# Patient Record
Sex: MALE | Race: WHITE | Employment: FULL TIME | URBAN - METROPOLITAN AREA
[De-identification: names, ages, dates, MRNs, and addresses within clinical notes are randomized per-mention and may not be internally consistent; named-entity substitution may affect disease eponyms.]

---

## 2019-05-08 ENCOUNTER — OFFICE VISIT (OUTPATIENT)
Dept: INTERNAL MEDICINE CLINIC | Age: 54
End: 2019-05-08

## 2019-05-08 ENCOUNTER — HOSPITAL ENCOUNTER (EMERGENCY)
Age: 54
Discharge: HOME | End: 2019-05-08
Attending: EMERGENCY MEDICINE
Payer: COMMERCIAL

## 2019-05-08 ENCOUNTER — HOSPITAL ENCOUNTER (OUTPATIENT)
Dept: ULTRASOUND IMAGING | Age: 54
Discharge: HOME | End: 2019-05-08
Attending: INTERNAL MEDICINE
Payer: COMMERCIAL

## 2019-05-08 VITALS
BODY MASS INDEX: 30.01 KG/M2 | OXYGEN SATURATION: 95 % | HEIGHT: 68 IN | SYSTOLIC BLOOD PRESSURE: 162 MMHG | DIASTOLIC BLOOD PRESSURE: 101 MMHG | HEART RATE: 91 BPM | WEIGHT: 198 LBS | TEMPERATURE: 98.5 F | RESPIRATION RATE: 16 BRPM

## 2019-05-08 VITALS
HEART RATE: 80 BPM | WEIGHT: 208.4 LBS | HEIGHT: 68 IN | RESPIRATION RATE: 18 BRPM | TEMPERATURE: 98.1 F | BODY MASS INDEX: 31.58 KG/M2 | OXYGEN SATURATION: 96 % | DIASTOLIC BLOOD PRESSURE: 88 MMHG | SYSTOLIC BLOOD PRESSURE: 146 MMHG

## 2019-05-08 DIAGNOSIS — I82.401 ACUTE THROMBOEMBOLISM OF DEEP VEINS OF RIGHT LOWER EXTREMITY (HCC): Primary | ICD-10-CM

## 2019-05-08 DIAGNOSIS — M79.89 RIGHT LEG SWELLING: ICD-10-CM

## 2019-05-08 DIAGNOSIS — M79.89 RIGHT LEG SWELLING: Primary | ICD-10-CM

## 2019-05-08 PROBLEM — E66.9 OBESITY (BMI 30-39.9): Status: ACTIVE | Noted: 2019-05-08

## 2019-05-08 LAB
ALBUMIN SERPL-MCNC: 3.8 G/DL (ref 3.5–5)
ALBUMIN/GLOB SERPL: 1 {RATIO} (ref 1.1–2.2)
ALP SERPL-CCNC: 113 U/L (ref 45–117)
ALT SERPL-CCNC: 43 U/L (ref 12–78)
ANION GAP SERPL CALC-SCNC: 4 MMOL/L (ref 5–15)
AST SERPL-CCNC: 33 U/L (ref 15–37)
BASOPHILS # BLD: 0.1 K/UL (ref 0–0.1)
BASOPHILS NFR BLD: 1 % (ref 0–1)
BILIRUB SERPL-MCNC: 0.4 MG/DL (ref 0.2–1)
BUN SERPL-MCNC: 15 MG/DL (ref 6–20)
BUN/CREAT SERPL: 15 (ref 12–20)
CALCIUM SERPL-MCNC: 9.2 MG/DL (ref 8.5–10.1)
CHLORIDE SERPL-SCNC: 104 MMOL/L (ref 97–108)
CO2 SERPL-SCNC: 30 MMOL/L (ref 21–32)
CREAT SERPL-MCNC: 1.01 MG/DL (ref 0.7–1.3)
DIFFERENTIAL METHOD BLD: ABNORMAL
EOSINOPHIL # BLD: 0.2 K/UL (ref 0–0.4)
EOSINOPHIL NFR BLD: 3 % (ref 0–7)
ERYTHROCYTE [DISTWIDTH] IN BLOOD BY AUTOMATED COUNT: 12.6 % (ref 11.5–14.5)
GLOBULIN SER CALC-MCNC: 3.7 G/DL (ref 2–4)
GLUCOSE SERPL-MCNC: 113 MG/DL (ref 65–100)
HCT VFR BLD AUTO: 42.5 % (ref 36.6–50.3)
HGB BLD-MCNC: 14.3 G/DL (ref 12.1–17)
IMM GRANULOCYTES # BLD AUTO: 0.1 K/UL (ref 0–0.04)
IMM GRANULOCYTES NFR BLD AUTO: 1 % (ref 0–0.5)
INR PPP: 1 (ref 0.9–1.1)
LYMPHOCYTES # BLD: 1.3 K/UL (ref 0.8–3.5)
LYMPHOCYTES NFR BLD: 19 % (ref 12–49)
MCH RBC QN AUTO: 28.7 PG (ref 26–34)
MCHC RBC AUTO-ENTMCNC: 33.6 G/DL (ref 30–36.5)
MCV RBC AUTO: 85.3 FL (ref 80–99)
MONOCYTES # BLD: 0.6 K/UL (ref 0–1)
MONOCYTES NFR BLD: 8 % (ref 5–13)
NEUTS SEG # BLD: 4.8 K/UL (ref 1.8–8)
NEUTS SEG NFR BLD: 68 % (ref 32–75)
NRBC # BLD: 0 K/UL (ref 0–0.01)
NRBC BLD-RTO: 0 PER 100 WBC
PLATELET # BLD AUTO: 242 K/UL (ref 150–400)
PMV BLD AUTO: 10.6 FL (ref 8.9–12.9)
POTASSIUM SERPL-SCNC: 3.9 MMOL/L (ref 3.5–5.1)
PROT SERPL-MCNC: 7.5 G/DL (ref 6.4–8.2)
PROTHROMBIN TIME: 10.3 SEC (ref 9–11.1)
RBC # BLD AUTO: 4.98 M/UL (ref 4.1–5.7)
SODIUM SERPL-SCNC: 138 MMOL/L (ref 136–145)
WBC # BLD AUTO: 7 K/UL (ref 4.1–11.1)

## 2019-05-08 PROCEDURE — 80053 COMPREHEN METABOLIC PANEL: CPT

## 2019-05-08 PROCEDURE — 36415 COLL VENOUS BLD VENIPUNCTURE: CPT

## 2019-05-08 PROCEDURE — 85610 PROTHROMBIN TIME: CPT

## 2019-05-08 PROCEDURE — 96372 THER/PROPH/DIAG INJ SC/IM: CPT

## 2019-05-08 PROCEDURE — 99282 EMERGENCY DEPT VISIT SF MDM: CPT

## 2019-05-08 PROCEDURE — 93971 EXTREMITY STUDY: CPT

## 2019-05-08 PROCEDURE — 74011250636 HC RX REV CODE- 250/636: Performed by: PHYSICIAN ASSISTANT

## 2019-05-08 PROCEDURE — 85025 COMPLETE CBC W/AUTO DIFF WBC: CPT

## 2019-05-08 RX ORDER — ENOXAPARIN SODIUM 100 MG/ML
90 INJECTION SUBCUTANEOUS
Status: COMPLETED | OUTPATIENT
Start: 2019-05-08 | End: 2019-05-08

## 2019-05-08 RX ORDER — RANITIDINE HCL 75 MG
75 TABLET ORAL 2 TIMES DAILY
COMMUNITY
End: 2021-01-18

## 2019-05-08 RX ADMIN — ENOXAPARIN SODIUM 90 MG: 100 INJECTION SUBCUTANEOUS at 18:36

## 2019-05-08 NOTE — PROGRESS NOTES
Chief Complaint Patient presents with  Pain (Chronic) right leg top of calf x 2 weeks 1. Have you been to the ER, urgent care clinic since your last visit? Hospitalized since your last visit? [facility]. 2. Have you seen or consulted any other health care providers outside of the [address] [place] [address] since your last visit? Include any pap smears or colon screening.   no

## 2019-05-08 NOTE — DISCHARGE INSTRUCTIONS
Patient Education        Deep Vein Thrombosis: Care Instructions  Your Care Instructions    A deep vein thrombosis (DVT) is a blood clot in certain veins of the legs, pelvis, or arms. Blood clots in these veins need to be treated because they can get bigger, break loose, and travel through the bloodstream to the lungs. A blood clot in a lung can be life-threatening. The doctor may have given you a blood thinner (anticoagulant). A blood thinner can stop the blood clot from growing larger and prevent new clots from forming. You will need to take a blood thinner for 3 to 6 months or longer. The doctor has checked you carefully, but problems can develop later. If you notice any problems or new symptoms, get medical treatment right away. Follow-up care is a key part of your treatment and safety. Be sure to make and go to all appointments, and call your doctor if you are having problems. It's also a good idea to know your test results and keep a list of the medicines you take. How can you care for yourself at home? · Take your medicines exactly as prescribed. Call your doctor if you think you are having a problem with your medicine. · If you are taking a blood thinner, be sure you get instructions about how to take your medicine safely. Blood thinners can cause serious bleeding problems. · Wear compression stockings if your doctor recommends them. These stockings are tighter at the feet than on the legs. They may reduce pain and swelling in your legs. But there are different types of stockings, and they need to fit right. So your doctor will recommend what you need. · When you sit, use a pillow to raise the arm or leg that has the blood clot. Try to keep it above the level of your heart. When should you call for help? Call 911 anytime you think you may need emergency care.  For example, call if:    · You passed out (lost consciousness).     · You have symptoms of a blood clot in your lung (called a pulmonary embolism). These include:  ? Sudden chest pain. ? Trouble breathing. ? Coughing up blood.    Call your doctor now or seek immediate medical care if:    · You have new or worse trouble breathing.     · You are dizzy or lightheaded, or you feel like you may faint.     · You have symptoms of a blood clot in your arm or leg. These may include:  ? Pain in the arm, calf, back of the knee, thigh, or groin. ? Redness and swelling in the arm, leg, or groin.    Watch closely for changes in your health, and be sure to contact your doctor if:    · You do not get better as expected. Where can you learn more? Go to http://maria luisa-yg.info/. Enter H807 in the search box to learn more about \"Deep Vein Thrombosis: Care Instructions. \"  Current as of: September 26, 2018  Content Version: 11.9  © 7604-5404 [employer]. Care instructions adapted under license by [employer] (which disclaims liability or warranty for this information). If you have questions about a medical condition or this instruction, always ask your healthcare professional. [place] [address] any warranty or liability for your use of this information.

## 2019-05-08 NOTE — PATIENT INSTRUCTIONS

## 2019-05-08 NOTE — PROGRESS NOTES
This note will not be viewable in [address] Subjective:  
 
Mr. Marissa Gonzalez presents to the office today with complaints of right calf swelling tenderness and warmth. He notes that this began approximately 2 weeks ago. Several days before this he played some with [employer] ball. He denies any injury to the leg and has had no pain with weightbearing. He gives no history of prior blood clots or immobility. He has had no chest pains, shortness of breath or palpitations. Past Medical History:  
Diagnosis Date  GERD (gastroesophageal reflux disease)  Obesity (BMI 30-39. 9) 5/8/2019 Past Surgical History:  
Procedure Laterality Date  HX ORTHOPAEDIC No Known Allergies Current Outpatient Medications Medication Sig Dispense Refill  raNITIdine (ZANTAC) 75 mg tab Take  by mouth two (2) times a day. Social History Socioeconomic History  Marital status: SINGLE Spouse name: Not on file  Number of children: Not on file  Years of education: Not on file  Highest education level: Not on file Tobacco Use  Smoking status: Never Smoker  Smokeless tobacco: Current User Substance and Sexual Activity  Alcohol use: Yes Comment: rarely Family History Problem Relation Age of Onset  Cancer Mother  Cancer Father Review of Systems: 
GEN: no weight loss, weight gain, fatigue or night sweats CV: no PND, orthopnea, or palpitations Resp: no dyspnea on exertion, no cough Abd: no nausea, vomiting or diarrhea EXT: Positive for right calf swelling and pain Endocrine: no hair loss, excessive thirst or polyuria Neurological ROS: no TIA or stroke symptoms ROS otherwise negative Objective:  
 
Visit Vitals /88 (BP 1 Location: Right arm, BP Patient Position: Sitting) Pulse 80 Temp 98.1 °F (36.7 °C) (Oral) Resp 18 Ht 5' 8\" (1.727 m) Wt 208 lb 6.4 oz (94.5 kg) SpO2 96% BMI 31.69 kg/m² Body mass index is 31.69 kg/m². General:   alert, cooperative and no distress Heart: S1 and S2 normal,no murmurs noted Lungs: Clear to auscultation bilaterally, no increased work of breathing Abdomen: Soft, nontender. Normal bowel sounds Extremities: There is mild swelling of the right calf and pain with palpation over the proximal posterior gastrocnemius region. No Baker's cyst is felt. The leg is warm as compared to the left Neuro: ..alert, oriented x3,speech normal in context and clarity, cranial nerves II-XII intact,motor strength: full proximally and distally,gait: normal 
reflexes: full and symmetric Physical exam otherwise negative Assessment/Plan:  
 
Diagnoses and all orders for this visit: 
 
Right leg swelling 
-     DUPLEX LOWER EXT VENOUS RIGHT; Future Other instructions: The patient's medication is reviewed and reconciled. He is having  right calf swelling warmth and tenderness and will need to rule out DVT. A stat venous Doppler of the right leg will be ordered for today. Follow-up here pending results of the above Follow-up and Dispositions · Return if symptoms worsen or fail to improve.  
  
 
 
Rosi Medina MD

## 2019-05-08 NOTE — ED PROVIDER NOTES
EMERGENCY DEPARTMENT HISTORY AND PHYSICAL EXAM 
 
 
Date: 5/8/2019 Patient Name: Kemi Zurita History of Presenting Illness Chief Complaint Patient presents with  Leg Swelling  
  pt was brought from [address] [facility] with DVT in right leg, has had swelling in leg x2 weeks off and on History Provided By: Patient HPI: Kemi Zurita, 47 y.o. male with PMHx significant for GERD, presents via wheelchair from outpatient ultrasound to the ED with cc of right leg DVT. 2 weeks ago, the patient began having pain to his right posterior calf and felt like he simply strained a muscle. He has been elevating the leg and taking over-the-counter analgesics but it has not been improving. He went to see his PCP, who noted that his leg was slightly swollen and erythematous, and ordered an outpatient ultrasound. He had that done today and it was positive for DVT from the right proximal femoral vein to the right peroneal vein. He has never had a DVT before. He is a  and spends a lot of time in the car. He denies recent surgery, immobilization, or active cancer. He denies chest pain or shortness of breath. There are no other complaints, changes, or physical findings at this time. PCP: Rosalva Bishop MD 
 
No current facility-administered medications on file prior to encounter. Current Outpatient Medications on File Prior to Encounter Medication Sig Dispense Refill  raNITIdine (ZANTAC) 75 mg tab Take  by mouth two (2) times a day. Past History Past Medical History: 
Past Medical History:  
Diagnosis Date  GERD (gastroesophageal reflux disease)  Obesity (BMI 30-39. 9) 5/8/2019 Past Surgical History: 
Past Surgical History:  
Procedure Laterality Date  HX ORTHOPAEDIC Family History: 
Family History Problem Relation Age of Onset  Cancer Mother  Cancer Father Social History: 
Social History Tobacco Use  
  Smoking status: Never Smoker  Smokeless tobacco: Current User Substance Use Topics  Alcohol use: Yes Comment: rarely  Drug use: Not on file Allergies: 
No Known Allergies Review of Systems Review of Systems Constitutional: Negative for chills and fever. HENT: Negative for ear pain and sore throat. Eyes: Negative for redness and visual disturbance. Respiratory: Negative for cough and shortness of breath. Cardiovascular: Negative for chest pain and palpitations. Gastrointestinal: Negative for abdominal pain, nausea and vomiting. Genitourinary: Negative for dysuria and hematuria. Musculoskeletal: Negative for back pain and gait problem. Positive for right leg pain and swelling. Skin: Negative for rash and wound. Neurological: Negative for dizziness and headaches. Psychiatric/Behavioral: Negative for behavioral problems and confusion. All other systems reviewed and are negative. Physical Exam  
Physical Exam  
Constitutional: He is oriented to person, place, and time. He appears well-developed and well-nourished. No distress. HENT:  
Head: Normocephalic and atraumatic. Eyes: Pupils are equal, round, and reactive to light. Conjunctivae and EOM are normal.  
Neck: Normal range of motion. Neck supple. Cardiovascular: Normal rate, regular rhythm and normal heart sounds. Pulmonary/Chest: Effort normal and breath sounds normal. No respiratory distress. He has no wheezes. He has no rales. Musculoskeletal: Normal range of motion. Right mild swelling to the right lower leg with tenderness to palpation over the proximal calf. Slight erythema along the medial leg. 2+ dorsalis pedis and posterior tibial pulses. Neurological: He is alert and oriented to person, place, and time. He has normal strength. No cranial nerve deficit or sensory deficit. GCS eye subscore is 4. GCS verbal subscore is 5. GCS motor subscore is 6. Skin: Skin is warm and dry. No rash noted. Psychiatric: He has a normal mood and affect. His behavior is normal.  
Nursing note and vitals reviewed. Diagnostic Study Results Labs - Recent Results (from the past 12 hour(s)) CBC WITH AUTOMATED DIFF Collection Time: 05/08/19  4:49 PM  
Result Value Ref Range WBC 7.0 4.1 - 11.1 K/uL  
 RBC 4.98 4.10 - 5.70 M/uL  
 HGB 14.3 12.1 - 17.0 g/dL HCT 42.5 36.6 - 50.3 % MCV 85.3 80.0 - 99.0 FL  
 MCH 28.7 26.0 - 34.0 PG  
 MCHC 33.6 30.0 - 36.5 g/dL  
 RDW 12.6 11.5 - 14.5 % PLATELET 036 298 - 701 K/uL MPV 10.6 8.9 - 12.9 FL  
 NRBC 0.0 0  WBC ABSOLUTE NRBC 0.00 0.00 - 0.01 K/uL NEUTROPHILS 68 32 - 75 % LYMPHOCYTES 19 12 - 49 % MONOCYTES 8 5 - 13 % EOSINOPHILS 3 0 - 7 % BASOPHILS 1 0 - 1 % IMMATURE GRANULOCYTES 1 (H) 0.0 - 0.5 % ABS. NEUTROPHILS 4.8 1.8 - 8.0 K/UL  
 ABS. LYMPHOCYTES 1.3 0.8 - 3.5 K/UL  
 ABS. MONOCYTES 0.6 0.0 - 1.0 K/UL  
 ABS. EOSINOPHILS 0.2 0.0 - 0.4 K/UL  
 ABS. BASOPHILS 0.1 0.0 - 0.1 K/UL  
 ABS. IMM. GRANS. 0.1 (H) 0.00 - 0.04 K/UL  
 DF AUTOMATED METABOLIC PANEL, COMPREHENSIVE Collection Time: 05/08/19  4:49 PM  
Result Value Ref Range Sodium 138 136 - 145 mmol/L Potassium 3.9 3.5 - 5.1 mmol/L Chloride 104 97 - 108 mmol/L  
 CO2 30 21 - 32 mmol/L Anion gap 4 (L) 5 - 15 mmol/L Glucose 113 (H) 65 - 100 mg/dL BUN 15 6 - 20 MG/DL Creatinine 1.01 0.70 - 1.30 MG/DL  
 BUN/Creatinine ratio 15 12 - 20 GFR est AA >60 >60 ml/min/1.73m2 GFR est non-AA >60 >60 ml/min/1.73m2 Calcium 9.2 8.5 - 10.1 MG/DL Bilirubin, total 0.4 0.2 - 1.0 MG/DL  
 ALT (SGPT) 43 12 - 78 U/L  
 AST (SGOT) 33 15 - 37 U/L Alk. phosphatase 113 45 - 117 U/L Protein, total 7.5 6.4 - 8.2 g/dL Albumin 3.8 3.5 - 5.0 g/dL Globulin 3.7 2.0 - 4.0 g/dL A-G Ratio 1.0 (L) 1.1 - 2.2 PROTHROMBIN TIME + INR Collection Time: 05/08/19  4:49 PM  
Result Value Ref Range INR 1.0 0.9 - 1.1 Prothrombin time 10.3 9.0 - 11.1 sec Radiologic Studies - No orders to display CT Results  (Last 48 hours) None CXR Results  (Last 48 hours) None Medical Decision Making I am the first provider for this patient. I reviewed the vital signs, available nursing notes, past medical history, past surgical history, family history and social history. Vital Signs-Reviewed the patient's vital signs. Patient Vitals for the past 12 hrs: 
 Temp Pulse Resp BP SpO2  
05/08/19 1641 98.5 °F (36.9 °C) 91 16 (!) 162/101 95 % Records Reviewed: Nursing Notes, Old Medical Records, Previous Radiology Studies and Previous Laboratory Studies Provider Notes (Medical Decision Making):  
Patient presents with confirmed DVT. He has no signs or symptoms of a pulmonary embolism. Plan to check kidney function, and give bridging dose of Lovenox, and start on Xarelto. I spent time educating the patient on DVT and answering all questions that he had. ED Course:  
Initial assessment performed. The patients presenting problems have been discussed, and they are in agreement with the care plan formulated and outlined with them. I have encouraged them to ask questions as they arise throughout their visit. Disposition: 
6:23 PM 
 
The patient has been re-evaluated and is ready for discharge. Reviewed available results with patient. Counseled patient on diagnosis and care plan. Patient has expressed understanding, and all questions have been answered. Patient agrees with plan and agrees to follow up as recommended, or to return to the ED if their symptoms worsen. Discharge instructions have been provided and explained to the patient, along with reasons to return to the ED. PLAN: 
1. Discharge Medication List as of 5/8/2019  6:20 PM  
  
START taking these medications  Details  
rivaroxaban (XARELTO) 15 mg (42)- 20 mg (9) DsPk Take one 15 mg tablet twice a day with food for the first 21 days. Then, take one 20 mg tablet once a day with food for 9 days. , Print, Disp-1 Dose Pack, R-0  
  
  
CONTINUE these medications which have NOT CHANGED Details  
raNITIdine (ZANTAC) 75 mg tab Take  by mouth two (2) times a day., Historical Med 2. Follow-up Information Follow up With Specialties Details Why Contact Info Claribel Hidalgo MD Internal Medicine Call in 1 day to schedule a follow up appointment [facility]. 
[phone] [facility] EMERGENCY DEPT Emergency Medicine Go to If symptoms worsen, or you develop chest pain, shortness of breath, lightheadedness [address] [place] [address] [address] [facility] 
[phone] Return to ED if worse Diagnosis Clinical Impression: 1. Acute thromboembolism of deep veins of right lower extremity (HCC) Queenie Membreno PA-C

## 2019-05-09 ENCOUNTER — TELEPHONE (OUTPATIENT)
Dept: INTERNAL MEDICINE CLINIC | Age: 54
End: 2019-05-09

## 2019-05-09 NOTE — TELEPHONE ENCOUNTER
Patient is calling, he advises that he went to the ED as Dr. Imtiaz Mendoza advised and was given a shot in his stomach and blood thinner medication to start taking. They advised him in the ED to follow up with us in a week and the patient is requesting to be worked in on Thursday after 3 or Friday as his has mandatory out of town conference in [place] (will return at 3).     Best call back # for patient: [phone]

## 2019-05-17 ENCOUNTER — OFFICE VISIT (OUTPATIENT)
Dept: INTERNAL MEDICINE CLINIC | Age: 54
End: 2019-05-17

## 2019-05-17 VITALS
DIASTOLIC BLOOD PRESSURE: 82 MMHG | HEART RATE: 112 BPM | BODY MASS INDEX: 31.83 KG/M2 | SYSTOLIC BLOOD PRESSURE: 138 MMHG | RESPIRATION RATE: 18 BRPM | HEIGHT: 68 IN | TEMPERATURE: 98.6 F | OXYGEN SATURATION: 96 % | WEIGHT: 210 LBS

## 2019-05-17 DIAGNOSIS — I82.411 ACUTE DEEP VEIN THROMBOSIS (DVT) OF FEMORAL VEIN OF RIGHT LOWER EXTREMITY (HCC): Primary | ICD-10-CM

## 2019-05-17 DIAGNOSIS — R05.9 COUGH: ICD-10-CM

## 2019-05-17 RX ORDER — BENZONATATE 200 MG/1
200 CAPSULE ORAL
Qty: 30 CAP | Refills: 0 | Status: SHIPPED | OUTPATIENT
Start: 2019-05-17 | End: 2019-05-24

## 2019-05-18 ENCOUNTER — HOSPITAL ENCOUNTER (EMERGENCY)
Age: 54
Discharge: HOME | End: 2019-05-18
Attending: EMERGENCY MEDICINE
Payer: COMMERCIAL

## 2019-05-18 VITALS
RESPIRATION RATE: 16 BRPM | SYSTOLIC BLOOD PRESSURE: 175 MMHG | TEMPERATURE: 98.6 F | DIASTOLIC BLOOD PRESSURE: 64 MMHG | WEIGHT: 205.69 LBS | BODY MASS INDEX: 31.17 KG/M2 | HEART RATE: 96 BPM | OXYGEN SATURATION: 99 % | HEIGHT: 68 IN

## 2019-05-18 DIAGNOSIS — R19.5 DARK STOOLS: ICD-10-CM

## 2019-05-18 DIAGNOSIS — I10 ACCELERATED HYPERTENSION: ICD-10-CM

## 2019-05-18 DIAGNOSIS — Z79.01 CHRONIC ANTICOAGULATION: Primary | ICD-10-CM

## 2019-05-18 DIAGNOSIS — Z86.718 HISTORY OF DVT (DEEP VEIN THROMBOSIS): ICD-10-CM

## 2019-05-18 LAB
ABO + RH BLD: NORMAL
ALBUMIN SERPL-MCNC: 3.8 G/DL (ref 3.5–5)
ALBUMIN/GLOB SERPL: 1.1 {RATIO} (ref 1.1–2.2)
ALP SERPL-CCNC: 114 U/L (ref 45–117)
ALT SERPL-CCNC: 52 U/L (ref 12–78)
ANION GAP SERPL CALC-SCNC: 4 MMOL/L (ref 5–15)
APTT PPP: 35.1 SEC (ref 22.1–32)
AST SERPL-CCNC: 66 U/L (ref 15–37)
BASOPHILS # BLD: 0.1 K/UL (ref 0–0.1)
BASOPHILS NFR BLD: 1 % (ref 0–1)
BILIRUB SERPL-MCNC: 0.4 MG/DL (ref 0.2–1)
BLOOD GROUP ANTIBODIES SERPL: NORMAL
BUN SERPL-MCNC: 13 MG/DL (ref 6–20)
BUN/CREAT SERPL: 15 (ref 12–20)
CALCIUM SERPL-MCNC: 9.1 MG/DL (ref 8.5–10.1)
CHLORIDE SERPL-SCNC: 106 MMOL/L (ref 97–108)
CO2 SERPL-SCNC: 30 MMOL/L (ref 21–32)
CREAT SERPL-MCNC: 0.87 MG/DL (ref 0.7–1.3)
DIFFERENTIAL METHOD BLD: ABNORMAL
EOSINOPHIL # BLD: 0.1 K/UL (ref 0–0.4)
EOSINOPHIL NFR BLD: 1 % (ref 0–7)
ERYTHROCYTE [DISTWIDTH] IN BLOOD BY AUTOMATED COUNT: 13 % (ref 11.5–14.5)
GLOBULIN SER CALC-MCNC: 3.6 G/DL (ref 2–4)
GLUCOSE SERPL-MCNC: 115 MG/DL (ref 65–100)
HCT VFR BLD AUTO: 42.8 % (ref 36.6–50.3)
HEMOCCULT STL QL: NEGATIVE
HGB BLD-MCNC: 14.4 G/DL (ref 12.1–17)
IMM GRANULOCYTES # BLD AUTO: 0.1 K/UL (ref 0–0.04)
IMM GRANULOCYTES NFR BLD AUTO: 1 % (ref 0–0.5)
INR PPP: 1.2 (ref 0.9–1.1)
LYMPHOCYTES # BLD: 1.3 K/UL (ref 0.8–3.5)
LYMPHOCYTES NFR BLD: 20 % (ref 12–49)
MCH RBC QN AUTO: 28.3 PG (ref 26–34)
MCHC RBC AUTO-ENTMCNC: 33.6 G/DL (ref 30–36.5)
MCV RBC AUTO: 84.3 FL (ref 80–99)
MONOCYTES # BLD: 0.6 K/UL (ref 0–1)
MONOCYTES NFR BLD: 9 % (ref 5–13)
NEUTS SEG # BLD: 4.2 K/UL (ref 1.8–8)
NEUTS SEG NFR BLD: 68 % (ref 32–75)
NRBC # BLD: 0 K/UL (ref 0–0.01)
NRBC BLD-RTO: 0 PER 100 WBC
PLATELET # BLD AUTO: 200 K/UL (ref 150–400)
PMV BLD AUTO: 11.2 FL (ref 8.9–12.9)
POTASSIUM SERPL-SCNC: 3.8 MMOL/L (ref 3.5–5.1)
PROT SERPL-MCNC: 7.4 G/DL (ref 6.4–8.2)
PROTHROMBIN TIME: 11.9 SEC (ref 9–11.1)
RBC # BLD AUTO: 5.08 M/UL (ref 4.1–5.7)
SODIUM SERPL-SCNC: 140 MMOL/L (ref 136–145)
SPECIMEN EXP DATE BLD: NORMAL
THERAPEUTIC RANGE,PTTT: ABNORMAL SECS (ref 58–77)
WBC # BLD AUTO: 6.2 K/UL (ref 4.1–11.1)

## 2019-05-18 PROCEDURE — 82272 OCCULT BLD FECES 1-3 TESTS: CPT

## 2019-05-18 PROCEDURE — 80053 COMPREHEN METABOLIC PANEL: CPT

## 2019-05-18 PROCEDURE — 85730 THROMBOPLASTIN TIME PARTIAL: CPT

## 2019-05-18 PROCEDURE — 86900 BLOOD TYPING SEROLOGIC ABO: CPT

## 2019-05-18 PROCEDURE — 85610 PROTHROMBIN TIME: CPT

## 2019-05-18 PROCEDURE — 85025 COMPLETE CBC W/AUTO DIFF WBC: CPT

## 2019-05-18 PROCEDURE — 36415 COLL VENOUS BLD VENIPUNCTURE: CPT

## 2019-05-18 PROCEDURE — 99282 EMERGENCY DEPT VISIT SF MDM: CPT

## 2019-05-18 RX ORDER — BISMUTH SUBSALICYLATE 262 MG
1 TABLET,CHEWABLE ORAL DAILY
COMMUNITY

## 2019-05-18 RX ORDER — GLUCOSAMINE SULFATE 1500 MG
1000 POWDER IN PACKET (EA) ORAL DAILY
COMMUNITY

## 2019-05-18 RX ORDER — ASCORBIC ACID 500 MG
500 TABLET ORAL DAILY
COMMUNITY

## 2019-05-18 RX ORDER — FAMOTIDINE 20 MG/1
20 TABLET, FILM COATED ORAL 2 TIMES DAILY
Qty: 20 TAB | Refills: 0 | Status: SHIPPED | OUTPATIENT
Start: 2019-05-18 | End: 2019-05-28

## 2019-05-18 NOTE — PROGRESS NOTES
Pharmacy Clarification of Prior to Admission Medication Regimen The patient was interviewed regarding clarification of the prior to admission medication regimen and was questioned regarding use of any other inhalers, topical products, over the counter medications, herbal medications, vitamin products or ophthalmic/nasal/otic medication use. Information Obtained From: RX Query, Patient Pertinent Pharmacy Findings: 
Patient appeared to be agitated and uncooperative. Patient answered questions, although it appeared he did not want to. 
rivaroxaban (XARELTO) 15 mg (42)- 20 mg (9) DsPk: Patient started a 30 day regimen on 5/9/19. Patient has completed 9 days of therapy as of 5/18/19. rivaroxaban (XARELTO) 20 mg tab tablet: Patient has not begun taking this agent. Identified High Alert Medication Information Current Anticoagulants: 
Name: rivaroxaban Kalyn Yoolist) PTA medication list was corrected to the following:  
 
Prior to Admission Medications Prescriptions Last Dose Informant Patient Reported? Taking?  
ascorbic acid, vitamin C, (VITAMIN C) 500 mg tablet 5/18/2019 at Unknown time Self Yes Yes Sig: Take 500 mg by mouth daily. benzonatate (TESSALON) 200 mg capsule 5/18/2019 at Unknown time Self No Yes Sig: Take 1 Cap by mouth three (3) times daily as needed for Cough for up to 7 days. cholecalciferol (VITAMIN D3) 1,000 unit cap 5/18/2019 at Unknown time Self Yes Yes Sig: Take 1,000 Units by mouth daily. multivitamin (ONE A DAY) tablet 5/18/2019 at Unknown time Self Yes Yes Sig: Take 1 Tab by mouth daily. raNITIdine (ZANTAC) 75 mg tab 4/18/2019 at Unknown time Self Yes Yes Sig: Take 75 mg by mouth two (2) times a day. rivaroxaban (XARELTO) 15 mg (42)- 20 mg (9) DsPk 5/18/2019 at Unknown time Self No Yes Sig: Take one 15 mg tablet twice a day with food for the first 21 days. Then, take one 20 mg tablet once a day with food for 9 days. rivaroxaban (XARELTO) 20 mg tab tablet Not Taking at Unknown time Self No No  
Sig: Take 1 Tab by mouth daily (with breakfast). Facility-Administered Medications: None Thank you, Marcey Rous Deri Shaker) Mortimer Capes, CPhT Medication History Pharmacy Technician

## 2019-05-18 NOTE — ED NOTES
Dr Karie Wheeler reviewed discharge instructions with the patient. The patient verbalized understanding. All questions and concerns were addressed. The patient declined a wheelchair and is discharged ambulatory in the care of family members with instructions and prescriptions in hand. Pt is alert and oriented x 4. Respirations are clear and unlabored.

## 2019-05-18 NOTE — DISCHARGE INSTRUCTIONS
Thank you for allowing us to take care of you today! We hope we addressed all of your concerns and needs. We strive to provide excellent quality care in the Emergency Department. You will receive a survey after your visit to evaluate the care you were provided. Should you receive a survey from us, we invite you to share your experience and tell us what made it excellent. It was a pleasure serving you, we invite you to share your experience with us, in our pursuit for excellence, should you be selected to receive a survey. The exam and treatment you received in the Emergency Department were for an urgent problem and are not intended as complete care. It is important that you follow up with a doctor, nurse practitioner, or physician assistant for ongoing care. If your symptoms become worse or you do not improve as expected and you are unable to reach your usual health care provider, you should return to the Emergency Department. We are available 24 hours a day. Please take your discharge instructions with you when you go to your follow-up appointment. If you have any problem arranging a follow-up appointment, contact the Emergency Department immediately. If a prescription has been provided, please have it filled as soon as possible to prevent a delay in treatment. Read the entire medication instruction sheet provided to you by the pharmacy. If you have any questions or reservations about taking the medication due to side effects or interactions with other medications, please call your primary care physician or contact the ER to speak with the charge nurse. Make an appointment with your family doctor or the physician you were referred to for follow-up of this visit as instructed on your discharge paperwork, as this is mandatory follow-up. Return to the ER if you are unable to be seen or if you are unable to be seen in a timely manner.     If you have any problem arranging the follow-up visit, contact the Emergency Department immediately. I hope you feel better and thank you again for allow us to provide you with excellent care today at [facility]! Warmest regards,    Ashutosh Park MD  Emergency Medicine Physician  [facility]                 Taking Blood Thinners Other Than Warfarin: Care Instructions  Your Care Instructions    Blood thinners are medicines that help prevent blood clots. They also help treat problems caused by blood clots. Your doctor may call them anticoagulants. Blood thinners don't really thin the blood. They slow down the time it takes for a blood clot to form. They also keep existing blood clots from getting bigger. Blood thinners can help prevent a stroke caused by a heart rhythm problem (atrial fibrillation). This heart rhythm problem can form clots in the heart that can then go to the brain. Blood thinners can also help prevent or treat blood clots in the legs or lungs. Examples of blood thinners include:  · Apixaban (Eliquis). · Dabigatran (Pradaxa). · Edoxaban (Savaysa). · Rivaroxaban (Xarelto). · Warfarin (Coumadin). These instructions are about blood thinner medicines except warfarin. If you take warfarin, your doctor will give you other instructions. Blood thinners can help save lives. But they can also cause problems. They can make you more likely to bleed. It's important to take them right and do everything you can to keep yourself safe. Follow-up care is a key part of your treatment and safety. Be sure to make and go to all appointments, and call your doctor if you are having problems. It's also a good idea to know your test results and keep a list of the medicines you take. How can you care for yourself at home? · Take your blood thinner exactly as your doctor prescribed them. · If you miss a dose, don't take an extra dose to make up for it.  Your doctor can tell you exactly what to do so you don't take too much or too little. · Ask your pharmacist if you should take your blood thinner with food or without food. · Take your blood thinner at the same time every day. Be careful not to run out of them. · Ask your pharmacist how to store your blood thinner. · Don't take other medicines before talking to your doctor or pharmacist first. This includes prescription medicines, over-the-counter medicines, vitamins, and herbal products. It also includes aspirin and other pain relievers, such as ibuprofen (Motrin). · Tell your doctors, dentist, and pharmacist that you are taking a blood thinner. · Wear medical alert jewelry. This lets others know that you take a blood thinner. You can buy it at most drugstores. · If you are pregnant, breastfeeding, or trying to get pregnant, tell your doctor. You and your doctor will decide what medicines are safe. If you are not planning on getting pregnant, talk to your doctor about how you can prevent pregnancy. · Try to avoid injuries. For example, be careful when you are exercising or playing sports. Make your home safe to reduce your risk of falling. When should you call for help? Call 911 anytime you think you may need emergency care. For example, call if:    · You have a sudden, severe headache that is different from past headaches.    Call your doctor now or seek immediate medical care if:    · You have any abnormal bleeding, such as:  ? Nosebleeds. ? Vaginal bleeding that is different (heavier, more frequent, at a different time of the month) than what you are used to.  ? Bloody or black stools, or rectal bleeding. ? Bloody or pink urine.    Watch closely for changes in your health, and be sure to contact your doctor if you have any problems. Where can you learn more? Go to http://maria luisa-yg.info/. Enter W492 in the search box to learn more about \"Taking Blood Thinners Other Than Warfarin: Care Instructions. \"  Current as of: July 22, 2018  Content Version: 11.9  © 5808-5961 [employer]. Care instructions adapted under license by [employer] (which disclaims liability or warranty for this information). If you have questions about a medical condition or this instruction, always ask your healthcare professional. [address] any warranty or liability for your use of this information.

## 2019-05-19 NOTE — ED PROVIDER NOTES
EMERGENCY DEPARTMENT HISTORY AND PHYSICAL EXAM 
 
 
Date: 5/18/2019 Patient Name: Katalina Villafana Patient Age and Sex: 47 y.o. male History of Presenting Illness Chief Complaint Patient presents with  Rectal Bleeding Patient states that he was diagnosed with a \"blood clot\" to right upper thigh and began having black stool yesterday and is currently taking \"a bloodthinner\" History Provided By: Patient HPI: Katalina Villafana, 47 y.o. male with PMHx significant for GERD, recent RLE DVT on Xarelto presents ambulatory to the ED with c/o of \"dark stool\" onset this morning. Pt states he was seen here in the ED on 5/8/19 and diagnosed with a RLE DVT and has been compliant with his Xarelto. He followed up with his PCP recently and he had been doing well. He had an uneventful day yesterday and had some salad with olives last night and this morning he noticed a black substance in his stool. He remembered the discharge instructions to call the MD or return to the ED if he noticed any hemoptysis or bleeding in stools and so he came here in the ED for evaluation. He denies any nausea, vomiting or abdominal pain. He denies any history of GI bleed. He states he has seen Dr. Darwin Martinez in the past for gastritis and he has been compliant with his ranitidine. Pt denies any other alleviating or exacerbating factors. Additionally, pt specifically denies any recent fever, chills, headache, nausea, vomiting, diarrhea, abdominal pain, CP, SOB, lightheadedness, dizziness, numbness, weakness, tingling, BLE swelling, heart palpitations, urinary sxs, changes in BM, changes in PO intake, cough, or congestion. PCP: Laney Lauren MD 
 
There are no other complaints, changes or physical findings at this time. Past History Past Medical History: 
Past Medical History:  
Diagnosis Date  GERD (gastroesophageal reflux disease)  Obesity (BMI 30-39. 9) 5/8/2019 Past Surgical History: Past Surgical History:  
Procedure Laterality Date  HX ORTHOPAEDIC Family History: 
Family History Problem Relation Age of Onset  Cancer Mother  Cancer Father Social History: 
Social History Tobacco Use  Smoking status: Never Smoker  Smokeless tobacco: Current User Substance Use Topics  Alcohol use: Yes Comment: once a month  Drug use: Not on file Allergies: 
No Known Allergies Medications: No current facility-administered medications on file prior to encounter. Current Outpatient Medications on File Prior to Encounter Medication Sig Dispense Refill  multivitamin (ONE A DAY) tablet Take 1 Tab by mouth daily.  cholecalciferol (VITAMIN D3) 1,000 unit cap Take 1,000 Units by mouth daily.  ascorbic acid, vitamin C, (VITAMIN C) 500 mg tablet Take 500 mg by mouth daily.  benzonatate (TESSALON) 200 mg capsule Take 1 Cap by mouth three (3) times daily as needed for Cough for up to 7 days. 30 Cap 0  
 raNITIdine (ZANTAC) 75 mg tab Take 75 mg by mouth two (2) times a day.  rivaroxaban (XARELTO) 15 mg (42)- 20 mg (9) DsPk Take one 15 mg tablet twice a day with food for the first 21 days. Then, take one 20 mg tablet once a day with food for 9 days. 1 Dose Pack 0  
 rivaroxaban (XARELTO) 20 mg tab tablet Take 1 Tab by mouth daily (with breakfast). 30 Tab 2 Review of Systems Review of Systems Constitutional: Negative. Negative for chills and fever. HENT: Negative. Negative for congestion, facial swelling, rhinorrhea, sore throat, trouble swallowing and voice change. Eyes: Negative. Respiratory: Negative. Negative for apnea, cough, chest tightness, shortness of breath and wheezing. Cardiovascular: Negative. Negative for chest pain, palpitations and leg swelling. Gastrointestinal: Negative for abdominal distention, abdominal pain, blood in stool, constipation, diarrhea, nausea and vomiting. Dark stools Endocrine: Negative. Negative for cold intolerance, heat intolerance and polyuria. Genitourinary: Negative. Negative for difficulty urinating, dysuria, flank pain, frequency, hematuria and urgency. Musculoskeletal: Negative. Negative for arthralgias, back pain, myalgias, neck pain and neck stiffness. Skin: Negative. Negative for color change and rash. Neurological: Negative. Negative for dizziness, syncope, facial asymmetry, speech difficulty, weakness, light-headedness, numbness and headaches. Hematological: Negative. Does not bruise/bleed easily. Psychiatric/Behavioral: Negative. Negative for confusion and self-injury. The patient is not nervous/anxious. Physical Exam  
Physical Exam  
Constitutional: He is oriented to person, place, and time. Vital signs are normal. He appears well-developed and well-nourished. He is cooperative. Non-toxic appearance. HENT:  
Head: Normocephalic and atraumatic. Mouth/Throat: Mucous membranes are normal. No posterior oropharyngeal erythema. Eyes: Pupils are equal, round, and reactive to light. Conjunctivae and EOM are normal.  
Neck: Normal range of motion. Cardiovascular: Normal rate, regular rhythm, normal heart sounds and intact distal pulses. Exam reveals no gallop and no friction rub. No murmur heard. Pulmonary/Chest: Effort normal and breath sounds normal. No respiratory distress. He has no wheezes. He has no rales. He exhibits no tenderness. Abdominal: Soft. Bowel sounds are normal. He exhibits no distension and no mass. There is no tenderness. There is no rebound and no guarding. Musculoskeletal: Normal range of motion. He exhibits no edema, tenderness or deformity. Neurological: He is alert and oriented to person, place, and time. He displays normal reflexes. No cranial nerve deficit. He exhibits normal muscle tone. Coordination normal.  
Skin: Skin is warm. No rash noted. Psychiatric: He has a normal mood and affect. Nursing note and vitals reviewed. Diagnostic Study Results Labs - Recent Results (from the past 24 hour(s)) CBC WITH AUTOMATED DIFF Collection Time: 05/18/19  1:12 PM  
Result Value Ref Range WBC 6.2 4.1 - 11.1 K/uL  
 RBC 5.08 4.10 - 5.70 M/uL  
 HGB 14.4 12.1 - 17.0 g/dL HCT 42.8 36.6 - 50.3 % MCV 84.3 80.0 - 99.0 FL  
 MCH 28.3 26.0 - 34.0 PG  
 MCHC 33.6 30.0 - 36.5 g/dL  
 RDW 13.0 11.5 - 14.5 % PLATELET 754 995 - 629 K/uL MPV 11.2 8.9 - 12.9 FL  
 NRBC 0.0 0  WBC ABSOLUTE NRBC 0.00 0.00 - 0.01 K/uL NEUTROPHILS 68 32 - 75 % LYMPHOCYTES 20 12 - 49 % MONOCYTES 9 5 - 13 % EOSINOPHILS 1 0 - 7 % BASOPHILS 1 0 - 1 % IMMATURE GRANULOCYTES 1 (H) 0.0 - 0.5 % ABS. NEUTROPHILS 4.2 1.8 - 8.0 K/UL  
 ABS. LYMPHOCYTES 1.3 0.8 - 3.5 K/UL  
 ABS. MONOCYTES 0.6 0.0 - 1.0 K/UL  
 ABS. EOSINOPHILS 0.1 0.0 - 0.4 K/UL  
 ABS. BASOPHILS 0.1 0.0 - 0.1 K/UL  
 ABS. IMM. GRANS. 0.1 (H) 0.00 - 0.04 K/UL  
 DF AUTOMATED METABOLIC PANEL, COMPREHENSIVE Collection Time: 05/18/19  1:12 PM  
Result Value Ref Range Sodium 140 136 - 145 mmol/L Potassium 3.8 3.5 - 5.1 mmol/L Chloride 106 97 - 108 mmol/L  
 CO2 30 21 - 32 mmol/L Anion gap 4 (L) 5 - 15 mmol/L Glucose 115 (H) 65 - 100 mg/dL BUN 13 6 - 20 MG/DL Creatinine 0.87 0.70 - 1.30 MG/DL  
 BUN/Creatinine ratio 15 12 - 20 GFR est AA >60 >60 ml/min/1.73m2 GFR est non-AA >60 >60 ml/min/1.73m2 Calcium 9.1 8.5 - 10.1 MG/DL Bilirubin, total 0.4 0.2 - 1.0 MG/DL  
 ALT (SGPT) 52 12 - 78 U/L  
 AST (SGOT) 66 (H) 15 - 37 U/L Alk. phosphatase 114 45 - 117 U/L Protein, total 7.4 6.4 - 8.2 g/dL Albumin 3.8 3.5 - 5.0 g/dL Globulin 3.6 2.0 - 4.0 g/dL A-G Ratio 1.1 1.1 - 2.2 PTT Collection Time: 05/18/19  1:12 PM  
Result Value Ref Range aPTT 35.1 (H) 22.1 - 32.0 sec  
 aPTT, therapeutic range     58.0 - 77.0 SECS  
PROTHROMBIN TIME + INR  Collection Time: 05/18/19  1:12 PM  
 Result Value Ref Range INR 1.2 (H) 0.9 - 1.1 Prothrombin time 11.9 (H) 9.0 - 11.1 sec TYPE & SCREEN Collection Time: 05/18/19  1:12 PM  
Result Value Ref Range Crossmatch Expiration 05/21/2019 ABO/Rh(D) B POSITIVE Antibody screen NEG   
OCCULT BLOOD, STOOL Collection Time: 05/18/19  1:43 PM  
Result Value Ref Range Occult blood, stool NEGATIVE  NEG Radiologic Studies - No orders to display CT Results  (Last 48 hours) None CXR Results  (Last 48 hours) 05/17/19 1513  XR CHEST PA LAT Final result Impression:  IMPRESSION:   
   
No significant abnormalities. Narrative:  EXAM:  XR CHEST PA LAT INDICATION:  cough COMPARISON:  5/11/2018 FINDINGS:  
   
PA and lateral radiographs of the chest demonstrate clear lungs. The cardiac  
and mediastinal contours and pulmonary vascularity are normal.   The bones and  
soft tissues are unremarkable. Medical Decision Making I am the first provider for this patient. I reviewed the vital signs, available nursing notes, past medical history, past surgical history, family history and social history. Vital Signs-Reviewed the patient's vital signs. Patient Vitals for the past 24 hrs: 
 Temp Pulse Resp BP SpO2  
05/18/19 1258 98.6 °F (37 °C) 96 16 175/64 99 % Pulse Oximetry Analysis - 99% on RA Cardiac Monitor:  
Rate: 96 bpm 
Rhythm: Normal Sinus Rhythm Records Reviewed: Nursing Notes, Old Medical Records, Previous electrocardiograms, Previous Radiology Studies and Previous Laboratory Studies Provider Notes (Medical Decision Making):  
Patient presents with dark stools on Xarelto; Currently stable vitals without tachycardia.  Exam nonperitoneal.  DDx: upper gi bleed 2/2 PUD, Esophageal varices; Iron intake, pepto bismol, foreign body Will get labs, obtain two large bore IV's, provide IVF resuscitation, administer IV PPI, send type and screen and transfuse as needed. Will perform rectal exam and monitor closely. ED Course:  
Initial assessment performed. The patients presenting problems have been discussed, and they are in agreement with the care plan formulated and outlined with them. I have encouraged them to ask questions as they arise throughout their visit. ALCOHOL/SUBSTANCE ABUSE COUNSELING: 
Upon evaluation, pt endorsed recent alcohol/illicit drug use. For approximately 15 minutes, pt has been counseled on the dangers of alcohol and illicit drug use on their health, and they were encouraged to quit as soon as possible in order to decrease further risks to their health. Pt has conveyed their understanding of the risks involved should they continue to use these products. HYPERTENSION COUNSELING Education was provided to the patient today regarding their hypertension. Patient is made aware of their elevated blood pressure and is instructed to follow up this week with their Primary Care for a recheck. Patient is counseled regarding consequences of chronic, uncontrolled hypertension including kidney disease, heart disease, stroke or even death. Patient states their understanding and agrees to follow up this week. Additionally, during their visit, I discussed sodium restriction, maintaining ideal body weight and regular exercise program as physiologic means to achieve blood pressure control. The patient will strive towards this. I reviewed our electronic medical record system for any past medical records that were available that may contribute to the patient's current condition, the nursing notes and vital signs from today's visit. Noe Richard MD 
Procedure Note - Rectal Exam:  
Performed by: Marisel Hackett MD 
Chaperoned by: RN 
Rectal exam performed. Brown stool was collected. Stool was collected and sent to the lab for Hemoccult testing.   
Other findings: Dark fleshy substance in sandwich bag that patient provided as the stool sample from home; appears to be a digested piece of olive but will send down for hemoccult testing. No hemorrhoids noted, no hard stool, no active bleeding. The procedure took 1-15 minutes, and pt tolerated well. Progress Note: 
Patient has been reassessed and reports feeling better and symptoms have improved after ED treatment. Mortimer Ferguson is able to tolerate PO and ambulate per baseline. Sophia Saldivar's final labs and imaging have been reviewed with him. He has been counseled regarding his diagnosis. He verbally conveys understanding and agreement of the signs, symptoms, diagnosis, treatment and prognosis and additionally agrees to follow up as recommended with Dr. Booker Canavan, MD in 24 - 48 hours. He also agrees with the care-plan and conveys that all of his questions have been answered. I have also put together some discharge instructions for him that include: 1) educational information regarding their diagnosis, 2) how to care for their diagnosis at home, as well a 3) list of reasons why they would want to return to the ED prior to their follow-up appointment, should their condition change. I have answered all questions to the patient's satisfaction. Strict return precautions given. He both understood and agreed with plan as discussed above. Vital signs stable for discharge. Disposition: DISCHARGE The pt is ready for discharge. The pt's signs, symptoms, diagnosis, and discharge instructions have been discussed and pt has conveyed their understanding. The pt is to follow up as recommended or return to ER should their symptoms worsen. Plan has been discussed and pt is in agreement. PLAN: 
1. Discharge Medication List as of 5/18/2019  2:25 PM  
  
CONTINUE these medications which have NOT CHANGED Details  
rivaroxaban (XARELTO) 20 mg tab tablet Take 1 Tab by mouth daily (with breakfast). , Normal, Disp-30 Tab, R-2  
  
 benzonatate (TESSALON) 200 mg capsule Take 1 Cap by mouth three (3) times daily as needed for Cough for up to 7 days. , Normal, Disp-30 Cap, R-0  
  
raNITIdine (ZANTAC) 75 mg tab Take 75 mg by mouth two (2) times a day., Historical Med  
  
rivaroxaban (XARELTO) 15 mg (42)- 20 mg (9) DsPk Take one 15 mg tablet twice a day with food for the first 21 days. Then, take one 20 mg tablet once a day with food for 9 days. , Print, Disp-1 Dose Pack, R-0  
  
  
 
2. Follow-up Information Follow up With Specialties Details Why Contact Info Quan Ryan MD Internal Medicine   [address] [facility] 
[phone] [facility] EMERGENCY DEPT Emergency Medicine  As needed, If symptoms worsen [address] [facility] 
[phone] [address] [facility]  Schedule an appointment as soon as possible for a visit in 1 day  [address] [facility] [address]   [id number] Return to ED if worse Diagnosis Clinical Impression: 1. Chronic anticoagulation 2. Dark stools 3. History of DVT (deep vein thrombosis) 4. Accelerated hypertension Attestation: 
 
I personally performed the services described in this documentation on this date 5/18/2019 for patient Osiris Estrella. I am the sole author of this note. Balwinder Dewitt MD 
 
Please note that this dictation was completed with [employer], the computer voice recognition software. Quite often unanticipated grammatical, syntax, homophones, and other interpretive errors are inadvertently transcribed by the computer software. Please disregard these errors. Please excuse any errors that have escaped final proofreading. Thank you. This note will not be viewable in [address]

## 2019-08-12 ENCOUNTER — OFFICE VISIT (OUTPATIENT)
Dept: INTERNAL MEDICINE CLINIC | Age: 54
End: 2019-08-12

## 2019-08-12 VITALS
HEIGHT: 68 IN | OXYGEN SATURATION: 97 % | HEART RATE: 95 BPM | TEMPERATURE: 98.1 F | DIASTOLIC BLOOD PRESSURE: 80 MMHG | SYSTOLIC BLOOD PRESSURE: 130 MMHG | WEIGHT: 208.2 LBS | BODY MASS INDEX: 31.55 KG/M2 | RESPIRATION RATE: 16 BRPM

## 2019-08-12 DIAGNOSIS — Z86.718 HISTORY OF DVT OF LOWER EXTREMITY: Primary | ICD-10-CM

## 2019-08-12 PROBLEM — I82.411 ACUTE DEEP VEIN THROMBOSIS (DVT) OF FEMORAL VEIN OF RIGHT LOWER EXTREMITY (HCC): Status: RESOLVED | Noted: 2019-05-17 | Resolved: 2019-08-12

## 2019-08-12 NOTE — PROGRESS NOTES
This note will not be viewable in [address] Subjective:     Adriel Garcia presents to the office today in follow-up of the DVT that involved his right calf up to the popliteal fossa. He has now been on anticoagulation for 3 months. He has no further leg symptoms or swelling. He notes of no family history of blood clots. He works as a  and spends hours each day sitting in a car. He has had no chest pain, shortness of breath or other related problems. Past Medical History:   Diagnosis Date    GERD (gastroesophageal reflux disease)     Obesity (BMI 30-39. 9) 5/8/2019     Past Surgical History:   Procedure Laterality Date    HX ORTHOPAEDIC       No Known Allergies  Current Outpatient Medications   Medication Sig Dispense Refill    multivitamin (ONE A DAY) tablet Take 1 Tab by mouth daily.  cholecalciferol (VITAMIN D3) 1,000 unit cap Take 1,000 Units by mouth daily.  ascorbic acid, vitamin C, (VITAMIN C) 500 mg tablet Take 500 mg by mouth daily.  rivaroxaban (XARELTO) 20 mg tab tablet Take 1 Tab by mouth daily (with breakfast). 30 Tab 2    raNITIdine (ZANTAC) 75 mg tab Take 75 mg by mouth two (2) times a day.        Social History     Socioeconomic History    Marital status: SINGLE     Spouse name: Not on file    Number of children: Not on file    Years of education: Not on file    Highest education level: Not on file   Tobacco Use    Smoking status: Never Smoker    Smokeless tobacco: Current User   Substance and Sexual Activity    Alcohol use: Yes     Comment: once a month     Family History   Problem Relation Age of Onset   Geronimo.Mayumiko Cancer Mother     Cancer Father        Review of Systems:  GEN: no weight loss, weight gain, fatigue or night sweats  CV: no PND, orthopnea, or palpitations  Resp: no dyspnea on exertion, no cough  Abd: no nausea, vomiting or diarrhea  EXT: denies edema, claudication  Endocrine: no hair loss, excessive thirst or polyuria  Neurological ROS: no TIA or stroke symptoms  ROS otherwise negative      Objective:     Visit Vitals  /80 (BP 1 Location: Right arm, BP Patient Position: Sitting)   Pulse 95   Temp 98.1 °F (36.7 °C) (Oral)   Resp 16   Ht 5' 8\" (1.727 m)   Wt 208 lb 3.2 oz (94.4 kg)   SpO2 97%   BMI 31.66 kg/m²     Body mass index is 31.66 kg/m². General:   alert, cooperative and no distress   Eyes: conjunctivae/sclerae clear. PERRL, EOM's intact   Mouth:  No oral lesions, no pharyngeal erythema, no exudates   Neck: Trachea midline, no thyromegaly, no bruits   Heart: S1 and S2 normal,no murmurs noted    Lungs: Clear to auscultation bilaterally, no increased work of breathing   Abdomen: Soft, nontender. Normal bowel sounds   Extremities: No edema or cyanosis. No right calf swelling or tenderness noted. Distal pulses intact   Neuro: ..alert, oriented x3,speech normal in context and clarity, cranial nerves II-XII intact,motor strength: full proximally and distally,gait: normal  reflexes: full and symmetric     Physical exam otherwise negative         Assessment/Plan:     Diagnoses and all orders for this visit:    History of DVT of lower extremity        Other instructions: The patient's medications were reviewed and reconciled. He has been anticoagulated for 3 months and most likely developed a DVT as a result of prolonged immobilization while sitting in cars during work. The clot was in the calf which puts a doubtful risk of embolizing. We will stop his Xarelto and have them changed to a baby aspirin daily for 3 months. He understands and any further symptoms that develop similar to what he had, that he should return for reevaluation    Follow-up and Dispositions    · Return for As previously scheduled.          Arya Tompkins MD

## 2019-08-12 NOTE — PATIENT INSTRUCTIONS
Body Mass Index: Care Instructions Your Care Instructions Body mass index (BMI) can help you see if your weight is raising your risk for health problems. It uses a formula to compare how much you weigh with how tall you are. · A BMI lower than 18.5 is considered underweight. · A BMI between 18.5 and 24.9 is considered healthy. · A BMI between 25 and 29.9 is considered overweight. A BMI of 30 or higher is considered obese. If your BMI is in the normal range, it means that you have a lower risk for weight-related health problems. If your BMI is in the overweight or obese range, you may be at increased risk for weight-related health problems, such as high blood pressure, heart disease, stroke, arthritis or joint pain, and diabetes. If your BMI is in the underweight range, you may be at increased risk for health problems such as fatigue, lower protection (immunity) against illness, muscle loss, bone loss, hair loss, and hormone problems. BMI is just one measure of your risk for weight-related health problems. You may be at higher risk for health problems if you are not active, you eat an unhealthy diet, or you drink too much alcohol or use tobacco products. Follow-up care is a key part of your treatment and safety. Be sure to make and go to all appointments, and call your doctor if you are having problems. It's also a good idea to know your test results and keep a list of the medicines you take. How can you care for yourself at home? · Practice healthy eating habits. This includes eating plenty of fruits, vegetables, whole grains, lean protein, and low-fat dairy. · If your doctor recommends it, get more exercise. Walking is a good choice. Bit by bit, increase the amount you walk every day. Try for at least 30 minutes on most days of the week. · Do not smoke. Smoking can increase your risk for health problems.  If you need help quitting, talk to your doctor about stop-smoking programs and medicines. These can increase your chances of quitting for good. · Limit alcohol to 2 drinks a day for men and 1 drink a day for women. Too much alcohol can cause health problems. If you have a BMI higher than 25 · Your doctor may do other tests to check your risk for weight-related health problems. This may include measuring the distance around your waist. A waist measurement of more than 40 inches in men or 35 inches in women can increase the risk of weight-related health problems. · Talk with your doctor about steps you can take to stay healthy or improve your health. You may need to make lifestyle changes to lose weight and stay healthy, such as changing your diet and getting regular exercise. If you have a BMI lower than 18.5 · Your doctor may do other tests to check your risk for health problems. · Talk with your doctor about steps you can take to stay healthy or improve your health. You may need to make lifestyle changes to gain or maintain weight and stay healthy, such as getting more healthy foods in your diet and doing exercises to build muscle. Where can you learn more? Go to http://maria luisa-yg.info/. Enter S176 in the search box to learn more about \"Body Mass Index: Care Instructions. \" Current as of: March 28, 2019 Content Version: 12.1 © 8763-2948 Healthwise, Incorporated. Care instructions adapted under license by [employer] (which disclaims liability or warranty for this information). If you have questions about a medical condition or this instruction, always ask your healthcare professional. [address] any warranty or liability for your use of this information.

## 2020-05-18 ENCOUNTER — APPOINTMENT (OUTPATIENT)
Dept: GENERAL RADIOLOGY | Age: 55
End: 2020-05-18
Attending: EMERGENCY MEDICINE
Payer: COMMERCIAL

## 2020-05-18 ENCOUNTER — HOSPITAL ENCOUNTER (EMERGENCY)
Age: 55
Discharge: HOME | End: 2020-05-18
Attending: EMERGENCY MEDICINE
Payer: COMMERCIAL

## 2020-05-18 VITALS
HEART RATE: 86 BPM | BODY MASS INDEX: 31.21 KG/M2 | SYSTOLIC BLOOD PRESSURE: 165 MMHG | RESPIRATION RATE: 18 BRPM | DIASTOLIC BLOOD PRESSURE: 100 MMHG | TEMPERATURE: 98.2 F | OXYGEN SATURATION: 99 % | HEIGHT: 68 IN | WEIGHT: 205.91 LBS

## 2020-05-18 DIAGNOSIS — S66.911A WRIST STRAIN, RIGHT, INITIAL ENCOUNTER: Primary | ICD-10-CM

## 2020-05-18 DIAGNOSIS — G56.11 RIGHT MEDIAN NERVE NEUROPATHY: ICD-10-CM

## 2020-05-18 PROCEDURE — 73130 X-RAY EXAM OF HAND: CPT

## 2020-05-18 PROCEDURE — 99283 EMERGENCY DEPT VISIT LOW MDM: CPT

## 2020-05-18 PROCEDURE — 74011250636 HC RX REV CODE- 250/636: Performed by: EMERGENCY MEDICINE

## 2020-05-18 PROCEDURE — 74011250637 HC RX REV CODE- 250/637: Performed by: EMERGENCY MEDICINE

## 2020-05-18 PROCEDURE — 96372 THER/PROPH/DIAG INJ SC/IM: CPT

## 2020-05-18 RX ORDER — KETOROLAC TROMETHAMINE 30 MG/ML
30 INJECTION, SOLUTION INTRAMUSCULAR; INTRAVENOUS
Status: COMPLETED | OUTPATIENT
Start: 2020-05-18 | End: 2020-05-18

## 2020-05-18 RX ORDER — NAPROXEN 500 MG/1
500 TABLET ORAL 2 TIMES DAILY WITH MEALS
Qty: 20 TAB | Refills: 0 | Status: SHIPPED | OUTPATIENT
Start: 2020-05-18 | End: 2020-05-28

## 2020-05-18 RX ORDER — ACETAMINOPHEN 500 MG
1000 TABLET ORAL ONCE
Status: COMPLETED | OUTPATIENT
Start: 2020-05-18 | End: 2020-05-18

## 2020-05-18 RX ORDER — HYDROCODONE BITARTRATE AND ACETAMINOPHEN 5; 325 MG/1; MG/1
1 TABLET ORAL
Qty: 12 TAB | Refills: 0 | Status: SHIPPED | OUTPATIENT
Start: 2020-05-18 | End: 2020-05-21

## 2020-05-18 RX ADMIN — KETOROLAC TROMETHAMINE 30 MG: 30 INJECTION, SOLUTION INTRAMUSCULAR at 06:42

## 2020-05-18 RX ADMIN — ACETAMINOPHEN 1000 MG: 500 TABLET ORAL at 06:39

## 2020-05-18 NOTE — LETTER
[address] 
[facility] EMERGENCY DEPT 
[address] [facility] [place] [address] 
[phone] Work/School Note Date: 5/18/2020 To Whom It May concern: 
 
Shivam Steen was seen and treated today in the emergency room by the following provider(s): 
Attending Provider: Minh Staples MD. Shivam Steen be excused from work on May 18, 2020. Sincerely, Gerardo Crooks MD

## 2020-05-18 NOTE — DISCHARGE INSTRUCTIONS
Patient Education        Strain or Sprain: Care Instructions  Your Care Instructions    A strain happens when you overstretch, or pull, a muscle. A sprain occurs when you stretch or tear a ligament, the tough tissue that connects one bone to another. These problems can happen when you exercise or lift something or when you are in an accident. Rest and other home care can help strains and sprains heal.  The doctor has checked you carefully, but problems can develop later. If you notice any problems or new symptoms,  get medical treatment right away. Follow-up care is a key part of your treatment and safety. Be sure to make and go to all appointments, and call your doctor if you are having problems. It's also a good idea to know your test results and keep a list of the medicines you take. How can you care for yourself at home? · If your doctor gave you a sling, splint, brace, or immobilizer, use it exactly as directed. · Rest the strained or sprained area, and follow your doctor's advice about when you can be active again. · Put ice or a cold pack on the sore area for 10 to 20 minutes at a time to stop swelling. Try this every 1 to 2 hours for 3 days (when you are awake) or until the swelling goes down. Put a thin cloth between the ice pack and your skin. Keep your splint or brace dry. · Prop up a sore arm or leg on a pillow when you ice it or anytime you sit or lie down. Try to keep it higher than the level of your heart. This will help reduce swelling. · Take pain medicines exactly as directed. ? If the doctor gave you a prescription medicine for pain, take it as prescribed. ? If you are not taking a prescription pain medicine, ask your doctor if you can take an over-the-counter medicine. · Do exercises as directed by your doctor or physical therapist.  · Return to your usual level of activity slowly. · Do not do anything that makes the pain worse. When should you call for help?   Call your doctor now or seek immediate medical care if:    · You have severe or increasing pain.     · You have tingling, weakness, or numbness in the area.     · The area turns cold or changes color.     · Your cast or splint feels too tight.     · You have symptoms of a blood clot, such as:  ? Pain in your calf, back of the knee, thigh, or groin. ? Redness and swelling in your leg or groin.     · You cannot move the strained part of your body.    Watch closely for changes in your health, and be sure to contact your doctor if:    · You do not get better as expected. Where can you learn more? Go to http://maria luisa-yg.info/  Enter H024 in the search box to learn more about \"Strain or Sprain: Care Instructions. \"  Current as of: June 26, 2019Content Version: 12.4  © 3120-3954 Healthwise, Incorporated. Care instructions adapted under license by [employer] (which disclaims liability or warranty for this information). If you have questions about a medical condition or this instruction, always ask your healthcare professional. [address] any warranty or liability for your use of this information.

## 2020-05-18 NOTE — ED PROVIDER NOTES
EMERGENCY DEPARTMENT HISTORY AND PHYSICAL EXAM      Date: 5/18/2020  Patient Name: Cabrera Mclain    History of Presenting Illness     Chief Complaint   Patient presents with    Wrist Pain     Patient reports he thinks he \"overextended\" his hand while carrying groceries. Patient reports taking pain meds last night as well as ice and heat.  Hand Pain       History Provided By: Patient    HPI: Cabrera Mclain, 49 y.o. male with PMHx significant for GERD and previous history of DVT (not currently on anticoagulation) presents to the ED with chief complaint of right wrist and hand pain. Patient states he was carrying heavy groceries and last night in his right hand and felt like his third finger of his right hand overextended. This happened at about 10 PM.  About an hour and a half later he started having severe pain in his third finger, hand, and wrist on the right. He tried taking ibuprofen, and using ice or heat without significant relief. He states that his pain is burning and is intermittently severe. He is left-handed. He thinks he broke his right wrist as a child, but that has been more than 40 years ago. He denies any weakness in his hand. He reports that the injury was not sudden and he did not fall and there was no significant trauma,  But just felt like the bag pulled on his third finger a little hard. PCP: Luis Eduardo Damon MD    No current facility-administered medications on file prior to encounter. Current Outpatient Medications on File Prior to Encounter   Medication Sig Dispense Refill    multivitamin (ONE A DAY) tablet Take 1 Tab by mouth daily.  cholecalciferol (VITAMIN D3) 1,000 unit cap Take 1,000 Units by mouth daily.  ascorbic acid, vitamin C, (VITAMIN C) 500 mg tablet Take 500 mg by mouth daily.  rivaroxaban (XARELTO) 20 mg tab tablet Take 1 Tab by mouth daily (with breakfast).  30 Tab 2    raNITIdine (ZANTAC) 75 mg tab Take 75 mg by mouth two (2) times a day. Past History     Past Medical History:  Past Medical History:   Diagnosis Date    GERD (gastroesophageal reflux disease)     Obesity (BMI 30-39. 9) 5/8/2019       Past Surgical History:  Past Surgical History:   Procedure Laterality Date    HX ORTHOPAEDIC         Family History:  Family History   Problem Relation Age of Onset    Cancer Mother     Cancer Father        Social History:  Social History     Tobacco Use    Smoking status: Never Smoker    Smokeless tobacco: Current User   Substance Use Topics    Alcohol use: Yes     Comment: once a month    Drug use: Not on file       Allergies:  No Known Allergies      Review of Systems   Review of Systems   Constitutional: Negative for chills and fever. HENT: Negative for congestion, ear pain, rhinorrhea and sore throat. Eyes: Negative. Respiratory: Negative for cough, chest tightness, shortness of breath and wheezing. Cardiovascular: Negative for chest pain and palpitations. Gastrointestinal: Negative for abdominal pain, diarrhea, nausea and vomiting. Genitourinary: Negative for dysuria and flank pain. Musculoskeletal: Positive for arthralgias. Negative for back pain, myalgias and neck pain. Skin: Negative for rash and wound. Neurological: Negative for dizziness, syncope, weakness, light-headedness and headaches. Psychiatric/Behavioral: Negative for confusion. The patient is not nervous/anxious. All other systems reviewed and are negative.         Physical Exam    General appearance - well nourished, well appearing, and in no distress  Chest - clear to auscultation  Heart - normal rate and regular rhythm  Musculoskeletal -tender to palpation over right anterior wrist in the area of the carpal tunnel, tender to palpation overlying the flexor tendon of the third finger on the right hand, no deformity, mild swelling of palmar aspect of right hand overlying the carpal tunnel and third meta carpal  normal ROM  Extremities - peripheral pulses normal, no pedal edema  Skin - normal coloration and turgor, no rashes  Neurological - alert, oriented x3, normal speech, no focal findings or movement disorder noted    Diagnostic Study Results     Labs -   No results found for this or any previous visit (from the past 12 hour(s)). Radiologic Studies -   XR HAND RT MIN 3 V   Final Result   IMPRESSION: No acute abnormality. CT Results  (Last 48 hours)    None        CXR Results  (Last 48 hours)    None            Medical Decision Making   I am the first provider for this patient. I reviewed the vital signs, available nursing notes, past medical history, past surgical history, family history and social history. Vital Signs-Reviewed the patient's vital signs. Patient Vitals for the past 12 hrs:   Temp Pulse Resp BP SpO2   05/18/20 0523 98.2 °F (36.8 °C) 86 18 (!) 165/100 99 %           Records Reviewed: Nursing Notes and Old Medical Records    Provider Notes (Medical Decision Making):   Differential diagnosis: Fracture, sprain, median neuropathy    ED Course:   Initial assessment performed. The patients presenting problems have been discussed, and they are in agreement with the care plan formulated and outlined with them. I have encouraged them to ask questions as they arise throughout their visit. Progress Notes:  ED Course as of May 19 0440   Mon May 18, 2020   0725 Patient's x-ray has not yet resulted. He is demanding to leave at this time without the x-ray results. Will place in wrist immobilizer and treat with anti-inflammatory pain medicine and Norco.  Will encourage ice and will have him follow-up with his primary care doctor.    [AO]      ED Course User Index  [AO] Cheryl Baird MD       [facility]      PLAN:  1.    Discharge Medication List as of 5/18/2020  7:25 AM      START taking these medications    Details   HYDROcodone-acetaminophen (Norco) 5-325 mg per tablet Take 1 Tab by mouth every six (6) hours as needed for Pain for up to 3 days. Max Daily Amount: 4 Tabs., Print, Disp-12 Tab, R-0      naproxen (Naprosyn) 500 mg tablet Take 1 Tab by mouth two (2) times daily (with meals) for 10 days. , Print, Disp-20 Tab, R-0         CONTINUE these medications which have NOT CHANGED    Details   multivitamin (ONE A DAY) tablet Take 1 Tab by mouth daily. , Historical Med      cholecalciferol (VITAMIN D3) 1,000 unit cap Take 1,000 Units by mouth daily. , Historical Med      ascorbic acid, vitamin C, (VITAMIN C) 500 mg tablet Take 500 mg by mouth daily. , Historical Med      rivaroxaban (XARELTO) 20 mg tab tablet Take 1 Tab by mouth daily (with breakfast). , Normal, Disp-30 Tab, R-2      raNITIdine (ZANTAC) 75 mg tab Take 75 mg by mouth two (2) times a day., Historical Med           2. Follow-up Information     Follow up With Specialties Details Why Contact Info    [facility] EMERGENCY DEPT Emergency Medicine  If symptoms worsen [address] [place] [address]  [phone]    Rosco Koyanagi, MD Internal Medicine Schedule an appointment as soon as possible for a visit  [address] [place] [id number] [phone]          Return to ED if worse     Diagnosis     Clinical Impression:   1. Wrist strain, right, initial encounter    2.  Right median nerve neuropathy

## 2020-05-18 NOTE — ED NOTES
Skin warm and dry. Respirations even and unlabored. In no apparent distress at this time. Discharge paperwork provided.

## 2020-09-08 ENCOUNTER — HOSPITAL ENCOUNTER (OUTPATIENT)
Dept: CT IMAGING | Age: 55
Discharge: HOME | End: 2020-09-08
Attending: FAMILY MEDICINE

## 2020-09-08 DIAGNOSIS — Z13.6 SCREENING FOR HEART DISEASE: ICD-10-CM

## 2020-09-08 PROCEDURE — 75571 CT HRT W/O DYE W/CA TEST: CPT

## 2020-11-30 ENCOUNTER — TELEPHONE (OUTPATIENT)
Dept: INTERNAL MEDICINE CLINIC | Age: 55
End: 2020-11-30

## 2020-11-30 NOTE — TELEPHONE ENCOUNTER
Patient states he slept wrong on the couch. He has pain from his lower shoulder blade down his right arm. He thinks he may have a pinched nerve. He is taking aleve and putting heat on it.      [phone]

## 2020-11-30 NOTE — TELEPHONE ENCOUNTER
Would continue to treat as he is doing over the next week.   If no improvement then will need to have an office visit

## 2021-01-13 ENCOUNTER — HOSPITAL ENCOUNTER (EMERGENCY)
Age: 56
Discharge: HOME | End: 2021-01-13
Attending: EMERGENCY MEDICINE
Payer: COMMERCIAL

## 2021-01-13 ENCOUNTER — APPOINTMENT (OUTPATIENT)
Dept: GENERAL RADIOLOGY | Age: 56
End: 2021-01-13
Attending: EMERGENCY MEDICINE
Payer: COMMERCIAL

## 2021-01-13 ENCOUNTER — TELEPHONE (OUTPATIENT)
Dept: INTERNAL MEDICINE CLINIC | Age: 56
End: 2021-01-13

## 2021-01-13 VITALS
HEART RATE: 99 BPM | WEIGHT: 204.81 LBS | HEIGHT: 68 IN | RESPIRATION RATE: 30 BRPM | SYSTOLIC BLOOD PRESSURE: 128 MMHG | OXYGEN SATURATION: 93 % | BODY MASS INDEX: 31.04 KG/M2 | TEMPERATURE: 100 F | DIASTOLIC BLOOD PRESSURE: 77 MMHG

## 2021-01-13 DIAGNOSIS — Z20.822 PERSON UNDER INVESTIGATION FOR COVID-19: Primary | ICD-10-CM

## 2021-01-13 DIAGNOSIS — E86.0 DEHYDRATION: ICD-10-CM

## 2021-01-13 DIAGNOSIS — J18.9 COMMUNITY ACQUIRED PNEUMONIA OF LEFT LOWER LOBE OF LUNG: ICD-10-CM

## 2021-01-13 DIAGNOSIS — R50.81 FEVER IN OTHER DISEASES: ICD-10-CM

## 2021-01-13 LAB
ANION GAP SERPL CALC-SCNC: 4 MMOL/L (ref 5–15)
BASOPHILS # BLD: 0 K/UL (ref 0–0.1)
BASOPHILS NFR BLD: 1 % (ref 0–1)
BUN SERPL-MCNC: 13 MG/DL (ref 6–20)
BUN/CREAT SERPL: 13 (ref 12–20)
CALCIUM SERPL-MCNC: 8.2 MG/DL (ref 8.5–10.1)
CHLORIDE SERPL-SCNC: 96 MMOL/L (ref 97–108)
CO2 SERPL-SCNC: 30 MMOL/L (ref 21–32)
CREAT SERPL-MCNC: 1.02 MG/DL (ref 0.7–1.3)
DIFFERENTIAL METHOD BLD: ABNORMAL
EOSINOPHIL # BLD: 0 K/UL (ref 0–0.4)
EOSINOPHIL NFR BLD: 0 % (ref 0–7)
ERYTHROCYTE [DISTWIDTH] IN BLOOD BY AUTOMATED COUNT: 12.9 % (ref 11.5–14.5)
GLUCOSE SERPL-MCNC: 93 MG/DL (ref 65–100)
HCT VFR BLD AUTO: 41.8 % (ref 36.6–50.3)
HGB BLD-MCNC: 14.4 G/DL (ref 12.1–17)
IMM GRANULOCYTES # BLD AUTO: 0 K/UL (ref 0–0.04)
IMM GRANULOCYTES NFR BLD AUTO: 1 % (ref 0–0.5)
LYMPHOCYTES # BLD: 0.9 K/UL (ref 0.8–3.5)
LYMPHOCYTES NFR BLD: 22 % (ref 12–49)
MCH RBC QN AUTO: 28.8 PG (ref 26–34)
MCHC RBC AUTO-ENTMCNC: 34.4 G/DL (ref 30–36.5)
MCV RBC AUTO: 83.6 FL (ref 80–99)
MONOCYTES # BLD: 0.3 K/UL (ref 0–1)
MONOCYTES NFR BLD: 7 % (ref 5–13)
NEUTS SEG # BLD: 2.8 K/UL (ref 1.8–8)
NEUTS SEG NFR BLD: 69 % (ref 32–75)
NRBC # BLD: 0 K/UL (ref 0–0.01)
NRBC BLD-RTO: 0 PER 100 WBC
PLATELET # BLD AUTO: 98 K/UL (ref 150–400)
PMV BLD AUTO: 12.3 FL (ref 8.9–12.9)
POTASSIUM SERPL-SCNC: 4.3 MMOL/L (ref 3.5–5.1)
RBC # BLD AUTO: 5 M/UL (ref 4.1–5.7)
SODIUM SERPL-SCNC: 130 MMOL/L (ref 136–145)
WBC # BLD AUTO: 3.9 K/UL (ref 4.1–11.1)

## 2021-01-13 PROCEDURE — 80048 BASIC METABOLIC PNL TOTAL CA: CPT

## 2021-01-13 PROCEDURE — 36415 COLL VENOUS BLD VENIPUNCTURE: CPT

## 2021-01-13 PROCEDURE — 74011250637 HC RX REV CODE- 250/637: Performed by: EMERGENCY MEDICINE

## 2021-01-13 PROCEDURE — 71045 X-RAY EXAM CHEST 1 VIEW: CPT

## 2021-01-13 PROCEDURE — 87635 SARS-COV-2 COVID-19 AMP PRB: CPT

## 2021-01-13 PROCEDURE — 99283 EMERGENCY DEPT VISIT LOW MDM: CPT

## 2021-01-13 PROCEDURE — 96375 TX/PRO/DX INJ NEW DRUG ADDON: CPT

## 2021-01-13 PROCEDURE — 96361 HYDRATE IV INFUSION ADD-ON: CPT

## 2021-01-13 PROCEDURE — 74011250636 HC RX REV CODE- 250/636: Performed by: EMERGENCY MEDICINE

## 2021-01-13 PROCEDURE — 85025 COMPLETE CBC W/AUTO DIFF WBC: CPT

## 2021-01-13 PROCEDURE — 96374 THER/PROPH/DIAG INJ IV PUSH: CPT

## 2021-01-13 RX ORDER — ACETAMINOPHEN 500 MG
1000 TABLET ORAL ONCE
Status: COMPLETED | OUTPATIENT
Start: 2021-01-13 | End: 2021-01-13

## 2021-01-13 RX ORDER — AZITHROMYCIN 250 MG/1
500 TABLET, FILM COATED ORAL
Status: COMPLETED | OUTPATIENT
Start: 2021-01-13 | End: 2021-01-13

## 2021-01-13 RX ORDER — AZITHROMYCIN 250 MG/1
250 TABLET, FILM COATED ORAL DAILY
Qty: 4 TAB | Refills: 0 | Status: SHIPPED | OUTPATIENT
Start: 2021-01-13 | End: 2021-01-13 | Stop reason: SDUPTHER

## 2021-01-13 RX ORDER — KETOROLAC TROMETHAMINE 30 MG/ML
15 INJECTION, SOLUTION INTRAMUSCULAR; INTRAVENOUS
Status: COMPLETED | OUTPATIENT
Start: 2021-01-13 | End: 2021-01-13

## 2021-01-13 RX ORDER — HYDROCODONE POLISTIREX AND CHLORPHENIRAMINE POLISTIREX 10; 8 MG/5ML; MG/5ML
5 SUSPENSION, EXTENDED RELEASE ORAL
Qty: 50 ML | Refills: 0 | Status: SHIPPED | OUTPATIENT
Start: 2021-01-13 | End: 2021-01-18

## 2021-01-13 RX ORDER — DEXAMETHASONE SODIUM PHOSPHATE 4 MG/ML
10 INJECTION, SOLUTION INTRA-ARTICULAR; INTRALESIONAL; INTRAMUSCULAR; INTRAVENOUS; SOFT TISSUE ONCE
Status: COMPLETED | OUTPATIENT
Start: 2021-01-13 | End: 2021-01-13

## 2021-01-13 RX ORDER — ALBUTEROL SULFATE 90 UG/1
2 AEROSOL, METERED RESPIRATORY (INHALATION)
Qty: 1 INHALER | Refills: 1 | Status: SHIPPED | OUTPATIENT
Start: 2021-01-13

## 2021-01-13 RX ORDER — HYDROCODONE POLISTIREX AND CHLORPHENIRAMINE POLISTIREX 10; 8 MG/5ML; MG/5ML
5 SUSPENSION, EXTENDED RELEASE ORAL
Qty: 50 ML | Refills: 0 | Status: SHIPPED | OUTPATIENT
Start: 2021-01-13 | End: 2021-01-13 | Stop reason: SDUPTHER

## 2021-01-13 RX ORDER — AZITHROMYCIN 250 MG/1
250 TABLET, FILM COATED ORAL DAILY
Qty: 4 TAB | Refills: 0 | Status: SHIPPED | OUTPATIENT
Start: 2021-01-13 | End: 2021-01-18 | Stop reason: CLARIF

## 2021-01-13 RX ORDER — ALBUTEROL SULFATE 90 UG/1
2 AEROSOL, METERED RESPIRATORY (INHALATION)
Qty: 1 INHALER | Refills: 1 | Status: SHIPPED | OUTPATIENT
Start: 2021-01-13 | End: 2021-01-13 | Stop reason: SDUPTHER

## 2021-01-13 RX ADMIN — ACETAMINOPHEN 1000 MG: 500 TABLET ORAL at 23:43

## 2021-01-13 RX ADMIN — SODIUM CHLORIDE 1000 ML: 9 INJECTION, SOLUTION INTRAVENOUS at 22:18

## 2021-01-13 RX ADMIN — DEXAMETHASONE SODIUM PHOSPHATE 10 MG: 4 INJECTION, SOLUTION INTRAMUSCULAR; INTRAVENOUS at 22:18

## 2021-01-13 RX ADMIN — AZITHROMYCIN 500 MG: 250 TABLET, FILM COATED ORAL at 22:18

## 2021-01-13 RX ADMIN — KETOROLAC TROMETHAMINE 15 MG: 30 INJECTION, SOLUTION INTRAMUSCULAR at 22:18

## 2021-01-13 NOTE — TELEPHONE ENCOUNTER
Basically he treats the symptoms as it can take a couple of weeks to get through it. If he gets short of breath he needs to go to the emergency room. They are supplementing patients with vitamin D, vitamin C and zinc to help the immune system fight at.

## 2021-01-13 NOTE — TELEPHONE ENCOUNTER
Patient states his he has covid symptoms since 1/3/21. He has not been tested but has symptoms and his coworkers are positive. He has chills,sweats,no energy and coughing. He would like to know what he can do so it will not go into his chest. He is on day 9 and not feeling better.      [phone]

## 2021-01-13 NOTE — Clinical Note
[address] 
[facility] EMERGENCY DEPT 
[address] [place] [address] [place] [address] 
[phone] Work/School Note Date: 1/13/2021 To Whom It May concern: 
 
Pamela Hamlin was seen and treated today in the emergency room by the following provider(s): 
Attending Provider: Katelyn Leonard MD. Pamela Hamlin is excused from work/school on 1/13/2021 through 1/16/2021. He is medically clear to return to work/school on 1/17/2021. Sincerely, Garth Gilbert MD

## 2021-01-14 ENCOUNTER — PATIENT OUTREACH (OUTPATIENT)
Dept: CASE MANAGEMENT | Age: 56
End: 2021-01-14

## 2021-01-14 LAB
COVID-19, XGCOVT: DETECTED
HEALTH STATUS, XMCV2T: ABNORMAL
SOURCE, COVRS: ABNORMAL
SPECIMEN SOURCE, FCOV2M: ABNORMAL
SPECIMEN TYPE, XMCV1T: ABNORMAL

## 2021-01-14 NOTE — ED PROVIDER NOTES
EMERGENCY DEPARTMENT HISTORY AND PHYSICAL EXAM      Date: 1/13/2021  Patient Name: Sukhwinder Fajardo    History of Presenting Illness     Chief Complaint   Patient presents with    Concern For COVID-19 (Coronavirus)     Pt having chills, sweats, fevers at home, diarrhea, and a cough since Jan 5th. Pt called his PCP and was told to come to ED for evaluation. History Provided By: Patient    HPI: Sukhwinder Fajardo, 48 y.o. male  Without significant past medical history presenting today with cough, shortness of breath, body aches, and fevers. The patient says that he had multiple positive Covid contacts in coworkers about 10 days ago. He started having symptoms 9 days ago. He says that he talked to some of his coworkers and encouraged him to come and be evaluated today. He says that his highest temperature at home was 102 degrees. He says that he is not particularly short of breath right now. Denies any chest pain unless he coughs. When he coughs he has chest pain on the left side. He has been taking Tylenol for symptoms. He also called his primary care provider who encouraged him to use cough medicine if necessary. Otherwise no complaints. There are no other complaints, changes, or physical findings at this time. PCP: Rena Cardenas MD    No current facility-administered medications on file prior to encounter. Current Outpatient Medications on File Prior to Encounter   Medication Sig Dispense Refill    multivitamin (ONE A DAY) tablet Take 1 Tab by mouth daily.  cholecalciferol (VITAMIN D3) 1,000 unit cap Take 1,000 Units by mouth daily.  ascorbic acid, vitamin C, (VITAMIN C) 500 mg tablet Take 500 mg by mouth daily.  rivaroxaban (XARELTO) 20 mg tab tablet Take 1 Tab by mouth daily (with breakfast). 30 Tab 2    raNITIdine (ZANTAC) 75 mg tab Take 75 mg by mouth two (2) times a day.          Past History     Past Medical History:  Past Medical History:   Diagnosis Date    GERD (gastroesophageal reflux disease)     Obesity (BMI 30-39. 9) 5/8/2019       Past Surgical History:  Past Surgical History:   Procedure Laterality Date    HX ORTHOPAEDIC         Family History:  Family History   Problem Relation Age of Onset    Cancer Mother     Cancer Father        Social History:  Social History     Tobacco Use    Smoking status: Never Smoker    Smokeless tobacco: Current User   Substance Use Topics    Alcohol use: Yes     Comment: once a month    Drug use: Not on file       Allergies:  No Known Allergies      Review of Systems   Constitutional: +  fever  Skin: No  rash  HEENT: No  nasal congestion  Resp: + cough  CV: No chest pain  GI: No vomiting  : No dysuria  MSK: No joint pain  Neuro: No numbness  Psych: No anxiety      Physical Exam     Patient Vitals for the past 12 hrs:   Temp Pulse Resp BP SpO2   01/13/21 1847 99.7 °F (37.6 °C) (!) 114 18 (!) 169/89 92 %     General: alert, No acute distress  Eyes: EOMI, normal conjunctiva  ENT: moist mucous membranes. Neck: Active, full ROM of neck. Skin: No rashes. no jaundice              Lungs: Equal chest expansion. no respiratory distress. clear to auscultation bilaterally No accessory muscle usage  Heart: tachycardic with a  regular rhythm     no peripheral edema   2+ radial pulses and DPs bilaterally  Abd:  non distended soft, nontender. No rebound tenderness. No guarding  Back: Full ROM  MSK: Full, active ROM in all 4 extremities. Neuro: Alert and oriented to Person, Place, Time and Situation; normal speech;   Psych: Cooperative with exam; Appropriate mood and affect             Diagnostic Study Results     Labs -     Recent Results (from the past 12 hour(s))   CBC WITH AUTOMATED DIFF    Collection Time: 01/13/21  9:51 PM   Result Value Ref Range    WBC 3.9 (L) 4.1 - 11.1 K/uL    RBC 5.00 4. 10 - 5.70 M/uL    HGB 14.4 12.1 - 17.0 g/dL    HCT 41.8 36.6 - 50.3 %    MCV 83.6 80.0 - 99.0 FL    MCH 28.8 26.0 - 34.0 PG    MCHC 34.4 30.0 - 36.5 g/dL    RDW 12.9 11.5 - 14.5 %    PLATELET 98 (L) 405 - 400 K/uL    MPV 12.3 8.9 - 12.9 FL    NRBC 0.0 0  WBC    ABSOLUTE NRBC 0.00 0.00 - 0.01 K/uL    NEUTROPHILS 69 32 - 75 %    LYMPHOCYTES 22 12 - 49 %    MONOCYTES 7 5 - 13 %    EOSINOPHILS 0 0 - 7 %    BASOPHILS 1 0 - 1 %    IMMATURE GRANULOCYTES 1 (H) 0.0 - 0.5 %    ABS. NEUTROPHILS 2.8 1.8 - 8.0 K/UL    ABS. LYMPHOCYTES 0.9 0.8 - 3.5 K/UL    ABS. MONOCYTES 0.3 0.0 - 1.0 K/UL    ABS. EOSINOPHILS 0.0 0.0 - 0.4 K/UL    ABS. BASOPHILS 0.0 0.0 - 0.1 K/UL    ABS. IMM.  GRANS. 0.0 0.00 - 0.04 K/UL    DF AUTOMATED     METABOLIC PANEL, BASIC    Collection Time: 01/13/21  9:51 PM   Result Value Ref Range    Sodium 130 (L) 136 - 145 mmol/L    Potassium 4.3 3.5 - 5.1 mmol/L    Chloride 96 (L) 97 - 108 mmol/L    CO2 30 21 - 32 mmol/L    Anion gap 4 (L) 5 - 15 mmol/L    Glucose 93 65 - 100 mg/dL    BUN 13 6 - 20 MG/DL    Creatinine 1.02 0.70 - 1.30 MG/DL    BUN/Creatinine ratio 13 12 - 20      GFR est AA >60 >60 ml/min/1.73m2    GFR est non-AA >60 >60 ml/min/1.73m2    Calcium 8.2 (L) 8.5 - 10.1 MG/DL   SARS-COV-2    Collection Time: 01/13/21 10:53 PM   Result Value Ref Range    Specimen source Nasopharyngeal      SARS-CoV-2 PENDING     SARS-CoV-2 PENDING     Specimen source Nasopharyngeal      COVID-19 rapid test PENDING     Specimen type NP Swab      Health status PENDING     COVID-19 PENDING        Radiologic Studies -   XR CHEST PORT   Final Result   IMPRESSION:   Left basilar parenchymal opacity consistent with pneumonia        CT Results  (Last 48 hours)    None        CXR Results  (Last 48 hours)               01/13/21 2015  XR CHEST PORT Final result    Impression:  IMPRESSION:   Left basilar parenchymal opacity consistent with pneumonia       Narrative:  Clinical history: cough/concern for covid   INDICATION:   cough/concern for covid   COMPARISON: 5/17/2019       FINDINGS:   AP portable upright view of the chest demonstrates a mildly prominent cardiopericardial silhouette. There is no pleural effusion. . Peripheral basilar   parenchymal opacity on the left. There is no pneumothorax. . Patient is on a   cardiac monitor. Medical Decision Making   I am the first provider for this patient. I reviewed the vital signs, available nursing notes, past medical history, past surgical history, family history and social history. Vital Signs-Reviewed the patient's vital signs. Patient Vitals for the past 12 hrs:   Temp Pulse Resp BP SpO2   01/13/21 1847 99.7 °F (37.6 °C) (!) 114 18 (!) 169/89 92 %       Pulse Oximetry Analysis - 94% on room air  -  Interpretation: Normal      Provider Notes (Medical Decision Making):     Differential Diagnosis: Pneumonia, pneumothorax, electrolyte derangement, dehydration, COVID-19    Initial Plan: Laboratory studies, IV fluids, Toradol, chest x-ray and reassess. The patient does appear to be uncomfortable, but is in no acute distress, oxygen saturations are about 94% on room air. He is mildly tachycardic and appears dehydrated upon initial exam. Will reassess after work-up and treatment. ED Course:   Initial assessment performed. The patients presenting problems have been discussed, and they are in agreement with the care plan formulated and outlined with them. I have encouraged them to ask questions as they arise throughout their visit. ED Course as of Jan 13 2344 Wed Jan 13, 2021   2310 On my interpretation of the patient's laboratory studies CBC shows no significant abnormality, basic metabolic panel shows mild decrease in sodium. [NW]   2672 On my interpretation of the patient's chest x-ray he does have left lower lobe infiltrate.    [NW]   2310 Patient presenting today with Covid symptoms with multiple Covid positive contacts. I gave him dexamethasone, Toradol, azithromycin, and IV fluids. He does have an infiltrate, but has normal oxygen saturation and no increased respiratory effort.   I will discharge him with a azithromycin, cough medicine, albuterol inhaler as needed. And he is discharged with return precautions. [NW]      ED Course User Index  [NW] Paty Diaz MD       I, Frida Funk MD, am the attending of record for this patient encounter. Dispo: Discharged. The patient has been re-evaluated and is ready for discharge. Reviewed available results with patient. Counseled patient on diagnosis and care plan. Patient has expressed understanding, and all questions have been answered. Patient agrees with plan and agrees to follow up as recommended, or to return to the ED if their symptoms worsen. Discharge instructions have been provided and explained to the patient, along with reasons to return to the ED. PLAN:  Current Discharge Medication List      START taking these medications    Details   albuterol (PROVENTIL HFA, VENTOLIN HFA, PROAIR HFA) 90 mcg/actuation inhaler Take 2 Puffs by inhalation every four (4) hours as needed for Wheezing. Qty: 1 Inhaler, Refills: 1      azithromycin (Zithromax Z-Shade) 250 mg tablet Take 1 Tab by mouth daily for 4 days. As per instructions on dosepak  Qty: 4 Tab, Refills: 0      HYDROcodone-chlorpheniramine (Tussionex Pennkinetic ER) 10-8 mg/5 mL suspension Take 5 mL by mouth every twelve (12) hours as needed for Cough for up to 5 days. Max Daily Amount: 10 mL. Qty: 50 mL, Refills: 0    Associated Diagnoses: Person under investigation for COVID-19           2. Follow-up Information    None       3. Return to ED if worse       Diagnosis     Clinical Impression:   1. Person under investigation for COVID-19    2. Community acquired pneumonia of left lower lobe of lung    3. Fever in other diseases    4. Dehydration        Attestations:    Frida Funk MD    Please note that this dictation was completed with [employer], the [employer] voice recognition software.   Quite often unanticipated grammatical, syntax, homophones, and other interpretive errors are inadvertently transcribed by the computer software. Please disregard these errors. Please excuse any errors that have escaped final proofreading. Thank you.

## 2021-01-14 NOTE — ED NOTES
Pt states that he self diagnosed his self with covid and started quarantining himself for 9 days, pt states he has had SOB, cough, chills, diarrhea, lower abdominal pain, temp (102 highest) and his s/sx have been worsening so his PCP told him to come in and get checked. PT states he has been taking tylenol for his fever. Pt works for the [employer] and other coworkers have been dx with Eliseo. Pt denies cp at this time. Pt resting in bed with call bell within reach.

## 2021-01-14 NOTE — ED NOTES
Rufus Uriarte MD reviewed discharge instructions with the patient. The patient verbalized understanding. All questions and concerns were addressed. The patient declined a wheelchair and is discharged ambulatory in the care of family members with instructions and prescriptions in hand. Pt is alert and oriented x 4. Respirations are clear and unlabored.

## 2021-01-14 NOTE — DISCHARGE INSTRUCTIONS
Tylenol 1000 mg every 6 hours for pain or fever  Ibuprofen 600 mg every 6 hours for pain or fever  Azithromycin as prescribed  Albuterol inhaler 2 puffs every 4-6 hours for shortness of breath as needed.   Tussionex  Prone for at least 3 hours per day (lay on stomach, helps to open lungs up)  Return if you have worsening shortness of breath

## 2021-01-14 NOTE — PROGRESS NOTES
Patient contacted regarding Surinder Meyer. Discussed COVID-19 related testing which was pending at this time. Test results were pending. Patient informed of results, if available? no     Care Transition Nurse/ Ambulatory Care Manager contacted the patient by telephone to perform post discharge assessment. Call within 2 business days of discharge: Yes Verified name and  with patient as identifiers. Provided introduction to self, and explanation of the CTN/ACM role, and reason for call due to risk factors for infection and/or exposure to COVID-19. Symptoms reviewed with patient who verbalized the following symptoms: no new symptoms and no worsening symptoms      Due to no new or worsening symptoms encounter was not routed to provider for escalation. Discussed follow-up appointments. If no appointment was previously scheduled, appointment scheduling offered:  yes   [facility] follow up appointment(s): No future appointments. Non-[facility] follow up appointment(s): none     Advance Care Planning:   Does patient have an Advance Directive:  decision maker updated. Patient has following risk factors of: pneumonia. CTN/ACM reviewed discharge instructions, medical action plan and red flags such as increased shortness of breath, increasing fever and signs of decompensation with patient who verbalized understanding. Discussed exposure protocols and quarantine with CDC Guidelines What to do if you are sick with coronavirus disease .  Patient was given an opportunity for questions and concerns. The patient agrees to contact the [facility] exposure line [phone], [facility] [address]  ([phone] and PCP office for questions related to their healthcare. CTN/ACM provided contact information for future needs.     Reviewed and educated patient on any new and changed medications related to discharge diagnosis     Patient/family/caregiver given information for [facility] and agrees to enroll yes  Patient's preferred e-mail: [email or web address]   Patient's preferred phone number: [phone]  Based on Loop alert triggers, patient will be contacted by nurse care manager for worsening symptoms. Pt will be further monitored by COVID Loop Team based on severity of symptoms and risk factors.

## 2021-01-18 ENCOUNTER — APPOINTMENT (OUTPATIENT)
Dept: GENERAL RADIOLOGY | Age: 56
DRG: 177 | End: 2021-01-18
Attending: EMERGENCY MEDICINE
Payer: COMMERCIAL

## 2021-01-18 ENCOUNTER — HOSPITAL ENCOUNTER (INPATIENT)
Age: 56
LOS: 30 days | Discharge: NURSING, REHAB OR LONG-TERM CARE | DRG: 177 | End: 2021-02-17
Attending: EMERGENCY MEDICINE | Admitting: HOSPITALIST
Payer: COMMERCIAL

## 2021-01-18 ENCOUNTER — APPOINTMENT (OUTPATIENT)
Dept: CT IMAGING | Age: 56
DRG: 177 | End: 2021-01-18
Attending: STUDENT IN AN ORGANIZED HEALTH CARE EDUCATION/TRAINING PROGRAM
Payer: COMMERCIAL

## 2021-01-18 DIAGNOSIS — U07.1 PNEUMONIA DUE TO COVID-19 VIRUS: Primary | ICD-10-CM

## 2021-01-18 DIAGNOSIS — J12.82 PNEUMONIA DUE TO COVID-19 VIRUS: Primary | ICD-10-CM

## 2021-01-18 PROBLEM — J96.01 ACUTE RESPIRATORY FAILURE WITH HYPOXIA (HCC): Status: ACTIVE | Noted: 2021-01-18

## 2021-01-18 LAB
ALBUMIN SERPL-MCNC: 2.5 G/DL (ref 3.5–5)
ALBUMIN/GLOB SERPL: 0.5 {RATIO} (ref 1.1–2.2)
ALP SERPL-CCNC: 84 U/L (ref 45–117)
ALT SERPL-CCNC: 56 U/L (ref 12–78)
ANION GAP SERPL CALC-SCNC: 5 MMOL/L (ref 5–15)
APTT PPP: 23.6 SEC (ref 22.1–31)
ARTERIAL PATENCY WRIST A: YES
AST SERPL-CCNC: 59 U/L (ref 15–37)
BASE EXCESS BLD CALC-SCNC: 5 MMOL/L
BASOPHILS # BLD: 0 K/UL (ref 0–0.1)
BASOPHILS NFR BLD: 0 % (ref 0–1)
BDY SITE: ABNORMAL
BILIRUB SERPL-MCNC: 0.9 MG/DL (ref 0.2–1)
BUN SERPL-MCNC: 19 MG/DL (ref 6–20)
BUN/CREAT SERPL: 19 (ref 12–20)
CA-I BLD-SCNC: 1.16 MMOL/L (ref 1.12–1.32)
CALCIUM SERPL-MCNC: 8.7 MG/DL (ref 8.5–10.1)
CHLORIDE SERPL-SCNC: 100 MMOL/L (ref 97–108)
CK SERPL-CCNC: 287 U/L (ref 39–308)
CO2 SERPL-SCNC: 28 MMOL/L (ref 21–32)
CREAT SERPL-MCNC: 1 MG/DL (ref 0.7–1.3)
CRP SERPL-MCNC: 24.2 MG/DL (ref 0–0.6)
D DIMER PPP FEU-MCNC: 1.54 MG/L FEU (ref 0–0.65)
DIFFERENTIAL METHOD BLD: ABNORMAL
EOSINOPHIL # BLD: 0 K/UL (ref 0–0.4)
EOSINOPHIL NFR BLD: 0 % (ref 0–7)
ERYTHROCYTE [DISTWIDTH] IN BLOOD BY AUTOMATED COUNT: 13.3 % (ref 11.5–14.5)
FERRITIN SERPL-MCNC: 1675 NG/ML (ref 26–388)
GAS FLOW.O2 O2 DELIVERY SYS: ABNORMAL L/MIN
GAS FLOW.O2 SETTING OXYMISER: 15 L/M
GLOBULIN SER CALC-MCNC: 4.7 G/DL (ref 2–4)
GLUCOSE SERPL-MCNC: 105 MG/DL (ref 65–100)
HCO3 BLD-SCNC: 28.9 MMOL/L (ref 22–26)
HCT VFR BLD AUTO: 42 % (ref 36.6–50.3)
HGB BLD-MCNC: 14.1 G/DL (ref 12.1–17)
IMM GRANULOCYTES # BLD AUTO: 0.2 K/UL (ref 0–0.04)
IMM GRANULOCYTES NFR BLD AUTO: 2 % (ref 0–0.5)
INR PPP: 1.1 (ref 0.9–1.1)
LDH SERPL L TO P-CCNC: 682 U/L (ref 85–241)
LYMPHOCYTES # BLD: 0.8 K/UL (ref 0.8–3.5)
LYMPHOCYTES NFR BLD: 7 % (ref 12–49)
MCH RBC QN AUTO: 28.5 PG (ref 26–34)
MCHC RBC AUTO-ENTMCNC: 33.6 G/DL (ref 30–36.5)
MCV RBC AUTO: 85 FL (ref 80–99)
MONOCYTES # BLD: 0.7 K/UL (ref 0–1)
MONOCYTES NFR BLD: 6 % (ref 5–13)
NEUTS SEG # BLD: 9.2 K/UL (ref 1.8–8)
NEUTS SEG NFR BLD: 85 % (ref 32–75)
NRBC # BLD: 0 K/UL (ref 0–0.01)
NRBC BLD-RTO: 0 PER 100 WBC
PCO2 BLD: 41.1 MMHG (ref 35–45)
PH BLD: 7.46 [PH] (ref 7.35–7.45)
PLATELET # BLD AUTO: 255 K/UL (ref 150–400)
PMV BLD AUTO: 11.1 FL (ref 8.9–12.9)
PO2 BLD: 71 MMHG (ref 80–100)
POTASSIUM SERPL-SCNC: 4.3 MMOL/L (ref 3.5–5.1)
PROT SERPL-MCNC: 7.2 G/DL (ref 6.4–8.2)
PROTHROMBIN TIME: 11.4 SEC (ref 9–11.1)
RBC # BLD AUTO: 4.94 M/UL (ref 4.1–5.7)
RBC MORPH BLD: ABNORMAL
SAO2 % BLD: 95 % (ref 92–97)
SODIUM SERPL-SCNC: 133 MMOL/L (ref 136–145)
SPECIMEN TYPE: ABNORMAL
THERAPEUTIC RANGE,PTTT: NORMAL SECS (ref 58–77)
TOTAL RESP. RATE, ITRR: 26
TROPONIN I SERPL-MCNC: <0.05 NG/ML
WBC # BLD AUTO: 10.9 K/UL (ref 4.1–11.1)
WBC MORPH BLD: ABNORMAL

## 2021-01-18 PROCEDURE — 93005 ELECTROCARDIOGRAM TRACING: CPT

## 2021-01-18 PROCEDURE — 71045 X-RAY EXAM CHEST 1 VIEW: CPT

## 2021-01-18 PROCEDURE — 80053 COMPREHEN METABOLIC PANEL: CPT

## 2021-01-18 PROCEDURE — 99285 EMERGENCY DEPT VISIT HI MDM: CPT

## 2021-01-18 PROCEDURE — 82550 ASSAY OF CK (CPK): CPT

## 2021-01-18 PROCEDURE — 74011250636 HC RX REV CODE- 250/636: Performed by: HOSPITALIST

## 2021-01-18 PROCEDURE — 74011250637 HC RX REV CODE- 250/637: Performed by: HOSPITALIST

## 2021-01-18 PROCEDURE — 84484 ASSAY OF TROPONIN QUANT: CPT

## 2021-01-18 PROCEDURE — 85730 THROMBOPLASTIN TIME PARTIAL: CPT

## 2021-01-18 PROCEDURE — 65660000000 HC RM CCU STEPDOWN

## 2021-01-18 PROCEDURE — 74011000636 HC RX REV CODE- 636: Performed by: EMERGENCY MEDICINE

## 2021-01-18 PROCEDURE — 96374 THER/PROPH/DIAG INJ IV PUSH: CPT

## 2021-01-18 PROCEDURE — 36600 WITHDRAWAL OF ARTERIAL BLOOD: CPT

## 2021-01-18 PROCEDURE — 82728 ASSAY OF FERRITIN: CPT

## 2021-01-18 PROCEDURE — 77010033711 HC HIGH FLOW OXYGEN

## 2021-01-18 PROCEDURE — 96375 TX/PRO/DX INJ NEW DRUG ADDON: CPT

## 2021-01-18 PROCEDURE — 82803 BLOOD GASES ANY COMBINATION: CPT

## 2021-01-18 PROCEDURE — 71275 CT ANGIOGRAPHY CHEST: CPT

## 2021-01-18 PROCEDURE — 85379 FIBRIN DEGRADATION QUANT: CPT

## 2021-01-18 PROCEDURE — 85025 COMPLETE CBC W/AUTO DIFF WBC: CPT

## 2021-01-18 PROCEDURE — 86140 C-REACTIVE PROTEIN: CPT

## 2021-01-18 PROCEDURE — 74011000258 HC RX REV CODE- 258: Performed by: HOSPITALIST

## 2021-01-18 PROCEDURE — 83615 LACTATE (LD) (LDH) ENZYME: CPT

## 2021-01-18 PROCEDURE — 36415 COLL VENOUS BLD VENIPUNCTURE: CPT

## 2021-01-18 PROCEDURE — 85610 PROTHROMBIN TIME: CPT

## 2021-01-18 PROCEDURE — 74011000250 HC RX REV CODE- 250: Performed by: HOSPITALIST

## 2021-01-18 RX ORDER — ENOXAPARIN SODIUM 100 MG/ML
0.5 INJECTION SUBCUTANEOUS EVERY 12 HOURS
Status: DISCONTINUED | OUTPATIENT
Start: 2021-01-18 | End: 2021-01-21

## 2021-01-18 RX ORDER — ACETAMINOPHEN 325 MG/1
650 TABLET ORAL
Status: DISCONTINUED | OUTPATIENT
Start: 2021-01-18 | End: 2021-02-17 | Stop reason: HOSPADM

## 2021-01-18 RX ORDER — DEXAMETHASONE 4 MG/1
6 TABLET ORAL DAILY
Status: DISCONTINUED | OUTPATIENT
Start: 2021-01-18 | End: 2021-01-18

## 2021-01-18 RX ORDER — ASPIRIN 81 MG/1
81 TABLET ORAL DAILY
COMMUNITY

## 2021-01-18 RX ORDER — ACETAMINOPHEN 650 MG/1
650 SUPPOSITORY RECTAL
Status: DISCONTINUED | OUTPATIENT
Start: 2021-01-18 | End: 2021-02-17 | Stop reason: HOSPADM

## 2021-01-18 RX ORDER — ASCORBIC ACID 500 MG
500 TABLET ORAL 2 TIMES DAILY
Status: DISPENSED | OUTPATIENT
Start: 2021-01-18 | End: 2021-01-21

## 2021-01-18 RX ORDER — FAMOTIDINE 20 MG/1
20 TABLET, FILM COATED ORAL DAILY
Status: DISCONTINUED | OUTPATIENT
Start: 2021-01-18 | End: 2021-01-20

## 2021-01-18 RX ORDER — SODIUM CHLORIDE 0.9 % (FLUSH) 0.9 %
5-40 SYRINGE (ML) INJECTION AS NEEDED
Status: DISCONTINUED | OUTPATIENT
Start: 2021-01-18 | End: 2021-02-17 | Stop reason: HOSPADM

## 2021-01-18 RX ORDER — POLYETHYLENE GLYCOL 3350 17 G/17G
17 POWDER, FOR SOLUTION ORAL DAILY PRN
Status: DISCONTINUED | OUTPATIENT
Start: 2021-01-18 | End: 2021-02-17 | Stop reason: HOSPADM

## 2021-01-18 RX ORDER — DEXAMETHASONE 4 MG/1
6 TABLET ORAL ONCE
Status: DISCONTINUED | OUTPATIENT
Start: 2021-01-18 | End: 2021-01-18

## 2021-01-18 RX ORDER — ONDANSETRON 2 MG/ML
4 INJECTION INTRAMUSCULAR; INTRAVENOUS
Status: DISCONTINUED | OUTPATIENT
Start: 2021-01-18 | End: 2021-02-17 | Stop reason: HOSPADM

## 2021-01-18 RX ORDER — DEXAMETHASONE 4 MG/1
6 TABLET ORAL DAILY
Status: DISCONTINUED | OUTPATIENT
Start: 2021-01-19 | End: 2021-01-18

## 2021-01-18 RX ORDER — HYDROCODONE POLISTIREX AND CHLORPHENIRAMINE POLISTIREX 10; 8 MG/5ML; MG/5ML
5 SUSPENSION, EXTENDED RELEASE ORAL
Status: DISCONTINUED | OUTPATIENT
Start: 2021-01-18 | End: 2021-02-17 | Stop reason: HOSPADM

## 2021-01-18 RX ORDER — SODIUM CHLORIDE 0.9 % (FLUSH) 0.9 %
5-40 SYRINGE (ML) INJECTION EVERY 8 HOURS
Status: DISCONTINUED | OUTPATIENT
Start: 2021-01-18 | End: 2021-02-17 | Stop reason: HOSPADM

## 2021-01-18 RX ORDER — ASPIRIN 81 MG/1
81 TABLET ORAL DAILY
Status: DISCONTINUED | OUTPATIENT
Start: 2021-01-19 | End: 2021-02-17 | Stop reason: HOSPADM

## 2021-01-18 RX ORDER — MELATONIN
2000 DAILY
Status: DISCONTINUED | OUTPATIENT
Start: 2021-01-19 | End: 2021-02-17 | Stop reason: HOSPADM

## 2021-01-18 RX ORDER — ALBUTEROL SULFATE 90 UG/1
2 AEROSOL, METERED RESPIRATORY (INHALATION)
Status: DISCONTINUED | OUTPATIENT
Start: 2021-01-18 | End: 2021-02-17 | Stop reason: HOSPADM

## 2021-01-18 RX ORDER — DEXAMETHASONE SODIUM PHOSPHATE 4 MG/ML
6 INJECTION, SOLUTION INTRA-ARTICULAR; INTRALESIONAL; INTRAMUSCULAR; INTRAVENOUS; SOFT TISSUE DAILY
Status: DISCONTINUED | OUTPATIENT
Start: 2021-01-18 | End: 2021-01-19

## 2021-01-18 RX ORDER — ZINC SULFATE 50(220)MG
1 CAPSULE ORAL DAILY
Status: DISPENSED | OUTPATIENT
Start: 2021-01-19 | End: 2021-01-26

## 2021-01-18 RX ADMIN — Medication 10 ML: at 16:26

## 2021-01-18 RX ADMIN — Medication 10 ML: at 22:00

## 2021-01-18 RX ADMIN — DEXAMETHASONE SODIUM PHOSPHATE 6 MG: 4 INJECTION, SOLUTION INTRAMUSCULAR; INTRAVENOUS at 16:27

## 2021-01-18 RX ADMIN — IOPAMIDOL 80 ML: 755 INJECTION, SOLUTION INTRAVENOUS at 15:40

## 2021-01-18 RX ADMIN — ENOXAPARIN SODIUM 50 MG: 60 INJECTION SUBCUTANEOUS at 16:32

## 2021-01-18 RX ADMIN — CEFTRIAXONE 1 G: 1 INJECTION, POWDER, FOR SOLUTION INTRAMUSCULAR; INTRAVENOUS at 16:29

## 2021-01-18 RX ADMIN — OXYCODONE HYDROCHLORIDE AND ACETAMINOPHEN 500 MG: 500 TABLET ORAL at 17:15

## 2021-01-18 RX ADMIN — REMDESIVIR 200 MG: 100 INJECTION, POWDER, LYOPHILIZED, FOR SOLUTION INTRAVENOUS at 17:08

## 2021-01-18 NOTE — ED PROVIDER NOTES
EMERGENCY DEPARTMENT HISTORY AND PHYSICAL EXAM          Date: 1/18/2021  Patient Name: Deng Waddell  Attending of Record: Dr. Ila Blake    History of Presenting Illness     Chief Complaint   Patient presents with    Shortness of Breath     Pt seen on 1/13 for same. Symptoms started on 1/4/21. Pt tested for COVID and did not receive a call back. Pt is 79% on room air. Pt placed on 2 L via NC and increased to 88% Incresased to 4L       History Provided By: Patient    HPI: Deng Waddell is a 48 y.o. male, w hx of GERD who presents to the ED with complaints of shortness of breath. Started having symptoms consistent with a viral syndrome on 1/4/21. Had intermittent dry cough, felt weak, had some diarrhea, and shortness of breath. Symptoms of shortness of breath and weakness became acutely worse in the past three days. Has a listed medication for [facility], [place], but reported to me that he has no pmh and is not taking medications. Has tested positive for COVID pneumonia recently. PCP: Viridiana Gray MD    There are no other complaints, changes, or physical findings at this time.      Current Facility-Administered Medications   Medication Dose Route Frequency Provider Last Rate Last Admin    iopamidoL (ISOVUE-370) 76 % injection 100 mL  100 mL IntraVENous RAD Curt Diaz MD        dexamethasone (PF) (DECADRON) 10 mg/mL injection 6 mg  6 mg IntraVENous DAILY Heydi Stephens MD        cefTRIAXone (ROCEPHIN) 1 g in 0.9% sodium chloride (MBP/ADV) 50 mL MBP  1 g IntraVENous Q24H Heydi Stephens MD        sodium chloride (NS) flush 5-40 mL  5-40 mL IntraVENous Q8H Heydi Stephens MD        sodium chloride (NS) flush 5-40 mL  5-40 mL IntraVENous PRN Heydi Stehpens MD        acetaminophen (TYLENOL) tablet 650 mg  650 mg Oral Q6H PRN Heydi Stephens MD        Or    acetaminophen (TYLENOL) suppository 650 mg  650 mg Rectal Q6H PRN Heydi Stephens MD        polyethylene glycol [facility]) packet 17 g  17 g Oral DAILY PRN Mary Ross MD        ondansetron [facility]) injection 4 mg  4 mg IntraVENous Q6H PRN Mary Ross MD        enoxaparin (LOVENOX) injection 50 mg  0.5 mg/kg SubCUTAneous Q12H Mary Ross MD        [START ON 1/19/2021] cholecalciferol (VITAMIN D3) (1000 Units /25 mcg) tablet 2 Tab  2,000 Units Oral DAILY Mary Ross MD        [START ON 1/19/2021] aspirin delayed-release tablet 81 mg  81 mg Oral DAILY Mary Ross MD        HYDROcodone-chlorpheniramine (TUSSIONEX) oral suspension 5 mL  5 mL Oral Q12H PRN Mary Ross MD        ascorbic acid (vitamin C) (VITAMIN C) tablet 500 mg  500 mg Oral BID Mary Ross MD         Current Outpatient Medications   Medication Sig Dispense Refill    aspirin delayed-release 81 mg tablet Take 81 mg by mouth daily.  albuterol (PROVENTIL HFA, VENTOLIN HFA, PROAIR HFA) 90 mcg/actuation inhaler Take 2 Puffs by inhalation every four (4) hours as needed for Wheezing. 1 Inhaler 1    HYDROcodone-chlorpheniramine (Tussionex Pennkinetic ER) 10-8 mg/5 mL suspension Take 5 mL by mouth every twelve (12) hours as needed for Cough for up to 5 days. Max Daily Amount: 10 mL. 50 mL 0    multivitamin (ONE A DAY) tablet Take 1 Tab by mouth daily.  cholecalciferol (VITAMIN D3) 1,000 unit cap Take 1,000 Units by mouth daily.  ascorbic acid, vitamin C, (VITAMIN C) 500 mg tablet Take 500 mg by mouth daily. Past History     Past Medical History:  Past Medical History:   Diagnosis Date    GERD (gastroesophageal reflux disease)     Obesity (BMI 30-39. 9) 5/8/2019    Right leg DVT ([address] 2019       Past Surgical History:  Past Surgical History:   Procedure Laterality Date    HX ORTHOPAEDIC         Family History:  Family History   Problem Relation Age of Onset    Cancer Mother     Cancer Father        Social History:  Social History     Tobacco Use    Smoking status: Never Smoker    Smokeless tobacco: Current User   Substance Use Topics  Alcohol use: Yes     Comment: once a month    Drug use: Not on file       Allergies:  No Known Allergies      Review of Systems   Review of Systems   Constitutional: Positive for activity change and appetite change. HENT: Negative for sneezing. Eyes: Negative for redness. Respiratory: Positive for cough and shortness of breath. Gastrointestinal: Positive for abdominal pain. Genitourinary: Negative for difficulty urinating. Musculoskeletal: Positive for myalgias. Skin: Negative for color change. Neurological: Negative for dizziness. Psychiatric/Behavioral: Negative for agitation. Physical Exam   Physical Exam  Constitutional:       Appearance: He is well-developed. HENT:      Head: Normocephalic and atraumatic. Mouth/Throat:      Mouth: Mucous membranes are moist.   Eyes:      Extraocular Movements: Extraocular movements intact. Pupils: Pupils are equal, round, and reactive to light. Neck:      Musculoskeletal: Normal range of motion. Cardiovascular:      Rate and Rhythm: Regular rhythm. Tachycardia present. Pulmonary:      Effort: Tachypnea present. Comments: Hypoxic to <90% on RA - maintaing O2 sats of 90% on 4LNC  Abdominal:      Palpations: Abdomen is soft. Tenderness: There is no abdominal tenderness. There is no rebound. Musculoskeletal:      Right lower leg: No edema. Left lower leg: No edema. Skin:     General: Skin is warm and dry. Capillary Refill: Capillary refill takes less than 2 seconds. Neurological:      General: No focal deficit present. Mental Status: He is alert.          Diagnostic Study Results     Labs -     Recent Results (from the past 12 hour(s))   CBC WITH AUTOMATED DIFF    Collection Time: 01/18/21 12:44 PM   Result Value Ref Range    WBC 10.9 4.1 - 11.1 K/uL    RBC 4.94 4.10 - 5.70 M/uL    HGB 14.1 12.1 - 17.0 g/dL    HCT 42.0 36.6 - 50.3 %    MCV 85.0 80.0 - 99.0 FL    MCH 28.5 26.0 - 34.0 PG    MCHC 33.6 30.0 - 36.5 g/dL    RDW 13.3 11.5 - 14.5 %    PLATELET 705 030 - 763 K/uL    MPV 11.1 8.9 - 12.9 FL    NRBC 0.0 0  WBC    ABSOLUTE NRBC 0.00 0.00 - 0.01 K/uL    NEUTROPHILS 85 (H) 32 - 75 %    LYMPHOCYTES 7 (L) 12 - 49 %    MONOCYTES 6 5 - 13 %    EOSINOPHILS 0 0 - 7 %    BASOPHILS 0 0 - 1 %    IMMATURE GRANULOCYTES 2 (H) 0.0 - 0.5 %    ABS. NEUTROPHILS 9.2 (H) 1.8 - 8.0 K/UL    ABS. LYMPHOCYTES 0.8 0.8 - 3.5 K/UL    ABS. MONOCYTES 0.7 0.0 - 1.0 K/UL    ABS. EOSINOPHILS 0.0 0.0 - 0.4 K/UL    ABS. BASOPHILS 0.0 0.0 - 0.1 K/UL    ABS. IMM. GRANS. 0.2 (H) 0.00 - 0.04 K/UL    DF SMEAR SCANNED      RBC COMMENTS NORMOCYTIC, NORMOCHROMIC      WBC COMMENTS FEW     METABOLIC PANEL, COMPREHENSIVE    Collection Time: 01/18/21 12:44 PM   Result Value Ref Range    Sodium 133 (L) 136 - 145 mmol/L    Potassium 4.3 3.5 - 5.1 mmol/L    Chloride 100 97 - 108 mmol/L    CO2 28 21 - 32 mmol/L    Anion gap 5 5 - 15 mmol/L    Glucose 105 (H) 65 - 100 mg/dL    BUN 19 6 - 20 MG/DL    Creatinine 1.00 0.70 - 1.30 MG/DL    BUN/Creatinine ratio 19 12 - 20      GFR est AA >60 >60 ml/min/1.73m2    GFR est non-AA >60 >60 ml/min/1.73m2    Calcium 8.7 8.5 - 10.1 MG/DL    Bilirubin, total 0.9 0.2 - 1.0 MG/DL    ALT (SGPT) 56 12 - 78 U/L    AST (SGOT) 59 (H) 15 - 37 U/L    Alk.  phosphatase 84 45 - 117 U/L    Protein, total 7.2 6.4 - 8.2 g/dL    Albumin 2.5 (L) 3.5 - 5.0 g/dL    Globulin 4.7 (H) 2.0 - 4.0 g/dL    A-G Ratio 0.5 (L) 1.1 - 2.2     CK W/ REFLX CKMB    Collection Time: 01/18/21 12:44 PM   Result Value Ref Range     39 - 308 U/L   PROTHROMBIN TIME + INR    Collection Time: 01/18/21  2:15 PM   Result Value Ref Range    INR 1.1 0.9 - 1.1      Prothrombin time 11.4 (H) 9.0 - 11.1 sec   PTT    Collection Time: 01/18/21  2:15 PM   Result Value Ref Range    aPTT 23.6 22.1 - 31.0 sec    aPTT, therapeutic range     58.0 - 77.0 SECS   D DIMER    Collection Time: 01/18/21  2:15 PM   Result Value Ref Range    D-dimer 1.54 (H) 0.00 - 0.65 mg/L FEU   LD    Collection Time: 01/18/21  2:24 PM   Result Value Ref Range     (H) 85 - 241 U/L       Radiologic Studies -   XR CHEST PORT   Final Result   IMPRESSION:   Increased left basilar atelectasis/consolidation. Interval small left effusion. Interval right lung parenchymal opacity as well. CTA CHEST W OR W WO CONT    (Results Pending)     CT Results  (Last 48 hours)    None        CXR Results  (Last 48 hours)               01/18/21 1352  XR CHEST PORT Final result    Impression:  IMPRESSION:   Increased left basilar atelectasis/consolidation. Interval small left effusion. Interval right lung parenchymal opacity as well. Narrative:  Clinical history: Shortness of Breath   INDICATION:   Shortness of Breath   COMPARISON: 1/13/2021       FINDINGS:   AP portable upright view of the chest demonstrates a stable  cardiopericardial   silhouette. There is no pleural effusion. .Increased scattered interstitial and   parenchymal opacities. Increased left-sided pleural effusion. Patient is on a   cardiac monitor. Medical Decision Making   I am the first provider for this patient. I reviewed the vital signs, available nursing notes, past medical history, past surgical history, family history and social history. Vital Signs-Reviewed the patient's vital signs. Patient Vitals for the past 12 hrs:   Temp Pulse Resp BP SpO2   01/18/21 1430 -- 98 25 133/77 90 %   01/18/21 1228 -- -- -- -- 93 %   01/18/21 1225 98.6 °F (37 °C) 100 22 (!) 145/73 (!) 89 %       ECG Interpretation: pending    Records Reviewed: Nursing Notes and Old Medical Records    Provider Notes (Medical Decision Making):   DDx:   COVID Pneumonia  Pulmonary Embolism    Pt with hypoxia requiring 4LNC. Tachycardic. No chest pain. He is hypoxic from COVID pneumonia, but given degree of hypoxia I think he needs a CTPE. Patient not complaining of any pain. He is willing to stay.   Will give dexamethasone. ED Course and Progress Notes:   Initial assessment performed. The patients presenting problems have been discussed, and they are in agreement with the care plan formulated and outlined with them. I have encouraged them to ask questions as they arise throughout their visit. Diagnosis     Clinical Impression:   1. Pneumonia due to COVID-19 virus        Disposition:  Admission    DISCHARGE     Resident Signature:  Tish Kitchen MD, PGY4

## 2021-01-18 NOTE — ED NOTES
Pt arrives ambulatory to the ED with c/o SOB, N/V/D, fever, chills, and CP, however pt denies CP at this time    Pt placed x3 on monitor, bed in low position, call bell in reach    1326: MD Branden at bedside at this time    1448: Stephanie Woo MD at bedside at this time to evaluate pt

## 2021-01-18 NOTE — CONSULTS
[facility]  Pulmonary, Critical Care, and Sleep Medicine    Initial Patient Consult    Name: Rola Lockwood MRN: [id number]   : 1965 Hospital: [facility]   Date: 2021        IMPRESSION:   · Severe Multilobar Covid Pneumonia, first Dx on , Symptoms first started on 21. · Severe Hypoxia: on 15L high flow oxgyen, POx was 79% on RA. Pa02 was 75 on ABG with 15L oxygen. · Covid related coagulopathy  · Covid related inflammation  · Hyponatremia  · Elevated D dimer at 1.54.   · Obese  · GERD  · Hypertension, LAE and LVH on ekg. · Critically ill, high risk of worsening and decompensation. 35 min CC, EOP. RECOMMENDATIONS:   · Mid to high flow oxyen, keep pox over 90%  · Decadron for 10 days. · ON REmdesivir  · Will follow inflammatory and coag markers  · Will get serial CXRs. · Discussed type and screen, Will see if pt needs convalescent plasma   · Will follow along with you. PCP: Veena Waters. Subjective: This patient has been seen and evaluated at the request of Dr. Delroy Evans for above. Patient is a 48 y.o. male   Who was first ill on 21. Has increased shortness of breath. Had dry cough. Weakness. Diarrhea. Weakness has been worse over last 3 days. He has been with increased  Breathing difficulty. Feels his breathing is constricted. Has decreased po intake. Has felt hot. Some loose stools. Has mild GERD. Non smoker, Occ etoh use. Chews tobacco.      Past Medical History:   Diagnosis Date    GERD (gastroesophageal reflux disease)     Obesity (BMI 30-39. 9) 2019    Right leg DVT ([address] 2019      Past Surgical History:   Procedure Laterality Date    HX ORTHOPAEDIC        Prior to Admission medications    Medication Sig Start Date End Date Taking? Authorizing Provider   aspirin delayed-release 81 mg tablet Take 81 mg by mouth daily.    Yes Other, MD Leopoldo   albuterol (PROVENTIL HFA, VENTOLIN HFA, PROAIR HFA) 90 mcg/actuation inhaler Take 2 Puffs by inhalation every four (4) hours as needed for Wheezing. 1/13/21  Yes Ramone Keane MD   HYDROcodone-chlorpheniramine (Tussionex Pennkinetic ER) 10-8 mg/5 mL suspension Take 5 mL by mouth every twelve (12) hours as needed for Cough for up to 5 days. Max Daily Amount: 10 mL. 1/13/21 1/18/21 Yes Ramone Keane MD   multivitamin (ONE A DAY) tablet Take 1 Tab by mouth daily. Yes Provider, Historical   cholecalciferol (VITAMIN D3) 1,000 unit cap Take 1,000 Units by mouth daily. Yes Provider, Historical   ascorbic acid, vitamin C, (VITAMIN C) 500 mg tablet Take 500 mg by mouth daily.    Yes Provider, Historical     No Known Allergies   Social History     Tobacco Use    Smoking status: Never Smoker    Smokeless tobacco: Current User   Substance Use Topics    Alcohol use: Yes     Comment: once a month      Family History   Problem Relation Age of Onset    Cancer Mother     Cancer Father         Current Facility-Administered Medications   Medication Dose Route Frequency    dexamethasone (DECADRON) 4 mg/mL injection 6 mg  6 mg IntraVENous DAILY    cefTRIAXone (ROCEPHIN) 1 g in 0.9% sodium chloride (MBP/ADV) 50 mL MBP  1 g IntraVENous Q24H    sodium chloride (NS) flush 5-40 mL  5-40 mL IntraVENous Q8H    enoxaparin (LOVENOX) injection 50 mg  0.5 mg/kg SubCUTAneous Q12H    [START ON 1/19/2021] cholecalciferol (VITAMIN D3) (1000 Units /25 mcg) tablet 2 Tab  2,000 Units Oral DAILY    [START ON 1/19/2021] aspirin delayed-release tablet 81 mg  81 mg Oral DAILY    ascorbic acid (vitamin C) (VITAMIN C) tablet 500 mg  500 mg Oral BID    remdesivir 200 mg in 0.9% sodium chloride 250 mL IVPB  200 mg IntraVENous ONCE    Followed by   Sonam Bienvenido ON 1/19/2021] remdesivir 100 mg in 0.9% sodium chloride 250 mL IVPB  100 mg IntraVENous Q24H       Review of Systems:  Constitutional: positive for fevers, chills, sweats, fatigue, malaise and anorexia, negative for weight loss  Eyes: negative  Ears, nose, mouth, throat, and face: positive for nasal congestion  Respiratory: positive for cough or dyspnea on exertion  Cardiovascular: positive for fatigue, paroxysmal nocturnal dyspnea, tachypnea, dyspnea on exertion  Gastrointestinal: positive for dyspepsia, reflux symptoms, nausea and change in bowel habits  Genitourinary:negative  Integument/breast: negative  Hematologic/lymphatic: negative  Musculoskeletal:negative  Neurological: negative  Behavioral/Psych: negative  Endocrine: negative  Allergic/Immunologic: negative    Objective:   Vital Signs:    Visit Vitals  /68   Pulse 94   Temp 98 °F (36.7 °C)   Resp (!) 35   Ht 5' 8\" (1.727 m)   Wt 92.9 kg (204 lb 12.9 oz)   SpO2 92%   BMI 31.14 kg/m²       O2 Device: Hi flow nasal cannula   O2 Flow Rate (L/min): 8 l/min   Temp (24hrs), Av.3 °F (36.8 °C), Min:98 °F (36.7 °C), Max:98.6 °F (37 °C)       Intake/Output:   Last shift:      No intake/output data recorded. Last 3 shifts: No intake/output data recorded. No intake or output data in the 24 hours ending 21 1727   Physical Exam:   General:  Alert, cooperative, Mild to mod distress, appears stated age. Head:  Normocephalic, without obvious abnormality, atraumatic. Eyes:  Conjunctivae/corneas clear. PERRL, EOMs intact. Nose: Nares normal. Septum midline. Mucosa normal. No drainage or sinus tenderness. Throat: Lips, mucosa, and tongue normal. Teeth and gums normal.   Neck: Supple, symmetrical, trachea midline, no adenopathy, thyroid: no enlargment/tenderness/nodules, no carotid bruit and no JVD. Back:   Symmetric, no curvature. ROM normal.   Lungs:   Clear to auscultation bilaterally. Does have increased RR in the mid 20s to low 30s. On MId flow oxygen. Chest wall:  No tenderness or deformity. Heart:  Regular rate and rhythm, S1, S2 normal, no murmur, click, rub or gallop. Abdomen:   Soft, non-tender. Bowel sounds normal. No masses,  No organomegaly.    Extremities: Extremities normal, atraumatic, no cyanosis or edema. Pulses: 2+ and symmetric all extremities. Skin: Skin color, texture, turgor normal. No rashes or lesions   Lymph nodes: Cervical, supraclavicular, and axillary nodes normal.   Neurologic: Grossly nonfocal, motor is intact  Psych NO overt anxiety or depression. Data review:     Recent Results (from the past 24 hour(s))   EKG, 12 LEAD, INITIAL    Collection Time: 01/18/21 12:40 PM   Result Value Ref Range    Ventricular Rate 97 BPM    Atrial Rate 97 BPM    P-R Interval 114 ms    QRS Duration 80 ms    Q-T Interval 334 ms    QTC Calculation (Bezet) 424 ms    Calculated P Axis 33 degrees    Calculated R Axis 18 degrees    Calculated T Axis 8 degrees    Diagnosis       Normal sinus rhythm  Possible Left atrial enlargement  Nonspecific T wave abnormality  No previous ECGs available     CBC WITH AUTOMATED DIFF    Collection Time: 01/18/21 12:44 PM   Result Value Ref Range    WBC 10.9 4.1 - 11.1 K/uL    RBC 4.94 4.10 - 5.70 M/uL    HGB 14.1 12.1 - 17.0 g/dL    HCT 42.0 36.6 - 50.3 %    MCV 85.0 80.0 - 99.0 FL    MCH 28.5 26.0 - 34.0 PG    MCHC 33.6 30.0 - 36.5 g/dL    RDW 13.3 11.5 - 14.5 %    PLATELET 621 327 - 843 K/uL    MPV 11.1 8.9 - 12.9 FL    NRBC 0.0 0  WBC    ABSOLUTE NRBC 0.00 0.00 - 0.01 K/uL    NEUTROPHILS 85 (H) 32 - 75 %    LYMPHOCYTES 7 (L) 12 - 49 %    MONOCYTES 6 5 - 13 %    EOSINOPHILS 0 0 - 7 %    BASOPHILS 0 0 - 1 %    IMMATURE GRANULOCYTES 2 (H) 0.0 - 0.5 %    ABS. NEUTROPHILS 9.2 (H) 1.8 - 8.0 K/UL    ABS. LYMPHOCYTES 0.8 0.8 - 3.5 K/UL    ABS. MONOCYTES 0.7 0.0 - 1.0 K/UL    ABS. EOSINOPHILS 0.0 0.0 - 0.4 K/UL    ABS. BASOPHILS 0.0 0.0 - 0.1 K/UL    ABS. IMM.  GRANS. 0.2 (H) 0.00 - 0.04 K/UL    DF SMEAR SCANNED      RBC COMMENTS NORMOCYTIC, NORMOCHROMIC      WBC COMMENTS FEW     METABOLIC PANEL, COMPREHENSIVE    Collection Time: 01/18/21 12:44 PM   Result Value Ref Range    Sodium 133 (L) 136 - 145 mmol/L    Potassium 4.3 3.5 - 5.1 mmol/L    Chloride 100 97 - 108 mmol/L    CO2 28 21 - 32 mmol/L    Anion gap 5 5 - 15 mmol/L    Glucose 105 (H) 65 - 100 mg/dL    BUN 19 6 - 20 MG/DL    Creatinine 1.00 0.70 - 1.30 MG/DL    BUN/Creatinine ratio 19 12 - 20      GFR est AA >60 >60 ml/min/1.73m2    GFR est non-AA >60 >60 ml/min/1.73m2    Calcium 8.7 8.5 - 10.1 MG/DL    Bilirubin, total 0.9 0.2 - 1.0 MG/DL    ALT (SGPT) 56 12 - 78 U/L    AST (SGOT) 59 (H) 15 - 37 U/L    Alk. phosphatase 84 45 - 117 U/L    Protein, total 7.2 6.4 - 8.2 g/dL    Albumin 2.5 (L) 3.5 - 5.0 g/dL    Globulin 4.7 (H) 2.0 - 4.0 g/dL    A-G Ratio 0.5 (L) 1.1 - 2.2     CK W/ REFLX CKMB    Collection Time: 01/18/21 12:44 PM   Result Value Ref Range     39 - 308 U/L   TROPONIN I    Collection Time: 01/18/21 12:44 PM   Result Value Ref Range    Troponin-I, Qt. <0.05 <0.05 ng/mL   PROTHROMBIN TIME + INR    Collection Time: 01/18/21  2:15 PM   Result Value Ref Range    INR 1.1 0.9 - 1.1      Prothrombin time 11.4 (H) 9.0 - 11.1 sec   PTT    Collection Time: 01/18/21  2:15 PM   Result Value Ref Range    aPTT 23.6 22.1 - 31.0 sec    aPTT, therapeutic range     58.0 - 77.0 SECS   D DIMER    Collection Time: 01/18/21  2:15 PM   Result Value Ref Range    D-dimer 1.54 (H) 0.00 - 0.65 mg/L FEU   LD    Collection Time: 01/18/21  2:24 PM   Result Value Ref Range     (H) 85 - 241 U/L   EKG, 12 LEAD, INITIAL    Collection Time: 01/18/21  3:53 PM   Result Value Ref Range    Ventricular Rate 94 BPM    Atrial Rate 94 BPM    P-R Interval 132 ms    QRS Duration 82 ms    Q-T Interval 342 ms    QTC Calculation (Bezet) 427 ms    Calculated P Axis 38 degrees    Calculated R Axis 9 degrees    Calculated T Axis -10 degrees    Diagnosis       Normal sinus rhythm  Possible Left atrial enlargement  Left ventricular hypertrophy  No previous ECGs available         Imaging:  I have personally reviewed the patients radiographs and have reviewed the reports:  1-18-21: IMPRESSION:      1.   No evidence for pulmonary embolism. 2.  Diffuse bilateral peripheral groundglass opacification and consolidation in  the lower lobes compatible with COVID pneumonia.         Sagrario Nance MD

## 2021-01-18 NOTE — ED NOTES
Bedside shift change report received from Amy France [facility] (offgoing nurse) given to VADIM Yin (oncoming nurse). Report included the following information SBAR, Kardex and Recent Results. 1721- Patient noted to be desaturating down to 87% on 8L of Hi Flow. Dr. Geovanni Mcdowell notified and gave a verbal order to increase him to 15 L of Hi Flow at this time. Respiratory notified. 1754- Respiratory to bedside to perform ABG at this time, as well as Dr. Domingo Wilkins to evaluate at this time. 01/19/21    0045- Bedside shift change report given to Aysha Franklin RN (oncoming nurse) by VADIM Yin (offgoing nurse). Report included the following information SBAR, Kardex and Recent Results.

## 2021-01-18 NOTE — H&P
Hospitalist Admission Note    NAME: Irma Lewis   :  1965   MRN:  [id number]     Date/Time:  2021 2:36 PM    Patient PCP: Samantha Mena MD    Please note that this dictation was completed with [email or web address], the computer voice recognition software. Quite often unanticipated grammatical, syntax, homophones, and other interpretive errors are inadvertently transcribed by the computer software. Please disregard these errors. Please excuse any errors that have escaped final proofreading  ______________________________________________________________________   Assessment & Plan:  Worsening bilateral Covid pneumonia, POA with acute hypoxic respiratory failure, POA  --Requiring 6 L oxygen to maintain O2 sat 90%. COVID-19 test positive on  chest x-ray today shows interval worsening pneumonia on the left with new infiltrate on the right compared to . --Finished Z-Shade today. Put on Rocephin. Check procalcitonin  --Start Decadron x10 days. Start on remdesivir  --Put on high flow O2. Monitor in stepdown and consult pulmonology as patient is at high risk for decompensating. --Agree with CTA chest to evaluate for PE since D-dimer is elevated and patient has history of unprovoked extensive right leg DVT . 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 Add IV pepcid prn    Obesity  Body mass index is 31.14 kg/m².     Code: Discussed, full code  DVT prophylaxis: Lovenox  Surrogate decision maker: Brother Irma Bonilla [phone]          Subjective:   CHIEF COMPLAINT: Progressively worsening shortness of breath, fevers chills, nausea vomiting diarrhea, loss of appetite    HISTORY OF PRESENT ILLNESS:     Mary Ellen Agrawal is a 48 y.o. male with past medical history significant for right lower extremity DVT was treated with Xarelto no longer on anticoagulation who presented to the emergency room on January 13 with symptoms of fever up to 102, chills, shortness of breath, nausea vomiting diarrhea. Was diagnosed with pneumonia and discharged home on albuterol, azithromycin and given a dose of Decadron in the ER. COVID-19 testing was done and resulted on January 16 as being positive. Patient also started taking vitamin D and zinc.  He reports not feeling any better since he was discharged from the ER and in fact increasingly more and more short of breath. He denies any pleuritic chest pain, leg pain or swelling. We were asked to admit for work up and evaluation of the above problems. Past Medical History:   Diagnosis Date    GERD (gastroesophageal reflux disease)     Obesity (BMI 30-39. 9) 5/8/2019      Past Surgical History:   Procedure Laterality Date    HX ORTHOPAEDIC       Social History     Tobacco Use    Smoking status: Never Smoker    Smokeless tobacco: Current User   Substance Use Topics    Alcohol use: Yes     Comment: once a month      Drug use: Denies  Lives alone, state   Family History   Problem Relation Age of Onset    Cancer Mother     Cancer Father      No Known Allergies     Prior to Admission medications    Medication Sig Start Date End Date Taking? Authorizing Provider   aspirin delayed-release 81 mg tablet Take 81 mg by mouth daily. Yes Leopoldo Lawrence MD   albuterol (PROVENTIL HFA, VENTOLIN HFA, PROAIR HFA) 90 mcg/actuation inhaler Take 2 Puffs by inhalation every four (4) hours as needed for Wheezing.  1/13/21  Yes Sangita Zayas MD   HYDROcodone-chlorpheniramine (Tussionex Pennkinetic ER) 10-8 mg/5 mL suspension Take 5 mL by mouth every twelve (12) hours as needed for Cough for up to 5 days. Max Daily Amount: 10 mL. 21 Yes Tito Cordova MD   multivitamin (ONE A DAY) tablet Take 1 Tab by mouth daily. Yes Provider, Historical   cholecalciferol (VITAMIN D3) 1,000 unit cap Take 1,000 Units by mouth daily. Yes Provider, Historical   ascorbic acid, vitamin C, (VITAMIN C) 500 mg tablet Take 500 mg by mouth daily. Yes Provider, Historical   azithromycin (Zithromax Z-Shade) 250 mg tablet Take 1 Tab by mouth daily for 4 days. As per instructions on dosepak 21  Tito Cordova MD   rivaroxaban Nattylorl Sicks) 20 mg tab tablet Take 1 Tab by mouth daily (with breakfast). 19   Frida Avina MD   raNITIdine (ZANTAC) 75 mg tab Take 75 mg by mouth two (2) times a day. Provider, Historical     REVIEW OF SYSTEMS:  POSITIVE= Bold.   Negative = normal text  General:  fever, chills, sweats, generalized weakness, weight loss/gain, loss of appetite  Eyes:  blurred vision, eye pain, loss of vision, diplopia  Ear Nose and Throat:  rhinorrhea, pharyngitis  Respiratory:   cough, sputum production, SOB, wheezing, BULL, pleuritic pain  Cardiology:  chest pain, palpitations, orthopnea, PND, edema, syncope   Gastrointestinal:  abdominal pain, N/V, dysphagia, diarrhea, constipation, bleeding  Genitourinary:  frequency, urgency, dysuria, hematuria, incontinence  Muskuloskeletal :  arthralgia, myalgia  Hematology:  easy bruising, bleeding, lymphadenopathy  Dermatological:  rash, ulceration, pruritis  Endocrine:  hot flashes or polydipsia  Neurological:  headache, dizziness, confusion, focal weakness, paresthesia, memory loss, gait disturbance  Psychological: anxiety, depression, agitation      Objective:   VITALS:    Visit Vitals  BP (!) 145/73 (BP 1 Location: Left arm, BP Patient Position: Sitting)   Pulse 100   Temp 98.6 °F (37 °C)   Resp 22   Ht 5' 8\" (1.727 m)   Wt 92.9 kg (204 lb 12.9 oz)   SpO2 93%   BMI 31.14 kg/m²     Temp (24hrs), Av.6 °F (37 °C), Min:98.6 °F (37 °C), Max:98.6 °F (37 °C)    Body mass index is 31.14 kg/m². PHYSICAL EXAM:    General:    Alert, ill appearing, slow to process/answer questions, cooperative, tachypneic with shallow rapid respirations intermittently, appears stated age. HEENT: Atraumatic, anicteric sclerae, pink conjunctivae     Hearing intact. Neck:  Supple, symmetrical,  thyroid: non tender  Lungs:   Bilateral crackles in bases. No Wheezing or Rhonchi. Chest wall:  No tenderness  No Accessory muscle use. Heart:   Regular  rhythm,  No  murmur   No gallop. No edema. Abdomen:   Obese, mildly distended, nontender. Bowel sounds normal. No masses  Extremities: No cyanosis. No clubbing  Skin:     Not pale Not Jaundiced  No rashes   Psych:  Fair insight. Flat affect Not depressed. Not anxious or agitated. Neurologic: EOMs intact. No facial asymmetry. No aphasia or slurred speech. Symmetrical strength, Alert and oriented X 3.    Peripheral pulse: Right, Radial, 2+  Capillary refill:  normal    IMAGING RESULTS:   []       I have personally reviewed the actual   []     CXR  []     CT scan  CXR:  CT :  EKG:   ________________________________________________________________________  Care Plan discussed with:    Comments   Patient y    [facility]:      ________________________________________________________________________  Prophylaxis:  GI none   DVT lovenox   ________________________________________________________________________  Recommended Disposition:   Home with Family y   HH/PT/OT/RN    SNF/LTC    [facility]    ________________________________________________________________________  Code Status:  Full Code y   DNR/DNI    ________________________________________________________________________  TOTAL TIME:  50 minutes      Comments    y Reviewed previous records   >50% of visit spent in counseling and coordination of care  Discussion with patient and/or family and questions answered ______________________________________________________________________  Yaw Lomas MD      Procedures: see electronic medical records for all procedures/Xrays and details which were not copied into this note but were reviewed prior to creation of Plan. LAB DATA REVIEWED:    Recent Results (from the past 24 hour(s))   CBC WITH AUTOMATED DIFF    Collection Time: 01/18/21 12:44 PM   Result Value Ref Range    WBC 10.9 4.1 - 11.1 K/uL    RBC 4.94 4.10 - 5.70 M/uL    HGB 14.1 12.1 - 17.0 g/dL    HCT 42.0 36.6 - 50.3 %    MCV 85.0 80.0 - 99.0 FL    MCH 28.5 26.0 - 34.0 PG    MCHC 33.6 30.0 - 36.5 g/dL    RDW 13.3 11.5 - 14.5 %    PLATELET 266 308 - 994 K/uL    MPV 11.1 8.9 - 12.9 FL    NRBC 0.0 0  WBC    ABSOLUTE NRBC 0.00 0.00 - 0.01 K/uL    NEUTROPHILS 85 (H) 32 - 75 %    LYMPHOCYTES 7 (L) 12 - 49 %    MONOCYTES 6 5 - 13 %    EOSINOPHILS 0 0 - 7 %    BASOPHILS 0 0 - 1 %    IMMATURE GRANULOCYTES 2 (H) 0.0 - 0.5 %    ABS. NEUTROPHILS 9.2 (H) 1.8 - 8.0 K/UL    ABS. LYMPHOCYTES 0.8 0.8 - 3.5 K/UL    ABS. MONOCYTES 0.7 0.0 - 1.0 K/UL    ABS. EOSINOPHILS 0.0 0.0 - 0.4 K/UL    ABS. BASOPHILS 0.0 0.0 - 0.1 K/UL    ABS. IMM. GRANS. 0.2 (H) 0.00 - 0.04 K/UL    DF SMEAR SCANNED      RBC COMMENTS NORMOCYTIC, NORMOCHROMIC      WBC COMMENTS FEW     METABOLIC PANEL, COMPREHENSIVE    Collection Time: 01/18/21 12:44 PM   Result Value Ref Range    Sodium 133 (L) 136 - 145 mmol/L    Potassium 4.3 3.5 - 5.1 mmol/L    Chloride 100 97 - 108 mmol/L    CO2 28 21 - 32 mmol/L    Anion gap 5 5 - 15 mmol/L    Glucose 105 (H) 65 - 100 mg/dL    BUN 19 6 - 20 MG/DL    Creatinine 1.00 0.70 - 1.30 MG/DL    BUN/Creatinine ratio 19 12 - 20      GFR est AA >60 >60 ml/min/1.73m2    GFR est non-AA >60 >60 ml/min/1.73m2    Calcium 8.7 8.5 - 10.1 MG/DL    Bilirubin, total 0.9 0.2 - 1.0 MG/DL    ALT (SGPT) 56 12 - 78 U/L    AST (SGOT) 59 (H) 15 - 37 U/L    Alk.  phosphatase 84 45 - 117 U/L    Protein, total 7.2 6.4 - 8.2 g/dL    Albumin 2.5 (L) 3.5 - 5.0 g/dL    Globulin 4.7 (H) 2.0 - 4.0 g/dL    A-G Ratio 0.5 (L) 1.1 - 2.2     CK W/ REFLX CKMB    Collection Time: 01/18/21 12:44 PM   Result Value Ref Range     39 - 308 U/L

## 2021-01-18 NOTE — PROGRESS NOTES
Pharmacy Clarification of the Prior to Admission Medication Regimen Retrospective to the Admission Medication Reconciliation    The patient was interviewed by phone regarding clarification of the prior to admission medication regimen. He was questioned regarding use of any other inhalers, topical products, over the counter medications, herbal medications, vitamin products or ophthalmic/nasal/otic medication use. Information Obtained From: query, patient    Recommendations/Findings: The following amendments were made to the patient's active medication list on file at [facility]:     1) Additions:         2) Removals:   Z - wale  Zantac  Xarelto      3) Changes:      4) Pertinent Pharmacy Findings:  Updated patients preferred outpatient pharmacy to: [address] [place] [address] medication list was corrected to the following:     Prior to Admission Medications   Prescriptions Last Dose Informant Taking? HYDROcodone-chlorpheniramine (Tussionex Pennkinetic ER) 10-8 mg/5 mL suspension 1/18/2021 at Unknown time Self Yes   Sig: Take 5 mL by mouth every twelve (12) hours as needed for Cough for up to 5 days. Max Daily Amount: 10 mL. albuterol (PROVENTIL HFA, VENTOLIN HFA, PROAIR HFA) 90 mcg/actuation inhaler 1/18/2021 at Unknown time Self Yes   Sig: Take 2 Puffs by inhalation every four (4) hours as needed for Wheezing. ascorbic acid, vitamin C, (VITAMIN C) 500 mg tablet 1/18/2021 at Unknown time Self Yes   Sig: Take 500 mg by mouth daily. aspirin delayed-release 81 mg tablet 1/17/2021 at Unknown time Self Yes   Sig: Take 81 mg by mouth daily. cholecalciferol (VITAMIN D3) 1,000 unit cap 1/18/2021 at Unknown time Self Yes   Sig: Take 1,000 Units by mouth daily. multivitamin (ONE A DAY) tablet 1/17/2021 at Unknown time Self Yes   Sig: Take 1 Tab by mouth daily.       Facility-Administered Medications: None        Thank you,  John Steen CPHT  Medication History Pharmacy Technician

## 2021-01-19 ENCOUNTER — APPOINTMENT (OUTPATIENT)
Dept: GENERAL RADIOLOGY | Age: 56
DRG: 177 | End: 2021-01-19
Attending: INTERNAL MEDICINE
Payer: COMMERCIAL

## 2021-01-19 LAB
ABO + RH BLD: NORMAL
ALBUMIN SERPL-MCNC: 2.4 G/DL (ref 3.5–5)
ALBUMIN/GLOB SERPL: 0.5 {RATIO} (ref 1.1–2.2)
ALP SERPL-CCNC: 86 U/L (ref 45–117)
ALT SERPL-CCNC: 52 U/L (ref 12–78)
ANION GAP SERPL CALC-SCNC: 4 MMOL/L (ref 5–15)
APTT PPP: 23.7 SEC (ref 22.1–31)
ARTERIAL PATENCY WRIST A: YES
AST SERPL-CCNC: 44 U/L (ref 15–37)
ATRIAL RATE: 94 BPM
ATRIAL RATE: 97 BPM
BASE EXCESS BLD CALC-SCNC: 6 MMOL/L
BASOPHILS # BLD: 0 K/UL (ref 0–0.1)
BASOPHILS NFR BLD: 0 % (ref 0–1)
BDY SITE: ABNORMAL
BILIRUB SERPL-MCNC: 0.7 MG/DL (ref 0.2–1)
BLOOD GROUP ANTIBODIES SERPL: NORMAL
BUN SERPL-MCNC: 19 MG/DL (ref 6–20)
BUN/CREAT SERPL: 22 (ref 12–20)
CA-I BLD-SCNC: 1.28 MMOL/L (ref 1.12–1.32)
CALCIUM SERPL-MCNC: 9.1 MG/DL (ref 8.5–10.1)
CALCULATED P AXIS, ECG09: 33 DEGREES
CALCULATED P AXIS, ECG09: 38 DEGREES
CALCULATED R AXIS, ECG10: 18 DEGREES
CALCULATED R AXIS, ECG10: 9 DEGREES
CALCULATED T AXIS, ECG11: -10 DEGREES
CALCULATED T AXIS, ECG11: 8 DEGREES
CHLORIDE SERPL-SCNC: 100 MMOL/L (ref 97–108)
CO2 SERPL-SCNC: 30 MMOL/L (ref 21–32)
COMMENT, HOLDF: NORMAL
CREAT SERPL-MCNC: 0.88 MG/DL (ref 0.7–1.3)
CRP SERPL HS-MCNC: >9.5 MG/L
D DIMER PPP FEU-MCNC: 4.21 MG/L FEU (ref 0–0.65)
DIAGNOSIS, 93000: NORMAL
DIAGNOSIS, 93000: NORMAL
DIFFERENTIAL METHOD BLD: ABNORMAL
EOSINOPHIL # BLD: 0 K/UL (ref 0–0.4)
EOSINOPHIL NFR BLD: 0 % (ref 0–7)
ERYTHROCYTE [DISTWIDTH] IN BLOOD BY AUTOMATED COUNT: 13.4 % (ref 11.5–14.5)
FERRITIN SERPL-MCNC: 1941 NG/ML (ref 26–388)
FIBRINOGEN PPP-MCNC: 752 MG/DL (ref 200–475)
GAS FLOW.O2 O2 DELIVERY SYS: ABNORMAL L/MIN
GAS FLOW.O2 SETTING OXYMISER: 60 L/M
GLOBULIN SER CALC-MCNC: 4.9 G/DL (ref 2–4)
GLUCOSE SERPL-MCNC: 114 MG/DL (ref 65–100)
HCO3 BLD-SCNC: 29.1 MMOL/L (ref 22–26)
HCT VFR BLD AUTO: 41.9 % (ref 36.6–50.3)
HGB BLD-MCNC: 14.1 G/DL (ref 12.1–17)
IMM GRANULOCYTES # BLD AUTO: 0.2 K/UL (ref 0–0.04)
IMM GRANULOCYTES NFR BLD AUTO: 2 % (ref 0–0.5)
INR PPP: 1.1 (ref 0.9–1.1)
LACTATE SERPL-SCNC: 1.6 MMOL/L (ref 0.4–2)
LYMPHOCYTES # BLD: 0.6 K/UL (ref 0.8–3.5)
LYMPHOCYTES NFR BLD: 8 % (ref 12–49)
MAGNESIUM SERPL-MCNC: 3.2 MG/DL (ref 1.6–2.4)
MCH RBC QN AUTO: 28.6 PG (ref 26–34)
MCHC RBC AUTO-ENTMCNC: 33.7 G/DL (ref 30–36.5)
MCV RBC AUTO: 85 FL (ref 80–99)
MONOCYTES # BLD: 0.4 K/UL (ref 0–1)
MONOCYTES NFR BLD: 5 % (ref 5–13)
NEUTS SEG # BLD: 6.5 K/UL (ref 1.8–8)
NEUTS SEG NFR BLD: 85 % (ref 32–75)
NRBC # BLD: 0 K/UL (ref 0–0.01)
NRBC BLD-RTO: 0 PER 100 WBC
O2/TOTAL GAS SETTING VFR VENT: 100 %
P-R INTERVAL, ECG05: 114 MS
P-R INTERVAL, ECG05: 132 MS
PCO2 BLD: 38 MMHG (ref 35–45)
PH BLD: 7.49 [PH] (ref 7.35–7.45)
PHOSPHATE SERPL-MCNC: 3.4 MG/DL (ref 2.6–4.7)
PLATELET # BLD AUTO: 234 K/UL (ref 150–400)
PMV BLD AUTO: 10.9 FL (ref 8.9–12.9)
PO2 BLD: 66 MMHG (ref 80–100)
POTASSIUM SERPL-SCNC: 4.6 MMOL/L (ref 3.5–5.1)
PROCALCITONIN SERPL-MCNC: 0.43 NG/ML
PROT SERPL-MCNC: 7.3 G/DL (ref 6.4–8.2)
PROTHROMBIN TIME: 11.3 SEC (ref 9–11.1)
Q-T INTERVAL, ECG07: 334 MS
Q-T INTERVAL, ECG07: 342 MS
QRS DURATION, ECG06: 80 MS
QRS DURATION, ECG06: 82 MS
QTC CALCULATION (BEZET), ECG08: 424 MS
QTC CALCULATION (BEZET), ECG08: 427 MS
RBC # BLD AUTO: 4.93 M/UL (ref 4.1–5.7)
RBC MORPH BLD: ABNORMAL
SAMPLES BEING HELD,HOLD: NORMAL
SAO2 % BLD: 94 % (ref 92–97)
SODIUM SERPL-SCNC: 134 MMOL/L (ref 136–145)
SPECIMEN EXP DATE BLD: NORMAL
SPECIMEN TYPE: ABNORMAL
THERAPEUTIC RANGE,PTTT: NORMAL SECS (ref 58–77)
TOTAL RESP. RATE, ITRR: 18
VENTRICULAR RATE, ECG03: 94 BPM
VENTRICULAR RATE, ECG03: 97 BPM
WBC # BLD AUTO: 7.7 K/UL (ref 4.1–11.1)

## 2021-01-19 PROCEDURE — 36600 WITHDRAWAL OF ARTERIAL BLOOD: CPT

## 2021-01-19 PROCEDURE — 74011000250 HC RX REV CODE- 250: Performed by: HOSPITALIST

## 2021-01-19 PROCEDURE — 86141 C-REACTIVE PROTEIN HS: CPT

## 2021-01-19 PROCEDURE — XW13325 TRANSFUSION OF CONVALESCENT PLASMA (NONAUTOLOGOUS) INTO PERIPHERAL VEIN, PERCUTANEOUS APPROACH, NEW TECHNOLOGY GROUP 5: ICD-10-PCS | Performed by: INTERNAL MEDICINE

## 2021-01-19 PROCEDURE — 80053 COMPREHEN METABOLIC PANEL: CPT

## 2021-01-19 PROCEDURE — 85379 FIBRIN DEGRADATION QUANT: CPT

## 2021-01-19 PROCEDURE — 65660000001 HC RM ICU INTERMED STEPDOWN

## 2021-01-19 PROCEDURE — 86901 BLOOD TYPING SEROLOGIC RH(D): CPT

## 2021-01-19 PROCEDURE — 85730 THROMBOPLASTIN TIME PARTIAL: CPT

## 2021-01-19 PROCEDURE — 74011000258 HC RX REV CODE- 258: Performed by: HOSPITALIST

## 2021-01-19 PROCEDURE — 83520 IMMUNOASSAY QUANT NOS NONAB: CPT

## 2021-01-19 PROCEDURE — 71045 X-RAY EXAM CHEST 1 VIEW: CPT

## 2021-01-19 PROCEDURE — 83735 ASSAY OF MAGNESIUM: CPT

## 2021-01-19 PROCEDURE — 83605 ASSAY OF LACTIC ACID: CPT

## 2021-01-19 PROCEDURE — XW033E5 INTRODUCTION OF REMDESIVIR ANTI-INFECTIVE INTO PERIPHERAL VEIN, PERCUTANEOUS APPROACH, NEW TECHNOLOGY GROUP 5: ICD-10-PCS | Performed by: INTERNAL MEDICINE

## 2021-01-19 PROCEDURE — 74011250637 HC RX REV CODE- 250/637: Performed by: INTERNAL MEDICINE

## 2021-01-19 PROCEDURE — 85610 PROTHROMBIN TIME: CPT

## 2021-01-19 PROCEDURE — 84145 PROCALCITONIN (PCT): CPT

## 2021-01-19 PROCEDURE — 84100 ASSAY OF PHOSPHORUS: CPT

## 2021-01-19 PROCEDURE — 82728 ASSAY OF FERRITIN: CPT

## 2021-01-19 PROCEDURE — 2709999900 HC NON-CHARGEABLE SUPPLY

## 2021-01-19 PROCEDURE — 77010033711 HC HIGH FLOW OXYGEN

## 2021-01-19 PROCEDURE — 36430 TRANSFUSION BLD/BLD COMPNT: CPT

## 2021-01-19 PROCEDURE — 85025 COMPLETE CBC W/AUTO DIFF WBC: CPT

## 2021-01-19 PROCEDURE — 85384 FIBRINOGEN ACTIVITY: CPT

## 2021-01-19 PROCEDURE — 36415 COLL VENOUS BLD VENIPUNCTURE: CPT

## 2021-01-19 PROCEDURE — 74011250636 HC RX REV CODE- 250/636: Performed by: INTERNAL MEDICINE

## 2021-01-19 PROCEDURE — 82803 BLOOD GASES ANY COMBINATION: CPT

## 2021-01-19 PROCEDURE — 74011250636 HC RX REV CODE- 250/636: Performed by: HOSPITALIST

## 2021-01-19 PROCEDURE — 74011250637 HC RX REV CODE- 250/637: Performed by: HOSPITALIST

## 2021-01-19 RX ORDER — SODIUM CHLORIDE 9 MG/ML
250 INJECTION, SOLUTION INTRAVENOUS AS NEEDED
Status: DISCONTINUED | OUTPATIENT
Start: 2021-01-19 | End: 2021-02-17 | Stop reason: HOSPADM

## 2021-01-19 RX ADMIN — FAMOTIDINE 20 MG: 20 TABLET, FILM COATED ORAL at 09:41

## 2021-01-19 RX ADMIN — ALBUTEROL SULFATE 2 PUFF: 90 AEROSOL, METERED RESPIRATORY (INHALATION) at 20:07

## 2021-01-19 RX ADMIN — REMDESIVIR 100 MG: 100 INJECTION, POWDER, LYOPHILIZED, FOR SOLUTION INTRAVENOUS at 16:52

## 2021-01-19 RX ADMIN — Medication 10 ML: at 22:04

## 2021-01-19 RX ADMIN — ENOXAPARIN SODIUM 50 MG: 60 INJECTION SUBCUTANEOUS at 04:00

## 2021-01-19 RX ADMIN — METHYLPREDNISOLONE SODIUM SUCCINATE 80 MG: 40 INJECTION, POWDER, FOR SOLUTION INTRAMUSCULAR; INTRAVENOUS at 11:19

## 2021-01-19 RX ADMIN — OXYCODONE HYDROCHLORIDE AND ACETAMINOPHEN 500 MG: 500 TABLET ORAL at 09:41

## 2021-01-19 RX ADMIN — AZITHROMYCIN MONOHYDRATE 500 MG: 500 INJECTION, POWDER, LYOPHILIZED, FOR SOLUTION INTRAVENOUS at 11:19

## 2021-01-19 RX ADMIN — METHYLPREDNISOLONE SODIUM SUCCINATE 80 MG: 40 INJECTION, POWDER, FOR SOLUTION INTRAMUSCULAR; INTRAVENOUS at 19:45

## 2021-01-19 RX ADMIN — CHOLECALCIFEROL TAB 25 MCG (1000 UNIT) 2 TABLET: 25 TAB at 09:41

## 2021-01-19 RX ADMIN — ZINC SULFATE 220 MG (50 MG) CAPSULE 1 CAPSULE: CAPSULE at 09:41

## 2021-01-19 RX ADMIN — ENOXAPARIN SODIUM 50 MG: 60 INJECTION SUBCUTANEOUS at 15:52

## 2021-01-19 RX ADMIN — SODIUM CHLORIDE 250 ML: 9 INJECTION, SOLUTION INTRAVENOUS at 17:33

## 2021-01-19 RX ADMIN — Medication 10 ML: at 14:00

## 2021-01-19 RX ADMIN — Medication 10 ML: at 06:00

## 2021-01-19 RX ADMIN — ASPIRIN 81 MG: 81 TABLET, COATED ORAL at 09:41

## 2021-01-19 RX ADMIN — OXYCODONE HYDROCHLORIDE AND ACETAMINOPHEN 500 MG: 500 TABLET ORAL at 19:46

## 2021-01-19 RX ADMIN — CEFTRIAXONE 1 G: 1 INJECTION, POWDER, FOR SOLUTION INTRAMUSCULAR; INTRAVENOUS at 15:53

## 2021-01-19 NOTE — ED NOTES
Bedside shift change report given to Iveth Landin RN (oncoming nurse) by Pedro Jack RN (offgoing nurse). Report included the following information SBAR, Kardex, ED Summary, [facility] and Recent Results. 2:42 AM: Spoke with respiratory regarding the pt 02 saturation. She states to keep the pt on humidified O2 if o2 saturation maintains average of 92%. 3:58 AM: Pt 02 sat 81%. Pt placed on NRB in addition to NC (humidified). Pt now 90%. Charge nurse and RT notified. 4:26 AM: Pt moved to negative pressure room in ED. RT at bedside. 4:26 AM: Bedside shift change report given to Montse Allen (oncoming nurse) by Iveth Landin RN (offgoing nurse). Report included the following information SBAR, Kardex, ED Summary, [facility] and Recent Results.

## 2021-01-19 NOTE — ED NOTES
0: Assumed patient care at time time. RT at bedside. Patient will be placed on Hi Flow due to decreasing oxygen stats.

## 2021-01-19 NOTE — PROGRESS NOTES
[facility]  Pulmonary, Critical Care, and Sleep Medicine    Patient Consult    Name: Rolando Solorio MRN: [id number]   : 1965 Hospital: [facility]   Date: 2021          When is saw him this am. His pox was in 70-80s. He has gotten a little better and is tolerating the high flow now. Will hold off bipap for now. Will at least discuss with ICU team in care he decompensates. No pulmonary reserve at this time. Discussed with RT. IMPRESSION:   · Severe Multilobar Covid Pneumonia, first Dx on , Symptoms first started on 21. · Acute Hypoxic Severe Hypoxia: on 15L high flow oxgyen, POx was 79% on RA. Pa02 was 75 on ABG with 15L oxygen. Pt was placed on high flow oxygen this am. Despite this pt is still hypoxic and has increased Respiratory Rate. I am very concerned pt may further deteriorate and need intubation. Will ask the ICU team to evaluate the pt. When I saw his this am. His RR was in the mid 35s. · Increased Pro Calcitonin: suggest concern for Bacterial infection. · Covid related coagulopathy  · Covid related inflammation, increased CRP, increased LDH,  and Increase ferritin. · Hyponatremia  · Elevated D dimer at 1.54.   · Obese  · GERD  · Hypertension, LAE and LVH on ekg. · Critically ill, high risk of worsening and decompensation. 35 min CC, EOP. RECOMMENDATIONS:   · NPO for now, except clears and meds. , Can try on bipap. But I am very concerned pt is deteriorating. · Will order convalescent plasma. I discussed with pt and he agrees with  Blood transfusion. · Will ask the ICU team to evaluate the pt,I feel he has a high risk of decompensation. · Decadron for 10 days. If not improving would consider solumedrol. · ON REmdesivir  · Will follow inflammatory and coag markers  · Will get serial CXRs. · Will follow along with you. PCP: Robyn Cherry.        Subjective:   21: Pt looks worse this am. Has increased RR into the mid 35s. Dyspneic. On high flow oxygen. Discussed convalescent plasma and he agrees. Feels very fatigued. No appetite. His temp has been normal.           This patient has been seen and evaluated at the request of Dr. Jesus Berger for above. Patient is a 48 y.o. male   Who was first ill on 1/4/21. Has increased shortness of breath. Had dry cough. Weakness. Diarrhea. Weakness has been worse over last 3 days. He has been with increased  Breathing difficulty. Feels his breathing is constricted. Has decreased po intake. Has felt hot. Some loose stools. Has mild GERD. Non smoker, Occ etoh use. Chews tobacco.      Past Medical History:   Diagnosis Date    GERD (gastroesophageal reflux disease)     Obesity (BMI 30-39. 9) 5/8/2019    Right leg DVT ([address] 2019      Past Surgical History:   Procedure Laterality Date    HX ORTHOPAEDIC        Prior to Admission medications    Medication Sig Start Date End Date Taking? Authorizing Provider   aspirin delayed-release 81 mg tablet Take 81 mg by mouth daily. Yes Other, MD Leopoldo   albuterol (PROVENTIL HFA, VENTOLIN HFA, PROAIR HFA) 90 mcg/actuation inhaler Take 2 Puffs by inhalation every four (4) hours as needed for Wheezing. 1/13/21  Yes Anni Encinas MD   HYDROcodone-chlorpheniramine (Tussionex Pennkinetic ER) 10-8 mg/5 mL suspension Take 5 mL by mouth every twelve (12) hours as needed for Cough for up to 5 days. Max Daily Amount: 10 mL. 1/13/21 1/18/21 Yes Anni Encinas MD   multivitamin (ONE A DAY) tablet Take 1 Tab by mouth daily. Yes Provider, Historical   cholecalciferol (VITAMIN D3) 1,000 unit cap Take 1,000 Units by mouth daily. Yes Provider, Historical   ascorbic acid, vitamin C, (VITAMIN C) 500 mg tablet Take 500 mg by mouth daily.    Yes Provider, Historical     No Known Allergies   Social History     Tobacco Use    Smoking status: Never Smoker    Smokeless tobacco: Current User   Substance Use Topics    Alcohol use: Yes     Comment: once a month      Family History   Problem Relation Age of Onset    Cancer Mother     Cancer Father         Current Facility-Administered Medications   Medication Dose Route Frequency    dexamethasone (DECADRON) 4 mg/mL injection 6 mg  6 mg IntraVENous DAILY    cefTRIAXone (ROCEPHIN) 1 g in 0.9% sodium chloride (MBP/ADV) 50 mL MBP  1 g IntraVENous Q24H    sodium chloride (NS) flush 5-40 mL  5-40 mL IntraVENous Q8H    enoxaparin (LOVENOX) injection 50 mg  0.5 mg/kg SubCUTAneous Q12H    cholecalciferol (VITAMIN D3) (1000 Units /25 mcg) tablet 2 Tab  2,000 Units Oral DAILY    aspirin delayed-release tablet 81 mg  81 mg Oral DAILY    ascorbic acid (vitamin C) (VITAMIN C) tablet 500 mg  500 mg Oral BID    remdesivir 100 mg in 0.9% sodium chloride 250 mL IVPB  100 mg IntraVENous Q24H    zinc sulfate (ZINCATE) 220 (50) mg capsule 1 Cap  1 Cap Oral DAILY    famotidine (PEPCID) tablet 20 mg  20 mg Oral DAILY       Review of Systems:  Constitutional: positive for fevers, chills, sweats, fatigue, malaise and anorexia, negative for weight loss  Eyes: negative  Ears, nose, mouth, throat, and face: positive for nasal congestion  Respiratory: positive for cough or dyspnea on exertion  Cardiovascular: positive for fatigue, paroxysmal nocturnal dyspnea, tachypnea, dyspnea on exertion  Gastrointestinal: positive for dyspepsia, reflux symptoms, nausea and change in bowel habits  Genitourinary:negative  Integument/breast: negative  Hematologic/lymphatic: negative  Musculoskeletal:negative  Neurological: negative  Behavioral/Psych: negative  Endocrine: negative  Allergic/Immunologic: negative    Objective:   Vital Signs:    Visit Vitals  /78 (BP 1 Location: Left arm, BP Patient Position: At rest)   Pulse 78   Temp 97.8 °F (36.6 °C)   Resp 22   Ht 5' 8\" (1.727 m)   Wt 92.9 kg (204 lb 12.9 oz)   SpO2 95%   BMI 31.14 kg/m²       O2 Device: Hi flow nasal cannula   O2 Flow Rate (L/min): 60 l/min   Temp (24hrs), Av °F (36.7 °C), Min:97.6 °F (36.4 °C), Max:98.6 °F (37 °C)       Intake/Output:   Last shift:      No intake/output data recorded. Last 3 shifts: No intake/output data recorded. No intake or output data in the 24 hours ending 01/19/21 7407   Physical Exam:   General:  Alert, cooperative, mod respiratory distress, appears stated age. Head:  Normocephalic, without obvious abnormality, atraumatic. Eyes:  Conjunctivae/corneas clear. PERRL, EOMs intact. Nose: Nares normal. Septum midline. Mucosa normal. No drainage or sinus tenderness. Throat: Lips, mucosa, and tongue normal. Teeth and gums normal.   Neck: Supple, symmetrical, trachea midline, no adenopathy, thyroid: no enlargment/tenderness/nodules, no carotid bruit and no JVD. Back:   Symmetric, no curvature. ROM normal.   Lungs:   Clear to auscultation bilaterally. increased RR in the 30s. On high flow oxygen. Chest wall:  No tenderness or deformity. Heart:  Regular rate and rhythm, S1, S2 normal, no murmur, click, rub or gallop. Abdomen:   Soft, non-tender. Bowel sounds normal. No masses,  No organomegaly. Extremities: Extremities normal, atraumatic, no cyanosis or edema. Pulses: 2+ and symmetric all extremities. Skin: Skin color, texture, turgor normal. No rashes or lesions   Lymph nodes: Cervical, supraclavicular, and axillary nodes normal.   Neurologic: Grossly nonfocal, motor is intact  Psych: Mild to mod anxiety.       Data review:     Recent Results (from the past 24 hour(s))   EKG, 12 LEAD, INITIAL    Collection Time: 01/18/21 12:40 PM   Result Value Ref Range    Ventricular Rate 97 BPM    Atrial Rate 97 BPM    P-R Interval 114 ms    QRS Duration 80 ms    Q-T Interval 334 ms    QTC Calculation (Bezet) 424 ms    Calculated P Axis 33 degrees    Calculated R Axis 18 degrees    Calculated T Axis 8 degrees    Diagnosis       Normal sinus rhythm  Possible Left atrial enlargement  Nonspecific T wave abnormality  No previous ECGs available  Confirmed by Delvis Scott P.SAPPHIRE ([id number]) on 1/19/2021 12:43:02 AM     CBC WITH AUTOMATED DIFF    Collection Time: 01/18/21 12:44 PM   Result Value Ref Range    WBC 10.9 4.1 - 11.1 K/uL    RBC 4.94 4.10 - 5.70 M/uL    HGB 14.1 12.1 - 17.0 g/dL    HCT 42.0 36.6 - 50.3 %    MCV 85.0 80.0 - 99.0 FL    MCH 28.5 26.0 - 34.0 PG    MCHC 33.6 30.0 - 36.5 g/dL    RDW 13.3 11.5 - 14.5 %    PLATELET 364 126 - 133 K/uL    MPV 11.1 8.9 - 12.9 FL    NRBC 0.0 0  WBC    ABSOLUTE NRBC 0.00 0.00 - 0.01 K/uL    NEUTROPHILS 85 (H) 32 - 75 %    LYMPHOCYTES 7 (L) 12 - 49 %    MONOCYTES 6 5 - 13 %    EOSINOPHILS 0 0 - 7 %    BASOPHILS 0 0 - 1 %    IMMATURE GRANULOCYTES 2 (H) 0.0 - 0.5 %    ABS. NEUTROPHILS 9.2 (H) 1.8 - 8.0 K/UL    ABS. LYMPHOCYTES 0.8 0.8 - 3.5 K/UL    ABS. MONOCYTES 0.7 0.0 - 1.0 K/UL    ABS. EOSINOPHILS 0.0 0.0 - 0.4 K/UL    ABS. BASOPHILS 0.0 0.0 - 0.1 K/UL    ABS. IMM. GRANS. 0.2 (H) 0.00 - 0.04 K/UL    DF SMEAR SCANNED      RBC COMMENTS NORMOCYTIC, NORMOCHROMIC      WBC COMMENTS FEW     METABOLIC PANEL, COMPREHENSIVE    Collection Time: 01/18/21 12:44 PM   Result Value Ref Range    Sodium 133 (L) 136 - 145 mmol/L    Potassium 4.3 3.5 - 5.1 mmol/L    Chloride 100 97 - 108 mmol/L    CO2 28 21 - 32 mmol/L    Anion gap 5 5 - 15 mmol/L    Glucose 105 (H) 65 - 100 mg/dL    BUN 19 6 - 20 MG/DL    Creatinine 1.00 0.70 - 1.30 MG/DL    BUN/Creatinine ratio 19 12 - 20      GFR est AA >60 >60 ml/min/1.73m2    GFR est non-AA >60 >60 ml/min/1.73m2    Calcium 8.7 8.5 - 10.1 MG/DL    Bilirubin, total 0.9 0.2 - 1.0 MG/DL    ALT (SGPT) 56 12 - 78 U/L    AST (SGOT) 59 (H) 15 - 37 U/L    Alk.  phosphatase 84 45 - 117 U/L    Protein, total 7.2 6.4 - 8.2 g/dL    Albumin 2.5 (L) 3.5 - 5.0 g/dL    Globulin 4.7 (H) 2.0 - 4.0 g/dL    A-G Ratio 0.5 (L) 1.1 - 2.2     CK W/ REFLX CKMB    Collection Time: 01/18/21 12:44 PM   Result Value Ref Range     39 - 308 U/L   TROPONIN I    Collection Time: 01/18/21 12:44 PM   Result Value Ref Range Troponin-I, Qt. <0.05 <0.05 ng/mL   PROTHROMBIN TIME + INR    Collection Time: 01/18/21  2:15 PM   Result Value Ref Range    INR 1.1 0.9 - 1.1      Prothrombin time 11.4 (H) 9.0 - 11.1 sec   PTT    Collection Time: 01/18/21  2:15 PM   Result Value Ref Range    aPTT 23.6 22.1 - 31.0 sec    aPTT, therapeutic range     58.0 - 77.0 SECS   D DIMER    Collection Time: 01/18/21  2:15 PM   Result Value Ref Range    D-dimer 1.54 (H) 0.00 - 0.65 mg/L FEU   LD    Collection Time: 01/18/21  2:24 PM   Result Value Ref Range     (H) 85 - 241 U/L   C REACTIVE PROTEIN, QT    Collection Time: 01/18/21  2:24 PM   Result Value Ref Range    C-Reactive protein 24.20 (H) 0.00 - 0.60 mg/dL   FERRITIN    Collection Time: 01/18/21  2:24 PM   Result Value Ref Range    Ferritin 1,675 (H) 26 - 388 NG/ML   EKG, 12 LEAD, INITIAL    Collection Time: 01/18/21  3:53 PM   Result Value Ref Range    Ventricular Rate 94 BPM    Atrial Rate 94 BPM    P-R Interval 132 ms    QRS Duration 82 ms    Q-T Interval 342 ms    QTC Calculation (Bezet) 427 ms    Calculated P Axis 38 degrees    Calculated R Axis 9 degrees    Calculated T Axis -10 degrees    Diagnosis       Normal sinus rhythm  Possible Left atrial enlargement  Left ventricular hypertrophy  No previous ECGs available  Confirmed by Miriam Shafer, P.VPrasad ([id number]) on 1/19/2021 12:40:57 AM     POC EG7    Collection Time: 01/18/21  5:53 PM   Result Value Ref Range    Calcium, ionized (POC) 1.16 1.12 - 1.32 mmol/L    pH (POC) 7.46 (H) 7.35 - 7.45      pCO2 (POC) 41.1 35.0 - 45.0 MMHG    pO2 (POC) 71 (L) 80 - 100 MMHG    HCO3 (POC) 28.9 (H) 22 - 26 MMOL/L    Base excess (POC) 5 mmol/L    sO2 (POC) 95 92 - 97 %    Site RIGHT RADIAL      Device: High Flow Nasal Cannula      Flow rate (POC) 15 L/M    Allens test (POC) YES      Specimen type (POC) ARTERIAL      Total resp.  rate 26     PROTHROMBIN TIME + INR    Collection Time: 01/19/21  3:10 AM   Result Value Ref Range    INR 1.1 0.9 - 1.1      Prothrombin time 11. 3 (H) 9.0 - 11.1 sec   PTT    Collection Time: 01/19/21  3:10 AM   Result Value Ref Range    aPTT 23.7 22.1 - 31.0 sec    aPTT, therapeutic range     58.0 - 77.0 SECS   D DIMER    Collection Time: 01/19/21  3:10 AM   Result Value Ref Range    D-dimer 4.21 (H) 0.00 - 0.65 mg/L FEU   METABOLIC PANEL, COMPREHENSIVE    Collection Time: 01/19/21  3:10 AM   Result Value Ref Range    Sodium 134 (L) 136 - 145 mmol/L    Potassium 4.6 3.5 - 5.1 mmol/L    Chloride 100 97 - 108 mmol/L    CO2 30 21 - 32 mmol/L    Anion gap 4 (L) 5 - 15 mmol/L    Glucose 114 (H) 65 - 100 mg/dL    BUN 19 6 - 20 MG/DL    Creatinine 0.88 0.70 - 1.30 MG/DL    BUN/Creatinine ratio 22 (H) 12 - 20      GFR est AA >60 >60 ml/min/1.73m2    GFR est non-AA >60 >60 ml/min/1.73m2    Calcium 9.1 8.5 - 10.1 MG/DL    Bilirubin, total 0.7 0.2 - 1.0 MG/DL    ALT (SGPT) 52 12 - 78 U/L    AST (SGOT) 44 (H) 15 - 37 U/L    Alk. phosphatase 86 45 - 117 U/L    Protein, total 7.3 6.4 - 8.2 g/dL    Albumin 2.4 (L) 3.5 - 5.0 g/dL    Globulin 4.9 (H) 2.0 - 4.0 g/dL    A-G Ratio 0.5 (L) 1.1 - 2.2     CBC WITH AUTOMATED DIFF    Collection Time: 01/19/21  3:10 AM   Result Value Ref Range    WBC 7.7 4.1 - 11.1 K/uL    RBC 4.93 4.10 - 5.70 M/uL    HGB 14.1 12.1 - 17.0 g/dL    HCT 41.9 36.6 - 50.3 %    MCV 85.0 80.0 - 99.0 FL    MCH 28.6 26.0 - 34.0 PG    MCHC 33.7 30.0 - 36.5 g/dL    RDW 13.4 11.5 - 14.5 %    PLATELET 161 401 - 885 K/uL    MPV 10.9 8.9 - 12.9 FL    NRBC 0.0 0  WBC    ABSOLUTE NRBC 0.00 0.00 - 0.01 K/uL    NEUTROPHILS 85 (H) 32 - 75 %    LYMPHOCYTES 8 (L) 12 - 49 %    MONOCYTES 5 5 - 13 %    EOSINOPHILS 0 0 - 7 %    BASOPHILS 0 0 - 1 %    IMMATURE GRANULOCYTES 2 (H) 0.0 - 0.5 %    ABS. NEUTROPHILS 6.5 1.8 - 8.0 K/UL    ABS. LYMPHOCYTES 0.6 (L) 0.8 - 3.5 K/UL    ABS. MONOCYTES 0.4 0.0 - 1.0 K/UL    ABS. EOSINOPHILS 0.0 0.0 - 0.4 K/UL    ABS. BASOPHILS 0.0 0.0 - 0.1 K/UL    ABS. IMM.  GRANS. 0.2 (H) 0.00 - 0.04 K/UL    DF SMEAR SCANNED      RBC COMMENTS NORMOCYTIC, NORMOCHROMIC     MAGNESIUM    Collection Time: 01/19/21  3:10 AM   Result Value Ref Range    Magnesium 3.2 (H) 1.6 - 2.4 mg/dL   PHOSPHORUS    Collection Time: 01/19/21  3:10 AM   Result Value Ref Range    Phosphorus 3.4 2.6 - 4.7 MG/DL   SAMPLES BEING HELD    Collection Time: 01/19/21  3:10 AM   Result Value Ref Range    SAMPLES BEING HELD BL     COMMENT        Add-on orders for these samples will be processed based on acceptable specimen integrity and analyte stability, which may vary by analyte. FIBRINOGEN    Collection Time: 01/19/21  3:10 AM   Result Value Ref Range    Fibrinogen 752 (H) 200 - 475 mg/dL   PROCALCITONIN    Collection Time: 01/19/21  3:15 AM   Result Value Ref Range    Procalcitonin 0.43 ng/mL   TYPE & SCREEN    Collection Time: 01/19/21  3:16 AM   Result Value Ref Range    Crossmatch Expiration 01/22/2021,2359     ABO/Rh(D) B POSITIVE     Antibody screen NEG    LACTIC ACID    Collection Time: 01/19/21  3:17 AM   Result Value Ref Range    Lactic acid 1.6 0.4 - 2.0 MMOL/L   POC EG7    Collection Time: 01/19/21  8:25 AM   Result Value Ref Range    Calcium, ionized (POC) 1.28 1.12 - 1.32 mmol/L    FIO2 (POC) 100 %    pH (POC) 7.49 (H) 7.35 - 7.45      pCO2 (POC) 38.0 35.0 - 45.0 MMHG    pO2 (POC) 66 (L) 80 - 100 MMHG    HCO3 (POC) 29.1 (H) 22 - 26 MMOL/L    Base excess (POC) 6 mmol/L    sO2 (POC) 94 92 - 97 %    Site LEFT RADIAL      Device: High Flow Nasal Cannula      Flow rate (POC) 60 L/M    Allens test (POC) YES      Specimen type (POC) ARTERIAL      Total resp. rate 18         Imaging:  I have personally reviewed the patients radiographs and have reviewed the reports:  1-18-21: IMPRESSION:      1. No evidence for pulmonary embolism. 2.  Diffuse bilateral peripheral groundglass opacification and consolidation in  the lower lobes compatible with COVID pneumonia. 1-19-21: Pt has persistent decreased lung volumes. Has bilateral pulmonary infiltrates.         Radha Bernardo MD

## 2021-01-19 NOTE — PROGRESS NOTES
[facility]    ICU TEAM Consult Note    Name: Rola Lockwood   : 1965   MRN: [id number]   Date: 2021      Assessment/Plan:     1. Acute hypoxic respiratory failure d/t COVID pna  a. Continue HiFlo  b. Progress to BiPAP if needed  c. Remdesivir, SoluMed, plasma  d. Keep I/Os negative as able   e. Bronchodilators   f. ProCal minimally up. On Rocephin/Azith   g. D/w Pulm, Dr. Hiral Simmons. h. Maintain current level of care w/ planned admit to PCU. Please call with questions or concerns or should he deteriorate further. 2. Sepsis d/t COVID  a. As above  3. RLE DVT hx   a. Quiescent, not on a/c baseline  b. Lovenox  4. Obesity - complicates care and increases risk of severe COVID illness  5. COVID: Dx'd    a. Remdesivir Day   b. SoluMed Day 2/10+  c. Convalescent plasma ordered, pending   d. Covid specific anticoagulation  e. BG control - normal on labs   f. Zinc/Ascorbic up to 10 days   g. Empiric abx   6. SBP Goal of: > 90 mmHg  7. MAP Goal of: > 65 mmHg  8. IVFs: prn  9. Transfusion Trigger (Hgb): <7 g/dL  10. Respiratory Goals:  a. Chlorhexidine   b. Optimize PEEP/Ventilation/Oxygenation  c. Aggressive bronchopulmonary hygiene  11. Pulmonary toilet: Duo-Nebs   12. SpO2 Goal: > 92%  13. Keep K>4; Mg>2   14. PT/OT: PT consulted and on board and OT consulted and on board   15. Discussed Plan of Care/Code Status: Full Code  16. Discussed Care Plan with Bedside RN  17. Documentation of Current Medications  18. Further as below:    F - Feeding:  Yes po  A - Analgesia: Acetaminophen  S - Sedation: N/A  T - DVT Prophylaxis: Lovenox   H - Head of Bed: > 30 Degrees  U - Ulcer Prophylaxis: Pepcid (famotidine)   G - Glycemic Control: Insulin  S - Spontaneous Breathing Trial: N/A  B - Bowel Regimen: None needed at this time  I - Indwelling Catheter:   Tubes: None  Lines: Peripheral IV  Drains: None  D - De-escalation of Antibiotics: as above    Multidisciplinary Rounds Completed:   Yes    ABCDEF Bundle/Checklist Completed:  Yes    SPECIAL EQUIPMENT  None    DISPOSITION  Awaiting PCU bed. Maintain current level of care for now. Subjective:   Progress Note: 1/19/2021      Reason for ICU Evaluation: acute hypoxic respiratory failure d/t COVID      HPI: 55M w/ h/o prior RLE DVT no longer on a/c admit 1/18, p/w fever, dyspnea progressive for a week plus. COVID + 1/13. Started on usual and appropriate therapies. Seen by Pulm. O2 req's increased from NC to HiFlo. Was to progress to BiPAP, but feeling a little better. Overnight Events: HPI      POD:  * No surgery found *    S/P:       Active Problem List:     Problem List  Date Reviewed: 5/8/2019          Codes Class    History of DVT of lower extremity ICD-10-CM: Z86.718  ICD-9-CM: V12.51         Obesity (BMI 30-39. 9) ICD-10-CM: E66.9  ICD-9-CM: 278.00         Acute respiratory failure with hypoxia (HCC) ICD-10-CM: J96.01  ICD-9-CM: 518.81         COVID-19 virus infection ICD-10-CM: U07.1  ICD-9-CM: 079.89               Past Medical History:      has a past medical history of GERD (gastroesophageal reflux disease), Obesity (BMI 30-39.9) (5/8/2019), and Right leg DVT ([address] (2019). Past Surgical History:      has a past surgical history that includes hx orthopaedic. Home Medications:     Prior to Admission medications    Medication Sig Start Date End Date Taking? Authorizing Provider   aspirin delayed-release 81 mg tablet Take 81 mg by mouth daily. Yes Howard, MD Leopoldo   albuterol (PROVENTIL HFA, VENTOLIN HFA, PROAIR HFA) 90 mcg/actuation inhaler Take 2 Puffs by inhalation every four (4) hours as needed for Wheezing. 1/13/21  Yes Jennifer Bojorquez MD   HYDROcodone-chlorpheniramine (Tussionex Pennkinetic ER) 10-8 mg/5 mL suspension Take 5 mL by mouth every twelve (12) hours as needed for Cough for up to 5 days. Max Daily Amount: 10 mL. 1/13/21 1/18/21 Yes Jennifer Bojorquez MD   multivitamin (ONE A DAY) tablet Take 1 Tab by mouth daily.    Yes Provider, Historical   cholecalciferol (VITAMIN D3) 1,000 unit cap Take 1,000 Units by mouth daily. Yes Provider, Historical   ascorbic acid, vitamin C, (VITAMIN C) 500 mg tablet Take 500 mg by mouth daily. Yes Provider, Historical       Allergies/Social/Family History:     No Known Allergies   Social History     Tobacco Use    Smoking status: Never Smoker    Smokeless tobacco: Current User   Substance Use Topics    Alcohol use: Yes     Comment: once a month      Family History   Problem Relation Age of Onset    Cancer Mother     Cancer Father        Review of Systems:     A comprehensive review of systems was negative except for that written in the HPI. Objective:   Vital Signs:  Visit Vitals  /81   Pulse 68   Temp 97.6 °F (36.4 °C)   Resp 15   Ht 5' 8\" (1.727 m)   Wt 92.9 kg (204 lb 12.9 oz)   SpO2 93%   BMI 31.14 kg/m²    O2 Flow Rate (L/min): 60 l/min O2 Device: Hi flow nasal cannula Temp (24hrs), Av.9 °F (36.6 °C), Min:97.6 °F (36.4 °C), Max:98.6 °F (37 °C)           Intake/Output:   No intake or output data in the 24 hours ending 21 1154    Physical Exam:    General:  Mild distress, cooperative, well noursished, well developed, appears stated age   Eyes:  Sclera anicteric. Pupils equally round and reactive to light. Mouth/Throat: Mucous membranes normal, oral pharynx clear   Neck: Supple   Lungs:   Diminished course   CV:  Regular rate and rhythm,no murmur, click, rub or gallop   Abdomen:   Soft, non-tender.  bowel sounds normal. non-distended   Extremities: No cyanosis or edema   Skin: Skin color, texture, turgor normal. no acute rash or lesions   Lymph nodes: Cervical and supraclavicular normal   Musculoskeletal: No swelling or deformity   Lines/Devices:  Intact, no erythema, drainage or tenderness   Psych: Alert and oriented, normal mood affect given the setting       LABS AND  DATA: Personally reviewed  Recent Labs     21  0310 21  1244   WBC 7.7 10.9   HGB 14.1 14.1   HCT 41.9 42.0    255     Recent Labs     01/19/21  0310 01/18/21  1244   * 133*   K 4.6 4.3    100   CO2 30 28   BUN 19 19   CREA 0.88 1.00   * 105*   CA 9.1 8.7   MG 3.2*  --    PHOS 3.4  --      Recent Labs     01/19/21  0310 01/18/21  1244   AP 86 84   TP 7.3 7.2   ALB 2.4* 2.5*   GLOB 4.9* 4.7*     Recent Labs     01/19/21  0310 01/18/21  1415   INR 1.1 1.1   PTP 11.3* 11.4*   APTT 23.7 23.6      Recent Labs     01/19/21  0825 01/18/21  1753   PHI 7.49* 7.46*   PCO2I 38.0 41.1   PO2I 66* 71*   FIO2I 100  --      Recent Labs     01/18/21  1244   TROIQ <0.05       Hemodynamics:   PAP:   CO:     Wedge:   CI:     CVP:    SVR:       PVR:       Ventilator Settings:  Mode Rate Tidal Volume Pressure FiO2 PEEP            100 %       Peak airway pressure:      Minute ventilation:          MEDS: Reviewed    Chest X-Ray:  CXR Results  (Last 48 hours)               01/19/21 0512  XR CHEST PORT Final result    Impression:  IMPRESSION:        Stable bilateral airspace disease, most marked in the left lower lobe. Persistent narrowing of the trachea at the thoracic inlet. Possible focal   stenosis       Narrative:  EXAM: XR CHEST PORT       INDICATION: Bilateral airspace disease       COMPARISON: Prior day, 1/13/2021       FINDINGS: A portable AP radiograph of the chest was obtained at 0457 hours. The   patient is on a cardiac monitor. The bilateral mixed interstitial parenchymal   opacities have not changed appreciably in distribution. Consolidation is on most   marked in the left lower lobe. . The cardiac and mediastinal contours and   pulmonary vascularity are stable. Incidental note is made of a persistent   narrowing of the trachea at the level of the thoracic inlet. 01/18/21 1352  XR CHEST PORT Final result    Impression:  IMPRESSION:   Increased left basilar atelectasis/consolidation. Interval small left effusion. Interval right lung parenchymal opacity as well. Narrative:  Clinical history: Shortness of Breath   INDICATION:   Shortness of Breath   COMPARISON: 1/13/2021       FINDINGS:   AP portable upright view of the chest demonstrates a stable  cardiopericardial   silhouette. There is no pleural effusion. .Increased scattered interstitial and   parenchymal opacities. Increased left-sided pleural effusion. Patient is on a   cardiac monitor. Images:   Reviewed     ECHO:  Normal stress echo 2016. CRITICAL CARE CONSULTANT NOTE  I had a face to face encounter with the patient, reviewed and interpreted patient data including clinical events, labs, images, vital signs, I/O's, and examined patient. I have discussed the case and the plan and management of the patient's care with the consulting services, the bedside nurses and the respiratory therapist.      NOTE OF PERSONAL INVOLVEMENT IN CARE   This patient has a high probability of imminent, clinically significant deterioration, which requires the highest level of preparedness to intervene urgently. I participated in the decision-making and personally managed or directed the management of the following life and organ supporting interventions that required my frequent assessment to treat or prevent imminent deterioration. I personally spent n/a  minutes of critical care time. This is time spent at this critically ill patient's bedside actively involved in patient care as well as the coordination of care and discussions with the patient's family. This does not include any procedural time which has been billed separately.     [facility]  1/19/2021

## 2021-01-19 NOTE — PROGRESS NOTES
Hospitalist Progress Note    NAME: Armen Cortes   :  1965   MRN:  [id number]       Assessment / Plan:    Worsening bilateral Covid pneumonia, POA with acute hypoxic respiratory failure, POA  -COVID-19 test positive on  chest x-ray today shows interval worsening pneumonia on the left with new infiltrate on the right compared to .  --CTA neg for PE. Shows diffuse bilateral peripheral groundglass opacification and consolidation in the lower lobes   --Finished Z-Shade outpatient. Procalcitonin low. On Rocephin and azithromycin per pulm. --Continue Decadron x10 days and remdesivir   --To get convalescent plasma today  --Continue vitamin C and vitamin D  --On HFNC 60L. Continue to monitor. BiPAP if he desats. --Follow inflammatory markers and D-dimer. -- Appreciate pulm following. ICU team consulted as pt at high risk for further decompensation.     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 Add IV pepcid prn     Obesity  Body mass index is 31.14 kg/m².     Code: Discussed, full code  DVT prophylaxis: Lovenox  Surrogate decision maker: Brother Sofiya Arellano [phone]     Subjective:     Chief Complaint / Reason for Physician Visit  Discussed with RN events overnight. On high flow oxygen, desats when talking, coughing  Denies chest pain    Review of Systems:  Symptom Y/N Comments  Symptom Y/N Comments   Fever/Chills    Chest Pain     Poor Appetite    Edema     Cough    Abdominal Pain     Sputum    Joint Pain     SOB/BULL    Pruritis/Rash     Nausea/vomit    Tolerating PT/OT     Diarrhea    Tolerating Diet     Constipation    Other       Could NOT obtain due to:      Objective:     VITALS:   Last 24hrs VS reviewed since prior progress note.  Most recent are:  Patient Vitals for the past 24 hrs:   Temp Pulse Resp BP SpO2   01/19/21 1826 97.6 °F (36.4 °C) 96 20 (!) 155/75 90 %   01/19/21 1800 -- 74 28 (!) 142/76 97 %   01/19/21 1757 -- 84 (!) 32 (!) 143/79 96 %   01/19/21 1752 97.8 °F (36.6 °C) 70 28 (!) 145/70 94 %   01/19/21 1744 97.9 °F (36.6 °C) 86 21 (!) 142/78 95 %   01/19/21 1741 98.3 °F (36.8 °C) 70 29 134/76 95 %   01/19/21 1724 97.6 °F (36.4 °C) 83 16 (!) 141/85 97 %   01/19/21 1545 97.7 °F (36.5 °C) 77 22 135/87 97 %   01/19/21 1115 -- 85 17 (!) 144/94 96 %   01/19/21 0942 -- -- -- -- 93 %   01/19/21 0900 97.6 °F (36.4 °C) 68 15 123/81 94 %   01/19/21 0815 -- -- -- -- 95 %   01/19/21 0431 -- -- -- -- (!) 75 %   01/19/21 0419 97.8 °F (36.6 °C) 78 22 133/78 (!) 85 %   01/19/21 0401 -- 78 -- -- 91 %   01/19/21 0400 -- 76 -- 130/80 90 %   01/19/21 0357 -- -- -- -- 94 %   01/19/21 0356 -- 79 -- -- (!) 81 %   01/19/21 0330 97.6 °F (36.4 °C) 71 22 124/78 93 %   01/19/21 0300 -- 65 -- 109/69 95 %   01/19/21 0230 -- 71 -- 123/79 91 %   01/19/21 0200 -- 72 -- 122/77 91 %   01/19/21 0145 -- 72 -- -- 95 %   01/19/21 0130 -- 77 -- 130/80 94 %   01/19/21 0100 -- 73 -- 118/71 93 %   01/19/21 0048 -- 76 -- 125/72 91 %   01/18/21 2300 97.7 °F (36.5 °C) 77 24 115/79 (!) 89 %   01/18/21 2000 -- 78 (!) 33 125/74 94 %   01/18/21 1900 -- 96 21 -- 92 %       Intake/Output Summary (Last 24 hours) at 1/19/2021 1830  Last data filed at 1/19/2021 1652  Gross per 24 hour   Intake 50 ml   Output --   Net 50 ml        I had a face to face encounter and independently examined this patient on 1/19/2021, as outlined below:  PHYSICAL EXAM:  General: WD, WN. Alert, cooperative, in moderate respiratory distress    EENT:  EOMI. Anicteric sclerae. MMM  Resp:  Coarse breath sounds bilaterally, no wheezing. CV:  Regular  rhythm,  No edema  GI:  Soft, Non distended, Non tender. +Bowel sounds  Neurologic:  Alert and oriented X 3, normal speech,   Psych:   Good insight.  Not anxious nor agitated  Skin:  No rashes. No jaundice    Reviewed most current lab test results and cultures  YES  Reviewed most current radiology test results   YES  Review and summation of old records today    NO  Reviewed patient's current orders and MAR    YES  PMH/SH reviewed - no change compared to H&P  ________________________________________________________________________  Care Plan discussed with:    Comments   Patient x    Family      RN x    Care Manager     Consultant                        Multidiciplinary team rounds were held today with , nursing, pharmacist and clinical coordinator. Patient's plan of care was discussed; medications were reviewed and discharge planning was addressed. ________________________________________________________________________      Total CRITICAL CARE TIME Spent:   Minutes non procedure based      Comments   >50% of visit spent in counseling and coordination of care x    ________________________________________________________________________  Trace Garibay MD     Procedures: see electronic medical records for all procedures/Xrays and details which were not copied into this note but were reviewed prior to creation of Plan. LABS:  I reviewed today's most current labs and imaging studies.   Pertinent labs include:  Recent Labs     01/19/21  0310 01/18/21  1244   WBC 7.7 10.9   HGB 14.1 14.1   HCT 41.9 42.0    255     Recent Labs     01/19/21  0310 01/18/21  1415 01/18/21  1244   *  --  133*   K 4.6  --  4.3     --  100   CO2 30  --  28   *  --  105*   BUN 19  --  19   CREA 0.88  --  1.00   CA 9.1  --  8.7   MG 3.2*  --   --    PHOS 3.4  --   --    ALB 2.4*  --  2.5*   TBILI 0.7  --  0.9   ALT 52  --  56   INR 1.1 1.1  --        Signed: Trace Garibay MD

## 2021-01-19 NOTE — ED NOTES
TRANSFER - IN REPORT:    Verbal report received from Christelle (name) on Nan En. Report consisted of patients Situation, Background, Assessment and   Recommendations(SBAR). Information from the following report(s) SBAR and ED Summary was reviewed with the receiving nurse. Opportunity for questions and clarification was provided. 0815 RT at bedside     0900 Pulmonologist at bedside evaluating pt     0925 Hospitalist at bedside evaluating pt     0947 pt resting in stretcher in POC, call bll within reach     1100 Pt resting in stretcher in POC. Pt used the urinal with assistance to stand up       1241 Updated pt brother Henna Exon [phone] on pt condition     1345 Pt resting in stretcher provided an ginger ale for pt     1553 Pt resting on stretcher in POC with call bell in reach. Pt remains on monitor x 3. VSS at this time.        1709 RT AT [address] Pt resting in POC, watching tv no complaints at this time     1828 Pt resting in stretcher, watching tv and talking on the phone     200 Updated pt brother on pt condition 1

## 2021-01-19 NOTE — PROGRESS NOTES
Reason for Admission:   COVID Pneumonia                    RUR Score:          12 %           Plan for utilizing home health:  Not at this time         [address]   Name of Practice: [facility]   Are you a current patient: Yes/No: Yes    Approximate date of last visit: 21   Can you participate in a virtual visit with your PCP:   Yes                    Current Advanced Directive/Advance Care Plan:      Ivon 13 (ACP) Conversation    Date of Conversation: 2021  Conducted with: Patient with Decision Making Capacity    Content/Action Overview:   DECLINED ACP conversation - will revisit periodically   Reviewed DNR/DNI and patient elects Full Code (Attempt Resuscitation)     Length of Voluntary ACP Conversation in minutes:  16 minutes    Kelvin Bazan RN }                          Transition of Care Plan:                    CM placed call to patient's room to discuss discharge planning. Patient is COVID positive so assessment completed via phone. Patient name, , and demographics all verified in chart. Patient address is [address] CM asked patient if he would like to provide his physical home address in the event he would need home health services or DME delivery, Patient declined to include physical address. Patient lives alone employed with [employer]. Patient is independent at baseline and does not use any DME at baseline. Patient is active with his PCP. Patient's family to transport at discharge. Patient denies any SNF, HH, and IPR. Patient currently on 60 litters high flow and reported increasing SOB with conversation so assessment was stopped. Unit CM will follow up with any discharge needs. Care Management Interventions  PCP Verified by CM: Yes  Last Visit to PCP: 21  Mode of Transport at Discharge:  Other (see comment)(Family )  Transition of Care Consult (CM Consult): Discharge Planning  Physical Therapy Consult: No  Occupational Therapy Consult: No  Speech Therapy Consult: No  Current Support Network: Lives Alone  Confirm Follow Up Transport: Self  [place] Resource Information Provided?: No  Discharge Location  Discharge Placement: PRATIMA LiaoN, RN, [address] [place] [address]

## 2021-01-20 ENCOUNTER — APPOINTMENT (OUTPATIENT)
Dept: GENERAL RADIOLOGY | Age: 56
DRG: 177 | End: 2021-01-20
Attending: INTERNAL MEDICINE
Payer: COMMERCIAL

## 2021-01-20 LAB
ALBUMIN SERPL-MCNC: 2.5 G/DL (ref 3.5–5)
ALBUMIN/GLOB SERPL: 0.6 {RATIO} (ref 1.1–2.2)
ALP SERPL-CCNC: 80 U/L (ref 45–117)
ALT SERPL-CCNC: 48 U/L (ref 12–78)
ANION GAP SERPL CALC-SCNC: 5 MMOL/L (ref 5–15)
APTT PPP: 25.9 SEC (ref 22.1–31)
AST SERPL-CCNC: 35 U/L (ref 15–37)
BASOPHILS # BLD: 0 K/UL (ref 0–0.1)
BASOPHILS NFR BLD: 0 % (ref 0–1)
BILIRUB SERPL-MCNC: 0.5 MG/DL (ref 0.2–1)
BLD PROD TYP BPU: NORMAL
BPU ID: NORMAL
BUN SERPL-MCNC: 23 MG/DL (ref 6–20)
BUN/CREAT SERPL: 29 (ref 12–20)
CALCIUM SERPL-MCNC: 9.1 MG/DL (ref 8.5–10.1)
CHLORIDE SERPL-SCNC: 104 MMOL/L (ref 97–108)
CO2 SERPL-SCNC: 28 MMOL/L (ref 21–32)
CREAT SERPL-MCNC: 0.79 MG/DL (ref 0.7–1.3)
D DIMER PPP FEU-MCNC: 3.78 MG/L FEU (ref 0–0.65)
DIFFERENTIAL METHOD BLD: ABNORMAL
EOSINOPHIL # BLD: 0 K/UL (ref 0–0.4)
EOSINOPHIL NFR BLD: 0 % (ref 0–7)
ERYTHROCYTE [DISTWIDTH] IN BLOOD BY AUTOMATED COUNT: 13.4 % (ref 11.5–14.5)
GLOBULIN SER CALC-MCNC: 4.4 G/DL (ref 2–4)
GLUCOSE SERPL-MCNC: 127 MG/DL (ref 65–100)
HCT VFR BLD AUTO: 40.9 % (ref 36.6–50.3)
HGB BLD-MCNC: 13.4 G/DL (ref 12.1–17)
IMM GRANULOCYTES # BLD AUTO: 0.2 K/UL (ref 0–0.04)
IMM GRANULOCYTES NFR BLD AUTO: 2 % (ref 0–0.5)
INR PPP: 1.2 (ref 0.9–1.1)
LYMPHOCYTES # BLD: 0.9 K/UL (ref 0.8–3.5)
LYMPHOCYTES NFR BLD: 8 % (ref 12–49)
MAGNESIUM SERPL-MCNC: 2.9 MG/DL (ref 1.6–2.4)
MCH RBC QN AUTO: 28.2 PG (ref 26–34)
MCHC RBC AUTO-ENTMCNC: 32.8 G/DL (ref 30–36.5)
MCV RBC AUTO: 85.9 FL (ref 80–99)
MONOCYTES # BLD: 0.4 K/UL (ref 0–1)
MONOCYTES NFR BLD: 4 % (ref 5–13)
NEUTS SEG # BLD: 9.9 K/UL (ref 1.8–8)
NEUTS SEG NFR BLD: 86 % (ref 32–75)
NRBC # BLD: 0 K/UL (ref 0–0.01)
NRBC BLD-RTO: 0 PER 100 WBC
PHOSPHATE SERPL-MCNC: 3.1 MG/DL (ref 2.6–4.7)
PLATELET # BLD AUTO: 316 K/UL (ref 150–400)
PMV BLD AUTO: 11.3 FL (ref 8.9–12.9)
POTASSIUM SERPL-SCNC: 4.2 MMOL/L (ref 3.5–5.1)
PROT SERPL-MCNC: 6.9 G/DL (ref 6.4–8.2)
PROTHROMBIN TIME: 12.1 SEC (ref 9–11.1)
RBC # BLD AUTO: 4.76 M/UL (ref 4.1–5.7)
SODIUM SERPL-SCNC: 137 MMOL/L (ref 136–145)
STATUS OF UNIT,%ST: NORMAL
THERAPEUTIC RANGE,PTTT: NORMAL SECS (ref 58–77)
UNIT DIVISION, %UDIV: 0
WBC # BLD AUTO: 11.4 K/UL (ref 4.1–11.1)

## 2021-01-20 PROCEDURE — 77030013038 HC MSK CPAP FISP -A

## 2021-01-20 PROCEDURE — 85379 FIBRIN DEGRADATION QUANT: CPT

## 2021-01-20 PROCEDURE — 74011000258 HC RX REV CODE- 258: Performed by: HOSPITALIST

## 2021-01-20 PROCEDURE — 74011250637 HC RX REV CODE- 250/637: Performed by: HOSPITALIST

## 2021-01-20 PROCEDURE — 71045 X-RAY EXAM CHEST 1 VIEW: CPT

## 2021-01-20 PROCEDURE — 74011250636 HC RX REV CODE- 250/636: Performed by: HOSPITALIST

## 2021-01-20 PROCEDURE — 74011000250 HC RX REV CODE- 250: Performed by: HOSPITALIST

## 2021-01-20 PROCEDURE — 85610 PROTHROMBIN TIME: CPT

## 2021-01-20 PROCEDURE — 80053 COMPREHEN METABOLIC PANEL: CPT

## 2021-01-20 PROCEDURE — 74011250636 HC RX REV CODE- 250/636: Performed by: INTERNAL MEDICINE

## 2021-01-20 PROCEDURE — 84100 ASSAY OF PHOSPHORUS: CPT

## 2021-01-20 PROCEDURE — 36415 COLL VENOUS BLD VENIPUNCTURE: CPT

## 2021-01-20 PROCEDURE — 85730 THROMBOPLASTIN TIME PARTIAL: CPT

## 2021-01-20 PROCEDURE — 83735 ASSAY OF MAGNESIUM: CPT

## 2021-01-20 PROCEDURE — 77010033711 HC HIGH FLOW OXYGEN

## 2021-01-20 PROCEDURE — 85025 COMPLETE CBC W/AUTO DIFF WBC: CPT

## 2021-01-20 PROCEDURE — 65660000001 HC RM ICU INTERMED STEPDOWN

## 2021-01-20 PROCEDURE — 2709999900 HC NON-CHARGEABLE SUPPLY

## 2021-01-20 PROCEDURE — 77030012793 HC CIRC VNTLTR FISP -B

## 2021-01-20 PROCEDURE — 94660 CPAP INITIATION&MGMT: CPT

## 2021-01-20 RX ORDER — FAMOTIDINE 10 MG/ML
20 INJECTION INTRAVENOUS EVERY 12 HOURS
Status: DISCONTINUED | OUTPATIENT
Start: 2021-01-20 | End: 2021-01-29

## 2021-01-20 RX ADMIN — FAMOTIDINE 20 MG: 10 INJECTION INTRAVENOUS at 14:26

## 2021-01-20 RX ADMIN — REMDESIVIR 100 MG: 100 INJECTION, POWDER, LYOPHILIZED, FOR SOLUTION INTRAVENOUS at 16:33

## 2021-01-20 RX ADMIN — ENOXAPARIN SODIUM 50 MG: 60 INJECTION SUBCUTANEOUS at 04:31

## 2021-01-20 RX ADMIN — METHYLPREDNISOLONE SODIUM SUCCINATE 80 MG: 40 INJECTION, POWDER, FOR SOLUTION INTRAMUSCULAR; INTRAVENOUS at 14:26

## 2021-01-20 RX ADMIN — OXYCODONE HYDROCHLORIDE AND ACETAMINOPHEN 500 MG: 500 TABLET ORAL at 17:27

## 2021-01-20 RX ADMIN — METHYLPREDNISOLONE SODIUM SUCCINATE 80 MG: 40 INJECTION, POWDER, FOR SOLUTION INTRAMUSCULAR; INTRAVENOUS at 06:33

## 2021-01-20 RX ADMIN — Medication 10 ML: at 06:33

## 2021-01-20 RX ADMIN — FAMOTIDINE 20 MG: 10 INJECTION INTRAVENOUS at 21:33

## 2021-01-20 RX ADMIN — CEFTRIAXONE 1 G: 1 INJECTION, POWDER, FOR SOLUTION INTRAMUSCULAR; INTRAVENOUS at 14:32

## 2021-01-20 RX ADMIN — Medication 10 ML: at 14:26

## 2021-01-20 RX ADMIN — METHYLPREDNISOLONE SODIUM SUCCINATE 80 MG: 40 INJECTION, POWDER, FOR SOLUTION INTRAMUSCULAR; INTRAVENOUS at 17:27

## 2021-01-20 RX ADMIN — AZITHROMYCIN MONOHYDRATE 500 MG: 500 INJECTION, POWDER, LYOPHILIZED, FOR SOLUTION INTRAVENOUS at 09:22

## 2021-01-20 RX ADMIN — METHYLPREDNISOLONE SODIUM SUCCINATE 80 MG: 40 INJECTION, POWDER, FOR SOLUTION INTRAMUSCULAR; INTRAVENOUS at 00:17

## 2021-01-20 RX ADMIN — ENOXAPARIN SODIUM 50 MG: 60 INJECTION SUBCUTANEOUS at 14:32

## 2021-01-20 NOTE — PROGRESS NOTES
Hospitalist Progress Note    NAME: Irma Lewis   :  1965   MRN:  [id number]       Assessment / Plan:  Acute hypoxic respiratory failure POA  Severe multilobar Covid pneumonia POA  -COVID-19 test positive on .   -Initially on nasal cannula/high flow, decompensation  , on BiPAP since 2021, steroids increased, evaluated by ICU intensivist.  --CTA neg for PE. Shows diffuse bilateral peripheral groundglass opacification and consolidation in the lower lobes   --Finished Z-Shade outpatient. Procalcitonin low. On Rocephin and azithromycin per pulm. --Continue  remdesivir   --Solu-Medrol 80 mg every 6 hours  --s/p convalescent plasma 2021  --Follow inflammatory markers and D-dimer. -- Appreciate pulm following.  -N.p.o. while on BiPAP. 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 Pepcid IV twice daily     Obesity  Body mass index is 31.14 kg/m².     Code: Discussed, full code  DVT prophylaxis: Lovenox  Surrogate decision maker: Brother Irma Bonilla [phone]     Subjective:     Chief Complaint / Reason for Physician Visit  Currently on BiPAP. Seems comfortable on BiPAP. He wants to eat. Review of Systems:  Symptom Y/N Comments  Symptom Y/N Comments   Fever/Chills    Chest Pain     Poor Appetite    Edema     Cough    Abdominal Pain     Sputum    Joint Pain     SOB/BULL    Pruritis/Rash     Nausea/vomit    Tolerating PT/OT     Diarrhea    Tolerating Diet     Constipation    Other       Could NOT obtain due to:      Objective:     VITALS:   Last 24hrs VS reviewed since prior progress note.  Most recent are:  Patient Vitals for the past 24 hrs:   Temp Pulse Resp BP SpO2   21 1134 97.7 °F (36.5 °C) 69 20 111/85 95 %   01/20/21 0853 97.7 °F (36.5 °C) 70 22 120/87 93 %   01/20/21 0711 -- -- -- -- 93 %   01/20/21 0429 97.5 °F (36.4 °C) 84 22 (!) 149/92 94 %   01/20/21 0340 -- -- -- -- 90 %   01/20/21 0015 97.4 °F (36.3 °C) 81 24 (!) 149/92 93 %   01/19/21 2307 -- -- -- -- 96 %   01/19/21 2159 97.4 °F (36.3 °C) 87 21 (!) 151/85 92 %   01/19/21 2030 98.1 °F (36.7 °C) 84 18 (!) 150/80 98 %   01/19/21 2015 -- 81 10 -- 95 %   01/19/21 2007 -- 78 -- (!) 144/81 --   01/19/21 2000 -- 86 20 (!) 144/83 91 %   01/19/21 1953 98.1 °F (36.7 °C) 83 (!) 31 (!) 151/82 90 %   01/19/21 1930 -- 90 19 (!) 151/82 (!) 82 %   01/19/21 1826 97.6 °F (36.4 °C) 96 20 (!) 155/75 90 %   01/19/21 1800 -- 74 28 (!) 142/76 97 %   01/19/21 1757 -- 84 (!) 32 (!) 143/79 96 %   01/19/21 1752 97.8 °F (36.6 °C) 70 28 (!) 145/70 94 %   01/19/21 1744 97.9 °F (36.6 °C) 86 21 (!) 142/78 95 %   01/19/21 1741 98.3 °F (36.8 °C) 70 29 134/76 95 %   01/19/21 1724 97.6 °F (36.4 °C) 83 16 (!) 141/85 97 %   01/19/21 1545 97.7 °F (36.5 °C) 77 22 135/87 97 %       Intake/Output Summary (Last 24 hours) at 1/20/2021 1203  Last data filed at 1/20/2021 3281  Gross per 24 hour   Intake 300 ml   Output 250 ml   Net 50 ml        I had a face to face encounter and independently examined this patient on 1/20/2021, as outlined below:  PHYSICAL EXAM:  General: WD, WN. Alert, cooperative, in moderate respiratory distress    EENT:  EOMI. Anicteric sclerae. MMM  Resp:  Coarse breath sounds bilaterally, no wheezing. CV:  Regular  rhythm,  No edema  GI:  Soft, Non distended, Non tender. +Bowel sounds  Neurologic:  Alert and oriented X 3, normal speech,   Psych:   Good insight. Not anxious nor agitated  Skin:  No rashes.   No jaundice    Reviewed most current lab test results and cultures  YES  Reviewed most current radiology test results   YES  Review and summation of old records today    NO  Reviewed patient's current orders and MAR    YES  PMH/SH reviewed - no change compared to H&P  ________________________________________________________________________  Care Plan discussed with:    Comments   Patient x    Family      RN x    Care Manager     Consultant                        Multidiciplinary team rounds were held today with , nursing, pharmacist and clinical coordinator. Patient's plan of care was discussed; medications were reviewed and discharge planning was addressed. ________________________________________________________________________      Total CRITICAL CARE TIME Spent:   Minutes non procedure based      Comments   >50% of visit spent in counseling and coordination of care x    ________________________________________________________________________  Guicho Bell MD     Procedures: see electronic medical records for all procedures/Xrays and details which were not copied into this note but were reviewed prior to creation of Plan. LABS:  I reviewed today's most current labs and imaging studies.   Pertinent labs include:  Recent Labs     01/20/21  0153 01/19/21  0310 01/18/21  1244   WBC 11.4* 7.7 10.9   HGB 13.4 14.1 14.1   HCT 40.9 41.9 42.0    234 255     Recent Labs     01/20/21  0136 01/19/21  0310 01/18/21  1415 01/18/21  1244    134*  --  133*   K 4.2 4.6  --  4.3    100  --  100   CO2 28 30  --  28   * 114*  --  105*   BUN 23* 19  --  19   CREA 0.79 0.88  --  1.00   CA 9.1 9.1  --  8.7   MG 2.9* 3.2*  --   --    PHOS 3.1 3.4  --   --    ALB 2.5* 2.4*  --  2.5*   TBILI 0.5 0.7  --  0.9   ALT 48 52  --  56   INR 1.2* 1.1 1.1  --        Signed: Guicho Bell MD

## 2021-01-20 NOTE — PROGRESS NOTES
[facility]  Pulmonary, Critical Care, and Sleep Medicine    Patient Consult    Name: Arthur Liu MRN: [id number]   : 1965 Hospital: [facility]   Date: 2021            IMPRESSION:   · Severe Multilobar Covid Pneumonia, first Dx on , Symptoms first started on 21. , CXR is stable today. · Acute Hypoxic Severe Hypoxia: ON bipap this am.  Despite this pt is still hypoxic and has increased Respiratory Rate. I am very concerned pt may further deteriorate and need intubation. Will ask the ICU team to evaluate the pt. When I saw his this am. His RR was in the mid 30s on bipap. On fio2 of 100%. · Increased Pro Calcitonin: suggest concern for Bacterial infection. · Covid related coagulopathy  · Covid related inflammation, increased CRP, increased LDH,  and Increase ferritin. · Elevated D dimer at 1.54.   · Obese  · GERD  · Hypertension, LAE and LVH on ekg. · Critically ill, high risk of worsening and decompensation. 35 min CC, EOP. RECOMMENDATIONS:   · High flow as tolerated. Can try to clear liquids as able with high flow. · Will order convalescent plasma. I discussed with pt and he agrees with  Blood transfusion. · Will ask the ICU team to evaluate the pt,I feel he has a high risk of decompensation. · Continue  Solumedrol for now. · ON REmdesivir  · Will follow inflammatory and coag markers  · Will get serial CXRs. · Will follow along with you. PCP: Robin Rodriguez. Subjective:     21: Pt appears and feels more comfortable on bipap this am. Has no headaches, no chest pain, decreased coughing. He feels more hungry today. 21: Pt looks worse this am. Has increased RR into the mid 35s. Dyspneic. On high flow oxygen. Discussed convalescent plasma and he agrees. Feels very fatigued. No appetite. His temp has been normal.     This patient has been seen and evaluated at the request of Dr. Aure Wise for above. Patient is a 48 y.o. male   Who was first ill on 1/4/21. Has increased shortness of breath. Had dry cough. Weakness. Diarrhea. Weakness has been worse over last 3 days. He has been with increased  Breathing difficulty. Feels his breathing is constricted. Has decreased po intake. Has felt hot. Some loose stools. Has mild GERD. Non smoker, Occ etoh use. Chews tobacco.      Past Medical History:   Diagnosis Date    GERD (gastroesophageal reflux disease)     Obesity (BMI 30-39. 9) 5/8/2019    Right leg DVT ([address] 2019      Past Surgical History:   Procedure Laterality Date    HX ORTHOPAEDIC        Prior to Admission medications    Medication Sig Start Date End Date Taking? Authorizing Provider   aspirin delayed-release 81 mg tablet Take 81 mg by mouth daily. Yes Other, MD Leopoldo   albuterol (PROVENTIL HFA, VENTOLIN HFA, PROAIR HFA) 90 mcg/actuation inhaler Take 2 Puffs by inhalation every four (4) hours as needed for Wheezing. 1/13/21  Yes Germán Morin MD   multivitamin (ONE A DAY) tablet Take 1 Tab by mouth daily. Yes Provider, Historical   cholecalciferol (VITAMIN D3) 1,000 unit cap Take 1,000 Units by mouth daily. Yes Provider, Historical   ascorbic acid, vitamin C, (VITAMIN C) 500 mg tablet Take 500 mg by mouth daily.    Yes Provider, Historical     No Known Allergies   Social History     Tobacco Use    Smoking status: Never Smoker    Smokeless tobacco: Current User   Substance Use Topics    Alcohol use: Yes     Comment: once a month      Family History   Problem Relation Age of Onset    Cancer Mother     Cancer Father         Current Facility-Administered Medications   Medication Dose Route Frequency    methylPREDNISolone (PF) (SOLU-MEDROL) injection 80 mg  80 mg IntraVENous Q6H    azithromycin (ZITHROMAX) 500 mg in 0.9% sodium chloride 250 mL (VIAL-MATE)  500 mg IntraVENous Q24H    cefTRIAXone (ROCEPHIN) 1 g in 0.9% sodium chloride (MBP/ADV) 50 mL MBP  1 g IntraVENous Q24H    sodium chloride (NS) flush 5-40 mL  5-40 mL IntraVENous Q8H    enoxaparin (LOVENOX) injection 50 mg  0.5 mg/kg SubCUTAneous Q12H    cholecalciferol (VITAMIN D3) (1000 Units /25 mcg) tablet 2 Tab  2,000 Units Oral DAILY    aspirin delayed-release tablet 81 mg  81 mg Oral DAILY    ascorbic acid (vitamin C) (VITAMIN C) tablet 500 mg  500 mg Oral BID    remdesivir 100 mg in 0.9% sodium chloride 250 mL IVPB  100 mg IntraVENous Q24H    zinc sulfate (ZINCATE) 220 (50) mg capsule 1 Cap  1 Cap Oral DAILY    famotidine (PEPCID) tablet 20 mg  20 mg Oral DAILY       Review of Systems:  Constitutional: positive for fevers, chills, sweats, fatigue, malaise and anorexia, negative for weight loss  Eyes: negative  Ears, nose, mouth, throat, and face: positive for nasal congestion  Respiratory: positive for cough or dyspnea on exertion  Cardiovascular: positive for fatigue, paroxysmal nocturnal dyspnea, tachypnea, dyspnea on exertion  Gastrointestinal: positive for dyspepsia, reflux symptoms, nausea and change in bowel habits  Genitourinary:negative  Integument/breast: negative  Hematologic/lymphatic: negative  Musculoskeletal:negative  Neurological: negative  Behavioral/Psych: negative  Endocrine: negative  Allergic/Immunologic: negative    Objective:   Vital Signs:    Visit Vitals  /87   Pulse 70   Temp 97.7 °F (36.5 °C)   Resp 22   Ht 5' 8\" (1.727 m)   Wt 92.9 kg (204 lb 12.9 oz)   SpO2 93%   BMI 31.14 kg/m²       O2 Device: BIPAP   O2 Flow Rate (L/min): 60 l/min   Temp (24hrs), Av.8 °F (36.6 °C), Min:97.4 °F (36.3 °C), Max:98.3 °F (36.8 °C)       Intake/Output:   Last shift:      No intake/output data recorded.   Last 3 shifts:  1901 -  0700  In: 300 [I.V.:300]  Out: 250 [Urine:250]    Intake/Output Summary (Last 24 hours) at 2021 0912  Last data filed at 2021 1240  Gross per 24 hour   Intake 300 ml   Output 250 ml   Net 50 ml      Physical Exam:   General:  Alert, cooperative, mild resp distress on bipap with 100% fio2. Head:  Normocephalic, without obvious abnormality, atraumatic. Eyes:  Conjunctivae/corneas clear. PERRL, EOMs intact. Nose: Nares normal. Septum midline. Mucosa normal. No drainage or sinus tenderness. Throat: Lips, mucosa, and tongue normal. Teeth and gums normal.   Neck: Supple, symmetrical, trachea midline, no adenopathy, thyroid: no enlargment/tenderness/nodules, no carotid bruit and no JVD. Back:   Symmetric, no curvature. ROM normal.   Lungs:   Clear to auscultation bilaterally. increased RR in the 30s. On high flow oxygen. Chest wall:  No tenderness or deformity. Heart:  Regular rate and rhythm, S1, S2 normal, no murmur, click, rub or gallop. Abdomen:   Soft, non-tender. Bowel sounds normal. No masses,  No organomegaly. Extremities: Extremities normal, atraumatic, no cyanosis or edema. Pulses: 2+ and symmetric all extremities. Skin: Skin color, texture, turgor normal. No rashes or lesions   Lymph nodes: Cervical, supraclavicular, and axillary nodes normal.   Neurologic: Grossly nonfocal, motor is intact  Psych: Mild to mod anxiety.       Data review:     Recent Results (from the past 24 hour(s))   CONVALESCENT PLASMA, ALLOCATE    Collection Time: 01/19/21  9:30 AM   Result Value Ref Range    Unit number [id number]     Blood component type ConvPls,Th     Unit division 00     Status of unit TRANSFUSED    PROTHROMBIN TIME + INR    Collection Time: 01/20/21  1:36 AM   Result Value Ref Range    INR 1.2 (H) 0.9 - 1.1      Prothrombin time 12.1 (H) 9.0 - 11.1 sec   PTT    Collection Time: 01/20/21  1:36 AM   Result Value Ref Range    aPTT 25.9 22.1 - 31.0 sec    aPTT, therapeutic range     58.0 - 77.0 SECS   D DIMER    Collection Time: 01/20/21  1:36 AM   Result Value Ref Range    D-dimer 3.78 (H) 0.00 - 0.65 mg/L FEU   METABOLIC PANEL, COMPREHENSIVE    Collection Time: 01/20/21  1:36 AM   Result Value Ref Range    Sodium 137 136 - 145 mmol/L    Potassium 4.2 3.5 - 5.1 mmol/L    Chloride 104 97 - 108 mmol/L    CO2 28 21 - 32 mmol/L    Anion gap 5 5 - 15 mmol/L    Glucose 127 (H) 65 - 100 mg/dL    BUN 23 (H) 6 - 20 MG/DL    Creatinine 0.79 0.70 - 1.30 MG/DL    BUN/Creatinine ratio 29 (H) 12 - 20      GFR est AA >60 >60 ml/min/1.73m2    GFR est non-AA >60 >60 ml/min/1.73m2    Calcium 9.1 8.5 - 10.1 MG/DL    Bilirubin, total 0.5 0.2 - 1.0 MG/DL    ALT (SGPT) 48 12 - 78 U/L    AST (SGOT) 35 15 - 37 U/L    Alk. phosphatase 80 45 - 117 U/L    Protein, total 6.9 6.4 - 8.2 g/dL    Albumin 2.5 (L) 3.5 - 5.0 g/dL    Globulin 4.4 (H) 2.0 - 4.0 g/dL    A-G Ratio 0.6 (L) 1.1 - 2.2     MAGNESIUM    Collection Time: 01/20/21  1:36 AM   Result Value Ref Range    Magnesium 2.9 (H) 1.6 - 2.4 mg/dL   PHOSPHORUS    Collection Time: 01/20/21  1:36 AM   Result Value Ref Range    Phosphorus 3.1 2.6 - 4.7 MG/DL   CBC WITH AUTOMATED DIFF    Collection Time: 01/20/21  1:53 AM   Result Value Ref Range    WBC 11.4 (H) 4.1 - 11.1 K/uL    RBC 4.76 4.10 - 5.70 M/uL    HGB 13.4 12.1 - 17.0 g/dL    HCT 40.9 36.6 - 50.3 %    MCV 85.9 80.0 - 99.0 FL    MCH 28.2 26.0 - 34.0 PG    MCHC 32.8 30.0 - 36.5 g/dL    RDW 13.4 11.5 - 14.5 %    PLATELET 866 568 - 911 K/uL    MPV 11.3 8.9 - 12.9 FL    NRBC 0.0 0  WBC    ABSOLUTE NRBC 0.00 0.00 - 0.01 K/uL    NEUTROPHILS 86 (H) 32 - 75 %    LYMPHOCYTES 8 (L) 12 - 49 %    MONOCYTES 4 (L) 5 - 13 %    EOSINOPHILS 0 0 - 7 %    BASOPHILS 0 0 - 1 %    IMMATURE GRANULOCYTES 2 (H) 0.0 - 0.5 %    ABS. NEUTROPHILS 9.9 (H) 1.8 - 8.0 K/UL    ABS. LYMPHOCYTES 0.9 0.8 - 3.5 K/UL    ABS. MONOCYTES 0.4 0.0 - 1.0 K/UL    ABS. EOSINOPHILS 0.0 0.0 - 0.4 K/UL    ABS. BASOPHILS 0.0 0.0 - 0.1 K/UL    ABS. IMM. GRANS. 0.2 (H) 0.00 - 0.04 K/UL    DF AUTOMATED         Imaging:  I have personally reviewed the patients radiographs and have reviewed the reports:  1-18-21: IMPRESSION:      1. No evidence for pulmonary embolism.   2.  Diffuse bilateral peripheral groundglass opacification and consolidation in  the lower lobes compatible with COVID pneumonia. 1-20-21: CXR reveals bilateral pulmonary infiltrates. 1-19-21: Pt has persistent decreased lung volumes. Has bilateral pulmonary infiltrates.         Nimco Juárez MD

## 2021-01-20 NOTE — ED NOTES
Bedside shift change report given to Nuha  (oncoming nurse) by  Anibal reza. Report included the following information SBAR, Kardex, ED Summary, [facility] and Recent Results.

## 2021-01-20 NOTE — PROGRESS NOTES
Problem: Falls - Risk of  Goal: *Absence of Falls  Description: Document Joshua Arellano Fall Risk and appropriate interventions in the flowsheet. Outcome: Progressing Towards Goal  Note: Fall Risk Interventions:            Medication Interventions: Patient to call before getting OOB, Teach patient to arise slowly                   Problem: Patient Education: Go to Patient Education Activity  Goal: Patient/Family Education  Outcome: Progressing Towards Goal     Problem: Breathing Pattern - Ineffective  Goal: *Absence of hypoxia  Outcome: Progressing Towards Goal  Goal: *Use of effective breathing techniques  Outcome: Progressing Towards Goal     Problem: Airway Clearance - Ineffective  Goal: Achieve or maintain patent airway  Outcome: Progressing Towards Goal     Problem: Gas Exchange - Impaired  Goal: Absence of hypoxia  Outcome: Progressing Towards Goal  Goal: Promote optimal lung function  Outcome: Progressing Towards Goal     Problem: Breathing Pattern - Ineffective  Goal: Ability to achieve and maintain a regular respiratory rate  Outcome: Progressing Towards Goal     Problem:  Body Temperature -  Risk of, Imbalanced  Goal: Ability to maintain a body temperature within defined limits  Outcome: Progressing Towards Goal  Goal: Will regain or maintain usual level of consciousness  Outcome: Progressing Towards Goal  Goal: Complications related to the disease process, condition or treatment will be avoided or minimized  Outcome: Progressing Towards Goal     Problem: Isolation Precautions - Risk of Spread of Infection  Goal: Prevent transmission of infectious organism to others  Outcome: Progressing Towards Goal     Problem: Nutrition Deficits  Goal: Optimize nutrtional status  Outcome: Progressing Towards Goal     Problem: Risk for Fluid Volume Deficit  Goal: Maintain normal heart rhythm  Outcome: Progressing Towards Goal  Goal: Maintain absence of muscle cramping  Outcome: Progressing Towards Goal  Goal: Maintain normal serum potassium, sodium, calcium, phosphorus, and pH  Outcome: Progressing Towards Goal     Problem: Loneliness or Risk for Loneliness  Goal: Demonstrate positive use of time alone when socialization is not possible  Outcome: Progressing Towards Goal     Problem: Fatigue  Goal: Verbalize increase energy and improved vitality  Outcome: Progressing Towards Goal     Problem: Patient Education: Go to Patient Education Activity  Goal: Patient/Family Education  Outcome: Progressing Towards Goal

## 2021-01-20 NOTE — ED NOTES
1930 - Bedside shift change report given to Fariba Aiken / Mariluz Willett RN  (oncoming nurse) by Kaiser Castle RN  (offgoing nurse). Report included the following information SBAR, Kardex, Intake/Output and MAR.     1955 - on arrival and at bedside report, departing RN stated high flow oxygen was ordered for 60 L / 100% FiO2 - called RT for further eval of machine - staff noticed the flow rate was decreased to 50 L at an unknown time - staff increased to propr setting 60 L 100% and pt's POX improved from low 90s to mid 90s      2050 - TRANSFER - OUT REPORT:    Verbal report given to Kaiden (name) on Shivam Steen  being transferred to PCU (unit) for routine progression of care       Report consisted of patients Situation, Background, Assessment and   Recommendations(SBAR). Information from the following report(s) SBAR, Kardex, ED Summary, Intake/Output and MAR was reviewed with the receiving nurse. Lines:   Peripheral IV 01/19/21 Right Antecubital (Active)   Site Assessment Clean, dry, & intact 01/19/21 0347   Phlebitis Assessment 0 01/19/21 0347   Infiltration Assessment 0 01/19/21 0347   Dressing Status Clean, dry, & intact 01/19/21 0347   Dressing Type Transparent 01/19/21 0347   Hub Color/Line Status Pink 01/19/21 0347   Action Taken Blood drawn 01/19/21 0347       Peripheral IV 01/19/21 Left Hand (Active)   Site Assessment Clean, dry, & intact 01/19/21 1740   Phlebitis Assessment 0 01/19/21 1740   Dressing Status Clean, dry, & intact 01/19/21 1740   Dressing Type Tape;Transparent 01/19/21 1740   Hub Color/Line Status Pink 01/19/21 1740        Opportunity for questions and clarification was provided.       Patient transported with:   Monitor / RN

## 2021-01-20 NOTE — PROGRESS NOTES
TRANSFER - IN REPORT:    Verbal report received from Gisselle RN(name) on Tuan Moore  being received from ED(unit) for routine progression of care      Report consisted of patients Situation, Background, Assessment and   Recommendations(SBAR). Information from the following report(s) SBAR, Kardex and ED Summary was reviewed with the receiving nurse. Opportunity for questions and clarification was provided. Assessment completed upon patients arrival to unit and care assumed.

## 2021-01-20 NOTE — PROGRESS NOTES
End of Shift Note    Bedside shift change report given to [address] (oncoming nurse) by William Kauffman (offgoing nurse). Report included the following information SBAR and Kardex    Shift worked:  [id number]     Shift summary and any significant changes:     Patient arrived to unit from ED. Initially on 60l Heated HFNC. Patient very slow for spO2 to increase. Pulled off HFNC in the AM and had to switch to BIPAP. Concerns for physician to address:       Zone phone for oncoming shift:   [phone]       Activity:  Activity Level: Bed Rest  Number times ambulated in hallways past shift: 0  Number of times OOB to chair past shift: 0    Cardiac:   Cardiac Monitoring: Yes      Cardiac Rhythm: Normal sinus rhythm    Access:   Current line(s): PIV     Genitourinary:   Urinary status: voiding    Respiratory:   O2 Device: BIPAP  Chronic home O2 use?: NO  Incentive spirometer at bedside: NO     GI:     Current diet:  DIET NPO With Ice Chips, With Sips of Clear Fluids, Except Meds  Passing flatus: YES  Tolerating current diet: YES       Pain Management:   Patient states pain is manageable on current regimen: YES    Skin:  Govind Score: 19  Interventions: increase time out of bed and PT/OT consult    Patient Safety:  Fall Score:  Total Score: 1  Interventions: bed/chair alarm and pt to call before getting OOB       Length of Stay:  Expected LOS: 5d 12h  Actual LOS: 2      William Kauffman

## 2021-01-21 ENCOUNTER — APPOINTMENT (OUTPATIENT)
Dept: GENERAL RADIOLOGY | Age: 56
DRG: 177 | End: 2021-01-21
Attending: INTERNAL MEDICINE
Payer: COMMERCIAL

## 2021-01-21 LAB
ALBUMIN SERPL-MCNC: 2.5 G/DL (ref 3.5–5)
ALBUMIN/GLOB SERPL: 0.6 {RATIO} (ref 1.1–2.2)
ALP SERPL-CCNC: 79 U/L (ref 45–117)
ALT SERPL-CCNC: 54 U/L (ref 12–78)
ANION GAP SERPL CALC-SCNC: 8 MMOL/L (ref 5–15)
APTT PPP: 26.6 SEC (ref 22.1–31)
AST SERPL-CCNC: 47 U/L (ref 15–37)
BASOPHILS # BLD: 0 K/UL (ref 0–0.1)
BASOPHILS NFR BLD: 0 % (ref 0–1)
BILIRUB SERPL-MCNC: 0.5 MG/DL (ref 0.2–1)
BUN SERPL-MCNC: 33 MG/DL (ref 6–20)
BUN/CREAT SERPL: 32 (ref 12–20)
CALCIUM SERPL-MCNC: 9.3 MG/DL (ref 8.5–10.1)
CHLORIDE SERPL-SCNC: 108 MMOL/L (ref 97–108)
CO2 SERPL-SCNC: 22 MMOL/L (ref 21–32)
CREAT SERPL-MCNC: 1.02 MG/DL (ref 0.7–1.3)
D DIMER PPP FEU-MCNC: 3.16 MG/L FEU (ref 0–0.65)
DIFFERENTIAL METHOD BLD: ABNORMAL
EOSINOPHIL # BLD: 0 K/UL (ref 0–0.4)
EOSINOPHIL NFR BLD: 0 % (ref 0–7)
ERYTHROCYTE [DISTWIDTH] IN BLOOD BY AUTOMATED COUNT: 13.6 % (ref 11.5–14.5)
GLOBULIN SER CALC-MCNC: 4.4 G/DL (ref 2–4)
GLUCOSE SERPL-MCNC: 159 MG/DL (ref 65–100)
HCT VFR BLD AUTO: 41.4 % (ref 36.6–50.3)
HGB BLD-MCNC: 13.5 G/DL (ref 12.1–17)
IL6 SERPL-MCNC: 13.1 PG/ML (ref 0–13)
IMM GRANULOCYTES # BLD AUTO: 0.1 K/UL (ref 0–0.04)
IMM GRANULOCYTES NFR BLD AUTO: 1 % (ref 0–0.5)
INR PPP: 1.4 (ref 0.9–1.1)
LYMPHOCYTES # BLD: 0.6 K/UL (ref 0.8–3.5)
LYMPHOCYTES NFR BLD: 6 % (ref 12–49)
MAGNESIUM SERPL-MCNC: 3.1 MG/DL (ref 1.6–2.4)
MCH RBC QN AUTO: 28.4 PG (ref 26–34)
MCHC RBC AUTO-ENTMCNC: 32.6 G/DL (ref 30–36.5)
MCV RBC AUTO: 87.2 FL (ref 80–99)
MONOCYTES # BLD: 0.3 K/UL (ref 0–1)
MONOCYTES NFR BLD: 3 % (ref 5–13)
NEUTS SEG # BLD: 10.1 K/UL (ref 1.8–8)
NEUTS SEG NFR BLD: 91 % (ref 32–75)
NRBC # BLD: 0 K/UL (ref 0–0.01)
NRBC BLD-RTO: 0 PER 100 WBC
PHOSPHATE SERPL-MCNC: 3.9 MG/DL (ref 2.6–4.7)
PLATELET # BLD AUTO: 356 K/UL (ref 150–400)
PMV BLD AUTO: 11.1 FL (ref 8.9–12.9)
POTASSIUM SERPL-SCNC: 4.9 MMOL/L (ref 3.5–5.1)
PROT SERPL-MCNC: 6.9 G/DL (ref 6.4–8.2)
PROTHROMBIN TIME: 14.6 SEC (ref 9–11.1)
RBC # BLD AUTO: 4.75 M/UL (ref 4.1–5.7)
SODIUM SERPL-SCNC: 138 MMOL/L (ref 136–145)
THERAPEUTIC RANGE,PTTT: NORMAL SECS (ref 58–77)
WBC # BLD AUTO: 11.2 K/UL (ref 4.1–11.1)

## 2021-01-21 PROCEDURE — 71045 X-RAY EXAM CHEST 1 VIEW: CPT

## 2021-01-21 PROCEDURE — 65660000001 HC RM ICU INTERMED STEPDOWN

## 2021-01-21 PROCEDURE — 74011250637 HC RX REV CODE- 250/637: Performed by: HOSPITALIST

## 2021-01-21 PROCEDURE — 85730 THROMBOPLASTIN TIME PARTIAL: CPT

## 2021-01-21 PROCEDURE — 85025 COMPLETE CBC W/AUTO DIFF WBC: CPT

## 2021-01-21 PROCEDURE — 85379 FIBRIN DEGRADATION QUANT: CPT

## 2021-01-21 PROCEDURE — 83735 ASSAY OF MAGNESIUM: CPT

## 2021-01-21 PROCEDURE — 74011250636 HC RX REV CODE- 250/636: Performed by: INTERNAL MEDICINE

## 2021-01-21 PROCEDURE — 74011000258 HC RX REV CODE- 258: Performed by: HOSPITALIST

## 2021-01-21 PROCEDURE — 36415 COLL VENOUS BLD VENIPUNCTURE: CPT

## 2021-01-21 PROCEDURE — 80053 COMPREHEN METABOLIC PANEL: CPT

## 2021-01-21 PROCEDURE — 94660 CPAP INITIATION&MGMT: CPT

## 2021-01-21 PROCEDURE — 74011250637 HC RX REV CODE- 250/637: Performed by: STUDENT IN AN ORGANIZED HEALTH CARE EDUCATION/TRAINING PROGRAM

## 2021-01-21 PROCEDURE — 74011250636 HC RX REV CODE- 250/636: Performed by: HOSPITALIST

## 2021-01-21 PROCEDURE — 74011000250 HC RX REV CODE- 250: Performed by: HOSPITALIST

## 2021-01-21 PROCEDURE — 84100 ASSAY OF PHOSPHORUS: CPT

## 2021-01-21 PROCEDURE — 85610 PROTHROMBIN TIME: CPT

## 2021-01-21 RX ORDER — HEPARIN SODIUM 5000 [USP'U]/ML
5000 INJECTION, SOLUTION INTRAVENOUS; SUBCUTANEOUS EVERY 8 HOURS
Status: DISCONTINUED | OUTPATIENT
Start: 2021-01-22 | End: 2021-01-27

## 2021-01-21 RX ADMIN — METHYLPREDNISOLONE SODIUM SUCCINATE 80 MG: 40 INJECTION, POWDER, FOR SOLUTION INTRAMUSCULAR; INTRAVENOUS at 05:26

## 2021-01-21 RX ADMIN — Medication 10 ML: at 21:13

## 2021-01-21 RX ADMIN — AZITHROMYCIN MONOHYDRATE 500 MG: 500 INJECTION, POWDER, LYOPHILIZED, FOR SOLUTION INTRAVENOUS at 10:10

## 2021-01-21 RX ADMIN — FAMOTIDINE 20 MG: 10 INJECTION INTRAVENOUS at 21:13

## 2021-01-21 RX ADMIN — METHYLPREDNISOLONE SODIUM SUCCINATE 80 MG: 40 INJECTION, POWDER, FOR SOLUTION INTRAMUSCULAR; INTRAVENOUS at 00:17

## 2021-01-21 RX ADMIN — Medication 10 ML: at 00:09

## 2021-01-21 RX ADMIN — METHYLPREDNISOLONE SODIUM SUCCINATE 80 MG: 40 INJECTION, POWDER, FOR SOLUTION INTRAMUSCULAR; INTRAVENOUS at 12:33

## 2021-01-21 RX ADMIN — Medication 10 ML: at 05:26

## 2021-01-21 RX ADMIN — OXYCODONE HYDROCHLORIDE AND ACETAMINOPHEN 500 MG: 500 TABLET ORAL at 10:09

## 2021-01-21 RX ADMIN — FAMOTIDINE 20 MG: 10 INJECTION INTRAVENOUS at 10:09

## 2021-01-21 RX ADMIN — CEFTRIAXONE 1 G: 1 INJECTION, POWDER, FOR SOLUTION INTRAMUSCULAR; INTRAVENOUS at 16:06

## 2021-01-21 RX ADMIN — METHYLPREDNISOLONE SODIUM SUCCINATE 80 MG: 40 INJECTION, POWDER, FOR SOLUTION INTRAMUSCULAR; INTRAVENOUS at 17:45

## 2021-01-21 RX ADMIN — ZINC SULFATE 220 MG (50 MG) CAPSULE 1 CAPSULE: CAPSULE at 10:09

## 2021-01-21 RX ADMIN — Medication 10 ML: at 16:07

## 2021-01-21 RX ADMIN — CHOLECALCIFEROL TAB 25 MCG (1000 UNIT) 2 TABLET: 25 TAB at 10:09

## 2021-01-21 RX ADMIN — REMDESIVIR 100 MG: 100 INJECTION, POWDER, LYOPHILIZED, FOR SOLUTION INTRAVENOUS at 17:09

## 2021-01-21 RX ADMIN — ASPIRIN 81 MG: 81 TABLET, COATED ORAL at 10:09

## 2021-01-21 RX ADMIN — ENOXAPARIN SODIUM 50 MG: 60 INJECTION SUBCUTANEOUS at 05:24

## 2021-01-21 NOTE — PROGRESS NOTES
Hospitalist Progress Note    NAME: Cheryl Moise   :  1965   MRN:  [id number]       Assessment / Plan:  Acute hypoxic respiratory failure POA  Severe multilobar Covid pneumonia POA  Pneumomediastinum , not POA  -COVID-19 test positive on . -CTA  neg for PE. Shows diffuse bilateral peripheral groundglass opacification and consolidation in the lower lobes   -Initially on nasal cannula/high flow, decompensation  , on BiPAP since 2021, steroids increased, evaluated by ICU intensivist on , declined tx    -Chest x-ray -persistent airspace disease. Showed new pneumomediastinum.  -Currently on BiPAP, agree with pulmonary to change to high flow NC, from BiPAP to reduce barotrauma.  -Continue remdesivir  -Continue IV steroids, getting Solu-Medrol 80 mg every 6 hours  -Continue antibiotics  -s/p convalescent plasma 2021    - Appreciate pulm following.    -Daily chest x-ray, or more frequently if sudden change in respiratory status.  -Currently on moderate intensity Lovenox, will change to heparin for prophylaxis.     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 Pepcid IV twice daily     Obesity  Body mass index is 31.14 kg/m².     Code: Discussed, full code  DVT prophylaxis: Heparin  Surrogate decision maker: Brother Casimiro Cardenas [phone]     Subjective:     Chief Complaint / Reason for Physician Visit  Patient currently on BiPAP. Seems comfortable, denies any chest pain.     Review of Systems:  Symptom Y/N Comments  Symptom Y/N Comments   Fever/Chills    Chest Pain     Poor Appetite    Edema     Cough    Abdominal Pain     Sputum    Joint Pain     SOB/BULL    Pruritis/Rash     Nausea/vomit    Tolerating PT/OT     Diarrhea    Tolerating Diet Constipation    Other       Could NOT obtain due to: On bipap     Objective:     VITALS:   Last 24hrs VS reviewed since prior progress note. Most recent are:  Patient Vitals for the past 24 hrs:   Temp Pulse Resp BP SpO2   01/21/21 1112 -- -- -- -- 90 %   01/21/21 1108 98 °F (36.7 °C) 85 18 125/81 90 %   01/21/21 0919 -- -- -- -- 92 %   01/21/21 0739 -- -- -- -- 94 %   01/21/21 0733 96.9 °F (36.1 °C) 97 16 131/78 90 %   01/21/21 0301 -- -- -- -- 94 %   01/21/21 0258 97.9 °F (36.6 °C) 68 21 126/81 93 %   01/20/21 2341 -- -- -- -- 93 %   01/20/21 2316 98.5 °F (36.9 °C) 80 29 (!) 157/87 --   01/20/21 1952 -- -- -- -- 95 %   01/20/21 1938 98.2 °F (36.8 °C) 73 27 119/82 95 %   01/20/21 1545 98.3 °F (36.8 °C) 75 24 124/86 95 %   01/20/21 1340 -- -- -- -- 93 %       Intake/Output Summary (Last 24 hours) at 1/21/2021 1150  Last data filed at 1/21/2021 0537  Gross per 24 hour   Intake 250 ml   Output 500 ml   Net -250 ml        I had a face to face encounter and independently examined this patient on 1/21/2021, as outlined below:  PHYSICAL EXAM:  General: WD, WN. Alert, cooperative, in moderate respiratory distress    EENT:  EOMI. Anicteric sclerae. MMM  Resp:  Coarse breath sounds bilaterally, no wheezing. CV:  Regular  rhythm,  No edema  GI:  Soft, Non distended, Non tender. +Bowel sounds  Neurologic:  Alert and oriented X 3, normal speech,   Psych:   Good insight. Not anxious nor agitated  Skin:  No rashes.   No jaundice    Reviewed most current lab test results and cultures  YES  Reviewed most current radiology test results   YES  Review and summation of old records today    NO  Reviewed patient's current orders and MAR    YES  PMH/ reviewed - no change compared to H&P  ________________________________________________________________________  Care Plan discussed with:    Comments   Patient x    Family      RN x    Care Manager     Consultant                        Multidiciplinary team rounds were held today with , nursing, pharmacist and clinical coordinator. Patient's plan of care was discussed; medications were reviewed and discharge planning was addressed. ________________________________________________________________________      Total CRITICAL CARE TIME Spent:   Minutes non procedure based      Comments   >50% of visit spent in counseling and coordination of care x    ________________________________________________________________________  Abelardo Roy MD     Procedures: see electronic medical records for all procedures/Xrays and details which were not copied into this note but were reviewed prior to creation of Plan. LABS:  I reviewed today's most current labs and imaging studies.   Pertinent labs include:  Recent Labs     01/21/21  0348 01/20/21  0153 01/19/21  0310   WBC 11.2* 11.4* 7.7   HGB 13.5 13.4 14.1   HCT 41.4 40.9 41.9    316 234     Recent Labs     01/21/21  0348 01/20/21  0136 01/19/21  0310    137 134*   K 4.9 4.2 4.6    104 100   CO2 22 28 30   * 127* 114*   BUN 33* 23* 19   CREA 1.02 0.79 0.88   CA 9.3 9.1 9.1   MG 3.1* 2.9* 3.2*   PHOS 3.9 3.1 3.4   ALB 2.5* 2.5* 2.4*   TBILI 0.5 0.5 0.7   ALT 54 48 52   INR 1.4* 1.2* 1.1       Signed: Abelardo Roy MD

## 2021-01-21 NOTE — PROGRESS NOTES
[facility]  Pulmonary, Critical Care, and Sleep Medicine    Name: Lana Gatica MRN: [id number]   : 1965 Hospital: [facility]   Date: 2021            IMPRESSION:   · Acute hypoxic respiratory failure - severe  · Severe Multilobar Covid Pneumonia, first Dx on , Symptoms first started on 21    · New pneumomediastinum 21  · Covid related coagulopathy  · Covid related inflammation, increased CRP, increased LDH,  and Increase ferritin. · Obesity  · GERD  · Hypertension, LAE and LVH on ekg. RECOMMENDATIONS:   · Currently on NIV. Will try to change to HFNC to minimize the amount of positive pressure ventilation he is getting to minimize risk of converting pneumomediastinum to pneumothorax  · Monitor for signs of sq air, none so far. Daily chest X-ray or more frequently if needed  · S/P convalescent plasma  · Continue steroids  · Remdesivir x 5 days total  · Follow inflammatory and coag markers  · Enoxaparin  · Very high risk for further decompensation. Low threshold to transfer patient to ICU. ICU team declined patient on 21   PCP: Edmundo Caballero. Subjective:     21: remains dependent on BiPAP. Feels about the same. Mild dyspnea unchanged, at least while he is on BiPAP  21: Pt appears and feels more comfortable on bipap this am. Has no headaches, no chest pain, decreased coughing. He feels more hungry today. 21: Pt looks worse this am. Has increased RR into the mid 35s. Dyspneic. On high flow oxygen. Discussed convalescent plasma and he agrees. Feels very fatigued. No appetite. His temp has been normal.     This patient has been seen and evaluated at the request of Dr. Tiara Lucero for above. Patient is a 48 y.o. male   Who was first ill on 21. Has increased shortness of breath. Had dry cough. Weakness. Diarrhea. Weakness has been worse over last 3 days.    He has been with increased  Breathing difficulty. Feels his breathing is constricted. Has decreased po intake. Has felt hot. Some loose stools. Has mild GERD. Non smoker, Occ etoh use. Chews tobacco.      Past Medical History:   Diagnosis Date    GERD (gastroesophageal reflux disease)     Obesity (BMI 30-39. 9) 5/8/2019    Right leg DVT ([address] 2019      Past Surgical History:   Procedure Laterality Date    HX ORTHOPAEDIC        Prior to Admission medications    Medication Sig Start Date End Date Taking? Authorizing Provider   aspirin delayed-release 81 mg tablet Take 81 mg by mouth daily. Yes Other, MD Leopoldo   albuterol (PROVENTIL HFA, VENTOLIN HFA, PROAIR HFA) 90 mcg/actuation inhaler Take 2 Puffs by inhalation every four (4) hours as needed for Wheezing. 1/13/21  Yes Talib Patterson MD   multivitamin (ONE A DAY) tablet Take 1 Tab by mouth daily. Yes Provider, Historical   cholecalciferol (VITAMIN D3) 1,000 unit cap Take 1,000 Units by mouth daily. Yes Provider, Historical   ascorbic acid, vitamin C, (VITAMIN C) 500 mg tablet Take 500 mg by mouth daily.    Yes Provider, Historical     No Known Allergies   Social History     Tobacco Use    Smoking status: Never Smoker    Smokeless tobacco: Current User   Substance Use Topics    Alcohol use: Yes     Comment: once a month      Family History   Problem Relation Age of Onset    Cancer Mother     Cancer Father         Current Facility-Administered Medications   Medication Dose Route Frequency    famotidine (PF) (PEPCID) injection 20 mg  20 mg IntraVENous Q12H    influenza vaccine 2020-21 (6 mos+)(PF) (FLUARIX/FLULAVAL/FLUZONE QUAD) injection 0.5 mL  0.5 mL IntraMUSCular PRIOR TO DISCHARGE    methylPREDNISolone (PF) (SOLU-MEDROL) injection 80 mg  80 mg IntraVENous Q6H    azithromycin (ZITHROMAX) 500 mg in 0.9% sodium chloride 250 mL (VIAL-MATE)  500 mg IntraVENous Q24H    cefTRIAXone (ROCEPHIN) 1 g in 0.9% sodium chloride (MBP/ADV) 50 mL MBP  1 g IntraVENous Q24H    sodium chloride (NS) flush 5-40 mL  5-40 mL IntraVENous Q8H    enoxaparin (LOVENOX) injection 50 mg  0.5 mg/kg SubCUTAneous Q12H    cholecalciferol (VITAMIN D3) (1000 Units /25 mcg) tablet 2 Tab  2,000 Units Oral DAILY    aspirin delayed-release tablet 81 mg  81 mg Oral DAILY    ascorbic acid (vitamin C) (VITAMIN C) tablet 500 mg  500 mg Oral BID    remdesivir 100 mg in 0.9% sodium chloride 250 mL IVPB  100 mg IntraVENous Q24H    zinc sulfate (ZINCATE) 220 (50) mg capsule 1 Cap  1 Cap Oral DAILY       Review of Systems:  Constitutional: positive for fevers, chills, sweats, fatigue, malaise and anorexia, negative for weight loss  Eyes: negative  Ears, nose, mouth, throat, and face: positive for nasal congestion  Respiratory: positive for cough or dyspnea on exertion  Cardiovascular: positive for fatigue, paroxysmal nocturnal dyspnea, tachypnea, dyspnea on exertion  Gastrointestinal: positive for dyspepsia, reflux symptoms, nausea and change in bowel habits  Genitourinary:negative  Integument/breast: negative  Hematologic/lymphatic: negative  Musculoskeletal:negative  Neurological: negative  Behavioral/Psych: negative  Endocrine: negative  Allergic/Immunologic: negative    Objective:   Vital Signs:    Visit Vitals  /78 (BP 1 Location: Right arm, BP Patient Position: At rest)   Pulse 97   Temp 96.9 °F (36.1 °C)   Resp 16   Ht 5' 8\" (1.727 m)   Wt 92.9 kg (204 lb 12.9 oz)   SpO2 92%   BMI 31.14 kg/m²       O2 Device: Heated, Hi flow nasal cannula   O2 Flow Rate (L/min): 60 l/min   Temp (24hrs), Av.9 °F (36.6 °C), Min:96.9 °F (36.1 °C), Max:98.5 °F (36.9 °C)       Intake/Output:   Last shift:      No intake/output data recorded.   Last 3 shifts:  1901 -  0700  In: 500 [I.V.:500]  Out: 750 [Urine:750]    Intake/Output Summary (Last 24 hours) at 2021 5786  Last data filed at 2021 0537  Gross per 24 hour   Intake 250 ml   Output 500 ml   Net -250 ml      Physical Exam:   General:  Alert, cooperative, no resp distress on bipap with 100% fio2. Head:  Normocephalic, without obvious abnormality, atraumatic. Eyes:  Conjunctivae/corneas clear    Nose: Nares normal. Septum midline. Mucosa normal.     Throat: Lips, mucosa, and tongue normal.    Neck: Supple, symmetrical, trachea midline    Lungs:   Distant bilateral breath sounds   Chest wall:  No tenderness or deformity. Heart:  Regular rate and rhythm    Abdomen:   Soft, non-tender. Bowel sounds normal.    Extremities: Extremities normal, atraumatic, no cyanosis    Skin: Skin color, texture, turgor normal. No crepitus   Neurologic: Grossly nonfocal     Data:   Labs:  Recent Labs     01/21/21  0348 01/20/21  0153 01/19/21  0310   WBC 11.2* 11.4* 7.7   HGB 13.5 13.4 14.1   HCT 41.4 40.9 41.9    316 234     Recent Labs     01/21/21  0348 01/20/21  0136 01/19/21  0310 01/18/21  1244 01/18/21  1244   NA  --  137 134*  --  133*   K  --  4.2 4.6  --  4.3   CL  --  104 100  --  100   CO2  --  28 30  --  28   GLU  --  127* 114*  --  105*   BUN  --  23* 19  --  19   CREA  --  0.79 0.88  --  1.00   CA  --  9.1 9.1  --  8.7   MG  --  2.9* 3.2*  --   --    PHOS  --  3.1 3.4  --   --    ALB  --  2.5* 2.4*  --  2.5*   TBILI  --  0.5 0.7  --  0.9   ALT  --  48 52  --  56   INR 1.4* 1.2* 1.1   < >  --     < > = values in this interval not displayed.      Recent Labs     01/19/21  0825 01/18/21  1753   PHI 7.49* 7.46*   PCO2I 38.0 41.1   PO2I 66* 71*   HCO3I 29.1* 28.9*   FIO2I 100  --        Imaging:  I have personally reviewed the patients radiographs:  Bilateral infiltrates with new pneumomediastinum        Vero Inman MD

## 2021-01-21 NOTE — PROGRESS NOTES
End of Shift Note    Bedside shift change report given to [address] [place] [address] Leobardo (oncoming nurse) by Maria Elena Garcia (offgoing nurse). Report included the following information SBAR and Kardex    Shift worked:  8074-0048     Shift summary and any significant changes:    Patient states that BIPAP is causing pain at the bridge of the nose. Concerns for physician to address: none   Zone phone for oncoming shift:  [phone]     Activity:  Activity Level: Bed Rest  Number times ambulated in hallways past shift: 0  Number of times OOB to chair past shift: 0    Cardiac:   Cardiac Monitoring: Yes      Cardiac Rhythm: Normal sinus rhythm    Access:   Current line(s): PIV     Genitourinary:   Urinary status: voiding    Respiratory:   O2 Device: BIPAP  Chronic home O2 use?: NO  Incentive spirometer at bedside: YES     GI:     Current diet:  DIET CLEAR LIQUID  Passing flatus: YES  Tolerating current diet: YES  % Diet Eaten: 0 %(NPO)    Pain Management:   Patient states pain is manageable on current regimen: YES    Skin:  Govind Score: 19  Interventions: PT/OT consult    Patient Safety:  Fall Score:  Total Score: 1  Interventions: bed/chair alarm       Length of Stay:  Expected LOS: 5d 12h  Actual LOS: [facility]

## 2021-01-21 NOTE — PROGRESS NOTES
ORIANA:  -confirm family to transport near d/c  -assess for home oxygen  -await therapy recommendations for the benefit of HHC  -ask pt again if he would be willing to give us his physical address  -ask MD if we can increase his activity level from bedrest  -make PCP omero't prior to d/c

## 2021-01-21 NOTE — PROGRESS NOTES
[facility] INTERDISCIPLINARY ROUNDS    Cardiopulmonary Care Interdisciplinary Rounds were held today to discuss patient's plan of care and outcomes. The following members were present: NP/Physician, Pharmacy, Nursing and Case Management. PLAN OF CARE:   Continue current treatment plan, pt placed on heated HiFlow today and transitioned off BiPAP d/t CXR this AM revealing pneumomediastinum.      Expected Length of Stay:  5d 12h

## 2021-01-22 ENCOUNTER — APPOINTMENT (OUTPATIENT)
Dept: GENERAL RADIOLOGY | Age: 56
DRG: 177 | End: 2021-01-22
Attending: INTERNAL MEDICINE
Payer: COMMERCIAL

## 2021-01-22 LAB
ALBUMIN SERPL-MCNC: 2.4 G/DL (ref 3.5–5)
ALBUMIN/GLOB SERPL: 0.6 {RATIO} (ref 1.1–2.2)
ALP SERPL-CCNC: 77 U/L (ref 45–117)
ALT SERPL-CCNC: 162 U/L (ref 12–78)
ANION GAP SERPL CALC-SCNC: 3 MMOL/L (ref 5–15)
APTT PPP: 23.2 SEC (ref 22.1–31)
AST SERPL-CCNC: 104 U/L (ref 15–37)
BASOPHILS # BLD: 0 K/UL (ref 0–0.1)
BASOPHILS NFR BLD: 0 % (ref 0–1)
BILIRUB SERPL-MCNC: 0.5 MG/DL (ref 0.2–1)
BUN SERPL-MCNC: 27 MG/DL (ref 6–20)
BUN/CREAT SERPL: 33 (ref 12–20)
CALCIUM SERPL-MCNC: 8.8 MG/DL (ref 8.5–10.1)
CHLORIDE SERPL-SCNC: 110 MMOL/L (ref 97–108)
CO2 SERPL-SCNC: 27 MMOL/L (ref 21–32)
CREAT SERPL-MCNC: 0.81 MG/DL (ref 0.7–1.3)
D DIMER PPP FEU-MCNC: 2.5 MG/L FEU (ref 0–0.65)
DIFFERENTIAL METHOD BLD: ABNORMAL
EOSINOPHIL # BLD: 0 K/UL (ref 0–0.4)
EOSINOPHIL NFR BLD: 0 % (ref 0–7)
ERYTHROCYTE [DISTWIDTH] IN BLOOD BY AUTOMATED COUNT: 13.7 % (ref 11.5–14.5)
GLOBULIN SER CALC-MCNC: 4 G/DL (ref 2–4)
GLUCOSE SERPL-MCNC: 122 MG/DL (ref 65–100)
HCT VFR BLD AUTO: 41.7 % (ref 36.6–50.3)
HGB BLD-MCNC: 13.5 G/DL (ref 12.1–17)
IMM GRANULOCYTES # BLD AUTO: 0.2 K/UL (ref 0–0.04)
IMM GRANULOCYTES NFR BLD AUTO: 2 % (ref 0–0.5)
INR PPP: 1.3 (ref 0.9–1.1)
LYMPHOCYTES # BLD: 0.6 K/UL (ref 0.8–3.5)
LYMPHOCYTES NFR BLD: 4 % (ref 12–49)
MCH RBC QN AUTO: 28.1 PG (ref 26–34)
MCHC RBC AUTO-ENTMCNC: 32.4 G/DL (ref 30–36.5)
MCV RBC AUTO: 86.7 FL (ref 80–99)
MONOCYTES # BLD: 0.6 K/UL (ref 0–1)
MONOCYTES NFR BLD: 4 % (ref 5–13)
NEUTS SEG # BLD: 12.3 K/UL (ref 1.8–8)
NEUTS SEG NFR BLD: 90 % (ref 32–75)
NRBC # BLD: 0 K/UL (ref 0–0.01)
NRBC BLD-RTO: 0 PER 100 WBC
PLATELET # BLD AUTO: 363 K/UL (ref 150–400)
PMV BLD AUTO: 11.2 FL (ref 8.9–12.9)
POTASSIUM SERPL-SCNC: 4.8 MMOL/L (ref 3.5–5.1)
PROT SERPL-MCNC: 6.4 G/DL (ref 6.4–8.2)
PROTHROMBIN TIME: 13 SEC (ref 9–11.1)
RBC # BLD AUTO: 4.81 M/UL (ref 4.1–5.7)
SODIUM SERPL-SCNC: 140 MMOL/L (ref 136–145)
THERAPEUTIC RANGE,PTTT: NORMAL SECS (ref 58–77)
WBC # BLD AUTO: 13.8 K/UL (ref 4.1–11.1)

## 2021-01-22 PROCEDURE — 74011000258 HC RX REV CODE- 258: Performed by: HOSPITALIST

## 2021-01-22 PROCEDURE — 85610 PROTHROMBIN TIME: CPT

## 2021-01-22 PROCEDURE — 65660000001 HC RM ICU INTERMED STEPDOWN

## 2021-01-22 PROCEDURE — 71045 X-RAY EXAM CHEST 1 VIEW: CPT

## 2021-01-22 PROCEDURE — 36415 COLL VENOUS BLD VENIPUNCTURE: CPT

## 2021-01-22 PROCEDURE — 80053 COMPREHEN METABOLIC PANEL: CPT

## 2021-01-22 PROCEDURE — 74011250637 HC RX REV CODE- 250/637: Performed by: STUDENT IN AN ORGANIZED HEALTH CARE EDUCATION/TRAINING PROGRAM

## 2021-01-22 PROCEDURE — 74011250637 HC RX REV CODE- 250/637: Performed by: HOSPITALIST

## 2021-01-22 PROCEDURE — 94660 CPAP INITIATION&MGMT: CPT

## 2021-01-22 PROCEDURE — 74011250636 HC RX REV CODE- 250/636: Performed by: INTERNAL MEDICINE

## 2021-01-22 PROCEDURE — 85730 THROMBOPLASTIN TIME PARTIAL: CPT

## 2021-01-22 PROCEDURE — 85379 FIBRIN DEGRADATION QUANT: CPT

## 2021-01-22 PROCEDURE — 77010033711 HC HIGH FLOW OXYGEN

## 2021-01-22 PROCEDURE — 74011250636 HC RX REV CODE- 250/636: Performed by: HOSPITALIST

## 2021-01-22 PROCEDURE — 85025 COMPLETE CBC W/AUTO DIFF WBC: CPT

## 2021-01-22 PROCEDURE — 74011000250 HC RX REV CODE- 250: Performed by: HOSPITALIST

## 2021-01-22 RX ADMIN — ZINC SULFATE 220 MG (50 MG) CAPSULE 1 CAPSULE: CAPSULE at 10:32

## 2021-01-22 RX ADMIN — Medication 10 ML: at 00:41

## 2021-01-22 RX ADMIN — HEPARIN SODIUM 5000 UNITS: 5000 INJECTION INTRAVENOUS; SUBCUTANEOUS at 12:37

## 2021-01-22 RX ADMIN — METHYLPREDNISOLONE SODIUM SUCCINATE 80 MG: 40 INJECTION, POWDER, FOR SOLUTION INTRAMUSCULAR; INTRAVENOUS at 00:40

## 2021-01-22 RX ADMIN — HEPARIN SODIUM 5000 UNITS: 5000 INJECTION INTRAVENOUS; SUBCUTANEOUS at 19:43

## 2021-01-22 RX ADMIN — Medication 10 ML: at 21:07

## 2021-01-22 RX ADMIN — CEFTRIAXONE 1 G: 1 INJECTION, POWDER, FOR SOLUTION INTRAMUSCULAR; INTRAVENOUS at 14:29

## 2021-01-22 RX ADMIN — Medication 5 ML: at 14:29

## 2021-01-22 RX ADMIN — REMDESIVIR 100 MG: 100 INJECTION, POWDER, LYOPHILIZED, FOR SOLUTION INTRAVENOUS at 17:59

## 2021-01-22 RX ADMIN — HEPARIN SODIUM 5000 UNITS: 5000 INJECTION INTRAVENOUS; SUBCUTANEOUS at 05:00

## 2021-01-22 RX ADMIN — METHYLPREDNISOLONE SODIUM SUCCINATE 80 MG: 40 INJECTION, POWDER, FOR SOLUTION INTRAMUSCULAR; INTRAVENOUS at 23:26

## 2021-01-22 RX ADMIN — Medication 10 ML: at 05:01

## 2021-01-22 RX ADMIN — METHYLPREDNISOLONE SODIUM SUCCINATE 80 MG: 40 INJECTION, POWDER, FOR SOLUTION INTRAMUSCULAR; INTRAVENOUS at 17:59

## 2021-01-22 RX ADMIN — METHYLPREDNISOLONE SODIUM SUCCINATE 80 MG: 40 INJECTION, POWDER, FOR SOLUTION INTRAMUSCULAR; INTRAVENOUS at 05:01

## 2021-01-22 RX ADMIN — FAMOTIDINE 20 MG: 10 INJECTION INTRAVENOUS at 10:32

## 2021-01-22 RX ADMIN — ASPIRIN 81 MG: 81 TABLET, COATED ORAL at 10:32

## 2021-01-22 RX ADMIN — FAMOTIDINE 20 MG: 10 INJECTION INTRAVENOUS at 21:07

## 2021-01-22 RX ADMIN — CHOLECALCIFEROL TAB 25 MCG (1000 UNIT) 2 TABLET: 25 TAB at 10:32

## 2021-01-22 RX ADMIN — AZITHROMYCIN MONOHYDRATE 500 MG: 500 INJECTION, POWDER, LYOPHILIZED, FOR SOLUTION INTRAVENOUS at 10:32

## 2021-01-22 RX ADMIN — METHYLPREDNISOLONE SODIUM SUCCINATE 80 MG: 40 INJECTION, POWDER, FOR SOLUTION INTRAMUSCULAR; INTRAVENOUS at 12:37

## 2021-01-22 NOTE — PROGRESS NOTES
End of Shift Note    Bedside shift change report given to Charity Wilkerson (oncoming nurse) by Johnny Ontiveros RN (offgoing nurse). Report included the following information SBAR and Kardex    Shift worked:  1900-0730     Shift summary and any significant changes:     none. Concerns for physician to address:  none. Zone phone for oncoming shift:          Activity:  Activity Level: Up with Assistance  Number times ambulated in hallways past shift: 0  Number of times OOB to chair past shift: 0    Cardiac:   Cardiac Monitoring: Yes      Cardiac Rhythm: Normal sinus rhythm    Access:   Current line(s): PIV     Genitourinary:   Urinary status: voiding    Respiratory:   O2 Device: Heated, Hi flow nasal cannula  Chronic home O2 use?: NO  Incentive spirometer at bedside: YES  Actual Volume (ml): 500 ml  GI:     Current diet:  DIET CLEAR LIQUID  DIET NUTRITIONAL SUPPLEMENTS Breakfast, Dinner, Lunch; Ensure Clear  Passing flatus: YES  Tolerating current diet: YES  % Diet Eaten: 100 %    Pain Management:   Patient states pain is manageable on current regimen: YES    Skin:  Govind Score: 18  Interventions: float heels    Patient Safety:  Fall Score:  Total Score: 1  Interventions: assistive device (walker, cane, etc), gripper socks and pt to call before getting OOB       Length of Stay:  Expected LOS: 5d 12h  Actual LOS: [address], RN

## 2021-01-22 NOTE — PROGRESS NOTES
Circled back on this gentleman who we saw a couple days ago with a pneumomediastinum but with no pneumothorax. Currently there is a bit more sq emphysema in the neck, but he is in no distress, sats 95-97% on HHFNC at 1000% 60 liters, and again there is no target to put a chest tube. Right now, he just needs continuous pulseoximetry to rapidly detect any destaturation that could signal the development of pneumothorax. In that case he would decompensate rapidly and I told Dr. Thony Carrillo attending MD that I would come and place a chest tube in that scenario. At this point as he stands he is well monitored here and does not warrant ICU. We are  Available if there is any change in status they can call me. I updated the charge nurse in this unit as well and RT. Call us as needed.

## 2021-01-22 NOTE — PROGRESS NOTES
End of Shift Note    Bedside shift change report given to DevonWay (oncoming nurse) by Vito Dickey (offgoing nurse). Report included the following information SBAR and Kardex    Shift worked:  DAY     Shift summary and any significant changes:    Low SpO2 in morning. Improved and stable through afternoon. Discussion of transfer for additional care regarding condition. Condition improved, so no further action warranted. Diet advanced from Clear Liquids to Full Liquids. Diet advancement discussion on hold until after CXR tomorrow, per MD.   Concerns for physician to address: Continue to monitor O2 demands closely, and assess for possible complications related to patient condition. Zone phone for oncoming shift:       Activity:  Activity Level: Up with Assistance  Number times ambulated in hallways past shift: 0  Number of times OOB to chair past shift: 0    Cardiac:   Cardiac Monitoring: Yes      Cardiac Rhythm: Sinus bradycardia, Normal sinus rhythm    Access:   Current line(s): PIV     Genitourinary:   Urinary status: voiding    Respiratory:   O2 Device: Heated, Hi flow nasal cannula  Chronic home O2 use?: NO  Incentive spirometer at bedside: YES  Actual Volume (ml): 500 ml  GI:  Last Bowel Movement Date: 01/22/21  Current diet:  DIET NUTRITIONAL SUPPLEMENTS Breakfast, Dinner, Lunch; Ensure Clear  DIET FULL LIQUID  Passing flatus: YES  Tolerating current diet: YES  % Diet Eaten: 100 %    Pain Management:   Patient states pain is manageable on current regimen: YES    Skin:  Govind Score: 18  Interventions: speciality bed, limit briefs and nutritional support     Patient Safety:  Fall Score:  Total Score: 1  Interventions: bed/chair alarm, gripper socks and pt to call before getting OOB       Length of Stay:  Expected LOS: 5d 12h  Actual LOS: [address] [facility]

## 2021-01-22 NOTE — PROGRESS NOTES
0700: Bedside shift change report given to Kirit Bourgeois (oncoming nurse) by Teodora Ortiz RN (offgoing nurse). Report included the following information SBAR and Kardex. 1000: Spoke with Teena Field MD about patient O2 sats in the high 80s on HHiFlow 60L. He said he would continue to monitor and if sats don't improve he may contact intensivist. He said he would let me know later. Current concerns are rapid decompensation r/t possible pneumothorax development. Will continue to monitor patient closely. 1100: Patient O2 sats have improved to 92/96, and I continue frequent encouragement of Incentive spirometry. Will continue to monitor closely and encourage incentive spirometry during patient interactions. Patient denies SOB when O2 saturation decreases to high 80s. Patient appears asymptomatic. 1120: Messaged SAMREEN Bhat MD regarding patient's diet, and he said we can advance it as his respiratory status remains stable. Diet advanced from clear liquids to full liquids. 1400: Follow-up comment to my 1120 note: No further discussion about transfer of patient for additional care -- as expected d/t patient currently maintaining 98% SpO2. Will continue to monitor and encourage incentive spirometry during patient interactions. 1830: Asked SAMREEN Bhat MD if he wanted to advance diet for breakfast from full liquids. He said that diet advancement would be decided after CXR tomorrow.

## 2021-01-22 NOTE — PROGRESS NOTES
Hospitalist Progress Note    NAME: Shun Latif   :  1965   MRN:  [id number]       Assessment / Plan:  Acute hypoxic respiratory failure POA, persistent  Severe multilobar Covid pneumonia POA  Pneumomediastinum , not POA, worsening  -COVID-19 test positive on . -CTA  neg for PE. Shows diffuse bilateral peripheral groundglass opacification and consolidation in the lower lobes   -Initially on nasal cannula/high flow, decompensation  , on BiPAP since 2021, steroids increased, evaluated by ICU intensivist on ,     -has developed pneumomediastinum , changed to high flow from BiPAP to reduce barotrauma, repeat chest x-ray today with slight worsening, oxygen requirement same as yesterday  -Discussed with ICU intensivist, advised to keep the patient in the PCU for now, if he deteriorates will need chest tube and intubation  -Continue HFNC, current sats 92-95% on HFNC,   -Continue remdesivir  -Continue Solu-Medrol, 80 mg q6h  -Continue antibiotics  -s/p convalescent plasma 2021    - Appreciate pulm following.    -Daily chest x-ray, or more frequently if sudden change in respiratory status.  -Currently on moderate intensity Lovenox, will change to heparin for prophylaxis, as high risk for decompensation and need for intubation     History of extensive right lower extremity DVT May 2019, likely occupational related  --Finished course of Xarelto and currently on baby aspirin.       GERD  --currently not on medication. Pepcid IV twice daily     Obesity  Body mass index is 31.14 kg/m².     Code: Discussed, full code  DVT prophylaxis: Heparin  Surrogate decision maker: Brother Favian Way [phone]    Discussed with the brother today, high risk for decompensation       Subjective:     Chief Complaint / Reason for Physician Visit  Currently on high flow nasal cannula, 60 L at 100% FiO2.      Review of Systems:  Symptom Y/N Comments  Symptom Y/N Comments Fever/Chills n   Chest Pain n    Poor Appetite n   Edema n    Cough n   Abdominal Pain n    Sputum    Joint Pain     SOB/BULL    Pruritis/Rash     Nausea/vomit    Tolerating PT/OT     Diarrhea    Tolerating Diet     Constipation    Other       Could NOT obtain due to:      Objective:     VITALS:   Last 24hrs VS reviewed since prior progress note. Most recent are:  Patient Vitals for the past 24 hrs:   Temp Pulse Resp BP SpO2   01/22/21 1132 -- -- -- -- 98 %   01/22/21 1054 97.7 °F (36.5 °C) 73 19 (!) 165/93 98 %   01/22/21 0748 97.8 °F (36.6 °C) 78 20 (!) 153/86 94 %   01/22/21 0736 -- -- -- -- 93 %   01/22/21 0302 98.1 °F (36.7 °C) 85 18 (!) 150/95 91 %   01/21/21 2303 -- -- -- -- 94 %   01/21/21 2243 98 °F (36.7 °C) 71 17 (!) 159/100 99 %   01/21/21 1951 98 °F (36.7 °C) 75 20 (!) 155/95 91 %   01/21/21 1531 97.8 °F (36.6 °C) 73 18 (!) 147/79 90 %   01/21/21 1526 -- -- -- -- 93 %       Intake/Output Summary (Last 24 hours) at 1/22/2021 1328  Last data filed at 1/22/2021 1249  Gross per 24 hour   Intake 1640 ml   Output 1475 ml   Net 165 ml        I had a face to face encounter and independently examined this patient on 1/22/2021, as outlined below:  PHYSICAL EXAM:  General: WD, WN. Alert, cooperative, in moderate respiratory distress    EENT:  EOMI. Anicteric sclerae. MMM  Resp:  B/l lung coarse, rales+  CV:  Regular  rhythm,  No edema  GI:  Soft, Non distended, Non tender. +Bowel sounds  Neurologic:  Alert and oriented X 3, normal speech,   Psych:   Good insight. Not anxious nor agitated  Skin:  No rashes.   No jaundice    Reviewed most current lab test results and cultures  YES  Reviewed most current radiology test results   YES  Review and summation of old records today    NO  Reviewed patient's current orders and MAR    YES  PMH/SH reviewed - no change compared to H&P  ________________________________________________________________________  Care Plan discussed with:    Comments   Patient x    Family      RN x Care Manager     Consultant                        Multidiciplinary team rounds were held today with , nursing, pharmacist and clinical coordinator. Patient's plan of care was discussed; medications were reviewed and discharge planning was addressed. ________________________________________________________________________      Total CRITICAL CARE TIME Spent: 40  Minutes non procedure based      Comments   >50% of visit spent in counseling and coordination of care x    ________________________________________________________________________  Dione Burroughs MD     Procedures: see electronic medical records for all procedures/Xrays and details which were not copied into this note but were reviewed prior to creation of Plan. LABS:  I reviewed today's most current labs and imaging studies.   Pertinent labs include:  Recent Labs     01/22/21  0045 01/21/21  0348 01/20/21  0153   WBC 13.8* 11.2* 11.4*   HGB 13.5 13.5 13.4   HCT 41.7 41.4 40.9    356 316     Recent Labs     01/22/21  0045 01/21/21  0348 01/20/21  0136    138 137   K 4.8 4.9 4.2   * 108 104   CO2 27 22 28   * 159* 127*   BUN 27* 33* 23*   CREA 0.81 1.02 0.79   CA 8.8 9.3 9.1   MG  --  3.1* 2.9*   PHOS  --  3.9 3.1   ALB 2.4* 2.5* 2.5*   TBILI 0.5 0.5 0.5   * 54 48   INR 1.3* 1.4* 1.2*       Signed: Dione Burroughs MD

## 2021-01-22 NOTE — PROGRESS NOTES
[facility]  Pulmonary, Critical Care, and Sleep Medicine    Name: Lovely Booker MRN: [id number]   : 1965 Hospital: [facility]   Date: 2021            IMPRESSION:   · Acute hypoxic respiratory failure - severe  · Severe Multilobar Covid Pneumonia, first Dx on , Symptoms first started on 21    · New pneumomediastinum 21  · Covid related coagulopathy  · Covid related inflammation, increased CRP, increased LDH,  and Increase ferritin. · Obesity  · GERD  · Hypertension, LAE and LVH on ekg. RECOMMENDATIONS:   · Continue HFNC and try to avoid NIV if possible to minimize the amount of positive pressure ventilation he is getting to minimize risk of converting pneumomediastinum to pneumothorax  · Monitor for signs of sq air, none so far. Daily chest X-ray or more frequently if needed  · S/P convalescent plasma  · Continue steroids  · Remdesivir x 5 days total  · Follow inflammatory and coag markers  · Enoxaparin  · Very high risk for further decompensation. Low threshold to transfer patient to ICU. ICU team declined patient on 21   PCP: Yulissa Arana. Subjective:     21: remains dependent on HFNC. Has mild dyspnea. 21: remains dependent on BiPAP. Feels about the same. Mild dyspnea unchanged, at least while he is on BiPAP  21: Pt appears and feels more comfortable on bipap this am. Has no headaches, no chest pain, decreased coughing. He feels more hungry today. 21: Pt looks worse this am. Has increased RR into the mid 35s. Dyspneic. On high flow oxygen. Discussed convalescent plasma and he agrees. Feels very fatigued. No appetite. His temp has been normal.     This patient has been seen and evaluated at the request of Dr. James Martini for above. Patient is a 48 y.o. male   Who was first ill on 21. Has increased shortness of breath. Had dry cough. Weakness. Diarrhea.  Weakness has been worse over last 3 days. He has been with increased  Breathing difficulty. Feels his breathing is constricted. Has decreased po intake. Has felt hot. Some loose stools. Has mild GERD. Non smoker, Occ etoh use. Chews tobacco.      Past Medical History:   Diagnosis Date    GERD (gastroesophageal reflux disease)     Obesity (BMI 30-39. 9) 5/8/2019    Right leg DVT ([address] 2019      Past Surgical History:   Procedure Laterality Date    HX ORTHOPAEDIC        Prior to Admission medications    Medication Sig Start Date End Date Taking? Authorizing Provider   aspirin delayed-release 81 mg tablet Take 81 mg by mouth daily. Yes Other, MD Leopoldo   albuterol (PROVENTIL HFA, VENTOLIN HFA, PROAIR HFA) 90 mcg/actuation inhaler Take 2 Puffs by inhalation every four (4) hours as needed for Wheezing. 1/13/21  Yes Ramone Keane MD   multivitamin (ONE A DAY) tablet Take 1 Tab by mouth daily. Yes Provider, Historical   cholecalciferol (VITAMIN D3) 1,000 unit cap Take 1,000 Units by mouth daily. Yes Provider, Historical   ascorbic acid, vitamin C, (VITAMIN C) 500 mg tablet Take 500 mg by mouth daily.    Yes Provider, Historical     No Known Allergies   Social History     Tobacco Use    Smoking status: Never Smoker    Smokeless tobacco: Current User   Substance Use Topics    Alcohol use: Yes     Comment: once a month      Family History   Problem Relation Age of Onset    Cancer Mother     Cancer Father         Current Facility-Administered Medications   Medication Dose Route Frequency    heparin (porcine) injection 5,000 Units  5,000 Units SubCUTAneous Q8H    famotidine (PF) (PEPCID) injection 20 mg  20 mg IntraVENous Q12H    influenza vaccine 2020-21 (6 mos+)(PF) (FLUARIX/FLULAVAL/FLUZONE QUAD) injection 0.5 mL  0.5 mL IntraMUSCular PRIOR TO DISCHARGE    methylPREDNISolone (PF) (SOLU-MEDROL) injection 80 mg  80 mg IntraVENous Q6H    azithromycin (ZITHROMAX) 500 mg in 0.9% sodium chloride 250 mL (VIAL-MATE)  500 mg IntraVENous Q24H    cefTRIAXone (ROCEPHIN) 1 g in 0.9% sodium chloride (MBP/ADV) 50 mL MBP  1 g IntraVENous Q24H    sodium chloride (NS) flush 5-40 mL  5-40 mL IntraVENous Q8H    cholecalciferol (VITAMIN D3) (1000 Units /25 mcg) tablet 2 Tab  2,000 Units Oral DAILY    aspirin delayed-release tablet 81 mg  81 mg Oral DAILY    remdesivir 100 mg in 0.9% sodium chloride 250 mL IVPB  100 mg IntraVENous Q24H    zinc sulfate (ZINCATE) 220 (50) mg capsule 1 Cap  1 Cap Oral DAILY       Review of Systems:  Constitutional: positive for fevers, chills, sweats, fatigue, malaise and anorexia, negative for weight loss  Eyes: negative  Ears, nose, mouth, throat, and face: positive for nasal congestion  Respiratory: positive for cough or dyspnea on exertion  Cardiovascular: positive for fatigue, paroxysmal nocturnal dyspnea, tachypnea, dyspnea on exertion  Gastrointestinal: positive for dyspepsia, reflux symptoms, nausea and change in bowel habits  Genitourinary:negative  Integument/breast: negative  Hematologic/lymphatic: negative  Musculoskeletal:negative  Neurological: negative  Behavioral/Psych: negative  Endocrine: negative  Allergic/Immunologic: negative    Objective:   Vital Signs:    Visit Vitals  BP (!) 153/86   Pulse 78   Temp 97.8 °F (36.6 °C)   Resp 20   Ht 5' 8\" (1.727 m)   Wt 92.9 kg (204 lb 12.9 oz)   SpO2 94%   BMI 31.14 kg/m²       O2 Device: Heated, Hi flow nasal cannula   O2 Flow Rate (L/min): 60 l/min   Temp (24hrs), Av °F (36.7 °C), Min:97.8 °F (36.6 °C), Max:98.1 °F (36.7 °C)       Intake/Output:   Last shift:       07 -  1900  In: -   Out: 100 [Urine:100]  Last 3 shifts: 1901 -  0700  In: 2130 [P.O.:1880; I.V.:250]  Out: 2225 [Urine:2225]    Intake/Output Summary (Last 24 hours) at 2021 0924  Last data filed at 2021 0755  Gross per 24 hour   Intake 2130 ml   Output 1225 ml   Net 905 ml      Physical Exam:   General:  Alert, cooperative, no resp distress on bipap with 100% fio2. Head:  Normocephalic, without obvious abnormality, atraumatic. Eyes:  Conjunctivae/corneas clear    Nose: Nares normal. Septum midline. Mucosa normal.     Throat: Lips, mucosa, and tongue normal.    Neck: Supple, symmetrical, trachea midline    Lungs:   Distant bilateral breath sounds   Chest wall:  No tenderness or deformity. Heart:  Regular rate and rhythm    Abdomen:   Soft, non-tender. Bowel sounds normal.    Extremities: Extremities normal, atraumatic, no cyanosis    Skin: Skin color, texture, turgor normal. No crepitus   Neurologic: Grossly nonfocal     Data:   Labs:  Recent Labs     01/22/21  0045 01/21/21  0348 01/20/21  0153   WBC 13.8* 11.2* 11.4*   HGB 13.5 13.5 13.4   HCT 41.7 41.4 40.9    356 316     Recent Labs     01/22/21  0045 01/21/21  0348 01/20/21  0136    138 137   K 4.8 4.9 4.2   * 108 104   CO2 27 22 28   * 159* 127*   BUN 27* 33* 23*   CREA 0.81 1.02 0.79   CA 8.8 9.3 9.1   MG  --  3.1* 2.9*   PHOS  --  3.9 3.1   ALB 2.4* 2.5* 2.5*   TBILI 0.5 0.5 0.5   * 54 48   INR 1.3* 1.4* 1.2*     No results for input(s): PHI, PCO2I, PO2I, HCO3I, FIO2I in the last 72 hours.     Imaging:  I have personally reviewed the patients radiographs:  Persistent bilateral infiltrates; slight increase in pneumomediastinum         Rajinder Owens MD

## 2021-01-22 NOTE — PROGRESS NOTES
Problem: Falls - Risk of  Goal: *Absence of Falls  Description: Document Elbert Burns Fall Risk and appropriate interventions in the flowsheet. Outcome: Progressing Towards Goal  Note: Fall Risk Interventions:            Medication Interventions: Patient to call before getting OOB, Teach patient to arise slowly                   Problem: Breathing Pattern - Ineffective  Goal: *Absence of hypoxia  Outcome: Progressing Towards Goal  Goal: *Use of effective breathing techniques  Outcome: Progressing Towards Goal     Problem: Airway Clearance - Ineffective  Goal: Achieve or maintain patent airway  Outcome: Progressing Towards Goal     Problem: Breathing Pattern - Ineffective  Goal: Ability to achieve and maintain a regular respiratory rate  Outcome: Progressing Towards Goal     Problem: Isolation Precautions - Risk of Spread of Infection  Goal: Prevent transmission of infectious organism to others  Outcome: Progressing Towards Goal     Problem: Pressure Injury - Risk of  Goal: *Prevention of pressure injury  Description: Document Govind Scale and appropriate interventions in the flowsheet.   Outcome: Progressing Towards Goal  Note: Pressure Injury Interventions:  Sensory Interventions: Assess changes in LOC         Activity Interventions: Assess need for specialty bed, Chair cushion         Nutrition Interventions: Document food/fluid/supplement intake, Offer support with meals,snacks and hydration

## 2021-01-23 ENCOUNTER — APPOINTMENT (OUTPATIENT)
Dept: GENERAL RADIOLOGY | Age: 56
DRG: 177 | End: 2021-01-23
Attending: INTERNAL MEDICINE
Payer: COMMERCIAL

## 2021-01-23 LAB
ALBUMIN SERPL-MCNC: 2.4 G/DL (ref 3.5–5)
ALBUMIN/GLOB SERPL: 0.6 {RATIO} (ref 1.1–2.2)
ALP SERPL-CCNC: 76 U/L (ref 45–117)
ALT SERPL-CCNC: 129 U/L (ref 12–78)
ANION GAP SERPL CALC-SCNC: 4 MMOL/L (ref 5–15)
APTT PPP: 23.1 SEC (ref 22.1–31)
AST SERPL-CCNC: 46 U/L (ref 15–37)
BASOPHILS # BLD: 0 K/UL (ref 0–0.1)
BASOPHILS NFR BLD: 0 % (ref 0–1)
BILIRUB SERPL-MCNC: 0.5 MG/DL (ref 0.2–1)
BUN SERPL-MCNC: 26 MG/DL (ref 6–20)
BUN/CREAT SERPL: 34 (ref 12–20)
CALCIUM SERPL-MCNC: 8.7 MG/DL (ref 8.5–10.1)
CHLORIDE SERPL-SCNC: 108 MMOL/L (ref 97–108)
CO2 SERPL-SCNC: 27 MMOL/L (ref 21–32)
CREAT SERPL-MCNC: 0.76 MG/DL (ref 0.7–1.3)
D DIMER PPP FEU-MCNC: 4.48 MG/L FEU (ref 0–0.65)
DIFFERENTIAL METHOD BLD: ABNORMAL
EOSINOPHIL # BLD: 0 K/UL (ref 0–0.4)
EOSINOPHIL NFR BLD: 0 % (ref 0–7)
ERYTHROCYTE [DISTWIDTH] IN BLOOD BY AUTOMATED COUNT: 13.4 % (ref 11.5–14.5)
GLOBULIN SER CALC-MCNC: 3.7 G/DL (ref 2–4)
GLUCOSE SERPL-MCNC: 137 MG/DL (ref 65–100)
HCT VFR BLD AUTO: 42.1 % (ref 36.6–50.3)
HGB BLD-MCNC: 13.7 G/DL (ref 12.1–17)
IMM GRANULOCYTES # BLD AUTO: 0.2 K/UL (ref 0–0.04)
IMM GRANULOCYTES NFR BLD AUTO: 1 % (ref 0–0.5)
INR PPP: 1.3 (ref 0.9–1.1)
LYMPHOCYTES # BLD: 0.4 K/UL (ref 0.8–3.5)
LYMPHOCYTES NFR BLD: 3 % (ref 12–49)
MCH RBC QN AUTO: 28.4 PG (ref 26–34)
MCHC RBC AUTO-ENTMCNC: 32.5 G/DL (ref 30–36.5)
MCV RBC AUTO: 87.3 FL (ref 80–99)
MONOCYTES # BLD: 0.5 K/UL (ref 0–1)
MONOCYTES NFR BLD: 4 % (ref 5–13)
NEUTS SEG # BLD: 12.2 K/UL (ref 1.8–8)
NEUTS SEG NFR BLD: 91 % (ref 32–75)
NRBC # BLD: 0 K/UL (ref 0–0.01)
NRBC BLD-RTO: 0 PER 100 WBC
PLATELET # BLD AUTO: 338 K/UL (ref 150–400)
PMV BLD AUTO: 11.1 FL (ref 8.9–12.9)
POTASSIUM SERPL-SCNC: 4.9 MMOL/L (ref 3.5–5.1)
PROT SERPL-MCNC: 6.1 G/DL (ref 6.4–8.2)
PROTHROMBIN TIME: 13 SEC (ref 9–11.1)
RBC # BLD AUTO: 4.82 M/UL (ref 4.1–5.7)
SODIUM SERPL-SCNC: 139 MMOL/L (ref 136–145)
THERAPEUTIC RANGE,PTTT: NORMAL SECS (ref 58–77)
WBC # BLD AUTO: 13.3 K/UL (ref 4.1–11.1)

## 2021-01-23 PROCEDURE — 80053 COMPREHEN METABOLIC PANEL: CPT

## 2021-01-23 PROCEDURE — 85025 COMPLETE CBC W/AUTO DIFF WBC: CPT

## 2021-01-23 PROCEDURE — 74011250636 HC RX REV CODE- 250/636: Performed by: INTERNAL MEDICINE

## 2021-01-23 PROCEDURE — 74011250637 HC RX REV CODE- 250/637: Performed by: STUDENT IN AN ORGANIZED HEALTH CARE EDUCATION/TRAINING PROGRAM

## 2021-01-23 PROCEDURE — 65660000001 HC RM ICU INTERMED STEPDOWN

## 2021-01-23 PROCEDURE — 85379 FIBRIN DEGRADATION QUANT: CPT

## 2021-01-23 PROCEDURE — 36415 COLL VENOUS BLD VENIPUNCTURE: CPT

## 2021-01-23 PROCEDURE — 85610 PROTHROMBIN TIME: CPT

## 2021-01-23 PROCEDURE — 74011250637 HC RX REV CODE- 250/637: Performed by: HOSPITALIST

## 2021-01-23 PROCEDURE — 71045 X-RAY EXAM CHEST 1 VIEW: CPT

## 2021-01-23 PROCEDURE — 85730 THROMBOPLASTIN TIME PARTIAL: CPT

## 2021-01-23 PROCEDURE — 77010033711 HC HIGH FLOW OXYGEN

## 2021-01-23 RX ORDER — ENOXAPARIN SODIUM 100 MG/ML
1 INJECTION SUBCUTANEOUS EVERY 12 HOURS
Status: DISCONTINUED | OUTPATIENT
Start: 2021-01-23 | End: 2021-02-04

## 2021-01-23 RX ADMIN — ASPIRIN 81 MG: 81 TABLET, COATED ORAL at 09:00

## 2021-01-23 RX ADMIN — FAMOTIDINE 20 MG: 10 INJECTION INTRAVENOUS at 09:01

## 2021-01-23 RX ADMIN — CHOLECALCIFEROL TAB 25 MCG (1000 UNIT) 2 TABLET: 25 TAB at 09:00

## 2021-01-23 RX ADMIN — METHYLPREDNISOLONE SODIUM SUCCINATE 80 MG: 40 INJECTION, POWDER, FOR SOLUTION INTRAMUSCULAR; INTRAVENOUS at 13:48

## 2021-01-23 RX ADMIN — METHYLPREDNISOLONE SODIUM SUCCINATE 80 MG: 40 INJECTION, POWDER, FOR SOLUTION INTRAMUSCULAR; INTRAVENOUS at 17:51

## 2021-01-23 RX ADMIN — METHYLPREDNISOLONE SODIUM SUCCINATE 80 MG: 40 INJECTION, POWDER, FOR SOLUTION INTRAMUSCULAR; INTRAVENOUS at 23:41

## 2021-01-23 RX ADMIN — HEPARIN SODIUM 5000 UNITS: 5000 INJECTION INTRAVENOUS; SUBCUTANEOUS at 05:16

## 2021-01-23 RX ADMIN — FAMOTIDINE 20 MG: 10 INJECTION INTRAVENOUS at 22:14

## 2021-01-23 RX ADMIN — Medication 10 ML: at 22:14

## 2021-01-23 RX ADMIN — AZITHROMYCIN MONOHYDRATE 500 MG: 500 INJECTION, POWDER, LYOPHILIZED, FOR SOLUTION INTRAVENOUS at 09:01

## 2021-01-23 RX ADMIN — ZINC SULFATE 220 MG (50 MG) CAPSULE 1 CAPSULE: CAPSULE at 09:00

## 2021-01-23 RX ADMIN — ENOXAPARIN SODIUM 90 MG: 100 INJECTION SUBCUTANEOUS at 23:39

## 2021-01-23 RX ADMIN — ENOXAPARIN SODIUM 90 MG: 100 INJECTION SUBCUTANEOUS at 13:48

## 2021-01-23 RX ADMIN — Medication 10 ML: at 05:16

## 2021-01-23 RX ADMIN — Medication 10 ML: at 13:48

## 2021-01-23 RX ADMIN — METHYLPREDNISOLONE SODIUM SUCCINATE 80 MG: 40 INJECTION, POWDER, FOR SOLUTION INTRAMUSCULAR; INTRAVENOUS at 05:16

## 2021-01-23 NOTE — PROGRESS NOTES
[facility]  Pulmonary, Critical Care, and Sleep Medicine    Name: Sarah Seay MRN: [id number]   : 1965 Hospital: [facility]   Date: 2021            IMPRESSION:   · Acute hypoxic respiratory failure - severe, secondary to COVID PNA-currently on HF (60L, 100%)  · Severe Multilobar Covid Pneumonia, first Dx on , Symptoms first started on 21    · New pneumomediastinum 21, radiographic stable  · Covid related coagulopathy  · Covid related inflammation, increased CRP, increased LDH,  and Increase ferritin. · Obesity  · GERD  · Hypertension, LAE and LVH on ekg. RECOMMENDATIONS:   · Continue HFNC and try to avoid NIV if possible to minimize the amount of positive pressure ventilation he is getting to minimize risk of qorsenng PTX/Pneumomediastiunum. Wean HF as tolerated to keep Spo2 >90%  · Monitor for signs of sq air, none so far. Daily chest X-ray or more frequently if needed  · S/P convalescent plasma  · Empiric antibiotics-CFTX, Azithro  · Start therapeutic Lovenox given suspicion of possible microthrombosis  · Continue steroids  · S/p Remdesivir x 5 days total  · Follow inflammatory and coag markers  · Very high risk for further decompensation. Low threshold to transfer patient to ICU. ICU team declined patient on 21   PCP: Raudel Flores. Subjective:     21: remains on HF (100%, 60L). SOB with minimal exertion. No cough or CP  21: remains dependent on HFNC. Has mild dyspnea. 21: remains dependent on BiPAP. Feels about the same. Mild dyspnea unchanged, at least while he is on BiPAP  21: Pt appears and feels more comfortable on bipap this am. Has no headaches, no chest pain, decreased coughing. He feels more hungry today. 21: Pt looks worse this am. Has increased RR into the mid 35s. Dyspneic. On high flow oxygen. Discussed convalescent plasma and he agrees. Feels very fatigued.  No appetite. His temp has been normal.     This patient has been seen and evaluated at the request of Dr. Edgra Olvera for above. Patient is a 48 y.o. male   Who was first ill on 1/4/21. Has increased shortness of breath. Had dry cough. Weakness. Diarrhea. Weakness has been worse over last 3 days. He has been with increased  Breathing difficulty. Feels his breathing is constricted. Has decreased po intake. Has felt hot. Some loose stools. Has mild GERD. Non smoker, Occ etoh use. Chews tobacco.      Past Medical History:   Diagnosis Date    GERD (gastroesophageal reflux disease)     Obesity (BMI 30-39. 9) 5/8/2019    Right leg DVT ([address] 2019      Past Surgical History:   Procedure Laterality Date    HX ORTHOPAEDIC        Prior to Admission medications    Medication Sig Start Date End Date Taking? Authorizing Provider   aspirin delayed-release 81 mg tablet Take 81 mg by mouth daily. Yes Other, MD Leopoldo   albuterol (PROVENTIL HFA, VENTOLIN HFA, PROAIR HFA) 90 mcg/actuation inhaler Take 2 Puffs by inhalation every four (4) hours as needed for Wheezing. 1/13/21  Yes Marissa Vázquez MD   multivitamin (ONE A DAY) tablet Take 1 Tab by mouth daily. Yes Provider, Historical   cholecalciferol (VITAMIN D3) 1,000 unit cap Take 1,000 Units by mouth daily. Yes Provider, Historical   ascorbic acid, vitamin C, (VITAMIN C) 500 mg tablet Take 500 mg by mouth daily.    Yes Provider, Historical     No Known Allergies   Social History     Tobacco Use    Smoking status: Never Smoker    Smokeless tobacco: Current User   Substance Use Topics    Alcohol use: Yes     Comment: once a month      Family History   Problem Relation Age of Onset    Cancer Mother     Cancer Father         Current Facility-Administered Medications   Medication Dose Route Frequency    enoxaparin (LOVENOX) injection 90 mg  1 mg/kg SubCUTAneous Q12H    heparin (porcine) injection 5,000 Units  5,000 Units SubCUTAneous Q8H    famotidine (PF) (PEPCID) injection 20 mg  20 mg IntraVENous Q12H    influenza vaccine - (6 mos+)(PF) (FLUARIX/FLULAVAL/FLUZONE QUAD) injection 0.5 mL  0.5 mL IntraMUSCular PRIOR TO DISCHARGE    methylPREDNISolone (PF) (SOLU-MEDROL) injection 80 mg  80 mg IntraVENous Q6H    azithromycin (ZITHROMAX) 500 mg in 0.9% sodium chloride 250 mL (VIAL-MATE)  500 mg IntraVENous Q24H    sodium chloride (NS) flush 5-40 mL  5-40 mL IntraVENous Q8H    cholecalciferol (VITAMIN D3) (1000 Units /25 mcg) tablet 2 Tab  2,000 Units Oral DAILY    aspirin delayed-release tablet 81 mg  81 mg Oral DAILY    zinc sulfate (ZINCATE) 220 (50) mg capsule 1 Cap  1 Cap Oral DAILY       Review of Systems:  Constitutional: positive for fevers, chills, sweats, fatigue, malaise and anorexia, negative for weight loss  Eyes: negative  Ears, nose, mouth, throat, and face: positive for nasal congestion  Respiratory: positive for cough or dyspnea on exertion  Cardiovascular: positive for fatigue, paroxysmal nocturnal dyspnea, tachypnea, dyspnea on exertion  Gastrointestinal: positive for dyspepsia, reflux symptoms, nausea and change in bowel habits  Genitourinary:negative  Integument/breast: negative  Hematologic/lymphatic: negative  Musculoskeletal:negative  Neurological: negative  Behavioral/Psych: negative  Endocrine: negative  Allergic/Immunologic: negative    Objective:   Vital Signs:    Visit Vitals  /88   Pulse 62   Temp 98.1 °F (36.7 °C)   Resp 18   Ht 5' 8\" (1.727 m)   Wt 92.9 kg (204 lb 12.9 oz)   SpO2 96%   BMI 31.14 kg/m²       O2 Device: Heated, Hi flow nasal cannula   O2 Flow Rate (L/min): 60 l/min   Temp (24hrs), Av.1 °F (36.7 °C), Min:97.7 °F (36.5 °C), Max:98.3 °F (36.8 °C)       Intake/Output:   Last shift:      No intake/output data recorded.   Last 3 shifts:  1901 -  0700  In:  [P.O.:]  Out:  [Urine:1825]    Intake/Output Summary (Last 24 hours) at 2021 0926  Last data filed at 2021 0521  Gross per 24 hour Intake 1540 ml   Output 1150 ml   Net 390 ml      Physical Exam:   General:  Alert, cooperative, no resp distress on bipap with 100% fio2. Head:  Normocephalic, without obvious abnormality, atraumatic. Eyes:  Conjunctivae/corneas clear    Nose: Nares normal. Septum midline. Mucosa normal.     Throat: Lips, mucosa, and tongue normal.    Neck: Supple, symmetrical, trachea midline    Lungs:   Distant bilateral breath sounds   Chest wall:  No tenderness or deformity. Heart:  Regular rate and rhythm    Abdomen:   Soft, non-tender. Bowel sounds normal.    Extremities: Extremities normal, atraumatic, no cyanosis    Skin: Skin color, texture, turgor normal. No crepitus   Neurologic: Grossly nonfocal     Data:   Labs:  Recent Labs     01/23/21  0223 01/22/21  0045 01/21/21  0348   WBC 13.3* 13.8* 11.2*   HGB 13.7 13.5 13.5   HCT 42.1 41.7 41.4    363 356     Recent Labs     01/23/21  0223 01/22/21  0045 01/21/21  0348    140 138   K 4.9 4.8 4.9    110* 108   CO2 27 27 22   * 122* 159*   BUN 26* 27* 33*   CREA 0.76 0.81 1.02   CA 8.7 8.8 9.3   MG  --   --  3.1*   PHOS  --   --  3.9   ALB 2.4* 2.4* 2.5*   TBILI 0.5 0.5 0.5   * 162* 54   INR 1.3* 1.3* 1.4*     No results for input(s): PHI, PCO2I, PO2I, HCO3I, FIO2I in the last 72 hours.     Imaging:  I have personally reviewed the patients radiographs:  Persistent bilateral infiltrates; slight increase in pneumomediastinum         Ger Jp, DO

## 2021-01-23 NOTE — PROGRESS NOTES
Hospitalist Progress Note    NAME: Lety Menendez   :  1965   MRN:  [id number]       Assessment / Plan:  Acute hypoxic respiratory failure POA, persistent  Severe multilobar Covid pneumonia POA  Pneumomediastinum , not POA, persistent  -COVID-19 test positive on . -CTA  neg for PE. Shows diffuse bilateral peripheral groundglass opacification and consolidation in the lower lobes   -Initially on nasal cannula/high flow, decompensation  , was on BiPAP since 2021, steroids increased, evaluated by ICU intensivist on .     -has developed pneumomediastinum , changed to high flow from BiPAP to reduce barotrauma, currently chest x-ray with tiny pneumothorax.  -Discussed with ICU intensivist .  -Continue HFNC, current sats 92-95% on HFNC,   -Continue remdesivir  -Continue Solu-Medrol, 80 mg q6h  -Continue antibiotics  -s/p convalescent plasma 2021  -Full dose Lovenox added by pulmonary     - Appreciate pulm following.  -Daily chest x-ray, or more frequently if sudden change in respiratory status. History of extensive right lower extremity DVT May 2019, likely occupational related  --Finished course of Xarelto and currently on baby aspirin.       GERD  --currently not on medication. Pepcid IV twice daily     Obesity  Body mass index is 31.14 kg/m².     Code: Discussed, full code  DVT prophylaxis: Heparin  Surrogate decision maker: Brother Noy Etienne [phone]    Patient remains high risk for decompensation. Subjective:     Chief Complaint / Reason for Physician Visit  Patient currently on 60 L high flow. Saturating greater than 90%.   He looks comfortable, not in acute distress,    Review of Systems:  Symptom Y/N Comments  Symptom Y/N Comments   Fever/Chills n   Chest Pain n    Poor Appetite n   Edema n    Cough n   Abdominal Pain n    Sputum    Joint Pain     SOB/BULL    Pruritis/Rash     Nausea/vomit    Tolerating PT/OT     Diarrhea Tolerating Diet     Constipation    Other       Could NOT obtain due to:      Objective:     VITALS:   Last 24hrs VS reviewed since prior progress note. Most recent are:  Patient Vitals for the past 24 hrs:   Temp Pulse Resp BP SpO2   01/23/21 1048 -- -- -- -- 97 %   01/23/21 0222 98.1 °F (36.7 °C) 62 18 127/88 96 %   01/22/21 2325 98.3 °F (36.8 °C) 82 20 (!) 145/84 100 %   01/22/21 1943 98.3 °F (36.8 °C) 68 22 (!) 155/90 96 %   01/22/21 1538 -- -- -- -- 95 %   01/22/21 1501 97.9 °F (36.6 °C) 70 20 (!) 140/88 99 %       Intake/Output Summary (Last 24 hours) at 1/23/2021 1212  Last data filed at 1/23/2021 0586  Gross per 24 hour   Intake 1060 ml   Output 1150 ml   Net -90 ml        I had a face to face encounter and independently examined this patient on 1/23/2021, as outlined below:  PHYSICAL EXAM:  General: WD, WN. Alert, cooperative, in moderate respiratory distress    EENT:  EOMI. Anicteric sclerae. MMM  Resp:  B/l lung coarse, rales+  CV:  Regular  rhythm,  No edema  GI:  Soft, Non distended, Non tender. +Bowel sounds  Neurologic:  Alert and oriented X 3, normal speech,   Psych:   Good insight. Not anxious nor agitated  Skin:  No rashes. No jaundice, subcu around right flank    Reviewed most current lab test results and cultures  YES  Reviewed most current radiology test results   YES  Review and summation of old records today    NO  Reviewed patient's current orders and MAR    YES  PMH/SH reviewed - no change compared to H&P  ________________________________________________________________________  Care Plan discussed with:    Comments   Patient x    Family      RN x    Care Manager     Consultant                        Multidiciplinary team rounds were held today with , nursing, pharmacist and clinical coordinator. Patient's plan of care was discussed; medications were reviewed and discharge planning was addressed. ________________________________________________________________________      Total CRITICAL CARE TIME Spent: 40  Minutes non procedure based      Comments   >50% of visit spent in counseling and coordination of care x    ________________________________________________________________________  Salima Pink MD     Procedures: see electronic medical records for all procedures/Xrays and details which were not copied into this note but were reviewed prior to creation of Plan. LABS:  I reviewed today's most current labs and imaging studies.   Pertinent labs include:  Recent Labs     01/23/21 0223 01/22/21 0045 01/21/21 0348   WBC 13.3* 13.8* 11.2*   HGB 13.7 13.5 13.5   HCT 42.1 41.7 41.4    363 356     Recent Labs     01/23/21 0223 01/22/21 0045 01/21/21 0348    140 138   K 4.9 4.8 4.9    110* 108   CO2 27 27 22   * 122* 159*   BUN 26* 27* 33*   CREA 0.76 0.81 1.02   CA 8.7 8.8 9.3   MG  --   --  3.1*   PHOS  --   --  3.9   ALB 2.4* 2.4* 2.5*   TBILI 0.5 0.5 0.5   * 162* 54   INR 1.3* 1.3* 1.4*       Signed: Salima Pink MD

## 2021-01-23 NOTE — PROGRESS NOTES
Problem: Falls - Risk of  Goal: *Absence of Falls  Description: Document Magda Marte Fall Risk and appropriate interventions in the flowsheet. Outcome: Progressing Towards Goal  Note: Fall Risk Interventions:            Medication Interventions: Patient to call before getting OOB                   Problem: Patient Education: Go to Patient Education Activity  Goal: Patient/Family Education  Outcome: Progressing Towards Goal     Problem: Breathing Pattern - Ineffective  Goal: *Absence of hypoxia  Outcome: Progressing Towards Goal  Goal: *Use of effective breathing techniques  Outcome: Progressing Towards Goal     Problem: Airway Clearance - Ineffective  Goal: Achieve or maintain patent airway  Outcome: Progressing Towards Goal     Problem: Gas Exchange - Impaired  Goal: Absence of hypoxia  Outcome: Progressing Towards Goal  Goal: Promote optimal lung function  Outcome: Progressing Towards Goal     Problem: Breathing Pattern - Ineffective  Goal: Ability to achieve and maintain a regular respiratory rate  Outcome: Progressing Towards Goal     Problem:  Body Temperature -  Risk of, Imbalanced  Goal: Ability to maintain a body temperature within defined limits  Outcome: Progressing Towards Goal  Goal: Will regain or maintain usual level of consciousness  Outcome: Progressing Towards Goal  Goal: Complications related to the disease process, condition or treatment will be avoided or minimized  Outcome: Progressing Towards Goal     Problem: Isolation Precautions - Risk of Spread of Infection  Goal: Prevent transmission of infectious organism to others  Outcome: Progressing Towards Goal     Problem: Nutrition Deficits  Goal: Optimize nutrtional status  Outcome: Progressing Towards Goal     Problem: Risk for Fluid Volume Deficit  Goal: Maintain normal heart rhythm  Outcome: Progressing Towards Goal  Goal: Maintain absence of muscle cramping  Outcome: Progressing Towards Goal  Goal: Maintain normal serum potassium, sodium, calcium, phosphorus, and pH  Outcome: Progressing Towards Goal     Problem: Loneliness or Risk for Loneliness  Goal: Demonstrate positive use of time alone when socialization is not possible  Outcome: Progressing Towards Goal     Problem: Fatigue  Goal: Verbalize increase energy and improved vitality  Outcome: Progressing Towards Goal     Problem: Patient Education: Go to Patient Education Activity  Goal: Patient/Family Education  Outcome: Progressing Towards Goal     Problem: Pressure Injury - Risk of  Goal: *Prevention of pressure injury  Description: Document Govind Scale and appropriate interventions in the flowsheet.   Outcome: Progressing Towards Goal  Note: Pressure Injury Interventions:  Sensory Interventions: Assess changes in LOC         Activity Interventions: Increase time out of bed         Nutrition Interventions: Document food/fluid/supplement intake, Offer support with meals,snacks and hydration    Friction and Shear Interventions: Feet elevated on foot rest                Problem: Patient Education: Go to Patient Education Activity  Goal: Patient/Family Education  Outcome: Progressing Towards Goal

## 2021-01-23 NOTE — PROGRESS NOTES
End of Shift Note    Bedside shift change report given to VADIM Kee (oncoming nurse) by Iris Morgan RN (offgoing nurse). Report included the following information SBAR and Kardex    Shift worked:  1900-0730     Shift summary and any significant changes:     none. Concerns for physician to address:  none. Zone phone for oncoming shift:          Activity:  Activity Level: Up with Assistance  Number times ambulated in hallways past shift: 0  Number of times OOB to chair past shift: 0    Cardiac:   Cardiac Monitoring: Yes      Cardiac Rhythm: Normal sinus rhythm    Access:   Current line(s): PIV     Genitourinary:   Urinary status: voiding    Respiratory:   O2 Device: Heated, Hi flow nasal cannula  Chronic home O2 use?: NO  Incentive spirometer at bedside: YES  Actual Volume (ml): 500 ml  GI:  Last Bowel Movement Date: 01/22/21  Current diet:  DIET NUTRITIONAL SUPPLEMENTS Breakfast, Dinner, Lunch; Ensure Clear  DIET FULL LIQUID  Passing flatus: YES  Tolerating current diet: YES  % Diet Eaten: 100 %    Pain Management:   Patient states pain is manageable on current regimen: YES    Skin:  Govind Score: 18  Interventions: nutritional support     Patient Safety:  Fall Score:  Total Score: 1  Interventions: pt to call before getting OOB       Length of Stay:  Expected LOS: 5d 12h  Actual LOS: Nikki Williamson RN

## 2021-01-23 NOTE — PROGRESS NOTES
Problem: Falls - Risk of  Goal: *Absence of Falls  Description: Document Porter Cea Fall Risk and appropriate interventions in the flowsheet. Outcome: Progressing Towards Goal  Note: Fall Risk Interventions:            Medication Interventions: Patient to call before getting OOB, Teach patient to arise slowly                   Problem: Breathing Pattern - Ineffective  Goal: *Absence of hypoxia  Outcome: Progressing Towards Goal  Goal: *Use of effective breathing techniques  Outcome: Progressing Towards Goal     Problem: Airway Clearance - Ineffective  Goal: Achieve or maintain patent airway  Outcome: Progressing Towards Goal     Problem: Gas Exchange - Impaired  Goal: Absence of hypoxia  Outcome: Progressing Towards Goal  Goal: Promote optimal lung function  Outcome: Progressing Towards Goal     Problem: Breathing Pattern - Ineffective  Goal: Ability to achieve and maintain a regular respiratory rate  Outcome: Progressing Towards Goal     Problem:  Body Temperature -  Risk of, Imbalanced  Goal: Ability to maintain a body temperature within defined limits  Outcome: Progressing Towards Goal  Goal: Will regain or maintain usual level of consciousness  Outcome: Progressing Towards Goal  Goal: Complications related to the disease process, condition or treatment will be avoided or minimized  Outcome: Progressing Towards Goal     Problem: Isolation Precautions - Risk of Spread of Infection  Goal: Prevent transmission of infectious organism to others  Outcome: Progressing Towards Goal

## 2021-01-24 ENCOUNTER — APPOINTMENT (OUTPATIENT)
Dept: GENERAL RADIOLOGY | Age: 56
DRG: 177 | End: 2021-01-24
Attending: INTERNAL MEDICINE
Payer: COMMERCIAL

## 2021-01-24 LAB
ANION GAP SERPL CALC-SCNC: 2 MMOL/L (ref 5–15)
APTT PPP: 29.2 SEC (ref 22.1–31)
BUN SERPL-MCNC: 27 MG/DL (ref 6–20)
BUN/CREAT SERPL: 38 (ref 12–20)
CALCIUM SERPL-MCNC: 8.9 MG/DL (ref 8.5–10.1)
CHLORIDE SERPL-SCNC: 106 MMOL/L (ref 97–108)
CO2 SERPL-SCNC: 28 MMOL/L (ref 21–32)
CREAT SERPL-MCNC: 0.72 MG/DL (ref 0.7–1.3)
D DIMER PPP FEU-MCNC: 4.03 MG/L FEU (ref 0–0.65)
ERYTHROCYTE [DISTWIDTH] IN BLOOD BY AUTOMATED COUNT: 13.3 % (ref 11.5–14.5)
GLUCOSE SERPL-MCNC: 184 MG/DL (ref 65–100)
HCT VFR BLD AUTO: 42 % (ref 36.6–50.3)
HGB BLD-MCNC: 13.9 G/DL (ref 12.1–17)
INR PPP: 1.2 (ref 0.9–1.1)
MCH RBC QN AUTO: 28.5 PG (ref 26–34)
MCHC RBC AUTO-ENTMCNC: 33.1 G/DL (ref 30–36.5)
MCV RBC AUTO: 86.1 FL (ref 80–99)
NRBC # BLD: 0 K/UL (ref 0–0.01)
NRBC BLD-RTO: 0 PER 100 WBC
PLATELET # BLD AUTO: 325 K/UL (ref 150–400)
PMV BLD AUTO: 10.7 FL (ref 8.9–12.9)
POTASSIUM SERPL-SCNC: 4.6 MMOL/L (ref 3.5–5.1)
PROTHROMBIN TIME: 12.4 SEC (ref 9–11.1)
RBC # BLD AUTO: 4.88 M/UL (ref 4.1–5.7)
SODIUM SERPL-SCNC: 136 MMOL/L (ref 136–145)
THERAPEUTIC RANGE,PTTT: NORMAL SECS (ref 58–77)
WBC # BLD AUTO: 12.2 K/UL (ref 4.1–11.1)

## 2021-01-24 PROCEDURE — 80048 BASIC METABOLIC PNL TOTAL CA: CPT

## 2021-01-24 PROCEDURE — 36415 COLL VENOUS BLD VENIPUNCTURE: CPT

## 2021-01-24 PROCEDURE — 74011250637 HC RX REV CODE- 250/637: Performed by: STUDENT IN AN ORGANIZED HEALTH CARE EDUCATION/TRAINING PROGRAM

## 2021-01-24 PROCEDURE — 71045 X-RAY EXAM CHEST 1 VIEW: CPT

## 2021-01-24 PROCEDURE — 85379 FIBRIN DEGRADATION QUANT: CPT

## 2021-01-24 PROCEDURE — 85610 PROTHROMBIN TIME: CPT

## 2021-01-24 PROCEDURE — 85027 COMPLETE CBC AUTOMATED: CPT

## 2021-01-24 PROCEDURE — 77010033711 HC HIGH FLOW OXYGEN

## 2021-01-24 PROCEDURE — 65660000001 HC RM ICU INTERMED STEPDOWN

## 2021-01-24 PROCEDURE — 85730 THROMBOPLASTIN TIME PARTIAL: CPT

## 2021-01-24 PROCEDURE — 94762 N-INVAS EAR/PLS OXIMTRY CONT: CPT

## 2021-01-24 PROCEDURE — 74011250636 HC RX REV CODE- 250/636: Performed by: INTERNAL MEDICINE

## 2021-01-24 PROCEDURE — 74011250637 HC RX REV CODE- 250/637: Performed by: HOSPITALIST

## 2021-01-24 RX ADMIN — FAMOTIDINE 20 MG: 10 INJECTION INTRAVENOUS at 09:31

## 2021-01-24 RX ADMIN — ENOXAPARIN SODIUM 90 MG: 100 INJECTION SUBCUTANEOUS at 11:31

## 2021-01-24 RX ADMIN — METHYLPREDNISOLONE SODIUM SUCCINATE 80 MG: 40 INJECTION, POWDER, FOR SOLUTION INTRAMUSCULAR; INTRAVENOUS at 17:43

## 2021-01-24 RX ADMIN — METHYLPREDNISOLONE SODIUM SUCCINATE 80 MG: 40 INJECTION, POWDER, FOR SOLUTION INTRAMUSCULAR; INTRAVENOUS at 11:31

## 2021-01-24 RX ADMIN — Medication 10 ML: at 22:16

## 2021-01-24 RX ADMIN — FAMOTIDINE 20 MG: 10 INJECTION INTRAVENOUS at 22:16

## 2021-01-24 RX ADMIN — ASPIRIN 81 MG: 81 TABLET, COATED ORAL at 09:31

## 2021-01-24 RX ADMIN — ZINC SULFATE 220 MG (50 MG) CAPSULE 1 CAPSULE: CAPSULE at 09:31

## 2021-01-24 RX ADMIN — CHOLECALCIFEROL TAB 25 MCG (1000 UNIT) 2 TABLET: 25 TAB at 09:34

## 2021-01-24 RX ADMIN — Medication 10 ML: at 15:16

## 2021-01-24 RX ADMIN — Medication 10 ML: at 06:14

## 2021-01-24 RX ADMIN — METHYLPREDNISOLONE SODIUM SUCCINATE 80 MG: 40 INJECTION, POWDER, FOR SOLUTION INTRAMUSCULAR; INTRAVENOUS at 06:14

## 2021-01-24 NOTE — PROGRESS NOTES
Problem: Falls - Risk of  Goal: *Absence of Falls  Description: Document [place] Flow Fall Risk and appropriate interventions in the flowsheet. Outcome: Progressing Towards Goal  Note: Fall Risk Interventions:            Medication Interventions: Patient to call before getting OOB                   Problem: Patient Education: Go to Patient Education Activity  Goal: Patient/Family Education  Outcome: Progressing Towards Goal     Problem: Breathing Pattern - Ineffective  Goal: *Absence of hypoxia  Outcome: Progressing Towards Goal  Goal: *Use of effective breathing techniques  Outcome: Progressing Towards Goal     Problem: Airway Clearance - Ineffective  Goal: Achieve or maintain patent airway  Outcome: Progressing Towards Goal     Problem: Gas Exchange - Impaired  Goal: Absence of hypoxia  Outcome: Progressing Towards Goal  Goal: Promote optimal lung function  Outcome: Progressing Towards Goal     Problem: Breathing Pattern - Ineffective  Goal: Ability to achieve and maintain a regular respiratory rate  Outcome: Progressing Towards Goal     Problem:  Body Temperature -  Risk of, Imbalanced  Goal: Ability to maintain a body temperature within defined limits  Outcome: Progressing Towards Goal  Goal: Will regain or maintain usual level of consciousness  Outcome: Progressing Towards Goal  Goal: Complications related to the disease process, condition or treatment will be avoided or minimized  Outcome: Progressing Towards Goal     Problem: Isolation Precautions - Risk of Spread of Infection  Goal: Prevent transmission of infectious organism to others  Outcome: Progressing Towards Goal     Problem: Nutrition Deficits  Goal: Optimize nutrtional status  Outcome: Progressing Towards Goal     Problem: Risk for Fluid Volume Deficit  Goal: Maintain normal heart rhythm  Outcome: Progressing Towards Goal  Goal: Maintain absence of muscle cramping  Outcome: Progressing Towards Goal  Goal: Maintain normal serum potassium, sodium, calcium, phosphorus, and pH  Outcome: Progressing Towards Goal     Problem: Loneliness or Risk for Loneliness  Goal: Demonstrate positive use of time alone when socialization is not possible  Outcome: Progressing Towards Goal     Problem: Fatigue  Goal: Verbalize increase energy and improved vitality  Outcome: Progressing Towards Goal     Problem: Patient Education: Go to Patient Education Activity  Goal: Patient/Family Education  Outcome: Progressing Towards Goal     Problem: Pressure Injury - Risk of  Goal: *Prevention of pressure injury  Description: Document Govind Scale and appropriate interventions in the flowsheet.   Outcome: Progressing Towards Goal  Note: Pressure Injury Interventions:  Sensory Interventions: Assess changes in LOC         Activity Interventions: Increase time out of bed         Nutrition Interventions: Document food/fluid/supplement intake    Friction and Shear Interventions: HOB 30 degrees or less                Problem: Patient Education: Go to Patient Education Activity  Goal: Patient/Family Education  Outcome: Progressing Towards Goal

## 2021-01-24 NOTE — PROGRESS NOTES
Problem: Falls - Risk of  Goal: *Absence of Falls  Description: Document Magda Marte Fall Risk and appropriate interventions in the flowsheet.   1/24/2021 1026 by Dimitrios Gee RN  Outcome: Progressing Towards Goal  Note: Fall Risk Interventions:            Medication Interventions: Patient to call before getting OOB                1/24/2021 0754 by Dimitrios Gee RN  Outcome: Progressing Towards Goal  Note: Fall Risk Interventions:            Medication Interventions: Patient to call before getting OOB                   Problem: Patient Education: Go to Patient Education Activity  Goal: Patient/Family Education  1/24/2021 1026 by Dimitrios Gee RN  Outcome: Progressing Towards Goal  1/24/2021 0754 by Dimitrios Gee RN  Outcome: Progressing Towards Goal     Problem: Breathing Pattern - Ineffective  Goal: *Absence of hypoxia  1/24/2021 1026 by Dimitrios Gee RN  Outcome: Progressing Towards Goal  1/24/2021 0754 by Dimitrios Gee RN  Outcome: Progressing Towards Goal  Goal: *Use of effective breathing techniques  1/24/2021 1026 by Dimitrios Gee RN  Outcome: Progressing Towards Goal  1/24/2021 0754 by Dimitrios Gee RN  Outcome: Progressing Towards Goal     Problem: Airway Clearance - Ineffective  Goal: Achieve or maintain patent airway  1/24/2021 1026 by Dimitrios Gee RN  Outcome: Progressing Towards Goal  1/24/2021 0754 by Dimitrios Gee RN  Outcome: Progressing Towards Goal     Problem: Gas Exchange - Impaired  Goal: Absence of hypoxia  1/24/2021 1026 by Dimitrios Gee RN  Outcome: Progressing Towards Goal  1/24/2021 0754 by Dimitrios Gee RN  Outcome: Progressing Towards Goal  Goal: Promote optimal lung function  1/24/2021 1026 by Dimitrios Gee RN  Outcome: Progressing Towards Goal  1/24/2021 0754 by Dimitrios Gee RN  Outcome: Progressing Towards Goal     Problem: Breathing Pattern - Ineffective  Goal: Ability to achieve and maintain a regular respiratory rate  1/24/2021 1026 by Gilbert Stanton, RN  Outcome: Progressing Towards Goal  1/24/2021 0754 by Gilbert Jackson RN  Outcome: Progressing Towards Goal     Problem:  Body Temperature -  Risk of, Imbalanced  Goal: Ability to maintain a body temperature within defined limits  1/24/2021 1026 by Gilbert Jackson RN  Outcome: Progressing Towards Goal  1/24/2021 0754 by Gilbert Jackson RN  Outcome: Progressing Towards Goal  Goal: Will regain or maintain usual level of consciousness  1/24/2021 1026 by Gilbert Jackson RN  Outcome: Progressing Towards Goal  1/24/2021 0754 by Gilbert Jackson RN  Outcome: Progressing Towards Goal  Goal: Complications related to the disease process, condition or treatment will be avoided or minimized  1/24/2021 1026 by Gilbert Jackson RN  Outcome: Progressing Towards Goal  1/24/2021 0754 by Gilbert Jackson RN  Outcome: Progressing Towards Goal     Problem: Isolation Precautions - Risk of Spread of Infection  Goal: Prevent transmission of infectious organism to others  1/24/2021 1026 by Gilbert Jackson RN  Outcome: Progressing Towards Goal  1/24/2021 0754 by Gilbert Jackson RN  Outcome: Progressing Towards Goal     Problem: Nutrition Deficits  Goal: Optimize nutrtional status  1/24/2021 1026 by Gilbert Jackson RN  Outcome: Progressing Towards Goal  1/24/2021 0754 by Gilbert Jackson RN  Outcome: Progressing Towards Goal     Problem: Risk for Fluid Volume Deficit  Goal: Maintain normal heart rhythm  1/24/2021 1026 by Gilbert Jackson RN  Outcome: Progressing Towards Goal  1/24/2021 0754 by Gilbert Jackson RN  Outcome: Progressing Towards Goal  Goal: Maintain absence of muscle cramping  1/24/2021 1026 by Gilbert Jackson RN  Outcome: Progressing Towards Goal  1/24/2021 0754 by Gilbert Jackson RN  Outcome: Progressing Towards Goal  Goal: Maintain normal serum potassium, sodium, calcium, phosphorus, and pH  1/24/2021 1026 by Gilbert Jackson RN  Outcome: Progressing Towards Goal  1/24/2021 0754 by Justo Healy RN  Outcome: Progressing Towards Goal     Problem: Loneliness or Risk for Loneliness  Goal: Demonstrate positive use of time alone when socialization is not possible  1/24/2021 1026 by Justo Healy RN  Outcome: Progressing Towards Goal  1/24/2021 0754 by Justo Healy RN  Outcome: Progressing Towards Goal     Problem: Fatigue  Goal: Verbalize increase energy and improved vitality  1/24/2021 1026 by Justo Healy RN  Outcome: Progressing Towards Goal  1/24/2021 0754 by Justo Healy RN  Outcome: Progressing Towards Goal     Problem: Patient Education: Go to Patient Education Activity  Goal: Patient/Family Education  1/24/2021 1026 by Justo Healy RN  Outcome: Progressing Towards Goal  1/24/2021 0754 by Justo Healy RN  Outcome: Progressing Towards Goal     Problem: Pressure Injury - Risk of  Goal: *Prevention of pressure injury  Description: Document Govind Scale and appropriate interventions in the flowsheet.   1/24/2021 1026 by Justo Healy RN  Outcome: Progressing Towards Goal  1/24/2021 0754 by Justo Healy RN  Outcome: Progressing Towards Goal  Note: Pressure Injury Interventions:  Sensory Interventions: Assess changes in LOC         Activity Interventions: Increase time out of bed         Nutrition Interventions: Document food/fluid/supplement intake    Friction and Shear Interventions: HOB 30 degrees or less                Problem: Patient Education: Go to Patient Education Activity  Goal: Patient/Family Education  1/24/2021 1026 by Justo Healy RN  Outcome: Progressing Towards Goal  1/24/2021 0754 by Justo Healy RN  Outcome: Progressing Towards Goal

## 2021-01-24 NOTE — PROGRESS NOTES
End of Shift Note    Bedside shift change report given to [address] (oncoming nurse) by Tuan Del Real (offgoing nurse). Report included the following information SBAR and Kardex    Shift worked:  0210-6029     Shift summary and any significant changes:    Pt O2 saturation was dropping during the night. Respiratory increased HHF  to 55L @ 100%. Concerns for physician to address: See above    Zone phone for oncoming shift:  [phone]     Activity:  Activity Level: Up with Assistance  Number times ambulated in hallways past shift: 0  Number of times OOB to chair past shift: 0    Cardiac:   Cardiac Monitoring: Yes      Cardiac Rhythm: Normal sinus rhythm    Access:   Current line(s): PIV     Genitourinary:   Urinary status: voiding    Respiratory:   O2 Device: Heated, Hi flow nasal cannula, Humidifier  Chronic home O2 use?: NO  Incentive spirometer at bedside: YES  Actual Volume (ml): 750 ml  GI:  Last Bowel Movement Date: 01/22/21  Current diet:  DIET NUTRITIONAL SUPPLEMENTS Breakfast, Dinner, Lunch; Ensure Clear  DIET REGULAR  Passing flatus: YES  Tolerating current diet: YES  % Diet Eaten: 100 %    Pain Management:   Patient states pain is manageable on current regimen: YES    Skin:  Govind Score: 19  Interventions: increase time out of bed    Patient Safety:  Fall Score:  Total Score: 1  Interventions: gripper socks       Length of Stay:  Expected LOS: 5d 12h  Actual LOS: [facility]

## 2021-01-24 NOTE — PROGRESS NOTES
[facility]  Pulmonary, Critical Care, and Sleep Medicine    Name: Iris Perez MRN: [id number]   : 1965 Hospital: [facility]   Date: 2021            IMPRESSION:   · Acute hypoxic respiratory failure - severe, secondary to COVID PNA-currently on HF (55L, 100%)  · Severe Multilobar Covid Pneumonia, first Dx on , Symptoms first started on 21    · New pneumomediastinum 21, radiographic stable  · Covid related coagulopathy  · Covid related inflammation, increased CRP, increased LDH,  and Increase ferritin. · Obesity  · GERD  · Hypertension, LAE and LVH on ekg. RECOMMENDATIONS:   · Continue HFNC and try to avoid NIV if possible to minimize the amount of positive pressure ventilation he is getting to minimize risk of worsening PTX/Pneumomediastiunum. Wean HF as tolerated to keep Spo2 >88%  · Monitor for signs of sq air, none so far. Daily chest X-ray or more frequently if needed  · S/P convalescent plasma  · Empiric antibiotics-CFTX, Azithro  · Cont therapeutic Lovenox given suspicion of possible microthrombosis  · Continue steroids  · S/p Remdesivir x 5 days total  · Follow inflammatory and coag markers  · Mobilize out of bed as tolerated  · Very high risk for further decompensation. Low threshold to transfer patient to ICU. ICU team declined patient on 21   PCP: Corrine Ho. Subjective:     21: uneventful night. On HF-55L, SOB with minimal ambulation in the room  21: remains on HF (100%, 60L). SOB with minimal exertion. No cough or CP  21: remains dependent on HFNC. Has mild dyspnea. 21: remains dependent on BiPAP. Feels about the same. Mild dyspnea unchanged, at least while he is on BiPAP  21: Pt appears and feels more comfortable on bipap this am. Has no headaches, no chest pain, decreased coughing. He feels more hungry today.    21: Pt looks worse this am. Has increased RR into the mid 35s. Dyspneic. On high flow oxygen. Discussed convalescent plasma and he agrees. Feels very fatigued. No appetite. His temp has been normal.     This patient has been seen and evaluated at the request of Dr. Sanders Done for above. Patient is a 48 y.o. male   Who was first ill on 1/4/21. Has increased shortness of breath. Had dry cough. Weakness. Diarrhea. Weakness has been worse over last 3 days. He has been with increased  Breathing difficulty. Feels his breathing is constricted. Has decreased po intake. Has felt hot. Some loose stools. Has mild GERD. Non smoker, Occ etoh use. Chews tobacco.      Past Medical History:   Diagnosis Date    GERD (gastroesophageal reflux disease)     Obesity (BMI 30-39. 9) 5/8/2019    Right leg DVT ([address] 2019      Past Surgical History:   Procedure Laterality Date    HX ORTHOPAEDIC        Prior to Admission medications    Medication Sig Start Date End Date Taking? Authorizing Provider   aspirin delayed-release 81 mg tablet Take 81 mg by mouth daily. Yes Other, MD Leopoldo   albuterol (PROVENTIL HFA, VENTOLIN HFA, PROAIR HFA) 90 mcg/actuation inhaler Take 2 Puffs by inhalation every four (4) hours as needed for Wheezing. 1/13/21  Yes Roma Klinefelter, MD   multivitamin (ONE A DAY) tablet Take 1 Tab by mouth daily. Yes Provider, Historical   cholecalciferol (VITAMIN D3) 1,000 unit cap Take 1,000 Units by mouth daily. Yes Provider, Historical   ascorbic acid, vitamin C, (VITAMIN C) 500 mg tablet Take 500 mg by mouth daily.    Yes Provider, Historical     No Known Allergies   Social History     Tobacco Use    Smoking status: Never Smoker    Smokeless tobacco: Current User   Substance Use Topics    Alcohol use: Yes     Comment: once a month      Family History   Problem Relation Age of Onset    Cancer Mother     Cancer Father         Current Facility-Administered Medications   Medication Dose Route Frequency    enoxaparin (LOVENOX) injection 90 mg  1 mg/kg SubCUTAneous Q12H    [Held by provider] heparin (porcine) injection 5,000 Units  5,000 Units SubCUTAneous Q8H    famotidine (PF) (PEPCID) injection 20 mg  20 mg IntraVENous Q12H    influenza vaccine 2020-21 (6 mos+)(PF) (FLUARIX/FLULAVAL/FLUZONE QUAD) injection 0.5 mL  0.5 mL IntraMUSCular PRIOR TO DISCHARGE    methylPREDNISolone (PF) (SOLU-MEDROL) injection 80 mg  80 mg IntraVENous Q6H    sodium chloride (NS) flush 5-40 mL  5-40 mL IntraVENous Q8H    cholecalciferol (VITAMIN D3) (1000 Units /25 mcg) tablet 2 Tab  2,000 Units Oral DAILY    aspirin delayed-release tablet 81 mg  81 mg Oral DAILY    zinc sulfate (ZINCATE) 220 (50) mg capsule 1 Cap  1 Cap Oral DAILY       Review of Systems:  Constitutional: positive for fevers, chills, sweats, fatigue, malaise and anorexia, negative for weight loss  Eyes: negative  Ears, nose, mouth, throat, and face: positive for nasal congestion  Respiratory: positive for cough or dyspnea on exertion  Cardiovascular: positive for fatigue, paroxysmal nocturnal dyspnea, tachypnea, dyspnea on exertion  Gastrointestinal: positive for dyspepsia, reflux symptoms, nausea and change in bowel habits  Genitourinary:negative  Integument/breast: negative  Hematologic/lymphatic: negative  Musculoskeletal:negative  Neurological: negative  Behavioral/Psych: negative  Endocrine: negative  Allergic/Immunologic: negative    Objective:   Vital Signs:    Visit Vitals  /86   Pulse 74   Temp 98 °F (36.7 °C)   Resp 20   Ht 5' 8\" (1.727 m)   Wt 92.9 kg (204 lb 12.9 oz)   SpO2 95%   BMI 31.14 kg/m²       O2 Device: Heated, Hi flow nasal cannula, Humidifier   O2 Flow Rate (L/min): 55 l/min(increased flow sats dipped 80\"s)   Temp (24hrs), Av.3 °F (36.8 °C), Min:97.8 °F (36.6 °C), Max:98.6 °F (37 °C)       Intake/Output:   Last shift:      No intake/output data recorded.   Last 3 shifts:  1901 -  0700  In: 2280 [P.O.:2030; I.V.:250]  Out: 2600 [Urine:2600]    Intake/Output Summary (Last 24 hours) at 1/24/2021 1008  Last data filed at 1/24/2021 0238  Gross per 24 hour   Intake 1810 ml   Output 1700 ml   Net 110 ml      Physical Exam:   General:  Alert, cooperative, no resp distress on bipap with 100% fio2. Head:  Normocephalic, without obvious abnormality, atraumatic. Eyes:  Conjunctivae/corneas clear    Nose: Nares normal. Septum midline. Mucosa normal.     Throat: Lips, mucosa, and tongue normal.    Neck: Supple, symmetrical, trachea midline    Lungs:   Distant bilateral breath sounds   Chest wall:  No tenderness or deformity. Heart:  Regular rate and rhythm    Abdomen:   Soft, non-tender. Bowel sounds normal.    Extremities: Extremities normal, atraumatic, no cyanosis    Skin: Skin color, texture, turgor normal. No crepitus   Neurologic: Grossly nonfocal     Data:   Labs:  Recent Labs     01/24/21  0240 01/23/21  0223 01/22/21  0045   WBC 12.2* 13.3* 13.8*   HGB 13.9 13.7 13.5   HCT 42.0 42.1 41.7    338 363     Recent Labs     01/24/21  0240 01/23/21  0223 01/22/21  0045    139 140   K 4.6 4.9 4.8    108 110*   CO2 28 27 27   * 137* 122*   BUN 27* 26* 27*   CREA 0.72 0.76 0.81   CA 8.9 8.7 8.8   ALB  --  2.4* 2.4*   TBILI  --  0.5 0.5   ALT  --  129* 162*   INR 1.2* 1.3* 1.3*     No results for input(s): PHI, PCO2I, PO2I, HCO3I, FIO2I in the last 72 hours.     Imaging:  I have personally reviewed the patients radiographs:  Persistent bilateral infiltrates; slight increase in pneumomediastinum         Connie Mendez DO

## 2021-01-24 NOTE — PROGRESS NOTES
Hospitalist Progress Note    NAME: Brea Armstrong   :  1965   MRN:  [id number]       Assessment / Plan:  Acute hypoxic respiratory failure POA, persistent  Severe multilobar Covid pneumonia POA  Pneumomediastinum , not POA, persistent  -COVID-19 test positive on . -CTA  neg for PE. Shows diffuse bilateral peripheral groundglass opacification and consolidation in the lower lobes   -Initially on nasal cannula/high flow, decompensation  , was on BiPAP since 2021, steroids increased, evaluated by ICU intensivist on .     -has developed pneumomediastinum , changed to high flow from BiPAP to reduce barotrauma, chest x-ray this There is an  unchanged tiny right apical pneumothorax. No left pneumothorax  -Dr Jina Sawyer Discussed with ICU intensivist .  -Continue HFNC, current sats 95% on HFNC,   -s/p  remdesivir  -Continue Solu-Medrol, 80 mg q6h  -s/p antibiotics  -s/p convalescent plasma 2021  -Full dose Lovenox added by pulmonary     - Appreciate pulm following.  -Daily chest x-ray, or more frequently if sudden change in respiratory status. History of extensive right lower extremity DVT May 2019, likely occupational related  --Finished course of Xarelto and currently on baby aspirin.       GERD  --currently not on medication. Pepcid IV twice daily     Obesity  Body mass index is 31.14 kg/m².     Code: Discussed, full code  DVT prophylaxis: Heparin  Surrogate decision maker: Brother Balta Sebastian [phone]    Patient remains high risk for decompensation. Subjective:     Chief Complaint / Reason for Physician Visit  Patient currently on 55 L high flow. Saturating greater than 90%. He looks comfortable, not in acute distress,. Patient was seen and examined. No acute events overnight.     \"feeling a little better\"    Review of Systems:  Symptom Y/N Comments  Symptom Y/N Comments   Fever/Chills n   Chest Pain n    Poor Appetite    Edema Cough n   Abdominal Pain n    Sputum    Joint Pain     SOB/BULL y improving  Pruritis/Rash     Nausea/vomit n   Tolerating PT/OT     Diarrhea    Tolerating Diet y    Constipation    Other       Could NOT obtain due to:          Objective:     VITALS:   Last 24hrs VS reviewed since prior progress note. Most recent are:  Patient Vitals for the past 24 hrs:   Temp Pulse Resp BP SpO2   01/24/21 0337 -- -- -- -- 95 %   01/24/21 0248 98 °F (36.7 °C) 74 20 135/86 90 %   01/24/21 0109 -- -- -- -- (!) 88 %   01/23/21 2335 97.8 °F (36.6 °C) 69 20 (!) 147/83 91 %   01/23/21 2135 -- -- -- -- (!) 88 %   01/23/21 1942 98.5 °F (36.9 °C) 82 20 129/86 94 %   01/23/21 1702 -- -- -- -- 93 %   01/23/21 1520 98.6 °F (37 °C) 77 20 130/76 95 %   01/23/21 1500 -- -- -- -- 97 %   01/23/21 1048 -- -- -- -- 97 %   01/23/21 1018 98.6 °F (37 °C) 92 20 120/83 97 %       Intake/Output Summary (Last 24 hours) at 1/24/2021 0919  Last data filed at 1/24/2021 1579  Gross per 24 hour   Intake 1810 ml   Output 1700 ml   Net 110 ml        I had a face to face encounter and independently examined this patient on 1/24/2021, as outlined below:  PHYSICAL EXAM:  General: WD, WN. Alert, cooperative, in moderate respiratory distress    EENT:  EOMI. Anicteric sclerae. MMM  Resp:  B/l lung coarse, rales+  CV:  Regular  rhythm,  No edema  GI:  Soft, Non distended, Non tender. +Bowel sounds  Neurologic:  Alert and oriented X 3, normal speech,   Psych:   Good insight. Not anxious nor agitated  Skin:  No rashes.   No jaundice, subcu around right flank    Reviewed most current lab test results and cultures  YES  Reviewed most current radiology test results   YES  Review and summation of old records today    NO  Reviewed patient's current orders and MAR    YES  PMH/SH reviewed - no change compared to H&P  ________________________________________________________________________  Care Plan discussed with:    Comments   Patient x    Family      RN x    Care Manager Consultant                        Multidiciplinary team rounds were held today with , nursing, pharmacist and clinical coordinator. Patient's plan of care was discussed; medications were reviewed and discharge planning was addressed. ________________________________________________________________________      Total CRITICAL CARE TIME Spent: 40  Minutes non procedure based      Comments   >50% of visit spent in counseling and coordination of care x    ________________________________________________________________________  Beba Dennis MD     Procedures: see electronic medical records for all procedures/Xrays and details which were not copied into this note but were reviewed prior to creation of Plan. LABS:  I reviewed today's most current labs and imaging studies.   Pertinent labs include:  Recent Labs     01/24/21  0240 01/23/21  0223 01/22/21  0045   WBC 12.2* 13.3* 13.8*   HGB 13.9 13.7 13.5   HCT 42.0 42.1 41.7    338 363     Recent Labs     01/24/21  0240 01/23/21  0223 01/22/21  0045    139 140   K 4.6 4.9 4.8    108 110*   CO2 28 27 27   * 137* 122*   BUN 27* 26* 27*   CREA 0.72 0.76 0.81   CA 8.9 8.7 8.8   ALB  --  2.4* 2.4*   TBILI  --  0.5 0.5   ALT  --  129* 162*   INR 1.2* 1.3* 1.3*       Signed: Beba Dennis MD

## 2021-01-25 LAB
APTT PPP: 30 SEC (ref 22.1–31)
D DIMER PPP FEU-MCNC: 3.41 MG/L FEU (ref 0–0.65)
INR PPP: 1.2 (ref 0.9–1.1)
PROTHROMBIN TIME: 12 SEC (ref 9–11.1)
THERAPEUTIC RANGE,PTTT: NORMAL SECS (ref 58–77)

## 2021-01-25 PROCEDURE — 85730 THROMBOPLASTIN TIME PARTIAL: CPT

## 2021-01-25 PROCEDURE — 65660000001 HC RM ICU INTERMED STEPDOWN

## 2021-01-25 PROCEDURE — 77010033711 HC HIGH FLOW OXYGEN

## 2021-01-25 PROCEDURE — 74011250637 HC RX REV CODE- 250/637: Performed by: HOSPITALIST

## 2021-01-25 PROCEDURE — 85379 FIBRIN DEGRADATION QUANT: CPT

## 2021-01-25 PROCEDURE — 74011250636 HC RX REV CODE- 250/636: Performed by: INTERNAL MEDICINE

## 2021-01-25 PROCEDURE — 74011250637 HC RX REV CODE- 250/637: Performed by: STUDENT IN AN ORGANIZED HEALTH CARE EDUCATION/TRAINING PROGRAM

## 2021-01-25 PROCEDURE — 36415 COLL VENOUS BLD VENIPUNCTURE: CPT

## 2021-01-25 PROCEDURE — 85610 PROTHROMBIN TIME: CPT

## 2021-01-25 RX ADMIN — Medication 10 ML: at 06:11

## 2021-01-25 RX ADMIN — ASPIRIN 81 MG: 81 TABLET, COATED ORAL at 10:31

## 2021-01-25 RX ADMIN — METHYLPREDNISOLONE SODIUM SUCCINATE 80 MG: 40 INJECTION, POWDER, FOR SOLUTION INTRAMUSCULAR; INTRAVENOUS at 06:11

## 2021-01-25 RX ADMIN — METHYLPREDNISOLONE SODIUM SUCCINATE 60 MG: 40 INJECTION, POWDER, FOR SOLUTION INTRAMUSCULAR; INTRAVENOUS at 13:32

## 2021-01-25 RX ADMIN — METHYLPREDNISOLONE SODIUM SUCCINATE 80 MG: 40 INJECTION, POWDER, FOR SOLUTION INTRAMUSCULAR; INTRAVENOUS at 01:34

## 2021-01-25 RX ADMIN — ENOXAPARIN SODIUM 90 MG: 100 INJECTION SUBCUTANEOUS at 13:32

## 2021-01-25 RX ADMIN — ENOXAPARIN SODIUM 90 MG: 100 INJECTION SUBCUTANEOUS at 01:35

## 2021-01-25 RX ADMIN — METHYLPREDNISOLONE SODIUM SUCCINATE 60 MG: 40 INJECTION, POWDER, FOR SOLUTION INTRAMUSCULAR; INTRAVENOUS at 18:46

## 2021-01-25 RX ADMIN — FAMOTIDINE 20 MG: 10 INJECTION INTRAVENOUS at 21:30

## 2021-01-25 RX ADMIN — ZINC SULFATE 220 MG (50 MG) CAPSULE 1 CAPSULE: CAPSULE at 10:31

## 2021-01-25 RX ADMIN — CHOLECALCIFEROL TAB 25 MCG (1000 UNIT) 2 TABLET: 25 TAB at 10:31

## 2021-01-25 RX ADMIN — FAMOTIDINE 20 MG: 10 INJECTION INTRAVENOUS at 10:31

## 2021-01-25 RX ADMIN — Medication 10 ML: at 21:31

## 2021-01-25 NOTE — PROGRESS NOTES
Comprehensive Nutrition Assessment    Type and Reason for Visit: Initial, RD nutrition re-screen/LOS    Nutrition Recommendations/Plan:   Continue regular diet  Adjust ONS to Ensure enlive daily  Please document % meals and supplements consumed in flowsheet I/O's under intake     Please obtain new weight for accurate assessment. Current chart weight is estimated. Nutrition Assessment:      Chart reviewed for LOS. Pt noted for acute hypoxic respiratory failure, covid pna, pneumomediastinum, DVT, GERD. RD did not visit bedside d/t covid isolation but spoke with pt over the phone. Pt reports good appetite today and PTA. Ensure clear previously added to diet for CLD. Can d/c now with regular diet. Pt would like Ensure enlive daily. MST negative for malnutrition risk factors. Current weight is estimated, updated wt ordered. Recent BM. No nutrition diagnosis. Patient Vitals for the past 72 hrs:   % Diet Eaten   01/24/21 1821 100 %   01/24/21 1317 100 %   01/24/21 0816 100 %   01/23/21 1520 100 %   01/23/21 1018 100 %   01/22/21 1400 100 %   01/22/21 1100 90 %     Wt Readings from Last 5 Encounters:   01/18/21 92.9 kg (204 lb 12.9 oz)   01/13/21 92.9 kg (204 lb 12.9 oz)   05/18/20 93.4 kg (205 lb 14.6 oz)   08/12/19 94.4 kg (208 lb 3.2 oz)   05/18/19 93.3 kg (205 lb 11 oz)   ]    Estimated Daily Nutrient Needs:  Energy (kcal): 2017 kcal (BMR x 1. 3AF - 250); Weight Used for Energy Requirements: Current  Protein (g): 74-93g (0.8-1g/kg); Weight Used for Protein Requirements: Current  Fluid (ml/day): 2000mL; Method Used for Fluid Requirements: 1 ml/kcal      Nutrition Related Findings:  Labs reviewed. Meds: D3, lovenox, pepcid, solumedrol, zincate. BM 1/23.       Wounds:    None       Current Nutrition Therapies:  DIET REGULAR  DIET NUTRITIONAL SUPPLEMENTS Dinner; Ensure [employer]    Anthropometric Measures:  · Height:  5' 8\" (172.7 cm)  · Current Body Wt:  92.9 kg (204 lb 12.9 oz)   · Ideal Body Wt:  154 lbs:  133 %   · BMI Category:  Obese class 1 (BMI 30.0-34. 9)       Nutrition Diagnosis:   · No nutrition diagnosis at this time related to   as evidenced by        Nutrition Interventions:   Food and/or Nutrient Delivery: Continue current diet, Modify oral nutrition supplement  Nutrition Education and Counseling: No recommendations at this time  Coordination of Nutrition Care: Continue to monitor while inpatient, Interdisciplinary rounds    Goals:  PO intake >70% meals and supplement next 5-7 days       Nutrition Monitoring and Evaluation:   Behavioral-Environmental Outcomes: None identified  Food/Nutrient Intake Outcomes: Food and nutrient intake, Supplement intake  Physical Signs/Symptoms Outcomes: Biochemical data, Weight, GI status    Discharge Planning:    Continue current diet     Electronically signed by Rocky Kendrick RD on 1/25/2021 at 10:42 AM    Contact: [phone]  Pager [phone]

## 2021-01-25 NOTE — PROGRESS NOTES
[facility]  Pulmonary, Critical Care, and Sleep Medicine    Name: Irma Lewis MRN: [id number]   : 1965 Hospital: [facility]   Date: 2021            IMPRESSION:   · Acute hypoxic respiratory failure - severe, secondary to COVID PNA-currently on HF (55L, 100%)  · Severe Multilobar Covid Pneumonia, first Dx on , Symptoms first started on 21    · New pneumomediastinum 21, radiographic stable  · Covid related coagulopathy  · Covid related inflammation, increased CRP, increased LDH,  and Increase ferritin. · Obesity  · GERD  · Hypertension, LAE and LVH on ekg. RECOMMENDATIONS:   · Continue HFNC and try to avoid NIV if possible to minimize the amount of positive pressure ventilation he is getting to minimize risk of worsening PTX/Pneumomediastiunum. Wean HF as tolerated to keep Spo2 >88%  · Monitor for signs of sq air, none so far. Daily chest X-ray or more frequently if needed  · S/P convalescent plasma  · Empiric antibiotics-CFTX, Azithro  · Cont therapeutic Lovenox given suspicion of possible microthrombosis  · Continue steroids  · S/p Remdesivir x 5 days total  · Follow inflammatory and coag markers  · Mobilize out of bed as tolerated  · Very high risk for further decompensation. Low threshold to transfer patient to ICU. ICU team declined patient on 21   PCP: Brittany Mchugh. Subjective:       21: events of weekend reviewed; No chest pain. Prone last night. sats marginal now. On HF-55L, SOB with minimal ambulation in the room. Would like to use BSC if able. Incentive on 400 ml    21: uneventful night. On HF-55L, SOB with minimal ambulation in the room  21: remains on HF (100%, 60L). SOB with minimal exertion. No cough or CP  21: remains dependent on HFNC. Has mild dyspnea. 21: remains dependent on BiPAP. Feels about the same.   Mild dyspnea unchanged, at least while he is on BiPAP  21: Pt appears and feels more comfortable on bipap this am. Has no headaches, no chest pain, decreased coughing. He feels more hungry today. 1-19-21: Pt looks worse this am. Has increased RR into the mid 35s. Dyspneic. On high flow oxygen. Discussed convalescent plasma and he agrees. Feels very fatigued. No appetite. His temp has been normal.     This patient has been seen and evaluated at the request of Dr. Allegra Neal for above. Patient is a 48 y.o. male   Who was first ill on 1/4/21. Has increased shortness of breath. Had dry cough. Weakness. Diarrhea. Weakness has been worse over last 3 days. He has been with increased  Breathing difficulty. Feels his breathing is constricted. Has decreased po intake. Has felt hot. Some loose stools. Has mild GERD. Non smoker, Occ etoh use. Chews tobacco.      Past Medical History:   Diagnosis Date    GERD (gastroesophageal reflux disease)     Obesity (BMI 30-39. 9) 5/8/2019    Right leg DVT ([address] 2019      Past Surgical History:   Procedure Laterality Date    HX ORTHOPAEDIC        Prior to Admission medications    Medication Sig Start Date End Date Taking? Authorizing Provider   aspirin delayed-release 81 mg tablet Take 81 mg by mouth daily. Yes Other, MD Leopoldo   albuterol (PROVENTIL HFA, VENTOLIN HFA, PROAIR HFA) 90 mcg/actuation inhaler Take 2 Puffs by inhalation every four (4) hours as needed for Wheezing. 1/13/21  Yes Tito Cordova MD   multivitamin (ONE A DAY) tablet Take 1 Tab by mouth daily. Yes Provider, Historical   cholecalciferol (VITAMIN D3) 1,000 unit cap Take 1,000 Units by mouth daily. Yes Provider, Historical   ascorbic acid, vitamin C, (VITAMIN C) 500 mg tablet Take 500 mg by mouth daily.    Yes Provider, Historical     No Known Allergies   Social History     Tobacco Use    Smoking status: Never Smoker    Smokeless tobacco: Current User   Substance Use Topics    Alcohol use: Yes     Comment: once a month      Family History   Problem Relation Age of Onset    Cancer Mother     Cancer Father         Current Facility-Administered Medications   Medication Dose Route Frequency    methylPREDNISolone (PF) (SOLU-MEDROL) injection 60 mg  60 mg IntraVENous Q6H    enoxaparin (LOVENOX) injection 90 mg  1 mg/kg SubCUTAneous Q12H    [Held by provider] heparin (porcine) injection 5,000 Units  5,000 Units SubCUTAneous Q8H    famotidine (PF) (PEPCID) injection 20 mg  20 mg IntraVENous Q12H    influenza vaccine - (6 mos+)(PF) (FLUARIX/FLULAVAL/FLUZONE QUAD) injection 0.5 mL  0.5 mL IntraMUSCular PRIOR TO DISCHARGE    sodium chloride (NS) flush 5-40 mL  5-40 mL IntraVENous Q8H    cholecalciferol (VITAMIN D3) (1000 Units /25 mcg) tablet 2 Tab  2,000 Units Oral DAILY    aspirin delayed-release tablet 81 mg  81 mg Oral DAILY    zinc sulfate (ZINCATE) 220 (50) mg capsule 1 Cap  1 Cap Oral DAILY       Review of Systems:  Constitutional: positive for fevers, chills, sweats, fatigue, malaise and anorexia, negative for weight loss  Eyes: negative  Ears, nose, mouth, throat, and face: positive for nasal congestion  Respiratory: positive for cough or dyspnea on exertion  Cardiovascular: positive for fatigue, paroxysmal nocturnal dyspnea, tachypnea, dyspnea on exertion  Gastrointestinal: positive for dyspepsia, reflux symptoms, nausea and change in bowel habits  Genitourinary:negative  Integument/breast: negative  Hematologic/lymphatic: negative  Musculoskeletal:negative  Neurological: negative  Behavioral/Psych: negative  Endocrine: negative  Allergic/Immunologic: negative    Objective:   Vital Signs:    Visit Vitals  /66 (BP 1 Location: Right arm, BP Patient Position: At rest)   Pulse 86   Temp 98.5 °F (36.9 °C)   Resp 20   Ht 5' 8\" (1.727 m)   Wt 92.9 kg (204 lb 12.9 oz)   SpO2 90%   BMI 31.14 kg/m²       O2 Device: Heated, Hi flow nasal cannula, Humidifier   O2 Flow Rate (L/min): 40 l/min   Temp (24hrs), Av.1 °F (36.7 °C), Min:97.9 °F (36.6 °C), Max:98.5 °F (36.9 °C)       Intake/Output:   Last shift:      No intake/output data recorded. Last 3 shifts: 01/23 1901 - 01/25 0700  In: 2130 [P.O.:2130]  Out: 4019 [Urine:1650]    Intake/Output Summary (Last 24 hours) at 1/25/2021 1238  Last data filed at 1/25/2021 0325  Gross per 24 hour   Intake 1410 ml   Output 700 ml   Net 710 ml      Physical Exam:   General:  Alert, cooperative, no resp distress on bipap with 100% fio2. Head:  Normocephalic, without obvious abnormality, atraumatic. Eyes:  Conjunctivae/corneas clear    Nose: Nares normal. Septum midline. Mucosa normal.     Throat: Lips, mucosa, and tongue normal.    Neck: Supple, symmetrical, trachea midline    Lungs:   Distant bilateral breath sounds   Chest wall:  No tenderness or deformity. Heart:  Regular rate and rhythm    Abdomen:   Soft, non-tender. Bowel sounds normal.    Extremities: Extremities normal, atraumatic, no cyanosis    Skin: Skin color, texture, turgor normal. No crepitus   Neurologic: Grossly nonfocal     Data:   Labs:  Recent Labs     01/24/21  0240 01/23/21  0223   WBC 12.2* 13.3*   HGB 13.9 13.7   HCT 42.0 42.1    338     Recent Labs     01/25/21  0238 01/24/21  0240 01/23/21  0223   NA  --  136 139   K  --  4.6 4.9   CL  --  106 108   CO2  --  28 27   GLU  --  184* 137*   BUN  --  27* 26*   CREA  --  0.72 0.76   CA  --  8.9 8.7   ALB  --   --  2.4*   TBILI  --   --  0.5   ALT  --   --  129*   INR 1.2* 1.2* 1.3*     No results for input(s): PHI, PCO2I, PO2I, HCO3I, FIO2I in the last 72 hours.     Imaging:  I have personally reviewed the patients radiographs:  Persistent bilateral infiltrates; slight increase in pneumomediastinum         Vinnie Marks MD

## 2021-01-25 NOTE — PROGRESS NOTES
Spiritual Care Assessment/Progress Note  [facility]      NAME: Harmeet Farfan      MRN: [id number]  AGE: 48 y.o. SEX: male  Roman Catholic Affiliation: No preference   Language: English     1/25/2021     Total Time (in minutes): 7     Spiritual Assessment begun in [facility] through conversation with:         []Patient        [] Family    [] Friend(s)        Reason for Consult: Initial/Spiritual assessment, patient floor     Spiritual beliefs: (Please include comment if needed)     [] Identifies with a andrea tradition:         [] Supported by a andrea community:            [] Claims no spiritual orientation:           [] Seeking spiritual identity:                [] Adheres to an individual form of spirituality:           [x] Not able to assess:                           Identified resources for coping:      [] Prayer                               [] Music                  [] Guided Imagery     [] Family/friends                 [] Pet visits     [] Devotional reading                         [x] Unknown     [] Other:                                           Interventions offered during this visit: (See comments for more details)    Patient Interventions: Initial visit           Plan of Care:     [x] Support spiritual and/or cultural needs    [] Support AMD and/or advance care planning process      [] Support grieving process   [] Coordinate Rites and/or Rituals    [] Coordination with community clergy   [] No spiritual needs identified at this time   [] Detailed Plan of Care below (See Comments)  [] Make referral to Music Therapy  [] Make referral to Pet Therapy     [] Make referral to Addiction services  [] Make referral to [facility]  [] Make referral to Spiritual Care Partner  [] No future visits requested        [x] Follow up upon further referrals     Comments:  Attempted to visit with patient on [facility] unit via telephone for spiritual assessment.   Patient is on COVID contact precautions;  unable to enter room. Telephone rang a busy signal.  Unable to assess spiritual needs/concerns at this time.       SOFI Lamb, [facility], Staff [address]    [address] Paging Service  [phone]

## 2021-01-25 NOTE — PROGRESS NOTES
ORIANA:  -confirm family to transport near d/c  -assess for home oxygen  -await therapy recommendations for the benefit of HHC  -ask pt again if he would be willing to give us his physical address.  This will be needed to deliver the oxygen.  -ask MD if we can increase his activity level from bedrest  -make PCP omero't prior to d/c  -[facility] can accept pending oxygen testing//order

## 2021-01-25 NOTE — PROGRESS NOTES
Hospitalist Progress Note    NAME: Cheryl Moise   :  1965   MRN:  [id number]       Assessment / Plan:  Acute hypoxic respiratory failure POA, persistent  Severe multilobar Covid pneumonia POA  Pneumomediastinum , not POA, persistent  -COVID-19 test positive on . -CTA  neg for PE. Shows diffuse bilateral peripheral groundglass opacification and consolidation in the lower lobes   -Initially on nasal cannula/high flow, decompensation  , was on BiPAP since 2021, steroids increased, evaluated by ICU intensivist on .     -has developed pneumomediastinum , changed to high flow from BiPAP to reduce barotrauma, chest x-ray this There is an unchanged tiny right apical pneumothorax. No left pneumothorax  -Dr Silverio Morse Discussed with ICU intensivist .  -Continue HFNC, currently on 40 L/min high flow nasal cannula saturating 93%  -s/p  remdesivir  -Continue Solu-Medrol, 60 mg every 6 hours  -s/p antibiotics  -s/p convalescent plasma 2021  -Full dose Lovenox added by pulmonary   -I discussed with pulmonology on 2021, input is appreciated  -Very high risk to decompensate at this point. History of extensive right lower extremity DVT May 2019, likely occupational related  --Finished course of Xarelto and currently on baby aspirin.       GERD  --currently not on medication. Pepcid IV twice daily     Obesity  Body mass index is 31.14 kg/m².     Code: Discussed, full code  DVT prophylaxis: Heparin  Surrogate decision maker: Brother Casimiro Cardenas [phone]    Patient remains high risk for decompensation. Subjective:     Chief Complaint / Reason for Physician Visit  Patient currently on 55 L high flow. Saturating greater than 90%. He looks comfortable, not in acute distress,. Patient was seen and examined. No acute events overnight.     \"Doing okay the same from yesterday\"    Review of Systems:  Symptom Y/N Comments  Symptom Y/N Comments Fever/Chills n   Chest Pain n    Poor Appetite    Edema     Cough n   Abdominal Pain n    Sputum    Joint Pain     SOB/BULL y  same  Pruritis/Rash     Nausea/vomit n   Tolerating PT/OT     Diarrhea    Tolerating Diet y    Constipation    Other       Could NOT obtain due to:          Objective:     VITALS:   Last 24hrs VS reviewed since prior progress note. Most recent are:  Patient Vitals for the past 24 hrs:   Temp Pulse Resp BP SpO2   01/25/21 0747 98 °F (36.7 °C) 75 20 118/78 91 %   01/25/21 0325 -- -- -- -- 93 %   01/25/21 0246 97.9 °F (36.6 °C) 72 20 121/67 93 %   01/24/21 2350 -- -- -- -- 98 %   01/24/21 2253 98 °F (36.7 °C) 68 18 123/85 95 %   01/24/21 2001 -- -- -- -- 93 %   01/24/21 1958 98 °F (36.7 °C) 79 18 (!) 169/85 95 %   01/24/21 1821 -- -- -- -- 96 %   01/24/21 1543 -- -- -- -- 92 %   01/24/21 1524 98 °F (36.7 °C) 80 20 (!) 149/91 90 %   01/24/21 1145 98.1 °F (36.7 °C) 78 24 (!) 138/93 90 %   01/24/21 1100 -- -- -- -- 90 %       Intake/Output Summary (Last 24 hours) at 1/25/2021 0926  Last data filed at 1/25/2021 0325  Gross per 24 hour   Intake 1410 ml   Output 950 ml   Net 460 ml        I had a face to face encounter and independently examined this patient on 1/25/2021, as outlined below:  PHYSICAL EXAM:  General: WD, WN. Alert, cooperative, in moderate respiratory distress    EENT:  EOMI. Anicteric sclerae. MMM  Resp:  B/l lung coarse, rales+  CV:  Regular  rhythm,  No edema  GI:  Soft, Non distended, Non tender. +Bowel sounds  Neurologic:  Alert and oriented X 3, normal speech,   Psych:   Good insight. Not anxious nor agitated  Skin:  No rashes.   No jaundice, subcu around right flank    Reviewed most current lab test results and cultures  YES  Reviewed most current radiology test results   YES  Review and summation of old records today    NO  Reviewed patient's current orders and MAR    YES  PMH/SH reviewed - no change compared to H&P  ________________________________________________________________________  Care Plan discussed with:    Comments   Patient x    Family      RN x    Care Manager     Consultant  x  pulmonology on January 24, 2021                     Multidiciplinary team rounds were held today with , nursing, pharmacist and clinical coordinator. Patient's plan of care was discussed; medications were reviewed and discharge planning was addressed. ________________________________________________________________________      Total CRITICAL CARE TIME Spent: 40  Minutes non procedure based      Comments   >50% of visit spent in counseling and coordination of care x    ________________________________________________________________________  Kai Shepard MD     Procedures: see electronic medical records for all procedures/Xrays and details which were not copied into this note but were reviewed prior to creation of Plan. LABS:  I reviewed today's most current labs and imaging studies.   Pertinent labs include:  Recent Labs     01/24/21 0240 01/23/21 0223   WBC 12.2* 13.3*   HGB 13.9 13.7   HCT 42.0 42.1    338     Recent Labs     01/25/21 0238 01/24/21 0240 01/23/21 0223   NA  --  136 139   K  --  4.6 4.9   CL  --  106 108   CO2  --  28 27   GLU  --  184* 137*   BUN  --  27* 26*   CREA  --  0.72 0.76   CA  --  8.9 8.7   ALB  --   --  2.4*   TBILI  --   --  0.5   ALT  --   --  129*   INR 1.2* 1.2* 1.3*       Signed: Kai Shepard MD

## 2021-01-25 NOTE — PROGRESS NOTES
End of Shift Note    Bedside shift change report given to Kelsey (oncoming nurse) by Sandra Dodge (offgoing nurse). Report included the following information SBAR and Kardex    Shift worked:  8225-4243     Shift summary and any significant changes:    none   Concerns for physician to address: none   Zone phone for oncseema shift:  [phone]     Activity:  Activity Level: Up with Assistance  Number times ambulated in hallways past shift: 0  Number of times OOB to chair past shift: 0    Cardiac:   Cardiac Monitoring: Yes      Cardiac Rhythm: Normal sinus rhythm    Access:   Current line(s): PIV     Genitourinary:   Urinary status: voiding    Respiratory:   O2 Device: Heated, Hi flow nasal cannula, Humidifier  Chronic home O2 use?: NO  Incentive spirometer at bedside: YES  Actual Volume (ml): 750 ml  GI:  Last Bowel Movement Date: 01/23/21  Current diet:  DIET NUTRITIONAL SUPPLEMENTS Breakfast, Dinner, Lunch; Ensure Clear  DIET REGULAR  Passing flatus: YES  Tolerating current diet: YES  % Diet Eaten: 100 %    Pain Management:   Patient states pain is manageable on current regimen: YES    Skin:  Govind Score: 18  Interventions: increase time out of bed    Patient Safety:  Fall Score:  Total Score: 1  Interventions: bed/chair alarm       Length of Stay:  Expected LOS: 5d 12h  Actual LOS: [address]

## 2021-01-25 NOTE — PROGRESS NOTES
Problem: Breathing Pattern - Ineffective  Goal: Ability to achieve and maintain a regular respiratory rate  Outcome: Progressing Towards Goal     Problem: Falls - Risk of  Goal: *Absence of Falls  Description: Document Guevara Mckinney Fall Risk and appropriate interventions in the flowsheet.   Outcome: Progressing Towards Goal  Note: Fall Risk Interventions:            Medication Interventions: Patient to call before getting OOB

## 2021-01-26 LAB
ALBUMIN SERPL-MCNC: 2.2 G/DL (ref 3.5–5)
ALBUMIN/GLOB SERPL: 0.7 {RATIO} (ref 1.1–2.2)
ALP SERPL-CCNC: 68 U/L (ref 45–117)
ALT SERPL-CCNC: 142 U/L (ref 12–78)
ANION GAP SERPL CALC-SCNC: 1 MMOL/L (ref 5–15)
AST SERPL-CCNC: 50 U/L (ref 15–37)
BILIRUB SERPL-MCNC: 0.5 MG/DL (ref 0.2–1)
BUN SERPL-MCNC: 24 MG/DL (ref 6–20)
BUN/CREAT SERPL: 30 (ref 12–20)
CALCIUM SERPL-MCNC: 8.7 MG/DL (ref 8.5–10.1)
CHLORIDE SERPL-SCNC: 106 MMOL/L (ref 97–108)
CO2 SERPL-SCNC: 30 MMOL/L (ref 21–32)
CREAT SERPL-MCNC: 0.8 MG/DL (ref 0.7–1.3)
CRP SERPL-MCNC: 0.67 MG/DL (ref 0–0.6)
D DIMER PPP FEU-MCNC: 2.43 MG/L FEU (ref 0–0.65)
ERYTHROCYTE [DISTWIDTH] IN BLOOD BY AUTOMATED COUNT: 13.4 % (ref 11.5–14.5)
FERRITIN SERPL-MCNC: 1157 NG/ML (ref 26–388)
GLOBULIN SER CALC-MCNC: 3.2 G/DL (ref 2–4)
GLUCOSE SERPL-MCNC: 271 MG/DL (ref 65–100)
HCT VFR BLD AUTO: 39.1 % (ref 36.6–50.3)
HGB BLD-MCNC: 12.9 G/DL (ref 12.1–17)
MCH RBC QN AUTO: 28.5 PG (ref 26–34)
MCHC RBC AUTO-ENTMCNC: 33 G/DL (ref 30–36.5)
MCV RBC AUTO: 86.3 FL (ref 80–99)
NRBC # BLD: 0 K/UL (ref 0–0.01)
NRBC BLD-RTO: 0 PER 100 WBC
PLATELET # BLD AUTO: 239 K/UL (ref 150–400)
PMV BLD AUTO: 11.7 FL (ref 8.9–12.9)
POTASSIUM SERPL-SCNC: 4.7 MMOL/L (ref 3.5–5.1)
PROT SERPL-MCNC: 5.4 G/DL (ref 6.4–8.2)
RBC # BLD AUTO: 4.53 M/UL (ref 4.1–5.7)
SODIUM SERPL-SCNC: 137 MMOL/L (ref 136–145)
WBC # BLD AUTO: 12.2 K/UL (ref 4.1–11.1)

## 2021-01-26 PROCEDURE — 80053 COMPREHEN METABOLIC PANEL: CPT

## 2021-01-26 PROCEDURE — 74011250636 HC RX REV CODE- 250/636: Performed by: INTERNAL MEDICINE

## 2021-01-26 PROCEDURE — 65660000001 HC RM ICU INTERMED STEPDOWN

## 2021-01-26 PROCEDURE — 86140 C-REACTIVE PROTEIN: CPT

## 2021-01-26 PROCEDURE — 77010033711 HC HIGH FLOW OXYGEN

## 2021-01-26 PROCEDURE — 74011250637 HC RX REV CODE- 250/637: Performed by: HOSPITALIST

## 2021-01-26 PROCEDURE — 85027 COMPLETE CBC AUTOMATED: CPT

## 2021-01-26 PROCEDURE — 85379 FIBRIN DEGRADATION QUANT: CPT

## 2021-01-26 PROCEDURE — 83520 IMMUNOASSAY QUANT NOS NONAB: CPT

## 2021-01-26 PROCEDURE — 82728 ASSAY OF FERRITIN: CPT

## 2021-01-26 PROCEDURE — 36415 COLL VENOUS BLD VENIPUNCTURE: CPT

## 2021-01-26 RX ADMIN — ENOXAPARIN SODIUM 90 MG: 100 INJECTION SUBCUTANEOUS at 23:57

## 2021-01-26 RX ADMIN — Medication 10 ML: at 05:56

## 2021-01-26 RX ADMIN — METHYLPREDNISOLONE SODIUM SUCCINATE 60 MG: 40 INJECTION, POWDER, FOR SOLUTION INTRAMUSCULAR; INTRAVENOUS at 18:01

## 2021-01-26 RX ADMIN — METHYLPREDNISOLONE SODIUM SUCCINATE 60 MG: 40 INJECTION, POWDER, FOR SOLUTION INTRAMUSCULAR; INTRAVENOUS at 05:56

## 2021-01-26 RX ADMIN — METHYLPREDNISOLONE SODIUM SUCCINATE 60 MG: 40 INJECTION, POWDER, FOR SOLUTION INTRAMUSCULAR; INTRAVENOUS at 12:30

## 2021-01-26 RX ADMIN — Medication 10 ML: at 18:01

## 2021-01-26 RX ADMIN — ASPIRIN 81 MG: 81 TABLET, COATED ORAL at 09:47

## 2021-01-26 RX ADMIN — ENOXAPARIN SODIUM 90 MG: 100 INJECTION SUBCUTANEOUS at 00:26

## 2021-01-26 RX ADMIN — METHYLPREDNISOLONE SODIUM SUCCINATE 60 MG: 40 INJECTION, POWDER, FOR SOLUTION INTRAMUSCULAR; INTRAVENOUS at 23:58

## 2021-01-26 RX ADMIN — METHYLPREDNISOLONE SODIUM SUCCINATE 60 MG: 40 INJECTION, POWDER, FOR SOLUTION INTRAMUSCULAR; INTRAVENOUS at 00:27

## 2021-01-26 RX ADMIN — FAMOTIDINE 20 MG: 10 INJECTION INTRAVENOUS at 20:49

## 2021-01-26 RX ADMIN — CHOLECALCIFEROL TAB 25 MCG (1000 UNIT) 2 TABLET: 25 TAB at 12:29

## 2021-01-26 RX ADMIN — ENOXAPARIN SODIUM 90 MG: 100 INJECTION SUBCUTANEOUS at 12:30

## 2021-01-26 RX ADMIN — Medication 10 ML: at 20:50

## 2021-01-26 RX ADMIN — FAMOTIDINE 20 MG: 10 INJECTION INTRAVENOUS at 09:47

## 2021-01-26 NOTE — INTERDISCIPLINARY ROUNDS
[address] INTERDISCIPLINARY ROUNDS    Cardiopulmonary Care Interdisciplinary Rounds were held today to discuss patient's plan of care and outcomes. The following members were present: NP/Physician, Pharmacy, Nursing and Case Management.     PLAN OF CARE:   Continue current treatment plan    Expected Length of Stay:  5d 12h

## 2021-01-26 NOTE — PROGRESS NOTES
Hospitalist Progress Note    NAME: Rolando Solorio   :  1965   MRN:  [id number]       Assessment / Plan:  Acute hypoxic respiratory failure POA, persistent  Severe multilobar Covid pneumonia POA  Pneumomediastinum , not POA, persistent  -COVID-19 test positive on . -CTA  neg for PE. Shows diffuse bilateral peripheral groundglass opacification and consolidation in the lower lobes   -Initially on nasal cannula/high flow, decompensation  , was on BiPAP since 2021, steroids increased, evaluated by ICU intensivist on .     -has developed pneumomediastinum , changed to high flow from BiPAP to reduce barotrauma, chest x-ray this There is an unchanged tiny right apical pneumothorax. No left pneumothorax  -Dr Jade Self Discussed with ICU intensivist .  -Continue HFNC, currently on 35 L/min high flow nasal cannula saturating 93%  -s/p  remdesivir  -Continue Solu-Medrol, 60 mg every 6 hours  -s/p antibiotics  -s/p convalescent plasma 2021  -Full dose Lovenox added by pulmonary   -I discussed with pulmonology on 2021, input is appreciated  -Continue to be very high risk for decompensation  -Repeat x-ray tomorrow to follow-up on the pneumothorax    History of extensive right lower extremity DVT May 2019, likely occupational related  --Finished course of Xarelto and currently on baby aspirin.       GERD  --currently not on medication. Pepcid IV twice daily     Obesity  Body mass index is 31.14 kg/m².     Code: Discussed, full code  DVT prophylaxis: Heparin  Surrogate decision maker: Irmaer Rosie  [phone]    Patient remains high risk for decompensation. Subjective:     Chief Complaint / Reason for Physician Visit  Patient currently on 55 L high flow. Saturating greater than 90%. He looks comfortable, not in acute distress,. Patient was seen and examined. No acute events overnight.     \"Feeling okay\"    Review of Systems:  Symptom Y/N Comments  Symptom Y/N Comments   Fever/Chills n   Chest Pain n    Poor Appetite    Edema     Cough n   Abdominal Pain n    Sputum    Joint Pain     SOB/BULL y  same  Pruritis/Rash     Nausea/vomit n   Tolerating PT/OT     Diarrhea    Tolerating Diet y    Constipation    Other       Could NOT obtain due to:          Objective:     VITALS:   Last 24hrs VS reviewed since prior progress note. Most recent are:  Patient Vitals for the past 24 hrs:   Temp Pulse Resp BP SpO2   01/26/21 1145 98.1 °F (36.7 °C) 96 18 (!) 150/79 93 %   01/26/21 0902 98.5 °F (36.9 °C) 98 18 135/71 90 %   01/26/21 0412 98.2 °F (36.8 °C) 66 20 136/80 92 %   01/25/21 2357 98 °F (36.7 °C) 60 20 (!) 157/72 97 %   01/25/21 1952 98.3 °F (36.8 °C) 96 20 126/86 92 %   01/25/21 1459 98 °F (36.7 °C) (!) 103 20 (!) 143/69 91 %       Intake/Output Summary (Last 24 hours) at 1/26/2021 1330  Last data filed at 1/25/2021 2357  Gross per 24 hour   Intake 300 ml   Output 950 ml   Net -650 ml        I had a face to face encounter and independently examined this patient on 1/26/2021, as outlined below:  PHYSICAL EXAM:  General: WD, WN. Alert, cooperative, in moderate respiratory distress    EENT:  EOMI. Anicteric sclerae. MMM  Resp:  B/l lung coarse, rales+  CV:  Regular  rhythm,  No edema  GI:  Soft, Non distended, Non tender. +Bowel sounds  Neurologic:  Alert and oriented X 3, normal speech,   Psych:   Good insight. Not anxious nor agitated  Skin:  No rashes.   No jaundice, subcu around right flank    Reviewed most current lab test results and cultures  YES  Reviewed most current radiology test results   YES  Review and summation of old records today    NO  Reviewed patient's current orders and MAR    YES  PMH/SH reviewed - no change compared to H&P  ________________________________________________________________________  Care Plan discussed with:    Comments   Patient x    Family      RN x    Care Manager     Consultant  x  pulmonology on January 24, 2021                     Multidiciplinary team rounds were held today with , nursing, pharmacist and clinical coordinator. Patient's plan of care was discussed; medications were reviewed and discharge planning was addressed. ________________________________________________________________________      Total CRITICAL CARE TIME Spent: 40  Minutes non procedure based      Comments   >50% of visit spent in counseling and coordination of care x    ________________________________________________________________________  Sandy Sandifer, MD     Procedures: see electronic medical records for all procedures/Xrays and details which were not copied into this note but were reviewed prior to creation of Plan. LABS:  I reviewed today's most current labs and imaging studies.   Pertinent labs include:  Recent Labs     01/26/21 0032 01/24/21  0240   WBC 12.2* 12.2*   HGB 12.9 13.9   HCT 39.1 42.0    325     Recent Labs     01/26/21  0032 01/25/21  0238 01/24/21  0240     --  136   K 4.7  --  4.6     --  106   CO2 30  --  28   *  --  184*   BUN 24*  --  27*   CREA 0.80  --  0.72   CA 8.7  --  8.9   ALB 2.2*  --   --    TBILI 0.5  --   --    *  --   --    INR  --  1.2* 1.2*       Signed: Sandy Sandifer, MD

## 2021-01-26 NOTE — PROGRESS NOTES
End of Shift Note    Bedside shift change report given to Al Lynch RN (oncoming nurse) by General Herlinda RN (offgoing nurse). Report included the following information SBAR and Kardex    Shift worked:  1900-0730     Shift summary and any significant changes:     none. Concerns for physician to address:  none. Zone phone for oncoming shift:          Activity:  Activity Level: Up with Assistance  Number times ambulated in hallways past shift: 0  Number of times OOB to chair past shift: 0    Cardiac:   Cardiac Monitoring: Yes      Cardiac Rhythm: Normal sinus rhythm    Access:   Current line(s): PIV     Genitourinary:   Urinary status: voiding    Respiratory:   O2 Device: Heated, Hi flow nasal cannula  Chronic home O2 use?: NO  Incentive spirometer at bedside: YES  Actual Volume (ml): 750 ml  GI:  Last Bowel Movement Date: 01/23/21  Current diet:  DIET REGULAR  DIET NUTRITIONAL SUPPLEMENTS Dinner; Ensure [employer]  Passing flatus: YES  Tolerating current diet: YES  % Diet Eaten: 100 %    Pain Management:   Patient states pain is manageable on current regimen: YES    Skin:  Govind Score: 17  Interventions: increase time out of bed    Patient Safety:  Fall Score:  Total Score: 1  Interventions: pt to call before getting OOB       Length of Stay:  Expected LOS: 5d 12h  Actual LOS: [address] RN

## 2021-01-26 NOTE — PROGRESS NOTES
[facility]  Pulmonary, Critical Care, and Sleep Medicine    Name: Tuan Moore MRN: [id number]   : 1965 Hospital: [facility]   Date: 2021            IMPRESSION:   · Acute hypoxic respiratory failure - severe, secondary to COVID PNA-currently on HF (35L, 100%)  · Severe Multilobar Covid Pneumonia, first Dx on , Symptoms first started on 21    · New pneumomediastinum 21, radiographic stable  · Covid related coagulopathy  · Covid related inflammation, increased CRP, increased LDH,  and Increase ferritin. · Obesity  · GERD  · Hypertension, LAE and LVH on ekg. RECOMMENDATIONS:   · Continue HFNC and try to avoid NIV if possible to minimize the amount of positive pressure ventilation he is getting to minimize risk of worsening PTX/Pneumomediastiunum. Wean HF as tolerated to keep Spo2 >88%  · Monitor for signs of sq air, none so far. Daily chest X-ray or more frequently if needed  · S/P convalescent plasma  · Empiric antibiotics-CFTX, Azithro  · Cont therapeutic Lovenox given suspicion of possible microthrombosis  · Continue steroids  · S/p Remdesivir x 5 days total  · Follow inflammatory and coag markers  · Mobilize out of bed as tolerated  · Very high risk for further decompensation. Low threshold to transfer patient to ICU. ICU team declined patient on 21   PCP: Rodolfo Kent. Subjective:   21: events of last night reviewed; No chest pain. Prone last night. felles better lying down. On HF-35L, ddimer 2.43; ferritin 1157; CRP 0.67 on solumedrol, LMWH    21: events of weekend reviewed; No chest pain. Prone last night. sats marginal now. On HF-55L, SOB with minimal ambulation in the room. Would like to use BSC if able. Incentive on 400 ml    21: uneventful night. On HF-55L, SOB with minimal ambulation in the room  21: remains on HF (100%, 60L). SOB with minimal exertion.  No cough or CP  21: remains dependent on HFNC. Has mild dyspnea. 1-21-21: remains dependent on BiPAP. Feels about the same. Mild dyspnea unchanged, at least while he is on BiPAP  1-20-21: Pt appears and feels more comfortable on bipap this am. Has no headaches, no chest pain, decreased coughing. He feels more hungry today. 1-19-21: Pt looks worse this am. Has increased RR into the mid 35s. Dyspneic. On high flow oxygen. Discussed convalescent plasma and he agrees. Feels very fatigued. No appetite. His temp has been normal.     This patient has been seen and evaluated at the request of Dr. Jesus Berger for above. Patient is a 48 y.o. male   Who was first ill on 1/4/21. Has increased shortness of breath. Had dry cough. Weakness. Diarrhea. Weakness has been worse over last 3 days. He has been with increased  Breathing difficulty. Feels his breathing is constricted. Has decreased po intake. Has felt hot. Some loose stools. Has mild GERD. Non smoker, Occ etoh use. Chews tobacco.      Past Medical History:   Diagnosis Date    GERD (gastroesophageal reflux disease)     Obesity (BMI 30-39. 9) 5/8/2019    Right leg DVT ([address] 2019      Past Surgical History:   Procedure Laterality Date    HX ORTHOPAEDIC        Prior to Admission medications    Medication Sig Start Date End Date Taking? Authorizing Provider   aspirin delayed-release 81 mg tablet Take 81 mg by mouth daily. Yes Other, MD Leopoldo   albuterol (PROVENTIL HFA, VENTOLIN HFA, PROAIR HFA) 90 mcg/actuation inhaler Take 2 Puffs by inhalation every four (4) hours as needed for Wheezing. 1/13/21  Yes Anni Encinas MD   multivitamin (ONE A DAY) tablet Take 1 Tab by mouth daily. Yes Provider, Historical   cholecalciferol (VITAMIN D3) 1,000 unit cap Take 1,000 Units by mouth daily. Yes Provider, Historical   ascorbic acid, vitamin C, (VITAMIN C) 500 mg tablet Take 500 mg by mouth daily.    Yes Provider, Historical     No Known Allergies   Social History     Tobacco Use    Smoking status: Never Smoker    Smokeless tobacco: Current User   Substance Use Topics    Alcohol use: Yes     Comment: once a month      Family History   Problem Relation Age of Onset    Cancer Mother     Cancer Father         Current Facility-Administered Medications   Medication Dose Route Frequency    methylPREDNISolone (PF) (SOLU-MEDROL) injection 60 mg  60 mg IntraVENous Q6H    enoxaparin (LOVENOX) injection 90 mg  1 mg/kg SubCUTAneous Q12H    [Held by provider] heparin (porcine) injection 5,000 Units  5,000 Units SubCUTAneous Q8H    famotidine (PF) (PEPCID) injection 20 mg  20 mg IntraVENous Q12H    influenza vaccine 2020-21 (6 mos+)(PF) (FLUARIX/FLULAVAL/FLUZONE QUAD) injection 0.5 mL  0.5 mL IntraMUSCular PRIOR TO DISCHARGE    sodium chloride (NS) flush 5-40 mL  5-40 mL IntraVENous Q8H    cholecalciferol (VITAMIN D3) (1000 Units /25 mcg) tablet 2 Tab  2,000 Units Oral DAILY    aspirin delayed-release tablet 81 mg  81 mg Oral DAILY       Review of Systems:  Constitutional: positive for fevers, chills, sweats, fatigue, malaise and anorexia, negative for weight loss  Eyes: negative  Ears, nose, mouth, throat, and face: positive for nasal congestion  Respiratory: positive for cough or dyspnea on exertion  Cardiovascular: positive for fatigue, paroxysmal nocturnal dyspnea, tachypnea, dyspnea on exertion  Gastrointestinal: positive for dyspepsia, reflux symptoms, nausea and change in bowel habits  Genitourinary:negative  Integument/breast: negative  Hematologic/lymphatic: negative  Musculoskeletal:negative  Neurological: negative  Behavioral/Psych: negative  Endocrine: negative  Allergic/Immunologic: negative    Objective:   Vital Signs:    Visit Vitals  BP (P) 135/71   Pulse (P) 98   Temp (P) 98.5 °F (36.9 °C)   Resp (P) 18   Ht 5' 8\" (1.727 m)   Wt 92.9 kg (204 lb 12.9 oz)   SpO2 (P) 90%   BMI 31.14 kg/m²       O2 Device: Heated, Hi flow nasal cannula   O2 Flow Rate (L/min): 35 l/min   Temp (24hrs), Av.3 °F (36.8 °C), Min:98 °F (36.7 °C), Max:98.5 °F (36.9 °C)       Intake/Output:   Last shift:      No intake/output data recorded. Last 3 shifts: 1901 -  0700  In: 750 [P.O.:750]  Out: 1500 [Urine:1500]    Intake/Output Summary (Last 24 hours) at 2021 1114  Last data filed at 2021 2357  Gross per 24 hour   Intake 300 ml   Output 950 ml   Net -650 ml      Physical Exam:   General:  Alert, cooperative, no resp distress on bipap with 100% fio2. Head:  Normocephalic, without obvious abnormality, atraumatic. Eyes:  Conjunctivae/corneas clear    Nose: Nares normal. Septum midline. Mucosa normal.     Throat: Lips, mucosa, and tongue normal.    Neck: Supple, symmetrical, trachea midline    Lungs:   Distant bilateral breath sounds   Chest wall:  No tenderness or deformity. Heart:  Regular rate and rhythm    Abdomen:   Soft, non-tender. Bowel sounds normal.    Extremities: Extremities normal, atraumatic, no cyanosis    Skin: Skin color, texture, turgor normal. No crepitus   Neurologic: Grossly nonfocal     Data:   Labs:  Recent Labs     21  0032 21  0240   WBC 12.2* 12.2*   HGB 12.9 13.9   HCT 39.1 42.0    325     Recent Labs     21  0032 21  0238 21  0240     --  136   K 4.7  --  4.6     --  106   CO2 30  --  28   *  --  184*   BUN 24*  --  27*   CREA 0.80  --  0.72   CA 8.7  --  8.9   ALB 2.2*  --   --    TBILI 0.5  --   --    *  --   --    INR  --  1.2* 1.2*     No results for input(s): PHI, PCO2I, PO2I, HCO3I, FIO2I in the last 72 hours.     Imaging:  I have personally reviewed the patients radiographs:  Persistent bilateral infiltrates; slight increase in pneumomediastinum         Remi Chang MD

## 2021-01-26 NOTE — PROGRESS NOTES
0700  Received report from [facility]. Assumed care of patient. 1900  End of Shift Note    Bedside shift change report given to VADIM Diego (oncoming nurse) by Roetta Dakin, RN (offgoing nurse). Report included the following information SBAR, Kardex, MAR and Cardiac Rhythm NSR    Shift worked:  7a-7p     Shift summary and any significant changes:     none     Concerns for physician to address:  none     Zone phone for oncoming shift:   na       Activity:  Activity Level: Up with Assistance  Number times ambulated in hallways past shift: 0  Number of times OOB to chair past shift: 1    Cardiac:   Cardiac Monitoring: Yes      Cardiac Rhythm: Normal sinus rhythm    Access:   Current line(s): PIV     Genitourinary:   Urinary status: voiding    Respiratory:   O2 Device: Heated, Hi flow nasal cannula, Humidifier  Chronic home O2 use?: NO  Incentive spirometer at bedside: YES  Actual Volume (ml): 750 ml  GI:  Last Bowel Movement Date: 01/23/21  Current diet:  DIET REGULAR  DIET NUTRITIONAL SUPPLEMENTS Dinner; Ensure [employer]  Passing flatus: YES  Tolerating current diet: YES  % Diet Eaten: 100 %    Pain Management:   Patient states pain is manageable on current regimen: YES    Skin:  Govind Score: 18  Interventions: increase time out of bed    Patient Safety:  Fall Score:  Total Score: 1  Interventions: bed/chair alarm, assistive device (walker, cane, etc), gripper socks and pt to call before getting OOB       Length of Stay:  Expected LOS: 5d 12h  Actual LOS: [address], RN

## 2021-01-27 ENCOUNTER — APPOINTMENT (OUTPATIENT)
Dept: GENERAL RADIOLOGY | Age: 56
DRG: 177 | End: 2021-01-27
Attending: INTERNAL MEDICINE
Payer: COMMERCIAL

## 2021-01-27 LAB
ALBUMIN SERPL-MCNC: 2.3 G/DL (ref 3.5–5)
ALBUMIN/GLOB SERPL: 0.7 {RATIO} (ref 1.1–2.2)
ALP SERPL-CCNC: 71 U/L (ref 45–117)
ALT SERPL-CCNC: 139 U/L (ref 12–78)
ANION GAP SERPL CALC-SCNC: 1 MMOL/L (ref 5–15)
AST SERPL-CCNC: 36 U/L (ref 15–37)
BILIRUB SERPL-MCNC: 0.9 MG/DL (ref 0.2–1)
BUN SERPL-MCNC: 24 MG/DL (ref 6–20)
BUN/CREAT SERPL: 33 (ref 12–20)
CALCIUM SERPL-MCNC: 8.8 MG/DL (ref 8.5–10.1)
CHLORIDE SERPL-SCNC: 104 MMOL/L (ref 97–108)
CO2 SERPL-SCNC: 31 MMOL/L (ref 21–32)
CREAT SERPL-MCNC: 0.72 MG/DL (ref 0.7–1.3)
D DIMER PPP FEU-MCNC: 2.06 MG/L FEU (ref 0–0.65)
GLOBULIN SER CALC-MCNC: 3.2 G/DL (ref 2–4)
GLUCOSE SERPL-MCNC: 242 MG/DL (ref 65–100)
POTASSIUM SERPL-SCNC: 4.7 MMOL/L (ref 3.5–5.1)
PROT SERPL-MCNC: 5.5 G/DL (ref 6.4–8.2)
SODIUM SERPL-SCNC: 136 MMOL/L (ref 136–145)

## 2021-01-27 PROCEDURE — 77010033711 HC HIGH FLOW OXYGEN

## 2021-01-27 PROCEDURE — 74011250636 HC RX REV CODE- 250/636: Performed by: INTERNAL MEDICINE

## 2021-01-27 PROCEDURE — 80053 COMPREHEN METABOLIC PANEL: CPT

## 2021-01-27 PROCEDURE — 65660000001 HC RM ICU INTERMED STEPDOWN

## 2021-01-27 PROCEDURE — 71045 X-RAY EXAM CHEST 1 VIEW: CPT

## 2021-01-27 PROCEDURE — 85379 FIBRIN DEGRADATION QUANT: CPT

## 2021-01-27 PROCEDURE — 36415 COLL VENOUS BLD VENIPUNCTURE: CPT

## 2021-01-27 PROCEDURE — 74011250637 HC RX REV CODE- 250/637: Performed by: HOSPITALIST

## 2021-01-27 RX ORDER — FUROSEMIDE 10 MG/ML
40 INJECTION INTRAMUSCULAR; INTRAVENOUS DAILY
Status: DISCONTINUED | OUTPATIENT
Start: 2021-01-27 | End: 2021-02-06

## 2021-01-27 RX ADMIN — CHOLECALCIFEROL TAB 25 MCG (1000 UNIT) 2 TABLET: 25 TAB at 09:17

## 2021-01-27 RX ADMIN — METHYLPREDNISOLONE SODIUM SUCCINATE 40 MG: 40 INJECTION, POWDER, FOR SOLUTION INTRAMUSCULAR; INTRAVENOUS at 20:48

## 2021-01-27 RX ADMIN — ENOXAPARIN SODIUM 90 MG: 100 INJECTION SUBCUTANEOUS at 12:51

## 2021-01-27 RX ADMIN — ENOXAPARIN SODIUM 90 MG: 100 INJECTION SUBCUTANEOUS at 23:47

## 2021-01-27 RX ADMIN — Medication 10 ML: at 20:49

## 2021-01-27 RX ADMIN — Medication 10 ML: at 06:22

## 2021-01-27 RX ADMIN — FAMOTIDINE 20 MG: 10 INJECTION INTRAVENOUS at 09:17

## 2021-01-27 RX ADMIN — METHYLPREDNISOLONE SODIUM SUCCINATE 60 MG: 40 INJECTION, POWDER, FOR SOLUTION INTRAMUSCULAR; INTRAVENOUS at 06:21

## 2021-01-27 RX ADMIN — METHYLPREDNISOLONE SODIUM SUCCINATE 60 MG: 40 INJECTION, POWDER, FOR SOLUTION INTRAMUSCULAR; INTRAVENOUS at 12:50

## 2021-01-27 RX ADMIN — ASPIRIN 81 MG: 81 TABLET, COATED ORAL at 09:17

## 2021-01-27 RX ADMIN — Medication 5 ML: at 14:00

## 2021-01-27 RX ADMIN — FAMOTIDINE 20 MG: 10 INJECTION INTRAVENOUS at 20:48

## 2021-01-27 RX ADMIN — FUROSEMIDE 40 MG: 10 INJECTION, SOLUTION INTRAMUSCULAR; INTRAVENOUS at 17:31

## 2021-01-27 NOTE — PROGRESS NOTES
0700: Bedside shift change report given to Giovanni Pino (oncoming nurse) by Joycelyn Brittle RN (offgoing nurse). Report included the following information SBAR and Kardex. 0930: Patient O2 saturation greater than 94% SpO2 on 35L HHiFlow and NRB. NRB utilized since desat this AM on night shift. Notified Respiratory Therapy of saturation and asked if we can increase HHiFlow to transition patient off of NRB. Respiratory Therapy informed me that they would be around to assess patient later and may continue NRB use for now. 1100: Spoke with Sandra TAPIA about patient. He asked to order a portable CXR to follow-up on previous finding of L sided pneumothorax. Ordered. 1140: Spoke with Respiratory Therapist after she saw patient. She said that she increased HHiFlow to 60L @ 100% and removed the NRB. Patient SpO2 98%. She said if a NRB is needed again, it must be connected to an oxygen tank and not the wall because, according to her, it compromises the HHiFlow setup. 1210: Messaged pharmacy for restock of Solumedrol. They replied that it will be restocked shortly, and they will dispense me a dose.

## 2021-01-27 NOTE — PROGRESS NOTES
End of Shift Note    Bedside shift change report given to [employer] (oncoming nurse) by Dennys Alvarez (offgoing nurse). Report included the following information SBAR and Kardex    Shift worked:  DAY     Shift summary and any significant changes:    Patient switched from 100% 35L HHiFlow & NRB to 100% 55L HHiFlow. Follow-up CXR ordered. Lasix added. Solumedrol modified. Concerns for physician to address:  Continue monitoring respiratory status. CXR results when available. Zone phone for oncoming shift:       Activity:  Activity Level: Up with Assistance  Number times ambulated in hallways past shift: 0  Number of times OOB to chair past shift: 3    Cardiac:   Cardiac Monitoring: Yes      Cardiac Rhythm: Normal sinus rhythm    Access:   Current line(s): PIV     Genitourinary:   Urinary status: voiding    Respiratory:   O2 Device: Hi flow nasal cannula, Heated  Chronic home O2 use?: NO  Incentive spirometer at bedside: YES  Actual Volume (ml): 750 ml  GI:  Last Bowel Movement Date: 01/26/21  Current diet:  DIET REGULAR  DIET NUTRITIONAL SUPPLEMENTS Dinner; Ensure [facility]  Passing flatus: YES  Tolerating current diet: YES  % Diet Eaten: 100 %    Pain Management:   Patient states pain is manageable on current regimen: YES    Skin:  Govind Score: 21  Interventions: increase time out of bed and PT/OT consult    Patient Safety:  Fall Score:  Total Score: 1  Interventions: bed/chair alarm, assistive device (walker, cane, etc), gripper socks and pt to call before getting OOB       Length of Stay:  Expected LOS: 5d 12h  Actual LOS: [facility]

## 2021-01-27 NOTE — PROGRESS NOTES
ORIANA:  -confirm family to transport near d/c  -assess for home oxygen  -await therapy recommendations for the benefit of HHC  -ask pt again if he would be willing to give us his physical address. This will be needed to deliver the oxygen.  -ask MD if we can increase his activity level from bedrest  -make PCP omero't prior to d/c  -[facility] can accept pending oxygen testing//order as of 1/27.

## 2021-01-27 NOTE — PROGRESS NOTES
0700  Received report from VADIM Diego. SBAR, Kardex, and MAR were discussed. End of Shift Note    Bedside shift change report given to VADIM Diego (oncoming nurse) by Lily Wang RN (offgoing nurse). Report included the following information SBAR, Kardex, ED Summary, Intake/Output, MAR, Recent Results and Cardiac Rhythm NSR    Shift worked:  3763-3862     Shift summary and any significant changes: On 35L HHF and will desat low 80S with movement. Concerns for physician to address:        Zone phone for oncoming shift:           Activity:  Activity Level: Up with Assistance  Number times ambulated in hallways past shift: 0  Number of times OOB to chair past shift: 0    Cardiac:   Cardiac Monitoring: Yes      Cardiac Rhythm: Normal sinus rhythm    Access:   Current line(s): PIV     Genitourinary:   Urinary status: voiding    Respiratory:   O2 Device: Heated, Hi flow nasal cannula  Chronic home O2 use?: NO  Incentive spirometer at bedside: YES  Actual Volume (ml): 750 ml  GI:  Last Bowel Movement Date: 01/26/21  Current diet:  DIET REGULAR  DIET NUTRITIONAL SUPPLEMENTS Dinner; Ensure [employer]  Passing flatus: YES  Tolerating current diet: YES  % Diet Eaten: 100 %    Pain Management:   Patient states pain is manageable on current regimen: YES    Skin:  Govind Score: 21  Interventions: increase time out of bed    Patient Safety:  Fall Score:  Total Score: 1  Interventions: pt to call before getting OOB and stay with me (per policy)       Length of Stay:  Expected LOS: 5d 12h  Actual LOS: Maris Tejada RN

## 2021-01-27 NOTE — PROGRESS NOTES
[address] INTERDISCIPLINARY ROUNDS    Cardiopulmonary Care Interdisciplinary Rounds were held today to discuss patient's plan of care and outcomes. The following members were present: NP/Physician, Pharmacy, Nursing and Case Management. PLAN OF CARE:   Continue current treatment plan, pt needing to be placed on NRB overnight in addition to heated HiFlow. Plan to obtain f/u CXR today.     Expected Length of Stay:  5d 12h

## 2021-01-27 NOTE — PROGRESS NOTES
Problem: Falls - Risk of  Goal: *Absence of Falls  Description: Document Tello Jacobo Fall Risk and appropriate interventions in the flowsheet. Outcome: Progressing Towards Goal  Note: Fall Risk Interventions:            Medication Interventions: Patient to call before getting OOB, Teach patient to arise slowly                   Problem: Breathing Pattern - Ineffective  Goal: *Absence of hypoxia  Outcome: Progressing Towards Goal  Goal: *Use of effective breathing techniques  Outcome: Progressing Towards Goal     Problem: Airway Clearance - Ineffective  Goal: Achieve or maintain patent airway  Outcome: Progressing Towards Goal     Problem: Gas Exchange - Impaired  Goal: Absence of hypoxia  Outcome: Progressing Towards Goal  Goal: Promote optimal lung function  Outcome: Progressing Towards Goal     Problem: Breathing Pattern - Ineffective  Goal: Ability to achieve and maintain a regular respiratory rate  Outcome: Progressing Towards Goal     Problem:  Body Temperature -  Risk of, Imbalanced  Goal: Ability to maintain a body temperature within defined limits  Outcome: Progressing Towards Goal

## 2021-01-27 NOTE — PROGRESS NOTES
End of Shift Note    Bedside shift change report given to Yvonne Luna RN (oncoming nurse) by Geetha Ray RN (offgoing nurse). Report included the following information SBAR and Kardex    Shift worked:  [id number]     Shift summary and any significant changes:     Patient O2 sats dropped to low 80s on 35L high flow at 100% this morning. Patient placed on NRB in addition to heated high flow for extra support per respiratory. Concerns for physician to address:  none     Zone phone for oncoming shift:          Activity:  Activity Level: Up with Assistance  Number times ambulated in hallways past shift: 0  Number of times OOB to chair past shift: 0    Cardiac:   Cardiac Monitoring: Yes      Cardiac Rhythm: Normal sinus rhythm    Access:   Current line(s): PIV     Genitourinary:   Urinary status: voiding    Respiratory:   O2 Device: Heated, Hi flow nasal cannula  Chronic home O2 use?: NO  Incentive spirometer at bedside: YES  Actual Volume (ml): 750 ml  GI:  Last Bowel Movement Date: 01/26/21  Current diet:  DIET REGULAR  DIET NUTRITIONAL SUPPLEMENTS Dinner; Ensure [employer]  Passing flatus: YES  Tolerating current diet: YES  % Diet Eaten: 100 %    Pain Management:   Patient states pain is manageable on current regimen: YES    Skin:  Govind Score: 21  Interventions: increase time out of bed    Patient Safety:  Fall Score:  Total Score: 1  Interventions: gripper socks       Length of Stay:  Expected LOS: 5d 12h  Actual LOS: 1449 Branden Acosta RN

## 2021-01-27 NOTE — PROGRESS NOTES
Hospitalist Progress Note    NAME: Abdulaziz Olivas   :  1965   MRN:  [id number]       Assessment / Plan:  Acute hypoxic respiratory failure POA, persistent  Severe multilobar Covid pneumonia POA  Pneumomediastinum , not POA, persistent  -COVID-19 test positive on . -CTA  neg for PE. Shows diffuse bilateral peripheral groundglass opacification and consolidation in the lower lobes   -Initially on nasal cannula/high flow, decompensation  , was on BiPAP since 2021, steroids increased, evaluated by ICU intensivist on .     -has developed pneumomediastinum , changed to high flow from BiPAP to reduce barotrauma, chest x-ray this There is an unchanged tiny right apical pneumothorax. No left pneumothorax  -Dr Donna Wynn Discussed with ICU intensivist .  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 GERD  --currently not on medication. Pepcid IV twice daily     Obesity  Body mass index is 31.14 kg/m².     Code: Discussed, full code  DVT prophylaxis: Heparin  Surrogate decision maker: Brother Emily Sheldon [phone]    Patient remains high risk for decompensation. Subjective:     Chief Complaint / Reason for Physician Visit  Patient currently on 55 L high flow. Saturating greater than 90%. He looks comfortable, not in acute distress,. Patient was seen and examined. No acute events overnight.     \"doing ok\"    Review of Systems:  Symptom Y/N Comments  Symptom Y/N Comments   Fever/Chills n   Chest Pain n    Poor Appetite    Edema     Cough n   Abdominal Pain n    Sputum    Joint Pain     SOB/BULL y  same  Pruritis/Rash     Nausea/vomit n   Tolerating PT/OT     Diarrhea    Tolerating Diet y    Constipation    Other       Could NOT obtain due to:          Objective:     VITALS:   Last 24hrs VS reviewed since prior progress note. Most recent are:  Patient Vitals for the past 24 hrs:   Temp Pulse Resp BP SpO2   01/27/21 1141 -- -- -- -- 92 %   01/27/21 1109 97.6 °F (36.4 °C) 77 20 101/72 90 %   01/27/21 1106 -- -- -- -- 98 %   01/27/21 0730 98.9 °F (37.2 °C) 73 20 137/77 91 %   01/27/21 0334 98 °F (36.7 °C) 78 18 (!) 143/91 92 %   01/26/21 2358 98.2 °F (36.8 °C) 75 20 (!) 147/83 93 %   01/26/21 1939 98.4 °F (36.9 °C) (!) 103 18 (!) 156/81 90 %   01/26/21 1448 98.7 °F (37.1 °C) 89 18 (!) 140/76 91 %       Intake/Output Summary (Last 24 hours) at 1/27/2021 1240  Last data filed at 1/26/2021 2358  Gross per 24 hour   Intake 300 ml   Output 450 ml   Net -150 ml        I had a face to face encounter and independently examined this patient on 1/27/2021, as outlined below:  PHYSICAL EXAM:  General: WD, WN. Alert, cooperative, in moderate respiratory distress    EENT:  EOMI. Anicteric sclerae. MMM  Resp:  B/l lung coarse, rales+  CV:  Regular  rhythm,  No edema  GI:  Soft, Non distended, Non tender. +Bowel sounds  Neurologic:  Alert and oriented X 3, normal speech,   Psych:   Good insight. Not anxious nor agitated  Skin:  No rashes.   No jaundice, subcu around right flank    Reviewed most current lab test results and cultures  YES  Reviewed most current radiology test results   YES  Review and summation of old records today    NO  Reviewed patient's current orders and MAR    YES  PMH/SH reviewed - no change compared to H&P  ________________________________________________________________________  Care Plan discussed with:    Comments   Patient x    Family      RN x Care Manager     Consultant  x  pulmonology on January 26, 2021                     Multidiciplinary team rounds were held today with , nursing, pharmacist and clinical coordinator. Patient's plan of care was discussed; medications were reviewed and discharge planning was addressed. ________________________________________________________________________      Total CRITICAL CARE TIME Spent: 35 Minutes non procedure based      Comments   >50% of visit spent in counseling and coordination of care x    ________________________________________________________________________  Mel Jarvis MD     Procedures: see electronic medical records for all procedures/Xrays and details which were not copied into this note but were reviewed prior to creation of Plan. LABS:  I reviewed today's most current labs and imaging studies.   Pertinent labs include:  Recent Labs     01/26/21  0032   WBC 12.2*   HGB 12.9   HCT 39.1        Recent Labs     01/27/21  0310 01/26/21  0032 01/25/21  0238    137  --    K 4.7 4.7  --     106  --    CO2 31 30  --    * 271*  --    BUN 24* 24*  --    CREA 0.72 0.80  --    CA 8.8 8.7  --    ALB 2.3* 2.2*  --    TBILI 0.9 0.5  --    * 142*  --    INR  --   --  1.2*       Signed: Mel Jarvis MD

## 2021-01-28 LAB
ALBUMIN SERPL-MCNC: 2.4 G/DL (ref 3.5–5)
ALBUMIN/GLOB SERPL: 0.8 {RATIO} (ref 1.1–2.2)
ALP SERPL-CCNC: 67 U/L (ref 45–117)
ALT SERPL-CCNC: 109 U/L (ref 12–78)
ANION GAP SERPL CALC-SCNC: 1 MMOL/L (ref 5–15)
AST SERPL-CCNC: 23 U/L (ref 15–37)
BILIRUB SERPL-MCNC: 0.7 MG/DL (ref 0.2–1)
BUN SERPL-MCNC: 24 MG/DL (ref 6–20)
BUN/CREAT SERPL: 29 (ref 12–20)
CALCIUM SERPL-MCNC: 8.7 MG/DL (ref 8.5–10.1)
CHLORIDE SERPL-SCNC: 100 MMOL/L (ref 97–108)
CO2 SERPL-SCNC: 33 MMOL/L (ref 21–32)
CREAT SERPL-MCNC: 0.83 MG/DL (ref 0.7–1.3)
D DIMER PPP FEU-MCNC: 1.66 MG/L FEU (ref 0–0.65)
ERYTHROCYTE [DISTWIDTH] IN BLOOD BY AUTOMATED COUNT: 13.5 % (ref 11.5–14.5)
FERRITIN SERPL-MCNC: 1209 NG/ML (ref 26–388)
GLOBULIN SER CALC-MCNC: 3.2 G/DL (ref 2–4)
GLUCOSE SERPL-MCNC: 258 MG/DL (ref 65–100)
HCT VFR BLD AUTO: 41.1 % (ref 36.6–50.3)
HGB BLD-MCNC: 13.7 G/DL (ref 12.1–17)
IL6 SERPL-MCNC: <2.5 PG/ML (ref 0–13)
MCH RBC QN AUTO: 28.8 PG (ref 26–34)
MCHC RBC AUTO-ENTMCNC: 33.3 G/DL (ref 30–36.5)
MCV RBC AUTO: 86.3 FL (ref 80–99)
NRBC # BLD: 0 K/UL (ref 0–0.01)
NRBC BLD-RTO: 0 PER 100 WBC
PLATELET # BLD AUTO: 188 K/UL (ref 150–400)
PMV BLD AUTO: 11.5 FL (ref 8.9–12.9)
POTASSIUM SERPL-SCNC: 4.8 MMOL/L (ref 3.5–5.1)
PROT SERPL-MCNC: 5.6 G/DL (ref 6.4–8.2)
RBC # BLD AUTO: 4.76 M/UL (ref 4.1–5.7)
SODIUM SERPL-SCNC: 134 MMOL/L (ref 136–145)
WBC # BLD AUTO: 13.7 K/UL (ref 4.1–11.1)

## 2021-01-28 PROCEDURE — 74011250637 HC RX REV CODE- 250/637: Performed by: HOSPITALIST

## 2021-01-28 PROCEDURE — 74011250636 HC RX REV CODE- 250/636: Performed by: INTERNAL MEDICINE

## 2021-01-28 PROCEDURE — 85027 COMPLETE CBC AUTOMATED: CPT

## 2021-01-28 PROCEDURE — 77010033711 HC HIGH FLOW OXYGEN

## 2021-01-28 PROCEDURE — 82728 ASSAY OF FERRITIN: CPT

## 2021-01-28 PROCEDURE — 80053 COMPREHEN METABOLIC PANEL: CPT

## 2021-01-28 PROCEDURE — 85379 FIBRIN DEGRADATION QUANT: CPT

## 2021-01-28 PROCEDURE — 65660000001 HC RM ICU INTERMED STEPDOWN

## 2021-01-28 PROCEDURE — 36415 COLL VENOUS BLD VENIPUNCTURE: CPT

## 2021-01-28 RX ADMIN — ASPIRIN 81 MG: 81 TABLET, COATED ORAL at 09:29

## 2021-01-28 RX ADMIN — CHOLECALCIFEROL TAB 25 MCG (1000 UNIT) 2000 UNITS: 25 TAB at 09:29

## 2021-01-28 RX ADMIN — Medication 10 ML: at 14:00

## 2021-01-28 RX ADMIN — METHYLPREDNISOLONE SODIUM SUCCINATE 40 MG: 40 INJECTION, POWDER, FOR SOLUTION INTRAMUSCULAR; INTRAVENOUS at 09:29

## 2021-01-28 RX ADMIN — METHYLPREDNISOLONE SODIUM SUCCINATE 40 MG: 40 INJECTION, POWDER, FOR SOLUTION INTRAMUSCULAR; INTRAVENOUS at 02:41

## 2021-01-28 RX ADMIN — FUROSEMIDE 40 MG: 10 INJECTION, SOLUTION INTRAMUSCULAR; INTRAVENOUS at 09:29

## 2021-01-28 RX ADMIN — ENOXAPARIN SODIUM 90 MG: 100 INJECTION SUBCUTANEOUS at 11:55

## 2021-01-28 RX ADMIN — FAMOTIDINE 20 MG: 10 INJECTION INTRAVENOUS at 09:29

## 2021-01-28 RX ADMIN — METHYLPREDNISOLONE SODIUM SUCCINATE 40 MG: 40 INJECTION, POWDER, FOR SOLUTION INTRAMUSCULAR; INTRAVENOUS at 13:43

## 2021-01-28 RX ADMIN — FAMOTIDINE 20 MG: 10 INJECTION INTRAVENOUS at 20:38

## 2021-01-28 RX ADMIN — ENOXAPARIN SODIUM 90 MG: 100 INJECTION SUBCUTANEOUS at 23:24

## 2021-01-28 RX ADMIN — Medication 10 ML: at 22:00

## 2021-01-28 RX ADMIN — Medication 10 ML: at 02:41

## 2021-01-28 RX ADMIN — METHYLPREDNISOLONE SODIUM SUCCINATE 40 MG: 40 INJECTION, POWDER, FOR SOLUTION INTRAMUSCULAR; INTRAVENOUS at 20:37

## 2021-01-28 NOTE — PROGRESS NOTES
Hospitalist Progress Note    NAME: Shepard Goldmann   :  1965   MRN:  [id number]       Assessment / Plan:  Acute hypoxic respiratory failure POA, persistent  Severe multilobar Covid pneumonia POA  Pneumomediastinum , not POA, persistent  -COVID-19 test positive on . -CTA  neg for PE. Shows diffuse bilateral peripheral groundglass opacification and consolidation in the lower lobes   -Initially on nasal cannula/high flow, decompensation  , was on BiPAP since 2021, steroids increased, evaluated by ICU intensivist on .     -has developed pneumomediastinum , changed to high flow from BiPAP to reduce barotrauma, chest x-ray this There is an unchanged tiny right apical pneumothorax. No left pneumothorax  -Dr Daya Ward Discussed with ICU intensivist .  -Continue HFNC, currently cont to be on 55 L/min high flow nasal cannula saturating 93%  -s/p  remdesivir  -decrease Solu-Medrol to 40 mg every 6 hours  -s/p antibiotics  -s/p convalescent plasma 2021  -Full dose Lovenox added by pulmonary   -I discussed with pulmonology on 2021, input is appreciated  -Continue to be very high risk for decompensation  -Repeat x-ray to follow-up on the pneumothorax on  \"Small right-sided pneumothorax is increased. 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 GERD  --currently not on medication. Pepcid IV twice daily     Obesity  Body mass index is 31.14 kg/m².     Code: Discussed, full code  DVT prophylaxis: Heparin  Surrogate decision maker: Brother Roland Mnazano [phone]    Patient remains high risk for decompensation. Subjective:     Chief Complaint / Reason for Physician Visit  Patient currently on 55 L high flow. Saturating greater than 90%.   He looks comfortable, not in acute distress,. Patient was seen and examined. No acute events overnight. \"feeling the same\"    Review of Systems:  Symptom Y/N Comments  Symptom Y/N Comments   Fever/Chills n   Chest Pain n    Poor Appetite    Edema     Cough n   Abdominal Pain n    Sputum    Joint Pain     SOB/BULL y  same  Pruritis/Rash     Nausea/vomit n   Tolerating PT/OT     Diarrhea    Tolerating Diet y    Constipation    Other       Could NOT obtain due to:          Objective:     VITALS:   Last 24hrs VS reviewed since prior progress note. Most recent are:  Patient Vitals for the past 24 hrs:   Temp Pulse Resp BP SpO2   01/28/21 1052 98.3 °F (36.8 °C) 85 20 (!) 140/87 90 %   01/28/21 0813 -- -- -- -- 91 %   01/28/21 0802 98.1 °F (36.7 °C) 77 20 128/84 92 %   01/28/21 0243 98.1 °F (36.7 °C) 68 20 122/77 96 %   01/28/21 0145 -- -- -- -- 94 %   01/27/21 2255 97.9 °F (36.6 °C) 73 22 (!) 144/80 93 %   01/27/21 2054 -- -- -- 131/75 --   01/27/21 1926 97.6 °F (36.4 °C) 96 22 (!) 141/103 (!) 88 %   01/27/21 1507 97.9 °F (36.6 °C) (!) 58 21 (!) 141/79 97 %   01/27/21 1415 -- -- -- -- 93 %       Intake/Output Summary (Last 24 hours) at 1/28/2021 1215  Last data filed at 1/28/2021 1159  Gross per 24 hour   Intake 1260 ml   Output 600 ml   Net 660 ml        I had a face to face encounter and independently examined this patient on 1/28/2021, as outlined below:  PHYSICAL EXAM:  General: WD, WN. Alert, cooperative, in moderate respiratory distress    EENT:  EOMI. Anicteric sclerae. MMM  Resp:  B/l lung coarse, rales+  CV:  Regular  rhythm,  No edema  GI:  Soft, Non distended, Non tender. +Bowel sounds  Neurologic:  Alert and oriented X 3, normal speech,   Psych:   Good insight. Not anxious nor agitated  Skin:  No rashes.   No jaundice, subcu around right flank    Reviewed most current lab test results and cultures  YES  Reviewed most current radiology test results   YES  Review and summation of old records today    NO  Reviewed patient's current orders and MAR    YES  PMH/SH reviewed - no change compared to H&P  ________________________________________________________________________  Care Plan discussed with:    Comments   Patient x    Family  x 01/27   RN x    Care Manager     Consultant  x  pulmonology                      Multidiciplinary team rounds were held today with , nursing, pharmacist and clinical coordinator. Patient's plan of care was discussed; medications were reviewed and discharge planning was addressed. ________________________________________________________________________      Total CRITICAL CARE TIME Spent: 35 Minutes non procedure based      Comments   >50% of visit spent in counseling and coordination of care x    ________________________________________________________________________  Patrick Shaw MD     Procedures: see electronic medical records for all procedures/Xrays and details which were not copied into this note but were reviewed prior to creation of Plan. LABS:  I reviewed today's most current labs and imaging studies.   Pertinent labs include:  Recent Labs     01/28/21  0244 01/26/21  0032   WBC 13.7* 12.2*   HGB 13.7 12.9   HCT 41.1 39.1    239     Recent Labs     01/28/21  0244 01/27/21  0310 01/26/21  0032   * 136 137   K 4.8 4.7 4.7    104 106   CO2 33* 31 30   * 242* 271*   BUN 24* 24* 24*   CREA 0.83 0.72 0.80   CA 8.7 8.8 8.7   ALB 2.4* 2.3* 2.2*   TBILI 0.7 0.9 0.5   * 139* 142*       Signed: Patrick Shaw MD

## 2021-01-28 NOTE — PROGRESS NOTES
[facility]  Pulmonary, Critical Care, and Sleep Medicine    Name: Lety Menendez MRN: [id number]   : 1965 Hospital: [facility]   Date: 2021            IMPRESSION:   · Acute hypoxic respiratory failure - severe, secondary to COVID PNA-currently on HF (55L, 100%)  · Severe Multilobar Covid Pneumonia, first Dx on , Symptoms first started on 21    · New pneumomediastinum 21, radiographic stable  · Covid related coagulopathy  · Covid related inflammation, increased ferritin. · Obesity  · GERD  · Hypertension, LAE and LVH on ekg. RECOMMENDATIONS:   · Continue HFNC and try to avoid NIV if possible to minimize the amount of positive pressure ventilation he is getting to minimize risk of worsening PTX/Pneumomediastiunum. Wean HF as tolerated to keep Spo2 >88%  · Monitor for signs of sq air, none so far. Daily chest X-ray or more frequently if needed  · S/P convalescent plasma  · Empiric antibiotics-CFTX, Azithro  · Cont therapeutic Lovenox given suspicion of possible microthrombosis  · Continue steroids- adjusted  · S/p Remdesivir x 5 days total  · Follow inflammatory and coag markers  · Mobilize out of bed as tolerated  · Very high risk for further decompensation. Low threshold to transfer patient to ICU. ICU team declined patient on 21   PCP: Alphonso Marks. Subjective:     21: Needing more O2. Not fatigued. . Stoic. events of last night reviewed; No chest pain. Prone last night. felles better lying down. On HF-55L, Ferritin 1209    21: events of last night reviewed; No chest pain. Prone last night. felles better lying down. On HF-35L,    21: events of last night reviewed; No chest pain. Prone last night. felles better lying down. On HF-35L, ddimer 2.43; ferritin 1157; CRP 0.67 on solumedrol, LMWH    21: events of weekend reviewed; No chest pain. Prone last night. sats marginal now.  On HF-55L, SOB with minimal ambulation in the room. Would like to use BSC if able. Incentive on 400 ml    1-24-21: uneventful night. On HF-55L, SOB with minimal ambulation in the room  1-23-21: remains on HF (100%, 60L). SOB with minimal exertion. No cough or CP  1-22-21: remains dependent on HFNC. Has mild dyspnea. 1-21-21: remains dependent on BiPAP. Feels about the same. Mild dyspnea unchanged, at least while he is on BiPAP  1-20-21: Pt appears and feels more comfortable on bipap this am. Has no headaches, no chest pain, decreased coughing. He feels more hungry today. 1-19-21: Pt looks worse this am. Has increased RR into the mid 35s. Dyspneic. On high flow oxygen. Discussed convalescent plasma and he agrees. Feels very fatigued. No appetite. His temp has been normal.     This patient has been seen and evaluated at the request of Dr. Tiara Lucero for above. Patient is a 48 y.o. male   Who was first ill on 1/4/21. Has increased shortness of breath. Had dry cough. Weakness. Diarrhea. Weakness has been worse over last 3 days. He has been with increased  Breathing difficulty. Feels his breathing is constricted. Has decreased po intake. Has felt hot. Some loose stools. Has mild GERD. Non smoker, Occ etoh use. Chews tobacco.      Past Medical History:   Diagnosis Date    GERD (gastroesophageal reflux disease)     Obesity (BMI 30-39. 9) 5/8/2019    Right leg DVT ([address] 2019      Past Surgical History:   Procedure Laterality Date    HX ORTHOPAEDIC        Prior to Admission medications    Medication Sig Start Date End Date Taking? Authorizing Provider   aspirin delayed-release 81 mg tablet Take 81 mg by mouth daily. Yes Other, MD Leopoldo   albuterol (PROVENTIL HFA, VENTOLIN HFA, PROAIR HFA) 90 mcg/actuation inhaler Take 2 Puffs by inhalation every four (4) hours as needed for Wheezing. 1/13/21  Yes Dannielle Dillon MD   multivitamin (ONE A DAY) tablet Take 1 Tab by mouth daily.    Yes Provider, Historical cholecalciferol (VITAMIN D3) 1,000 unit cap Take 1,000 Units by mouth daily. Yes Provider, Historical   ascorbic acid, vitamin C, (VITAMIN C) 500 mg tablet Take 500 mg by mouth daily.    Yes Provider, Historical     No Known Allergies   Social History     Tobacco Use    Smoking status: Never Smoker    Smokeless tobacco: Current User   Substance Use Topics    Alcohol use: Yes     Comment: once a month      Family History   Problem Relation Age of Onset    Cancer Mother     Cancer Father         Current Facility-Administered Medications   Medication Dose Route Frequency    methylPREDNISolone (PF) (SOLU-MEDROL) injection 40 mg  40 mg IntraVENous Q6H    furosemide (LASIX) injection 40 mg  40 mg IntraVENous DAILY    enoxaparin (LOVENOX) injection 90 mg  1 mg/kg SubCUTAneous Q12H    famotidine (PF) (PEPCID) injection 20 mg  20 mg IntraVENous Q12H    influenza vaccine 2020-21 (6 mos+)(PF) (FLUARIX/FLULAVAL/FLUZONE QUAD) injection 0.5 mL  0.5 mL IntraMUSCular PRIOR TO DISCHARGE    sodium chloride (NS) flush 5-40 mL  5-40 mL IntraVENous Q8H    cholecalciferol (VITAMIN D3) (1000 Units /25 mcg) tablet 2 Tab  2,000 Units Oral DAILY    aspirin delayed-release tablet 81 mg  81 mg Oral DAILY       Review of Systems:  Constitutional: positive for fevers, chills, sweats, fatigue, malaise and anorexia, negative for weight loss  Eyes: negative  Ears, nose, mouth, throat, and face: positive for nasal congestion  Respiratory: positive for cough or dyspnea on exertion  Cardiovascular: positive for fatigue, paroxysmal nocturnal dyspnea, tachypnea, dyspnea on exertion  Gastrointestinal: positive for dyspepsia, reflux symptoms, nausea and change in bowel habits  Genitourinary:negative  Integument/breast: negative  Hematologic/lymphatic: negative  Musculoskeletal:negative  Neurological: negative  Behavioral/Psych: negative  Endocrine: negative  Allergic/Immunologic: negative    Objective:   Vital Signs:    Visit Vitals  BP (!) 140/87 (BP 1 Location: Right arm, BP Patient Position: At rest)   Pulse 85   Temp 98.3 °F (36.8 °C)   Resp 20   Ht 5' 8\" (1.727 m)   Wt 92.9 kg (204 lb 12.9 oz)   SpO2 90%   BMI 31.14 kg/m²       O2 Device: Heated, Hi flow nasal cannula   O2 Flow Rate (L/min): 55 l/min   Temp (24hrs), Av °F (36.7 °C), Min:97.6 °F (36.4 °C), Max:98.3 °F (36.8 °C)       Intake/Output:   Last shift:       07 -  1900  In: 240 [P.O.:240]  Out: 200 [Urine:200]  Last 3 shifts: 1901 -  0700  In: 1700 [P.O.:1700]  Out: 850 [Urine:850]    Intake/Output Summary (Last 24 hours) at 2021 1201  Last data filed at 2021 1159  Gross per 24 hour   Intake 1260 ml   Output 600 ml   Net 660 ml      Physical Exam:   General:  Alert, cooperative, no resp distress on bipap with 100% fio2. Head:  Normocephalic, without obvious abnormality, atraumatic. Eyes:  Conjunctivae/corneas clear    Nose: Nares normal. Septum midline. Mucosa normal.     Throat: Lips, mucosa, and tongue normal.    Neck: Supple, symmetrical, trachea midline    Lungs:   Distant bilateral breath sounds   Chest wall:  No tenderness or deformity. Heart:  Regular rate and rhythm    Abdomen:   Soft, non-tender. Bowel sounds normal.    Extremities: Extremities normal, atraumatic, no cyanosis    Skin: Skin color, texture, turgor normal. No crepitus   Neurologic: Grossly nonfocal     Data:   Labs:  Recent Labs     21  0244 21  0032   WBC 13.7* 12.2*   HGB 13.7 12.9   HCT 41.1 39.1    239     Recent Labs     21  0244 21  0310 21  0032   * 136 137   K 4.8 4.7 4.7    104 106   CO2 33* 31 30   * 242* 271*   BUN 24* 24* 24*   CREA 0.83 0.72 0.80   CA 8.7 8.8 8.7   ALB 2.4* 2.3* 2.2*   TBILI 0.7 0.9 0.5   * 139* 142*     No results for input(s): PHI, PCO2I, PO2I, HCO3I, FIO2I in the last 72 hours.     Imaging:  I have personally reviewed the patients radiographs:  Persistent bilateral infiltrates; slight increase in pneumomediastinum         Ever Tidwell MD

## 2021-01-28 NOTE — PROGRESS NOTES
0700 Bedside shift change report given to Kanchan Jameson (oncoming nurse) by Trever Bell RN (offgoing nurse). Report included the following information SBAR, Kardex, Intake/Output and MAR.     1900 End of Shift Note    Bedside shift change report given to Mary Beth Williamson RN (oncoming nurse) by Emanuel Eric RN (offgoing nurse). Report included the following information SBAR, Kardex, Intake/Output and MAR    Shift worked:  [id number]     Shift summary and any significant changes:     None     Concerns for physician to address:  None     Zone phone for oncoming shift:   [phone]       Activity:  Activity Level: Up with Assistance  Number times ambulated in hallways past shift: 0  Number of times OOB to chair past shift: 1    Cardiac:   Cardiac Monitoring: Yes      Cardiac Rhythm: Normal sinus rhythm    Access:   Current line(s): no access     Genitourinary:   Urinary status: voiding    Respiratory:   O2 Device: Heated, Hi flow nasal cannula  Chronic home O2 use?: NO  Incentive spirometer at bedside: N/A  Actual Volume (ml): 750 ml  GI:  Last Bowel Movement Date: 01/26/21  Current diet:  DIET REGULAR  DIET NUTRITIONAL SUPPLEMENTS Dinner; Ensure [employer]  Passing flatus: YES  Tolerating current diet: YES  % Diet Eaten: 75 %    Pain Management:   Patient states pain is manageable on current regimen: YES    Skin:  Govind Score: 18  Interventions: increase time out of bed    Patient Safety:  Fall Score: Total Score: 1  Interventions: gripper socks       Length of Stay:  Expected LOS: 5d 12h  Actual LOS: [address] [place], RN                              Problem: Falls - Risk of  Goal: *Absence of Falls  Description: Document Vogel Reason Fall Risk and appropriate interventions in the flowsheet.   Outcome: Progressing Towards Goal  Note: Fall Risk Interventions:            Medication Interventions: Bed/chair exit alarm         History of Falls Interventions: Bed/chair exit alarm         Problem: Patient Education: Go to Patient Education Activity  Goal: Patient/Family Education  Outcome: Progressing Towards Goal     Problem: Breathing Pattern - Ineffective  Goal: *Absence of hypoxia  Outcome: Progressing Towards Goal

## 2021-01-28 NOTE — PROGRESS NOTES
[facility]  Pulmonary, Critical Care, and Sleep Medicine    Name: Placido Mcnair MRN: [id number]   : 1965 Hospital: [facility]   Date: 2021            IMPRESSION:   · Acute hypoxic respiratory failure - severe, secondary to COVID PNA-currently on HF (35L, 100%)  · Severe Multilobar Covid Pneumonia, first Dx on , Symptoms first started on 21    · New pneumomediastinum 21, radiographic stable  · Covid related coagulopathy  · Covid related inflammation, increased ferritin. · Obesity  · GERD  · Hypertension, LAE and LVH on ekg. RECOMMENDATIONS:   · Continue HFNC and try to avoid NIV if possible to minimize the amount of positive pressure ventilation he is getting to minimize risk of worsening PTX/Pneumomediastiunum. Wean HF as tolerated to keep Spo2 >88%  · Monitor for signs of sq air, none so far. Daily chest X-ray or more frequently if needed  · S/P convalescent plasma  · Empiric antibiotics-CFTX, Azithro  · Cont therapeutic Lovenox given suspicion of possible microthrombosis  · Continue steroids- adjusted  · S/p Remdesivir x 5 days total  · Follow inflammatory and coag markers  · Mobilize out of bed as tolerated  · Very high risk for further decompensation. Low threshold to transfer patient to ICU. ICU team declined patient on 21   PCP: Kitty Young. Subjective:     21: events of last night reviewed; No chest pain. Prone last night. felles better lying down. On HF-35L,    21: events of last night reviewed; No chest pain. Prone last night. felles better lying down. On HF-35L, ddimer 2.43; ferritin 1157; CRP 0.67 on solumedrol, LMWH    21: events of weekend reviewed; No chest pain. Prone last night. sats marginal now. On HF-55L, SOB with minimal ambulation in the room. Would like to use BSC if able. Incentive on 400 ml    21: uneventful night.  On HF-55L, SOB with minimal ambulation in the room  1-23-21: remains on HF (100%, 60L). SOB with minimal exertion. No cough or CP  1-22-21: remains dependent on HFNC. Has mild dyspnea. 1-21-21: remains dependent on BiPAP. Feels about the same. Mild dyspnea unchanged, at least while he is on BiPAP  1-20-21: Pt appears and feels more comfortable on bipap this am. Has no headaches, no chest pain, decreased coughing. He feels more hungry today. 1-19-21: Pt looks worse this am. Has increased RR into the mid 35s. Dyspneic. On high flow oxygen. Discussed convalescent plasma and he agrees. Feels very fatigued. No appetite. His temp has been normal.     This patient has been seen and evaluated at the request of Dr. Edgar Olvera for above. Patient is a 48 y.o. male   Who was first ill on 1/4/21. Has increased shortness of breath. Had dry cough. Weakness. Diarrhea. Weakness has been worse over last 3 days. He has been with increased  Breathing difficulty. Feels his breathing is constricted. Has decreased po intake. Has felt hot. Some loose stools. Has mild GERD. Non smoker, Occ etoh use. Chews tobacco.      Past Medical History:   Diagnosis Date    GERD (gastroesophageal reflux disease)     Obesity (BMI 30-39. 9) 5/8/2019    Right leg DVT ([address] 2019      Past Surgical History:   Procedure Laterality Date    HX ORTHOPAEDIC        Prior to Admission medications    Medication Sig Start Date End Date Taking? Authorizing Provider   aspirin delayed-release 81 mg tablet Take 81 mg by mouth daily. Yes Other, MD Leopoldo   albuterol (PROVENTIL HFA, VENTOLIN HFA, PROAIR HFA) 90 mcg/actuation inhaler Take 2 Puffs by inhalation every four (4) hours as needed for Wheezing. 1/13/21  Yes Marissa Vázquez MD   multivitamin (ONE A DAY) tablet Take 1 Tab by mouth daily. Yes Provider, Historical   cholecalciferol (VITAMIN D3) 1,000 unit cap Take 1,000 Units by mouth daily.    Yes Provider, Historical   ascorbic acid, vitamin C, (VITAMIN C) 500 mg tablet Take 500 mg by mouth daily.    Yes Provider, Historical     No Known Allergies   Social History     Tobacco Use    Smoking status: Never Smoker    Smokeless tobacco: Current User   Substance Use Topics    Alcohol use: Yes     Comment: once a month      Family History   Problem Relation Age of Onset    Cancer Mother     Cancer Father         Current Facility-Administered Medications   Medication Dose Route Frequency    methylPREDNISolone (PF) (SOLU-MEDROL) injection 40 mg  40 mg IntraVENous Q6H    furosemide (LASIX) injection 40 mg  40 mg IntraVENous DAILY    enoxaparin (LOVENOX) injection 90 mg  1 mg/kg SubCUTAneous Q12H    famotidine (PF) (PEPCID) injection 20 mg  20 mg IntraVENous Q12H    influenza vaccine 2020-21 (6 mos+)(PF) (FLUARIX/FLULAVAL/FLUZONE QUAD) injection 0.5 mL  0.5 mL IntraMUSCular PRIOR TO DISCHARGE    sodium chloride (NS) flush 5-40 mL  5-40 mL IntraVENous Q8H    cholecalciferol (VITAMIN D3) (1000 Units /25 mcg) tablet 2 Tab  2,000 Units Oral DAILY    aspirin delayed-release tablet 81 mg  81 mg Oral DAILY       Review of Systems:  Constitutional: positive for fevers, chills, sweats, fatigue, malaise and anorexia, negative for weight loss  Eyes: negative  Ears, nose, mouth, throat, and face: positive for nasal congestion  Respiratory: positive for cough or dyspnea on exertion  Cardiovascular: positive for fatigue, paroxysmal nocturnal dyspnea, tachypnea, dyspnea on exertion  Gastrointestinal: positive for dyspepsia, reflux symptoms, nausea and change in bowel habits  Genitourinary:negative  Integument/breast: negative  Hematologic/lymphatic: negative  Musculoskeletal:negative  Neurological: negative  Behavioral/Psych: negative  Endocrine: negative  Allergic/Immunologic: negative    Objective:   Vital Signs:    Visit Vitals  BP (!) 140/87 (BP 1 Location: Right arm, BP Patient Position: At rest)   Pulse 85   Temp 98.3 °F (36.8 °C)   Resp 20   Ht 5' 8\" (1.727 m)   Wt 92.9 kg (204 lb 12.9 oz)   SpO2 90%   BMI 31.14 kg/m²       O2 Device: Heated, Hi flow nasal cannula   O2 Flow Rate (L/min): 55 l/min   Temp (24hrs), Av °F (36.7 °C), Min:97.6 °F (36.4 °C), Max:98.3 °F (36.8 °C)       Intake/Output:   Last shift:      No intake/output data recorded. Last 3 shifts:  1901 -  0700  In: 1700 [P.O.:1700]  Out: 850 [Urine:850]    Intake/Output Summary (Last 24 hours) at 2021 1159  Last data filed at 2021 1930  Gross per 24 hour   Intake 1020 ml   Output 400 ml   Net 620 ml      Physical Exam:   General:  Alert, cooperative, no resp distress on bipap with 100% fio2. Head:  Normocephalic, without obvious abnormality, atraumatic. Eyes:  Conjunctivae/corneas clear    Nose: Nares normal. Septum midline. Mucosa normal.     Throat: Lips, mucosa, and tongue normal.    Neck: Supple, symmetrical, trachea midline    Lungs:   Distant bilateral breath sounds   Chest wall:  No tenderness or deformity. Heart:  Regular rate and rhythm    Abdomen:   Soft, non-tender. Bowel sounds normal.    Extremities: Extremities normal, atraumatic, no cyanosis    Skin: Skin color, texture, turgor normal. No crepitus   Neurologic: Grossly nonfocal     Data:   Labs:  Recent Labs     21  0244 21  0032   WBC 13.7* 12.2*   HGB 13.7 12.9   HCT 41.1 39.1    239     Recent Labs     21  0244 21  0310 21  0032   * 136 137   K 4.8 4.7 4.7    104 106   CO2 33* 31 30   * 242* 271*   BUN 24* 24* 24*   CREA 0.83 0.72 0.80   CA 8.7 8.8 8.7   ALB 2.4* 2.3* 2.2*   TBILI 0.7 0.9 0.5   * 139* 142*     No results for input(s): PHI, PCO2I, PO2I, HCO3I, FIO2I in the last 72 hours.     Imaging:  I have personally reviewed the patients radiographs:  Persistent bilateral infiltrates; slight increase in pneumomediastinum         Haydee Abreu MD

## 2021-01-28 NOTE — PROGRESS NOTES
End of Shift Note    Bedside shift change report given to Jannie Saul (oncoming nurse) by Iris Morgan RN (offgoing nurse). Report included the following information SBAR and Kardex    Shift worked:  1900-0730     Shift summary and any significant changes:     none. Concerns for physician to address:  none. Zone phone for oncoming shift:          Activity:  Activity Level: Up with Assistance  Number times ambulated in hallways past shift: 0  Number of times OOB to chair past shift: 0    Cardiac:   Cardiac Monitoring: Yes      Cardiac Rhythm: Normal sinus rhythm    Access:   Current line(s): PIV     Genitourinary:   Urinary status: voiding    Respiratory:   O2 Device: Heated, Hi flow nasal cannula  Chronic home O2 use?: NO  Incentive spirometer at bedside: YES  Actual Volume (ml): 750 ml  GI:  Last Bowel Movement Date: 01/26/21  Current diet:  DIET REGULAR  DIET NUTRITIONAL SUPPLEMENTS Dinner; Ensure [employer]  Passing flatus: YES  Tolerating current diet: YES  % Diet Eaten: 100 %    Pain Management:   Patient states pain is manageable on current regimen: YES    Skin:  Govind Score: 18  Interventions: increase time out of bed    Patient Safety:  Fall Score:  Total Score: 1  Interventions: pt to call before getting OOB       Length of Stay:  Expected LOS: 5d 12h  Actual LOS: Fatuma Sheldon RN

## 2021-01-29 ENCOUNTER — APPOINTMENT (OUTPATIENT)
Dept: GENERAL RADIOLOGY | Age: 56
DRG: 177 | End: 2021-01-29
Attending: INTERNAL MEDICINE
Payer: COMMERCIAL

## 2021-01-29 LAB
ALBUMIN SERPL-MCNC: 2.3 G/DL (ref 3.5–5)
ALBUMIN/GLOB SERPL: 0.7 {RATIO} (ref 1.1–2.2)
ALP SERPL-CCNC: 71 U/L (ref 45–117)
ALT SERPL-CCNC: 107 U/L (ref 12–78)
ANION GAP SERPL CALC-SCNC: 0 MMOL/L (ref 5–15)
AST SERPL-CCNC: 27 U/L (ref 15–37)
BILIRUB SERPL-MCNC: 0.9 MG/DL (ref 0.2–1)
BUN SERPL-MCNC: 26 MG/DL (ref 6–20)
BUN/CREAT SERPL: 36 (ref 12–20)
CALCIUM SERPL-MCNC: 8.7 MG/DL (ref 8.5–10.1)
CHLORIDE SERPL-SCNC: 100 MMOL/L (ref 97–108)
CO2 SERPL-SCNC: 33 MMOL/L (ref 21–32)
CREAT SERPL-MCNC: 0.73 MG/DL (ref 0.7–1.3)
D DIMER PPP FEU-MCNC: 1.19 MG/L FEU (ref 0–0.65)
FERRITIN SERPL-MCNC: 1301 NG/ML (ref 26–388)
GLOBULIN SER CALC-MCNC: 3.2 G/DL (ref 2–4)
GLUCOSE SERPL-MCNC: 253 MG/DL (ref 65–100)
POTASSIUM SERPL-SCNC: 4.8 MMOL/L (ref 3.5–5.1)
PROT SERPL-MCNC: 5.5 G/DL (ref 6.4–8.2)
SODIUM SERPL-SCNC: 133 MMOL/L (ref 136–145)

## 2021-01-29 PROCEDURE — 65660000001 HC RM ICU INTERMED STEPDOWN

## 2021-01-29 PROCEDURE — 74011250637 HC RX REV CODE- 250/637: Performed by: INTERNAL MEDICINE

## 2021-01-29 PROCEDURE — 82728 ASSAY OF FERRITIN: CPT

## 2021-01-29 PROCEDURE — 74011250637 HC RX REV CODE- 250/637: Performed by: HOSPITALIST

## 2021-01-29 PROCEDURE — 36415 COLL VENOUS BLD VENIPUNCTURE: CPT

## 2021-01-29 PROCEDURE — 74011250636 HC RX REV CODE- 250/636: Performed by: INTERNAL MEDICINE

## 2021-01-29 PROCEDURE — 77010033711 HC HIGH FLOW OXYGEN

## 2021-01-29 PROCEDURE — 85379 FIBRIN DEGRADATION QUANT: CPT

## 2021-01-29 PROCEDURE — 80053 COMPREHEN METABOLIC PANEL: CPT

## 2021-01-29 PROCEDURE — 71045 X-RAY EXAM CHEST 1 VIEW: CPT

## 2021-01-29 RX ORDER — FAMOTIDINE 20 MG/1
20 TABLET, FILM COATED ORAL EVERY 12 HOURS
Status: DISCONTINUED | OUTPATIENT
Start: 2021-01-29 | End: 2021-02-17 | Stop reason: HOSPADM

## 2021-01-29 RX ADMIN — ENOXAPARIN SODIUM 90 MG: 100 INJECTION SUBCUTANEOUS at 12:37

## 2021-01-29 RX ADMIN — METHYLPREDNISOLONE SODIUM SUCCINATE 40 MG: 40 INJECTION, POWDER, FOR SOLUTION INTRAMUSCULAR; INTRAVENOUS at 04:10

## 2021-01-29 RX ADMIN — FAMOTIDINE 20 MG: 10 INJECTION INTRAVENOUS at 09:58

## 2021-01-29 RX ADMIN — METHYLPREDNISOLONE SODIUM SUCCINATE 60 MG: 40 INJECTION, POWDER, FOR SOLUTION INTRAMUSCULAR; INTRAVENOUS at 21:13

## 2021-01-29 RX ADMIN — FAMOTIDINE 20 MG: 20 TABLET, FILM COATED ORAL at 21:14

## 2021-01-29 RX ADMIN — METHYLPREDNISOLONE SODIUM SUCCINATE 60 MG: 40 INJECTION, POWDER, FOR SOLUTION INTRAMUSCULAR; INTRAVENOUS at 13:47

## 2021-01-29 RX ADMIN — CHOLECALCIFEROL TAB 25 MCG (1000 UNIT) 2000 UNITS: 25 TAB at 09:58

## 2021-01-29 RX ADMIN — FUROSEMIDE 40 MG: 10 INJECTION, SOLUTION INTRAMUSCULAR; INTRAVENOUS at 09:59

## 2021-01-29 RX ADMIN — ASPIRIN 81 MG: 81 TABLET, COATED ORAL at 09:58

## 2021-01-29 RX ADMIN — Medication 10 ML: at 13:47

## 2021-01-29 RX ADMIN — Medication 10 ML: at 21:14

## 2021-01-29 NOTE — PROGRESS NOTES
Hospitalist Progress Note    NAME: Nanda Best   :  1965   MRN:  [id number]       Assessment / Plan:  Acute hypoxic respiratory failure POA, persistent  Severe multilobar Covid pneumonia POA  Pneumomediastinum , not POA, persistent  -COVID-19 test positive on . -CTA  neg for PE. Shows diffuse bilateral peripheral groundglass opacification and consolidation in the lower lobes   -Initially on nasal cannula/high flow, decompensation  , was on BiPAP since 2021, steroids increased, evaluated by ICU intensivist on .     -has developed pneumomediastinum , changed to high flow from BiPAP to reduce barotrauma, chest x-ray this There is an unchanged tiny right apical pneumothorax. No left pneumothorax  -Dr Jose Sweet Discussed with ICU intensivist .  -Continue HFNC, currently cont to be on 45 L/min high flow nasal cannula saturating 93%  -s/p  remdesivir  -decrease Solu-Medrol to 40 mg every 6 hours  -s/p antibiotics  -s/p convalescent plasma 2021  -Full dose Lovenox added by pulmonary   -I discussed with pulmonology on 2021, input is appreciated  -Continue to be very high risk for decompensation  -Repeat x-ray to follow-up on the pneumothorax on  \"No significant change in the appearance of the chest with a tiny, right-sided pneumothorax. Likely persistent pneumomediastinum. \"    History of extensive right lower extremity DVT May 2019, likely occupational related  --Finished course of Xarelto and currently on baby aspirin. GERD  --currently not on medication. Pepcid IV twice daily     Obesity  Body mass index is 31.14 kg/m².     Code: Discussed, full code  DVT prophylaxis: Heparin  Surrogate decision maker: Brother Stephen Moises [phone]    Patient remains high risk for decompensation. Subjective:     Chief Complaint / Reason for Physician Visit  Patient currently on 45 L high flow. Saturating greater than 90%.   He looks comfortable, not in acute distress,. Patient was seen and examined. No acute events overnight. \"doing the same\"    Review of Systems:  Symptom Y/N Comments  Symptom Y/N Comments   Fever/Chills n   Chest Pain n    Poor Appetite    Edema     Cough n   Abdominal Pain n    Sputum    Joint Pain     SOB/BULL y  same  Pruritis/Rash     Nausea/vomit n   Tolerating PT/OT     Diarrhea    Tolerating Diet y    Constipation    Other       Could NOT obtain due to:          Objective:     VITALS:   Last 24hrs VS reviewed since prior progress note. Most recent are:  Patient Vitals for the past 24 hrs:   Temp Pulse Resp BP SpO2   01/29/21 1203 -- -- -- -- 93 %   01/29/21 1045 97.9 °F (36.6 °C) (!) 109 20 102/74 90 %   01/29/21 0836 -- -- -- -- 95 %   01/29/21 0804 97.5 °F (36.4 °C) 72 20 121/83 98 %   01/29/21 0409 97.5 °F (36.4 °C) (!) 59 20 120/77 98 %   01/28/21 2232 98.3 °F (36.8 °C) 66 20 132/81 95 %   01/28/21 2038 98.3 °F (36.8 °C) 80 20 133/71 94 %   01/28/21 1830 97.8 °F (36.6 °C) 84 20 (!) 140/80 96 %   01/28/21 1518 98.3 °F (36.8 °C) 70 20 130/89 98 %       Intake/Output Summary (Last 24 hours) at 1/29/2021 1455  Last data filed at 1/29/2021 1445  Gross per 24 hour   Intake 480 ml   Output 2000 ml   Net -1520 ml        I had a face to face encounter and independently examined this patient on 1/29/2021, as outlined below:  PHYSICAL EXAM:  General: WD, WN. Alert, cooperative, in moderate respiratory distress    EENT:  EOMI. Anicteric sclerae. MMM  Resp:  B/l lung coarse, rales+  CV:  Regular  rhythm,  No edema  GI:  Soft, Non distended, Non tender. +Bowel sounds  Neurologic:  Alert and oriented X 3, normal speech,   Psych:   Good insight. Not anxious nor agitated  Skin:  No rashes.   No jaundice, subcu around right flank    Reviewed most current lab test results and cultures  YES  Reviewed most current radiology test results   YES  Review and summation of old records today    NO  Reviewed patient's current orders and MAR    YES  PMH/SH reviewed - no change compared to H&P  ________________________________________________________________________  Care Plan discussed with:    Comments   Patient x    Family  x 01/27   RN x    Care Manager     Consultant  x  pulmonology                      Multidiciplinary team rounds were held today with , nursing, pharmacist and clinical coordinator. Patient's plan of care was discussed; medications were reviewed and discharge planning was addressed. ________________________________________________________________________      Total CRITICAL CARE TIME Spent: 35 Minutes non procedure based      Comments   >50% of visit spent in counseling and coordination of care x    ________________________________________________________________________  Shayd Bragg MD     Procedures: see electronic medical records for all procedures/Xrays and details which were not copied into this note but were reviewed prior to creation of Plan. LABS:  I reviewed today's most current labs and imaging studies.   Pertinent labs include:  Recent Labs     01/28/21  0244   WBC 13.7*   HGB 13.7   HCT 41.1        Recent Labs     01/29/21  0413 01/28/21  0244 01/27/21  0310   * 134* 136   K 4.8 4.8 4.7    100 104   CO2 33* 33* 31   * 258* 242*   BUN 26* 24* 24*   CREA 0.73 0.83 0.72   CA 8.7 8.7 8.8   ALB 2.3* 2.4* 2.3*   TBILI 0.9 0.7 0.9   * 109* 139*       Signed: Shady Bragg MD

## 2021-01-29 NOTE — PROGRESS NOTES
0700: Verbal shift change report given to VADIM Kee (oncoming nurse) by Kamini Vargas RN (offgoing nurse). Report included the following information SBAR, Intake/Output, MAR, Recent Results and Cardiac Rhythm NSR.      1140: Called portable chest x-ray regarding STAT CXR. Pt still not received x-ray.

## 2021-01-29 NOTE — PROGRESS NOTES
[facility]  Pulmonary, Critical Care, and Sleep Medicine    Name: Lety Menendez MRN: [id number]   : 1965 Hospital: [facility]   Date: 2021            IMPRESSION:   · Acute hypoxic respiratory failure - severe, secondary to COVID PNA-currently on HF (55L, 100%)  · Severe Multilobar Covid Pneumonia, first Dx on , Symptoms first started on 21  S/P convalescent plasma ; S/p Remdesivir x 5 days total  · New pneumomediastinum 21, radiographic stable  · Covid related coagulopathy  · Covid related inflammation, increased ferritin. · Obesity  · GERD  · Hypertension, LAE and LVH on ekg. RECOMMENDATIONS:   · Continue HFNC and try to avoid NIV if possible to minimize the amount of positive pressure ventilation he is getting to minimize risk of worsening PTX/Pneumomediastiunum. Wean HF as tolerated to keep Spo2 >88%  · Monitor for signs of sq air, none so far. Daily chest X-ray or more frequently if needed  · Daily CXR  · If he declines quickly, will need stat cxr  · Trying to avoid PAP due to pneumomediastinum  · Empiric antibiotics-CFTX, Azithro  · Cont therapeutic Lovenox given suspicion of possible microthrombosis  · Continue steroids- adjusted  · Follow inflammatory and coag markers  · Mobilize out of bed as tolerated  · Very high risk for further decompensation. Low threshold to transfer patient to ICU. ICU team declined patient on 21   PCP: Alphonso Marks. Subjective:   21: at one point down to 40 LPM now %% LPM.d/w nursing. Drops at night. Does prone. SC air inneck CXR done and personally reviewed. PTX minimal pneumomediastiinum same. OOB in recliner. Not fatigued. . Stoic. events of last night reviewed; I spoke with DIL. Pt getting depressed. No chest pain. On HF-55L, Ferritin 1209---> 1301 cc time 35 minutes    21: Needing more O2. Not fatigued. .  Stoic. events of last night reviewed; No chest pain. Prone last night. felles better lying down. On HF-55L, Ferritin 1209    1-27-21: events of last night reviewed; No chest pain. Prone last night. felles better lying down. On HF-35L,    1-26-21: events of last night reviewed; No chest pain. Prone last night. felles better lying down. On HF-35L, ddimer 2.43; ferritin 1157; CRP 0.67 on solumedrol, LMWH    1-25-21: events of weekend reviewed; No chest pain. Prone last night. sats marginal now. On HF-55L, SOB with minimal ambulation in the room. Would like to use BSC if able. Incentive on 400 ml    1-24-21: uneventful night. On HF-55L, SOB with minimal ambulation in the room  1-23-21: remains on HF (100%, 60L). SOB with minimal exertion. No cough or CP  1-22-21: remains dependent on HFNC. Has mild dyspnea. 1-21-21: remains dependent on BiPAP. Feels about the same. Mild dyspnea unchanged, at least while he is on BiPAP  1-20-21: Pt appears and feels more comfortable on bipap this am. Has no headaches, no chest pain, decreased coughing. He feels more hungry today. 1-19-21: Pt looks worse this am. Has increased RR into the mid 35s. Dyspneic. On high flow oxygen. Discussed convalescent plasma and he agrees. Feels very fatigued. No appetite. His temp has been normal.     This patient has been seen and evaluated at the request of Dr. Daisy Morales for above. Patient is a 48 y.o. male   Who was first ill on 1/4/21. Has increased shortness of breath. Had dry cough. Weakness. Diarrhea. Weakness has been worse over last 3 days. He has been with increased  Breathing difficulty. Feels his breathing is constricted. Has decreased po intake. Has felt hot. Some loose stools. Has mild GERD. Non smoker, Occ etoh use. Chews tobacco.      Past Medical History:   Diagnosis Date    GERD (gastroesophageal reflux disease)     Obesity (BMI 30-39. 9) 5/8/2019    Right leg DVT ([address] 2019      Past Surgical History:   Procedure Laterality Date    HX ORTHOPAEDIC Prior to Admission medications    Medication Sig Start Date End Date Taking? Authorizing Provider   aspirin delayed-release 81 mg tablet Take 81 mg by mouth daily. Yes Other, MD Leopoldo   albuterol (PROVENTIL HFA, VENTOLIN HFA, PROAIR HFA) 90 mcg/actuation inhaler Take 2 Puffs by inhalation every four (4) hours as needed for Wheezing. 1/13/21  Yes Faby Maki MD   multivitamin (ONE A DAY) tablet Take 1 Tab by mouth daily. Yes Provider, Historical   cholecalciferol (VITAMIN D3) 1,000 unit cap Take 1,000 Units by mouth daily. Yes Provider, Historical   ascorbic acid, vitamin C, (VITAMIN C) 500 mg tablet Take 500 mg by mouth daily.    Yes Provider, Historical     No Known Allergies   Social History     Tobacco Use    Smoking status: Never Smoker    Smokeless tobacco: Current User   Substance Use Topics    Alcohol use: Yes     Comment: once a month      Family History   Problem Relation Age of Onset    Cancer Mother     Cancer Father         Current Facility-Administered Medications   Medication Dose Route Frequency    methylPREDNISolone (PF) (SOLU-MEDROL) injection 60 mg  60 mg IntraVENous Q6H    famotidine (PEPCID) tablet 20 mg  20 mg Oral Q12H    furosemide (LASIX) injection 40 mg  40 mg IntraVENous DAILY    enoxaparin (LOVENOX) injection 90 mg  1 mg/kg SubCUTAneous Q12H    influenza vaccine 2020-21 (6 mos+)(PF) (FLUARIX/FLULAVAL/FLUZONE QUAD) injection 0.5 mL  0.5 mL IntraMUSCular PRIOR TO DISCHARGE    sodium chloride (NS) flush 5-40 mL  5-40 mL IntraVENous Q8H    cholecalciferol (VITAMIN D3) (1000 Units /25 mcg) tablet 2 Tab  2,000 Units Oral DAILY    aspirin delayed-release tablet 81 mg  81 mg Oral DAILY       Review of Systems:  Constitutional: positive for fevers, chills, sweats, fatigue, malaise and anorexia, negative for weight loss  Eyes: negative  Ears, nose, mouth, throat, and face: positive for nasal congestion  Respiratory: positive for cough or dyspnea on exertion  Cardiovascular: positive for fatigue, paroxysmal nocturnal dyspnea, tachypnea, dyspnea on exertion  Gastrointestinal: positive for dyspepsia, reflux symptoms, nausea and change in bowel habits  Genitourinary:negative  Integument/breast: negative  Hematologic/lymphatic: negative  Musculoskeletal:negative  Neurological: negative  Behavioral/Psych: negative  Endocrine: negative  Allergic/Immunologic: negative    Objective:   Vital Signs:    Visit Vitals  /74 (BP 1 Location: Left upper arm, BP Patient Position: Sitting)   Pulse (!) 109   Temp 97.9 °F (36.6 °C)   Resp 20   Ht 5' 8\" (1.727 m)   Wt 92.9 kg (204 lb 12.9 oz)   SpO2 93%   BMI 31.14 kg/m²       O2 Device: Heated, Hi flow nasal cannula   O2 Flow Rate (L/min): 45 l/min   Temp (24hrs), Av.9 °F (36.6 °C), Min:97.5 °F (36.4 °C), Max:98.3 °F (36.8 °C)       Intake/Output:   Last shift:       07 -  1900  In: 480 [P.O.:480]  Out: 400 [Urine:400]  Last 3 shifts:  190 -  0700  In: 600 [P.O.:600]  Out: 1750 [Urine:1750]    Intake/Output Summary (Last 24 hours) at 2021 1409  Last data filed at 2021 1045  Gross per 24 hour   Intake 480 ml   Output 1550 ml   Net -1070 ml      Physical Exam:   General:  Alert, cooperative, no resp distress on bipap with 100% fio2. Head:  Normocephalic, without obvious abnormality, atraumatic. Eyes:  Conjunctivae/corneas clear    Nose: Nares normal. Septum midline. Mucosa normal.     Throat: Lips, mucosa, and tongue normal.    Neck: Supple, symmetrical, trachea midline    Lungs:   Distant bilateral breath sounds   Chest wall:  No tenderness or deformity. Heart:  Regular rate and rhythm    Abdomen:   Soft, non-tender.  Bowel sounds normal.    Extremities: Extremities normal, atraumatic, no cyanosis    Skin: Skin color, texture, turgor normal. No crepitus   Neurologic: Grossly nonfocal     Data:   Labs:  Recent Labs     21  0244   WBC 13.7*   HGB 13.7   HCT 41.1    Recent Labs     01/29/21  0413 01/28/21  0244 01/27/21  0310   * 134* 136   K 4.8 4.8 4.7    100 104   CO2 33* 33* 31   * 258* 242*   BUN 26* 24* 24*   CREA 0.73 0.83 0.72   CA 8.7 8.7 8.8   ALB 2.3* 2.4* 2.3*   TBILI 0.9 0.7 0.9   * 109* 139*     No results for input(s): PHI, PCO2I, PO2I, HCO3I, FIO2I in the last 72 hours.     Imaging:  I have personally reviewed the patients radiographs:  Persistent bilateral infiltrates; slight increase in pneumomediastinum         Irma Gonzales MD

## 2021-01-29 NOTE — PROGRESS NOTES
End of Shift Note    Bedside shift change report given to VADIM Kee (oncoming nurse) by Katarzyna Singer (offgoing nurse). Report included the following information SBAR and Kardex    Shift worked:  night     Shift summary and any significant changes:     none. Patient tolerating O2, 55L heated high flow. Concerns for physician to address:  O2 needs     Zone phone for oncoming shift:          Activity:  Activity Level: Up with Assistance  Number times ambulated in hallways past shift: 0  Number of times OOB to chair past shift: 0    Cardiac:   Cardiac Monitoring: Yes      Cardiac Rhythm: Normal sinus rhythm    Access:   Current line(s): PIV     Genitourinary:   Urinary status: voiding    Respiratory:   O2 Device: Heated, Hi flow nasal cannula  Chronic home O2 use?: NO  Incentive spirometer at bedside: YES  Actual Volume (ml): 750 ml  GI:  Last Bowel Movement Date: 01/26/21  Current diet:  DIET REGULAR  DIET NUTRITIONAL SUPPLEMENTS Dinner; Ensure [employer]  Passing flatus: YES  Tolerating current diet: YES  % Diet Eaten: 75 %    Pain Management:   Patient states pain is manageable on current regimen: YES    Skin:  Govind Score: 18  Interventions: float heels and increase time out of bed    Patient Safety:  Fall Score:  Total Score: 1  Interventions: bed/chair alarm, assistive device (walker, cane, etc), gripper socks and stay with me (per policy)       Length of Stay:  Expected LOS: 5d 12h  Actual LOS: Ivelisse Alcazar

## 2021-01-29 NOTE — PROGRESS NOTES
ORIANA:  -confirm family to transport near d/c  -assess for home oxygen  -await therapy recommendations for the benefit of HHC  -ask pt again if he would be willing to give us his physical address. This will be needed to deliver the oxygen.  -may benefit from IP pulmonary rehab at d/c  -make PCP omero't prior to d/c  -[facility] can accept pending oxygen testing//order as of 1/27.

## 2021-01-29 NOTE — PROGRESS NOTES
Problem: Falls - Risk of  Goal: *Absence of Falls  Description: Document Ignacio Fall Risk and appropriate interventions in the flowsheet.  Outcome: Progressing Towards Goal  Note: Fall Risk Interventions:  Mobility Interventions: Patient to call before getting OOB, Strengthening exercises (ROM-active/passive), Utilize walker, cane, or other assistive device         Medication Interventions: Patient to call before getting OOB, Teach patient to arise slowly         History of Falls Interventions: Room close to nurse's station         Problem: Patient Education: Go to Patient Education Activity  Goal: Patient/Family Education  Outcome: Progressing Towards Goal     Problem: Breathing Pattern - Ineffective  Goal: *Absence of hypoxia  Outcome: Progressing Towards Goal  Goal: *Use of effective breathing techniques  Outcome: Progressing Towards Goal     Problem: Airway Clearance - Ineffective  Goal: Achieve or maintain patent airway  Outcome: Progressing Towards Goal     Problem: Gas Exchange - Impaired  Goal: Absence of hypoxia  Outcome: Progressing Towards Goal  Goal: Promote optimal lung function  Outcome: Progressing Towards Goal     Problem: Breathing Pattern - Ineffective  Goal: Ability to achieve and maintain a regular respiratory rate  Outcome: Progressing Towards Goal     Problem: Body Temperature -  Risk of, Imbalanced  Goal: Ability to maintain a body temperature within defined limits  Outcome: Progressing Towards Goal  Goal: Will regain or maintain usual level of consciousness  Outcome: Progressing Towards Goal  Goal: Complications related to the disease process, condition or treatment will be avoided or minimized  Outcome: Progressing Towards Goal     Problem: Isolation Precautions - Risk of Spread of Infection  Goal: Prevent transmission of infectious organism to others  Outcome: Progressing Towards Goal     Problem: Nutrition Deficits  Goal: Optimize nutrtional status  Outcome: Progressing Towards Goal    Problem: Risk for Fluid Volume Deficit  Goal: Maintain normal heart rhythm  Outcome: Progressing Towards Goal  Goal: Maintain absence of muscle cramping  Outcome: Progressing Towards Goal  Goal: Maintain normal serum potassium, sodium, calcium, phosphorus, and pH  Outcome: Progressing Towards Goal     Problem: Loneliness or Risk for Loneliness  Goal: Demonstrate positive use of time alone when socialization is not possible  Outcome: Progressing Towards Goal     Problem: Fatigue  Goal: Verbalize increase energy and improved vitality  Outcome: Progressing Towards Goal     Problem: Patient Education: Go to Patient Education Activity  Goal: Patient/Family Education  Outcome: Progressing Towards Goal     Problem: Pressure Injury - Risk of  Goal: *Prevention of pressure injury  Description: Document Govind Scale and appropriate interventions in the flowsheet. Outcome: Progressing Towards Goal  Note: Pressure Injury Interventions:  Sensory Interventions: Assess changes in LOC, Keep linens dry and wrinkle-free, Minimize linen layers, Maintain/enhance activity level, Pressure redistribution bed/mattress (bed type), Sit a 90-degree angle/use footstool if needed, Turn and reposition approx.  every two hours (pillows and wedges if needed), Use 30-degree side-lying position    Moisture Interventions: Absorbent underpads, Check for incontinence Q2 hours and as needed, Minimize layers, Offer toileting Q_hr    Activity Interventions: Increase time out of bed, Pressure redistribution bed/mattress(bed type), PT/OT evaluation    Mobility Interventions: HOB 30 degrees or less, Pressure redistribution bed/mattress (bed type), PT/OT evaluation    Nutrition Interventions: Document food/fluid/supplement intake    Friction and Shear Interventions: Apply protective barrier, creams and emollients, Foam dressings/transparent film/skin sealants, HOB 30 degrees or less, Lift team/patient mobility team, Minimize layers, Sit at 90-degree angle                Problem: Patient Education: Go to Patient Education Activity  Goal: Patient/Family Education  Outcome: Progressing Towards Goal

## 2021-01-30 ENCOUNTER — APPOINTMENT (OUTPATIENT)
Dept: GENERAL RADIOLOGY | Age: 56
DRG: 177 | End: 2021-01-30
Attending: INTERNAL MEDICINE
Payer: COMMERCIAL

## 2021-01-30 LAB
ALBUMIN SERPL-MCNC: 2.3 G/DL (ref 3.5–5)
ALBUMIN/GLOB SERPL: 0.7 {RATIO} (ref 1.1–2.2)
ALP SERPL-CCNC: 80 U/L (ref 45–117)
ALT SERPL-CCNC: 111 U/L (ref 12–78)
ANION GAP SERPL CALC-SCNC: 4 MMOL/L (ref 5–15)
AST SERPL-CCNC: 29 U/L (ref 15–37)
BILIRUB SERPL-MCNC: 0.8 MG/DL (ref 0.2–1)
BUN SERPL-MCNC: 30 MG/DL (ref 6–20)
BUN/CREAT SERPL: 34 (ref 12–20)
CALCIUM SERPL-MCNC: 8.8 MG/DL (ref 8.5–10.1)
CHLORIDE SERPL-SCNC: 98 MMOL/L (ref 97–108)
CO2 SERPL-SCNC: 30 MMOL/L (ref 21–32)
CREAT SERPL-MCNC: 0.89 MG/DL (ref 0.7–1.3)
D DIMER PPP FEU-MCNC: 0.8 MG/L FEU (ref 0–0.65)
ERYTHROCYTE [DISTWIDTH] IN BLOOD BY AUTOMATED COUNT: 13.7 % (ref 11.5–14.5)
FERRITIN SERPL-MCNC: 1339 NG/ML (ref 26–388)
GLOBULIN SER CALC-MCNC: 3.4 G/DL (ref 2–4)
GLUCOSE BLD STRIP.AUTO-MCNC: 394 MG/DL (ref 65–100)
GLUCOSE SERPL-MCNC: 355 MG/DL (ref 65–100)
HCT VFR BLD AUTO: 43 % (ref 36.6–50.3)
HGB BLD-MCNC: 14 G/DL (ref 12.1–17)
MCH RBC QN AUTO: 28.7 PG (ref 26–34)
MCHC RBC AUTO-ENTMCNC: 32.6 G/DL (ref 30–36.5)
MCV RBC AUTO: 88.1 FL (ref 80–99)
NRBC # BLD: 0 K/UL (ref 0–0.01)
NRBC BLD-RTO: 0 PER 100 WBC
PLATELET # BLD AUTO: 158 K/UL (ref 150–400)
PMV BLD AUTO: 12.7 FL (ref 8.9–12.9)
POTASSIUM SERPL-SCNC: 4.9 MMOL/L (ref 3.5–5.1)
PROT SERPL-MCNC: 5.7 G/DL (ref 6.4–8.2)
RBC # BLD AUTO: 4.88 M/UL (ref 4.1–5.7)
SERVICE CMNT-IMP: ABNORMAL
SODIUM SERPL-SCNC: 132 MMOL/L (ref 136–145)
WBC # BLD AUTO: 13.9 K/UL (ref 4.1–11.1)

## 2021-01-30 PROCEDURE — 82728 ASSAY OF FERRITIN: CPT

## 2021-01-30 PROCEDURE — 82962 GLUCOSE BLOOD TEST: CPT

## 2021-01-30 PROCEDURE — 85027 COMPLETE CBC AUTOMATED: CPT

## 2021-01-30 PROCEDURE — 80053 COMPREHEN METABOLIC PANEL: CPT

## 2021-01-30 PROCEDURE — 74011250637 HC RX REV CODE- 250/637: Performed by: HOSPITALIST

## 2021-01-30 PROCEDURE — 85379 FIBRIN DEGRADATION QUANT: CPT

## 2021-01-30 PROCEDURE — 74011250636 HC RX REV CODE- 250/636: Performed by: INTERNAL MEDICINE

## 2021-01-30 PROCEDURE — 71045 X-RAY EXAM CHEST 1 VIEW: CPT

## 2021-01-30 PROCEDURE — 65660000001 HC RM ICU INTERMED STEPDOWN

## 2021-01-30 PROCEDURE — 36415 COLL VENOUS BLD VENIPUNCTURE: CPT

## 2021-01-30 PROCEDURE — 74011250637 HC RX REV CODE- 250/637: Performed by: INTERNAL MEDICINE

## 2021-01-30 PROCEDURE — 77010033711 HC HIGH FLOW OXYGEN

## 2021-01-30 PROCEDURE — 74011636637 HC RX REV CODE- 636/637: Performed by: INTERNAL MEDICINE

## 2021-01-30 RX ADMIN — ASPIRIN 81 MG: 81 TABLET, COATED ORAL at 08:22

## 2021-01-30 RX ADMIN — FUROSEMIDE 40 MG: 10 INJECTION, SOLUTION INTRAMUSCULAR; INTRAVENOUS at 08:22

## 2021-01-30 RX ADMIN — ENOXAPARIN SODIUM 90 MG: 100 INJECTION SUBCUTANEOUS at 23:31

## 2021-01-30 RX ADMIN — FAMOTIDINE 20 MG: 20 TABLET, FILM COATED ORAL at 21:07

## 2021-01-30 RX ADMIN — ENOXAPARIN SODIUM 90 MG: 100 INJECTION SUBCUTANEOUS at 12:56

## 2021-01-30 RX ADMIN — METHYLPREDNISOLONE SODIUM SUCCINATE 60 MG: 40 INJECTION, POWDER, FOR SOLUTION INTRAMUSCULAR; INTRAVENOUS at 08:22

## 2021-01-30 RX ADMIN — HUMAN INSULIN 10 UNITS: 100 INJECTION, SUSPENSION SUBCUTANEOUS at 12:56

## 2021-01-30 RX ADMIN — METHYLPREDNISOLONE SODIUM SUCCINATE 60 MG: 40 INJECTION, POWDER, FOR SOLUTION INTRAMUSCULAR; INTRAVENOUS at 21:06

## 2021-01-30 RX ADMIN — Medication 10 ML: at 06:00

## 2021-01-30 RX ADMIN — ENOXAPARIN SODIUM 90 MG: 100 INJECTION SUBCUTANEOUS at 02:01

## 2021-01-30 RX ADMIN — HUMAN INSULIN 10 UNITS: 100 INJECTION, SUSPENSION SUBCUTANEOUS at 23:32

## 2021-01-30 RX ADMIN — Medication 10 ML: at 13:00

## 2021-01-30 RX ADMIN — METHYLPREDNISOLONE SODIUM SUCCINATE 60 MG: 40 INJECTION, POWDER, FOR SOLUTION INTRAMUSCULAR; INTRAVENOUS at 13:00

## 2021-01-30 RX ADMIN — CHOLECALCIFEROL TAB 25 MCG (1000 UNIT) 2000 UNITS: 25 TAB at 08:22

## 2021-01-30 RX ADMIN — Medication 10 ML: at 21:10

## 2021-01-30 RX ADMIN — FAMOTIDINE 20 MG: 20 TABLET, FILM COATED ORAL at 08:22

## 2021-01-30 RX ADMIN — METHYLPREDNISOLONE SODIUM SUCCINATE 60 MG: 40 INJECTION, POWDER, FOR SOLUTION INTRAMUSCULAR; INTRAVENOUS at 02:00

## 2021-01-30 NOTE — PROGRESS NOTES
Problem: Falls - Risk of  Goal: *Absence of Falls  Description: Document J Luis Led Fall Risk and appropriate interventions in the flowsheet. Outcome: Progressing Towards Goal  Note: Fall Risk Interventions:  Mobility Interventions: OT consult for ADLs, Patient to call before getting OOB, PT Consult for mobility concerns, PT Consult for assist device competence, Strengthening exercises (ROM-active/passive)         Medication Interventions: Patient to call before getting OOB, Teach patient to arise slowly         History of Falls Interventions: Room close to nurse's station         Problem: Patient Education: Go to Patient Education Activity  Goal: Patient/Family Education  Outcome: Progressing Towards Goal     Problem: Breathing Pattern - Ineffective  Goal: *Absence of hypoxia  Outcome: Progressing Towards Goal  Goal: *Use of effective breathing techniques  Outcome: Progressing Towards Goal     Problem: Airway Clearance - Ineffective  Goal: Achieve or maintain patent airway  Outcome: Progressing Towards Goal     Problem: Gas Exchange - Impaired  Goal: Absence of hypoxia  Outcome: Progressing Towards Goal  Goal: Promote optimal lung function  Outcome: Progressing Towards Goal     Problem: Breathing Pattern - Ineffective  Goal: Ability to achieve and maintain a regular respiratory rate  Outcome: Progressing Towards Goal     Problem:  Body Temperature -  Risk of, Imbalanced  Goal: Ability to maintain a body temperature within defined limits  Outcome: Progressing Towards Goal  Goal: Will regain or maintain usual level of consciousness  Outcome: Progressing Towards Goal  Goal: Complications related to the disease process, condition or treatment will be avoided or minimized  Outcome: Progressing Towards Goal     Problem: Isolation Precautions - Risk of Spread of Infection  Goal: Prevent transmission of infectious organism to others  Outcome: Progressing Towards Goal     Problem: Nutrition Deficits  Goal: Optimize nutrtional status  Outcome: Progressing Towards Goal     Problem: Risk for Fluid Volume Deficit  Goal: Maintain normal heart rhythm  Outcome: Progressing Towards Goal  Goal: Maintain absence of muscle cramping  Outcome: Progressing Towards Goal  Goal: Maintain normal serum potassium, sodium, calcium, phosphorus, and pH  Outcome: Progressing Towards Goal     Problem: Loneliness or Risk for Loneliness  Goal: Demonstrate positive use of time alone when socialization is not possible  Outcome: Progressing Towards Goal     Problem: Fatigue  Goal: Verbalize increase energy and improved vitality  Outcome: Progressing Towards Goal     Problem: Patient Education: Go to Patient Education Activity  Goal: Patient/Family Education  Outcome: Progressing Towards Goal     Problem: Pressure Injury - Risk of  Goal: *Prevention of pressure injury  Description: Document Govind Scale and appropriate interventions in the flowsheet.   Outcome: Progressing Towards Goal     Problem: Patient Education: Go to Patient Education Activity  Goal: Patient/Family Education  Outcome: Progressing Towards Goal

## 2021-01-30 NOTE — PROGRESS NOTES
Hospitalist Progress Note    NAME: Rola Story   :  1965   MRN:  [id number]       Assessment / Plan:  Acute hypoxic respiratory failure POA, persistent  Severe multilobar Covid pneumonia POA  Pneumomediastinum , not POA, persistent  -COVID-19 test positive on . -CTA  neg for PE. Shows diffuse bilateral peripheral groundglass opacification and consolidation in the lower lobes   -Initially on nasal cannula/high flow, decompensation  , was on BiPAP since 2021, steroids increased, evaluated by ICU intensivist on .     -has developed pneumomediastinum , changed to high flow from BiPAP to reduce barotrauma, chest x-ray this There is an unchanged tiny right apical pneumothorax. No left pneumothorax  -Dr Reji Jeff Discussed with ICU intensivist .  -Continue HFNC, currently cont to be on 40 L/min high flow nasal cannula saturating 90% when I saw him this a.m.  -s/p  remdesivir  -decreased Solu-Medrol to 40 mg every 6 hours  -s/p antibiotics  -s/p convalescent plasma 2021  -Full dose Lovenox added by pulmonary   -Continue to be very high risk for decompensation  -Repeat x-ray today\"No evidence of pneumothorax. Diffuse bilateral airspace disease, not significantly changed. Pneumomediastinum is unchanged. \"    History of extensive right lower extremity DVT May 2019, likely occupational related  --Finished course of Xarelto and currently on baby aspirin. GERD  --currently not on medication. Pepcid IV twice daily     Obesity  Body mass index is 31.14 kg/m².     Code: Discussed, full code  DVT prophylaxis: Heparin  Surrogate decision maker: Brother Guevara File [phone]    Patient remains high risk for decompensation. Subjective:     Chief Complaint / Reason for Physician Visit  Patient currently on 40 L high flow. Saturating greater than 90%. He looks comfortable, not in acute distress,. Patient was seen and examined.   No acute events overnight. \"Feeling strong\"    Review of Systems:  Symptom Y/N Comments  Symptom Y/N Comments   Fever/Chills n   Chest Pain n    Poor Appetite    Edema     Cough n   Abdominal Pain n    Sputum    Joint Pain     SOB/BULL y  same  Pruritis/Rash     Nausea/vomit n   Tolerating PT/OT     Diarrhea    Tolerating Diet y    Constipation    Other       Could NOT obtain due to:          Objective:     VITALS:   Last 24hrs VS reviewed since prior progress note. Most recent are:  Patient Vitals for the past 24 hrs:   Temp Pulse Resp BP SpO2   01/30/21 1034 -- -- -- -- 94 %   01/30/21 0813 98.1 °F (36.7 °C) 83 20 (!) 140/83 96 %   01/30/21 0502 98.2 °F (36.8 °C) 76 18 (!) 144/72 93 %   01/29/21 2350 97.7 °F (36.5 °C) 74 18 (!) 157/92 96 %   01/29/21 2045 98 °F (36.7 °C) 83 18 (!) 141/76 98 %   01/29/21 1643 -- -- -- -- 95 %   01/29/21 1457 98.1 °F (36.7 °C) 70 20 139/86 97 %   01/29/21 1203 -- -- -- -- 93 %       Intake/Output Summary (Last 24 hours) at 1/30/2021 1053  Last data filed at 1/30/2021 0816  Gross per 24 hour   Intake 480 ml   Output 2700 ml   Net -2220 ml        I had a face to face encounter and independently examined this patient on 1/30/2021, as outlined below:  PHYSICAL EXAM:  General: WD, WN. Alert, cooperative, in moderate respiratory distress    EENT:  EOMI. Anicteric sclerae. MMM  Resp:  B/l lung coarse, rales+  CV:  Regular  rhythm,  No edema  GI:  Soft, Non distended, Non tender. +Bowel sounds  Neurologic:  Alert and oriented X 3, normal speech,   Psych:   Good insight. Not anxious nor agitated  Skin:  No rashes.   No jaundice, subcu around right flank    Reviewed most current lab test results and cultures  YES  Reviewed most current radiology test results   YES  Review and summation of old records today    NO  Reviewed patient's current orders and MAR    YES  PMH/SH reviewed - no change compared to H&P  ________________________________________________________________________  Care Plan discussed with: Comments   Patient x    Family  x 01/29   RN x    Care Manager     Consultant  x  pulmonology                      Multidiciplinary team rounds were held today with , nursing, pharmacist and clinical coordinator. Patient's plan of care was discussed; medications were reviewed and discharge planning was addressed. ________________________________________________________________________      Total CRITICAL CARE TIME Spent: 35 Minutes non procedure based      Comments   >50% of visit spent in counseling and coordination of care x    ________________________________________________________________________  Ken Jovel MD     Procedures: see electronic medical records for all procedures/Xrays and details which were not copied into this note but were reviewed prior to creation of Plan. LABS:  I reviewed today's most current labs and imaging studies.   Pertinent labs include:  Recent Labs     01/30/21  0205 01/28/21  0244   WBC 13.9* 13.7*   HGB 14.0 13.7   HCT 43.0 41.1    188     Recent Labs     01/30/21  0205 01/29/21  0413 01/28/21  0244   * 133* 134*   K 4.9 4.8 4.8   CL 98 100 100   CO2 30 33* 33*   * 253* 258*   BUN 30* 26* 24*   CREA 0.89 0.73 0.83   CA 8.8 8.7 8.7   ALB 2.3* 2.3* 2.4*   TBILI 0.8 0.9 0.7   * 107* 109*       Signed: Ken Jovel MD

## 2021-01-30 NOTE — PROGRESS NOTES
[facility]  Pulmonary, Critical Care, and Sleep Medicine    Name: Armando Hernandez MRN: [id number]   : 1965 Hospital: [facility]   Date: 2021            IMPRESSION:   · Acute hypoxic respiratory failure - severe, secondary to COVID PNA-currently on HF (55L, 100%)  · Severe Multilobar Covid Pneumonia, first Dx on , Symptoms first started on 21  S/P convalescent plasma ; S/p Remdesivir x 5 days total  · New pneumomediastinum 21, radiographic stable  · Covid related coagulopathy  · Covid related inflammation, increased ferritin. · Obesity  · GERD  · Hypertension, LAE and LVH on ekg. RECOMMENDATIONS:   · Continue HFNC and try to avoid NIV if possible to minimize the amount of positive pressure ventilation he is getting to minimize risk of worsening PTX/Pneumomediastiunum. Wean HF as tolerated to keep Spo2 >88%  · Monitor for signs of sq air, none so far. Daily chest X-ray or more frequently if needed  · Daily CXR  · If he declines quickly, will need stat cxr  · Trying to avoid PAP due to pneumomediastinum  · Empiric antibiotics-CFTX, Azithro  · Cont therapeutic Lovenox given suspicion of possible microthrombosis  · Continue steroids- adjusted  · Follow inflammatory and coag markers  · Mobilize out of bed as tolerated  · Very high risk for further decompensation. Low threshold to transfer patient to ICU. ICU team declined patient on 21   PCP: David Mccloud. Subjective:     21: CXR this AM personallyy reviewed. No PTX otherwise no change. Crepitus in neck no change. No chest pain. Down to 36 LPM  D/w nursing. Drops sats with transfers Not fatigued. Ferritin 1209---> 1301 ---> 1339;   cc time 35 minutes      21: at one point down to 40 LPM now %% LPM.d/w nursing. Drops at night. Does prone. SC air inneck CXR done and personally reviewed. PTX minimal pneumomediastiinum same. OOB in recliner. Not fatigued. . Stoic. events of last night reviewed; I spoke with DIL. Pt getting depressed. No chest pain. On HF-55L, Ferritin 1209---> 1301 cc time 35 minutes    1-28-21: Needing more O2. Not fatigued. . Stoic. events of last night reviewed; No chest pain. Prone last night. felles better lying down. On HF-55L, Ferritin 1209    1-27-21: events of last night reviewed; No chest pain. Prone last night. felles better lying down. On HF-35L,    1-26-21: events of last night reviewed; No chest pain. Prone last night. felles better lying down. On HF-35L, ddimer 2.43; ferritin 1157; CRP 0.67 on solumedrol, LMWH    1-25-21: events of weekend reviewed; No chest pain. Prone last night. sats marginal now. On HF-55L, SOB with minimal ambulation in the room. Would like to use BSC if able. Incentive on 400 ml    1-24-21: uneventful night. On HF-55L, SOB with minimal ambulation in the room  1-23-21: remains on HF (100%, 60L). SOB with minimal exertion. No cough or CP  1-22-21: remains dependent on HFNC. Has mild dyspnea. 1-21-21: remains dependent on BiPAP. Feels about the same. Mild dyspnea unchanged, at least while he is on BiPAP  1-20-21: Pt appears and feels more comfortable on bipap this am. Has no headaches, no chest pain, decreased coughing. He feels more hungry today. 1-19-21: Pt looks worse this am. Has increased RR into the mid 35s. Dyspneic. On high flow oxygen. Discussed convalescent plasma and he agrees. Feels very fatigued. No appetite. His temp has been normal.     This patient has been seen and evaluated at the request of Dr. Chana Webster for above. Patient is a 48 y.o. male   Who was first ill on 1/4/21. Has increased shortness of breath. Had dry cough. Weakness. Diarrhea. Weakness has been worse over last 3 days. He has been with increased  Breathing difficulty. Feels his breathing is constricted. Has decreased po intake. Has felt hot. Some loose stools. Has mild GERD.    Non smoker, Occ etoh use. Chews tobacco.      Past Medical History:   Diagnosis Date    GERD (gastroesophageal reflux disease)     Obesity (BMI 30-39. 9) 5/8/2019    Right leg DVT ([address] 2019      Past Surgical History:   Procedure Laterality Date    HX ORTHOPAEDIC        Prior to Admission medications    Medication Sig Start Date End Date Taking? Authorizing Provider   aspirin delayed-release 81 mg tablet Take 81 mg by mouth daily. Yes Other, MD Leopoldo   albuterol (PROVENTIL HFA, VENTOLIN HFA, PROAIR HFA) 90 mcg/actuation inhaler Take 2 Puffs by inhalation every four (4) hours as needed for Wheezing. 1/13/21  Yes Armen Chaudhary MD   multivitamin (ONE A DAY) tablet Take 1 Tab by mouth daily. Yes Provider, Historical   cholecalciferol (VITAMIN D3) 1,000 unit cap Take 1,000 Units by mouth daily. Yes Provider, Historical   ascorbic acid, vitamin C, (VITAMIN C) 500 mg tablet Take 500 mg by mouth daily.    Yes Provider, Historical     No Known Allergies   Social History     Tobacco Use    Smoking status: Never Smoker    Smokeless tobacco: Current User   Substance Use Topics    Alcohol use: Yes     Comment: once a month      Family History   Problem Relation Age of Onset    Cancer Mother     Cancer Father         Current Facility-Administered Medications   Medication Dose Route Frequency    methylPREDNISolone (PF) (SOLU-MEDROL) injection 60 mg  60 mg IntraVENous Q6H    famotidine (PEPCID) tablet 20 mg  20 mg Oral Q12H    furosemide (LASIX) injection 40 mg  40 mg IntraVENous DAILY    enoxaparin (LOVENOX) injection 90 mg  1 mg/kg SubCUTAneous Q12H    influenza vaccine 2020-21 (6 mos+)(PF) (FLUARIX/FLULAVAL/FLUZONE QUAD) injection 0.5 mL  0.5 mL IntraMUSCular PRIOR TO DISCHARGE    sodium chloride (NS) flush 5-40 mL  5-40 mL IntraVENous Q8H    cholecalciferol (VITAMIN D3) (1000 Units /25 mcg) tablet 2 Tab  2,000 Units Oral DAILY    aspirin delayed-release tablet 81 mg  81 mg Oral DAILY       Review of Systems:  Constitutional: positive for fevers, chills, sweats, fatigue, malaise and anorexia, negative for weight loss  Eyes: negative  Ears, nose, mouth, throat, and face: positive for nasal congestion  Respiratory: positive for cough or dyspnea on exertion  Cardiovascular: positive for fatigue, paroxysmal nocturnal dyspnea, tachypnea, dyspnea on exertion  Gastrointestinal: positive for dyspepsia, reflux symptoms, nausea and change in bowel habits  Genitourinary:negative  Integument/breast: negative  Hematologic/lymphatic: negative  Musculoskeletal:negative  Neurological: negative  Behavioral/Psych: negative  Endocrine: negative  Allergic/Immunologic: negative    Objective:   Vital Signs:    Visit Vitals  /76 (BP 1 Location: Left upper arm, BP Patient Position: Sitting)   Pulse 80   Temp 98.1 °F (36.7 °C)   Resp 20   Ht 5' 8\" (1.727 m)   Wt 92.9 kg (204 lb 12.9 oz)   SpO2 93%   BMI 31.14 kg/m²       O2 Device: Heated, Hi flow nasal cannula   O2 Flow Rate (L/min): 40 l/min   Temp (24hrs), Av °F (36.7 °C), Min:97.7 °F (36.5 °C), Max:98.2 °F (36.8 °C)       Intake/Output:   Last shift:       0701 -  1900  In: 480 [P.O.:480]  Out: 1700 [Urine:1700]  Last 3 shifts:  190 -  0700  In: 480 [P.O.:480]  Out: 3000 [Urine:3000]    Intake/Output Summary (Last 24 hours) at 2021 1133  Last data filed at 2021 1108  Gross per 24 hour   Intake 480 ml   Output 3150 ml   Net -2670 ml      Physical Exam:   General:  Alert, cooperative, no resp distress on bipap with 100% fio2. Head:  Normocephalic, without obvious abnormality, atraumatic. Eyes:  Conjunctivae/corneas clear    Nose: Nares normal. Septum midline. Mucosa normal.     Throat: Lips, mucosa, and tongue normal.    Neck: Supple, symmetrical, trachea midline    Lungs:   Distant bilateral breath sounds   Chest wall:  No tenderness or deformity. Heart:  Regular rate and rhythm    Abdomen:   Soft, non-tender.  Bowel sounds normal. Extremities: Extremities normal, atraumatic, no cyanosis    Skin: Skin color, texture, turgor normal. No crepitus   Neurologic: Grossly nonfocal     Data:   Labs:  Recent Labs     01/30/21  0205 01/28/21  0244   WBC 13.9* 13.7*   HGB 14.0 13.7   HCT 43.0 41.1    188     Recent Labs     01/30/21  0205 01/29/21  0413 01/28/21  0244   * 133* 134*   K 4.9 4.8 4.8   CL 98 100 100   CO2 30 33* 33*   * 253* 258*   BUN 30* 26* 24*   CREA 0.89 0.73 0.83   CA 8.8 8.7 8.7   ALB 2.3* 2.3* 2.4*   TBILI 0.8 0.9 0.7   * 107* 109*     No results for input(s): PHI, PCO2I, PO2I, HCO3I, FIO2I in the last 72 hours.     Imaging:  I have personally reviewed the patients radiographs:  Persistent bilateral infiltrates; slight increase in pneumomediastinum         Melody Castro MD

## 2021-01-31 ENCOUNTER — APPOINTMENT (OUTPATIENT)
Dept: GENERAL RADIOLOGY | Age: 56
DRG: 177 | End: 2021-01-31
Attending: INTERNAL MEDICINE
Payer: COMMERCIAL

## 2021-01-31 LAB
D DIMER PPP FEU-MCNC: 0.68 MG/L FEU (ref 0–0.65)
FERRITIN SERPL-MCNC: 1361 NG/ML (ref 26–388)
PROCALCITONIN SERPL-MCNC: <0.05 NG/ML

## 2021-01-31 PROCEDURE — 84145 PROCALCITONIN (PCT): CPT

## 2021-01-31 PROCEDURE — 74011250636 HC RX REV CODE- 250/636: Performed by: INTERNAL MEDICINE

## 2021-01-31 PROCEDURE — 77010033711 HC HIGH FLOW OXYGEN

## 2021-01-31 PROCEDURE — 74011636637 HC RX REV CODE- 636/637: Performed by: INTERNAL MEDICINE

## 2021-01-31 PROCEDURE — 74011250637 HC RX REV CODE- 250/637: Performed by: INTERNAL MEDICINE

## 2021-01-31 PROCEDURE — 82728 ASSAY OF FERRITIN: CPT

## 2021-01-31 PROCEDURE — 36415 COLL VENOUS BLD VENIPUNCTURE: CPT

## 2021-01-31 PROCEDURE — 65660000001 HC RM ICU INTERMED STEPDOWN

## 2021-01-31 PROCEDURE — 85379 FIBRIN DEGRADATION QUANT: CPT

## 2021-01-31 PROCEDURE — 74011250637 HC RX REV CODE- 250/637: Performed by: HOSPITALIST

## 2021-01-31 PROCEDURE — 71045 X-RAY EXAM CHEST 1 VIEW: CPT

## 2021-01-31 RX ADMIN — HUMAN INSULIN 20 UNITS: 100 INJECTION, SUSPENSION SUBCUTANEOUS at 14:49

## 2021-01-31 RX ADMIN — Medication 10 ML: at 06:56

## 2021-01-31 RX ADMIN — Medication 10 ML: at 21:27

## 2021-01-31 RX ADMIN — ENOXAPARIN SODIUM 90 MG: 100 INJECTION SUBCUTANEOUS at 13:29

## 2021-01-31 RX ADMIN — FAMOTIDINE 20 MG: 20 TABLET, FILM COATED ORAL at 09:39

## 2021-01-31 RX ADMIN — CHOLECALCIFEROL TAB 25 MCG (1000 UNIT) 2000 UNITS: 25 TAB at 09:38

## 2021-01-31 RX ADMIN — METHYLPREDNISOLONE SODIUM SUCCINATE 60 MG: 40 INJECTION, POWDER, FOR SOLUTION INTRAMUSCULAR; INTRAVENOUS at 09:39

## 2021-01-31 RX ADMIN — METHYLPREDNISOLONE SODIUM SUCCINATE 40 MG: 40 INJECTION, POWDER, FOR SOLUTION INTRAMUSCULAR; INTRAVENOUS at 21:18

## 2021-01-31 RX ADMIN — ASPIRIN 81 MG: 81 TABLET, COATED ORAL at 09:39

## 2021-01-31 RX ADMIN — FUROSEMIDE 40 MG: 10 INJECTION, SOLUTION INTRAMUSCULAR; INTRAVENOUS at 09:39

## 2021-01-31 RX ADMIN — Medication 10 ML: at 14:48

## 2021-01-31 RX ADMIN — FAMOTIDINE 20 MG: 20 TABLET, FILM COATED ORAL at 21:18

## 2021-01-31 RX ADMIN — METHYLPREDNISOLONE SODIUM SUCCINATE 60 MG: 40 INJECTION, POWDER, FOR SOLUTION INTRAMUSCULAR; INTRAVENOUS at 02:08

## 2021-01-31 RX ADMIN — METHYLPREDNISOLONE SODIUM SUCCINATE 40 MG: 40 INJECTION, POWDER, FOR SOLUTION INTRAMUSCULAR; INTRAVENOUS at 14:48

## 2021-01-31 NOTE — PROGRESS NOTES
[facility]  Pulmonary, Critical Care, and Sleep Medicine    Name: Shivam Steen MRN: [id number]   : 1965 Hospital: [facility]   Date: 2021            IMPRESSION:   · Acute hypoxic respiratory failure - severe, secondary to COVID PNA-currently on HF (55L, 100%)  · Severe Multilobar Covid Pneumonia, first Dx on , Symptoms first started on 21  S/P convalescent plasma ; S/p Remdesivir x 5 days total  · New pneumomediastinum 21, radiographic stable las couple of days; crepitus in neck  · Covid related coagulopathy  · Covid related inflammation, increased ferritin. · Steroid induced hyperglycemia  · Obesity  · GERD  · Hypertension, LAE and LVH on ekg. RECOMMENDATIONS:   · Continue HFNC and try to avoid NIV if possible to minimize the amount of positive pressure ventilation he is getting to minimize risk of worsening PTX/Pneumomediastiunum. Wean HF as tolerated to keep Spo2 >88%  · Monitor for signs of sq air, none so far. Daily chest X-ray or more frequently if needed  · CXR in AM  · If he declines quickly, will need stat cxr  · Trying to avoid PAP due to pneumomediastinum  · Empiric antibiotics-CFTX, Azithro completed  · Cont therapeutic Lovenox given suspicion of possible microthrombosis  · Continue steroids- will start to reduce  · Follow inflammatory and coag markers  · Mobilize out of bed as tolerated  · Very high risk for further decompensation. Low threshold to transfer patient to ICU. ICU team declined patient on 21   PCP: Malvin Tomas. Subjective:     21: CXR this AM personally reviewed. No PTX otherwise no change. Crepitus in neck no change. No chest pain. Still on 40 LPM  D/w nursing. Drops sats with transfers. PCT normal    21: CXR this AM personallyy reviewed. No PTX otherwise no change. Crepitus in neck no change. No chest pain. Down to 36 LPM  D/w nursing.  Drops sats with transfers Not fatigued. Ferritin 1209---> 1301 ---> 1339;   cc time 35 minutes      1-29-21: at one point down to 40 LPM now %% LPM.d/w nursing. Drops at night. Does prone. SC air inneck CXR done and personally reviewed. PTX minimal pneumomediastiinum same. OOB in recliner. Not fatigued. . Stoic. events of last night reviewed; I spoke with DIL. Pt getting depressed. No chest pain. On HF-55L, Ferritin 1209---> 1301 cc time 35 minutes    1-28-21: Needing more O2. Not fatigued. . Stoic. events of last night reviewed; No chest pain. Prone last night. felles better lying down. On HF-55L, Ferritin 1209    1-27-21: events of last night reviewed; No chest pain. Prone last night. felles better lying down. On HF-35L,    1-26-21: events of last night reviewed; No chest pain. Prone last night. felles better lying down. On HF-35L, ddimer 2.43; ferritin 1157; CRP 0.67 on solumedrol, LMWH    1-25-21: events of weekend reviewed; No chest pain. Prone last night. sats marginal now. On HF-55L, SOB with minimal ambulation in the room. Would like to use BSC if able. Incentive on 400 ml    1-24-21: uneventful night. On HF-55L, SOB with minimal ambulation in the room  1-23-21: remains on HF (100%, 60L). SOB with minimal exertion. No cough or CP  1-22-21: remains dependent on HFNC. Has mild dyspnea. 1-21-21: remains dependent on BiPAP. Feels about the same. Mild dyspnea unchanged, at least while he is on BiPAP  1-20-21: Pt appears and feels more comfortable on bipap this am. Has no headaches, no chest pain, decreased coughing. He feels more hungry today. 1-19-21: Pt looks worse this am. Has increased RR into the mid 35s. Dyspneic. On high flow oxygen. Discussed convalescent plasma and he agrees. Feels very fatigued. No appetite. His temp has been normal.     This patient has been seen and evaluated at the request of Dr. Vinh Machado for above. Patient is a 48 y.o. male   Who was first ill on 1/4/21.  Has increased shortness of breath. Had dry cough. Weakness. Diarrhea. Weakness has been worse over last 3 days. He has been with increased  Breathing difficulty. Feels his breathing is constricted. Has decreased po intake. Has felt hot. Some loose stools. Has mild GERD. Non smoker, Occ etoh use. Chews tobacco.      Past Medical History:   Diagnosis Date    GERD (gastroesophageal reflux disease)     Obesity (BMI 30-39. 9) 5/8/2019    Right leg DVT ([address] 2019      Past Surgical History:   Procedure Laterality Date    HX ORTHOPAEDIC        Prior to Admission medications    Medication Sig Start Date End Date Taking? Authorizing Provider   aspirin delayed-release 81 mg tablet Take 81 mg by mouth daily. Yes Other, MD Leopoldo   albuterol (PROVENTIL HFA, VENTOLIN HFA, PROAIR HFA) 90 mcg/actuation inhaler Take 2 Puffs by inhalation every four (4) hours as needed for Wheezing. 1/13/21  Yes Armin Lyons MD   multivitamin (ONE A DAY) tablet Take 1 Tab by mouth daily. Yes Provider, Historical   cholecalciferol (VITAMIN D3) 1,000 unit cap Take 1,000 Units by mouth daily. Yes Provider, Historical   ascorbic acid, vitamin C, (VITAMIN C) 500 mg tablet Take 500 mg by mouth daily.    Yes Provider, Historical     No Known Allergies   Social History     Tobacco Use    Smoking status: Never Smoker    Smokeless tobacco: Current User   Substance Use Topics    Alcohol use: Yes     Comment: once a month      Family History   Problem Relation Age of Onset    Cancer Mother     Cancer Father         Current Facility-Administered Medications   Medication Dose Route Frequency    insulin NPH (NOVOLIN N, HUMULIN N) injection 10 Units  10 Units SubCUTAneous Q12H    methylPREDNISolone (PF) (SOLU-MEDROL) injection 60 mg  60 mg IntraVENous Q6H    famotidine (PEPCID) tablet 20 mg  20 mg Oral Q12H    furosemide (LASIX) injection 40 mg  40 mg IntraVENous DAILY    enoxaparin (LOVENOX) injection 90 mg  1 mg/kg SubCUTAneous Q12H    influenza vaccine 2020- (6 mos+)(PF) (FLUARIX/FLULAVAL/FLUZONE QUAD) injection 0.5 mL  0.5 mL IntraMUSCular PRIOR TO DISCHARGE    sodium chloride (NS) flush 5-40 mL  5-40 mL IntraVENous Q8H    cholecalciferol (VITAMIN D3) (1000 Units /25 mcg) tablet 2 Tab  2,000 Units Oral DAILY    aspirin delayed-release tablet 81 mg  81 mg Oral DAILY       Review of Systems:  Constitutional: positive for fevers, chills, sweats, fatigue, malaise and anorexia, negative for weight loss  Eyes: negative  Ears, nose, mouth, throat, and face: positive for nasal congestion  Respiratory: positive for cough or dyspnea on exertion  Cardiovascular: positive for fatigue, paroxysmal nocturnal dyspnea, tachypnea, dyspnea on exertion  Gastrointestinal: positive for dyspepsia, reflux symptoms, nausea and change in bowel habits  Genitourinary:negative  Integument/breast: negative  Hematologic/lymphatic: negative  Musculoskeletal:negative  Neurological: negative  Behavioral/Psych: negative  Endocrine: negative  Allergic/Immunologic: negative    Objective:   Vital Signs:    Visit Vitals  /85 (BP 1 Location: Left upper arm, BP Patient Position: Sitting)   Pulse 79   Temp 98.1 °F (36.7 °C)   Resp 20   Ht 5' 8\" (1.727 m)   Wt 92.9 kg (204 lb 12.9 oz)   SpO2 96%   BMI 31.14 kg/m²       O2 Device: Heated, Hi flow nasal cannula   O2 Flow Rate (L/min): 40 l/min   Temp (24hrs), Av.1 °F (36.7 °C), Min:98 °F (36.7 °C), Max:98.2 °F (36.8 °C)       Intake/Output:   Last shift:       07 - 1900  In: -   Out: 1600 [Urine:1600]  Last 3 shifts: 1901 -  0700  In: 840 [P.O.:840]  Out: 3100 [Urine:3100]    Intake/Output Summary (Last 24 hours) at 2021 1030  Last data filed at 2021 0956  Gross per 24 hour   Intake 360 ml   Output 2450 ml   Net -2090 ml      Physical Exam:   General:  Alert, cooperative, no resp distress on bipap with 100% fio2. Head:  Normocephalic, without obvious abnormality, atraumatic.    Eyes: Conjunctivae/corneas clear    Nose: Nares normal. Septum midline. Mucosa normal.     Throat: Lips, mucosa, and tongue normal.    Neck: Supple, symmetrical, trachea midline    Lungs:   Distant bilateral breath sounds   Chest wall:  No tenderness or deformity. Heart:  Regular rate and rhythm    Abdomen:   Soft, non-tender. Bowel sounds normal.    Extremities: Extremities normal, atraumatic, no cyanosis    Skin: Skin color, texture, turgor normal. No crepitus   Neurologic: Grossly nonfocal     Data:   Labs:  Recent Labs     01/30/21  0205   WBC 13.9*   HGB 14.0   HCT 43.0        Recent Labs     01/30/21  0205 01/29/21  0413   * 133*   K 4.9 4.8   CL 98 100   CO2 30 33*   * 253*   BUN 30* 26*   CREA 0.89 0.73   CA 8.8 8.7   ALB 2.3* 2.3*   TBILI 0.8 0.9   * 107*     No results for input(s): PHI, PCO2I, PO2I, HCO3I, FIO2I in the last 72 hours.     Imaging:  I have personally reviewed the patients radiographs:  Persistent bilateral infiltrates; slight increase in pneumomediastinum         Kerri Greene MD

## 2021-01-31 NOTE — PROGRESS NOTES
Hospitalist Progress Note    NAME: Ever Woody   :  1965   MRN:  [id number]       Assessment / Plan:  Acute hypoxic respiratory failure POA, persistent  Severe multilobar Covid pneumonia POA  Pneumomediastinum , not POA, persistent  -COVID-19 test positive on . -CTA  neg for PE. Shows diffuse bilateral peripheral groundglass opacification and consolidation in the lower lobes   -Initially on nasal cannula/high flow, decompensation  , was on BiPAP since 2021, steroids increased, evaluated by ICU intensivist on .     -has developed pneumomediastinum , changed to high flow from BiPAP to reduce barotrauma, chest x-ray this There is an unchanged tiny right apical pneumothorax. No left pneumothorax  -Dr Jenna Hernandez Discussed with ICU intensivist .  -Continue HFNC, currently cont to be on 40 L/min high flow nasal cannula saturating 90% when I saw him this a.m.  -s/p  remdesivir  -decreased Solu-Medrol to 40 mg every 6 hours  -s/p antibiotics  -s/p convalescent plasma 2021  -Full dose Lovenox added by pulmonary   -Continue to be very high risk for decompensation  -Repeat x-ray today\"No evidence of pneumothorax. Diffuse bilateral airspace disease, not significantly changed. Pneumomediastinum is unchanged. \"  -No real changes of the last 24 hours. Continue to be on 40 L high flow. History of extensive right lower extremity DVT May 2019, likely occupational related  --Finished course of Xarelto and currently on baby aspirin. GERD  --currently not on medication. Pepcid IV twice daily     Obesity  Body mass index is 31.14 kg/m².     Code: Discussed, full code  DVT prophylaxis: Heparin  Surrogate decision maker: Brother Rajwinder Idol [phone]    Patient remains high risk for decompensation. Subjective:     Chief Complaint / Reason for Physician Visit  Patient currently on 40 L high flow. Saturating greater than 90%.   He looks comfortable, not in acute distress,. Patient was seen and examined. No acute events overnight. \"Doing fine\"    Review of Systems:  Symptom Y/N Comments  Symptom Y/N Comments   Fever/Chills n   Chest Pain n    Poor Appetite    Edema     Cough n   Abdominal Pain n    Sputum    Joint Pain     SOB/BULL y  same  Pruritis/Rash     Nausea/vomit n   Tolerating PT/OT     Diarrhea    Tolerating Diet y    Constipation    Other       Could NOT obtain due to:          Objective:     VITALS:   Last 24hrs VS reviewed since prior progress note. Most recent are:  Patient Vitals for the past 24 hrs:   Temp Pulse Resp BP SpO2   01/31/21 1102 97.9 °F (36.6 °C) 79 20 137/79 94 %   01/31/21 0914 -- -- -- -- 96 %   01/31/21 0746 98.1 °F (36.7 °C) 79 20 125/85 96 %   01/31/21 0348 98.1 °F (36.7 °C) 72 20 129/84 98 %   01/31/21 0054 98 °F (36.7 °C) 88 20 (!) 146/88 95 %   01/30/21 2228 -- -- -- -- 93 %   01/30/21 2015 98.1 °F (36.7 °C) 76 20 (!) 146/87 93 %   01/30/21 1605 -- -- -- -- 91 %   01/30/21 1500 98.2 °F (36.8 °C) 76 20 138/85 94 %       Intake/Output Summary (Last 24 hours) at 1/31/2021 1142  Last data filed at 1/31/2021 1223  Gross per 24 hour   Intake --   Output 2000 ml   Net -2000 ml        I had a face to face encounter and independently examined this patient on 1/31/2021, as outlined below:  PHYSICAL EXAM:  General: WD, WN. Alert, cooperative, in moderate respiratory distress    EENT:  EOMI. Anicteric sclerae. MMM  Resp:  B/l lung coarse, rales+  CV:  Regular  rhythm,  No edema  GI:  Soft, Non distended, Non tender. +Bowel sounds  Neurologic:  Alert and oriented X 3, normal speech,   Psych:   Good insight. Not anxious nor agitated  Skin:  No rashes.   No jaundice, subcu around right flank    Reviewed most current lab test results and cultures  YES  Reviewed most current radiology test results   YES  Review and summation of old records today    NO  Reviewed patient's current orders and MAR    YES  PMH/SH reviewed - no change compared to H&P  ________________________________________________________________________  Care Plan discussed with:    Comments   Patient x    Family  x 01/29   RN x    Care Manager     Consultant  x  pulmonology                      Multidiciplinary team rounds were held today with , nursing, pharmacist and clinical coordinator. Patient's plan of care was discussed; medications were reviewed and discharge planning was addressed. ________________________________________________________________________      Total CRITICAL CARE TIME Spent: 35 Minutes non procedure based      Comments   >50% of visit spent in counseling and coordination of care x    ________________________________________________________________________  Marie Santos MD     Procedures: see electronic medical records for all procedures/Xrays and details which were not copied into this note but were reviewed prior to creation of Plan. LABS:  I reviewed today's most current labs and imaging studies.   Pertinent labs include:  Recent Labs     01/30/21  0205   WBC 13.9*   HGB 14.0   HCT 43.0        Recent Labs     01/30/21  0205 01/29/21  0413   * 133*   K 4.9 4.8   CL 98 100   CO2 30 33*   * 253*   BUN 30* 26*   CREA 0.89 0.73   CA 8.8 8.7   ALB 2.3* 2.3*   TBILI 0.8 0.9   * 107*       Signed: Marie Santos MD

## 2021-02-01 ENCOUNTER — APPOINTMENT (OUTPATIENT)
Dept: GENERAL RADIOLOGY | Age: 56
DRG: 177 | End: 2021-02-01
Attending: INTERNAL MEDICINE
Payer: COMMERCIAL

## 2021-02-01 LAB
ANION GAP SERPL CALC-SCNC: 0 MMOL/L (ref 5–15)
BUN SERPL-MCNC: 34 MG/DL (ref 6–20)
BUN/CREAT SERPL: 45 (ref 12–20)
CALCIUM SERPL-MCNC: 9.2 MG/DL (ref 8.5–10.1)
CHLORIDE SERPL-SCNC: 100 MMOL/L (ref 97–108)
CO2 SERPL-SCNC: 34 MMOL/L (ref 21–32)
CREAT SERPL-MCNC: 0.75 MG/DL (ref 0.7–1.3)
D DIMER PPP FEU-MCNC: 0.89 MG/L FEU (ref 0–0.65)
ERYTHROCYTE [DISTWIDTH] IN BLOOD BY AUTOMATED COUNT: 13.7 % (ref 11.5–14.5)
EST. AVERAGE GLUCOSE BLD GHB EST-MCNC: 137 MG/DL
FERRITIN SERPL-MCNC: 1522 NG/ML (ref 26–388)
GLUCOSE SERPL-MCNC: 265 MG/DL (ref 65–100)
HBA1C MFR BLD: 6.4 % (ref 4–5.6)
HCT VFR BLD AUTO: 44.7 % (ref 36.6–50.3)
HGB BLD-MCNC: 14.5 G/DL (ref 12.1–17)
MCH RBC QN AUTO: 28.3 PG (ref 26–34)
MCHC RBC AUTO-ENTMCNC: 32.4 G/DL (ref 30–36.5)
MCV RBC AUTO: 87.1 FL (ref 80–99)
NRBC # BLD: 0 K/UL (ref 0–0.01)
NRBC BLD-RTO: 0 PER 100 WBC
PLATELET # BLD AUTO: 148 K/UL (ref 150–400)
PMV BLD AUTO: 11.9 FL (ref 8.9–12.9)
POTASSIUM SERPL-SCNC: 4.7 MMOL/L (ref 3.5–5.1)
RBC # BLD AUTO: 5.13 M/UL (ref 4.1–5.7)
SODIUM SERPL-SCNC: 134 MMOL/L (ref 136–145)
WBC # BLD AUTO: 15 K/UL (ref 4.1–11.1)

## 2021-02-01 PROCEDURE — 74011250637 HC RX REV CODE- 250/637: Performed by: HOSPITALIST

## 2021-02-01 PROCEDURE — 85379 FIBRIN DEGRADATION QUANT: CPT

## 2021-02-01 PROCEDURE — 77010033711 HC HIGH FLOW OXYGEN

## 2021-02-01 PROCEDURE — 36415 COLL VENOUS BLD VENIPUNCTURE: CPT

## 2021-02-01 PROCEDURE — 71045 X-RAY EXAM CHEST 1 VIEW: CPT

## 2021-02-01 PROCEDURE — 83036 HEMOGLOBIN GLYCOSYLATED A1C: CPT

## 2021-02-01 PROCEDURE — 74011250636 HC RX REV CODE- 250/636: Performed by: INTERNAL MEDICINE

## 2021-02-01 PROCEDURE — 65660000001 HC RM ICU INTERMED STEPDOWN

## 2021-02-01 PROCEDURE — 74011636637 HC RX REV CODE- 636/637: Performed by: INTERNAL MEDICINE

## 2021-02-01 PROCEDURE — 85027 COMPLETE CBC AUTOMATED: CPT

## 2021-02-01 PROCEDURE — 74011250637 HC RX REV CODE- 250/637: Performed by: INTERNAL MEDICINE

## 2021-02-01 PROCEDURE — 82728 ASSAY OF FERRITIN: CPT

## 2021-02-01 PROCEDURE — 80048 BASIC METABOLIC PNL TOTAL CA: CPT

## 2021-02-01 RX ADMIN — METHYLPREDNISOLONE SODIUM SUCCINATE 40 MG: 40 INJECTION, POWDER, FOR SOLUTION INTRAMUSCULAR; INTRAVENOUS at 02:36

## 2021-02-01 RX ADMIN — Medication 10 ML: at 07:04

## 2021-02-01 RX ADMIN — Medication 10 ML: at 14:00

## 2021-02-01 RX ADMIN — METHYLPREDNISOLONE SODIUM SUCCINATE 40 MG: 40 INJECTION, POWDER, FOR SOLUTION INTRAMUSCULAR; INTRAVENOUS at 08:11

## 2021-02-01 RX ADMIN — ASPIRIN 81 MG: 81 TABLET, COATED ORAL at 11:54

## 2021-02-01 RX ADMIN — METHYLPREDNISOLONE SODIUM SUCCINATE 40 MG: 40 INJECTION, POWDER, FOR SOLUTION INTRAMUSCULAR; INTRAVENOUS at 13:14

## 2021-02-01 RX ADMIN — Medication 10 ML: at 21:52

## 2021-02-01 RX ADMIN — ENOXAPARIN SODIUM 90 MG: 100 INJECTION SUBCUTANEOUS at 02:36

## 2021-02-01 RX ADMIN — HUMAN INSULIN 20 UNITS: 100 INJECTION, SUSPENSION SUBCUTANEOUS at 13:14

## 2021-02-01 RX ADMIN — METHYLPREDNISOLONE SODIUM SUCCINATE 40 MG: 40 INJECTION, POWDER, FOR SOLUTION INTRAMUSCULAR; INTRAVENOUS at 21:52

## 2021-02-01 RX ADMIN — FAMOTIDINE 20 MG: 20 TABLET, FILM COATED ORAL at 11:54

## 2021-02-01 RX ADMIN — HUMAN INSULIN 20 UNITS: 100 INJECTION, SUSPENSION SUBCUTANEOUS at 02:36

## 2021-02-01 RX ADMIN — CHOLECALCIFEROL TAB 25 MCG (1000 UNIT) 2000 UNITS: 25 TAB at 11:54

## 2021-02-01 RX ADMIN — FAMOTIDINE 20 MG: 20 TABLET, FILM COATED ORAL at 21:52

## 2021-02-01 RX ADMIN — ENOXAPARIN SODIUM 90 MG: 100 INJECTION SUBCUTANEOUS at 13:14

## 2021-02-01 RX ADMIN — ENOXAPARIN SODIUM 90 MG: 100 INJECTION SUBCUTANEOUS at 23:07

## 2021-02-01 RX ADMIN — FUROSEMIDE 40 MG: 10 INJECTION, SOLUTION INTRAMUSCULAR; INTRAVENOUS at 11:54

## 2021-02-01 NOTE — PROGRESS NOTES
Comprehensive Nutrition Assessment    Type and Reason for Visit: Reassess    Nutrition Recommendations/Plan:   · RD adjusted diet to a Consistent Carb Diet to assist with BG management. · Continue Enlive for now; will monitor BG and po intake. If BG remains elevated and PO >75% of meals, consider discontinue nutritional supplements. · Please document % meals and supplements consumed in flowsheet I/O's under intake. Nutrition Assessment:     2/1: Chart reviewed; med noted for COVID-19, acute resp failure. As an effort to limit exposure, RD attempted to call pt over the phone but pt did not answer. Continues to receive a regular diet but BG levels remain significantly elevated. Therefore, adjusted to a Consistent Carb Diet to see if this will help. Pt also receiving steroid meds which influence BG levels. Patient Vitals for the past 72 hrs:   % Diet Eaten   01/30/21 1108 100 %   01/30/21 0813 100 %     Last Weight Metric  Weight Loss Metrics 1/18/2021 1/13/2021 5/18/2020 8/12/2019 5/18/2019 5/17/2019 5/8/2019   Today's Wt 204 lb 12.9 oz 204 lb 12.9 oz 205 lb 14.6 oz 208 lb 3.2 oz 205 lb 11 oz 210 lb 198 lb   BMI 31.14 kg/m2 31.14 kg/m2 31.31 kg/m2 31.66 kg/m2 31.27 kg/m2 31.93 kg/m2 30.11 kg/m2     Estimated Daily Nutrient Needs:  Energy (kcal): 2017 kcal (BMR x 1. 3AF - 250); Weight Used for Energy Requirements: Current  Protein (g): 74-93g (0.8-1g/kg); Weight Used for Protein Requirements: Current  Fluid (ml/day): 2000mL; Method Used for Fluid Requirements: 1 ml/kcal    Nutrition Related Findings:  Labs:  mg/dl; Meds: Vitamin D, pepcid, prednisone; BM: 1/31      Wounds:    None       Current Nutrition Therapies:  DIET NUTRITIONAL SUPPLEMENTS Dinner; Ensure [employer]  DIET DIABETIC CONSISTENT CARB Regular    Anthropometric Measures:  · Height:  5' 8\" (172.7 cm)  · Current Body Wt:  92.9 kg (204 lb 12.9 oz)   · Ideal Body Wt:  154 lbs:  133 %   · BMI Category:  Obese class 1 (BMI 30.0-34. 9)       Nutrition Diagnosis:   · Altered nutrition-related lab values related to (current medical condition, acute illness, steroid meds) as evidenced by (elevated BG levels >300 mg/dl)    Nutrition Interventions:   Food and/or Nutrient Delivery: Modify current diet  Nutrition Education and Counseling: No recommendations at this time  Coordination of Nutrition Care: No recommendation at this time    Goals:  Trend BG <200 mg/dl next 5-7 days       Nutrition Monitoring and Evaluation:   Behavioral-Environmental Outcomes: None identified  Food/Nutrient Intake Outcomes: Food and nutrient intake  Physical Signs/Symptoms Outcomes: Biochemical data, Weight, Skin    Discharge Planning:    (Consistent Carb Diet if BG levels remain elevated)     Electronically signed by Brandyn Hinson RD on 2/1/2021 at 1:19 PM

## 2021-02-01 NOTE — PROGRESS NOTES
0730  Report received from [facility]. SBAR, Kardex, ED Summary, Procedure Summary, Intake/Output, MAR, Accordion, Recent Results, Med Rec Status and Cardiac Rhythm NSR were discussed. Juany Cabral     End of Shift Note    Bedside shift change report given to [facility] bg (oncoming nurse) by Ca Jimenez (offgoing nurse). Report included the following information SBAR, Kardex, ED Summary, Procedure Summary, Intake/Output, MAR, Accordion, Recent Results, Med Rec Status and Cardiac Rhythm NSR    Shift worked:  7a-7p     Shift summary and any significant changes:     None - patient sat in recliner for a few hours. Patient slept a lot throughout the shift. Poor appetite. Concerns for physician to address:       [facility]          Activity:  Activity Level: Up with Assistance  Number times ambulated in hallways past shift: 0  Number of times OOB to chair past shift: 0    Cardiac:   Cardiac Monitoring: Yes      Cardiac Rhythm: Normal sinus rhythm    Access:   Current line(s): PIV     Genitourinary:   Urinary status: voiding    Respiratory:   O2 Device: Heated, Hi flow nasal cannula  Chronic home O2 use?: NO  Incentive spirometer at bedside: YES  Actual Volume (ml): 750 ml  GI:  Last Bowel Movement Date: 01/31/21  Current diet:  DIET REGULAR  DIET NUTRITIONAL SUPPLEMENTS Dinner; Ensure [employer]  Passing flatus: YES  Tolerating current diet: YES  % Diet Eaten: 100 %    Pain Management:   Patient states pain is manageable on current regimen: YES    Skin:  Govind Score: 19  Interventions: increase time out of bed    Patient Safety:  Fall Score:  Total Score: 1  Interventions: gripper socks and pt to call before getting OOB       Length of Stay:  Expected LOS: 5d 12h  Actual LOS: [address]

## 2021-02-01 NOTE — PROGRESS NOTES
0700: Bedside shift change report given to Blank Sexton (oncoming nurse) by Lynn Bryant (offgoing nurse). Report included the following information SBAR and Kardex.

## 2021-02-01 NOTE — PROGRESS NOTES
ORIANA:  -confirm family to transport near d/c  -assess for home oxygen 24 hours prior to d/c  -await therapy recommendations for the benefit of Ruben Urias  -ask pt again if he would be willing to give us his physical address.  This will be needed to deliver the oxygen//HHC staff.  -may benefit from IP pulmonary rehab at d/c  -make PCP omero't prior to d/c  -[facility] can accept pending oxygen testing//order as of 1/27.

## 2021-02-01 NOTE — PROGRESS NOTES
[facility]  Pulmonary, Critical Care, and Sleep Medicine    Name: Rola Story MRN: [id number]   : 1965 Hospital: [facility]   Date: 2021            IMPRESSION:   · Acute hypoxic respiratory failure - severe, secondary to COVID PNA-currently on HF (55L, 100%)  · Severe Multilobar Covid Pneumonia, first Dx on , Symptoms first started on 21  S/P convalescent plasma ; S/p Remdesivir x 5 days total  · New pneumomediastinum 21, radiographic stable las couple of days; crepitus in neck  · Covid related coagulopathy  · Covid related inflammation, increased ferritin. · Steroid induced hyperglycemia  · Obesity  · GERD  · Hypertension, LAE and LVH on ekg. RECOMMENDATIONS:   · Continue HFNC   · If he declines quickly, will need stat cxr  · Trying to avoid PAP due to pneumomediastinum  · Empiric antibiotics completed  · Cont therapeutic Lovenox   · Continue steroids  · Follow inflammatory and coag markers  · Mobilize out of bed as tolerated  · Very high risk for further decompensation. Low threshold to transfer patient to ICU. ICU team declined patient on 21   PCP: Lucita Burns.        Subjective:     No acute events   No severe distress  On HF O2    Current Facility-Administered Medications   Medication Dose Route Frequency    insulin NPH (NOVOLIN N, HUMULIN N) injection 20 Units  20 Units SubCUTAneous Q12H    methylPREDNISolone (PF) (SOLU-MEDROL) injection 40 mg  40 mg IntraVENous Q6H    famotidine (PEPCID) tablet 20 mg  20 mg Oral Q12H    furosemide (LASIX) injection 40 mg  40 mg IntraVENous DAILY    enoxaparin (LOVENOX) injection 90 mg  1 mg/kg SubCUTAneous Q12H    influenza vaccine - (6 mos+)(PF) (FLUARIX/FLULAVAL/FLUZONE QUAD) injection 0.5 mL  0.5 mL IntraMUSCular PRIOR TO DISCHARGE    sodium chloride (NS) flush 5-40 mL  5-40 mL IntraVENous Q8H    cholecalciferol (VITAMIN D3) (1000 Units /25 mcg) tablet 2 Tab 2,000 Units Oral DAILY    aspirin delayed-release tablet 81 mg  81 mg Oral DAILY       Review of Systems:  Constitutional: positive for fevers, chills, sweats, fatigue, malaise and anorexia, negative for weight loss  Eyes: negative  Ears, nose, mouth, throat, and face: positive for nasal congestion  Respiratory: positive for cough or dyspnea on exertion  Cardiovascular: positive for fatigue, paroxysmal nocturnal dyspnea, tachypnea, dyspnea on exertion  Gastrointestinal: positive for dyspepsia, reflux symptoms, nausea and change in bowel habits  Genitourinary:negative  Integument/breast: negative  Hematologic/lymphatic: negative  Musculoskeletal:negative  Neurological: negative  Behavioral/Psych: negative  Endocrine: negative  Allergic/Immunologic: negative    Objective:   Vital Signs:    Visit Vitals  /85   Pulse 83   Temp 98 °F (36.7 °C)   Resp 12   Ht 5' 8\" (1.727 m)   Wt 92.9 kg (204 lb 12.9 oz)   SpO2 97%   BMI 31.14 kg/m²       O2 Device: Heated, Hi flow nasal cannula   O2 Flow Rate (L/min): 40 l/min   Temp (24hrs), Av.1 °F (36.7 °C), Min:97.9 °F (36.6 °C), Max:98.2 °F (36.8 °C)       Intake/Output:   Last shift:       07 -  1900  In: 240 [P.O.:240]  Out: 200 [Urine:200]  Last 3 shifts:  190 -  0700  In: -   Out: 3100 [Urine:3100]    Intake/Output Summary (Last 24 hours) at 2021 0930  Last data filed at 2021 0730  Gross per 24 hour   Intake 240 ml   Output 3300 ml   Net -3060 ml      Physical Exam:   General:  Alert, cooperative, no resp distress on bipap with 100% fio2. Head:  Normocephalic, without obvious abnormality, atraumatic. Eyes:  Conjunctivae/corneas clear    Nose: Nares normal. Septum midline. Mucosa normal.     Throat: Lips, mucosa, and tongue normal.    Neck: Supple, symmetrical, trachea midline    Lungs:   Distant bilateral breath sounds   Chest wall:  No tenderness or deformity. Heart:  Regular rate and rhythm    Abdomen:   Soft, non-tender.  Bowel sounds normal.    Extremities: Extremities normal, atraumatic, no cyanosis    Skin: Skin color, texture, turgor normal. No crepitus   Neurologic: Grossly nonfocal     Data:   Labs:  Recent Labs     02/01/21  0816 01/30/21  0205   WBC 15.0* 13.9*   HGB 14.5 14.0   HCT 44.7 43.0   * 158     Recent Labs     02/01/21  0242 01/30/21  0205   * 132*   K 4.7 4.9    98   CO2 34* 30   * 355*   BUN 34* 30*   CREA 0.75 0.89   CA 9.2 8.8   ALB  --  2.3*   TBILI  --  0.8   ALT  --  111*     No results for input(s): PHI, PCO2I, PO2I, HCO3I, FIO2I in the last 72 hours.     Imaging:  I have personally reviewed the patients radiographs:  CXR: manav Ontiveros MD

## 2021-02-01 NOTE — PROGRESS NOTES
Called [facility] for patient.   Per [facility], patient needs new orders for [facility] and  since patient declined orders placed in January.  New orders loaded.   Routing to Dr Hernandez since nursing can not sign orders. Please sign and route back to nursing. Thank you. Will call [facility] once orders signed.      Hospitalist Progress Note    NAME: Nanda Best   :  1965   MRN:  [id number]       Assessment / Plan:  Acute hypoxic respiratory failure POA, persistent  Severe multilobar Covid pneumonia POA  Pneumomediastinum , not POA, persistent  -COVID-19 test positive on . -CTA  neg for PE. Shows diffuse bilateral peripheral groundglass opacification and consolidation in the lower lobes   -Initially on nasal cannula/high flow, decompensation  , was on BiPAP since 2021, steroids increased, evaluated by ICU intensivist on .     -has developed pneumomediastinum , changed to high flow from BiPAP to reduce barotrauma, chest x-ray this There is an unchanged tiny right apical pneumothorax. No left pneumothorax  -Dr Jose Sweet Discussed with ICU intensivist .  -Continue HFNC, currently cont to be on 40 L/min high flow nasal cannula saturating 90% when I saw him this a.m.  -s/p  remdesivir  -Solu-Medrol 40 mg IV every 6 hours, continue to wean as tolerated  -s/p antibiotics  -s/p convalescent plasma 2021  -Full dose Lovenox added by pulmonary   -Continue to be very high risk for decompensation  -Chest x-ray today with slightly improved pulmonary parenchymal opacities  -No changes at the last 24 hours. 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 GERD  --currently not on medication. Pepcid IV twice daily     Obesity  Body mass index is 31.14 kg/m².     Code: Discussed, full code  DVT prophylaxis: Heparin  Surrogate decision maker: Irmaer Stephen Moises [phone]    Patient remains high risk for decompensation. Subjective:     Chief Complaint / Reason for Physician Visit  Discussed with RN overnight events. Patient was seen and examined. No acute events overnight.     \"Doing all right\"    Review of Systems:  Symptom Y/N Comments  Symptom Y/N Comments   Fever/Chills n   Chest Pain n    Poor Appetite    Edema     Cough n   Abdominal Pain n    Sputum    Joint Pain     SOB/BULL y  same  Pruritis/Rash     Nausea/vomit n   Tolerating PT/OT     Diarrhea    Tolerating Diet y    Constipation    Other       Could NOT obtain due to:          Objective:     VITALS:   Last 24hrs VS reviewed since prior progress note. Most recent are:  Patient Vitals for the past 24 hrs:   Temp Pulse Resp BP SpO2   02/01/21 1051 97.7 °F (36.5 °C) 99 16 (!) 147/84 90 %   02/01/21 0844 98 °F (36.7 °C) 83 12 133/85 --   02/01/21 0742 -- -- -- -- 97 %   02/01/21 0300 98 °F (36.7 °C) 75 20 128/79 97 %   01/31/21 2300 98.2 °F (36.8 °C) 89 20 (!) 139/92 93 %   01/31/21 2204 -- -- -- -- 95 %   01/31/21 2015 98 °F (36.7 °C) (!) 107 20 (!) 146/70 97 %   01/31/21 1454 98.2 °F (36.8 °C) 93 20 122/72 94 %       Intake/Output Summary (Last 24 hours) at 2/1/2021 1106  Last data filed at 2/1/2021 0730  Gross per 24 hour   Intake 240 ml   Output 1700 ml   Net -1460 ml        I had a face to face encounter and independently examined this patient on 2/1/2021, as outlined below:  PHYSICAL EXAM:  General: WD, WN. Alert, cooperative, in moderate respiratory distress    EENT:  EOMI. Anicteric sclerae. MMM  Resp:  B/l lung coarse, rales+  CV:  Regular  rhythm,  No edema  GI:  Soft, Non distended, Non tender. +Bowel sounds  Neurologic:  Alert and oriented X 3, normal speech,   Psych:   Good insight. Not anxious nor agitated  Skin:  No rashes.   No jaundice, subcu around right flank    Reviewed most current lab test results and cultures  YES  Reviewed most current radiology test results   YES  Review and summation of old records today    NO  Reviewed patient's current orders and MAR    YES  PMH/SH reviewed - no change compared to H&P  ________________________________________________________________________  Care Plan discussed with:    Comments Patient x    Family      RN x    Care Manager     Consultant  x  pulmonology                      Multidiciplinary team rounds were held today with , nursing, pharmacist and clinical coordinator. Patient's plan of care was discussed; medications were reviewed and discharge planning was addressed. ________________________________________________________________________      Total CRITICAL CARE TIME Spent: 35 Minutes non procedure based      Comments   >50% of visit spent in counseling and coordination of care x    ________________________________________________________________________  Andrea Malave MD     Procedures: see electronic medical records for all procedures/Xrays and details which were not copied into this note but were reviewed prior to creation of Plan. LABS:  I reviewed today's most current labs and imaging studies.   Pertinent labs include:  Recent Labs     02/01/21  0816 01/30/21  0205   WBC 15.0* 13.9*   HGB 14.5 14.0   HCT 44.7 43.0   * 158     Recent Labs     02/01/21  0242 01/30/21  0205   * 132*   K 4.7 4.9    98   CO2 34* 30   * 355*   BUN 34* 30*   CREA 0.75 0.89   CA 9.2 8.8   ALB  --  2.3*   TBILI  --  0.8   ALT  --  111*       Signed: Andrea Malave MD

## 2021-02-02 LAB
D DIMER PPP FEU-MCNC: 0.46 MG/L FEU (ref 0–0.65)
FERRITIN SERPL-MCNC: 1779 NG/ML (ref 26–388)
GLUCOSE BLD STRIP.AUTO-MCNC: 215 MG/DL (ref 65–100)
SERVICE CMNT-IMP: ABNORMAL

## 2021-02-02 PROCEDURE — 74011250636 HC RX REV CODE- 250/636: Performed by: INTERNAL MEDICINE

## 2021-02-02 PROCEDURE — 77010033678 HC OXYGEN DAILY

## 2021-02-02 PROCEDURE — 74011250637 HC RX REV CODE- 250/637: Performed by: INTERNAL MEDICINE

## 2021-02-02 PROCEDURE — 77010033711 HC HIGH FLOW OXYGEN

## 2021-02-02 PROCEDURE — 74011250637 HC RX REV CODE- 250/637: Performed by: HOSPITALIST

## 2021-02-02 PROCEDURE — 82728 ASSAY OF FERRITIN: CPT

## 2021-02-02 PROCEDURE — 74011636637 HC RX REV CODE- 636/637: Performed by: INTERNAL MEDICINE

## 2021-02-02 PROCEDURE — 82962 GLUCOSE BLOOD TEST: CPT

## 2021-02-02 PROCEDURE — 85379 FIBRIN DEGRADATION QUANT: CPT

## 2021-02-02 PROCEDURE — 65660000001 HC RM ICU INTERMED STEPDOWN

## 2021-02-02 RX ADMIN — HUMAN INSULIN 20 UNITS: 100 INJECTION, SUSPENSION SUBCUTANEOUS at 14:00

## 2021-02-02 RX ADMIN — ASPIRIN 81 MG: 81 TABLET, COATED ORAL at 09:10

## 2021-02-02 RX ADMIN — ENOXAPARIN SODIUM 90 MG: 100 INJECTION SUBCUTANEOUS at 12:30

## 2021-02-02 RX ADMIN — Medication 10 ML: at 15:30

## 2021-02-02 RX ADMIN — Medication 10 ML: at 20:47

## 2021-02-02 RX ADMIN — METHYLPREDNISOLONE SODIUM SUCCINATE 40 MG: 40 INJECTION, POWDER, FOR SOLUTION INTRAMUSCULAR; INTRAVENOUS at 02:10

## 2021-02-02 RX ADMIN — ENOXAPARIN SODIUM 90 MG: 100 INJECTION SUBCUTANEOUS at 23:19

## 2021-02-02 RX ADMIN — FUROSEMIDE 40 MG: 10 INJECTION, SOLUTION INTRAMUSCULAR; INTRAVENOUS at 09:10

## 2021-02-02 RX ADMIN — HUMAN INSULIN 20 UNITS: 100 INJECTION, SUSPENSION SUBCUTANEOUS at 02:11

## 2021-02-02 RX ADMIN — METHYLPREDNISOLONE SODIUM SUCCINATE 40 MG: 40 INJECTION, POWDER, FOR SOLUTION INTRAMUSCULAR; INTRAVENOUS at 15:29

## 2021-02-02 RX ADMIN — FAMOTIDINE 20 MG: 20 TABLET, FILM COATED ORAL at 20:46

## 2021-02-02 RX ADMIN — FAMOTIDINE 20 MG: 20 TABLET, FILM COATED ORAL at 09:10

## 2021-02-02 RX ADMIN — METHYLPREDNISOLONE SODIUM SUCCINATE 40 MG: 40 INJECTION, POWDER, FOR SOLUTION INTRAMUSCULAR; INTRAVENOUS at 09:10

## 2021-02-02 RX ADMIN — CHOLECALCIFEROL TAB 25 MCG (1000 UNIT) 2000 UNITS: 25 TAB at 09:10

## 2021-02-02 RX ADMIN — METHYLPREDNISOLONE SODIUM SUCCINATE 40 MG: 40 INJECTION, POWDER, FOR SOLUTION INTRAMUSCULAR; INTRAVENOUS at 20:46

## 2021-02-02 NOTE — PROGRESS NOTES
End of Shift Note    Bedside shift change report given to Gwyn Ford (oncoming nurse) by Cain Trammell (offgoing nurse). Report included the following information SBAR and Kardex    Shift worked:  DAY     Shift summary and any significant changes:    none   Concerns for physician to address: none   Zone phone for oncoming shift:       Activity:  Activity Level: Up with Assistance  Number times ambulated in hallways past shift: 0  Number of times OOB to chair past shift: 1    Cardiac:   Cardiac Monitoring: Yes      Cardiac Rhythm: Normal sinus rhythm    Access:   Current line(s): PIV     Genitourinary:   Urinary status: voiding    Respiratory:   O2 Device: Heated, Hi flow nasal cannula  Chronic home O2 use?: NO  Incentive spirometer at bedside: YES  Actual Volume (ml): 750 ml  GI:  Last Bowel Movement Date: 01/31/21  Current diet:  DIET NUTRITIONAL SUPPLEMENTS Dinner; Ensure [employer]  DIET DIABETIC CONSISTENT CARB Regular  Passing flatus: YES  Tolerating current diet: YES  % Diet Eaten: 100 %    Pain Management:   Patient states pain is manageable on current regimen: YES    Skin:  Govind Score: 19  Interventions: increase time out of bed    Patient Safety:  Fall Score:  Total Score: 2  Interventions: gripper socks and pt to call before getting OOB       Length of Stay:  Expected LOS: 5d 12h  Actual LOS: [address]

## 2021-02-02 NOTE — PROGRESS NOTES
Problem: Falls - Risk of  Goal: *Absence of Falls  Description: Document Daniel Cope Fall Risk and appropriate interventions in the flowsheet.   Outcome: Progressing Towards Goal  Note: Fall Risk Interventions:  Mobility Interventions: Bed/chair exit alarm, Assess mobility with egress test         Medication Interventions: Bed/chair exit alarm, Evaluate medications/consider consulting pharmacy, Patient to call before getting OOB, Teach patient to arise slowly    Elimination Interventions: Bed/chair exit alarm, Call light in reach, Patient to call for help with toileting needs, Toileting schedule/hourly rounds, Urinal in reach    History of Falls Interventions: Bed/chair exit alarm

## 2021-02-02 NOTE — PROGRESS NOTES
Hospitalist Progress Note    NAME: Cheryl Moise   :  1965   MRN:  [id number]       Assessment / Plan:  Acute hypoxic respiratory failure POA, persistent, still on 40L/m HiFlow oxygen  Severe multilobar Covid pneumonia POA  Pneumomediastinum , not POA, persistent but stable  -COVID-19 test positive on . -CTA  neg for PE. Shows diffuse bilateral peripheral groundglass opacification and consolidation in the lower lobes   -Initially on nasal cannula/high flow, decompensation  , was on BiPAP since 2021, steroids increased, evaluated by ICU intensivist on .     -has developed pneumomediastinum , changed to high flow from BiPAP to reduce barotrauma, chest x-ray this There is an unchanged tiny right apical pneumothorax. No left pneumothorax  -Dr Silverio Morse Discussed with ICU intensivist .  -Continue HFNC, currently cont to be on 40 L/min high flow & stable- wean as able  -s/p  remdesivir  -Solu-Medrol 40 mg IV every 6 hours, continue to wean as tolerated  -s/p antibiotics  -s/p convalescent plasma 2021  -Full dose Lovenox added by pulmonary   -Continue to be very high risk for decompensation  -Chest x-ray today with slightly improved pulmonary parenchymal opacities  -No changes at the last 24 hours. 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 GERD  --currently not on medication. Pepcid IV twice daily     Obesity  Body mass index is 31.14 kg/m².     Code: Discussed, full code  DVT prophylaxis: Heparin  Surrogate decision maker: Brother Casimiro Cardenas [phone]    Patient remains high risk for decompensation. Subjective:     Chief Complaint / Reason for Physician Visit  Discussed with RN overnight events. Patient was seen and examined. No acute events overnight.     \"Doing all right\"    Review of Systems:  Symptom Y/N Comments  Symptom Y/N Comments   Fever/Chills n   Chest Pain n    Poor Appetite    Edema     Cough n   Abdominal Pain n    Sputum    Joint Pain     SOB/BULL y  same  Pruritis/Rash     Nausea/vomit n   Tolerating PT/OT     Diarrhea    Tolerating Diet y    Constipation    Other       Could NOT obtain due to:          Objective:     VITALS:   Last 24hrs VS reviewed since prior progress note. Most recent are:  Patient Vitals for the past 24 hrs:   Temp Pulse Resp BP SpO2   02/02/21 1458 -- -- -- -- 96 %   02/02/21 1431 98.1 °F (36.7 °C) 96 20 127/87 94 %   02/02/21 1039 -- -- -- -- 96 %   02/02/21 1008 98.2 °F (36.8 °C) 87 20 136/72 93 %   02/02/21 0754 -- 75 20 134/64 92 %   02/02/21 0752 98.4 °F (36.9 °C) 78 20 134/64 95 %   02/02/21 0509 -- -- -- -- 96 %   02/02/21 0205 98.9 °F (37.2 °C) 71 20 109/62 96 %   02/02/21 0007 -- -- -- -- 99 %   02/01/21 2304 98.2 °F (36.8 °C) 73 20 114/83 99 %   02/01/21 2106 -- -- -- -- 93 %   02/01/21 2004 98 °F (36.7 °C) 83 18 123/71 98 %     No intake or output data in the 24 hours ending 02/02/21 1630     I had a face to face encounter and independently examined this patient on 2/2/2021, as outlined below:  PHYSICAL EXAM:  General: WD, WN. Alert, cooperative, in moderate respiratory distress    EENT:  EOMI. Anicteric sclerae. MMM  Resp:  B/l lung coarse, rales+  CV:  Regular  rhythm,  No edema  GI:  Soft, Non distended, Non tender. +Bowel sounds  Neurologic:  Alert and oriented X 3, normal speech,   Psych:   Good insight. Not anxious nor agitated  Skin:  No rashes.   No jaundice, subcu around right flank    Reviewed most current lab test results and cultures  YES  Reviewed most current radiology test results   YES  Review and summation of old records today    NO  Reviewed patient's current orders and MAR    YES  PMH/ reviewed - no change compared to H&P  ________________________________________________________________________  Care Plan discussed with:    Comments   Patient x    Family      RN x    Care Manager x    Consultant                       x Multidiciplinary team rounds were held today with , nursing, pharmacist and clinical coordinator. Patient's plan of care was discussed; medications were reviewed and discharge planning was addressed. ________________________________________________________________________      Total CRITICAL CARE TIME Spent: 36 Minutes non procedure based      Comments   >50% of visit spent in counseling and coordination of care x    ________________________________________________________________________  Beto Vuong MD     Procedures: see electronic medical records for all procedures/Xrays and details which were not copied into this note but were reviewed prior to creation of Plan. LABS:  I reviewed today's most current labs and imaging studies.   Pertinent labs include:  Recent Labs     02/01/21  0816   WBC 15.0*   HGB 14.5   HCT 44.7   *     Recent Labs     02/01/21  0242   *   K 4.7      CO2 34*   *   BUN 34*   CREA 0.75   CA 9.2       Signed: Beto Vuong MD

## 2021-02-02 NOTE — PROGRESS NOTES
[facility]  Pulmonary, Critical Care, and Sleep Medicine    Name: Armen Cortes MRN: [id number]   : 1965 Hospital[facility]   Date: 2021            IMPRESSION:   · Acute hypoxic respiratory failure - severe, secondary to COVID PNA-currently on HF (55L, 100%)  · Severe Multilobar Covid Pneumonia, first Dx on , Symptoms first started on 21  S/P convalescent plasma ; S/p Remdesivir x 5 days total  · New pneumomediastinum 21, radiographic stable las couple of days; crepitus in neck  · Covid related coagulopathy  · Covid related inflammation, increased ferritin. · Steroid induced hyperglycemia  · Obesity  · GERD  · Hypertension, LAE and LVH on ekg. RECOMMENDATIONS:   · Continue HFNC, wean as tolerated  · Trying to avoid PAP due to pneumomediastinum  · Empiric antibiotics completed  · Cont therapeutic Lovenox   · Continue steroids  · Follow inflammatory and coag markers  · Mobilize out of bed as tolerated   PCP: Doug Kawasaki.        Subjective:     No acute events   No severe distress  On HF O2    Current Facility-Administered Medications   Medication Dose Route Frequency    insulin NPH (NOVOLIN N, HUMULIN N) injection 20 Units  20 Units SubCUTAneous Q12H    methylPREDNISolone (PF) (SOLU-MEDROL) injection 40 mg  40 mg IntraVENous Q6H    famotidine (PEPCID) tablet 20 mg  20 mg Oral Q12H    furosemide (LASIX) injection 40 mg  40 mg IntraVENous DAILY    enoxaparin (LOVENOX) injection 90 mg  1 mg/kg SubCUTAneous Q12H    influenza vaccine  (6 mos+)(PF) (FLUARIX/FLULAVAL/FLUZONE QUAD) injection 0.5 mL  0.5 mL IntraMUSCular PRIOR TO DISCHARGE    sodium chloride (NS) flush 5-40 mL  5-40 mL IntraVENous Q8H    cholecalciferol (VITAMIN D3) (1000 Units /25 mcg) tablet 2 Tab  2,000 Units Oral DAILY    aspirin delayed-release tablet 81 mg  81 mg Oral DAILY       Review of Systems:  Constitutional: positive for fevers, chills, sweats, fatigue, malaise and anorexia, negative for weight loss  Eyes: negative  Ears, nose, mouth, throat, and face: positive for nasal congestion  Respiratory: positive for cough or dyspnea on exertion  Cardiovascular: positive for fatigue, paroxysmal nocturnal dyspnea, tachypnea, dyspnea on exertion  Gastrointestinal: positive for dyspepsia, reflux symptoms, nausea and change in bowel habits  Genitourinary:negative  Integument/breast: negative  Hematologic/lymphatic: negative  Musculoskeletal:negative  Neurological: negative  Behavioral/Psych: negative  Endocrine: negative  Allergic/Immunologic: negative    Objective:   Vital Signs:    Visit Vitals  /64   Pulse 75   Temp 98.4 °F (36.9 °C)   Resp 20   Ht 5' 8\" (1.727 m)   Wt 92.9 kg (204 lb 12.9 oz)   SpO2 92%   BMI 31.14 kg/m²       O2 Device: Heated, Hi flow nasal cannula   O2 Flow Rate (L/min): 40 l/min   Temp (24hrs), Av.2 °F (36.8 °C), Min:97.7 °F (36.5 °C), Max:98.9 °F (37.2 °C)       Intake/Output:   Last shift:      No intake/output data recorded. Last 3 shifts:  190 -  0700  In: 1200 [P.O.:1200]  Out: 900 [Urine:900]    Intake/Output Summary (Last 24 hours) at 2021 0853  Last data filed at 2021 1400  Gross per 24 hour   Intake 960 ml   Output --   Net 960 ml      Physical Exam:   General:  Alert, cooperative, no resp distress on bipap with 100% fio2. Head:  Normocephalic, without obvious abnormality, atraumatic. Eyes:  Conjunctivae/corneas clear    Nose: Nares normal. Septum midline. Mucosa normal.     Throat: Lips, mucosa, and tongue normal.    Neck: Supple, symmetrical, trachea midline    Lungs:   Distant bilateral breath sounds   Chest wall:  No tenderness or deformity. Heart:  Regular rate and rhythm    Abdomen:   Soft, non-tender.  Bowel sounds normal.    Extremities: Extremities normal, atraumatic, no cyanosis    Skin: Skin color, texture, turgor normal. No crepitus   Neurologic: Grossly nonfocal     Data: Labs:  Recent Labs     02/01/21  0816   WBC 15.0*   HGB 14.5   HCT 44.7   *     Recent Labs     02/01/21  0242   *   K 4.7      CO2 34*   *   BUN 34*   CREA 0.75   CA 9.2     No results for input(s): PHI, PCO2I, PO2I, HCO3I, FIO2I in the last 72 hours.     Imaging:  I have personally reviewed the patients radiographs:  CXR: improved        Tyson Miranda MD

## 2021-02-03 LAB
ANION GAP SERPL CALC-SCNC: 2 MMOL/L (ref 5–15)
BUN SERPL-MCNC: 35 MG/DL (ref 6–20)
BUN/CREAT SERPL: 50 (ref 12–20)
CALCIUM SERPL-MCNC: 9 MG/DL (ref 8.5–10.1)
CHLORIDE SERPL-SCNC: 98 MMOL/L (ref 97–108)
CO2 SERPL-SCNC: 34 MMOL/L (ref 21–32)
CREAT SERPL-MCNC: 0.7 MG/DL (ref 0.7–1.3)
D DIMER PPP FEU-MCNC: 0.35 MG/L FEU (ref 0–0.65)
ERYTHROCYTE [DISTWIDTH] IN BLOOD BY AUTOMATED COUNT: 13.5 % (ref 11.5–14.5)
FERRITIN SERPL-MCNC: 2477 NG/ML (ref 26–388)
GLUCOSE BLD STRIP.AUTO-MCNC: 288 MG/DL (ref 65–100)
GLUCOSE SERPL-MCNC: 275 MG/DL (ref 65–100)
HCT VFR BLD AUTO: 44.4 % (ref 36.6–50.3)
HGB BLD-MCNC: 14.7 G/DL (ref 12.1–17)
MCH RBC QN AUTO: 28.7 PG (ref 26–34)
MCHC RBC AUTO-ENTMCNC: 33.1 G/DL (ref 30–36.5)
MCV RBC AUTO: 86.7 FL (ref 80–99)
NRBC # BLD: 0 K/UL (ref 0–0.01)
NRBC BLD-RTO: 0 PER 100 WBC
PLATELET # BLD AUTO: 113 K/UL (ref 150–400)
PMV BLD AUTO: 12.1 FL (ref 8.9–12.9)
POTASSIUM SERPL-SCNC: 4.8 MMOL/L (ref 3.5–5.1)
RBC # BLD AUTO: 5.12 M/UL (ref 4.1–5.7)
SERVICE CMNT-IMP: ABNORMAL
SODIUM SERPL-SCNC: 134 MMOL/L (ref 136–145)
WBC # BLD AUTO: 14 K/UL (ref 4.1–11.1)

## 2021-02-03 PROCEDURE — 36415 COLL VENOUS BLD VENIPUNCTURE: CPT

## 2021-02-03 PROCEDURE — 80048 BASIC METABOLIC PNL TOTAL CA: CPT

## 2021-02-03 PROCEDURE — 74011250637 HC RX REV CODE- 250/637: Performed by: INTERNAL MEDICINE

## 2021-02-03 PROCEDURE — 74011636637 HC RX REV CODE- 636/637: Performed by: INTERNAL MEDICINE

## 2021-02-03 PROCEDURE — 74011250636 HC RX REV CODE- 250/636: Performed by: INTERNAL MEDICINE

## 2021-02-03 PROCEDURE — 85379 FIBRIN DEGRADATION QUANT: CPT

## 2021-02-03 PROCEDURE — 74011250637 HC RX REV CODE- 250/637: Performed by: HOSPITALIST

## 2021-02-03 PROCEDURE — 82962 GLUCOSE BLOOD TEST: CPT

## 2021-02-03 PROCEDURE — 85027 COMPLETE CBC AUTOMATED: CPT

## 2021-02-03 PROCEDURE — 77010033711 HC HIGH FLOW OXYGEN

## 2021-02-03 PROCEDURE — 82728 ASSAY OF FERRITIN: CPT

## 2021-02-03 PROCEDURE — 65660000001 HC RM ICU INTERMED STEPDOWN

## 2021-02-03 RX ORDER — INSULIN LISPRO 100 [IU]/ML
INJECTION, SOLUTION INTRAVENOUS; SUBCUTANEOUS
Status: DISCONTINUED | OUTPATIENT
Start: 2021-02-03 | End: 2021-02-07

## 2021-02-03 RX ORDER — PREDNISONE 20 MG/1
40 TABLET ORAL
Status: DISCONTINUED | OUTPATIENT
Start: 2021-02-03 | End: 2021-02-09

## 2021-02-03 RX ADMIN — ENOXAPARIN SODIUM 90 MG: 100 INJECTION SUBCUTANEOUS at 12:38

## 2021-02-03 RX ADMIN — FAMOTIDINE 20 MG: 20 TABLET, FILM COATED ORAL at 21:13

## 2021-02-03 RX ADMIN — FUROSEMIDE 40 MG: 10 INJECTION, SOLUTION INTRAMUSCULAR; INTRAVENOUS at 09:29

## 2021-02-03 RX ADMIN — Medication 10 ML: at 02:27

## 2021-02-03 RX ADMIN — METHYLPREDNISOLONE SODIUM SUCCINATE 40 MG: 40 INJECTION, POWDER, FOR SOLUTION INTRAMUSCULAR; INTRAVENOUS at 02:27

## 2021-02-03 RX ADMIN — ENOXAPARIN SODIUM 90 MG: 100 INJECTION SUBCUTANEOUS at 23:13

## 2021-02-03 RX ADMIN — Medication 10 ML: at 21:13

## 2021-02-03 RX ADMIN — PREDNISONE 40 MG: 20 TABLET ORAL at 09:59

## 2021-02-03 RX ADMIN — INSULIN LISPRO 5 UNITS: 100 INJECTION, SOLUTION INTRAVENOUS; SUBCUTANEOUS at 17:21

## 2021-02-03 RX ADMIN — FAMOTIDINE 20 MG: 20 TABLET, FILM COATED ORAL at 09:29

## 2021-02-03 RX ADMIN — ASPIRIN 81 MG: 81 TABLET, COATED ORAL at 09:29

## 2021-02-03 RX ADMIN — Medication 10 ML: at 14:00

## 2021-02-03 RX ADMIN — HUMAN INSULIN 20 UNITS: 100 INJECTION, SUSPENSION SUBCUTANEOUS at 02:28

## 2021-02-03 RX ADMIN — CHOLECALCIFEROL TAB 25 MCG (1000 UNIT) 2000 UNITS: 25 TAB at 09:29

## 2021-02-03 NOTE — PROGRESS NOTES
End of Shift Note    Bedside shift change report given to Alexandre Sofia RN (oncoming nurse) by Zhanna Alonso RN (offgoing nurse). Report included the following information SBAR and Kardex    Shift worked:  1900-0730     Shift summary and any significant changes:     none. Concerns for physician to address:  none. Zone phone for oncoming shift:          Activity:  Activity Level: Up with Assistance  Number times ambulated in hallways past shift: 0  Number of times OOB to chair past shift: 0    Cardiac:   Cardiac Monitoring: Yes      Cardiac Rhythm: Normal sinus rhythm    Access:   Current line(s): PIV     Genitourinary:   Urinary status: voiding    Respiratory:   O2 Device: Heated, Hi flow nasal cannula  Chronic home O2 use?: NO  Incentive spirometer at bedside: YES  Actual Volume (ml): 750 ml  GI:  Last Bowel Movement Date: 02/02/21  Current diet:  DIET NUTRITIONAL SUPPLEMENTS Dinner; Ensure [employer]  DIET DIABETIC CONSISTENT CARB Regular  Passing flatus: YES  Tolerating current diet: YES  % Diet Eaten: 100 %    Pain Management:   Patient states pain is manageable on current regimen: YES    Skin:  Govind Score: 17  Interventions: increase time out of bed    Patient Safety:  Fall Score:  Total Score: 2  Interventions: pt to call before getting OOB       Length of Stay:  Expected LOS: 5d 12h  Actual LOS: [address] [place] [address], RN

## 2021-02-03 NOTE — PROGRESS NOTES
End of Shift Note    Bedside shift change report given to [employer] (oncoming nurse) by Cass Falcon (offgoing nurse). Report included the following information SBAR and Kardex    Shift worked:  DAY     Shift summary and any significant changes:    O2 weaned to 15L HiFlow   Concerns for physician to address: none   Zone phone for oncoming shift:       Activity:  Activity Level: Up with Assistance  Number times ambulated in hallways past shift: 0  Number of times OOB to chair past shift: 0    Cardiac:   Cardiac Monitoring: Yes      Cardiac Rhythm: Normal sinus rhythm    Access:   Current line(s): PIV     Genitourinary:   Urinary status: voiding    Respiratory:   O2 Device: Hi flow nasal cannula  Chronic home O2 use?: NO  Incentive spirometer at bedside: YES  Actual Volume (ml): 750 ml  GI:  Last Bowel Movement Date: 02/02/21  Current diet:  DIET NUTRITIONAL SUPPLEMENTS Dinner; Ensure [employer]  DIET DIABETIC CONSISTENT CARB Regular  Passing flatus: YES  Tolerating current diet: YES  % Diet Eaten: 100 %    Pain Management:   Patient states pain is manageable on current regimen: YES    Skin:  Govind Score: 17  Interventions: increase time out of bed and PT/OT consult    Patient Safety:  Fall Score:  Total Score: 2  Interventions: bed/chair alarm, gripper socks and pt to call before getting OOB       Length of Stay:  Expected LOS: 5d 12h  Actual LOS: [address]

## 2021-02-03 NOTE — PROGRESS NOTES
Hospitalist Progress Note    NAME: Armen Mask   :  1965   MRN:  [id number]       Assessment / Plan:  Acute hypoxic respiratory failure POA, improved, down to 15L/m HiFlow oxygen  Severe multilobar Covid pneumonia POA  Pneumomediastinum , not POA, persistent but stable  -COVID-19 test positive on . -CTA  neg for PE. Shows diffuse bilateral peripheral groundglass opacification and consolidation in the lower lobes   -Initially on nasal cannula/high flow, decompensation  , was on BiPAP since 2021, steroids increased, evaluated by ICU intensivist on .     -has developed pneumomediastinum , changed to high flow from BiPAP to reduce barotrauma, chest x-ray this There is an unchanged tiny right apical pneumothorax. No left pneumothorax  -Dr Manish Toro Discussed with ICU intensivist .  -Continue HFNC, currently down to 15 L/min high flow & stable- wean as able  -s/p  remdesivir  S/p IV Solu-Medrol - changed to PO prednisone today per pulmonary  -s/p antibiotics  -s/p convalescent plasma 2021  -Full dose Lovenox added by pulmonary   -Continue to be very high risk for decompensation  -Chest x-ray today with slightly improved pulmonary parenchymal opacities  -No changes at the last 24 hours. 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 GERD  --currently not on medication. Pepcid IV twice daily     Obesity  Body mass index is 31.14 kg/m².     Code: Discussed, full code  DVT prophylaxis: Heparin  Surrogate decision maker: Brother Sfoiya Arellano [phone]    Patient remains high risk for decompensation. Subjective:     Chief Complaint / Reason for Physician Visit  Discussed with RN overnight events. Patient was seen and examined. No acute events overnight.     \"Doing all right\"    Review of Systems:  Symptom Y/N Comments  Symptom Y/N Comments   Fever/Chills n   Chest Pain n    Poor Appetite    Edema     Cough n   Abdominal Pain n    Sputum    Joint Pain     SOB/BULL y  same  Pruritis/Rash     Nausea/vomit n   Tolerating PT/OT y    Diarrhea    Tolerating Diet y    Constipation    Other       Could NOT obtain due to:          Objective:     VITALS:   Last 24hrs VS reviewed since prior progress note. Most recent are:  Patient Vitals for the past 24 hrs:   Temp Pulse Resp BP SpO2   02/03/21 1140 97.7 °F (36.5 °C) 98 16 (!) 138/95 95 %   02/03/21 1044 -- -- -- -- 92 %   02/03/21 0850 -- -- -- -- 97 %   02/03/21 0723 97.9 °F (36.6 °C) 80 16 116/80 98 %   02/03/21 0226 97.3 °F (36.3 °C) 90 18 129/74 91 %   02/02/21 2319 97.4 °F (36.3 °C) 88 20 (!) 148/87 93 %   02/02/21 1952 98.1 °F (36.7 °C) 96 18 (!) 144/90 92 %   02/02/21 1711 -- -- -- -- 98 %   02/02/21 1458 -- -- -- -- 96 %   02/02/21 1431 98.1 °F (36.7 °C) 96 20 127/87 94 %       Intake/Output Summary (Last 24 hours) at 2/3/2021 1346  Last data filed at 2/3/2021 1144  Gross per 24 hour   Intake 900 ml   Output 1725 ml   Net -825 ml        I had a face to face encounter and independently examined this patient on 2/3/2021, as outlined below:  PHYSICAL EXAM:  General: WD, WN. Alert, cooperative, in moderate respiratory distress    EENT:  EOMI. Anicteric sclerae. MMM  Resp:  B/l lung coarse, rales+  CV:  Regular  rhythm,  No edema  GI:  Soft, Non distended, Non tender. +Bowel sounds  Neurologic:  Alert and oriented X 3, normal speech,   Psych:   Good insight. Not anxious nor agitated  Skin:  No rashes.   No jaundice, subcu around right flank    Reviewed most current lab test results and cultures  YES  Reviewed most current radiology test results   YES  Review and summation of old records today    NO  Reviewed patient's current orders and MAR    YES  PMH/SH reviewed - no change compared to H&P  ________________________________________________________________________  Care Plan discussed with:    Comments   Patient x    Family      RN x    Care Manager x    Consultant                       x Multidiciplinary team rounds were held today with , nursing, pharmacist and clinical coordinator. Patient's plan of care was discussed; medications were reviewed and discharge planning was addressed. ________________________________________________________________________      Total TIME Spent: 26 Minutes non procedure based      Comments   >50% of visit spent in counseling and coordination of care x    ________________________________________________________________________  Henna Pedraza MD     Procedures: see electronic medical records for all procedures/Xrays and details which were not copied into this note but were reviewed prior to creation of Plan. LABS:  I reviewed today's most current labs and imaging studies.   Pertinent labs include:  Recent Labs     02/03/21  0232 02/01/21  0816   WBC 14.0* 15.0*   HGB 14.7 14.5   HCT 44.4 44.7   * 148*     Recent Labs     02/03/21  0232 02/01/21  0242   * 134*   K 4.8 4.7   CL 98 100   CO2 34* 34*   * 265*   BUN 35* 34*   CREA 0.70 0.75   CA 9.0 9.2       Signed: Henna Pedraza MD

## 2021-02-03 NOTE — PROGRESS NOTES
0700: Bedside shift change report given to Moises Curry (oncoming nurse) by Sydney Renee RN (offgoing nurse). Report included the following information SBAR and Kardex.

## 2021-02-03 NOTE — PROGRESS NOTES
ORIANA:  -confirm family to transport near d/c  -assess for home oxygen 24 hours prior to d/c  -await therapy recommendations for the benefit of [facility] [address]  -ask pt again if he would be willing to give us his physical address.  This will be needed to deliver the oxygen//HHC staff.  -may benefit from IP pulmonary rehab at d/c  -make PCP omero't prior to d/c  -[facility] can accept pending oxygen testing//order as of 2/3

## 2021-02-03 NOTE — PROGRESS NOTES
[facility]  Pulmonary, Critical Care, and Sleep Medicine    Name: Placido Mcnair MRN: [id number]   : 1965 [facility]   Date: 2/3/2021            IMPRESSION:   · Acute hypoxic respiratory failure - severe, secondary to COVID PNA-currently on HF (55L, 100%)  · Severe Multilobar Covid Pneumonia, first Dx on , Symptoms first started on 21  S/P convalescent plasma ; S/p Remdesivir x 5 days total  · New pneumomediastinum 21, radiographic stable las couple of days; crepitus in neck  · Covid related coagulopathy  · Covid related inflammation, increased ferritin. · Steroid induced hyperglycemia  · Obesity  · GERD  · Hypertension, LAE and LVH on ekg. RECOMMENDATIONS:   · Continue HFNC, wean as tolerated  · Trying to avoid PAP due to pneumomediastinum  · Empiric antibiotics completed  · Cont therapeutic Lovenox   · Continue steroids, change to po prednisone  · Follow inflammatory and coag markers  · Mobilize out of bed as tolerated   PCP: Kitty Young.        Subjective:     No acute events   No severe distress  On HF O2    Current Facility-Administered Medications   Medication Dose Route Frequency    insulin NPH (NOVOLIN N, HUMULIN N) injection 20 Units  20 Units SubCUTAneous Q12H    methylPREDNISolone (PF) (SOLU-MEDROL) injection 40 mg  40 mg IntraVENous Q6H    famotidine (PEPCID) tablet 20 mg  20 mg Oral Q12H    furosemide (LASIX) injection 40 mg  40 mg IntraVENous DAILY    enoxaparin (LOVENOX) injection 90 mg  1 mg/kg SubCUTAneous Q12H    influenza vaccine - (6 mos+)(PF) (FLUARIX/FLULAVAL/FLUZONE QUAD) injection 0.5 mL  0.5 mL IntraMUSCular PRIOR TO DISCHARGE    sodium chloride (NS) flush 5-40 mL  5-40 mL IntraVENous Q8H    cholecalciferol (VITAMIN D3) (1000 Units /25 mcg) tablet 2 Tab  2,000 Units Oral DAILY    aspirin delayed-release tablet 81 mg  81 mg Oral DAILY       Review of Systems:  Constitutional: positive for fevers, chills, sweats, fatigue, malaise and anorexia, negative for weight loss  Eyes: negative  Ears, nose, mouth, throat, and face: positive for nasal congestion  Respiratory: positive for cough or dyspnea on exertion  Cardiovascular: positive for fatigue, paroxysmal nocturnal dyspnea, tachypnea, dyspnea on exertion  Gastrointestinal: positive for dyspepsia, reflux symptoms, nausea and change in bowel habits  Genitourinary:negative  Integument/breast: negative  Hematologic/lymphatic: negative  Musculoskeletal:negative  Neurological: negative  Behavioral/Psych: negative  Endocrine: negative  Allergic/Immunologic: negative    Objective:   Vital Signs:    Visit Vitals  /80 (BP 1 Location: Right upper arm, BP Patient Position: At rest)   Pulse 80   Temp 97.9 °F (36.6 °C)   Resp 16   Ht 5' 8\" (1.727 m)   Wt 92.9 kg (204 lb 12.9 oz)   SpO2 98%   BMI 31.14 kg/m²       O2 Device: Heated, Hi flow nasal cannula   O2 Flow Rate (L/min): 30 l/min   Temp (24hrs), Av.8 °F (36.6 °C), Min:97.3 °F (36.3 °C), Max:98.2 °F (36.8 °C)       Intake/Output:   Last shift:      701 - 1900  In: -   Out: 100 [Urine:100]  Last 3 shifts: 1901 -  0700  In: 420 [P.O.:420]  Out: 1250 [Urine:1250]    Intake/Output Summary (Last 24 hours) at 2/3/2021 0824  Last data filed at 2/3/2021 0729  Gross per 24 hour   Intake 420 ml   Output 1350 ml   Net -930 ml      Physical Exam:   General:  Alert, cooperative, no resp distress on bipap with 100% fio2. Head:  Normocephalic, without obvious abnormality, atraumatic. Eyes:  Conjunctivae/corneas clear    Nose: Nares normal. Septum midline. Mucosa normal.     Throat: Lips, mucosa, and tongue normal.    Neck: Supple, symmetrical, trachea midline    Lungs:   Distant bilateral breath sounds   Chest wall:  No tenderness or deformity. Heart:  Regular rate and rhythm    Abdomen:   Soft, non-tender.  Bowel sounds normal.    Extremities: Extremities normal, atraumatic, no cyanosis    Skin: Skin color, texture, turgor normal. No crepitus   Neurologic: Grossly nonfocal     Data:   Labs:  Recent Labs     02/03/21  0232 02/01/21  0816   WBC 14.0* 15.0*   HGB 14.7 14.5   HCT 44.4 44.7   * 148*     Recent Labs     02/03/21  0232 02/01/21  0242   * 134*   K 4.8 4.7   CL 98 100   CO2 34* 34*   * 265*   BUN 35* 34*   CREA 0.70 0.75   CA 9.0 9.2     No results for input(s): PHI, PCO2I, PO2I, HCO3I, FIO2I in the last 72 hours.     Imaging:  I have personally reviewed the patients radiographs:  CXR 2/1: improved        Diaan Lowe MD

## 2021-02-03 NOTE — PROGRESS NOTES
Problem: Falls - Risk of  Goal: *Absence of Falls  Description: Document Sherif Christopher Fall Risk and appropriate interventions in the flowsheet.   Outcome: Progressing Towards Goal  Note: Fall Risk Interventions:  Mobility Interventions: Assess mobility with egress test, Communicate number of staff needed for ambulation/transfer, Patient to call before getting OOB, PT Consult for mobility concerns         Medication Interventions: Teach patient to arise slowly, Patient to call before getting OOB    Elimination Interventions: Call light in reach, Bed/chair exit alarm    History of Falls Interventions: Bed/chair exit alarm         Problem: Breathing Pattern - Ineffective  Goal: *Absence of hypoxia  Outcome: Progressing Towards Goal  Goal: *Use of effective breathing techniques  Outcome: Progressing Towards Goal     Problem: Airway Clearance - Ineffective  Goal: Achieve or maintain patent airway  Outcome: Progressing Towards Goal     Problem: Gas Exchange - Impaired  Goal: Absence of hypoxia  Outcome: Progressing Towards Goal  Goal: Promote optimal lung function  Outcome: Progressing Towards Goal     Problem: Breathing Pattern - Ineffective  Goal: Ability to achieve and maintain a regular respiratory rate  Outcome: Progressing Towards Goal

## 2021-02-04 LAB
COMMENT, HOLDF: NORMAL
FERRITIN SERPL-MCNC: 1982 NG/ML (ref 26–388)
GLUCOSE BLD STRIP.AUTO-MCNC: 204 MG/DL (ref 65–100)
GLUCOSE BLD STRIP.AUTO-MCNC: 242 MG/DL (ref 65–100)
SAMPLES BEING HELD,HOLD: NORMAL
SERVICE CMNT-IMP: ABNORMAL
SERVICE CMNT-IMP: ABNORMAL

## 2021-02-04 PROCEDURE — 77010033711 HC HIGH FLOW OXYGEN

## 2021-02-04 PROCEDURE — 36415 COLL VENOUS BLD VENIPUNCTURE: CPT

## 2021-02-04 PROCEDURE — 65660000001 HC RM ICU INTERMED STEPDOWN

## 2021-02-04 PROCEDURE — 74011250637 HC RX REV CODE- 250/637: Performed by: INTERNAL MEDICINE

## 2021-02-04 PROCEDURE — 82962 GLUCOSE BLOOD TEST: CPT

## 2021-02-04 PROCEDURE — 74011250636 HC RX REV CODE- 250/636: Performed by: INTERNAL MEDICINE

## 2021-02-04 PROCEDURE — 74011250637 HC RX REV CODE- 250/637: Performed by: HOSPITALIST

## 2021-02-04 PROCEDURE — 82728 ASSAY OF FERRITIN: CPT

## 2021-02-04 PROCEDURE — 74011636637 HC RX REV CODE- 636/637: Performed by: INTERNAL MEDICINE

## 2021-02-04 RX ORDER — ENOXAPARIN SODIUM 100 MG/ML
40 INJECTION SUBCUTANEOUS EVERY 12 HOURS
Status: DISCONTINUED | OUTPATIENT
Start: 2021-02-04 | End: 2021-02-06

## 2021-02-04 RX ADMIN — ENOXAPARIN SODIUM 40 MG: 40 INJECTION SUBCUTANEOUS at 17:47

## 2021-02-04 RX ADMIN — CHOLECALCIFEROL TAB 25 MCG (1000 UNIT) 2000 UNITS: 25 TAB at 08:41

## 2021-02-04 RX ADMIN — FUROSEMIDE 40 MG: 10 INJECTION, SOLUTION INTRAMUSCULAR; INTRAVENOUS at 08:41

## 2021-02-04 RX ADMIN — Medication 10 ML: at 21:05

## 2021-02-04 RX ADMIN — PREDNISONE 40 MG: 20 TABLET ORAL at 08:41

## 2021-02-04 RX ADMIN — FAMOTIDINE 20 MG: 20 TABLET, FILM COATED ORAL at 08:41

## 2021-02-04 RX ADMIN — INSULIN LISPRO 3 UNITS: 100 INJECTION, SOLUTION INTRAVENOUS; SUBCUTANEOUS at 08:41

## 2021-02-04 RX ADMIN — Medication 10 ML: at 14:00

## 2021-02-04 RX ADMIN — Medication 10 ML: at 06:29

## 2021-02-04 RX ADMIN — INSULIN LISPRO 3 UNITS: 100 INJECTION, SOLUTION INTRAVENOUS; SUBCUTANEOUS at 17:47

## 2021-02-04 RX ADMIN — FAMOTIDINE 20 MG: 20 TABLET, FILM COATED ORAL at 21:05

## 2021-02-04 RX ADMIN — ASPIRIN 81 MG: 81 TABLET, COATED ORAL at 08:41

## 2021-02-04 NOTE — PROGRESS NOTES
[facility]  Pulmonary, Critical Care, and Sleep Medicine    Name: Martín Garza MRN: [id number]   : 1965 Hospital: [facility]   Date: 2021            IMPRESSION:   · Acute hypoxic respiratory failure - severe, secondary to COVID PNA-currently on HF (55L, 100%)  · Severe Multilobar Covid Pneumonia, first Dx on , Symptoms first started on 21  S/P convalescent plasma ; S/p Remdesivir x 5 days total  · New pneumomediastinum 21, radiographic stable las couple of days; crepitus in neck  · Covid related coagulopathy  · Covid related inflammation, increased ferritin. · Steroid induced hyperglycemia  · Obesity  · GERD  · Hypertension, LAE and LVH on ekg. RECOMMENDATIONS:   · Continue HFNC, wean as tolerated, daily improvement  · Trying to avoid PAP due to pneumomediastinum  · Empiric antibiotics completed  · Cont therapeutic Lovenox   · Continue po steroids  · Follow inflammatory and coag markers  · Mobilize out of bed as tolerated   PCP: Karely Crabtree.        Subjective:     No acute events   No severe distress  On HF O2, weaning daily  sats 94-97%    Current Facility-Administered Medications   Medication Dose Route Frequency    predniSONE (DELTASONE) tablet 40 mg  40 mg Oral DAILY WITH BREAKFAST    insulin lispro (HUMALOG) injection   SubCUTAneous ACB&D    famotidine (PEPCID) tablet 20 mg  20 mg Oral Q12H    furosemide (LASIX) injection 40 mg  40 mg IntraVENous DAILY    enoxaparin (LOVENOX) injection 90 mg  1 mg/kg SubCUTAneous Q12H    influenza vaccine - (6 mos+)(PF) (FLUARIX/FLULAVAL/FLUZONE QUAD) injection 0.5 mL  0.5 mL IntraMUSCular PRIOR TO DISCHARGE    sodium chloride (NS) flush 5-40 mL  5-40 mL IntraVENous Q8H    cholecalciferol (VITAMIN D3) (1000 Units /25 mcg) tablet 2 Tab  2,000 Units Oral DAILY    aspirin delayed-release tablet 81 mg  81 mg Oral DAILY       Review of Systems:  Constitutional: positive for fevers, chills, sweats, fatigue, malaise and anorexia, negative for weight loss  Eyes: negative  Ears, nose, mouth, throat, and face: positive for nasal congestion  Respiratory: positive for cough or dyspnea on exertion  Cardiovascular: positive for fatigue, paroxysmal nocturnal dyspnea, tachypnea, dyspnea on exertion  Gastrointestinal: positive for dyspepsia, reflux symptoms, nausea and change in bowel habits  Genitourinary:negative  Integument/breast: negative  Hematologic/lymphatic: negative  Musculoskeletal:negative  Neurological: negative  Behavioral/Psych: negative  Endocrine: negative  Allergic/Immunologic: negative    Objective:   Vital Signs:    Visit Vitals  /77 (BP 1 Location: Right upper arm, BP Patient Position: At rest)   Pulse 93   Temp 97.9 °F (36.6 °C)   Resp 16   Ht 5' 8\" (1.727 m)   Wt 92.9 kg (204 lb 12.9 oz)   SpO2 96%   BMI 31.14 kg/m²       O2 Device: Hi flow nasal cannula   O2 Flow Rate (L/min): 10 l/min   Temp (24hrs), Av.6 °F (36.4 °C), Min:97.3 °F (36.3 °C), Max:98 °F (36.7 °C)       Intake/Output:   Last shift:      No intake/output data recorded. Last 3 shifts:  1901 -  0700  In: 1820 [P.O.:1820]  Out: 2475 [Urine:2475]    Intake/Output Summary (Last 24 hours) at 2021 0843  Last data filed at 2021 0536  Gross per 24 hour   Intake 1160 ml   Output 1125 ml   Net 35 ml      Physical Exam:   General:  Alert, cooperative, no resp distress on bipap with 100% fio2. Head:  Normocephalic, without obvious abnormality, atraumatic. Eyes:  Conjunctivae/corneas clear    Nose: Nares normal. Septum midline. Mucosa normal.     Throat: Lips, mucosa, and tongue normal.    Neck: Supple, symmetrical, trachea midline    Lungs:   Distant bilateral breath sounds   Chest wall:  No tenderness or deformity. Heart:  Regular rate and rhythm    Abdomen:   Soft, non-tender.  Bowel sounds normal.    Extremities: Extremities normal, atraumatic, no cyanosis    Skin: Skin color, texture, turgor normal. No crepitus   Neurologic: Grossly nonfocal     Data:   Labs:  Recent Labs     02/03/21  0232   WBC 14.0*   HGB 14.7   HCT 44.4   *     Recent Labs     02/03/21  0232   *   K 4.8   CL 98   CO2 34*   *   BUN 35*   CREA 0.70   CA 9.0     No results for input(s): PHI, PCO2I, PO2I, HCO3I, FIO2I in the last 72 hours.     Imaging:  I have personally reviewed the patients radiographs:  CXR 2/1: improved        Sunita Archuleta MD

## 2021-02-04 NOTE — PROGRESS NOTES
End of Shift Note    Bedside shift change report given to SkyBitz (oncoming nurse) by Isabela Lindsey (offgoing nurse). Report included the following information SBAR and Kardex    Shift worked:  DAY     Shift summary and any significant changes:    Lovenox dosage modified. DDimer draw frequency modified. PT/OT consulted for eval.   Concerns for physician to address: none   Zone phone for oncoming shift:       Activity:  Activity Level: Up with Assistance  Number times ambulated in hallways past shift: 0  Number of times OOB to chair past shift: 1    Cardiac:   Cardiac Monitoring: Yes      Cardiac Rhythm: Normal sinus rhythm, Sinus tachycardia    Access:   Current line(s): PIV     Genitourinary:   Urinary status: voiding    Respiratory:   O2 Device: Hi flow nasal cannula  Chronic home O2 use?: NO  Incentive spirometer at bedside: YES  Actual Volume (ml): 750 ml  GI:  Last Bowel Movement Date: 02/02/21  Current diet:  DIET NUTRITIONAL SUPPLEMENTS Dinner; [employer]  DIET DIABETIC CONSISTENT CARB Regular  Passing flatus: YES  Tolerating current diet: YES  % Diet Eaten: 100 %    Pain Management:   Patient states pain is manageable on current regimen: YES    Skin:  Govind Score: 17  Interventions: increase time out of bed and PT/OT consult    Patient Safety:  Fall Score:  Total Score: 2  Interventions: bed/chair alarm and assistive device (walker, cane, etc)       Length of Stay:  Expected LOS: 5d 12h  Actual LOS: [address] [id number]

## 2021-02-04 NOTE — PROGRESS NOTES
Problem: Falls - Risk of  Goal: *Absence of Falls  Description: Document Magda Marte Fall Risk and appropriate interventions in the flowsheet. Outcome: Progressing Towards Goal  Note: Fall Risk Interventions:  Mobility Interventions: Assess mobility with egress test, Communicate number of staff needed for ambulation/transfer, Patient to call before getting OOB, PT Consult for mobility concerns         Medication Interventions: Patient to call before getting OOB    Elimination Interventions: Call light in reach    History of Falls Interventions: Bed/chair exit alarm         Problem: Breathing Pattern - Ineffective  Goal: *Absence of hypoxia  Outcome: Progressing Towards Goal  Goal: *Use of effective breathing techniques  Outcome: Progressing Towards Goal     Problem: Airway Clearance - Ineffective  Goal: Achieve or maintain patent airway  Outcome: Progressing Towards Goal     Problem: Gas Exchange - Impaired  Goal: Absence of hypoxia  Outcome: Progressing Towards Goal  Goal: Promote optimal lung function  Outcome: Progressing Towards Goal     Problem: Breathing Pattern - Ineffective  Goal: Ability to achieve and maintain a regular respiratory rate  Outcome: Progressing Towards Goal     Problem: Pressure Injury - Risk of  Goal: *Prevention of pressure injury  Description: Document Govind Scale and appropriate interventions in the flowsheet.   Outcome: Progressing Towards Goal  Note: Pressure Injury Interventions:  Sensory Interventions: Assess changes in LOC    Moisture Interventions: Absorbent underpads, Apply protective barrier, creams and emollients    Activity Interventions: Assess need for specialty bed    Mobility Interventions: Assess need for specialty bed, Chair cushion    Nutrition Interventions: Document food/fluid/supplement intake, Offer support with meals,snacks and hydration    Friction and Shear Interventions: Apply protective barrier, creams and emollients, Feet elevated on foot rest

## 2021-02-04 NOTE — PROGRESS NOTES
2pm  I called and spoke with pt's brother Joanna Vazquez as pt is in isolation and did not answer the phone. He states he//wife//step-son(who has cerebral palsy) have not had Covid virus. We discussed d/c options such as [facility]//wants [facility] for nursing, PT, OT, and HHA(FOC completed). Pt works for the [employer] and is \"hesitant\" to give out his physical address. We discussed how the [place] [address] staff and oxygen staff would need to know how to help him at home. We discussed the pt's potential needs for assistance after d/c. He says his wife would help but would not be able to do so unless pt was retested and was covid negative due to state taking away his CP son without the negative test.  He will talk with the pt about hiring assistance, SNF, asking other family members/[employer]/neighbors to help. ORIANA:  -confirm family to transport near d/c  -assess for home oxygen 24 hours prior to d/c  -await therapy recommendations for the benefit of [facility]HC  -ask pt again if he would be willing to give us his physical address.  This will be needed to deliver the oxygen//HHC staff.  -may benefit from IP pulmonary rehab at d/c  -make PCP omero't prior to d/c  -[facility] can accept pending oxygen testing//order as of 2/3

## 2021-02-04 NOTE — PROGRESS NOTES
Hospitalist Progress Note    NAME: Vernon Bay   :  1965   MRN:  [id number]       Assessment / Plan:  Acute hypoxic respiratory failure POA, improved, down to 10 L/m HiFlow oxygen  Severe multilobar Covid pneumonia POA  Pneumomediastinum , not POA, persistent but stable  -COVID-19 test positive on . -CTA  neg for PE. Shows diffuse bilateral peripheral groundglass opacification and consolidation in the lower lobes   -Initially on nasal cannula/high flow, decompensation  , was on BiPAP since 2021, steroids increased, evaluated by ICU intensivist on .     -has developed pneumomediastinum , changed to high flow from BiPAP to reduce barotrauma, chest x-ray this There is an unchanged tiny right apical pneumothorax. No left pneumothorax  -Dr Wally Ojeda Discussed with ICU intensivist .  -Continue HFNC, currently down to 10 L/min high flow & stable- wean as able  -s/p  remdesivir  S/p IV Solu-Medrol - changed to PO prednisone now  -s/p antibiotics  -s/p convalescent plasma 2021  -Full dose Lovenox added by pulmonary   -Continue to be very high risk for decompensation  -Chest x-ray today with slightly improved pulmonary parenchymal opacities  -No changes at the last 24 hours. 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 GERD  --currently not on medication. Pepcid IV twice daily     Obesity  Body mass index is 31.14 kg/m².     Code: Discussed, full code  DVT prophylaxis: Heparin  Surrogate decision maker: Brother Stiven Peers [phone]    Patient remains high risk for decompensation. Subjective:     Chief Complaint / Reason for Physician Visit  Discussed with RN overnight events. Patient was seen and examined. No acute events overnight.     \"Doing all right\"    Review of Systems:  Symptom Y/N Comments  Symptom Y/N Comments   Fever/Chills n   Chest Pain n    Poor Appetite    Edema     Cough n   Abdominal Pain n    Sputum    Joint Pain     SOB/BULL y  same  Pruritis/Rash     Nausea/vomit n   Tolerating PT/OT y    Diarrhea    Tolerating Diet y    Constipation    Other       Could NOT obtain due to:          Objective:     VITALS:   Last 24hrs VS reviewed since prior progress note. Most recent are:  Patient Vitals for the past 24 hrs:   Temp Pulse Resp BP SpO2   02/04/21 1121 98 °F (36.7 °C) 100 20 123/76 95 %   02/04/21 0808 -- -- -- -- 96 %   02/04/21 0751 97.9 °F (36.6 °C) 93 16 124/77 96 %   02/04/21 0536 -- -- -- -- 94 %   02/04/21 0307 97.3 °F (36.3 °C) (!) 101 18 126/85 97 %   02/03/21 2313 97.3 °F (36.3 °C) 90 16 121/60 90 %   02/03/21 1935 97.4 °F (36.3 °C) (!) 108 18 121/78 91 %   02/03/21 1449 98 °F (36.7 °C) 90 16 -- 90 %       Intake/Output Summary (Last 24 hours) at 2/4/2021 1320  Last data filed at 2/4/2021 1012  Gross per 24 hour   Intake 920 ml   Output 1650 ml   Net -730 ml        I had a face to face encounter and independently examined this patient on 2/4/2021, as outlined below:  PHYSICAL EXAM:  General: WD, WN. Alert, cooperative, in moderate respiratory distress    EENT:  EOMI. Anicteric sclerae. MMM  Resp:  B/l lung coarse, rales+  CV:  Regular  rhythm,  No edema  GI:  Soft, Non distended, Non tender. +Bowel sounds  Neurologic:  Alert and oriented X 3, normal speech,   Psych:   Good insight. Not anxious nor agitated  Skin:  No rashes.   No jaundice, subcu around right flank    Reviewed most current lab test results and cultures  YES  Reviewed most current radiology test results   YES  Review and summation of old records today    NO  Reviewed patient's current orders and MAR    YES  PMH/SH reviewed - no change compared to H&P  ________________________________________________________________________  Care Plan discussed with:    Comments   Patient x    Family RN x    Care Manager x    Consultant                       x Multidiciplinary team rounds were held today with , nursing, pharmacist and clinical coordinator. Patient's plan of care was discussed; medications were reviewed and discharge planning was addressed. ________________________________________________________________________      Total TIME Spent: 26 Minutes non procedure based      Comments   >50% of visit spent in counseling and coordination of care x    ________________________________________________________________________  Tyler Patel MD     Procedures: see electronic medical records for all procedures/Xrays and details which were not copied into this note but were reviewed prior to creation of Plan. LABS:  I reviewed today's most current labs and imaging studies.   Pertinent labs include:  Recent Labs     02/03/21 0232   WBC 14.0*   HGB 14.7   HCT 44.4   *     Recent Labs     02/03/21 0232   *   K 4.8   CL 98   CO2 34*   *   BUN 35*   CREA 0.70   CA 9.0       Signed: Tyler Patel MD

## 2021-02-04 NOTE — PROGRESS NOTES
0700: Bedside shift change report given to Sulaiman Katz (oncoming nurse) by Kemar Arthur (offgoing nurse). Report included the following information SBAR and Kardex.

## 2021-02-04 NOTE — PROGRESS NOTES
End of Shift Note    Bedside shift change report given to Fer Spencer RN (oncoming nurse) by Jay Weaver RN (offgoing nurse). Report included the following information SBAR and Kardex    Shift worked:  [id number]     Shift summary and any significant changes:     Unable to obtain patient labs this morning. Will consult PICC team first thing in the morning. NP made aware. Patient on 15L NC high flow. Maintained oxygen >95% overnight. Concerns for physician to address:  none .      Zone phone for oncoming shift:              Jay Weaver RN

## 2021-02-05 ENCOUNTER — APPOINTMENT (OUTPATIENT)
Dept: GENERAL RADIOLOGY | Age: 56
DRG: 177 | End: 2021-02-05
Attending: INTERNAL MEDICINE
Payer: COMMERCIAL

## 2021-02-05 LAB
ERYTHROCYTE [DISTWIDTH] IN BLOOD BY AUTOMATED COUNT: 13.6 % (ref 11.5–14.5)
GLUCOSE BLD STRIP.AUTO-MCNC: 168 MG/DL (ref 65–100)
GLUCOSE BLD STRIP.AUTO-MCNC: 280 MG/DL (ref 65–100)
GLUCOSE BLD STRIP.AUTO-MCNC: 299 MG/DL (ref 65–100)
GLUCOSE BLD STRIP.AUTO-MCNC: 300 MG/DL (ref 65–100)
HCT VFR BLD AUTO: 46.2 % (ref 36.6–50.3)
HGB BLD-MCNC: 15.3 G/DL (ref 12.1–17)
MCH RBC QN AUTO: 28.5 PG (ref 26–34)
MCHC RBC AUTO-ENTMCNC: 33.1 G/DL (ref 30–36.5)
MCV RBC AUTO: 86.2 FL (ref 80–99)
NRBC # BLD: 0 K/UL (ref 0–0.01)
NRBC BLD-RTO: 0 PER 100 WBC
PLATELET # BLD AUTO: 92 K/UL (ref 150–400)
PMV BLD AUTO: 11.5 FL (ref 8.9–12.9)
RBC # BLD AUTO: 5.36 M/UL (ref 4.1–5.7)
SERVICE CMNT-IMP: ABNORMAL
WBC # BLD AUTO: 15.2 K/UL (ref 4.1–11.1)

## 2021-02-05 PROCEDURE — 77010033711 HC HIGH FLOW OXYGEN

## 2021-02-05 PROCEDURE — 82962 GLUCOSE BLOOD TEST: CPT

## 2021-02-05 PROCEDURE — 85027 COMPLETE CBC AUTOMATED: CPT

## 2021-02-05 PROCEDURE — 97162 PT EVAL MOD COMPLEX 30 MIN: CPT

## 2021-02-05 PROCEDURE — 97530 THERAPEUTIC ACTIVITIES: CPT | Performed by: OCCUPATIONAL THERAPIST

## 2021-02-05 PROCEDURE — 74011250637 HC RX REV CODE- 250/637: Performed by: INTERNAL MEDICINE

## 2021-02-05 PROCEDURE — 74011250636 HC RX REV CODE- 250/636: Performed by: INTERNAL MEDICINE

## 2021-02-05 PROCEDURE — 36415 COLL VENOUS BLD VENIPUNCTURE: CPT

## 2021-02-05 PROCEDURE — 65660000001 HC RM ICU INTERMED STEPDOWN

## 2021-02-05 PROCEDURE — 74011636637 HC RX REV CODE- 636/637: Performed by: INTERNAL MEDICINE

## 2021-02-05 PROCEDURE — 97530 THERAPEUTIC ACTIVITIES: CPT

## 2021-02-05 PROCEDURE — 71045 X-RAY EXAM CHEST 1 VIEW: CPT

## 2021-02-05 PROCEDURE — 97165 OT EVAL LOW COMPLEX 30 MIN: CPT | Performed by: OCCUPATIONAL THERAPIST

## 2021-02-05 PROCEDURE — 74011250637 HC RX REV CODE- 250/637: Performed by: HOSPITALIST

## 2021-02-05 RX ADMIN — PREDNISONE 40 MG: 20 TABLET ORAL at 08:55

## 2021-02-05 RX ADMIN — Medication 10 ML: at 17:29

## 2021-02-05 RX ADMIN — ENOXAPARIN SODIUM 40 MG: 40 INJECTION SUBCUTANEOUS at 05:25

## 2021-02-05 RX ADMIN — ENOXAPARIN SODIUM 40 MG: 40 INJECTION SUBCUTANEOUS at 17:28

## 2021-02-05 RX ADMIN — INSULIN LISPRO 5 UNITS: 100 INJECTION, SOLUTION INTRAVENOUS; SUBCUTANEOUS at 17:28

## 2021-02-05 RX ADMIN — INSULIN LISPRO 2 UNITS: 100 INJECTION, SOLUTION INTRAVENOUS; SUBCUTANEOUS at 09:07

## 2021-02-05 RX ADMIN — FAMOTIDINE 20 MG: 20 TABLET, FILM COATED ORAL at 08:55

## 2021-02-05 RX ADMIN — Medication 10 ML: at 21:50

## 2021-02-05 RX ADMIN — ASPIRIN 81 MG: 81 TABLET, COATED ORAL at 08:55

## 2021-02-05 RX ADMIN — FUROSEMIDE 40 MG: 10 INJECTION, SOLUTION INTRAMUSCULAR; INTRAVENOUS at 09:07

## 2021-02-05 RX ADMIN — CHOLECALCIFEROL TAB 25 MCG (1000 UNIT) 2000 UNITS: 25 TAB at 08:55

## 2021-02-05 RX ADMIN — Medication 10 ML: at 05:25

## 2021-02-05 RX ADMIN — FAMOTIDINE 20 MG: 20 TABLET, FILM COATED ORAL at 21:50

## 2021-02-05 NOTE — PROGRESS NOTES
Hospitalist Progress Note    NAME: Martín Garza   :  1965   MRN:  [id number]       Assessment / Plan:  Acute hypoxic respiratory failure POA, improved, down to 9 L/m HiFlow oxygen today  Severe multilobar Covid pneumonia POA  Pneumomediastinum , not POA, persistent but stable  -COVID-19 test positive on . -CTA  neg for PE. Shows diffuse bilateral peripheral groundglass opacification and consolidation in the lower lobes   -Initially on nasal cannula/high flow, decompensation  , was on BiPAP since 2021, steroids increased, evaluated by ICU intensivist on .     -has developed pneumomediastinum , changed to high flow from BiPAP to reduce barotrauma, chest x-ray this There is an unchanged tiny right apical pneumothorax. No left pneumothorax  -Dr Essence Tate Discussed with ICU intensivist .  -Continue HFNC, currently down to 9 L/min high flow & stable- wean as able  -s/p  remdesivir  S/p IV Solu-Medrol - changed to PO prednisone now  -s/p antibiotics  -s/p convalescent plasma 2021  -Full dose Lovenox added by pulmonary   -Continue to be very high risk for decompensation  -Chest x-ray today with slightly improved pulmonary parenchymal opacities  -No changes at the last 24 hours. 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 GERD  --currently not on medication. Pepcid IV twice daily     Obesity  Body mass index is 31.14 kg/m².     Code: Discussed, full code  DVT prophylaxis: Heparin  Surrogate decision maker: Brother Vero Helms [phone]    Disposition:  HH likely, awaiting PT/OT RACHEL griggs working on DC planning     Subjective:     Chief Complaint / Reason for Physician Visit: F/U Resp failure/COVID PNA  Discussed with RN overnight events. Patient was seen and examined.   No acute events overnight. \"Doing all right\"    Review of Systems:  Symptom Y/N Comments  Symptom Y/N Comments   Fever/Chills n   Chest Pain n    Poor Appetite    Edema     Cough n   Abdominal Pain n    Sputum    Joint Pain     SOB/BULL y  improved  Pruritis/Rash     Nausea/vomit n   Tolerating PT/OT y    Diarrhea    Tolerating Diet y    Constipation    Other       Could NOT obtain due to:          Objective:     VITALS:   Last 24hrs VS reviewed since prior progress note. Most recent are:  Patient Vitals for the past 24 hrs:   Temp Pulse Resp BP SpO2   02/05/21 0730 98.1 °F (36.7 °C) 100 16 114/82 95 %   02/05/21 0331 97.1 °F (36.2 °C) 95 18 119/74 94 %   02/04/21 2257 97.4 °F (36.3 °C) 89 16 115/69 93 %   02/04/21 1945 97.2 °F (36.2 °C) (!) 105 18 (!) 141/80 95 %   02/04/21 1540 97.6 °F (36.4 °C) 100 16 115/85 93 %   02/04/21 1121 98 °F (36.7 °C) 100 20 123/76 95 %       Intake/Output Summary (Last 24 hours) at 2/5/2021 0854  Last data filed at 2/5/2021 7617  Gross per 24 hour   Intake 1130 ml   Output 1625 ml   Net -495 ml        I had a face to face encounter and independently examined this patient on 2/5/2021, as outlined below:  PHYSICAL EXAM:  General: WD, WN. Alert, cooperative, in moderate respiratory distress    EENT:  EOMI. Anicteric sclerae. MMM  Resp:  B/l lung coarse, rales+  CV:  Regular  rhythm,  No edema  GI:  Soft, Non distended, Non tender. +Bowel sounds  Neurologic:  Alert and oriented X 3, normal speech,   Psych:   Good insight. Not anxious nor agitated  Skin:  No rashes.   No jaundice, subcu around right flank    Reviewed most current lab test results and cultures  YES  Reviewed most current radiology test results   YES  Review and summation of old records today    NO  Reviewed patient's current orders and MAR    YES  PMH/SH reviewed - no change compared to H&P  ________________________________________________________________________  Care Plan discussed with:    Comments   Patient x    Family      RN x    Care Manager x    Consultant                       x Multidiciplinary team rounds were held today with , nursing, pharmacist and clinical coordinator. Patient's plan of care was discussed; medications were reviewed and discharge planning was addressed. ________________________________________________________________________      Total TIME Spent: 26 Minutes non procedure based      Comments   >50% of visit spent in counseling and coordination of care x    ________________________________________________________________________  Annetta Jensen MD     Procedures: see electronic medical records for all procedures/Xrays and details which were not copied into this note but were reviewed prior to creation of Plan. LABS:  I reviewed today's most current labs and imaging studies.   Pertinent labs include:  Recent Labs     02/05/21 0333 02/03/21  0232   WBC 15.2* 14.0*   HGB 15.3 14.7   HCT 46.2 44.4   PLT 92* 113*     Recent Labs     02/03/21  0232   *   K 4.8   CL 98   CO2 34*   *   BUN 35*   CREA 0.70   CA 9.0       Signed: Annetta Jnesen MD

## 2021-02-05 NOTE — PROGRESS NOTES
Problem: Mobility Impaired (Adult and Pediatric)  Goal: *Acute Goals and Plan of Care (Insert Text)  Description: FUNCTIONAL STATUS PRIOR TO ADMISSION: Patient was independent and active without use of DME.    HOME SUPPORT PRIOR TO ADMISSION: The patient lived with family. Physical Therapy Goals  Initiated 2/5/2021  1. Patient will move from supine to sit and sit to supine  in bed with independence within 7 day(s). 2.  Patient will transfer from bed to chair and chair to bed with independence using the least restrictive device within 7 day(s). 3.  Patient will perform sit to stand with independence within 7 day(s). 4.  Patient will ambulate with independence for 50 feet with the least restrictive device within 7 day(s). Outcome: Not Met    PHYSICAL THERAPY EVALUATION  Patient: Deng Waddell (74 y.o. male)  Date: 2/5/2021  Primary Diagnosis: COVID-19 virus infection [U07.1]  Acute respiratory failure with hypoxia (HCC) [J96.01]        Precautions: fall         ASSESSMENT  Based on the objective data described below, the patient presents with generalized weakness, decreased activity tolerance, tachycardic, hypoxia, impaired balance and overall decreased functional mobility. Pt was received in the chair on 15L of high flow and cleared by nursing to mobilize. Session was extremely limited due to poor activity tolerance. Able to stand, oxygen dropped to 82% and HR increased to 146bpm. He was returned to sitting and chest x-ray was needed to be done. Pt exhausted and SOB with minimal activity. Current Level of Function Impacting Discharge (mobility/balance): CGA    Functional Outcome Measure: The patient scored Total: 50/100 on the Barthel Index which is indicative of 50% impaired ability to care for basic self needs/dependency on others.      Other factors to consider for discharge: significant oxygen needs     Patient will benefit from skilled therapy intervention to address the above noted impairments. PLAN :  Recommendations and Planned Interventions: bed mobility training, transfer training, gait training, therapeutic exercises, patient and family training/education, and therapeutic activities      Frequency/Duration: Patient will be followed by physical therapy:  3 times a week to address goals. Recommendation for discharge: (in order for the patient to meet his/her long term goals)  Physical therapy at least 2 days/week in the home AND ensure assist and/or supervision for safety with mobility if pt is able to progress and wean oxygen  other wise he will need IP rehab    This discharge recommendation:  Has not yet been discussed the attending provider and/or case management    IF patient discharges home will need the following DME: to be determined (TBD)         SUBJECTIVE:   Patient stated i have been in bed since I have been here.     OBJECTIVE DATA SUMMARY:   HISTORY:    Past Medical History:   Diagnosis Date    GERD (gastroesophageal reflux disease)     Obesity (BMI 30-39. 9) 5/8/2019    Right leg DVT ([facility] [address] 2019     Past Surgical History:   Procedure Laterality Date    HX ORTHOPAEDIC         Personal factors and/or comorbidities impacting plan of care:     Home Situation  Home Environment: Private residence  # Steps to Enter: 10  One/Two Story Residence: Two story  # of Interior Steps: 10  Living Alone: Yes  Support Systems: Family member(s), Friends \ neighbors  Patient Expects to be Discharged to[de-identified] Private residence  Current DME Used/Available at Home: None    EXAMINATION/PRESENTATION/DECISION MAKING:   Critical Behavior:      Alert and oriented x 4        Hearing:   Auditory  Auditory Impairment: None  Skin:  intact  Edema: WDL  Range Of Motion:  AROM: Generally decreased, functional           PROM: Generally decreased, functional           Strength:    Strength: Generally decreased, functional                    Tone & Sensation:   Tone: Normal              Sensation: Intact Coordination:  Coordination: Within functional limits  Vision:      Functional Mobility:  Bed Mobility:         Session began and ended in sitting     Transfers:  Sit to Stand: Contact guard assistance  Stand to Sit: Contact guard assistance                       Balance:   Sitting: Intact  Standing: Impaired  Standing - Static: Fair  Ambulation/Gait Training:      Unable to tolerate            Functional Measure:  Barthel Index:    Bathin  Bladder: 10  Bowels: 10  Groomin  Dressin  Feeding: 10  Mobility: 0  Stairs: 0  Toilet Use: 5  Transfer (Bed to Chair and Back): 10  Total: 50/100       The Barthel ADL Index: Guidelines  1. The index should be used as a record of what a patient does, not as a record of what a patient could do. 2. The main aim is to establish degree of independence from any help, physical or verbal, however minor and for whatever reason. 3. The need for supervision renders the patient not independent. 4. A patient's performance should be established using the best available evidence. Asking the patient, friends/relatives and nurses are the usual sources, but direct observation and common sense are also important. However direct testing is not needed. 5. Usually the patient's performance over the preceding 24-48 hours is important, but occasionally longer periods will be relevant. 6. Middle categories imply that the patient supplies over 50 per cent of the effort. 7. Use of aids to be independent is allowed. Herminio Gates., Barthel, D.W. (1541). Functional evaluation: the Barthel Index. [address] [facility] (14)2. ERIK Kwan, Lenny Maradiaga., Arabella Plata., Palma Hernandez, [address] [place] [address] (). Measuring the change indisability after inpatient rehabilitation; comparison of the responsiveness of the Barthel Index and Functional [place] Measure. Journal of Neurology, Neurosurgery, and Psychiatry, 66(4), 306-688. Kishor Padilla NPrasadJ.A, CHRISSY Gómez, Sia Bender M.A. (2004.) Assessment of post-stroke quality of life in cost-effectiveness studies: The usefulness of the Barthel Index and the EuroQoL-5D. Quality of Life Research, 15, 395-95        Physical Therapy Evaluation Charge Determination   History Examination Presentation Decision-Making   MEDIUM  Complexity : 1-2 comorbidities / personal factors will impact the outcome/ POC  MEDIUM Complexity : 3 Standardized tests and measures addressing body structure, function, activity limitation and / or participation in recreation  HIGH Complexity : Unstable and unpredictable characteristics  Other outcome measures barthel  MEDIUM      Based on the above components, the patient evaluation is determined to be of the following complexity level: MEDIUM        Activity Tolerance:   Poor and desaturates with exertion and requires oxygen    After treatment patient left in no apparent distress:   Sitting in chair and Call bell within reach    COMMUNICATION/EDUCATION:   The patients plan of care was discussed with: Occupational therapist and Registered nurse. Fall prevention education was provided and the patient/caregiver indicated understanding. and Patient/family agree to work toward stated goals and plan of care.     Thank you for this referral.  Alric Opitz, PT, DPT   Time Calculation: 18 mins

## 2021-02-05 NOTE — PROGRESS NOTES
Problem: Self Care Deficits Care Plan (Adult)  Goal: *Acute Goals and Plan of Care (Insert Text)  Description: FUNCTIONAL STATUS PRIOR TO ADMISSION: Patient was independent and active without use of DME    HOME SUPPORT PRIOR TO ADMISSION: The patient lived alone with brother and neighbors to provide assistance. Occupational Therapy Goals:  Initiated 2/5/2021  1. Patient will perform grooming seated with supervision within 7 days. 2. Patient will perform toileting with supervision within 7 days. 3. Patient will perform lower body dressing with supervision within 7 days. 4. Patient will transfer from bedside commode with modified independence using the least restrictive device and appropriate durable medical equipment within 7 days. 5. Patient will perform UB dressing with supervision within 7 days. Outcome: Not Met   OCCUPATIONAL THERAPY EVALUATION  Patient: Sukhwinder Fajardo (62 y.o. male)  Date: 2/5/2021  Primary Diagnosis: COVID-19 virus infection [U07.1]  Acute respiratory failure with hypoxia (HCC) [J96.01]        Precautions: droplet+        ASSESSMENT  Based on the objective data described below, the patient presents with very limited activity tolerance and HR was significantly elevated at rest 120's-130's. Pt was on 15L HF NC and O2 sat dropped from 80-91% to 85% with seated crossed leg and knee extension x2 reps. Educated on pacing PLB and over time O2 sat improved to 89% seated with PLB. Pt was able to perform UB seated task with set up and only able to stand briefly with CGA due to medical status. LOB in standing with initial sit to stand with min assist for balance. He is generally weak and limited with ADLS due to medical status and weakness.         02/05/21 1103 02/05/21 1107 02/05/21 1110   Vital Signs   Pulse (Heart Rate) (!) 131 (!) 124  --    /81 124/84  --    MAP (Calculated) 95 97  --    BP 1 Location Right upper arm Right upper arm  --    BP 1 Method Automatic Automatic  --    BP Patient Position Sitting  (LE elevated in chair) Sitting  --    O2 Sat (%) (!) 89 % (!) 89 % (!) 85 %  (LE exercise x2)   O2 Device Hi flow nasal cannula Hi flow nasal cannula Hi flow nasal cannula   O2 Flow Rate (L/min) 15 l/min 15 l/min 15 l/min        02/05/21 1112 02/05/21 1113   Vital Signs   Pulse (Heart Rate) (!) 146  --    BP  --   --    MAP (Calculated)  --   --    BP 1 Location  --   --    BP 1 Method  --   --    BP Patient Position Standing Sitting   O2 Sat (%) 90 % (!) 82 %   O2 Device Hi flow nasal cannula Hi flow nasal cannula   O2 Flow Rate (L/min) 15 l/min 15 l/min     Current Level of Function Impacting Discharge (ADLs/self-care): CGA sit to stand, min assist standing balance    ADL Assessment:  Feeding: Independent(but needs rest breaks)    Oral Facial Hygiene/Grooming: Stand-by assistance    Bathing: Moderate assistance(due to decresaed respiratory status)    Upper Body Dressing: Setup    Lower Body Dressing: Moderate assistance    Toileting: Moderate assistance       Functional Outcome Measure: The patient scored 50/100 on the barthel outcome measure which is indicative of moderate decline in mobility and ADLS. Other factors to consider for discharge: lives alone     Patient will benefit from skilled therapy intervention to address the above noted impairments. PLAN :  Recommendations and Planned Interventions: self care training, functional mobility training, therapeutic exercise, balance training, therapeutic activities, patient education, home safety training, and family training/education    Frequency/Duration: Patient will be followed by occupational therapy 3 times a week to address goals.     Recommendation for discharge: (in order for the patient to meet his/her long term goals)  To be determined: very limited activity tolerance at this time    This discharge recommendation:  Has been made in collaboration with the attending provider and/or case management    IF patient discharges home will need the following DME: TBD       SUBJECTIVE:   Patient stated I have been getting to the chair.     OBJECTIVE DATA SUMMARY:   HISTORY:   Past Medical History:   Diagnosis Date    GERD (gastroesophageal reflux disease)     Obesity (BMI 30-39. 9) 5/8/2019    Right leg DVT ([address] 2019     Past Surgical History:   Procedure Laterality Date    HX ORTHOPAEDIC         Expanded or extensive additional review of patient history:     Home Situation  Home Environment: Private residence  # Steps to Enter: 10  One/Two Story Residence: Two story  # of Interior Steps: 10  Living Alone: Yes  Support Systems: Family member(s), Friends \ neighbors  Patient Expects to be Discharged to[de-identified] Private residence  Current DME Used/Available at Home: None    Hand dominance: Left      EXAMINATION OF PERFORMANCE DEFICITS:  Cognitive/Behavioral Status:  Neurologic State: Alert  Orientation Level: Oriented to person;Oriented to place  Cognition: Decreased attention/concentration; Follows commands  Perception: Appears intact  Perseveration: No perseveration noted  Safety/Judgement: Fall prevention  Hearing: Auditory  Auditory Impairment: None    Vision/Perceptual:                           Acuity: (grossly intact)         Range of Motion:    AROM: Generally decreased, functional  PROM: Generally decreased, functional                      Strength:    Strength: Generally decreased, functional                Coordination:  Coordination: Within functional limits  Fine Motor Skills-Upper: Left Intact; Right Intact    Gross Motor Skills-Upper: Left Intact; Right Intact    Tone & Sensation:    Tone: Normal  Sensation: Intact                      Balance:  Sitting: Intact  Standing: Impaired  Standing - Static: Fair    Functional Mobility and Transfers for ADLs:      Transfers:  Sit to Stand: Contact guard assistance  Stand to Sit: Contact guard assistance  Bathroom Mobility: (unable)    ADL Assessment:  Feeding: Independent(but needs rest breaks)    Oral Facial Hygiene/Grooming: Stand-by assistance    Bathing: Moderate assistance(due to decresaed respiratory status)    Upper Body Dressing: Setup    Lower Body Dressing: Moderate assistance    Toileting: Moderate assistance                ADL Intervention and task modifications:  See assessment    Cognitive Retraining  Safety/Judgement: Fall prevention      Functional Measure:  Barthel Index:    Bathin  Bladder: 10  Bowels: 10  Groomin  Dressin  Feeding: 10  Mobility: 0  Stairs: 0  Toilet Use: 5  Transfer (Bed to Chair and Back): 10  Total: 50/100        The Barthel ADL Index: Guidelines  1. The index should be used as a record of what a patient does, not as a record of what a patient could do. 2. The main aim is to establish degree of independence from any help, physical or verbal, however minor and for whatever reason. 3. The need for supervision renders the patient not independent. 4. A patient's performance should be established using the best available evidence. Asking the patient, friends/relatives and nurses are the usual sources, but direct observation and common sense are also important. However direct testing is not needed. 5. Usually the patient's performance over the preceding 24-48 hours is important, but occasionally longer periods will be relevant. 6. Middle categories imply that the patient supplies over 50 per cent of the effort. 7. Use of aids to be independent is allowed. Jian Alvarado., Barthel, D.W. (4798). Functional evaluation: the Barthel Index. [address] [facility] (14)2. [place] ERIK Rubio, Jamshid Cary., Ani Pitts.[address] (). Measuring the change indisability after inpatient rehabilitation; comparison of the responsiveness of the Barthel Index and Functional [place] Measure. Journal of Neurology, Neurosurgery, and Psychiatry, 66(4), 516-851. KRISTEN Gould.CECE, CHRISSY Gómez, Nigel Penaloza M.A. (2004.) Assessment of post-stroke quality of life in cost-effectiveness studies: The usefulness of the Barthel Index and the EuroQoL-5D. Quality of Life Research, 15, 174-98         Occupational Therapy Evaluation Charge Determination   History Examination Decision-Making   LOW Complexity : Brief history review  LOW Complexity : 1-3 performance deficits relating to physical, cognitive , or psychosocial skils that result in activity limitations and / or participation restrictions  LOW Complexity : No comorbidities that affect functional and no verbal or physical assistance needed to complete eval tasks       Based on the above components, the patient evaluation is determined to be of the following complexity level: LOW   Pain Ratin/10    Activity Tolerance:   Poor, desaturates with exertion and requires oxygen, and observed SOB with activity    After treatment patient left in no apparent distress:    Sitting in chair and Call bell within reach    COMMUNICATION/EDUCATION:   The patients plan of care was discussed with: Physical therapist, Registered nurse, and patient . Patient/family agree to work toward stated goals and plan of care. This patients plan of care is appropriate for delegation to \[facility]\"".     Thank you for this referral.  Delicia Jacobson OTR/L  Time Calculation: 17 mins

## 2021-02-05 NOTE — PROGRESS NOTES
RN states pt is more acute today. We discussed in IDR that MD would order a covid test(see below) on Sunday//Monday. I called into the room and he was willing to have [facility] assess him for admission. I'll send chart to [facility] as brother feels he may need rehab prior to going home. If accepted, he would need insurance auth. At present, there is not enough home support for pt. [facility] can provide oxygen with orders and o2 challenge. [address] [employer] is reviewing the chart-they will need orders and an address    Yesterday, I called and spoke with pt's brother Keaton Garcia as pt is in isolation and did not answer the phone. He states he//wife//step-son(who has cerebral palsy) have not had Covid virus. We discussed d/c options such as [facility]//wants [address] for nursing, PT, OT, and HHA(FOC completed). Pt works for the [employer] and is \"hesitant\" to give out his physical address. We discussed how the [facility] staff and oxygen staff would need to know how to help him at home. We discussed the pt's potential needs for assistance after d/c. He says his wife would help but would not be able to do so unless pt was retested and was covid negative due to state taking away his CP son without the negative test.  He will talk with the pt about hiring assistance, SNF, asking other family members/[employer]/neighbors to help.       ORIANA:  -confirm family to transport near d/c  -assess for home oxygen 24 hours prior to d/c  -await therapy recommendations for the benefit of [facility]  -ask pt again if he would be willing to give us his physical address.  This will be needed to deliver the oxygen//HHC staff.  -may benefit from IP pulmonary rehab at d/c  -make PCP omero't prior to d/c  -[facility] can accept pending oxygen testing//order as of 2/3

## 2021-02-05 NOTE — PROGRESS NOTES
02/05/21 1103 02/05/21 1107 02/05/21 1110   Vital Signs   Pulse (Heart Rate) (!) 131 (!) 124  --    /81 124/84  --    MAP (Calculated) 95 97  --    BP 1 Location Right upper arm Right upper arm  --    BP 1 Method Automatic Automatic  --    BP Patient Position Sitting  (LE elevated in chair) Sitting  --    O2 Sat (%) (!) 89 % (!) 89 % (!) 85 %  (LE exercise x2)   O2 Device Hi flow nasal cannula Hi flow nasal cannula Hi flow nasal cannula   O2 Flow Rate (L/min) 15 l/min 15 l/min 15 l/min      02/05/21 1112 02/05/21 1113   Vital Signs   Pulse (Heart Rate) (!) 146  --    BP  --   --    MAP (Calculated)  --   --    BP 1 Location  --   --    BP 1 Method  --   --    BP Patient Position Standing Sitting   O2 Sat (%) 90 % (!) 82 %   O2 Device Hi flow nasal cannula Hi flow nasal cannula   O2 Flow Rate (L/min) 15 l/min 15 l/min

## 2021-02-05 NOTE — PROGRESS NOTES
02/05/21 1031   Vitals   Temp 98 °F (36.7 °C)   Temp Source Oral   Pulse (Heart Rate) (!) 113   Heart Rate Source Monitor   Resp Rate 18   O2 Sat (%) 90 %   Level of Consciousness Alert   /83   MAP (Calculated) 96   BP 1 Location Right upper arm   BP 1 Method Automatic   BP Patient Position Sitting   MEWS Score 3   Oxygen Therapy   O2 Device Hi flow nasal cannula   O2 Flow Rate (L/min) 15 l/min   Pain 1   Pain Scale 1 Numeric (0 - 10)   Pain Intensity 1 0   Patient Stated Pain Goal 0   Pain Reassessment 1 Yes       MEWS 3 due to HR. Spoke with Dr. Vee Vizcaino and Dr. Troy Soliz regarding oxygen and HR. No new orders at this time.

## 2021-02-05 NOTE — PROGRESS NOTES
Problem: Falls - Risk of  Goal: *Absence of Falls  Description: Document Clydene Bone Fall Risk and appropriate interventions in the flowsheet. Outcome: Progressing Towards Goal  Note: Fall Risk Interventions:  Mobility Interventions: Assess mobility with egress test, Communicate number of staff needed for ambulation/transfer, Patient to call before getting OOB, PT Consult for mobility concerns         Medication Interventions: Patient to call before getting OOB, Teach patient to arise slowly    Elimination Interventions: Call light in reach    History of Falls Interventions: Bed/chair exit alarm         Problem: Patient Education: Go to Patient Education Activity  Goal: Patient/Family Education  Outcome: Progressing Towards Goal     Problem: Breathing Pattern - Ineffective  Goal: *Absence of hypoxia  Outcome: Progressing Towards Goal  Goal: *Use of effective breathing techniques  Outcome: Progressing Towards Goal     Problem: Airway Clearance - Ineffective  Goal: Achieve or maintain patent airway  Outcome: Progressing Towards Goal     Problem: Gas Exchange - Impaired  Goal: Absence of hypoxia  Outcome: Progressing Towards Goal  Goal: Promote optimal lung function  Outcome: Progressing Towards Goal     Problem: Breathing Pattern - Ineffective  Goal: Ability to achieve and maintain a regular respiratory rate  Outcome: Progressing Towards Goal     Problem:  Body Temperature -  Risk of, Imbalanced  Goal: Ability to maintain a body temperature within defined limits  Outcome: Progressing Towards Goal  Goal: Will regain or maintain usual level of consciousness  Outcome: Progressing Towards Goal  Goal: Complications related to the disease process, condition or treatment will be avoided or minimized  Outcome: Progressing Towards Goal     Problem: Isolation Precautions - Risk of Spread of Infection  Goal: Prevent transmission of infectious organism to others  Outcome: Progressing Towards Goal     Problem: Nutrition Deficits  Goal: Optimize nutrtional status  Outcome: Progressing Towards Goal     Problem: Risk for Fluid Volume Deficit  Goal: Maintain normal heart rhythm  Outcome: Progressing Towards Goal  Goal: Maintain absence of muscle cramping  Outcome: Progressing Towards Goal  Goal: Maintain normal serum potassium, sodium, calcium, phosphorus, and pH  Outcome: Progressing Towards Goal     Problem: Loneliness or Risk for Loneliness  Goal: Demonstrate positive use of time alone when socialization is not possible  Outcome: Progressing Towards Goal     Problem: Fatigue  Goal: Verbalize increase energy and improved vitality  Outcome: Progressing Towards Goal     Problem: Patient Education: Go to Patient Education Activity  Goal: Patient/Family Education  Outcome: Progressing Towards Goal     Problem: Pressure Injury - Risk of  Goal: *Prevention of pressure injury  Description: Document Govind Scale and appropriate interventions in the flowsheet.   Outcome: Progressing Towards Goal  Note: Pressure Injury Interventions:  Sensory Interventions: Assess changes in LOC    Moisture Interventions: Absorbent underpads, Apply protective barrier, creams and emollients    Activity Interventions: Assess need for specialty bed    Mobility Interventions: Assess need for specialty bed    Nutrition Interventions: Document food/fluid/supplement intake, Offer support with meals,snacks and hydration    Friction and Shear Interventions: Lift sheet, Minimize layers                Problem: Patient Education: Go to Patient Education Activity  Goal: Patient/Family Education  Outcome: Progressing Towards Goal

## 2021-02-05 NOTE — PROGRESS NOTES
[facility]  Pulmonary, Critical Care, and Sleep Medicine    Name: Abdulaziz Olivas MRN: [id number]   : 1965 Hospital: [facility]   Date: 2021            IMPRESSION:   · Acute hypoxic respiratory failure - severe, secondary to COVID PNA-currently on HF (55L, 100%)  · Severe Multilobar Covid Pneumonia, first Dx on , Symptoms first started on 21  S/P convalescent plasma ; S/p Remdesivir x 5 days total  · New pneumomediastinum 21, radiographic stable las couple of days; crepitus in neck  · Covid related coagulopathy  · Covid related inflammation, increased ferritin. · Steroid induced hyperglycemia  · Obesity  · GERD  · Hypertension, LAE and LVH on ekg. RECOMMENDATIONS:   · Continue HFNC, wean as tolerated, daily improvement  · Trying to avoid PAP due to pneumomediastinum  · Empiric antibiotics completed  · Cont Lovenox, full dose until 24hrs ago  · Continue po steroids  · Follow inflammatory and coag markers  · If BNP, D-Dimeror procalcitonin elevated in the AM then will intervene  · Discussed with Dr Donna Wynn  · Mobilize out of bed as tolerated  · Will follow monday   PCP: Jania Alonzo.        Subjective:     No acute events   No severe distress  On HF O2, requiring more O2 this am  sats 90-93%    Current Facility-Administered Medications   Medication Dose Route Frequency    enoxaparin (LOVENOX) injection 40 mg  40 mg SubCUTAneous Q12H    predniSONE (DELTASONE) tablet 40 mg  40 mg Oral DAILY WITH BREAKFAST    insulin lispro (HUMALOG) injection   SubCUTAneous ACB&D    famotidine (PEPCID) tablet 20 mg  20 mg Oral Q12H    furosemide (LASIX) injection 40 mg  40 mg IntraVENous DAILY    influenza vaccine - (6 mos+)(PF) (FLUARIX/FLULAVAL/FLUZONE QUAD) injection 0.5 mL  0.5 mL IntraMUSCular PRIOR TO DISCHARGE    sodium chloride (NS) flush 5-40 mL  5-40 mL IntraVENous Q8H    cholecalciferol (VITAMIN D3) (1000 Units /25 mcg) tablet 2 Tab  2,000 Units Oral DAILY    aspirin delayed-release tablet 81 mg  81 mg Oral DAILY       Review of Systems:  Constitutional: positive for fevers, chills, sweats, fatigue, malaise and anorexia, negative for weight loss  Eyes: negative  Ears, nose, mouth, throat, and face: positive for nasal congestion  Respiratory: positive for cough or dyspnea on exertion  Cardiovascular: positive for fatigue, paroxysmal nocturnal dyspnea, tachypnea, dyspnea on exertion  Gastrointestinal: positive for dyspepsia, reflux symptoms, nausea and change in bowel habits  Genitourinary:negative  Integument/breast: negative  Hematologic/lymphatic: negative  Musculoskeletal:negative  Neurological: negative  Behavioral/Psych: negative  Endocrine: negative  Allergic/Immunologic: negative    Objective:   Vital Signs:    Visit Vitals  /82 (BP 1 Location: Right upper arm, BP Patient Position: At rest)   Pulse 100   Temp 98.1 °F (36.7 °C)   Resp 16   Ht 5' 8\" (1.727 m)   Wt 92.9 kg (204 lb 12.9 oz)   SpO2 95%   BMI 31.14 kg/m²       O2 Device: Hi flow nasal cannula   O2 Flow Rate (L/min): 9 l/min   Temp (24hrs), Av.6 °F (36.4 °C), Min:97.1 °F (36.2 °C), Max:98.1 °F (36.7 °C)       Intake/Output:   Last shift:      No intake/output data recorded. Last 3 shifts:  1901 -  0700  In: 1570 [P.O.:1570]  Out: 2425 [Urine:2425]    Intake/Output Summary (Last 24 hours) at 2021 0844  Last data filed at 2021 9349  Gross per 24 hour   Intake 1130 ml   Output 1625 ml   Net -495 ml      Physical Exam:   General:  Alert, cooperative, no resp distress on bipap with 100% fio2. Head:  Normocephalic, without obvious abnormality, atraumatic. Eyes:  Conjunctivae/corneas clear    Nose: Nares normal. Septum midline. Mucosa normal.     Throat: Lips, mucosa, and tongue normal.    Neck: Supple, symmetrical, trachea midline    Lungs:   Distant bilateral breath sounds   Chest wall:  No tenderness or deformity.    Heart:  Regular rate and rhythm    Abdomen:   Soft, non-tender. Bowel sounds normal.    Extremities: Extremities normal, atraumatic, no cyanosis    Skin: Skin color, texture, turgor normal. No crepitus   Neurologic: Grossly nonfocal     Data:   Labs:  Recent Labs     02/05/21  0333 02/03/21  0232   WBC 15.2* 14.0*   HGB 15.3 14.7   HCT 46.2 44.4   PLT 92* 113*     Recent Labs     02/03/21  0232   *   K 4.8   CL 98   CO2 34*   *   BUN 35*   CREA 0.70   CA 9.0     No results for input(s): PHI, PCO2I, PO2I, HCO3I, FIO2I in the last 72 hours.     Imaging:  I have personally reviewed the patients radiographs:  CXR: today same        Govind Logan MD

## 2021-02-05 NOTE — PROGRESS NOTES
End of Shift Note    Bedside shift change report given to VADIM Kee (oncoming nurse) by Shailesh Sullivan RN (offgoing nurse). Report included the following information SBAR and Kardex    Shift worked:  [id number]     Shift summary and any significant changes:     none. Concerns for physician to address:  none.       Zone phone for oncoming shift:              Shailesh Sullivan RN

## 2021-02-05 NOTE — PROGRESS NOTES
Problem: Falls - Risk of  Goal: *Absence of Falls  Description: Document Sid Paiz Fall Risk and appropriate interventions in the flowsheet.   Outcome: Progressing Towards Goal  Note: Fall Risk Interventions:  Mobility Interventions: Assess mobility with egress test, Communicate number of staff needed for ambulation/transfer, Patient to call before getting OOB, PT Consult for mobility concerns         Medication Interventions: Patient to call before getting OOB, Teach patient to arise slowly    Elimination Interventions: Call light in reach    History of Falls Interventions: Bed/chair exit alarm         Problem: Breathing Pattern - Ineffective  Goal: *Absence of hypoxia  Outcome: Progressing Towards Goal  Goal: *Use of effective breathing techniques  Outcome: Progressing Towards Goal     Problem: Airway Clearance - Ineffective  Goal: Achieve or maintain patent airway  Outcome: Progressing Towards Goal     Problem: Gas Exchange - Impaired  Goal: Absence of hypoxia  Outcome: Progressing Towards Goal  Goal: Promote optimal lung function  Outcome: Progressing Towards Goal     Problem: Breathing Pattern - Ineffective  Goal: Ability to achieve and maintain a regular respiratory rate  Outcome: Progressing Towards Goal

## 2021-02-05 NOTE — PROGRESS NOTES
Spiritual Care Assessment/Progress Note  [facility]      NAME: Iris Perez      MRN: [id number]  AGE: 48 y.o.  SEX: male  Druze Affiliation: No preference   Language: English     2/5/2021     Total Time (in minutes): 5     Spiritual Assessment begun in [facility]ARD[facility]ULMON[facility] through conversation with:         [x]Patient        [] Family    [] Friend(s)        Reason for Consult: Initial/Spiritual assessment, patient floor     Spiritual beliefs: (Please include comment if needed)     [] Identifies with a andrea tradition:         [] Supported by a andrea community:            [x] Claims no spiritual orientation:           [] Seeking spiritual identity:                [] Adheres to an individual form of spirituality:           [] Not able to assess:                           Identified resources for coping:      [] Prayer                               [] Music                  [] Guided Imagery     [x] Family/friends                 [] Pet visits     [] Devotional reading                         [] Unknown     [] Other:                                               Interventions offered during this visit: (See comments for more details)    Patient Interventions: Affirmation of emotions/emotional suffering, Initial visit, Initial/Spiritual assessment, patient floor, Normalization of emotional/spiritual concerns           Plan of Care:     [] Support spiritual and/or cultural needs    [] Support AMD and/or advance care planning process      [] Support grieving process   [] Coordinate Rites and/or Rituals    [] Coordination with community clergy   [] No spiritual needs identified at this time   [] Detailed Plan of Care below (See Comments)  [] Make referral to Music Therapy  [] Make referral to Pet Therapy     [] Make referral to Addiction services  [] Make referral to [facility]  [] Make referral to Spiritual Care Partner  [] No future visits requested        [x] Follow up upon further referrals     Comments:     Initial Spiritual Care Assessment visit for Mr. Rock Weathers in [address]. No familye was present during the visit. Patient shared that he is ding well and has a good family support and does not need anything as of right now.  affirmed his healing progress and supportive family. Patient declined any further needs. Advised of 24/7  availability.       [id number] Jolly Friedman., M.S., Th.M.  Spiritual Care Provider   Paging Service [phone])

## 2021-02-06 LAB
BNP SERPL-MCNC: 1430 PG/ML
D DIMER PPP FEU-MCNC: 0.4 MG/L FEU (ref 0–0.65)
GLUCOSE BLD STRIP.AUTO-MCNC: 212 MG/DL (ref 65–100)
GLUCOSE BLD STRIP.AUTO-MCNC: 299 MG/DL (ref 65–100)
GLUCOSE BLD STRIP.AUTO-MCNC: 384 MG/DL (ref 65–100)
PROCALCITONIN SERPL-MCNC: 0.23 NG/ML
SERVICE CMNT-IMP: ABNORMAL

## 2021-02-06 PROCEDURE — 74011250636 HC RX REV CODE- 250/636: Performed by: INTERNAL MEDICINE

## 2021-02-06 PROCEDURE — 82962 GLUCOSE BLOOD TEST: CPT

## 2021-02-06 PROCEDURE — 74011250637 HC RX REV CODE- 250/637: Performed by: INTERNAL MEDICINE

## 2021-02-06 PROCEDURE — 74011636637 HC RX REV CODE- 636/637: Performed by: INTERNAL MEDICINE

## 2021-02-06 PROCEDURE — 74011250637 HC RX REV CODE- 250/637: Performed by: HOSPITALIST

## 2021-02-06 PROCEDURE — 84145 PROCALCITONIN (PCT): CPT

## 2021-02-06 PROCEDURE — 36415 COLL VENOUS BLD VENIPUNCTURE: CPT

## 2021-02-06 PROCEDURE — 77010033711 HC HIGH FLOW OXYGEN

## 2021-02-06 PROCEDURE — 85379 FIBRIN DEGRADATION QUANT: CPT

## 2021-02-06 PROCEDURE — 83880 ASSAY OF NATRIURETIC PEPTIDE: CPT

## 2021-02-06 PROCEDURE — 65660000001 HC RM ICU INTERMED STEPDOWN

## 2021-02-06 RX ORDER — ENOXAPARIN SODIUM 100 MG/ML
30 INJECTION SUBCUTANEOUS EVERY 12 HOURS
Status: DISCONTINUED | OUTPATIENT
Start: 2021-02-06 | End: 2021-02-15

## 2021-02-06 RX ORDER — FUROSEMIDE 10 MG/ML
40 INJECTION INTRAMUSCULAR; INTRAVENOUS ONCE
Status: COMPLETED | OUTPATIENT
Start: 2021-02-06 | End: 2021-02-06

## 2021-02-06 RX ORDER — FUROSEMIDE 10 MG/ML
40 INJECTION INTRAMUSCULAR; INTRAVENOUS 2 TIMES DAILY
Status: DISCONTINUED | OUTPATIENT
Start: 2021-02-06 | End: 2021-02-11

## 2021-02-06 RX ADMIN — FAMOTIDINE 20 MG: 20 TABLET, FILM COATED ORAL at 09:17

## 2021-02-06 RX ADMIN — INSULIN LISPRO 5 UNITS: 100 INJECTION, SOLUTION INTRAVENOUS; SUBCUTANEOUS at 17:09

## 2021-02-06 RX ADMIN — Medication 10 ML: at 21:25

## 2021-02-06 RX ADMIN — INSULIN LISPRO 3 UNITS: 100 INJECTION, SOLUTION INTRAVENOUS; SUBCUTANEOUS at 09:18

## 2021-02-06 RX ADMIN — CHOLECALCIFEROL TAB 25 MCG (1000 UNIT) 2000 UNITS: 25 TAB at 09:17

## 2021-02-06 RX ADMIN — ENOXAPARIN SODIUM 40 MG: 40 INJECTION SUBCUTANEOUS at 06:28

## 2021-02-06 RX ADMIN — Medication 10 ML: at 13:40

## 2021-02-06 RX ADMIN — FUROSEMIDE 40 MG: 10 INJECTION, SOLUTION INTRAMUSCULAR; INTRAVENOUS at 17:09

## 2021-02-06 RX ADMIN — PREDNISONE 40 MG: 20 TABLET ORAL at 09:17

## 2021-02-06 RX ADMIN — FUROSEMIDE 40 MG: 10 INJECTION, SOLUTION INTRAMUSCULAR; INTRAVENOUS at 09:17

## 2021-02-06 RX ADMIN — Medication 10 ML: at 06:28

## 2021-02-06 RX ADMIN — ASPIRIN 81 MG: 81 TABLET, COATED ORAL at 09:17

## 2021-02-06 RX ADMIN — FAMOTIDINE 20 MG: 20 TABLET, FILM COATED ORAL at 21:25

## 2021-02-06 RX ADMIN — FUROSEMIDE 40 MG: 10 INJECTION, SOLUTION INTRAMUSCULAR; INTRAVENOUS at 12:44

## 2021-02-06 RX ADMIN — ENOXAPARIN SODIUM 30 MG: 30 INJECTION SUBCUTANEOUS at 17:09

## 2021-02-06 NOTE — PROGRESS NOTES
[facility] [facility]    Cardiopulmonary Care Interdisciplinary Rounds were held today to discuss patient's plan of care and outcomes. The following members were present: NP/Physician, Pharmacy, Nursing and Case Management.     PLAN OF CARE:   Continue current treatment plan, continue to wean O2    Expected Length of Stay:  5d 12h

## 2021-02-06 NOTE — PROGRESS NOTES
Problem: Falls - Risk of  Goal: *Absence of Falls  Description: Document Leafy Ca Fall Risk and appropriate interventions in the flowsheet. Outcome: Progressing Towards Goal  Note: Fall Risk Interventions:  Mobility Interventions: Bed/chair exit alarm, OT consult for ADLs, Patient to call before getting OOB, PT Consult for mobility concerns, PT Consult for assist device competence, Strengthening exercises (ROM-active/passive), Utilize walker, cane, or other assistive device         Medication Interventions: Bed/chair exit alarm, Patient to call before getting OOB, Teach patient to arise slowly    Elimination Interventions: Call light in reach, Bed/chair exit alarm, Patient to call for help with toileting needs, Toileting schedule/hourly rounds, Toilet paper/wipes in reach, Urinal in reach    History of Falls Interventions: Bed/chair exit alarm, Room close to nurse's station         Problem: Patient Education: Go to Patient Education Activity  Goal: Patient/Family Education  Outcome: Progressing Towards Goal     Problem: Breathing Pattern - Ineffective  Goal: *Absence of hypoxia  Outcome: Progressing Towards Goal  Goal: *Use of effective breathing techniques  Outcome: Progressing Towards Goal     Problem: Airway Clearance - Ineffective  Goal: Achieve or maintain patent airway  Outcome: Progressing Towards Goal     Problem: Gas Exchange - Impaired  Goal: Absence of hypoxia  Outcome: Progressing Towards Goal  Goal: Promote optimal lung function  Outcome: Progressing Towards Goal     Problem: Breathing Pattern - Ineffective  Goal: Ability to achieve and maintain a regular respiratory rate  Outcome: Progressing Towards Goal     Problem:  Body Temperature -  Risk of, Imbalanced  Goal: Ability to maintain a body temperature within defined limits  Outcome: Progressing Towards Goal  Goal: Will regain or maintain usual level of consciousness  Outcome: Progressing Towards Goal  Goal: Complications related to the disease process, condition or treatment will be avoided or minimized  Outcome: Progressing Towards Goal     Problem: Isolation Precautions - Risk of Spread of Infection  Goal: Prevent transmission of infectious organism to others  Outcome: Progressing Towards Goal     Problem: Nutrition Deficits  Goal: Optimize nutrtional status  Outcome: Progressing Towards Goal     Problem: Risk for Fluid Volume Deficit  Goal: Maintain normal heart rhythm  Outcome: Progressing Towards Goal  Goal: Maintain absence of muscle cramping  Outcome: Progressing Towards Goal  Goal: Maintain normal serum potassium, sodium, calcium, phosphorus, and pH  Outcome: Progressing Towards Goal     Problem: Loneliness or Risk for Loneliness  Goal: Demonstrate positive use of time alone when socialization is not possible  Outcome: Progressing Towards Goal     Problem: Fatigue  Goal: Verbalize increase energy and improved vitality  Outcome: Progressing Towards Goal     Problem: Patient Education: Go to Patient Education Activity  Goal: Patient/Family Education  Outcome: Progressing Towards Goal     Problem: Pressure Injury - Risk of  Goal: *Prevention of pressure injury  Description: Document Govind Scale and appropriate interventions in the flowsheet.   Outcome: Progressing Towards Goal  Note: Pressure Injury Interventions:  Sensory Interventions: Assess changes in LOC, Keep linens dry and wrinkle-free, Maintain/enhance activity level, Minimize linen layers, Pressure redistribution bed/mattress (bed type), Sit a 90-degree angle/use footstool if needed    Moisture Interventions: Absorbent underpads, Minimize layers, Offer toileting Q_hr    Activity Interventions: Increase time out of bed, Pressure redistribution bed/mattress(bed type), PT/OT evaluation    Mobility Interventions: HOB 30 degrees or less, Pressure redistribution bed/mattress (bed type), PT/OT evaluation    Nutrition Interventions: Document food/fluid/supplement intake    Friction and Shear Interventions: Minimize layers, Sit at 90-degree angle                Problem: Patient Education: Go to Patient Education Activity  Goal: Patient/Family Education  Outcome: Progressing Towards Goal     Problem: Patient Education: Go to Patient Education Activity  Goal: Patient/Family Education  Outcome: Progressing Towards Goal

## 2021-02-06 NOTE — PROGRESS NOTES
Problem: Falls - Risk of  Goal: *Absence of Falls  Description: Document Maciel Gonzalez Fall Risk and appropriate interventions in the flowsheet. Outcome: Progressing Towards Goal  Note: Fall Risk Interventions:  Mobility Interventions: Bed/chair exit alarm, Communicate number of staff needed for ambulation/transfer, OT consult for ADLs, Patient to call before getting OOB, PT Consult for mobility concerns, PT Consult for assist device competence         Medication Interventions: Bed/chair exit alarm, Evaluate medications/consider consulting pharmacy, Patient to call before getting OOB, Teach patient to arise slowly    Elimination Interventions: Bed/chair exit alarm, Call light in reach, Elevated toilet seat, Patient to call for help with toileting needs, Stay With Me (per policy), Toilet paper/wipes in reach, Toileting schedule/hourly rounds, Urinal in reach    History of Falls Interventions: Bed/chair exit alarm, Consult care management for discharge planning, Door open when patient unattended, Room close to nurse's station, Utilize gait belt for transfer/ambulation, Assess for delayed presentation/identification of injury for 48 hrs (comment for end date)         Problem: Patient Education: Go to Patient Education Activity  Goal: Patient/Family Education  Outcome: Progressing Towards Goal     Problem: Breathing Pattern - Ineffective  Goal: *Absence of hypoxia  Outcome: Progressing Towards Goal  Goal: *Use of effective breathing techniques  Outcome: Progressing Towards Goal     Problem: Airway Clearance - Ineffective  Goal: Achieve or maintain patent airway  Outcome: Progressing Towards Goal     Problem: Gas Exchange - Impaired  Goal: Absence of hypoxia  Outcome: Progressing Towards Goal  Goal: Promote optimal lung function  Outcome: Progressing Towards Goal     Problem: Breathing Pattern - Ineffective  Goal: Ability to achieve and maintain a regular respiratory rate  Outcome: Progressing Towards Goal     Problem:  Body Temperature -  Risk of, Imbalanced  Goal: Ability to maintain a body temperature within defined limits  Outcome: Progressing Towards Goal  Goal: Will regain or maintain usual level of consciousness  Outcome: Progressing Towards Goal  Goal: Complications related to the disease process, condition or treatment will be avoided or minimized  Outcome: Progressing Towards Goal     Problem: Isolation Precautions - Risk of Spread of Infection  Goal: Prevent transmission of infectious organism to others  Outcome: Progressing Towards Goal     Problem: Nutrition Deficits  Goal: Optimize nutrtional status  Outcome: Progressing Towards Goal     Problem: Risk for Fluid Volume Deficit  Goal: Maintain normal heart rhythm  Outcome: Progressing Towards Goal  Goal: Maintain absence of muscle cramping  Outcome: Progressing Towards Goal  Goal: Maintain normal serum potassium, sodium, calcium, phosphorus, and pH  Outcome: Progressing Towards Goal     Problem: Loneliness or Risk for Loneliness  Goal: Demonstrate positive use of time alone when socialization is not possible  Outcome: Progressing Towards Goal     Problem: Fatigue  Goal: Verbalize increase energy and improved vitality  Outcome: Progressing Towards Goal     Problem: Patient Education: Go to Patient Education Activity  Goal: Patient/Family Education  Outcome: Progressing Towards Goal     Problem: Pressure Injury - Risk of  Goal: *Prevention of pressure injury  Description: Document Govind Scale and appropriate interventions in the flowsheet.   Outcome: Progressing Towards Goal  Note: Pressure Injury Interventions:  Sensory Interventions: Assess changes in LOC, Assess need for specialty bed, Avoid rigorous massage over bony prominences, Chair cushion, Check visual cues for pain, Discuss PT/OT consult with provider, Float heels, Keep linens dry and wrinkle-free    Moisture Interventions: Absorbent underpads, Apply protective barrier, creams and emollients, Limit adult briefs    Activity Interventions: Assess need for specialty bed, Chair cushion, Increase time out of bed, Pressure redistribution bed/mattress(bed type), PT/OT evaluation    Mobility Interventions: Assess need for specialty bed, Chair cushion, Float heels, HOB 30 degrees or less, Pressure redistribution bed/mattress (bed type), PT/OT evaluation    Nutrition Interventions: Document food/fluid/supplement intake, Offer support with meals,snacks and hydration    Friction and Shear Interventions: Apply protective barrier, creams and emollients, Feet elevated on foot rest, Foam dressings/transparent film/skin sealants, HOB 30 degrees or less, Lift sheet, Lift team/patient mobility team, Minimize layers, Sit at 90-degree angle                Problem: Patient Education: Go to Patient Education Activity  Goal: Patient/Family Education  Outcome: Progressing Towards Goal     Problem: Patient Education: Go to Patient Education Activity  Goal: Patient/Family Education  Outcome: Progressing Towards Goal     Problem: Patient Education: Go to Patient Education Activity  Goal: Patient/Family Education  Outcome: Progressing Towards Goal

## 2021-02-06 NOTE — PROGRESS NOTES
Hospitalist Progress Note    NAME: Lety Menendez   :  1965   MRN:  [id number]       Assessment / Plan:  Acute hypoxic respiratory failure POA, slightly up to 15 L/m HiFlow oxygen today  Severe multilobar Covid pneumonia POA  Pneumomediastinum , not POA, persistent but stable  -COVID-19 test positive on . -CTA  neg for PE. Shows diffuse bilateral peripheral groundglass opacification and consolidation in the lower lobes   -Initially on nasal cannula/high flow, decompensation  , was on BiPAP since 2021, steroids increased, evaluated by ICU intensivist on . D Dimer still low 0.40  Procalcitonin= 0.23 (low)    -has developed pneumomediastinum , changed to high flow from BiPAP to reduce barotrauma, chest x-ray this There is an unchanged tiny right apical pneumothorax. No left pneumothorax  -Dr Dada De Jesus Discussed with ICU intensivist .  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 GERD  --currently not on medication. Pepcid IV twice daily     Obesity  Body mass index is 31.14 kg/m².     Code: Discussed, full code  DVT prophylaxis: Heparin  Surrogate decision maker: Brothtoro Etienne [phone]    Disposition:  IP Pulm rehab versus HH lTBD, PT/OT eval noted CM working on DC planning     Subjective:     Chief Complaint / Reason for Physician Visit: F/U Resp failure/COVID PNA  Discussed with RN overnight events. Patient was seen and examined. No acute events overnight.     \"Doing all right\"    Review of Systems:  Symptom Y/N Comments  Symptom Y/N Comments   Fever/Chills n   Chest Pain n    Poor Appetite    Edema     Cough n   Abdominal Pain n    Sputum    Joint Pain     SOB/BULL y  improved  Pruritis/Rash     Nausea/vomit n   Tolerating PT/OT y    Diarrhea    Tolerating Diet y    Constipation    Other       Could NOT obtain due to:          Objective:     VITALS:   Last 24hrs VS reviewed since prior progress note. Most recent are:  Patient Vitals for the past 24 hrs:   Temp Pulse Resp BP SpO2   02/06/21 1100 97.7 °F (36.5 °C) 95 18 121/77 96 %   02/06/21 0749 97.7 °F (36.5 °C) 88 18 122/74 97 %   02/06/21 0258 98.1 °F (36.7 °C) 91 18 118/83 97 %   02/05/21 2257 98.3 °F (36.8 °C) 95 19 113/78 98 %   02/05/21 1950 97.9 °F (36.6 °C) (!) 102 20 119/79 98 %   02/05/21 1433 97.6 °F (36.4 °C) 91 20 129/82 96 %       Intake/Output Summary (Last 24 hours) at 2/6/2021 1131  Last data filed at 2/6/2021 0801  Gross per 24 hour   Intake 240 ml   Output 1150 ml   Net -910 ml        I had a face to face encounter and independently examined this patient on 2/6/2021, as outlined below:  PHYSICAL EXAM:  General: WD, WN. Alert, cooperative, in moderate respiratory distress    EENT:  EOMI. Anicteric sclerae. MMM  Resp:  B/l lung coarse, rales+  CV:  Regular  rhythm,  No edema  GI:  Soft, Non distended, Non tender. +Bowel sounds  Neurologic:  Alert and oriented X 3, normal speech,   Psych:   Good insight. Not anxious nor agitated  Skin:  No rashes.   No jaundice, subcu around right flank    Reviewed most current lab test results and cultures  YES  Reviewed most current radiology test results   YES  Review and summation of old records today    NO  Reviewed patient's current orders and MAR    YES  PMH/SH reviewed - no change compared to H&P  ________________________________________________________________________  Care Plan discussed with:    Comments   Patient x    Family      RN x    Care Manager     Consultant                        Multidiciplinary team rounds were held today with , nursing, pharmacist and clinical coordinator. Patient's plan of care was discussed; medications were reviewed and discharge planning was addressed. ________________________________________________________________________      Total TIME Spent: 36 Minutes non procedure based      Comments   >50% of visit spent in counseling and coordination of care x    ________________________________________________________________________  Aiyana Soliman MD     Procedures: see electronic medical records for all procedures/Xrays and details which were not copied into this note but were reviewed prior to creation of Plan. LABS:  I reviewed today's most current labs and imaging studies. Pertinent labs include:  Recent Labs     02/05/21  0333   WBC 15.2*   HGB 15.3   HCT 46.2   PLT 92*     No results for input(s): NA, K, CL, CO2, GLU, BUN, CREA, CA, MG, PHOS, ALB, TBIL, TBILI, ALT, INR, INREXT, INREXT in the last 72 hours.     No lab exists for component: SGOT    Signed: Aiyana Soliman MD

## 2021-02-06 NOTE — ROUTINE PROCESS
End of Shift Note    Bedside shift change report given to [address] [place] [address] Leobardo (oncoming nurse) by Mayuri Martin RN (offgoing nurse). Report included the following information SBAR    Shift worked:  7p     Shift summary and any significant changes:          Concerns for physician to address:       Zone phone for oncoming shift:          Activity:  Activity Level: Up with Assistance  Number times ambulated in hallways past shift: 0  Number of times OOB to chair past shift: 0    Cardiac:   Cardiac Monitoring: Yes      Cardiac Rhythm: Normal sinus rhythm    Access:   Current line(s): PIV     Genitourinary:   Urinary status: voiding    Respiratory:   O2 Device: Hi flow nasal cannula  Chronic home O2 use?: NO  Incentive spirometer at bedside: YES  Actual Volume (ml): 750 ml  GI:  Last Bowel Movement Date: 02/03/21  Current diet:  DIET NUTRITIONAL SUPPLEMENTS Dinner; Ensure [employer]  DIET DIABETIC CONSISTENT CARB Regular  Passing flatus: YES  Tolerating current diet: YES  % Diet Eaten: 50 %    Pain Management:   Patient states pain is manageable on current regimen: N/A    Skin:  Govind Score: 18  Interventions: float heels, increase time out of bed and PT/OT consult    Patient Safety:  Fall Score:  Total Score: 3  Interventions: bed/chair alarm, assistive device (walker, cane, etc), gripper socks, pt to call before getting OOB and stay with me (per policy)  High Fall Risk: Yes    Length of Stay:  Expected LOS: 5d 12h  Actual LOS: [address], RN

## 2021-02-07 LAB
ANION GAP SERPL CALC-SCNC: 4 MMOL/L (ref 5–15)
ATRIAL RATE: 105 BPM
BUN SERPL-MCNC: 35 MG/DL (ref 6–20)
BUN/CREAT SERPL: 54 (ref 12–20)
CALCIUM SERPL-MCNC: 9 MG/DL (ref 8.5–10.1)
CALCULATED P AXIS, ECG09: 44 DEGREES
CALCULATED T AXIS, ECG11: -178 DEGREES
CHLORIDE SERPL-SCNC: 94 MMOL/L (ref 97–108)
CO2 SERPL-SCNC: 34 MMOL/L (ref 21–32)
CREAT SERPL-MCNC: 0.65 MG/DL (ref 0.7–1.3)
DIAGNOSIS, 93000: NORMAL
ERYTHROCYTE [DISTWIDTH] IN BLOOD BY AUTOMATED COUNT: 13.3 % (ref 11.5–14.5)
GLUCOSE BLD STRIP.AUTO-MCNC: 256 MG/DL (ref 65–100)
GLUCOSE BLD STRIP.AUTO-MCNC: 280 MG/DL (ref 65–100)
GLUCOSE BLD STRIP.AUTO-MCNC: 285 MG/DL (ref 65–100)
GLUCOSE SERPL-MCNC: 159 MG/DL (ref 65–100)
HCT VFR BLD AUTO: 44.8 % (ref 36.6–50.3)
HGB BLD-MCNC: 14.9 G/DL (ref 12.1–17)
MCH RBC QN AUTO: 28.7 PG (ref 26–34)
MCHC RBC AUTO-ENTMCNC: 33.3 G/DL (ref 30–36.5)
MCV RBC AUTO: 86.3 FL (ref 80–99)
NRBC # BLD: 0 K/UL (ref 0–0.01)
NRBC BLD-RTO: 0 PER 100 WBC
P-R INTERVAL, ECG05: 118 MS
PLATELET # BLD AUTO: 75 K/UL (ref 150–400)
PMV BLD AUTO: 12.7 FL (ref 8.9–12.9)
POTASSIUM SERPL-SCNC: 4.6 MMOL/L (ref 3.5–5.1)
Q-T INTERVAL, ECG07: 366 MS
QRS DURATION, ECG06: 72 MS
QTC CALCULATION (BEZET), ECG08: 483 MS
RBC # BLD AUTO: 5.19 M/UL (ref 4.1–5.7)
SERVICE CMNT-IMP: ABNORMAL
SODIUM SERPL-SCNC: 132 MMOL/L (ref 136–145)
VENTRICULAR RATE, ECG03: 105 BPM
WBC # BLD AUTO: 12.1 K/UL (ref 4.1–11.1)

## 2021-02-07 PROCEDURE — 65660000001 HC RM ICU INTERMED STEPDOWN

## 2021-02-07 PROCEDURE — 82962 GLUCOSE BLOOD TEST: CPT

## 2021-02-07 PROCEDURE — 93005 ELECTROCARDIOGRAM TRACING: CPT

## 2021-02-07 PROCEDURE — 74011250637 HC RX REV CODE- 250/637: Performed by: INTERNAL MEDICINE

## 2021-02-07 PROCEDURE — 36415 COLL VENOUS BLD VENIPUNCTURE: CPT

## 2021-02-07 PROCEDURE — 74011250637 HC RX REV CODE- 250/637: Performed by: HOSPITALIST

## 2021-02-07 PROCEDURE — 80048 BASIC METABOLIC PNL TOTAL CA: CPT

## 2021-02-07 PROCEDURE — 74011000250 HC RX REV CODE- 250: Performed by: INTERNAL MEDICINE

## 2021-02-07 PROCEDURE — 77010033711 HC HIGH FLOW OXYGEN

## 2021-02-07 PROCEDURE — 74011636637 HC RX REV CODE- 636/637: Performed by: INTERNAL MEDICINE

## 2021-02-07 PROCEDURE — 74011250636 HC RX REV CODE- 250/636: Performed by: INTERNAL MEDICINE

## 2021-02-07 PROCEDURE — 85027 COMPLETE CBC AUTOMATED: CPT

## 2021-02-07 RX ORDER — METOPROLOL TARTRATE 25 MG/1
25 TABLET, FILM COATED ORAL 2 TIMES DAILY
Status: DISCONTINUED | OUTPATIENT
Start: 2021-02-08 | End: 2021-02-07

## 2021-02-07 RX ORDER — METOPROLOL TARTRATE 25 MG/1
25 TABLET, FILM COATED ORAL 2 TIMES DAILY
Status: DISCONTINUED | OUTPATIENT
Start: 2021-02-07 | End: 2021-02-16

## 2021-02-07 RX ORDER — DILTIAZEM HYDROCHLORIDE 5 MG/ML
10 INJECTION INTRAVENOUS ONCE
Status: COMPLETED | OUTPATIENT
Start: 2021-02-07 | End: 2021-02-07

## 2021-02-07 RX ORDER — INSULIN LISPRO 100 [IU]/ML
INJECTION, SOLUTION INTRAVENOUS; SUBCUTANEOUS
Status: DISCONTINUED | OUTPATIENT
Start: 2021-02-07 | End: 2021-02-17 | Stop reason: HOSPADM

## 2021-02-07 RX ADMIN — ENOXAPARIN SODIUM 30 MG: 30 INJECTION SUBCUTANEOUS at 17:29

## 2021-02-07 RX ADMIN — INSULIN LISPRO 3 UNITS: 100 INJECTION, SOLUTION INTRAVENOUS; SUBCUTANEOUS at 23:16

## 2021-02-07 RX ADMIN — CHOLECALCIFEROL TAB 25 MCG (1000 UNIT) 2000 UNITS: 25 TAB at 08:31

## 2021-02-07 RX ADMIN — INSULIN LISPRO 5 UNITS: 100 INJECTION, SOLUTION INTRAVENOUS; SUBCUTANEOUS at 17:29

## 2021-02-07 RX ADMIN — METOPROLOL TARTRATE 25 MG: 25 TABLET, FILM COATED ORAL at 21:15

## 2021-02-07 RX ADMIN — Medication 10 ML: at 06:39

## 2021-02-07 RX ADMIN — ENOXAPARIN SODIUM 30 MG: 30 INJECTION SUBCUTANEOUS at 06:38

## 2021-02-07 RX ADMIN — PREDNISONE 40 MG: 20 TABLET ORAL at 08:30

## 2021-02-07 RX ADMIN — FUROSEMIDE 40 MG: 10 INJECTION, SOLUTION INTRAMUSCULAR; INTRAVENOUS at 08:32

## 2021-02-07 RX ADMIN — ASPIRIN 81 MG: 81 TABLET, COATED ORAL at 08:31

## 2021-02-07 RX ADMIN — Medication 10 ML: at 17:28

## 2021-02-07 RX ADMIN — DILTIAZEM HYDROCHLORIDE 10 MG: 5 INJECTION INTRAVENOUS at 19:06

## 2021-02-07 RX ADMIN — Medication 10 ML: at 21:15

## 2021-02-07 RX ADMIN — FUROSEMIDE 40 MG: 10 INJECTION, SOLUTION INTRAMUSCULAR; INTRAVENOUS at 17:29

## 2021-02-07 RX ADMIN — INSULIN LISPRO 5 UNITS: 100 INJECTION, SOLUTION INTRAVENOUS; SUBCUTANEOUS at 08:33

## 2021-02-07 RX ADMIN — FAMOTIDINE 20 MG: 20 TABLET, FILM COATED ORAL at 21:15

## 2021-02-07 RX ADMIN — FAMOTIDINE 20 MG: 20 TABLET, FILM COATED ORAL at 08:32

## 2021-02-07 NOTE — PROGRESS NOTES
Problem: Falls - Risk of  Goal: *Absence of Falls  Description: Document Sherif Christopher Fall Risk and appropriate interventions in the flowsheet. Outcome: Progressing Towards Goal  Note: Fall Risk Interventions:  Mobility Interventions: Bed/chair exit alarm, Communicate number of staff needed for ambulation/transfer, Patient to call before getting OOB, Utilize walker, cane, or other assistive device         Medication Interventions: Bed/chair exit alarm, Patient to call before getting OOB, Teach patient to arise slowly    Elimination Interventions: Bed/chair exit alarm, Call light in reach, Patient to call for help with toileting needs, Toileting schedule/hourly rounds    History of Falls Interventions: Bed/chair exit alarm, Room close to nurse's station         Problem: Pressure Injury - Risk of  Goal: *Prevention of pressure injury  Description: Document Govind Scale and appropriate interventions in the flowsheet.   Outcome: Progressing Towards Goal  Note: Pressure Injury Interventions:  Sensory Interventions: Assess changes in LOC    Moisture Interventions: Absorbent underpads, Minimize layers    Activity Interventions: Assess need for specialty bed, Increase time out of bed, Pressure redistribution bed/mattress(bed type)    Mobility Interventions: Assess need for specialty bed, Pressure redistribution bed/mattress (bed type)    Nutrition Interventions: Document food/fluid/supplement intake, Offer support with meals,snacks and hydration    Friction and Shear Interventions: Apply protective barrier, creams and emollients, Lift team/patient mobility team, Minimize layers

## 2021-02-07 NOTE — PROGRESS NOTES
Problem: Falls - Risk of  Goal: *Absence of Falls  Description: Document Judah Sol Fall Risk and appropriate interventions in the flowsheet.   Outcome: Progressing Towards Goal  Note: Fall Risk Interventions:  Mobility Interventions: Bed/chair exit alarm, OT consult for ADLs, Patient to call before getting OOB, PT Consult for mobility concerns, PT Consult for assist device competence, Strengthening exercises (ROM-active/passive), Utilize walker, cane, or other assistive device         Medication Interventions: Patient to call before getting OOB    Elimination Interventions: Urinal in reach, Toilet paper/wipes in reach, Call light in reach    History of Falls Interventions: Bed/chair exit alarm, Room close to nurse's station

## 2021-02-07 NOTE — PROGRESS NOTES
1900: End of Shift Note    Bedside shift change report given to , RN (oncoming nurse) by Peter Suarez (offgoing nurse). Report included the following information SBAR    Shift worked:  [id number]     Shift summary and any significant changes:     Pt O2 weaned from 15 L to 12 L     Concerns for physician to address:       Zone phone for oncoming shift:   [phone]       Activity:  Activity Level: Up with Assistance  Number times ambulated in hallways past shift: 0  Number of times OOB to chair past shift: 0    Cardiac:   Cardiac Monitoring: Yes      Cardiac Rhythm: Sinus tachycardia    Access:   Current line(s): PIV     Genitourinary:   Urinary status: voiding    Respiratory:   O2 Device: Hi flow nasal cannula  Chronic home O2 use?: NO  Incentive spirometer at bedside: YES  Actual Volume (ml): 750 ml  GI:  Last Bowel Movement Date: 02/03/21  Current diet:  DIET NUTRITIONAL SUPPLEMENTS Dinner; Ensure [employer]  DIET DIABETIC CONSISTENT CARB Regular  Passing flatus: YES  Tolerating current diet: YES  % Diet Eaten: 100 %    Pain Management:   Patient states pain is manageable on current regimen: YES    Skin:  Govind Score: 20  Interventions: internal/external urinary devices and nutritional support     Patient Safety:  Fall Score:  Total Score: 2  Interventions: bed/chair alarm, assistive device (walker, cane, etc), gripper socks and pt to call before getting OOB  High Fall Risk: Yes    Length of Stay:  Expected LOS: 5d 12h  Actual LOS: [facility]

## 2021-02-07 NOTE — PROGRESS NOTES
Hospitalist Progress Note    NAME: Lety Menendez   :  1965   MRN:  [id number]       Assessment / Plan:  Acute hypoxic respiratory failure POA, slightly down to 13 L/m HiFlow oxygen today  Severe multilobar Covid pneumonia POA  Pneumomediastinum , not POA, persistent but stable  -COVID-19 test positive on . -CTA  neg for PE. Shows diffuse bilateral peripheral groundglass opacification and consolidation in the lower lobes   -Initially on nasal cannula/high flow, decompensation  , was on BiPAP since 2021, steroids increased, evaluated by ICU intensivist on . D Dimer still low 0.40  Procalcitonin= 0.23 (low)    -has developed pneumomediastinum , changed to high flow from BiPAP to reduce barotrauma, chest x-ray this There is an unchanged tiny right apical pneumothorax. No left pneumothorax  -Dr Dada De Jesus Discussed with ICU intensivist .  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 GERD  --currently not on medication.   Pepcid IV twice daily     Obesity  Body mass index is 31.14 kg/m².     Code: Discussed, full code  DVT prophylaxis: Heparin  Surrogate decision maker: Brother Noy Etienne [phone]    Disposition:  IP Pulm rehab versus HH lTBD, PT/OT eval noted CM working on DC planning     Subjective:     Chief Complaint / Reason for Physician Visit: F/U Resp failure/COVID PNA  Discussed with RN overnight events. Patient was seen and examined. No acute events overnight. \"Doing all right\"    Review of Systems:  Symptom Y/N Comments  Symptom Y/N Comments   Fever/Chills n   Chest Pain n    Poor Appetite    Edema     Cough n   Abdominal Pain n    Sputum    Joint Pain     SOB/BULL y  improved  Pruritis/Rash     Nausea/vomit n   Tolerating PT/OT y    Diarrhea    Tolerating Diet y    Constipation    Other       Could NOT obtain due to:          Objective:     VITALS:   Last 24hrs VS reviewed since prior progress note. Most recent are:  Patient Vitals for the past 24 hrs:   Temp Pulse Resp BP SpO2   02/07/21 1116 97.8 °F (36.6 °C) (!) 108 20 130/79 --   02/07/21 0831 -- (!) 106 -- 125/76 --   02/07/21 0713 97.9 °F (36.6 °C) (!) 105 20 120/75 97 %   02/07/21 0331 97.9 °F (36.6 °C) (!) 101 20 125/81 99 %   02/06/21 2317 98.1 °F (36.7 °C) 96 20 126/82 99 %   02/06/21 2030 -- -- -- -- 97 %   02/06/21 1954 98 °F (36.7 °C) (!) 106 20 132/77 96 %   02/06/21 1503 98 °F (36.7 °C) (!) 104 18 118/78 96 %       Intake/Output Summary (Last 24 hours) at 2/7/2021 1138  Last data filed at 2/7/2021 1015  Gross per 24 hour   Intake 240 ml   Output 2850 ml   Net -2610 ml        I had a face to face encounter and independently examined this patient on 2/7/2021, as outlined below:  PHYSICAL EXAM:  General: WD, WN. Alert, cooperative, in moderate respiratory distress    EENT:  EOMI. Anicteric sclerae. MMM  Resp:  B/l lung coarse, rales+  CV:  Regular  rhythm,  No edema  GI:  Soft, Non distended, Non tender. +Bowel sounds  Neurologic:  Alert and oriented X 3, normal speech,   Psych:   Good insight. Not anxious nor agitated  Skin:  No rashes.   No jaundice, subcu around right flank    Reviewed most current lab test results and cultures  YES  Reviewed most current radiology test results   YES  Review and summation of old records today    NO  Reviewed patient's current orders and MAR    YES  PMH/SH reviewed - no change compared to H&P  ________________________________________________________________________  Care Plan discussed with:    Comments   Patient x    Family      RN x    Care Manager     Consultant                        Multidiciplinary team rounds were held today with , nursing, pharmacist and clinical coordinator. Patient's plan of care was discussed; medications were reviewed and discharge planning was addressed. ________________________________________________________________________      Total TIME Spent: 36 Minutes non procedure based      Comments   >50% of visit spent in counseling and coordination of care x    ________________________________________________________________________  William France MD     Procedures: see electronic medical records for all procedures/Xrays and details which were not copied into this note but were reviewed prior to creation of Plan. LABS:  I reviewed today's most current labs and imaging studies.   Pertinent labs include:  Recent Labs     02/07/21 0340 02/05/21  0333   WBC 12.1* 15.2*   HGB 14.9 15.3   HCT 44.8 46.2   PLT 75* 92*     Recent Labs     02/07/21  0340   *   K 4.6   CL 94*   CO2 34*   *   BUN 35*   CREA 0.65*   CA 9.0       Signed: William France MD

## 2021-02-08 LAB
ANION GAP SERPL CALC-SCNC: 3 MMOL/L (ref 5–15)
BUN SERPL-MCNC: 33 MG/DL (ref 6–20)
BUN/CREAT SERPL: 57 (ref 12–20)
CALCIUM SERPL-MCNC: 8.7 MG/DL (ref 8.5–10.1)
CHLORIDE SERPL-SCNC: 92 MMOL/L (ref 97–108)
CO2 SERPL-SCNC: 38 MMOL/L (ref 21–32)
CREAT SERPL-MCNC: 0.58 MG/DL (ref 0.7–1.3)
ERYTHROCYTE [DISTWIDTH] IN BLOOD BY AUTOMATED COUNT: 13.1 % (ref 11.5–14.5)
GLUCOSE BLD STRIP.AUTO-MCNC: 132 MG/DL (ref 65–100)
GLUCOSE BLD STRIP.AUTO-MCNC: 239 MG/DL (ref 65–100)
GLUCOSE BLD STRIP.AUTO-MCNC: 306 MG/DL (ref 65–100)
GLUCOSE BLD STRIP.AUTO-MCNC: 321 MG/DL (ref 65–100)
GLUCOSE SERPL-MCNC: 123 MG/DL (ref 65–100)
HCT VFR BLD AUTO: 42.2 % (ref 36.6–50.3)
HGB BLD-MCNC: 14 G/DL (ref 12.1–17)
MAGNESIUM SERPL-MCNC: 2.1 MG/DL (ref 1.6–2.4)
MCH RBC QN AUTO: 28.4 PG (ref 26–34)
MCHC RBC AUTO-ENTMCNC: 33.2 G/DL (ref 30–36.5)
MCV RBC AUTO: 85.6 FL (ref 80–99)
NRBC # BLD: 0 K/UL (ref 0–0.01)
NRBC BLD-RTO: 0 PER 100 WBC
PLATELET # BLD AUTO: 81 K/UL (ref 150–400)
PMV BLD AUTO: 11.7 FL (ref 8.9–12.9)
POTASSIUM SERPL-SCNC: 3.8 MMOL/L (ref 3.5–5.1)
RBC # BLD AUTO: 4.93 M/UL (ref 4.1–5.7)
SERVICE CMNT-IMP: ABNORMAL
SODIUM SERPL-SCNC: 133 MMOL/L (ref 136–145)
WBC # BLD AUTO: 10.8 K/UL (ref 4.1–11.1)

## 2021-02-08 PROCEDURE — 97530 THERAPEUTIC ACTIVITIES: CPT

## 2021-02-08 PROCEDURE — 74011250637 HC RX REV CODE- 250/637: Performed by: HOSPITALIST

## 2021-02-08 PROCEDURE — 85027 COMPLETE CBC AUTOMATED: CPT

## 2021-02-08 PROCEDURE — 74011250636 HC RX REV CODE- 250/636: Performed by: INTERNAL MEDICINE

## 2021-02-08 PROCEDURE — 65660000001 HC RM ICU INTERMED STEPDOWN

## 2021-02-08 PROCEDURE — 74011250637 HC RX REV CODE- 250/637: Performed by: INTERNAL MEDICINE

## 2021-02-08 PROCEDURE — 82962 GLUCOSE BLOOD TEST: CPT

## 2021-02-08 PROCEDURE — 97535 SELF CARE MNGMENT TRAINING: CPT

## 2021-02-08 PROCEDURE — 83735 ASSAY OF MAGNESIUM: CPT

## 2021-02-08 PROCEDURE — 97110 THERAPEUTIC EXERCISES: CPT

## 2021-02-08 PROCEDURE — 80048 BASIC METABOLIC PNL TOTAL CA: CPT

## 2021-02-08 PROCEDURE — 77010033711 HC HIGH FLOW OXYGEN

## 2021-02-08 PROCEDURE — 74011636637 HC RX REV CODE- 636/637: Performed by: INTERNAL MEDICINE

## 2021-02-08 PROCEDURE — 36415 COLL VENOUS BLD VENIPUNCTURE: CPT

## 2021-02-08 RX ADMIN — INSULIN LISPRO 7 UNITS: 100 INJECTION, SOLUTION INTRAVENOUS; SUBCUTANEOUS at 16:54

## 2021-02-08 RX ADMIN — Medication 10 ML: at 16:55

## 2021-02-08 RX ADMIN — Medication 10 ML: at 05:53

## 2021-02-08 RX ADMIN — INSULIN LISPRO 4 UNITS: 100 INJECTION, SOLUTION INTRAVENOUS; SUBCUTANEOUS at 21:57

## 2021-02-08 RX ADMIN — FAMOTIDINE 20 MG: 20 TABLET, FILM COATED ORAL at 08:47

## 2021-02-08 RX ADMIN — PREDNISONE 40 MG: 20 TABLET ORAL at 08:47

## 2021-02-08 RX ADMIN — FAMOTIDINE 20 MG: 20 TABLET, FILM COATED ORAL at 21:57

## 2021-02-08 RX ADMIN — ASPIRIN 81 MG: 81 TABLET, COATED ORAL at 08:47

## 2021-02-08 RX ADMIN — ENOXAPARIN SODIUM 30 MG: 30 INJECTION SUBCUTANEOUS at 17:00

## 2021-02-08 RX ADMIN — ENOXAPARIN SODIUM 30 MG: 30 INJECTION SUBCUTANEOUS at 05:53

## 2021-02-08 RX ADMIN — CHOLECALCIFEROL TAB 25 MCG (1000 UNIT) 2000 UNITS: 25 TAB at 08:47

## 2021-02-08 RX ADMIN — METOPROLOL TARTRATE 25 MG: 25 TABLET, FILM COATED ORAL at 08:47

## 2021-02-08 RX ADMIN — Medication 10 ML: at 21:59

## 2021-02-08 RX ADMIN — INSULIN LISPRO 3 UNITS: 100 INJECTION, SOLUTION INTRAVENOUS; SUBCUTANEOUS at 12:42

## 2021-02-08 RX ADMIN — METOPROLOL TARTRATE 25 MG: 25 TABLET, FILM COATED ORAL at 17:00

## 2021-02-08 RX ADMIN — FUROSEMIDE 40 MG: 10 INJECTION, SOLUTION INTRAMUSCULAR; INTRAVENOUS at 17:00

## 2021-02-08 RX ADMIN — FUROSEMIDE 40 MG: 10 INJECTION, SOLUTION INTRAMUSCULAR; INTRAVENOUS at 08:47

## 2021-02-08 RX ADMIN — HUMAN INSULIN 10 UNITS: 100 INJECTION, SUSPENSION SUBCUTANEOUS at 08:47

## 2021-02-08 NOTE — PROGRESS NOTES
Problem: Falls - Risk of  Goal: *Absence of Falls  Description: Document Desi Vogel Fall Risk and appropriate interventions in the flowsheet.   Outcome: Progressing Towards Goal  Note: Fall Risk Interventions:  Mobility Interventions: Communicate number of staff needed for ambulation/transfer         Medication Interventions: Evaluate medications/consider consulting pharmacy, Patient to call before getting OOB, Teach patient to arise slowly    Elimination Interventions: Bed/chair exit alarm, Call light in reach    History of Falls Interventions: Bed/chair exit alarm, Vital signs minimum Q4HRs X 24 hrs (comment for end date), Assess for delayed presentation/identification of injury for 48 hrs (comment for end date)         Problem: Breathing Pattern - Ineffective  Goal: Ability to achieve and maintain a regular respiratory rate  Outcome: Progressing Towards Goal     Problem: Isolation Precautions - Risk of Spread of Infection  Goal: Prevent transmission of infectious organism to others  Outcome: Progressing Towards Goal     Problem: Loneliness or Risk for Loneliness  Goal: Demonstrate positive use of time alone when socialization is not possible  Outcome: Progressing Towards Goal

## 2021-02-08 NOTE — PROGRESS NOTES
Problem: Self Care Deficits Care Plan (Adult)  Goal: *Acute Goals and Plan of Care (Insert Text)  Description: FUNCTIONAL STATUS PRIOR TO ADMISSION: Patient was independent and active without use of DME    HOME SUPPORT PRIOR TO ADMISSION: The patient lived alone with brother and neighbors to provide assistance. Occupational Therapy Goals:  Initiated 2/5/2021  1. Patient will perform grooming seated with supervision within 7 days. 2. Patient will perform toileting with supervision within 7 days. 3. Patient will perform lower body dressing with supervision within 7 days. 4. Patient will transfer from bedside commode with modified independence using the least restrictive device and appropriate durable medical equipment within 7 days. 5. Patient will perform UB dressing with supervision within 7 days. Outcome: Progressing Towards Goal   OCCUPATIONAL THERAPY TREATMENT  Patient: Cheryl Moise (31 y.o. male)  Date: 2/8/2021  Diagnosis: COVID-19 virus infection [U07.1]  Acute respiratory failure with hypoxia ([facility] [address] [J96.01] <principal problem not specified>       Precautions:    Chart, occupational therapy assessment, plan of care, and goals were reviewed. ASSESSMENT  Patient continues with skilled OT services and is slowly progressing towards goals. Patient continues to present with decreased independence in self-care and functional mobility secondary to general weakness, increased HR, decreased activity tolerance, and impaired balance. Patient received sitting in chair and agreed to therapy. Patient received on 15L O2 with sats at 91%. Patient educated on PLB and demonstrated back with visual cues. Patient completed STS from chair with encouragement to attempt without assistance. While standing, patient's O2 sats dropped in 84% and HR increased to 123. Patient also reported he felt dizzy and returned to sitting. Patient participated in PLB and increased O2 sats to 90% after approx 1 min.  Patient brushed his teeth while sitting and educated on pacing himself during tasks due to his current respiratory status. Patient's O2 sats dropped at low as 83% while brushing his teeth but increased with PLB and verbal cues to take a rest break. Patient participated in LB therapeutic exercises while seated and educated on UB exercises to complete throughout the day. During rest break, patient's O2 sats dropped as low at 79% and recovered to 84% with PLB. After several mins, RN was notified and placed patient on NRB mask. Patient was left with RN at bedside. Current Level of Function Impacting Discharge (ADLs): set-up/SBA for seated grooming    Other factors to consider for discharge: covid +, below baseline         PLAN :  Patient continues to benefit from skilled intervention to address the above impairments. Continue treatment per established plan of care. to address goals. Recommendation for discharge: (in order for the patient to meet his/her long term goals)  To be determined: pulmonary rehab    This discharge recommendation:  Has been made in collaboration with the attending provider and/or case management    IF patient discharges home will need the following DME: TBD depending on progress       SUBJECTIVE:   Patient stated I didn't get up yesterday.     OBJECTIVE DATA SUMMARY:   Cognitive/Behavioral Status:  Neurologic State: Alert; Appropriate for age  Orientation Level: Oriented X4  Cognition: Appropriate decision making; Appropriate for age attention/concentration; Appropriate safety awareness; Follows commands    Functional Mobility and Transfers for ADLs:  Bed Mobility:  Patient received in chair and ended session in chair    Transfers:  Sit to Stand: Contact guard assistance  Stand to Sit: Contact guard assistance    Balance:  Sitting: Intact  Standing: Impaired  Standing - Static: Fair    ADL Intervention:  Grooming  Brushing Teeth: Stand-by assistance;Set-up(cues for energy conservation)    Therapeutic Exercises:   Patient educated and demonstrated seated therapeutic exercises to complete throughout the day. Patient educated on importance of pacing himself during exercise and taking breaks. Pain:  Patient did not c/o pain during session. Activity Tolerance:   Poor, desaturates with exertion and requires oxygen, and observed SOB with activity    After treatment patient left in no apparent distress:   Sitting in chair, Call bell within reach, and RN present    COMMUNICATION/COLLABORATION:   The patients plan of care was discussed with: Physical therapist and Registered nurse.      Lilian Marino OTR/L  Time Calculation: 30 mins

## 2021-02-08 NOTE — PROGRESS NOTES
[facility]  Pulmonary, Critical Care, and Sleep Medicine    Name: Nanda Best MRN: [id number]   : 1965 Hospital: [facility]   Date: 2021            IMPRESSION:   · Acute hypoxic respiratory failure - severe, secondary to COVID PNA-currently on HF (13L, 100%)  · Severe Multilobar Covid Pneumonia, first Dx on , Symptoms first started on 21  S/P convalescent plasma ; S/p Remdesivir x 5 days total  · New pneumomediastinum 21, radiographic stable las couple of days; crepitus in neck  · Covid related coagulopathy  · Covid related inflammation, increased ferritin. · Steroid induced hyperglycemia  · Obesity  · GERD  · Hypertension, LAE and LVH on ekg. RECOMMENDATIONS:   · Continue HFNC, wean as tolerated, daily improvement, wean as tolerated. · Trying to avoid PAP due to pneumomediastinum. · Empiric antibiotics completed  · Cont Lovenox, full dose until 24hrs ago  · Continue po steroids  · Follow inflammatory and coag markers  · If BNP, D-Dimeror procalcitonin elevated in the AM then will intervene  · Discussed with Dr Jose Sweet  · Mobilize out of bed as tolerated  ·    PCP: Grabiel Salinas. Subjective:     No acute events   No severe distress  On HF O2,  Conversant.    sats 90-93%    Current Facility-Administered Medications   Medication Dose Route Frequency    insulin lispro (HUMALOG) injection   SubCUTAneous AC&HS    insulin NPH (NOVOLIN N, HUMULIN N) injection 10 Units  10 Units SubCUTAneous DAILY    metoprolol tartrate (LOPRESSOR) tablet 25 mg  25 mg Oral BID    furosemide (LASIX) injection 40 mg  40 mg IntraVENous BID    enoxaparin (LOVENOX) injection 30 mg  30 mg SubCUTAneous Q12H    predniSONE (DELTASONE) tablet 40 mg  40 mg Oral DAILY WITH BREAKFAST    famotidine (PEPCID) tablet 20 mg  20 mg Oral Q12H    influenza vaccine - (6 mos+)(PF) (FLUARIX/FLULAVAL/FLUZONE QUAD) injection 0.5 mL  0.5 mL IntraMUSCular PRIOR TO DISCHARGE   • sodium chloride (NS) flush 5-40 mL  5-40 mL IntraVENous Q8H   • cholecalciferol (VITAMIN D3) (1000 Units /25 mcg) tablet 2 Tab  2,000 Units Oral DAILY   • aspirin delayed-release tablet 81 mg  81 mg Oral DAILY       Review of Systems:  Constitutional: positive for fevers, chills, sweats, fatigue, malaise and anorexia, negative for weight loss  Eyes: negative  Ears, nose, mouth, throat, and face: positive for nasal congestion  Respiratory: positive for cough or dyspnea on exertion  Cardiovascular: positive for fatigue, paroxysmal nocturnal dyspnea, tachypnea, dyspnea on exertion  Gastrointestinal: positive for dyspepsia, reflux symptoms, nausea and change in bowel habits  Genitourinary:negative  Integument/breast: negative  Hematologic/lymphatic: negative  Musculoskeletal:negative  Neurological: negative  Behavioral/Psych: negative  Endocrine: negative  Allergic/Immunologic: negative    Objective:   Vital Signs:    Visit Vitals  /76   Pulse 97   Temp 98 °F (36.7 °C)   Resp 18   Ht 5' 8\" (1.727 m)   Wt 76.3 kg (168 lb 4.8 oz)   SpO2 100%   BMI 25.59 kg/m²       O2 Device: Hi flow nasal cannula   O2 Flow Rate (L/min): 13 l/min   Temp (24hrs), Av °F (36.7 °C), Min:97.7 °F (36.5 °C), Max:98.3 °F (36.8 °C)       Intake/Output:   Last shift:      No intake/output data recorded.  Last 3 shifts:  1901 -  0700  In: -   Out: 3325 [Urine:3325]    Intake/Output Summary (Last 24 hours) at 2021 0956  Last data filed at 2021 2317  Gross per 24 hour   Intake --   Output 1850 ml   Net -1850 ml      Physical Exam:   General:  Alert, cooperative, no resp distress on bipap with 100% fio2.    Head:  Normocephalic, without obvious abnormality, atraumatic.   Eyes:  Conjunctivae/corneas clear    Nose: Nares normal. Septum midline. Mucosa normal.     Throat: Lips, mucosa, and tongue normal.    Neck: Supple, symmetrical, trachea midline    Lungs:   Distant bilateral breath sounds but clear.   Chest wall:  No tenderness or deformity. Heart:  Regular rate and rhythm    Abdomen:   Soft, non-tender. Bowel sounds normal.    Extremities: Extremities normal, atraumatic, no cyanosis    Skin: Skin color, texture, turgor normal. No crepitus   Neurologic: Grossly nonfocal, motor is intact. Psych: no overt anxiety or depression. Data:   Labs:  Recent Labs     02/08/21 0243 02/07/21  0340   WBC 10.8 12.1*   HGB 14.0 14.9   HCT 42.2 44.8   PLT 81* 75*     Recent Labs     02/08/21 0243 02/07/21  0340   * 132*   K 3.8 4.6   CL 92* 94*   CO2 38* 34*   * 159*   BUN 33* 35*   CREA 0.58* 0.65*   CA 8.7 9.0   MG 2.1  --      No results for input(s): PHI, PCO2I, PO2I, HCO3I, FIO2I in the last 72 hours.     Imaging:  I have personally reviewed the patients radiographs:  CXR: today same        Shawn Muñoz MD

## 2021-02-08 NOTE — PROGRESS NOTES
End of Shift Note    Bedside shift change report given to Roseanne Palomo RN (oncoming nurse) by Arnulfo Jessica (offgoing nurse). Report included the following information SBAR, Kardex and Cardiac Rhythm Sinus Tach    Shift worked:  Day     Shift summary and any significant changes:     Attempted to wean oxygen, pt took 15-20 minutes to recover after therapy. Pt proned for 2-3 hours. Pts oxygen saturation 96-98%. Concerns for physician to address:  Unable to wean oxygen     Zone phone for oncoming shift:   [phone]       Activity:  Activity Level: Up with Assistance  Number times ambulated in hallways past shift: 0  Number of times OOB to chair past shift: 2    Cardiac:   Cardiac Monitoring: Yes      Cardiac Rhythm: Sinus tachycardia    Access:   Current line(s): PIV     Genitourinary:   Urinary status: voiding    Respiratory:   O2 Device: Hi flow nasal cannula  Chronic home O2 use?: NO  Incentive spirometer at bedside: YES  Actual Volume (ml): 750 ml  GI:  Last Bowel Movement Date: 02/03/21  Current diet:  DIET DIABETIC CONSISTENT CARB Regular  DIET NUTRITIONAL SUPPLEMENTS Dinner, Lunch; Glucerna Shake  Passing flatus: YES  Tolerating current diet: YES  % Diet Eaten: 75 %    Pain Management:   Patient states pain is manageable on current regimen: YES    Skin:  Govind Score: 21  Interventions: increase time out of bed and PT/OT consult    Patient Safety:  Fall Score:  Total Score: 2  Interventions: gripper socks and pt to call before getting OOB  High Fall Risk: Yes    Length of Stay:  Expected LOS: 5d 12h  Actual LOS: [address]

## 2021-02-08 NOTE — PROGRESS NOTES
RAPID RESPONSE TEAM 
 
Was asked to evaluate 2223 for tachycardia Heart rate 120's on monitor at rest. Patient denies chest pain or palpitations. Rhythm appears ST depressed with inverted T waves. EKG obtained. ST, possible ischemia? . Discussed with Dr. Miles Rg. Orders received: cardizem 10 mg IVP x1 and metoprolol 25 mg po BID.

## 2021-02-08 NOTE — PROGRESS NOTES
02/08/21 1630   Vitals   Temp 98 °F (36.7 °C)   Temp Source Oral   Pulse (Heart Rate) (!) 115   Heart Rate Source Monitor   Resp Rate 18   O2 Sat (%) 96 %   Level of Consciousness Alert   /78   MAP (Calculated) 96   BP 1 Location Right upper arm   BP 1 Method Automatic   BP Patient Position At rest   MEWS Score 3   MD notified via perfect serve, pt due for scheduled metoprolol.

## 2021-02-08 NOTE — PROGRESS NOTES
Comprehensive Nutrition Assessment    Type and Reason for Visit: Reassess    Nutrition Recommendations/Plan:   Continue CCD  Switch Ensure enlive shakes to Glucerna d/t continuing elevated BG    Please document % meals and supplements consumed in flowsheet I/O's under intake     Nutrition Assessment:      2/8: Chart reviewed for reassessment. RD spoke with pt over the phone. He reports he does not care much for the food but has generally been eating fairly. Recent recorded intakes %. BG continues to spike, likely d/t steroid tx. Will adjust Ensure to Glucerna. Patient Vitals for the past 72 hrs:   % Diet Eaten   02/08/21 1502 75 %   02/06/21 1217 100 %   02/06/21 0749 75 %     2/1: Chart reviewed; med noted for COVID-19, acute resp failure. As an effort to limit exposure, RD attempted to call pt over the phone but pt did not answer. Continues to receive a regular diet but BG levels remain significantly elevated. Therefore, adjusted to a Consistent Carb Diet to see if this will help. Pt also receiving steroid meds which influence BG levels. Estimated Daily Nutrient Needs:  Energy (kcal): 2017 kcal (BMR x 1. 3AF - 250); Weight Used for Energy Requirements: Current  Protein (g): 74-93g (0.8-1g/kg); Weight Used for Protein Requirements: Current  Fluid (ml/day): 2000mL; Method Used for Fluid Requirements: 1 ml/kcal      Nutrition Related Findings:  Labs: -239mg/dL, Na 133. Meds: vit D3, pepcid, lasix, humalog, NPH, prednisone. BM 2/3. Wounds:    None       Current Nutrition Therapies:  DIET NUTRITIONAL SUPPLEMENTS Dinner; Ensure [employer]  DIET DIABETIC CONSISTENT CARB Regular    Anthropometric Measures:  · Height:  5' 8\" (172.7 cm)  · Current Body Wt:  76.3 kg (168 lb 3.4 oz)   · Ideal Body Wt:  154 lbs:  133 %   · BMI Category: Overweight (BMI 25.0-29. 9)       Nutrition Diagnosis:   · Altered nutrition-related lab values related to (current medical condition, acute illness, steroid meds) as evidenced by (elevated BG levels >300 mg/dl)  Diagnosis continues. BG improving, most <300mg/dL.     Nutrition Interventions:   Food and/or Nutrient Delivery: Continue current diet, Modify oral nutrition supplement  Nutrition Education and Counseling: No recommendations at this time  Coordination of Nutrition Care: No recommendation at this time    Goals:  PO intake >60% meals and ONS with BG trending <200mg/dL next 5-7 days       Nutrition Monitoring and Evaluation:   Behavioral-Environmental Outcomes: None identified  Food/Nutrient Intake Outcomes: Food and nutrient intake, Supplement intake  Physical Signs/Symptoms Outcomes: Biochemical data, Weight    Discharge Planning:    Continue current diet     Electronically signed by Ally Vazquez RD on 2/8/2021 at 3:36 PM    Contact: [phone]  Pager [phone]

## 2021-02-08 NOTE — PROGRESS NOTES
Hospitalist Progress Note    NAME: Martín Garza   :  1965   MRN:  [id number]       Assessment / Plan:  Acute hypoxic respiratory failure POA, slightly down to 10-12 L/m HiFlow oxygen today  Severe multilobar Covid pneumonia POA  Pneumomediastinum , not POA, persistent but stable  -COVID-19 test positive on . -CTA  neg for PE. Shows diffuse bilateral peripheral groundglass opacification and consolidation in the lower lobes   -Initially on nasal cannula/high flow, decompensation  , was on BiPAP since 2021, steroids increased, evaluated by ICU intensivist on . D Dimer still low 0.40  Procalcitonin= 0.23 (low)    -has developed pneumomediastinum , changed to high flow from BiPAP to reduce barotrauma, chest x-ray this There is an unchanged tiny right apical pneumothorax. No left pneumothorax  -Dr Essence Tate Discussed with ICU intensivist .  -Continue HFNC, currently improved to 10-12 L/min high flow & stable- wean as able  -s/p  remdesivir  S/p IV Solu-Medrol - changed to PO prednisone now,  added NPH with steroids in AM for steroid induced hyperglycemia  -s/p antibiotics  -s/p convalescent plasma 2021  S/p Full dose Lovenox added by pulmonary - now tapered down to 40 Q 12 2/4  Cont aggressive IV Diuretics -- Lasix 40 mg BID, proBNP= 1430  Neg 2.07L in past 24 hrs, total 12. 07L this admission- keep net negative      History of extensive right lower extremity DVT May 2019, likely occupational related  --Finished course of Xarelto and currently on baby aspirin. GERD  --currently not on medication.   Pepcid IV twice daily     Obesity  Body mass index is 31.14 kg/m².     Code: Discussed, full code  DVT prophylaxis: Heparin  Surrogate decision maker: Brother Vero Helms [phone]    Disposition:  IP Pulm rehab versus HH lTBD, PT/OT eval noted CM working on DC planning     Subjective:     Chief Complaint / Reason for Physician Visit: F/U Resp failure/COVID PNA  Discussed with RN overnight events. Patient was seen and examined. No acute events overnight. \"Doing all right\"    Review of Systems:  Symptom Y/N Comments  Symptom Y/N Comments   Fever/Chills n   Chest Pain n    Poor Appetite    Edema     Cough n   Abdominal Pain n    Sputum    Joint Pain     SOB/BULL y  improved  Pruritis/Rash     Nausea/vomit n   Tolerating PT/OT y    Diarrhea    Tolerating Diet y    Constipation    Other       Could NOT obtain due to:          Objective:     VITALS:   Last 24hrs VS reviewed since prior progress note. Most recent are:  Patient Vitals for the past 24 hrs:   Temp Pulse Resp BP SpO2   02/08/21 1030 97.5 °F (36.4 °C) (!) 103 18 116/71 90 %   02/08/21 0947 -- -- -- -- 99 %   02/08/21 0752 98 °F (36.7 °C) 97 18 123/76 100 %   02/08/21 0244 97.8 °F (36.6 °C) 90 18 114/74 100 %   02/07/21 2300 98.3 °F (36.8 °C) 89 19 107/71 99 %   02/07/21 2113 -- (!) 102 -- 108/74 --   02/07/21 2058 -- -- -- -- 96 %   02/07/21 1953 98.3 °F (36.8 °C) 99 18 99/74 96 %   02/07/21 1911 -- -- -- 106/71 --   02/07/21 1906 -- (!) 113 -- 112/67 --   02/07/21 1728 -- 98 -- 128/77 --   02/07/21 1442 97.7 °F (36.5 °C) 99 20 125/74 97 %       Intake/Output Summary (Last 24 hours) at 2/8/2021 1238  Last data filed at 2/8/2021 7846  Gross per 24 hour   Intake --   Output 1850 ml   Net -1850 ml        I had a face to face encounter and independently examined this patient on 2/8/2021, as outlined below:  PHYSICAL EXAM:  General: WD, WN. Alert, cooperative, in moderate respiratory distress    EENT:  EOMI. Anicteric sclerae. MMM  Resp:  B/l lung coarse, rales+  CV:  Regular  rhythm,  No edema  GI:  Soft, Non distended, Non tender. +Bowel sounds  Neurologic:  Alert and oriented X 3, normal speech,   Psych:   Good insight. Not anxious nor agitated  Skin:  No rashes.   No jaundice, subcu around right flank    Reviewed most current lab test results and cultures  YES  Reviewed most current radiology test results   YES  Review and summation of old records today    NO  Reviewed patient's current orders and MAR    YES  PMH/SH reviewed - no change compared to H&P  ________________________________________________________________________  Care Plan discussed with:    Comments   Patient x    Family      RN x    Care Manager x    Consultant                       x Multidiciplinary team rounds were held today with , nursing, pharmacist and clinical coordinator. Patient's plan of care was discussed; medications were reviewed and discharge planning was addressed. ________________________________________________________________________      Total TIME Spent: 36 Minutes non procedure based      Comments   >50% of visit spent in counseling and coordination of care x    ________________________________________________________________________  Hi Troncoso MD     Procedures: see electronic medical records for all procedures/Xrays and details which were not copied into this note but were reviewed prior to creation of Plan. LABS:  I reviewed today's most current labs and imaging studies.   Pertinent labs include:  Recent Labs     02/08/21  0243 02/07/21  0340   WBC 10.8 12.1*   HGB 14.0 14.9   HCT 42.2 44.8   PLT 81* 75*     Recent Labs     02/08/21  0243 02/07/21  0340   * 132*   K 3.8 4.6   CL 92* 94*   CO2 38* 34*   * 159*   BUN 33* 35*   CREA 0.58* 0.65*   CA 8.7 9.0   MG 2.1  --        Signed: Hi Troncoso MD

## 2021-02-08 NOTE — PROGRESS NOTES
Problem: Falls - Risk of  Goal: *Absence of Falls  Description: Document Maria Luz Olmos Fall Risk and appropriate interventions in the flowsheet.   Outcome: Progressing Towards Goal  Note: Fall Risk Interventions:  Mobility Interventions: Assess mobility with egress test, Bed/chair exit alarm, Communicate number of staff needed for ambulation/transfer, Patient to call before getting OOB         Medication Interventions: Bed/chair exit alarm, Evaluate medications/consider consulting pharmacy, Patient to call before getting OOB, Teach patient to arise slowly    Elimination Interventions: Bed/chair exit alarm, Call light in reach, Patient to call for help with toileting needs    History of Falls Interventions: Bed/chair exit alarm, Evaluate medications/consider consulting pharmacy         Problem: Breathing Pattern - Ineffective  Goal: *Absence of hypoxia  Outcome: Progressing Towards Goal     Problem: Breathing Pattern - Ineffective  Goal: Ability to achieve and maintain a regular respiratory rate  Outcome: Progressing Towards Goal

## 2021-02-08 NOTE — PROGRESS NOTES
0730: Verbal and Written shift change report given to 5 [facility] (oncoming nurse) by Maddi Lai (offgoing nurse). Report included the following information SBAR, Kardex and Cardiac Rhythm NSR.     0900: Pt assisted to chair. 1100: Physical therapy at bedside. 1130: Pts oxygen saturation dropping 83-84%, pt placed on non-rebreather oxygen saturation 92%. 1145: Pt assisted back to bed and placed in prone position. Oxygen saturation 98%. 1250: Pt resting in prone position oxygen saturation 97%. 1440: Pt positioned high fowlers, and set up to eat. Pt proned 2 hours. 1800: Pts heart rate sustaining in the 110-115s, MD notified. Evening dose of metoprolol and lasix given 1700.     1810: MD aware of pts HR.

## 2021-02-08 NOTE — PROGRESS NOTES
Problem: Mobility Impaired (Adult and Pediatric)  Goal: *Acute Goals and Plan of Care (Insert Text)  Description: FUNCTIONAL STATUS PRIOR TO ADMISSION: Patient was independent and active without use of DME.    HOME SUPPORT PRIOR TO ADMISSION: The patient lived with family. Physical Therapy Goals  Initiated 2/5/2021  1. Patient will move from supine to sit and sit to supine  in bed with independence within 7 day(s). 2.  Patient will transfer from bed to chair and chair to bed with independence using the least restrictive device within 7 day(s). 3.  Patient will perform sit to stand with independence within 7 day(s). 4.  Patient will ambulate with independence for 50 feet with the least restrictive device within 7 day(s). Outcome: Progressing Towards Goal    PHYSICAL THERAPY TREATMENT  Patient: Sarah Seay (48 y.o. male)  Date: 2/8/2021  Diagnosis: COVID-19 virus infection [U07.1]  Acute respiratory failure with hypoxia ([address] [J96.01] <principal problem not specified>       Precautions:    Chart, physical therapy assessment, plan of care and goals were reviewed. ASSESSMENT  Patient continues with skilled PT services and is progressing towards goals. Pt was received sitting up in the chair on 12L of HF and cleared by nursing to mobilize. He was only able to tolerate standing up and then returned to sitting. Brushed teeth in sitting with frequent resting breaks. Oxygen dropped to 83% with activity and cued several time to PLB. Performed a few LE exercises. Pt had a difficult time with recovering and oxygen in the low 80s. Nursing aware. Pt was increased to 15L and still unable to recover. Nursing placed pt on NRB. Stable with nursing at the end of the session.       Current Level of Function Impacting Discharge (mobility/balance): CGA    Other factors to consider for discharge: increased oxygen needs         PLAN :  Patient continues to benefit from skilled intervention to address the above impairments. Continue treatment per established plan of care. to address goals. Recommendation for discharge: (in order for the patient to meet his/her long term goals)  To be determined: pulmonary rehab pending progress    This discharge recommendation:  Has not yet been discussed the attending provider and/or case management    IF patient discharges home will need the following DME: to be determined (TBD)       SUBJECTIVE:   Patient stated it feels good to brush my teeth.     OBJECTIVE DATA SUMMARY:   Critical Behavior:  Neurologic State: Alert, Appropriate for age  Orientation Level: Oriented X4  Cognition: Appropriate decision making, Appropriate for age attention/concentration, Appropriate safety awareness, Follows commands  Safety/Judgement: Fall prevention  Functional Mobility Training:  Bed Mobility:         Session began and ended in sitting           Transfers:  Sit to Stand: Contact guard assistance  Stand to Sit: Contact guard assistance                             Balance:  Sitting: Intact  Standing: Impaired  Standing - Static: Fair      Therapeutic Exercises:   Marches and LAQ, only 5 each  Pain Ratin/10    Activity Tolerance:   Poor and desaturates with exertion and requires oxygen    After treatment patient left in no apparent distress:   Sitting in chair and Call bell within reach    COMMUNICATION/COLLABORATION:   The patients plan of care was discussed with: Occupational therapist and Registered nurse.      Belgica Nichols, PT, DPT   Time Calculation: 34 mins

## 2021-02-08 NOTE — PROGRESS NOTES
End of Shift Note    Bedside shift change report given to Kassidy Lieberman (oncoming nurse) by Martin Aquino (offgoing nurse). Report included the following information SBAR and Kardex    Shift worked:  night     Shift summary and any significant changes:          Concerns for physician to address:       Zone phone for oncoming shift:          Activity:  Activity Level: Up with Assistance  Number times ambulated in hallways past shift: 0  Number of times OOB to chair past shift: 0    Cardiac:   Cardiac Monitoring: Yes      Cardiac Rhythm: Normal sinus rhythm    Access:   Current line(s): PIV     Genitourinary:   Urinary status: voiding    Respiratory:   O2 Device: Nasal cannula  Chronic home O2 use?: NO  Incentive spirometer at bedside: YES  Actual Volume (ml): 750 ml  GI:  Last Bowel Movement Date: 02/03/21  Current diet:  DIET NUTRITIONAL SUPPLEMENTS Dinner; [employer]  DIET DIABETIC CONSISTENT CARB Regular  Passing flatus: YES  Tolerating current diet: YES  % Diet Eaten: 100 %    Pain Management:   Patient states pain is manageable on current regimen: YES    Skin:  Govind Score: 20  Interventions: turn team, float heels and increase time out of bed    Patient Safety:  Fall Score:  Total Score: 2  Interventions: bed/chair alarm, gripper socks and pt to call before getting OOB  High Fall Risk: Yes    Length of Stay:  Expected LOS: 5d 12h  Actual LOS: 21      Martin Aquino

## 2021-02-08 NOTE — PROGRESS NOTES
[address] INTERDISCIPLINARY ROUNDS    Cardiopulmonary Care Interdisciplinary Rounds were held today to discuss patient's plan of care and outcomes. The following members were present: NP/Physician, Pharmacy, Nursing and Case Management. PLAN OF CARE:   Continue current treatment plan, pt currently on 15L HiFlow today. Continue to monitor respiratory status closely and wean O2 as tolerated.      Expected Length of Stay:  5d 12h

## 2021-02-09 LAB
ANION GAP SERPL CALC-SCNC: 4 MMOL/L (ref 5–15)
BNP SERPL-MCNC: 3026 PG/ML
BUN SERPL-MCNC: 33 MG/DL (ref 6–20)
BUN/CREAT SERPL: 56 (ref 12–20)
CALCIUM SERPL-MCNC: 9 MG/DL (ref 8.5–10.1)
CHLORIDE SERPL-SCNC: 94 MMOL/L (ref 97–108)
CO2 SERPL-SCNC: 38 MMOL/L (ref 21–32)
CREAT SERPL-MCNC: 0.59 MG/DL (ref 0.7–1.3)
D DIMER PPP FEU-MCNC: 0.33 MG/L FEU (ref 0–0.65)
ERYTHROCYTE [DISTWIDTH] IN BLOOD BY AUTOMATED COUNT: 12.9 % (ref 11.5–14.5)
GLUCOSE BLD STRIP.AUTO-MCNC: 116 MG/DL (ref 65–100)
GLUCOSE BLD STRIP.AUTO-MCNC: 255 MG/DL (ref 65–100)
GLUCOSE BLD STRIP.AUTO-MCNC: 277 MG/DL (ref 65–100)
GLUCOSE BLD STRIP.AUTO-MCNC: 340 MG/DL (ref 65–100)
GLUCOSE SERPL-MCNC: 151 MG/DL (ref 65–100)
HCT VFR BLD AUTO: 40.2 % (ref 36.6–50.3)
HGB BLD-MCNC: 13.2 G/DL (ref 12.1–17)
INR PPP: 1 (ref 0.9–1.1)
MCH RBC QN AUTO: 28.4 PG (ref 26–34)
MCHC RBC AUTO-ENTMCNC: 32.8 G/DL (ref 30–36.5)
MCV RBC AUTO: 86.6 FL (ref 80–99)
NRBC # BLD: 0 K/UL (ref 0–0.01)
NRBC BLD-RTO: 0 PER 100 WBC
PLATELET # BLD AUTO: 93 K/UL (ref 150–400)
PMV BLD AUTO: 12.1 FL (ref 8.9–12.9)
POTASSIUM SERPL-SCNC: 3.8 MMOL/L (ref 3.5–5.1)
PROCALCITONIN SERPL-MCNC: 0.13 NG/ML
PROTHROMBIN TIME: 10.6 SEC (ref 9–11.1)
RBC # BLD AUTO: 4.64 M/UL (ref 4.1–5.7)
SERVICE CMNT-IMP: ABNORMAL
SODIUM SERPL-SCNC: 136 MMOL/L (ref 136–145)
WBC # BLD AUTO: 11 K/UL (ref 4.1–11.1)

## 2021-02-09 PROCEDURE — 84145 PROCALCITONIN (PCT): CPT

## 2021-02-09 PROCEDURE — 74011250637 HC RX REV CODE- 250/637: Performed by: INTERNAL MEDICINE

## 2021-02-09 PROCEDURE — 85610 PROTHROMBIN TIME: CPT

## 2021-02-09 PROCEDURE — 74011250636 HC RX REV CODE- 250/636: Performed by: INTERNAL MEDICINE

## 2021-02-09 PROCEDURE — 83880 ASSAY OF NATRIURETIC PEPTIDE: CPT

## 2021-02-09 PROCEDURE — 74011250637 HC RX REV CODE- 250/637: Performed by: HOSPITALIST

## 2021-02-09 PROCEDURE — 85379 FIBRIN DEGRADATION QUANT: CPT

## 2021-02-09 PROCEDURE — 74011636637 HC RX REV CODE- 636/637: Performed by: INTERNAL MEDICINE

## 2021-02-09 PROCEDURE — 77010033678 HC OXYGEN DAILY

## 2021-02-09 PROCEDURE — 65660000001 HC RM ICU INTERMED STEPDOWN

## 2021-02-09 PROCEDURE — 85027 COMPLETE CBC AUTOMATED: CPT

## 2021-02-09 PROCEDURE — 36415 COLL VENOUS BLD VENIPUNCTURE: CPT

## 2021-02-09 PROCEDURE — 82962 GLUCOSE BLOOD TEST: CPT

## 2021-02-09 PROCEDURE — 80048 BASIC METABOLIC PNL TOTAL CA: CPT

## 2021-02-09 RX ADMIN — Medication 10 ML: at 14:00

## 2021-02-09 RX ADMIN — FUROSEMIDE 40 MG: 10 INJECTION, SOLUTION INTRAMUSCULAR; INTRAVENOUS at 08:42

## 2021-02-09 RX ADMIN — ACETAMINOPHEN 650 MG: 325 TABLET ORAL at 17:17

## 2021-02-09 RX ADMIN — METOPROLOL TARTRATE 25 MG: 25 TABLET, FILM COATED ORAL at 17:17

## 2021-02-09 RX ADMIN — ENOXAPARIN SODIUM 30 MG: 30 INJECTION SUBCUTANEOUS at 17:16

## 2021-02-09 RX ADMIN — PREDNISONE 40 MG: 20 TABLET ORAL at 08:42

## 2021-02-09 RX ADMIN — INSULIN LISPRO 5 UNITS: 100 INJECTION, SOLUTION INTRAVENOUS; SUBCUTANEOUS at 12:44

## 2021-02-09 RX ADMIN — FAMOTIDINE 20 MG: 20 TABLET, FILM COATED ORAL at 21:51

## 2021-02-09 RX ADMIN — ENOXAPARIN SODIUM 30 MG: 30 INJECTION SUBCUTANEOUS at 06:16

## 2021-02-09 RX ADMIN — ASPIRIN 81 MG: 81 TABLET, COATED ORAL at 08:42

## 2021-02-09 RX ADMIN — CHOLECALCIFEROL TAB 25 MCG (1000 UNIT) 2000 UNITS: 25 TAB at 08:42

## 2021-02-09 RX ADMIN — Medication 10 ML: at 06:16

## 2021-02-09 RX ADMIN — FAMOTIDINE 20 MG: 20 TABLET, FILM COATED ORAL at 08:42

## 2021-02-09 RX ADMIN — HUMAN INSULIN 10 UNITS: 100 INJECTION, SUSPENSION SUBCUTANEOUS at 08:43

## 2021-02-09 RX ADMIN — METOPROLOL TARTRATE 25 MG: 25 TABLET, FILM COATED ORAL at 08:42

## 2021-02-09 RX ADMIN — Medication 10 ML: at 21:51

## 2021-02-09 RX ADMIN — INSULIN LISPRO 4 UNITS: 100 INJECTION, SOLUTION INTRAVENOUS; SUBCUTANEOUS at 21:50

## 2021-02-09 RX ADMIN — INSULIN LISPRO 5 UNITS: 100 INJECTION, SOLUTION INTRAVENOUS; SUBCUTANEOUS at 17:14

## 2021-02-09 RX ADMIN — FUROSEMIDE 40 MG: 10 INJECTION, SOLUTION INTRAMUSCULAR; INTRAVENOUS at 17:17

## 2021-02-09 NOTE — PROGRESS NOTES
02/09/21 1446   Vitals   Temp 98.4 °F (36.9 °C)   Temp Source Oral   Pulse (Heart Rate) (!) 118   Heart Rate Source Monitor   Resp Rate 18   O2 Sat (%) 97 %   Level of Consciousness Alert   /64   MAP (Monitor) 76   MAP (Calculated) 79   MEWS Score 3   Will notify provider of MEWS. Pt up in chair oxygen saturation maintaining above 96%.

## 2021-02-09 NOTE — PROGRESS NOTES
Verbal and Written shift change report given to 5 [facility] (oncoming nurse) by Sofi Stein (offgoing nurse). Report included the following information SBAR, Kardex and Cardiac Rhythm NSR, Sinus Tach. 0730: Pt weaned to 10 L HIflow nasal cannula. pts oxygen saturation 96%. 0930: Pts oxygen saturation 88% , pts oxygen increased back to 15L. 1030: Pts  oxygen saturation 94, pt to remain on 15L [place] NC at this time. 1100: Pt's sister in law called, updated on patients plan of care. 1255: Pt assisted to chair, set up for lunch.

## 2021-02-09 NOTE — PROGRESS NOTES
0700: End of Shift Note    Bedside shift change report given to Be Dee RN (oncoming nurse) by Brigette Zuniga (offgoing nurse). Report included the following information SBAR, Kardex, Intake/Output, MAR, Recent Results and Cardiac Rhythm sinus tach, NSR    Shift worked:  [id number]     Shift summary and any significant changes:     uneventful night, pt. slept     Concerns for physician to address:       Zone phone for oncoming shift:          Activity:  Activity Level: Up with Assistance  Number times ambulated in hallways past shift: 0  Number of times OOB to chair past shift: 0    Cardiac:   Cardiac Monitoring: Yes      Cardiac Rhythm: Sinus tachycardia    Access:   Current line(s): PIV     Genitourinary:   Urinary status: voiding    Respiratory:   O2 Device: Hi flow nasal cannula  Chronic home O2 use?: NO  Incentive spirometer at bedside: YES  Actual Volume (ml): 750 ml  GI:  Last Bowel Movement Date: 01/22/21  Current diet:  DIET DIABETIC CONSISTENT CARB Regular  DIET NUTRITIONAL SUPPLEMENTS Dinner, Lunch; Glucerna Shake  Passing flatus: YES  Tolerating current diet: YES  % Diet Eaten: 100 %    Pain Management:   Patient states pain is manageable on current regimen: YES    Skin:  Govind Score: 21  Interventions: increase time out of bed    Patient Safety:  Fall Score:  Total Score: 2  Interventions: gripper socks and pt to call before getting OOB  High Fall Risk: Yes    Length of Stay:  Expected LOS: 5d 12h  Actual LOS: [address]

## 2021-02-09 NOTE — PROGRESS NOTES
Hospitalist Progress Note    NAME: Lizzy Matias   :  1965   MRN:  [id number]       Assessment / Plan:  Acute hypoxic respiratory failure POA  Severe multilobar Covid pneumonia POA  Pneumomediastinum , not POA, persistent but stable  -COVID-19 test positive on . -CTA  neg for PE. Shows diffuse bilateral peripheral groundglass opacification and consolidation in the lower lobes   -Initially on nasal cannula/high flow, decompensation  went on BIPAP    -developed pneumomediastinum , changed to high flow from BiPAP to reduce barotrauma,   -s/p antibiotics  -s/p convalescent plasma 2021  -s/p IV Solu-Medrol - now on prednisone  -continue lovenox  -continue IV lasix. 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 GERD  -Pepcid IV twice daily     Obesity  Body mass index is 31.14 kg/m².     Code: Discussed, full code    Surrogate decision maker: Brother Ciro Toledo [phone]    Disposition:  IP Pulm rehab versus HH lTBD, PT/OT eval noted CM working on DC planning     Subjective:     Chief Complaint / Reason for Physician Visit:   F/U Resp failure/COVID PNA  Discussed with RN overnight events. Review of Systems:  Symptom Y/N Comments  Symptom Y/N Comments   Fever/Chills n   Chest Pain n    Poor Appetite    Edema     Cough n   Abdominal Pain n    Sputum    Joint Pain     SOB/BULL y  improved  Pruritis/Rash     Nausea/vomit n   Tolerating PT/OT y    Diarrhea    Tolerating Diet y    Constipation    Other       Could NOT obtain due to:          Objective:     VITALS:   Last 24hrs VS reviewed since prior progress note.  Most recent are:  Patient Vitals for the past 24 hrs:   Temp Pulse Resp BP SpO2   21 1517 -- (!) 109 -- -- 98 %   21 1513 -- (!) 113 -- -- 96 %   21 1446 98.4 °F (36.9 °C) (!) 118 18 110/64 97 % 02/09/21 1256 -- (!) 128 -- -- 90 %   02/09/21 1032 -- (!) 128 -- -- 94 %   02/09/21 0931 -- (!) 116 -- -- 96 %   02/09/21 0928 -- (!) 130 -- -- (!) 88 %   02/09/21 0846 -- -- -- -- 99 %   02/09/21 0735 97.8 °F (36.6 °C) 96 18 (!) 110/59 100 %   02/09/21 0306 97.8 °F (36.6 °C) 99 16 113/69 98 %   02/08/21 2202 98.1 °F (36.7 °C) (!) 105 18 110/65 97 %   02/08/21 1943 98 °F (36.7 °C) (!) 105 20 113/66 100 %   02/08/21 1630 98 °F (36.7 °C) (!) 115 18 131/78 96 %       Intake/Output Summary (Last 24 hours) at 2/9/2021 1534  Last data filed at 2/9/2021 1254  Gross per 24 hour   Intake 480 ml   Output 2150 ml   Net -1670 ml        I had a face to face encounter and independently examined this patient on 2/9/2021, as outlined below:  PHYSICAL EXAM:  General: WD, WN. Alert, cooperative, in moderate respiratory distress    EENT:  EOMI. Anicteric sclerae. MMM  Resp:  B/l lung coarse, rales+  CV:  Regular  rhythm,  No edema  GI:  Soft, Non distended, Non tender. +Bowel sounds  Neurologic:  Alert and oriented X 3, normal speech,   Psych:   Good insight. Not anxious nor agitated  Skin:  No rashes. No jaundice, subcu around right flank    Reviewed most current lab test results and cultures  YES  Reviewed most current radiology test results   YES  Review and summation of old records today    NO  Reviewed patient's current orders and MAR    YES  PMH/SH reviewed - no change compared to H&P  ________________________________________________________________________  Care Plan discussed with:    Comments   Patient x    Family      RN x    Care Manager     Consultant                        Multidiciplinary team rounds were held today with , nursing, pharmacist and clinical coordinator. Patient's plan of care was discussed; medications were reviewed and discharge planning was addressed.      ________________________________________________________________________    Total TIME Spent: 25 Minutes non procedure based Comments   >50% of visit spent in counseling and coordination of care x    ________________________________________________________________________  Aureliano Geller MD     Procedures: see electronic medical records for all procedures/Xrays and details which were not copied into this note but were reviewed prior to creation of Plan. LABS:  I reviewed today's most current labs and imaging studies.   Pertinent labs include:  Recent Labs     02/09/21  0309 02/08/21  0243 02/07/21  0340   WBC 11.0 10.8 12.1*   HGB 13.2 14.0 14.9   HCT 40.2 42.2 44.8   PLT 93* 81* 75*     Recent Labs     02/09/21  0309 02/08/21  0243 02/07/21  0340    133* 132*   K 3.8 3.8 4.6   CL 94* 92* 94*   CO2 38* 38* 34*   * 123* 159*   BUN 33* 33* 35*   CREA 0.59* 0.58* 0.65*   CA 9.0 8.7 9.0   MG  --  2.1  --    INR 1.0  --   --        Signed: Aureliano Geller MD

## 2021-02-09 NOTE — PROGRESS NOTES
MD-please call family with update.  Please order repeat covid tests per request of family.      ORIANA:  -confirm family to transport near d/c  -assess for home oxygen 24 hours prior to d/c-  -await therapy recommendations for the benefit of [facility] vs IP rehab  -ask pt again if he would be willing to give us his physical address.  This will be needed to deliver the oxygen//HHC staff.  -may benefit from IP pulmonary rehab at d/c  -make PCP omero't prior to d/c  -[facility] can accept pending oxygen testing//order as of 2/9

## 2021-02-09 NOTE — PROGRESS NOTES
[facility]  Pulmonary, Critical Care, and Sleep Medicine    Name: Lana Gatica MRN: [id number]   : 1965 Hospital: [facility]   Date: 2021            IMPRESSION:   · Acute hypoxic respiratory failure - severe, secondary to COVID PNA-currently on HF (13L, 100%)  · Severe Multilobar Covid Pneumonia, first Dx on , Symptoms first started on 21  S/P convalescent plasma ; S/p Remdesivir x 5 days total, He was sleeping in prone position several times yesterday. Seems to have some improvement in oxygenation. · New pneumomediastinum 21, radiographically stable. · Covid related coagulopathy  · Covid related inflammation, increased ferritin. · Steroid induced hyperglycemia  · Obesity  · GERD  · Hypertension, LAE and LVH on ekg. RECOMMENDATIONS:   · Continue HFNC, wean as tolerated, daily improvement, wean as tolerated. · Trying to avoid PAP due to pneumomediastinum. · Will work on weaning the oxygenation. · Empiric antibiotics completed  · Cont Lovenox for DVT prophylaxis. · Continue po steroids, decrease dose to 30mg po every day. · Follow inflammatory and coag markers  · Mobilize out of bed as tolerated   PCP: Edmundo Caballero. Subjective:     No acute events   No severe distress  Had some dryness of his nose. NO headaches. Tolerating po intake pretty well. NO chest pain, no back pain, no leg pain.      Current Facility-Administered Medications   Medication Dose Route Frequency    insulin lispro (HUMALOG) injection   SubCUTAneous AC&HS    insulin NPH (NOVOLIN N, HUMULIN N) injection 10 Units  10 Units SubCUTAneous DAILY    metoprolol tartrate (LOPRESSOR) tablet 25 mg  25 mg Oral BID    furosemide (LASIX) injection 40 mg  40 mg IntraVENous BID    enoxaparin (LOVENOX) injection 30 mg  30 mg SubCUTAneous Q12H    predniSONE (DELTASONE) tablet 40 mg  40 mg Oral DAILY WITH BREAKFAST    famotidine (PEPCID) tablet 20 mg 20 mg Oral Q12H    influenza vaccine - (6 mos+)(PF) (FLUARIX/FLULAVAL/FLUZONE QUAD) injection 0.5 mL  0.5 mL IntraMUSCular PRIOR TO DISCHARGE    sodium chloride (NS) flush 5-40 mL  5-40 mL IntraVENous Q8H    cholecalciferol (VITAMIN D3) (1000 Units /25 mcg) tablet 2 Tab  2,000 Units Oral DAILY    aspirin delayed-release tablet 81 mg  81 mg Oral DAILY       Review of Systems:  Constitutional: positive for fevers, chills, sweats, fatigue, malaise and anorexia, negative for weight loss  Eyes: negative  Ears, nose, mouth, throat, and face: positive for nasal congestion  Respiratory: positive for cough or dyspnea on exertion  Cardiovascular: positive for fatigue, paroxysmal nocturnal dyspnea, tachypnea, dyspnea on exertion  Gastrointestinal: positive for dyspepsia, reflux symptoms, nausea and change in bowel habits  Genitourinary:negative  Integument/breast: negative  Hematologic/lymphatic: negative  Musculoskeletal:negative  Neurological: negative  Behavioral/Psych: negative  Endocrine: negative  Allergic/Immunologic: negative    Objective:   Vital Signs:    Visit Vitals  BP (!) 110/59   Pulse 96   Temp 97.8 °F (36.6 °C)   Resp 18   Ht 5' 8\" (1.727 m)   Wt 76.3 kg (168 lb 4.8 oz)   SpO2 99%   BMI 25.59 kg/m²       O2 Device: Hi flow nasal cannula   O2 Flow Rate (L/min): 10 l/min   Temp (24hrs), Av.9 °F (36.6 °C), Min:97.5 °F (36.4 °C), Max:98.1 °F (36.7 °C)       Intake/Output:   Last shift:      No intake/output data recorded. Last 3 shifts:  1901 -  0700  In: 480 [P.O.:480]  Out: 2225 [Urine:2225]    Intake/Output Summary (Last 24 hours) at 2021 09  Last data filed at 2021 0320  Gross per 24 hour   Intake 480 ml   Output 1500 ml   Net -1020 ml      Physical Exam:   General:  Alert, cooperative, no resp distress. ON heated high flow oxygen. Flow of 10L and Pox of 99%. Head:  Normocephalic, without obvious abnormality, atraumatic.    Eyes:  Conjunctivae/corneas clear    Nose: Nares normal. Septum midline. Mucosa normal.     Throat: Lips, mucosa, and tongue normal.    Neck: Supple, symmetrical, trachea midline    Lungs:   Distant bilateral breath sounds but clear. Chest wall:  No tenderness or deformity. Heart:  Regular rate and rhythm    Abdomen:   Soft, non-tender. Bowel sounds normal.    Extremities: Extremities normal, atraumatic, no cyanosis    Skin: Skin color, texture, turgor normal. No crepitus   Neurologic: Grossly nonfocal, motor is intact. Psych: no overt anxiety or depression. Data:   Labs:  Recent Labs     02/09/21 0309 02/08/21 0243 02/07/21  0340   WBC 11.0 10.8 12.1*   HGB 13.2 14.0 14.9   HCT 40.2 42.2 44.8   PLT 93* 81* 75*     Recent Labs     02/09/21 0309 02/08/21 0243 02/07/21  0340    133* 132*   K 3.8 3.8 4.6   CL 94* 92* 94*   CO2 38* 38* 34*   * 123* 159*   BUN 33* 33* 35*   CREA 0.59* 0.58* 0.65*   CA 9.0 8.7 9.0   MG  --  2.1  --    INR 1.0  --   --      No results for input(s): PHI, PCO2I, PO2I, HCO3I, FIO2I in the last 72 hours. Imaging:  I have personally reviewed the patients radiographs:  CXR: 2-5-21: COMPARISON: February 1     FINDINGS: A portable AP radiograph of the chest was obtained at 1110 hours. The  patient is on a cardiac monitor. There is persistent bilateral lower lobe  opacification without change. The cardiac and mediastinal contours and pulmonary  vascularity are normal.  The bones and soft tissues are grossly within normal  limits.      IMPRESSION  No significant change.         Tanner Dodd MD

## 2021-02-09 NOTE — PROGRESS NOTES
Problem: Breathing Pattern - Ineffective  Goal: *Absence of hypoxia  Outcome: Progressing Towards Goal  Goal: *Use of effective breathing techniques  Outcome: Progressing Towards Goal     Problem: Airway Clearance - Ineffective  Goal: Achieve or maintain patent airway  Outcome: Progressing Towards Goal     Problem: Gas Exchange - Impaired  Goal: Absence of hypoxia  Outcome: Progressing Towards Goal  Goal: Promote optimal lung function  Outcome: Progressing Towards Goal

## 2021-02-10 ENCOUNTER — APPOINTMENT (OUTPATIENT)
Dept: GENERAL RADIOLOGY | Age: 56
DRG: 177 | End: 2021-02-10
Attending: INTERNAL MEDICINE
Payer: COMMERCIAL

## 2021-02-10 LAB
GLUCOSE BLD STRIP.AUTO-MCNC: 167 MG/DL (ref 65–100)
GLUCOSE BLD STRIP.AUTO-MCNC: 208 MG/DL (ref 65–100)
GLUCOSE BLD STRIP.AUTO-MCNC: 280 MG/DL (ref 65–100)
GLUCOSE BLD STRIP.AUTO-MCNC: 92 MG/DL (ref 65–100)
SERVICE CMNT-IMP: ABNORMAL
SERVICE CMNT-IMP: NORMAL

## 2021-02-10 PROCEDURE — 74011250637 HC RX REV CODE- 250/637: Performed by: INTERNAL MEDICINE

## 2021-02-10 PROCEDURE — 97530 THERAPEUTIC ACTIVITIES: CPT

## 2021-02-10 PROCEDURE — 74011250636 HC RX REV CODE- 250/636: Performed by: INTERNAL MEDICINE

## 2021-02-10 PROCEDURE — 77010033678 HC OXYGEN DAILY

## 2021-02-10 PROCEDURE — 74011636637 HC RX REV CODE- 636/637: Performed by: INTERNAL MEDICINE

## 2021-02-10 PROCEDURE — 65660000001 HC RM ICU INTERMED STEPDOWN

## 2021-02-10 PROCEDURE — 82962 GLUCOSE BLOOD TEST: CPT

## 2021-02-10 PROCEDURE — 71045 X-RAY EXAM CHEST 1 VIEW: CPT

## 2021-02-10 PROCEDURE — 97535 SELF CARE MNGMENT TRAINING: CPT | Performed by: OCCUPATIONAL THERAPIST

## 2021-02-10 PROCEDURE — 74011250637 HC RX REV CODE- 250/637: Performed by: HOSPITALIST

## 2021-02-10 RX ADMIN — PREDNISONE 30 MG: 20 TABLET ORAL at 08:57

## 2021-02-10 RX ADMIN — ENOXAPARIN SODIUM 30 MG: 30 INJECTION SUBCUTANEOUS at 17:45

## 2021-02-10 RX ADMIN — Medication 10 ML: at 05:27

## 2021-02-10 RX ADMIN — ENOXAPARIN SODIUM 30 MG: 30 INJECTION SUBCUTANEOUS at 05:26

## 2021-02-10 RX ADMIN — METOPROLOL TARTRATE 25 MG: 25 TABLET, FILM COATED ORAL at 17:45

## 2021-02-10 RX ADMIN — FAMOTIDINE 20 MG: 20 TABLET, FILM COATED ORAL at 23:21

## 2021-02-10 RX ADMIN — ASPIRIN 81 MG: 81 TABLET, COATED ORAL at 09:02

## 2021-02-10 RX ADMIN — HUMAN INSULIN 10 UNITS: 100 INJECTION, SUSPENSION SUBCUTANEOUS at 08:56

## 2021-02-10 RX ADMIN — INSULIN LISPRO 3 UNITS: 100 INJECTION, SOLUTION INTRAVENOUS; SUBCUTANEOUS at 12:23

## 2021-02-10 RX ADMIN — METOPROLOL TARTRATE 25 MG: 25 TABLET, FILM COATED ORAL at 08:57

## 2021-02-10 RX ADMIN — Medication 10 ML: at 23:22

## 2021-02-10 RX ADMIN — INSULIN LISPRO 3 UNITS: 100 INJECTION, SOLUTION INTRAVENOUS; SUBCUTANEOUS at 23:21

## 2021-02-10 RX ADMIN — FUROSEMIDE 40 MG: 10 INJECTION, SOLUTION INTRAMUSCULAR; INTRAVENOUS at 08:56

## 2021-02-10 RX ADMIN — Medication 10 ML: at 14:00

## 2021-02-10 RX ADMIN — INSULIN LISPRO 2 UNITS: 100 INJECTION, SOLUTION INTRAVENOUS; SUBCUTANEOUS at 17:45

## 2021-02-10 RX ADMIN — FAMOTIDINE 20 MG: 20 TABLET, FILM COATED ORAL at 08:57

## 2021-02-10 RX ADMIN — FUROSEMIDE 40 MG: 10 INJECTION, SOLUTION INTRAMUSCULAR; INTRAVENOUS at 17:44

## 2021-02-10 RX ADMIN — CHOLECALCIFEROL TAB 25 MCG (1000 UNIT) 2000 UNITS: 25 TAB at 08:57

## 2021-02-10 NOTE — PROGRESS NOTES
02/10/21 1132 02/10/21 1135 02/10/21 1146   Vital Signs   Pulse (Heart Rate) (!) 102 (!) 123  --    O2 Sat (%) 93 % (!) 88 %  (mobility to chair) 95 %   O2 Device Hi flow nasal cannula Hi flow nasal cannula Hi flow nasal cannula   O2 Flow Rate (L/min) 10 l/min 10 l/min 10 l/min

## 2021-02-10 NOTE — PROGRESS NOTES
0700 Bedside shift change report given to Rocio Quintero (oncoming nurse) by Swathi Chiang RN (offgoing nurse). Report included the following information SBAR, Kardex, Intake/Output and MAR.     [id number] Spoke with pt family member Joel Hirsch. Gave update on pt. Joel Hirsch informed me that she wanted to speak with Dr. Alexus Langley also with [facility] case management. I informed Joel Hirsch that I would reach out to the Dr. Alexus Langley also to [facility] Conroy from case management. 1 Perfect served Dr. Sophie Mallory to inform him that pts family member Joel Hirsch would like for him to reach out to her to answer a few questions awaiting response. Mikopavanrudolph [phone] with Josefina Conroy from case management informed her that pts family member Joel Hirsch would like for her to call her in regards to the pt. Josefina Conroy informed me that she would reach out to the family member. End of Shift Note    Bedside shift change report given to Hudson Duffy RN (oncoming nurse) by Nigel Oconnell RN (offgoing nurse). Report included the following information SBAR, Kardex, Intake/Output and MAR    Shift worked:  3457-0019     Shift summary and any significant changes:     None     Concerns for physician to address:  None     [facility]   [phone]       Activity:  Activity Level:  Up with Assistance  Number times ambulated in hallways past shift: 0  Number of times OOB to chair past shift: 1    Cardiac:   Cardiac Monitoring: Yes      Cardiac Rhythm: Sinus tachycardia    Access:   Current line(s): no access     Genitourinary:   Urinary status: voiding    Respiratory:   O2 Device: Hi flow nasal cannula  Chronic home O2 use?: NO  Incentive spirometer at bedside: N/A  Actual Volume (ml): 250 ml  GI:  Last Bowel Movement Date: (Patient stated he has had a bowel movement since 1/22/2021)  Current diet:  DIET DIABETIC CONSISTENT CARB Regular  DIET NUTRITIONAL SUPPLEMENTS Dinner, Lunch; Glucerna Shake  Passing flatus: YES  Tolerating current diet: YES  % Diet Eaten: 100 %    Pain Management:   Patient states pain is manageable on current regimen: YES    Skin:  Govind Score: 19  Interventions: increase time out of bed    Patient Safety:  Fall Score:  Total Score: 2  Interventions: bed/chair alarm  High Fall Risk: Yes    Length of Stay:  Expected LOS: 5d 12h  Actual LOS: 23      Perez Amaya, RN

## 2021-02-10 NOTE — PROGRESS NOTES
[facility] INTERDISCIPLINARY ROUNDS    Cardiopulmonary Care Interdisciplinary Rounds were held today to discuss patient's plan of care and outcomes. The following members were present: NP/Physician, Pharmacy, Nursing and Case Management. PLAN OF CARE:   Continue current treatment plan, pt remains on 10L HiFlow.      Expected Length of Stay:  5d 12h

## 2021-02-10 NOTE — PROGRESS NOTES
Problem: Falls - Risk of  Goal: *Absence of Falls  Description: Document Elbert Burns Fall Risk and appropriate interventions in the flowsheet. Outcome: Progressing Towards Goal  Note: Fall Risk Interventions:  Mobility Interventions: Bed/chair exit alarm, Patient to call before getting OOB         Medication Interventions: Bed/chair exit alarm, Patient to call before getting OOB, Teach patient to arise slowly    Elimination Interventions: Bed/chair exit alarm, Call light in reach    History of Falls Interventions: Bed/chair exit alarm         Problem: Patient Education: Go to Patient Education Activity  Goal: Patient/Family Education  Outcome: Progressing Towards Goal     Problem: Breathing Pattern - Ineffective  Goal: *Absence of hypoxia  Outcome: Progressing Towards Goal  Goal: *Use of effective breathing techniques  Outcome: Progressing Towards Goal     Problem: Airway Clearance - Ineffective  Goal: Achieve or maintain patent airway  Outcome: Progressing Towards Goal     Problem: Gas Exchange - Impaired  Goal: Absence of hypoxia  Outcome: Progressing Towards Goal  Goal: Promote optimal lung function  Outcome: Progressing Towards Goal     Problem: Breathing Pattern - Ineffective  Goal: Ability to achieve and maintain a regular respiratory rate  Outcome: Progressing Towards Goal     Problem:  Body Temperature -  Risk of, Imbalanced  Goal: Ability to maintain a body temperature within defined limits  Outcome: Progressing Towards Goal  Goal: Will regain or maintain usual level of consciousness  Outcome: Progressing Towards Goal  Goal: Complications related to the disease process, condition or treatment will be avoided or minimized  Outcome: Progressing Towards Goal     Problem: Isolation Precautions - Risk of Spread of Infection  Goal: Prevent transmission of infectious organism to others  Outcome: Progressing Towards Goal     Problem: Nutrition Deficits  Goal: Optimize nutrtional status  Outcome: Progressing Towards Goal     Problem: Risk for Fluid Volume Deficit  Goal: Maintain normal heart rhythm  Outcome: Progressing Towards Goal  Goal: Maintain absence of muscle cramping  Outcome: Progressing Towards Goal  Goal: Maintain normal serum potassium, sodium, calcium, phosphorus, and pH  Outcome: Progressing Towards Goal     Problem: Loneliness or Risk for Loneliness  Goal: Demonstrate positive use of time alone when socialization is not possible  Outcome: Progressing Towards Goal     Problem: Fatigue  Goal: Verbalize increase energy and improved vitality  Outcome: Progressing Towards Goal     Problem: Patient Education: Go to Patient Education Activity  Goal: Patient/Family Education  Outcome: Progressing Towards Goal     Problem: Pressure Injury - Risk of  Goal: *Prevention of pressure injury  Description: Document Govind Scale and appropriate interventions in the flowsheet.   Outcome: Progressing Towards Goal  Note: Pressure Injury Interventions:  Sensory Interventions: Keep linens dry and wrinkle-free, Maintain/enhance activity level, Minimize linen layers    Moisture Interventions: Absorbent underpads, Minimize layers    Activity Interventions: Increase time out of bed    Mobility Interventions: Pressure redistribution bed/mattress (bed type), HOB 30 degrees or less, PT/OT evaluation    Nutrition Interventions: Document food/fluid/supplement intake, Offer support with meals,snacks and hydration    Friction and Shear Interventions: HOB 30 degrees or less, Lift sheet, Minimize layers                Problem: Patient Education: Go to Patient Education Activity  Goal: Patient/Family Education  Outcome: Progressing Towards Goal     Problem: Patient Education: Go to Patient Education Activity  Goal: Patient/Family Education  Outcome: Progressing Towards Goal     Problem: Patient Education: Go to Patient Education Activity  Goal: Patient/Family Education  Outcome: Progressing Towards Goal

## 2021-02-10 NOTE — PROGRESS NOTES
0700: End of Shift Note    Bedside shift change report given to Sigrid Lozoya (oncoming nurse) by Payal Kim (offgoing nurse). Report included the following information SBAR, Kardex, Intake/Output, MAR, Recent Results and Cardiac Rhythm Sinus tach, NSR    Shift worked:  [id number]     Shift summary and any significant changes:     uneventful night     Concerns for physician to address:       Zone phone for oncoming shift:          Activity:  Activity Level: Up with Assistance  Number times ambulated in hallways past shift: 0  Number of times OOB to chair past shift: 0    Cardiac:   Cardiac Monitoring: Yes      Cardiac Rhythm: Sinus tachycardia, Normal sinus rhythm    Access:   Current line(s): PIV     Genitourinary:   Urinary status: voiding    Respiratory:   O2 Device: Hi flow nasal cannula  Chronic home O2 use?: NO  Incentive spirometer at bedside: NO  Actual Volume (ml): 500 ml  GI:  Last Bowel Movement Date: 01/22/21  Current diet:  DIET DIABETIC CONSISTENT CARB Regular  DIET NUTRITIONAL SUPPLEMENTS Dinner, Lunch; Glucerna Shake  Passing flatus: YES  Tolerating current diet: YES  % Diet Eaten: 80 %    Pain Management:   Patient states pain is manageable on current regimen: YES    Skin:  Govind Score: 19  Interventions: increase time out of bed    Patient Safety:  Fall Score:  Total Score: 2  Interventions: bed/chair alarm, gripper socks and pt to call before getting OOB  High Fall Risk: Yes    Length of Stay:  Expected LOS: 5d 12h  Actual LOS: [address]

## 2021-02-10 NOTE — PROGRESS NOTES
I spoke with sister in law, Violet Ness, at [phone] who states that her  is the pt's only family. She works full time caring for their son who has CP. Since they are considered a long term care \"facility\" she is unable to care for covid positive patients such as the patient. She is in agreement with [facility] and if he is not accepted, she would like a list of personal care providers so she can arrange care for the pt at home. ORIANA:  -confirm family to transport near d/c  -assess for home oxygen 24 hours prior to d/c-  -await therapy recommendations for the benefit of [address] vs IP rehab  -ask pt again if he would be willing to give us his physical address.  This will be needed to deliver the oxygen//HHC staff.  -may benefit from IP pulmonary rehab at d/c  -make PCP omero't prior to d/c  -[facility] can accept pending oxygen testing//order as of 2/9  -family would like repeat covid test

## 2021-02-10 NOTE — PROGRESS NOTES
[facility] [place]  Pulmonary, Critical Care, and Sleep Medicine    Name: Maureen Gallego MRN: [id number]   : 1965 Hospital: [facility]   Date: 2/10/2021            IMPRESSION:   · Acute hypoxic respiratory failure - severe, secondary to COVID PNA-currently on HF (13L, 100%), Still needs moderate oxygen support. Continue to wean as tolerated. · Severe Multilobar Covid Pneumonia, first Dx on , Symptoms first started on 21  S/P convalescent plasma ; S/p Remdesivir x 5 days total, He was sleeping in prone position several times yesterday. Seems to have some improvement in oxygenation. · New pneumomediastinum 21, radiographically stable. · Covid related coagulopathy  · Covid related inflammation, increased ferritin. · Steroid induced hyperglycemia  · Obesity  · GERD  · Hypertension, LAE and LVH on ekg. RECOMMENDATIONS:   · Continue HFNC, wean as tolerated, daily improvement, wean as tolerated. · Trying to avoid PAP due to pneumomediastinum. · Will work on weaning the oxygenation. · Empiric antibiotics completed  · Cont Lovenox for DVT prophylaxis. · Continue po steroids, decrease dose to 30mg po every day. · Follow inflammatory and coag markers  · Mobilize out of bed as tolerated   PCP: Iris Richardson. Subjective:   Pt seems comfortable. No acute events   No severe distress  Had some dryness of his nose. NO headaches. Tolerating po intake pretty well. NO chest pain, no back pain, no leg pain.      Current Facility-Administered Medications   Medication Dose Route Frequency    predniSONE (DELTASONE) tablet 30 mg  30 mg Oral DAILY WITH BREAKFAST    insulin lispro (HUMALOG) injection   SubCUTAneous AC&HS    insulin NPH (NOVOLIN N, HUMULIN N) injection 10 Units  10 Units SubCUTAneous DAILY    metoprolol tartrate (LOPRESSOR) tablet 25 mg  25 mg Oral BID    furosemide (LASIX) injection 40 mg  40 mg IntraVENous BID    enoxaparin (LOVENOX) injection 30 mg  30 mg SubCUTAneous Q12H    famotidine (PEPCID) tablet 20 mg  20 mg Oral Q12H    influenza vaccine 2020-21 (6 mos+)(PF) (FLUARIX/FLULAVAL/FLUZONE QUAD) injection 0.5 mL  0.5 mL IntraMUSCular PRIOR TO DISCHARGE    sodium chloride (NS) flush 5-40 mL  5-40 mL IntraVENous Q8H    cholecalciferol (VITAMIN D3) (1000 Units /25 mcg) tablet 2 Tab  2,000 Units Oral DAILY    aspirin delayed-release tablet 81 mg  81 mg Oral DAILY       Review of Systems:  Constitutional: positive for fevers, chills, sweats, fatigue, malaise and anorexia, negative for weight loss  Eyes: negative  Ears, nose, mouth, throat, and face: positive for nasal congestion  Respiratory: positive for cough or dyspnea on exertion  Cardiovascular: positive for fatigue, paroxysmal nocturnal dyspnea, tachypnea, dyspnea on exertion  Gastrointestinal: positive for dyspepsia, reflux symptoms, nausea and change in bowel habits  Genitourinary:negative  Integument/breast: negative  Hematologic/lymphatic: negative  Musculoskeletal:negative  Neurological: negative  Behavioral/Psych: negative  Endocrine: negative  Allergic/Immunologic: negative    Objective:   Vital Signs:    Visit Vitals  /72 (BP 1 Location: Right upper arm, BP Patient Position: At rest)   Pulse 100   Temp 97.7 °F (36.5 °C)   Resp 16   Ht 5' 8\" (1.727 m)   Wt 78.9 kg (174 lb)   SpO2 100%   BMI 26.46 kg/m²       O2 Device: Hi flow nasal cannula   O2 Flow Rate (L/min): 10 l/min   Temp (24hrs), Av.1 °F (36.7 °C), Min:97.7 °F (36.5 °C), Max:98.4 °F (36.9 °C)       Intake/Output:   Last shift:      02/10 0701 - 02/10 1900  In: -   Out: 350 [Urine:350]  Last 3 shifts: 1901 - 02/10 0700  In: 360 [P.O.:360]  Out: 1800 [Urine:1800]    Intake/Output Summary (Last 24 hours) at 2/10/2021 1012  Last data filed at 2/10/2021 0731  Gross per 24 hour   Intake 240 ml   Output 1300 ml   Net -1060 ml      Physical Exam:   General:  Alert, cooperative, no resp distress. ON heated high flow oxygen. Flow of 10L and Pox of 99%. Head:  Normocephalic, without obvious abnormality, atraumatic. Eyes:  Conjunctivae/corneas clear    Nose: Nares normal. Septum midline. Mucosa normal.     Throat: Lips, mucosa, and tongue normal.    Neck: Supple, symmetrical, trachea midline    Lungs:   Distant bilateral breath sounds but clear. Chest wall:  No tenderness or deformity. Heart:  Regular rate and rhythm    Abdomen:   Soft, non-tender. Bowel sounds normal.    Extremities: Extremities normal, atraumatic, no cyanosis    Skin: Skin color, texture, turgor normal. No crepitus   Neurologic: Grossly nonfocal, motor is intact. Psych: no overt anxiety or depression. Data:   Labs:  Recent Labs     02/09/21 0309 02/08/21  0243   WBC 11.0 10.8   HGB 13.2 14.0   HCT 40.2 42.2   PLT 93* 81*     Recent Labs     02/09/21 0309 02/08/21  0243    133*   K 3.8 3.8   CL 94* 92*   CO2 38* 38*   * 123*   BUN 33* 33*   CREA 0.59* 0.58*   CA 9.0 8.7   MG  --  2.1   INR 1.0  --      No results for input(s): PHI, PCO2I, PO2I, HCO3I, FIO2I in the last 72 hours. Imaging:  I have personally reviewed the patients radiographs:  CXR: 2-5-21: COMPARISON: February 1     FINDINGS: A portable AP radiograph of the chest was obtained at 1110 hours. The  patient is on a cardiac monitor. There is persistent bilateral lower lobe  opacification without change. The cardiac and mediastinal contours and pulmonary  vascularity are normal.  The bones and soft tissues are grossly within normal  limits.      IMPRESSION  No significant change.         Sagrario Nance MD

## 2021-02-10 NOTE — PROGRESS NOTES
Problem: Mobility Impaired (Adult and Pediatric)  Goal: *Acute Goals and Plan of Care (Insert Text)  Description: FUNCTIONAL STATUS PRIOR TO ADMISSION: Patient was independent and active without use of DME.    HOME SUPPORT PRIOR TO ADMISSION: The patient lived with family. Physical Therapy Goals  Initiated 2/5/2021  1. Patient will move from supine to sit and sit to supine  in bed with independence within 7 day(s). 2.  Patient will transfer from bed to chair and chair to bed with independence using the least restrictive device within 7 day(s). 3.  Patient will perform sit to stand with independence within 7 day(s). 4.  Patient will ambulate with independence for 50 feet with the least restrictive device within 7 day(s). Physical Therapy Goals  Revised  2/10/2021  1. Patient will move from supine to sit and sit to supine  in bed with independence within 7 day(s). 2.  Patient will transfer from bed to chair and chair to bed with independence using the least restrictive device within 7 day(s). 3.  Patient will perform sit to stand with independence within 7 day(s). 4.  Patient will ambulate with modified independence for 15 feet with the least restrictive device within 7 day(s). While maintaining oxygen saturation >90%  Outcome: Progressing Towards Goal    PHYSICAL THERAPY TREATMENT: WEEKLY REASSESSMENT  Patient: Ani Hyde (70 y.o. male)  Date: 2/10/2021  Primary Diagnosis: COVID-19 virus infection [U07.1]  Acute respiratory failure with hypoxia ([facility] [address] [J96.01]        Precautions: fall         ASSESSMENT  Patient continues with skilled PT services and is progressing towards goals. Pt was received in supine on 10L HF and cleared by nursing to mobilize. HR more stable and increased to 123bpm with activity. He was able to come to the EOB, stood and took a few steps to the chair. Oxygen saturation dropped to 88% and was able to recover much quicker with PLB than previous sessions. Remained seated for ADLs at the sink, attempted to wash hands standing and it was too much activity. He was left sitting up in the chair at the end of the session. Patient's progression toward goals since last assessment: making progress but goals needed to be down graded    Current Level of Function Impacting Discharge (mobility/balance): CGA/min A    Other factors to consider for discharge: increased oxygen needs         PLAN :  Goals have been updated based on progression since last assessment. Patient continues to benefit from skilled intervention to address the above impairments. Recommendations and Planned Interventions: bed mobility training, transfer training, gait training, therapeutic exercises, patient and family training/education, and therapeutic activities      Frequency/Duration: Patient will be followed by physical therapy:  3 times a week to address goals. Recommendation for discharge: (in order for the patient to meet his/her long term goals)  Therapy 3 hours per day 5-7 days per week, pulmonary rehab    This discharge recommendation:  Has not yet been discussed the attending provider and/or case management    IF patient discharges home will need the following DME: to be determined (TBD)         SUBJECTIVE:   Patient stated i have not brushed my teeth since I saw you all last.    OBJECTIVE DATA SUMMARY:   HISTORY:    Past Medical History:   Diagnosis Date    GERD (gastroesophageal reflux disease)     Obesity (BMI 30-39. 9) 5/8/2019    Right leg DVT ([address] 2019     Past Surgical History:   Procedure Laterality Date    HX ORTHOPAEDIC         Personal factors and/or comorbidities impacting plan of care:     Home Situation  Home Environment: Private residence  # Steps to Enter: 10  One/Two Story Residence: Two story  # of Interior Steps: 10  Living Alone: Yes  Support Systems: Family member(s), Friends \ neighbors  Patient Expects to be Discharged to[employer] residence  Current DME Used/Available at Home: None    EXAMINATION/PRESENTATION/DECISION MAKING:   Critical Behavior:  Neurologic State: Alert  Orientation Level: Oriented X4  Cognition: Appropriate decision making, Appropriate for age attention/concentration, Appropriate safety awareness  Safety/Judgement: Awareness of environment, Fall prevention, Home safety  Hearing:  Auditory  Auditory Impairment: None  Skin:  intact  Edema: WDL       Functional Mobility:  Bed Mobility:  Rolling: Independent  Supine to Sit: Supervision     Scooting: Supervision  Transfers:  Sit to Stand: Contact guard assistance  Stand to Sit: Contact guard assistance        Bed to Chair: Minimum assistance(hand held assist due to weakness and balance)              Balance:   Sitting: Intact  Standing: Impaired  Standing - Static: Fair  Standing - Dynamic : Fair  Ambulation/Gait Training:  Distance (ft): 5 Feet (ft)  Assistive Device: (HHA)  Ambulation - Level of Assistance: Contact guard assistance        Gait Abnormalities: Decreased step clearance              Speed/Wen: Shuffled;Pace decreased (<100 feet/min)  Step Length: Left shortened;Right shortened               Pain Rating:  No major complaints     Activity Tolerance:   Fair and desaturates with exertion and requires oxygen    After treatment patient left in no apparent distress:   Sitting in chair and Call bell within reach    COMMUNICATION/EDUCATION:   The patient’s plan of care was discussed with: Occupational therapist and Registered nurse.     Fall prevention education was provided and the patient/caregiver indicated understanding. and Patient/family agree to work toward stated goals and plan of care.    Thank you for this referral.  Lynn Katz, PT, DPT   Time Calculation: 26 mins

## 2021-02-10 NOTE — PROGRESS NOTES
Hospitalist Progress Note    NAME: Sarah Seay   :  1965   MRN:  [id number]       Assessment / Plan:  Acute hypoxic respiratory failure POA  Severe multilobar Covid pneumonia POA  Pneumomediastinum , not POA, persistent but stable  -COVID-19 test positive on . -CTA  neg for PE. Shows diffuse bilateral peripheral groundglass opacification and consolidation in the lower lobes   -Initially on nasal cannula/high flow, decompensation  went on BIPAP    -developed pneumomediastinum , changed to high flow from BiPAP to reduce barotrauma,   -s/p antibiotics  -s/p convalescent plasma 2021  -s/p IV Solu-Medrol - now on prednisone. Follow inflammatory markers  -continue lovenox  -continue IV lasix. 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 GERD  -Pepcid IV twice daily     Obesity  Body mass index is 31.14 kg/m².     Code: Discussed, full code    Surrogate decision maker: Brother Elizabeth Loredo [phone]    Disposition:  IP Pulm rehab versus HH lTBD, PT/OT eval noted CM working on DC planning     Subjective:     Chief Complaint / Reason for Physician Visit:   F/U Resp failure/COVID PNA  Discussed with RN overnight events. Review of Systems:  Symptom Y/N Comments  Symptom Y/N Comments   Fever/Chills n   Chest Pain n    Poor Appetite    Edema     Cough n   Abdominal Pain n    Sputum    Joint Pain     SOB/BULL y  improved  Pruritis/Rash     Nausea/vomit n   Tolerating PT/OT y    Diarrhea    Tolerating Diet y    Constipation    Other       Could NOT obtain due to:          Objective:     VITALS:   Last 24hrs VS reviewed since prior progress note.  Most recent are:  Patient Vitals for the past 24 hrs:   Temp Pulse Resp BP SpO2   02/10/21 1510 97.9 °F (36.6 °C) (!) 111 20 105/77 94 %   02/10/21 1159 97.7 °F (36.5 °C) (!) 104 18 105/65 91 %   02/10/21 1146 -- -- -- -- 95 %   02/10/21 1135 -- (!) 123 -- -- (!) 88 %   02/10/21 1132 -- (!) 102 -- -- 93 %   02/10/21 0729 97.7 °F (36.5 °C) 100 16 104/72 100 %   02/10/21 0322 97.9 °F (36.6 °C) 100 18 112/70 99 %   02/09/21 2341 98 °F (36.7 °C) (!) 105 20 105/67 97 %   02/09/21 1951 98.4 °F (36.9 °C) 96 18 107/66 98 %   02/09/21 1819 -- -- -- -- 98 %       Intake/Output Summary (Last 24 hours) at 2/10/2021 1705  Last data filed at 2/10/2021 1110  Gross per 24 hour   Intake 120 ml   Output 950 ml   Net -830 ml        I had a face to face encounter and independently examined this patient on 2/10/2021, as outlined below:  PHYSICAL EXAM:  General: WD, WN. Alert, cooperative, in moderate respiratory distress    EENT:  EOMI. Anicteric sclerae. MMM  Resp:  B/l lung coarse, rales+  CV:  Regular  rhythm,  No edema  GI:  Soft, Non distended, Non tender. +Bowel sounds  Neurologic:  Alert and oriented X 3, normal speech,   Psych:   Good insight. Not anxious nor agitated  Skin:  No rashes. No jaundice, subcu around right flank    Reviewed most current lab test results and cultures  YES  Reviewed most current radiology test results   YES  Review and summation of old records today    NO  Reviewed patient's current orders and MAR    YES  PMH/SH reviewed - no change compared to H&P  ________________________________________________________________________  Care Plan discussed with:    Comments   Patient x    Family  x MAC   RN x    Care Manager     Consultant                        Multidiciplinary team rounds were held today with , nursing, pharmacist and clinical coordinator. Patient's plan of care was discussed; medications were reviewed and discharge planning was addressed.      ________________________________________________________________________    Total TIME Spent: 25 Minutes non procedure based      Comments   >50% of visit spent in counseling and coordination of care x ________________________________________________________________________  Herminia Maya MD     Procedures: see electronic medical records for all procedures/Xrays and details which were not copied into this note but were reviewed prior to creation of Plan. LABS:  I reviewed today's most current labs and imaging studies.   Pertinent labs include:  Recent Labs     02/09/21 0309 02/08/21 0243   WBC 11.0 10.8   HGB 13.2 14.0   HCT 40.2 42.2   PLT 93* 81*     Recent Labs     02/09/21 0309 02/08/21 0243    133*   K 3.8 3.8   CL 94* 92*   CO2 38* 38*   * 123*   BUN 33* 33*   CREA 0.59* 0.58*   CA 9.0 8.7   MG  --  2.1   INR 1.0  --        Signed: Herminia Maya MD

## 2021-02-10 NOTE — PROGRESS NOTES
Problem: Self Care Deficits Care Plan (Adult)  Goal: *Acute Goals and Plan of Care (Insert Text)  Description: FUNCTIONAL STATUS PRIOR TO ADMISSION: Patient was independent and active without use of DME    HOME SUPPORT PRIOR TO ADMISSION: The patient lived alone with brother and neighbors to provide assistance. Occupational Therapy Goals:  Weekly re-eval 2/10/2021 all below goals remain appropirate  Initiated 2/5/2021  1. Patient will perform grooming seated with supervision within 7 days. Met 2/10/2021 upgrade to standing with rest breaks  2. Patient will perform toileting with supervision within 7 days. 3. Patient will perform lower body dressing with supervision within 7 days. 4. Patient will transfer from bedside commode with modified independence using the least restrictive device and appropriate durable medical equipment within 7 days. 5. Patient will perform UB dressing with supervision within 7 days. Outcome: Progressing Towards Goal   OCCUPATIONAL THERAPY RE-EVALUATION  Patient: Josette Freeman (36 y.o. male)  Date: 2/10/2021  Diagnosis: COVID-19 virus infection [U07.1]  Acute respiratory failure with hypoxia ([facility] [address] [J96.01] <principal problem not specified>       Precautions: (droplet +)  Chart, occupational therapy assessment, plan of care, and goals were reviewed. ASSESSMENT  Based on the objective data described below, pt continues to make gains and is limited by general weakness, decreased endurance, SOB and high O2 needs. With minimal activity pts O2 sat continues to drop on 10L HFNC(the lowest 86%) however O2 sat improves to above 90% with seated PLB in less than 30 seconds. HR has also improved with activity and was max 135 with standing attempt to groom. Pt was unable to complete standing to wash hands due the above and needed rest breaks seated to brush teeth at sink.   Pt has met grooming goal from initial eval.  Continue to recommend inpatient rehab at discharge. Current Level of Function Impacting Discharge (ADLs): CGA/min assist stand pivot transfers     Feeding: Independent    Oral Facial Hygiene/Grooming: Setup(seated)    Bathing: Moderate assistance(due to respiratory status and fatigue)    Upper Body Dressing: Setup    Lower Body Dressing: Moderate assistance    Toileting: Minimum assistance    Other factors to consider for discharge: continued high O2 needs         PLAN :  Recommendations and Planned Interventions: self care training, functional mobility training, therapeutic exercise, balance training, therapeutic activities, patient education, home safety training, and family training/education    Frequency/Duration: Patient will be followed by occupational therapy 3 times a week to address goals. Recommend with staff: OOB to chair and to bedside commode     Recommend next OT session: ADLS, standing tolerance    Recommendation for discharge: (in order for the patient to meet his/her long term goals)  Inpatient Pulmonary Rehab    This discharge recommendation:  Has been made in collaboration with the attending provider and/or case management    Equipment recommendations for successful discharge (if) home: TBD       SUBJECTIVE:   Patient stated Can I brush me teeth? I have not done it since the last time you came and saw me.   Tooth brush left on pts table in his reach at end of session    OBJECTIVE DATA SUMMARY:   Hospital course since last seen and reason for reevaluation: continuing to be weaned from o2    Cognitive/Behavioral Status:  Neurologic State: Alert  Orientation Level: Oriented X4  Cognition: Appropriate decision making; Appropriate for age attention/concentration; Appropriate safety awareness  Perception: Appears intact  Perseveration: No perseveration noted  Safety/Judgement: Awareness of environment; Fall prevention;Home safety      Hearing:   Auditory  Auditory Impairment: None    Vision/Perceptual:                           Acuity: Within Defined Limits         Range of Motion:    AROM: Generally decreased, functional                         Strength:    Strength: Generally decreased, functional                Coordination:  Coordination: Within functional limits  Fine Motor Skills-Upper: Left Intact; Right Intact    Gross Motor Skills-Upper: Left Intact; Right Intact    Tone & Sensation:    Tone: Normal  Sensation: Intact                        Functional Mobility and Transfers for ADLs:  Bed Mobility:  Rolling: Independent  Supine to Sit: Supervision  Scooting: Supervision    Transfers:  Sit to Stand: Contact guard assistance     Bed to Chair: Minimum assistance(hand held assist due to weakness and balance)    Balance:  Sitting: Intact  Standing: Impaired  Standing - Static: Fair  Standing - Dynamic : Fair    ADL Assessment:  Feeding: Independent    Oral Facial Hygiene/Grooming: Setup(seated)    Bathing: Moderate assistance(due to respiratory status and fatigue)    Upper Body Dressing: Setup    Lower Body Dressing: Moderate assistance    Toileting: Minimum assistance                ADL Intervention and task modifications:       Grooming  Brushing Teeth: Set-up(seated at sink in recliner chair)    Standing at sink to wash hands was only able to wash and then needed to sit to dry due to fatigue and low O2 sats. Cognitive Retraining  Safety/Judgement: Awareness of environment; Fall prevention;Home safety      Functional Measure:  Barthel Index:    Bathin  Bladder: 10  Bowels: 10  Groomin  Dressin  Feeding: 10  Mobility: 0  Stairs: 0  Toilet Use: 5  Transfer (Bed to Chair and Back): 10  Total: 55/100        The Barthel ADL Index: Guidelines  1. The index should be used as a record of what a patient does, not as a record of what a patient could do. 2. The main aim is to establish degree of independence from any help, physical or verbal, however minor and for whatever reason.   3. The need for supervision renders the patient not independent. 4. A patient's performance should be established using the best available evidence. Asking the patient, friends/relatives and nurses are the usual sources, but direct observation and common sense are also important. However direct testing is not needed. 5. Usually the patient's performance over the preceding 24-48 hours is important, but occasionally longer periods will be relevant. 6. Middle categories imply that the patient supplies over 50 per cent of the effort. 7. Use of aids to be independent is allowed. Karoline Wells., Barthel, D.W. (4961). Functional evaluation: the Barthel Index. [facility] (14)2. Allison Damian mart ERIK Adkins, Parish Nguyễn., [facility] [place], [address] [place] [address] (1999). Measuring the change indisability after inpatient rehabilitation; comparison of the responsiveness of the Barthel Index and Functional [place] Measure. Journal of Neurology, Neurosurgery, and Psychiatry, 66(4), 278-594. Debora Castellon, N.J.A, CHRISSY Gómez, & Ruben Elmore M.A. (2004.) Assessment of post-stroke quality of life in cost-effectiveness studies: The usefulness of the Barthel Index and the EuroQoL-5D. Quality of Life Research, 13, 427-43         Pain:  0/10    Activity Tolerance:   Fair, desaturates with exertion and requires oxygen, requires frequent rest breaks, and observed SOB with activity    After treatment patient left in no apparent distress:   Sitting in chair and Call bell within reach    COMMUNICATION/COLLABORATION:   The patients plan of care was discussed with: Physical therapist, Registered nurse, and patient .      Ethan Litten, OTR/L  Time Calculation: 30 mins

## 2021-02-11 LAB
ANION GAP SERPL CALC-SCNC: 1 MMOL/L (ref 5–15)
BNP SERPL-MCNC: 2241 PG/ML
BUN SERPL-MCNC: 24 MG/DL (ref 6–20)
BUN/CREAT SERPL: 44 (ref 12–20)
CALCIUM SERPL-MCNC: 8.8 MG/DL (ref 8.5–10.1)
CHLORIDE SERPL-SCNC: 92 MMOL/L (ref 97–108)
CO2 SERPL-SCNC: 39 MMOL/L (ref 21–32)
CREAT SERPL-MCNC: 0.55 MG/DL (ref 0.7–1.3)
CRP SERPL-MCNC: 2.06 MG/DL (ref 0–0.6)
FERRITIN SERPL-MCNC: 1127 NG/ML (ref 26–388)
GLUCOSE BLD STRIP.AUTO-MCNC: 160 MG/DL (ref 65–100)
GLUCOSE BLD STRIP.AUTO-MCNC: 195 MG/DL (ref 65–100)
GLUCOSE BLD STRIP.AUTO-MCNC: 318 MG/DL (ref 65–100)
GLUCOSE BLD STRIP.AUTO-MCNC: 97 MG/DL (ref 65–100)
GLUCOSE SERPL-MCNC: 76 MG/DL (ref 65–100)
LDH SERPL L TO P-CCNC: 610 U/L (ref 85–241)
MAGNESIUM SERPL-MCNC: 2 MG/DL (ref 1.6–2.4)
POTASSIUM SERPL-SCNC: 3.7 MMOL/L (ref 3.5–5.1)
SERVICE CMNT-IMP: ABNORMAL
SERVICE CMNT-IMP: NORMAL
SODIUM SERPL-SCNC: 132 MMOL/L (ref 136–145)

## 2021-02-11 PROCEDURE — 82728 ASSAY OF FERRITIN: CPT

## 2021-02-11 PROCEDURE — 74011250637 HC RX REV CODE- 250/637: Performed by: INTERNAL MEDICINE

## 2021-02-11 PROCEDURE — 77010033711 HC HIGH FLOW OXYGEN

## 2021-02-11 PROCEDURE — 74011636637 HC RX REV CODE- 636/637: Performed by: INTERNAL MEDICINE

## 2021-02-11 PROCEDURE — 83880 ASSAY OF NATRIURETIC PEPTIDE: CPT

## 2021-02-11 PROCEDURE — 83615 LACTATE (LD) (LDH) ENZYME: CPT

## 2021-02-11 PROCEDURE — 80048 BASIC METABOLIC PNL TOTAL CA: CPT

## 2021-02-11 PROCEDURE — 74011250636 HC RX REV CODE- 250/636: Performed by: INTERNAL MEDICINE

## 2021-02-11 PROCEDURE — 74011250637 HC RX REV CODE- 250/637: Performed by: HOSPITALIST

## 2021-02-11 PROCEDURE — 82962 GLUCOSE BLOOD TEST: CPT

## 2021-02-11 PROCEDURE — 65660000001 HC RM ICU INTERMED STEPDOWN

## 2021-02-11 PROCEDURE — 36415 COLL VENOUS BLD VENIPUNCTURE: CPT

## 2021-02-11 PROCEDURE — 83735 ASSAY OF MAGNESIUM: CPT

## 2021-02-11 PROCEDURE — 86140 C-REACTIVE PROTEIN: CPT

## 2021-02-11 RX ORDER — FUROSEMIDE 10 MG/ML
40 INJECTION INTRAMUSCULAR; INTRAVENOUS DAILY
Status: DISCONTINUED | OUTPATIENT
Start: 2021-02-12 | End: 2021-02-17 | Stop reason: HOSPADM

## 2021-02-11 RX ORDER — PREDNISONE 20 MG/1
20 TABLET ORAL
Status: DISCONTINUED | OUTPATIENT
Start: 2021-02-12 | End: 2021-02-17

## 2021-02-11 RX ORDER — LANOLIN ALCOHOL/MO/W.PET/CERES
3 CREAM (GRAM) TOPICAL
Status: DISCONTINUED | OUTPATIENT
Start: 2021-02-11 | End: 2021-02-11

## 2021-02-11 RX ORDER — POLYETHYLENE GLYCOL 3350 17 G/17G
17 POWDER, FOR SOLUTION ORAL DAILY
Status: DISCONTINUED | OUTPATIENT
Start: 2021-02-11 | End: 2021-02-17 | Stop reason: HOSPADM

## 2021-02-11 RX ORDER — DIPHENHYDRAMINE HCL 25 MG
25 CAPSULE ORAL
Status: DISCONTINUED | OUTPATIENT
Start: 2021-02-11 | End: 2021-02-17 | Stop reason: HOSPADM

## 2021-02-11 RX ORDER — LANOLIN ALCOHOL/MO/W.PET/CERES
3 CREAM (GRAM) TOPICAL
Status: DISCONTINUED | OUTPATIENT
Start: 2021-02-11 | End: 2021-02-17 | Stop reason: HOSPADM

## 2021-02-11 RX ORDER — AMOXICILLIN 250 MG
1 CAPSULE ORAL 2 TIMES DAILY
Status: DISCONTINUED | OUTPATIENT
Start: 2021-02-11 | End: 2021-02-17 | Stop reason: HOSPADM

## 2021-02-11 RX ADMIN — PREDNISONE 30 MG: 20 TABLET ORAL at 08:41

## 2021-02-11 RX ADMIN — DOCUSATE SODIUM - SENNOSIDES 1 TABLET: 50; 8.6 TABLET, FILM COATED ORAL at 17:56

## 2021-02-11 RX ADMIN — ASPIRIN 81 MG: 81 TABLET, COATED ORAL at 08:41

## 2021-02-11 RX ADMIN — ENOXAPARIN SODIUM 30 MG: 30 INJECTION SUBCUTANEOUS at 17:56

## 2021-02-11 RX ADMIN — Medication 10 ML: at 06:22

## 2021-02-11 RX ADMIN — Medication 10 ML: at 17:57

## 2021-02-11 RX ADMIN — ENOXAPARIN SODIUM 30 MG: 30 INJECTION SUBCUTANEOUS at 06:22

## 2021-02-11 RX ADMIN — METOPROLOL TARTRATE 25 MG: 25 TABLET, FILM COATED ORAL at 08:41

## 2021-02-11 RX ADMIN — CHOLECALCIFEROL TAB 25 MCG (1000 UNIT) 2000 UNITS: 25 TAB at 08:41

## 2021-02-11 RX ADMIN — HUMAN INSULIN 10 UNITS: 100 INJECTION, SUSPENSION SUBCUTANEOUS at 08:41

## 2021-02-11 RX ADMIN — DOCUSATE SODIUM - SENNOSIDES 1 TABLET: 50; 8.6 TABLET, FILM COATED ORAL at 12:58

## 2021-02-11 RX ADMIN — Medication 10 ML: at 23:04

## 2021-02-11 RX ADMIN — Medication 3 MG: at 23:03

## 2021-02-11 RX ADMIN — POLYETHYLENE GLYCOL 3350 17 G: 17 POWDER, FOR SOLUTION ORAL at 08:41

## 2021-02-11 RX ADMIN — FUROSEMIDE 40 MG: 10 INJECTION, SOLUTION INTRAMUSCULAR; INTRAVENOUS at 08:41

## 2021-02-11 RX ADMIN — METOPROLOL TARTRATE 25 MG: 25 TABLET, FILM COATED ORAL at 17:56

## 2021-02-11 RX ADMIN — INSULIN LISPRO 7 UNITS: 100 INJECTION, SOLUTION INTRAVENOUS; SUBCUTANEOUS at 11:30

## 2021-02-11 RX ADMIN — FAMOTIDINE 20 MG: 20 TABLET, FILM COATED ORAL at 08:41

## 2021-02-11 RX ADMIN — INSULIN LISPRO 2 UNITS: 100 INJECTION, SOLUTION INTRAVENOUS; SUBCUTANEOUS at 16:30

## 2021-02-11 RX ADMIN — FAMOTIDINE 20 MG: 20 TABLET, FILM COATED ORAL at 23:03

## 2021-02-11 NOTE — PROGRESS NOTES
End of Shift Note    Bedside shift change report given to Janet Dillard (oncoming nurse) by Aaron Mcdonough (offgoing nurse). Report included the following information SBAR and Kardex    Shift worked:  [id number]     Shift summary and any significant changes:    none   Concerns for physician to address: none   Zone phone for oncoming shift:  [phone]     Activity:  Activity Level: Up with Assistance  Number times ambulated in hallways past shift: 0  Number of times OOB to chair past shift: 0    Cardiac:   Cardiac Monitoring: Yes      Cardiac Rhythm: Sinus tachycardia    Access:   Current line(s): PIV     Genitourinary:   Urinary status: voiding    Respiratory:   O2 Device: Hi flow nasal cannula, Humidifier  Chronic home O2 use?: NO  Incentive spirometer at bedside: YES  Actual Volume (ml): 250 ml  GI:  Last Bowel Movement Date: (Patient stated he has had a bowel movement since 1/22/2021)  Current diet:  DIET DIABETIC CONSISTENT CARB Regular  DIET NUTRITIONAL SUPPLEMENTS Dinner, Lunch; Glucerna Shake  Passing flatus: YES  Tolerating current diet: YES  % Diet Eaten: 100 %    Pain Management:   Patient states pain is manageable on current regimen: YES    Skin:  Govind Score: 20  Interventions: increase time out of bed and PT/OT consult    Patient Safety:  Fall Score:  Total Score: 2  Interventions: gripper socks and pt to call before getting OOB  High Fall Risk: Yes    Length of Stay:  Expected LOS: 5d 12h  Actual LOS: [address]

## 2021-02-11 NOTE — PROGRESS NOTES
Problem: Falls - Risk of  Goal: *Absence of Falls  Description: Document Crys Kearns Fall Risk and appropriate interventions in the flowsheet.   Outcome: Progressing Towards Goal  Note: Fall Risk Interventions:  Mobility Interventions: Bed/chair exit alarm         Medication Interventions: Bed/chair exit alarm    Elimination Interventions: Bed/chair exit alarm    History of Falls Interventions: Bed/chair exit alarm         Problem: Breathing Pattern - Ineffective  Goal: *Absence of hypoxia  Outcome: Progressing Towards Goal

## 2021-02-11 NOTE — PROGRESS NOTES
Hospitalist Progress Note    NAME: Cheryl Moise   :  1965   MRN:  [id number]       Assessment / Plan:  Acute hypoxic respiratory failure POA  Severe multilobar Covid pneumonia POA  Pneumomediastinum , not POA, persistent but stable  -COVID-19 test positive on . -CTA  neg for PE. Shows diffuse bilateral peripheral groundglass opacification and consolidation in the lower lobes   -Initially on nasal cannula/high flow, decompensation  went on BIPAP    -developed pneumomediastinum , changed to high flow from BiPAP to reduce barotrauma,   -s/p antibiotics  -s/p convalescent plasma 2021  -s/p IV Solu-Medrol - now on prednisone. Follow inflammatory markers  -continue lovenox  -continue IV lasix. 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 Constipation  -pericolace and miralax daily    GERD  -Pepcid      Obesity  Body mass index is 31.14 kg/m².     Code: Discussed, full code    Surrogate decision maker: Brother Casimiro Cardenas [phone]    Disposition:  IP Pulm rehab versus HH lTBD, PT/OT eval noted CM working on DC planning     Subjective:     Chief Complaint / Reason for Physician Visit:   F/U Resp failure/COVID PNA  Discussed with RN overnight events. Review of Systems:  Symptom Y/N Comments  Symptom Y/N Comments   Fever/Chills n   Chest Pain n    Poor Appetite    Edema     Cough n   Abdominal Pain n    Sputum    Joint Pain     SOB/BULL y  improved  Pruritis/Rash     Nausea/vomit n   Tolerating PT/OT y    Diarrhea    Tolerating Diet y    Constipation    Other       Could NOT obtain due to:          Objective:     VITALS:   Last 24hrs VS reviewed since prior progress note.  Most recent are:  Patient Vitals for the past 24 hrs:   Temp Pulse Resp BP SpO2   21 0724 97.8 °F (36.6 °C) (!) 103 16 113/69 95 %   21 0409 -- -- -- -- 98 %   02/11/21 0322 97.4 °F (36.3 °C) (!) 101 20 118/67 91 %   02/10/21 2328 97.8 °F (36.6 °C) (!) 107 20 134/68 90 %   02/10/21 2204 -- -- -- -- 96 %   02/10/21 2003 98 °F (36.7 °C) (!) 103 20 114/62 99 %   02/10/21 2002 -- -- -- -- 99 %   02/10/21 1510 97.9 °F (36.6 °C) (!) 111 20 105/77 94 %   02/10/21 1159 97.7 °F (36.5 °C) (!) 104 18 105/65 91 %   02/10/21 1146 -- -- -- -- 95 %   02/10/21 1135 -- (!) 123 -- -- (!) 88 %   02/10/21 1132 -- (!) 102 -- -- 93 %       Intake/Output Summary (Last 24 hours) at 2/11/2021 0852  Last data filed at 2/11/2021 2022  Gross per 24 hour   Intake 420 ml   Output 1475 ml   Net -1055 ml        I had a face to face encounter and independently examined this patient on 2/11/2021, as outlined below:  PHYSICAL EXAM:  General: WD, WN. Alert, cooperative, in moderate respiratory distress    EENT:  EOMI. Anicteric sclerae. MMM  Resp:  B/l lung coarse, rales+  CV:  Regular  rhythm,  No edema  GI:  Soft, Non distended, Non tender. +Bowel sounds  Neurologic:  Alert and oriented X 3, normal speech,   Psych:   Good insight. Not anxious nor agitated  Skin:  No rashes. No jaundice, subcu around right flank    Reviewed most current lab test results and cultures  YES  Reviewed most current radiology test results   YES  Review and summation of old records today    NO  Reviewed patient's current orders and MAR    YES  PMH/SH reviewed - no change compared to H&P  ________________________________________________________________________  Care Plan discussed with:    Comments   Patient x    Family  x MAC   RN x    Care Manager     Consultant                        Multidiciplinary team rounds were held today with , nursing, pharmacist and clinical coordinator. Patient's plan of care was discussed; medications were reviewed and discharge planning was addressed.      ________________________________________________________________________    Total TIME Spent: 25 Minutes non procedure based      Comments   >50% of visit spent in counseling and coordination of care x    ________________________________________________________________________  Dano Carpio MD     Procedures: see electronic medical records for all procedures/Xrays and details which were not copied into this note but were reviewed prior to creation of Plan. LABS:  I reviewed today's most current labs and imaging studies.   Pertinent labs include:  Recent Labs     02/09/21 0309   WBC 11.0   HGB 13.2   HCT 40.2   PLT 93*     Recent Labs     02/11/21 0305 02/09/21 0309   * 136   K 3.7 3.8   CL 92* 94*   CO2 39* 38*   GLU 76 151*   BUN 24* 33*   CREA 0.55* 0.59*   CA 8.8 9.0   MG 2.0  --    INR  --  1.0       Signed: Dano Carpio MD

## 2021-02-11 NOTE — PROGRESS NOTES
0700: Bedside shift change report given to Jeff Wei (oncoming nurse) by Jennifer Randhawa RN (offgoing nurse). Report included the following information SBAR and Kardex. 1330: Pt MEWS score 4 d/t tachycardia. Joaquina Salas MD notified of tachycardia.

## 2021-02-11 NOTE — PROGRESS NOTES
Today, I emailed health care home resources to Colin Ordonez. Yesterday, I spoke with sister in law, Kenny Murillo, at [phone] who states that her  is the pt's only family. She works full time caring for their son who has CP. Since they are considered a long term care \"facility\" she is unable to care for covid positive patients such as the patient. She is in agreement with [facility] and if he is not accepted, she would like a list of personal care providers so she can arrange care for the pt at home.       ORIANA:  -confirm family to transport near d/c  -assess for home oxygen 24 hours prior to d/c-  -await therapy recommendations for the benefit of HHC vs IP rehab  -ask pt again if he would be willing to give us his physical address.  This will be needed to deliver the oxygen//HHC staff.  -may benefit from IP pulmonary rehab at d/c-updates sent 2/11/21  -make PCP omero't prior to d/c  -[facility] can accept pending oxygen testing//order as of 2/11  -family would like repeat covid test

## 2021-02-11 NOTE — PROGRESS NOTES
[facility]  Pulmonary, Critical Care, and Sleep Medicine    Name: Miles Jang MRN: [id number]   : 1965 Hospital: [facility]   Date: 2021            IMPRESSION:   · Acute hypoxic respiratory failure - severe, secondary to COVID PNA-currently on HF (13L, 100%), Still needs moderate oxygen support. Continue to wean as tolerated. · Severe Multilobar Covid Pneumonia, first Dx on , Symptoms first started on 21  S/P convalescent plasma ; S/p Remdesivir x 5 days total, He was sleeping in prone position several times yesterday. Seems to have some improvement in oxygenation. · New pneumomediastinum 21, radiographically stable. · Covid related coagulopathy  · Covid related inflammation, increased ferritin. · Steroid induced hyperglycemia  · Obesity  · GERD  · Hypertension, LAE and LVH on ekg. RECOMMENDATIONS:   · Continue HFNC, wean as tolerated, daily improvement, wean as tolerated. · Will work on weaning the oxygenation. · Empiric antibiotics completed  · Cont Lovenox for DVT prophylaxis. · Continue po steroids, decrease dose to 30mg po every day. · Follow inflammatory and coag markers  · Pt and OT to assist: Mobilize out of bed as tolerated   PCP: Ronda Yeboah. Subjective:   21: Pt is feeling a little better today. States he is constipated. He has not been sleeping well. He does not like his food. Pt seems comfortable. No acute events   No severe distress  Had some dryness of his nose. NO headaches. Tolerating po intake pretty well. NO chest pain, no back pain, no leg pain.      Current Facility-Administered Medications   Medication Dose Route Frequency    senna-docusate (PERICOLACE) 8.6-50 mg per tablet 1 Tab  1 Tab Oral BID    polyethylene glycol (MIRALAX) packet 17 g  17 g Oral DAILY    [START ON 2021] furosemide (LASIX) injection 40 mg  40 mg IntraVENous DAILY    predniSONE (DELTASONE) tablet 30 mg  30 mg Oral DAILY WITH BREAKFAST    insulin lispro (HUMALOG) injection   SubCUTAneous AC&HS    insulin NPH (NOVOLIN N, HUMULIN N) injection 10 Units  10 Units SubCUTAneous DAILY    metoprolol tartrate (LOPRESSOR) tablet 25 mg  25 mg Oral BID    enoxaparin (LOVENOX) injection 30 mg  30 mg SubCUTAneous Q12H    famotidine (PEPCID) tablet 20 mg  20 mg Oral Q12H    influenza vaccine - (6 mos+)(PF) (FLUARIX/FLULAVAL/FLUZONE QUAD) injection 0.5 mL  0.5 mL IntraMUSCular PRIOR TO DISCHARGE    sodium chloride (NS) flush 5-40 mL  5-40 mL IntraVENous Q8H    cholecalciferol (VITAMIN D3) (1000 Units /25 mcg) tablet 2 Tab  2,000 Units Oral DAILY    aspirin delayed-release tablet 81 mg  81 mg Oral DAILY       Review of Systems:  Constitutional: positive for fevers, chills, sweats, fatigue, malaise and anorexia, negative for weight loss  Eyes: negative  Ears, nose, mouth, throat, and face: positive for nasal congestion  Respiratory: positive for cough or dyspnea on exertion  Cardiovascular: positive for fatigue, paroxysmal nocturnal dyspnea, tachypnea, dyspnea on exertion  Gastrointestinal: positive for dyspepsia, reflux symptoms, nausea and change in bowel habits  Genitourinary:negative  Integument/breast: negative  Hematologic/lymphatic: negative  Musculoskeletal:negative  Neurological: negative  Behavioral/Psych: negative  Endocrine: negative  Allergic/Immunologic: negative    Objective:   Vital Signs:    Visit Vitals  /69 (BP 1 Location: Right upper arm, BP Patient Position: At rest)   Pulse (!) 103   Temp 97.8 °F (36.6 °C)   Resp 16   Ht 5' 8\" (1.727 m)   Wt 75.7 kg (166 lb 14.4 oz)   SpO2 95%   BMI 25.38 kg/m²       O2 Device: Hi flow nasal cannula, Humidifier   O2 Flow Rate (L/min): 10 l/min   Temp (24hrs), Av.8 °F (36.6 °C), Min:97.4 °F (36.3 °C), Max:98 °F (36.7 °C)       Intake/Output:   Last shift:       0701 -  1900  In: -   Out: 300 [Urine:300]  Last 3 shifts: 02/09 1901 - 02/11 0700  In: 420 [P.O.:420]  Out: 1525 [Urine:1525]    Intake/Output Summary (Last 24 hours) at 2/11/2021 1017  Last data filed at 2/11/2021 8307  Gross per 24 hour   Intake 300 ml   Output 1475 ml   Net -1175 ml      Physical Exam:   General:  Alert, cooperative, no resp distress. ON heated high flow oxygen. Flow of 10L and Pox of 96%. Head:  Normocephalic, without obvious abnormality, atraumatic. Eyes:  Conjunctivae/corneas clear    Nose: Nares normal. Septum midline. Mucosa normal.     Throat: Lips, mucosa, and tongue normal.    Neck: Supple, symmetrical, trachea midline    Lungs:   Distant bilateral breath sounds but clear. Chest wall:  No tenderness or deformity. Heart:  Regular rate and rhythm    Abdomen:   Soft, non-tender. Bowel sounds normal.    Extremities: Extremities normal, atraumatic, no cyanosis    Skin: Skin color, texture, turgor normal. No crepitus   Neurologic: Grossly nonfocal, motor is intact. Psych: no overt anxiety or depression. Data:   Labs:  Recent Labs     02/09/21  0309   WBC 11.0   HGB 13.2   HCT 40.2   PLT 93*     Recent Labs     02/11/21  0305 02/09/21  0309   * 136   K 3.7 3.8   CL 92* 94*   CO2 39* 38*   GLU 76 151*   BUN 24* 33*   CREA 0.55* 0.59*   CA 8.8 9.0   MG 2.0  --    INR  --  1.0     No results for input(s): PHI, PCO2I, PO2I, HCO3I, FIO2I in the last 72 hours. Imaging:  I have personally reviewed the patients radiographs:  CXR: 2-5-21: COMPARISON: February 1     FINDINGS: A portable AP radiograph of the chest was obtained at 1110 hours. The  patient is on a cardiac monitor. There is persistent bilateral lower lobe  opacification without change. The cardiac and mediastinal contours and pulmonary  vascularity are normal.  The bones and soft tissues are grossly within normal  limits.      IMPRESSION  No significant change.         Mirela Hinson MD

## 2021-02-12 LAB
GLUCOSE BLD STRIP.AUTO-MCNC: 108 MG/DL (ref 65–100)
GLUCOSE BLD STRIP.AUTO-MCNC: 184 MG/DL (ref 65–100)
GLUCOSE BLD STRIP.AUTO-MCNC: 253 MG/DL (ref 65–100)
GLUCOSE BLD STRIP.AUTO-MCNC: 318 MG/DL (ref 65–100)
SERVICE CMNT-IMP: ABNORMAL

## 2021-02-12 PROCEDURE — 65660000001 HC RM ICU INTERMED STEPDOWN

## 2021-02-12 PROCEDURE — 74011636637 HC RX REV CODE- 636/637: Performed by: INTERNAL MEDICINE

## 2021-02-12 PROCEDURE — 97530 THERAPEUTIC ACTIVITIES: CPT | Performed by: OCCUPATIONAL THERAPIST

## 2021-02-12 PROCEDURE — 74011250637 HC RX REV CODE- 250/637: Performed by: INTERNAL MEDICINE

## 2021-02-12 PROCEDURE — 74011250637 HC RX REV CODE- 250/637: Performed by: HOSPITALIST

## 2021-02-12 PROCEDURE — 77010033678 HC OXYGEN DAILY

## 2021-02-12 PROCEDURE — 74011250636 HC RX REV CODE- 250/636: Performed by: INTERNAL MEDICINE

## 2021-02-12 PROCEDURE — 77010033711 HC HIGH FLOW OXYGEN

## 2021-02-12 PROCEDURE — 97116 GAIT TRAINING THERAPY: CPT

## 2021-02-12 PROCEDURE — 82962 GLUCOSE BLOOD TEST: CPT

## 2021-02-12 RX ADMIN — METOPROLOL TARTRATE 25 MG: 25 TABLET, FILM COATED ORAL at 17:42

## 2021-02-12 RX ADMIN — INSULIN LISPRO 2 UNITS: 100 INJECTION, SOLUTION INTRAVENOUS; SUBCUTANEOUS at 12:47

## 2021-02-12 RX ADMIN — ASPIRIN 81 MG: 81 TABLET, COATED ORAL at 08:45

## 2021-02-12 RX ADMIN — ENOXAPARIN SODIUM 30 MG: 30 INJECTION SUBCUTANEOUS at 17:40

## 2021-02-12 RX ADMIN — METOPROLOL TARTRATE 25 MG: 25 TABLET, FILM COATED ORAL at 08:45

## 2021-02-12 RX ADMIN — HUMAN INSULIN 10 UNITS: 100 INJECTION, SUSPENSION SUBCUTANEOUS at 08:45

## 2021-02-12 RX ADMIN — Medication 5 ML: at 14:00

## 2021-02-12 RX ADMIN — DOCUSATE SODIUM - SENNOSIDES 1 TABLET: 50; 8.6 TABLET, FILM COATED ORAL at 08:45

## 2021-02-12 RX ADMIN — Medication 10 ML: at 21:46

## 2021-02-12 RX ADMIN — INSULIN LISPRO 5 UNITS: 100 INJECTION, SOLUTION INTRAVENOUS; SUBCUTANEOUS at 16:30

## 2021-02-12 RX ADMIN — INSULIN LISPRO 4 UNITS: 100 INJECTION, SOLUTION INTRAVENOUS; SUBCUTANEOUS at 21:47

## 2021-02-12 RX ADMIN — PREDNISONE 20 MG: 20 TABLET ORAL at 08:45

## 2021-02-12 RX ADMIN — Medication 10 ML: at 06:41

## 2021-02-12 RX ADMIN — DOCUSATE SODIUM - SENNOSIDES 1 TABLET: 50; 8.6 TABLET, FILM COATED ORAL at 17:40

## 2021-02-12 RX ADMIN — FAMOTIDINE 20 MG: 20 TABLET, FILM COATED ORAL at 21:47

## 2021-02-12 RX ADMIN — FUROSEMIDE 40 MG: 10 INJECTION, SOLUTION INTRAMUSCULAR; INTRAVENOUS at 08:45

## 2021-02-12 RX ADMIN — ENOXAPARIN SODIUM 30 MG: 30 INJECTION SUBCUTANEOUS at 06:41

## 2021-02-12 RX ADMIN — CHOLECALCIFEROL TAB 25 MCG (1000 UNIT) 2000 UNITS: 25 TAB at 08:45

## 2021-02-12 RX ADMIN — POLYETHYLENE GLYCOL 3350 17 G: 17 POWDER, FOR SOLUTION ORAL at 08:45

## 2021-02-12 RX ADMIN — Medication 3 MG: at 21:47

## 2021-02-12 RX ADMIN — FAMOTIDINE 20 MG: 20 TABLET, FILM COATED ORAL at 08:45

## 2021-02-12 NOTE — PROGRESS NOTES
Problem: Mobility Impaired (Adult and Pediatric)  Goal: *Acute Goals and Plan of Care (Insert Text)  Description: FUNCTIONAL STATUS PRIOR TO ADMISSION: Patient was independent and active without use of DME.    HOME SUPPORT PRIOR TO ADMISSION: The patient lived with family. Physical Therapy Goals  Initiated 2/5/2021  1. Patient will move from supine to sit and sit to supine  in bed with independence within 7 day(s). 2.  Patient will transfer from bed to chair and chair to bed with independence using the least restrictive device within 7 day(s). 3.  Patient will perform sit to stand with independence within 7 day(s). 4.  Patient will ambulate with independence for 50 feet with the least restrictive device within 7 day(s). Physical Therapy Goals  Revised  2/10/2021  1. Patient will move from supine to sit and sit to supine  in bed with independence within 7 day(s). 2.  Patient will transfer from bed to chair and chair to bed with independence using the least restrictive device within 7 day(s). 3.  Patient will perform sit to stand with independence within 7 day(s). 4.  Patient will ambulate with modified independence for 15 feet with the least restrictive device within 7 day(s). While maintaining oxygen saturation >90%    Outcome: Progressing Towards Goal   PHYSICAL THERAPY TREATMENT  Patient: Rolando Solorio (40 y.o. male)  Date: 2/12/2021  Diagnosis: COVID-19 virus infection [U07.1]  Acute respiratory failure with hypoxia ([facility] [address] [J96.01] <principal problem not specified>       Precautions: (droplet +)  Chart, physical therapy assessment, plan of care and goals were reviewed. ASSESSMENT  Patient continues with skilled PT services and is progressing towards goals. Pt received supine in bed with continued motivation to participate in PT. Session focused on pt's coordination of PLB with mobility. Pt able to move to eob, stand and ambulate with HHA X 2 to bedside chair.  He took a seated rest due to decreased O2 sats. O2 increased to 15L for further mobility. RW needed for next session for balance and energy conservation. IPR(pulmonary) remains appropriate at dc.          02/12/21 1239 02/12/21 1242 02/12/21 1249   Vital Signs   O2 Sat (%) (!) 78 %  (mobility to bedside chair) 92 %  (seated at bedside chair) (!) 86 %  (mobility forward and back)   O2 Device Hi flow nasal cannula Hi flow nasal cannula Hi flow nasal cannula   O2 Flow Rate (L/min) 10 l/min 15 l/min 15 l/min        02/12/21 1250   Vital Signs   O2 Sat (%) 92 %   O2 Device Hi flow nasal cannula   O2 Flow Rate (L/min) 10 l/min       Current Level of Function Impacting Discharge (mobility/balance): cga/min a    Other factors to consider for discharge: covid +, functioning below his I baseline       PLAN :  Patient continues to benefit from skilled intervention to address the above impairments. Continue treatment per established plan of care. to address goals. Recommendation for discharge: (in order for the patient to meet his/her long term goals)  Therapy 3 hours per day 5-7 days per week    This discharge recommendation:  Has been made in collaboration with the attending provider and/or case management    IF patient discharges home will need the following DME: to be determined (TBD)       SUBJECTIVE:   Patient stated Oralia Sanchez you for coming to help me.     OBJECTIVE DATA SUMMARY:   Critical Behavior:  Neurologic State: Alert  Orientation Level: Oriented X4  Cognition: Follows commands, Memory loss  Safety/Judgement: Fall prevention  Functional Mobility Training:  Bed Mobility:  Rolling: Independent  Supine to Sit: Supervision     Scooting: Contact guard assistance        Transfers:  Sit to Stand: Contact guard assistance; Additional time  Stand to Sit: Contact guard assistance; Additional time        Bed to Chair: Minimum assistance(hand held assist)                    Balance:  Sitting: Intact  Standing: Impaired  Standing - Static: Fair  Standing - Dynamic : Fair  Ambulation/Gait Training:  Distance (ft): 13 Feet (ft)(3, 5ft foward, 5 ft backward)  Assistive Device: (HHA X 2)  Ambulation - Level of Assistance: Contact guard assistance        Gait Abnormalities: Decreased step clearance              Speed/Wen: Pace decreased (<100 feet/min)  Step Length: Left shortened;Right shortened        Activity Tolerance:   Fair    After treatment patient left in no apparent distress:   Sitting in chair and Call bell within reach    COMMUNICATION/COLLABORATION:   The patients plan of care was discussed with: Occupational therapist and Registered nurse.      Spring Honeycutt, PT

## 2021-02-12 NOTE — ADT AUTH CERT NOTES
Utilization Reviews       Viral Illness, Acute - Care Day 25 (2/11/2021) by Carey Peguero       Review Entered Review Status   2/12/2021 12:18 Completed      Criteria Review      Care Day: 25 Care Date: 2/11/2021 Level of Care: Step Down    Guideline Day 3    Level Of Care    ( ) Complete discharge planning    2/12/2021 12:18:09 EST by Peggy Bucio Team working w family for plan . [facility] or personal care at home. Clinical Status    ( ) * Hemodynamic stability    2/12/2021 12:18:09 EST by Errol Staples      tachycardia hr 100    (X) * Afebrile or temperature acceptable for next level of care    2/12/2021 12:18:09 EST by Errol Mins      97.9    ( ) * Tachypnea absent    2/12/2021 12:18:09 EST by Bernice Hammond rr 20    ( ) * Hypoxemia absent    2/12/2021 12:18:09 EST by Errol Staples      97 % Hi flow nasal cannula 10 l/min    (X) * Mental status at baseline    ( ) * Renal function at baseline, or stable and acceptable for next level of care    2/12/2021 12:18:09 EST by Bernice Hammond bun 24    ( ) * Discharge plans and education understood    Activity    ( ) * Ambulatory or acceptable for next level of care    2/12/2021 12:18:09 EST by Bernice Hammond PT recommended ; Therapy 3 hours per day 5-7 days per week, pulmonary rehab upon DC. took a some steps yesterday w PT. PT OT orders.     Routes    (X) * Oral hydration [L]    (X) * Oral medications or regimen acceptable for next level of care    (X) * Oral diet or acceptable for next level of care    2/12/2021 12:18:09 EST by Errol Staples      diab diet    Interventions    ( ) * Isolation not indicated, or is performable at next level of care [G] [H]    2/12/2021 12:18:09 EST by Errol Staples      droplet plus    (X) Pulse oximetry    2/12/2021 12:18:09 EST by Errol Mins      97 % Hi flow nasal cannula 10 l/min    Medications    (X) * Antimicrobial medication absent or regimen established for next level of care 2/12/2021 12:18:09 EST by Dedra [email or web address]; ASA 81 mg po daily, lovenox 30 mg sc q 12h,   humalog 7u 2u sc, NPH 10 u sc q d, lopressor 25 mg po bid,   lasix 40 mg iv , Prednisone 30 mg po daily. * Milestone   Additional Notes   2/11    97.9 °F (36.6 °C) 100 105/68 - At rest 20    97 % Hi flow nasal cannula 10 l/min . 2/11/2021 03:05   Sodium: 132 (L)   Potassium: 3.7   Chloride: 92 (L)   CO2: 39 (H)   Anion gap: 1 (L)   Glucose: 76   BUN: 24 (H)   Creatinine: 0.55 (L)   BUN/Creatinine ratio: 44 (H)   Calcium: 8.8   Magnesium: 2.0   GFR est non-AA: >60   GFR est AA: >60   LD: 610 (H)   NT pro-BNP: 2,241 (H)   Ferritin: 1,127 (H)   C-Reactive protein: 2.06 (H)      -----------PULM MD TEAM    IMPRESSION:   · Acute hypoxic respiratory failure - severe, secondary to COVID PNA-currently on HF (13L, 100%), Still needs moderate oxygen support. Continue to wean as tolerated. · Severe Multilobar Covid Pneumonia, first Dx on 1/13, Symptoms first started on 1/4/21  S/P convalescent plasma 1/19; S/p Remdesivir x 5 days total, He was sleeping in prone position several times yesterday. Seems to have some improvement in oxygenation. · New pneumomediastinum 1-21-21, radiographically stable. · Covid related coagulopathy   · Covid related inflammation, increased ferritin. · Steroid induced hyperglycemia   · Obesity   · GERD   · Hypertension, LAE and LVH on ekg.        RECOMMENDATIONS:   · Continue HFNC, wean as tolerated, daily improvement, wean as tolerated. · Will work on weaning the oxygenation. · Empiric antibiotics completed   · Cont Lovenox for DVT prophylaxis. · Continue po steroids, decrease dose to 30mg po every day. · Follow inflammatory and coag markers   · Pt and OT to assist: Mobilize out of bed as tolerated   PCP: Robin Rodriguez.         Subjective:   2-11-21: Pt is feeling a little better today. States he is constipated. He has not been sleeping well.     He does not like his food. ----------PER MD TEAM    PHYSICAL EXAM:   General:          WD, WN. Alert, cooperative, in moderate respiratory distress     EENT:              EOMI. Anicteric sclerae. MMM   Resp:               B/l lung coarse, rales+   CV:                  Regular  rhythm,  No edema   GI:                   Soft, Non distended, Non tender.  +Bowel sounds   Neurologic:       Alert and oriented X 3, normal speech,    Psych:   Good insight. Not anxious nor agitated   Skin:                No rashes.  No jaundice, subcu around right flank      Assessment / Plan:   Acute hypoxic respiratory failure POA   Severe multilobar Covid pneumonia POA   Pneumomediastinum 1/21, not POA, persistent but stable   -COVID-19 test positive on January 13. -CTA 1/18 neg for PE.  Shows diffuse bilateral peripheral groundglass opacification and consolidation in the lower lobes    -Initially on nasal cannula/high flow, decompensation 1/19 went on BIPAP     -developed pneumomediastinum 1/21, changed to high flow from BiPAP to reduce barotrauma,    -s/p antibiotics   -s/p convalescent plasma 1/19/2021   -s/p IV Solu-Medrol - now on prednisone.   Follow inflammatory markers   -continue lovenox   -continue IV lasix.  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       Constipation   -pericolace and miralax daily       GERD   -Pepcid        Obesity   Body mass index is 31.14 kg/m².       Code: Discussed, full code                Viral Illness, Acute - Care Day 24 (2/10/2021) by Arcelia Corral       Review Entered Review Status   2/12/2021 12:04 Completed      Criteria Review      Care Day: 24 Care Date: 2/10/2021 Level of Care: Step Down    Guideline Day 3    Clinical Status    ( ) * Hemodynamic stability    2/12/2021 12:03:38 EST by Malvin Sozua      vs below , tachycardia    (X) * Afebrile or temperature acceptable for next level of care    2/12/2021 12:03:38 EST by Tam Gonzales      98    ( ) * Tachypnea absent    2/12/2021 12:03:38 EST by Catie Marie rr 20    ( ) * Hypoxemia absent    (X) * Mental status at baseline    2/12/2021 12:03:38 EST by Catie Marie a&0 x 3    ( ) * Renal function at baseline, or stable and acceptable for next level of care    ( ) * Discharge plans and education understood    Activity    ( ) * Ambulatory or acceptable for next level of care    2/12/2021 11:44:43 EST by Torinkym Janet      AMB 5 feet w PT. They recommend ; Therapy 3 hours per day 5-7 days per week, pulmonary rehab upon DC. Routes    (X) * Oral hydration [L]    (X) * Oral medications or regimen acceptable for next level of care    2/12/2021 12:03:38 EST by Tam Gonzales      SEE meds, also on other po meds    (X) * Oral diet or acceptable for next level of care    2/12/2021 11:44:43 EST by Torinkym Janet      diab diet    Interventions    ( ) * Isolation not indicated, or is performable at next level of care [G] [H]    2/12/2021 12:03:38 EST by Torinkym Janet      droplet plus    (X) Pulse oximetry    2/12/2021 12:03:38 EST by Torinkym Janet      sat 99 HFNC    Medications    (X) * Antimicrobial medication absent or regimen established for next level of care    2/12/2021 12:03:38 EST by [employer] Janet      lasix 40 mg iv bid, prednisone 30 mg po daily,    2/12/2021 11:44:43 EST by Counsyl Janet      meds; asa 81 MG PO Q D, lovenox 30 mg sc q 12h,  humalog 3u 2u 3u sc, NPH 10 u sc q d, lopressor 25 mg po bid,    * Milestone   Additional Notes   2/10       98 °F (36.7 °C) 103 114/62 79 - At rest 20 99 %    HFNC ten liters/Min.       ----------CXR Portable.    FINDINGS: Portable chest shows no significant change since February 5. There is   no apparent pneumothorax.  Lungs show bilateral patchy airspace disease. Heart   size is stable.  There is no overt pulmonary edema.    IMPRESSION   No significant change.      ------No new labs.       ---------PER MD   PHYSICAL EXAM:   General:          WD, WN. Alert, cooperative, in moderate respiratory distress     EENT:              EOMI. Anicteric sclerae. MMM   Resp:               B/l lung coarse, rales+   CV:                  Regular  rhythm,  No edema   GI:                   Soft, Non distended, Non tender.  +Bowel sounds   Neurologic:       Alert and oriented X 3, normal speech,    Psych:   Good insight. Not anxious nor agitated   Skin:                No rashes.  No jaundice, subcu around right flank       Assessment / Plan:   Acute hypoxic respiratory failure POA   Severe multilobar Covid pneumonia POA   Pneumomediastinum 1/21, not POA, persistent but stable   -COVID-19 test positive on January 13. -CTA 1/18 neg for PE. Shows diffuse bilateral peripheral groundglass opacification and consolidation in the lower lobes    -Initially on nasal cannula/high flow, decompensation 1/19 went on BIPAP     -developed pneumomediastinum 1/21, changed to high flow from BiPAP to reduce barotrauma,    -s/p antibiotics   -s/p convalescent plasma 1/19/2021   -s/p IV Solu-Medrol - now on prednisone.   Follow inflammatory markers   -continue lovenox   -continue IV lasix.  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       GERD   -Pepcid IV twice daily       Obesity   Body mass index is 31.14 kg/m².       Code: Discussed, full code       Surrogate decision maker: Brother    Disposition:   IP Pulm rehab versus HH lTBD, PT/OT eval noted CM working on DC planning.       --------------PULM MD TEAM    IMPRESSION:   · Acute hypoxic respiratory failure - severe, secondary to COVID PNA-currently on HF (13L, 100%), Still needs moderate oxygen support. Continue to wean as tolerated.     · Severe Multilobar Covid Pneumonia, first Dx on 1/13, Symptoms first started on 1/4/21  S/P convalescent plasma 1/19; S/p Remdesivir x 5 days total, He was sleeping in prone position several times yesterday. Seems to have some improvement in oxygenation. · New pneumomediastinum 1-21-21, radiographically stable. · Covid related coagulopathy   · Covid related inflammation, increased ferritin. · Steroid induced hyperglycemia   · Obesity   · GERD   · Hypertension, LAE and LVH on ekg.        RECOMMENDATIONS:   · Continue HFNC, wean as tolerated, daily improvement, wean as tolerated. · Trying to avoid PAP due to pneumomediastinum. · Will work on weaning the oxygenation. · Empiric antibiotics completed   · Cont Lovenox for DVT prophylaxis. · Continue po steroids, decrease dose to 30mg po every day.     · Follow inflammatory and coag markers   · Mobilize out of bed as tolerated

## 2021-02-12 NOTE — PROGRESS NOTES
0700: Bedside shift change report given to Kirit Bourgeois (oncoming nurse) by Russ Cook RN (offgoing nurse). Report included the following information SBAR and Kardex. 1240: PT/OT working with patient. Patient amublated within room utilizing 15L HiFlow, SpO2 86%. Patient SpO2 improved after 2-3 minutes at rest. Patient states feeling good. PT/OT discussed with patient utilizing a walker for stability while working on regain strength d/t unstable gait.

## 2021-02-12 NOTE — PROGRESS NOTES
02/12/21 1239 02/12/21 1242 02/12/21 1249   Vital Signs   O2 Sat (%) (!) 78 %  (mobility to bedside chair) 92 %  (seated at bedside chair) (!) 86 %  (mobility forward and back)   O2 Device Hi flow nasal cannula Hi flow nasal cannula Hi flow nasal cannula   O2 Flow Rate (L/min) 10 l/min 15 l/min 15 l/min      02/12/21 1250   Vital Signs   O2 Sat (%) 92 %   O2 Device Hi flow nasal cannula   O2 Flow Rate (L/min) 10 l/min

## 2021-02-12 NOTE — PROGRESS NOTES
Hospitalist Progress Note    NAME: Pamela Hamlin   :  1965   MRN:  [id number]       Assessment / Plan:  Acute hypoxic respiratory failure POA  Severe multilobar Covid pneumonia POA  Pneumomediastinum , not POA, persistent but stable  -COVID-19 test positive on . -CTA  neg for PE. Shows diffuse bilateral peripheral groundglass opacification and consolidation in the lower lobes   -Initially on nasal cannula/high flow, decompensation  went on BIPAP    -developed pneumomediastinum , changed to high flow from BiPAP to reduce barotrauma,   -s/p antibiotics  -s/p convalescent plasma 2021  -s/p IV Solu-Medrol - now on prednisone taper. Follow inflammatory markers  -continue lovenox  -continue IV lasix. 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 Constipation  -pericolace and miralax daily    GERD  -Pepcid      Obesity  Body mass index is 31.14 kg/m².     Code: Discussed, full code    Surrogate decision maker: Brother Ramesh Phillips [phone]    Disposition:  IP Pulm rehab versus HH lTBD, PT/OT eval noted CM working on DC planning     Subjective:     Chief Complaint / Reason for Physician Visit:   F/U Resp failure/COVID PNA  Discussed with RN overnight events. Review of Systems:  Symptom Y/N Comments  Symptom Y/N Comments   Fever/Chills n   Chest Pain n    Poor Appetite    Edema     Cough n   Abdominal Pain n    Sputum    Joint Pain     SOB/BULL y  improved  Pruritis/Rash     Nausea/vomit n   Tolerating PT/OT y    Diarrhea    Tolerating Diet y    Constipation    Other       Could NOT obtain due to:          Objective:     VITALS:   Last 24hrs VS reviewed since prior progress note.  Most recent are:  Patient Vitals for the past 24 hrs:   Temp Pulse Resp BP SpO2   21 1404 -- -- -- -- 99 %   21 1251 -- -- -- -- 94 % 02/12/21 1250 -- -- -- -- 92 %   02/12/21 1249 -- -- -- -- (!) 86 %   02/12/21 1242 -- -- -- -- 92 %   02/12/21 1239 -- -- -- -- (!) 78 %   02/12/21 1104 98.1 °F (36.7 °C) 90 18 100/73 94 %   02/12/21 0932 -- -- -- -- 93 %   02/12/21 0745 98.4 °F (36.9 °C) 95 16 109/69 98 %   02/12/21 0348 98.1 °F (36.7 °C) 89 20 105/61 94 %   02/12/21 0300 -- -- -- -- 94 %   02/11/21 2300 -- -- -- -- 96 %   02/11/21 2254 97.5 °F (36.4 °C) 95 18 117/65 91 %   02/11/21 2018 -- -- -- -- 93 %   02/11/21 2004 97.9 °F (36.6 °C) 100 20 105/68 97 %   02/11/21 1459 97.9 °F (36.6 °C) (!) 113 24 114/71 97 %       Intake/Output Summary (Last 24 hours) at 2/12/2021 1433  Last data filed at 2/12/2021 1400  Gross per 24 hour   Intake 1470 ml   Output 1525 ml   Net -55 ml        I had a face to face encounter and independently examined this patient on 2/12/2021, as outlined below:  PHYSICAL EXAM:  General: WD, WN. Alert, cooperative, in moderate respiratory distress    EENT:  EOMI. Anicteric sclerae. MMM  Resp:  B/l lung coarse, rales+  CV:  Regular  rhythm,  No edema  GI:  Soft, Non distended, Non tender. +Bowel sounds  Neurologic:  Alert and oriented X 3, normal speech,   Psych:   Good insight. Not anxious nor agitated  Skin:  No rashes. No jaundice, subcu around right flank    Reviewed most current lab test results and cultures  YES  Reviewed most current radiology test results   YES  Review and summation of old records today    NO  Reviewed patient's current orders and MAR    YES  PMH/SH reviewed - no change compared to H&P  ________________________________________________________________________  Care Plan discussed with:    Comments   Patient x    Family  x MAC   RN x    Care Manager     Consultant                        Multidiciplinary team rounds were held today with , nursing, pharmacist and clinical coordinator. Patient's plan of care was discussed; medications were reviewed and discharge planning was addressed. ________________________________________________________________________    Total TIME Spent: 25 Minutes non procedure based      Comments   >50% of visit spent in counseling and coordination of care x    ________________________________________________________________________  Alli Echeverria MD     Procedures: see electronic medical records for all procedures/Xrays and details which were not copied into this note but were reviewed prior to creation of Plan. LABS:  I reviewed today's most current labs and imaging studies. Pertinent labs include:  No results for input(s): WBC, HGB, HCT, PLT, HGBEXT, HCTEXT, PLTEXT, HGBEXT, HCTEXT, PLTEXT in the last 72 hours.   Recent Labs     02/11/21  0305   *   K 3.7   CL 92*   CO2 39*   GLU 76   BUN 24*   CREA 0.55*   CA 8.8   MG 2.0       Signed: Alli Echeverria MD

## 2021-02-12 NOTE — PROGRESS NOTES
End of Shift Note    Bedside shift change report given to Love Valentine  (oncoming nurse) by Raine Rodriguez (offgoing nurse). Report included the following information SBAR and Kardex    Shift worked:  [id number]     Shift summary and any significant changes:    none   Concerns for physician to address: none   Zone phone for oncoming shift:  [phone]     Activity:  Activity Level: Up with Assistance  Number times ambulated in hallways past shift: 0  Number of times OOB to chair past shift: 0    Cardiac:   Cardiac Monitoring: Yes      Cardiac Rhythm: Normal sinus rhythm, Sinus tachycardia    Access:   Current line(s): PIV     Genitourinary:   Urinary status: voiding    Respiratory:   O2 Device: Hi flow nasal cannula, Humidifier  Chronic home O2 use?: NO  Incentive spirometer at bedside: YES  Actual Volume (ml): 250 ml  GI:  Last Bowel Movement Date: 02/11/21  Current diet:  DIET DIABETIC CONSISTENT CARB Regular  DIET NUTRITIONAL SUPPLEMENTS Dinner, Lunch; Glucerna Shake  Passing flatus: YES  Tolerating current diet: YES  % Diet Eaten: 100 %    Pain Management:   Patient states pain is manageable on current regimen: YES    Skin:  Govind Score: 20  Interventions: increase time out of bed    Patient Safety:  Fall Score:  Total Score: 2  Interventions: gripper socks and pt to call before getting OOB  High Fall Risk: Yes    Length of Stay:  Expected LOS: 5d 12h  Actual LOS: [address]

## 2021-02-12 NOTE — PROGRESS NOTES
ORIANA:  -confirm family to transport near d/c  -assess for home oxygen 24 hours prior to d/c-  -await therapy recommendations for the benefit of HHC vs IP rehab  -ask pt again if he would be willing to give us his physical address.  This will be needed to deliver the oxygen//HHC staff.  -may benefit from IP pulmonary rehab at d/c-updates sent 2/12/21  -make PCP omero't prior to d/c  -[facility] can accept pending oxygen testing//order as of 2/12  -family would like repeat covid test-2/14

## 2021-02-12 NOTE — PROGRESS NOTES
[facility]  Pulmonary, Critical Care, and Sleep Medicine    Name: Sukhwinder Fajardo MRN: [id number]   : 1965 Hospital: [facility]   Date: 2021            IMPRESSION:   · Acute hypoxic respiratory failure - severe, secondary to COVID PNA-currently on HF (10L, 100%), Still needs moderate oxygen support. Continue to wean as tolerated. · Severe Multilobar Covid Pneumonia, first Dx on , Symptoms first started on 21  S/P convalescent plasma ; S/p Remdesivir x 5 days total, He was sleeping in prone position several times yesterday. Seems to have some improvement in oxygenation. · New pneumomediastinum 21, radiographically stable. · Covid related coagulopathy  · Covid related inflammation, increased ferritin. · Steroid induced hyperglycemia  · Obesity  · GERD  · Hypertension, LAE and LVH on ekg. RECOMMENDATIONS:   · Continue HFNC, wean as tolerated, daily improvement, wean as tolerated. · Will work on weaning the oxygen levels. · Empiric antibiotics completed  · Cont Lovenox for DVT prophylaxis. · Continue po steroids, decrease dose to 30mg po every day. · Follow inflammatory and coag markers  · Pt and OT to assist: Mobilize out of bed as tolerated  · Will see as needed over the weekend. Please call if any issues or acute changes. PCP: Berta Dominguez. Subjective:   21: Pt is feeling a little better today. States he is constipated. He has not been sleeping well. He does not like his food. Tolerating po intake well. Has some leg stiff and back pain from being in the bed. Pt seems comfortable. No acute events   No severe distress  Had some dryness of his nose. NO headaches. Tolerating po intake pretty well. NO chest pain, no back pain, no leg pain.      Current Facility-Administered Medications   Medication Dose Route Frequency    senna-docusate (PERICOLACE) 8.6-50 mg per tablet 1 Tab  1 Tab Oral BID    polyethylene glycol (MIRALAX) packet 17 g  17 g Oral DAILY    furosemide (LASIX) injection 40 mg  40 mg IntraVENous DAILY    predniSONE (DELTASONE) tablet 20 mg  20 mg Oral DAILY WITH BREAKFAST    melatonin tablet 3 mg  3 mg Oral QHS    insulin lispro (HUMALOG) injection   SubCUTAneous AC&HS    insulin NPH (NOVOLIN N, HUMULIN N) injection 10 Units  10 Units SubCUTAneous DAILY    metoprolol tartrate (LOPRESSOR) tablet 25 mg  25 mg Oral BID    enoxaparin (LOVENOX) injection 30 mg  30 mg SubCUTAneous Q12H    famotidine (PEPCID) tablet 20 mg  20 mg Oral Q12H    influenza vaccine - (6 mos+)(PF) (FLUARIX/FLULAVAL/FLUZONE QUAD) injection 0.5 mL  0.5 mL IntraMUSCular PRIOR TO DISCHARGE    sodium chloride (NS) flush 5-40 mL  5-40 mL IntraVENous Q8H    cholecalciferol (VITAMIN D3) (1000 Units /25 mcg) tablet 2 Tab  2,000 Units Oral DAILY    aspirin delayed-release tablet 81 mg  81 mg Oral DAILY       Review of Systems:  Constitutional: positive for fevers, chills, sweats, fatigue, malaise and anorexia, negative for weight loss  Eyes: negative  Ears, nose, mouth, throat, and face: positive for nasal congestion  Respiratory: positive for cough or dyspnea on exertion  Cardiovascular: positive for fatigue, paroxysmal nocturnal dyspnea, tachypnea, dyspnea on exertion  Gastrointestinal: positive for dyspepsia, reflux symptoms, nausea and change in bowel habits  Genitourinary:negative  Integument/breast: negative  Hematologic/lymphatic: negative  Musculoskeletal:negative  Neurological: negative  Behavioral/Psych: negative  Endocrine: negative  Allergic/Immunologic: negative    Objective:   Vital Signs:    Visit Vitals  /69   Pulse 95   Temp 98.4 °F (36.9 °C)   Resp 16   Ht 5' 8\" (1.727 m)   Wt 76.3 kg (168 lb 4.8 oz)   SpO2 98%   BMI 25.59 kg/m²       O2 Device: Hi flow nasal cannula, Humidifier   O2 Flow Rate (L/min): 10 l/min   Temp (24hrs), Av.9 °F (36.6 °C), Min:97.5 °F (36.4 °C), Max:98.4 °F (36.9 °C)       Intake/Output:   Last shift:      No intake/output data recorded. Last 3 shifts: 02/10 1901 - 02/12 0700  In: 1050 [P.O.:1050]  Out: 2400 [Urine:2400]    Intake/Output Summary (Last 24 hours) at 2/12/2021 0999  Last data filed at 2/12/2021 0644  Gross per 24 hour   Intake 750 ml   Output 1225 ml   Net -475 ml      Physical Exam:   General:  Alert, cooperative, no resp distress. ON heated high flow oxygen. Flow of 10L and Pox of 96%. Lying in bed, Conversant, appears comfortable. Head:  Normocephalic, without obvious abnormality, atraumatic. Eyes:  Conjunctivae/corneas clear    Nose: Nares normal. Septum midline. Mucosa normal.     Throat: Lips, mucosa, and tongue normal.    Neck: Supple, symmetrical, trachea midline    Lungs:   Distant bilateral breath sounds but clear. Breathing comfortably. Chest wall:  No tenderness or deformity. Heart:  Regular rate and rhythm    Abdomen:   Soft, non-tender. Bowel sounds normal.    Extremities: Extremities normal, atraumatic, no cyanosis    Skin: Skin color, texture, turgor normal. No crepitus   Neurologic: Grossly nonfocal, motor is intact. Good strength of BUE and BLE. Psych: no overt anxiety or depression. Data:   Labs:  No results for input(s): WBC, HGB, HCT, PLT, HGBEXT, HCTEXT, PLTEXT, HGBEXT, HCTEXT, PLTEXT in the last 72 hours. Recent Labs     02/11/21  0305   *   K 3.7   CL 92*   CO2 39*   GLU 76   BUN 24*   CREA 0.55*   CA 8.8   MG 2.0     No results for input(s): PHI, PCO2I, PO2I, HCO3I, FIO2I in the last 72 hours. Imaging:  I have personally reviewed the patients radiographs:  CXR: 2-5-21: COMPARISON: February 1     FINDINGS: A portable AP radiograph of the chest was obtained at 1110 hours. The  patient is on a cardiac monitor. There is persistent bilateral lower lobe  opacification without change.  The cardiac and mediastinal contours and pulmonary  vascularity are normal.  The bones and soft tissues are grossly within normal  limits.      IMPRESSION  No significant change.         iNchelle Bowers MD

## 2021-02-12 NOTE — PROGRESS NOTES
Problem: Falls - Risk of  Goal: *Absence of Falls  Description: Document Sherif Christopher Fall Risk and appropriate interventions in the flowsheet.   Outcome: Progressing Towards Goal  Note: Fall Risk Interventions:  Mobility Interventions: Bed/chair exit alarm         Medication Interventions: Bed/chair exit alarm    Elimination Interventions: Bed/chair exit alarm, Call light in reach    History of Falls Interventions: Bed/chair exit alarm         Problem: Breathing Pattern - Ineffective  Goal: *Absence of hypoxia  Outcome: Progressing Towards Goal

## 2021-02-12 NOTE — PROGRESS NOTES
Problem: Self Care Deficits Care Plan (Adult)  Goal: *Acute Goals and Plan of Care (Insert Text)  Description: FUNCTIONAL STATUS PRIOR TO ADMISSION: Patient was independent and active without use of DME    HOME SUPPORT PRIOR TO ADMISSION: The patient lived alone with brother and neighbors to provide assistance. Occupational Therapy Goals:  Weekly re-eval 2/10/2021 all below goals remain appropirate  Initiated 2/5/2021  1. Patient will perform grooming seated with supervision within 7 days. Met 2/10/2021 upgrade to standing with rest breaks  2. Patient will perform toileting with supervision within 7 days. 3. Patient will perform lower body dressing with supervision within 7 days. 4. Patient will transfer from bedside commode with modified independence using the least restrictive device and appropriate durable medical equipment within 7 days. 5. Patient will perform UB dressing with supervision within 7 days. Outcome: Progressing Towards Goal   OCCUPATIONAL THERAPY TREATMENT  Patient: Ugo Brantley (74 y.o. male)  Date: 2/12/2021  Diagnosis: COVID-19 virus infection [U07.1]  Acute respiratory failure with hypoxia ([facility] [address] [J96.01] <principal problem not specified>       Precautions: (droplet +)  Chart, occupational therapy assessment, plan of care, and goals were reviewed. ASSESSMENT  Patient continues with skilled OT services and is progressing towards goals. Pt remains motivated to participate and needs verbal cues to coordinate PLB with mobility attempts. He was supine with HOB raised eating lunch but was willing to stop eating to participate in therapy. Focused on tolerance to upright activity tolerance and mobility in prep for functional standing ADLS. Pt needed min hand held assist x2 for mobility to chair with seated rest break due to low O2 sat (see below) and needed 15L for further mobility forward and back.   Pt would benefit from Rw for next session for balance and energy conservation. Continue to recommend inpatient pulmonary rehab at discharge. 02/12/21 1239 02/12/21 1242 02/12/21 1249   Vital Signs   O2 Sat (%) (!) 78 %  (mobility to bedside chair) 92 %  (seated at bedside chair) (!) 86 %  (mobility forward and back)   O2 Device Hi flow nasal cannula Hi flow nasal cannula Hi flow nasal cannula   O2 Flow Rate (L/min) 10 l/min 15 l/min 15 l/min        02/12/21 1250   Vital Signs   O2 Sat (%) 92 %   O2 Device Hi flow nasal cannula   O2 Flow Rate (L/min) 10 l/min         Current Level of Function Impacting Discharge (ADLs): min hand held assist for mobility short distance    Other factors to consider for discharge: was independent and working as a  prior to illness         PLAN :  Patient continues to benefit from skilled intervention to address the above impairments. Continue treatment per established plan of care. to address goals. Recommend with staff: OOB to chair and to [facility] for toiletilng    Recommend next OT session: standing tolerance, PLB ADLs    Recommendation for discharge: (in order for the patient to meet his/her long term goals)  Therapy 3 hours per day 5-7 days per week, inpatient pulmonary rehab    This discharge recommendation:  Has been made in collaboration with the attending provider and/or case management    IF patient discharges home will need the following DME: needs rehab       SUBJECTIVE:   Patient stated I don't mind. You are helping me get better.     OBJECTIVE DATA SUMMARY:   Cognitive/Behavioral Status:  Neurologic State: Alert  Orientation Level: Oriented X4  Cognition: Follows commands;Memory loss  Perception: Appears intact  Perseveration: No perseveration noted  Safety/Judgement: Fall prevention    Functional Mobility and Transfers for ADLs:  Bed Mobility:  Rolling: Independent  Supine to Sit: Supervision  Scooting: Contact guard assistance    Transfers:  Sit to Stand: Contact guard assistance; Additional time     Bed to Chair: Minimum assistance(hand held assist)    Balance:  Sitting: Intact  Standing: Impaired  Standing - Static: Fair  Standing - Dynamic : Fair    ADL Intervention:       Grooming  Washing Hands: Set-up(seated)    Cognitive Retraining  Safety/Judgement: Fall prevention      Pain:  0/10    Activity Tolerance:   Fair, Poor, desaturates with exertion and requires oxygen, requires frequent rest breaks and observed SOB with activity    After treatment patient left in no apparent distress:   Sitting in chair and Call bell within reach    COMMUNICATION/COLLABORATION:   The patients plan of care was discussed with: Physical therapist, Registered nurse and patient.      Qasim Ocampo OTR/L  Time Calculation: 18 mins

## 2021-02-13 LAB
ANION GAP SERPL CALC-SCNC: 4 MMOL/L (ref 5–15)
BNP SERPL-MCNC: 2211 PG/ML
BUN SERPL-MCNC: 19 MG/DL (ref 6–20)
BUN/CREAT SERPL: 42 (ref 12–20)
CALCIUM SERPL-MCNC: 9.1 MG/DL (ref 8.5–10.1)
CHLORIDE SERPL-SCNC: 93 MMOL/L (ref 97–108)
CO2 SERPL-SCNC: 37 MMOL/L (ref 21–32)
CREAT SERPL-MCNC: 0.45 MG/DL (ref 0.7–1.3)
CRP SERPL-MCNC: 1.83 MG/DL (ref 0–0.6)
FERRITIN SERPL-MCNC: 911 NG/ML (ref 26–388)
GLUCOSE BLD STRIP.AUTO-MCNC: 102 MG/DL (ref 65–100)
GLUCOSE BLD STRIP.AUTO-MCNC: 158 MG/DL (ref 65–100)
GLUCOSE BLD STRIP.AUTO-MCNC: 219 MG/DL (ref 65–100)
GLUCOSE BLD STRIP.AUTO-MCNC: 232 MG/DL (ref 65–100)
GLUCOSE SERPL-MCNC: 62 MG/DL (ref 65–100)
MAGNESIUM SERPL-MCNC: 2.2 MG/DL (ref 1.6–2.4)
POTASSIUM SERPL-SCNC: 3.6 MMOL/L (ref 3.5–5.1)
SERVICE CMNT-IMP: ABNORMAL
SODIUM SERPL-SCNC: 134 MMOL/L (ref 136–145)

## 2021-02-13 PROCEDURE — 74011250637 HC RX REV CODE- 250/637: Performed by: INTERNAL MEDICINE

## 2021-02-13 PROCEDURE — 82728 ASSAY OF FERRITIN: CPT

## 2021-02-13 PROCEDURE — 65660000001 HC RM ICU INTERMED STEPDOWN

## 2021-02-13 PROCEDURE — 80048 BASIC METABOLIC PNL TOTAL CA: CPT

## 2021-02-13 PROCEDURE — 74011250636 HC RX REV CODE- 250/636: Performed by: INTERNAL MEDICINE

## 2021-02-13 PROCEDURE — 83735 ASSAY OF MAGNESIUM: CPT

## 2021-02-13 PROCEDURE — 83880 ASSAY OF NATRIURETIC PEPTIDE: CPT

## 2021-02-13 PROCEDURE — 77010033711 HC HIGH FLOW OXYGEN

## 2021-02-13 PROCEDURE — 74011636637 HC RX REV CODE- 636/637: Performed by: INTERNAL MEDICINE

## 2021-02-13 PROCEDURE — 86140 C-REACTIVE PROTEIN: CPT

## 2021-02-13 PROCEDURE — 74011250637 HC RX REV CODE- 250/637: Performed by: HOSPITALIST

## 2021-02-13 PROCEDURE — 82962 GLUCOSE BLOOD TEST: CPT

## 2021-02-13 PROCEDURE — 36415 COLL VENOUS BLD VENIPUNCTURE: CPT

## 2021-02-13 RX ADMIN — ASPIRIN 81 MG: 81 TABLET, COATED ORAL at 09:15

## 2021-02-13 RX ADMIN — FAMOTIDINE 20 MG: 20 TABLET, FILM COATED ORAL at 22:34

## 2021-02-13 RX ADMIN — INSULIN LISPRO 2 UNITS: 100 INJECTION, SOLUTION INTRAVENOUS; SUBCUTANEOUS at 22:34

## 2021-02-13 RX ADMIN — Medication 10 ML: at 22:34

## 2021-02-13 RX ADMIN — FUROSEMIDE 40 MG: 10 INJECTION, SOLUTION INTRAMUSCULAR; INTRAVENOUS at 09:15

## 2021-02-13 RX ADMIN — CHOLECALCIFEROL TAB 25 MCG (1000 UNIT) 2000 UNITS: 25 TAB at 09:15

## 2021-02-13 RX ADMIN — METOPROLOL TARTRATE 25 MG: 25 TABLET, FILM COATED ORAL at 09:15

## 2021-02-13 RX ADMIN — INSULIN LISPRO 3 UNITS: 100 INJECTION, SOLUTION INTRAVENOUS; SUBCUTANEOUS at 13:35

## 2021-02-13 RX ADMIN — INSULIN LISPRO 2 UNITS: 100 INJECTION, SOLUTION INTRAVENOUS; SUBCUTANEOUS at 16:30

## 2021-02-13 RX ADMIN — ENOXAPARIN SODIUM 30 MG: 30 INJECTION SUBCUTANEOUS at 06:43

## 2021-02-13 RX ADMIN — Medication 3 MG: at 22:34

## 2021-02-13 RX ADMIN — PREDNISONE 20 MG: 20 TABLET ORAL at 09:15

## 2021-02-13 RX ADMIN — FAMOTIDINE 20 MG: 20 TABLET, FILM COATED ORAL at 09:15

## 2021-02-13 RX ADMIN — Medication 10 ML: at 06:42

## 2021-02-13 RX ADMIN — ENOXAPARIN SODIUM 30 MG: 30 INJECTION SUBCUTANEOUS at 18:06

## 2021-02-13 RX ADMIN — DOCUSATE SODIUM - SENNOSIDES 1 TABLET: 50; 8.6 TABLET, FILM COATED ORAL at 09:15

## 2021-02-13 RX ADMIN — HUMAN INSULIN 10 UNITS: 100 INJECTION, SUSPENSION SUBCUTANEOUS at 09:41

## 2021-02-13 RX ADMIN — METOPROLOL TARTRATE 25 MG: 25 TABLET, FILM COATED ORAL at 18:06

## 2021-02-13 RX ADMIN — POLYETHYLENE GLYCOL 3350 17 G: 17 POWDER, FOR SOLUTION ORAL at 09:13

## 2021-02-13 NOTE — PROGRESS NOTES
End of Shift Note    Bedside shift change report given to Karuna FIERRO (oncoming nurse) by Lesley Howard (offgoing nurse). Report included the following information SBAR and Kardex    Shift worked:  0121-3113     Shift summary and any significant changes:    none   Concerns for physician to address: none   Zone phone for oncoming shift:  [phone]     Activity:  Activity Level: Up with Assistance  Number times ambulated in hallways past shift: 0  Number of times OOB to chair past shift: 0    Cardiac:   Cardiac Monitoring: Yes      Cardiac Rhythm: Normal sinus rhythm, Sinus tachycardia    Access:   Current line(s): PIV     Genitourinary:   Urinary status: voiding    Respiratory:   O2 Device: Hi flow nasal cannula  Chronic home O2 use?: NO  Incentive spirometer at bedside: YES  Actual Volume (ml): 250 ml  GI:  Last Bowel Movement Date: 02/12/21  Current diet:  DIET DIABETIC CONSISTENT CARB Regular  DIET NUTRITIONAL SUPPLEMENTS Dinner, Lunch; Glucerna Shake  Passing flatus: YES  Tolerating current diet: YES  % Diet Eaten: 100 %    Pain Management:   Patient states pain is manageable on current regimen: YES    Skin:  Govind Score: 20  Interventions: PT/OT consult    Patient Safety:  Fall Score:  Total Score: 3  Interventions: gripper socks  High Fall Risk: Yes    Length of Stay:  Expected LOS: 5d 12h  Actual LOS: [address]

## 2021-02-13 NOTE — PROGRESS NOTES
Problem: Falls - Risk of  Goal: *Absence of Falls  Description: Document Noe Pastrana Fall Risk and appropriate interventions in the flowsheet.   Outcome: Progressing Towards Goal  Note: Fall Risk Interventions:  Mobility Interventions: Bed/chair exit alarm, Communicate number of staff needed for ambulation/transfer, OT consult for ADLs, Patient to call before getting OOB, PT Consult for mobility concerns, PT Consult for assist device competence, Utilize walker, cane, or other assistive device, Utilize gait belt for transfers/ambulation         Medication Interventions: Bed/chair exit alarm, Evaluate medications/consider consulting pharmacy, Teach patient to arise slowly, Patient to call before getting OOB, Utilize gait belt for transfers/ambulation    Elimination Interventions: Bed/chair exit alarm, Elevated toilet seat, Call light in reach, Patient to call for help with toileting needs, Stay With Me (per policy), Toilet paper/wipes in reach, Toileting schedule/hourly rounds    History of Falls Interventions: Bed/chair exit alarm, Consult care management for discharge planning, Door open when patient unattended, Evaluate medications/consider consulting pharmacy, Investigate reason for fall, Utilize gait belt for transfer/ambulation, Room close to nurse's station

## 2021-02-13 NOTE — PROGRESS NOTES
End of Shift Note    Bedside shift change report given to [address] (oncoming nurse) by Mango Johnson (offgoing nurse). Report included the following information SBAR and Kardex    Shift worked:  DAY     Shift summary and any significant changes:    Weaned O2 to 7L; however, hypoxic (86%) on 15L during activity with PT/OT   Concerns for physician to address: none   Zone phone for oncoming shift:       Activity:  Activity Level: Up with Assistance  Number times ambulated in hallways past shift: 0  Number of times OOB to chair past shift: 2    Cardiac:   Cardiac Monitoring: Yes      Cardiac Rhythm: Normal sinus rhythm, Sinus tachycardia    Access:   Current line(s): PIV     Genitourinary:   Urinary status: voiding    Respiratory:   O2 Device: Hi flow nasal cannula  Chronic home O2 use?: NO  Incentive spirometer at bedside: YES  Actual Volume (ml): 250 ml  GI:  Last Bowel Movement Date: 02/11/21  Current diet:  DIET DIABETIC CONSISTENT CARB Regular  DIET NUTRITIONAL SUPPLEMENTS Dinner, Lunch; Glucerna Shake  Passing flatus: YES  Tolerating current diet: YES  % Diet Eaten: 100 %    Pain Management:   Patient states pain is manageable on current regimen: YES    Skin:  Govind Score: 20  Interventions: increase time out of bed and PT/OT consult    Patient Safety:  Fall Score:  Total Score: 3  Interventions: bed/chair alarm, assistive device (walker, cane, etc), gripper socks and pt to call before getting OOB  High Fall Risk: Yes    Length of Stay:  Expected LOS: 5d 12h  Actual LOS: [address] [facility]

## 2021-02-13 NOTE — PROGRESS NOTES
Hospitalist Progress Note    NAME: Armen Mask   :  1965   MRN:  [id number]       Assessment / Plan:  Acute hypoxic respiratory failure POA  Severe multilobar Covid pneumonia POA  Pneumomediastinum , not POA, persistent but stable  -COVID-19 test positive on . -CTA  neg for PE. Shows diffuse bilateral peripheral groundglass opacification and consolidation in the lower lobes   -Initially on nasal cannula/high flow, decompensation  went on BIPAP    -developed pneumomediastinum , changed to high flow from BiPAP to reduce barotrauma,   -s/p antibiotics  -s/p convalescent plasma 2021  -s/p IV Solu-Medrol - now on prednisone taper. Follow inflammatory markers  -continue lovenox  -continue IV lasix. Follow lytes  -down to 6L HF from 15. 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 Constipation  -pericolace and miralax daily    GERD  -Pepcid      Obesity  Body mass index is 31.14 kg/m².     Code: Discussed, full code    Surrogate decision maker: Brother Sofiya Arellano [phone]    Disposition:  IP Pulm rehab versus HH lTBD, PT/OT eval noted CM working on DC planning     Subjective:     Chief Complaint / Reason for Physician Visit:   F/U Resp failure/COVID PNA  Discussed with RN overnight events. Review of Systems:  Symptom Y/N Comments  Symptom Y/N Comments   Fever/Chills n   Chest Pain n    Poor Appetite    Edema     Cough n   Abdominal Pain n    Sputum    Joint Pain     SOB/BULL y  improved  Pruritis/Rash     Nausea/vomit n   Tolerating PT/OT y    Diarrhea    Tolerating Diet y    Constipation    Other       Could NOT obtain due to:          Objective:     VITALS:   Last 24hrs VS reviewed since prior progress note.  Most recent are:  Patient Vitals for the past 24 hrs:   Temp Pulse Resp BP SpO2   21 1124 -- -- -- -- 90 %   21 0740 98 °F (36.7 °C) 91 20 (!) 111/56 95 %   02/13/21 0344 97.9 °F (36.6 °C) 89 20 109/63 91 %   02/12/21 2336 98.1 °F (36.7 °C) 86 20 (!) 106/59 98 %   02/12/21 1926 97.7 °F (36.5 °C) (!) 112 20 131/81 98 %   02/12/21 1742 -- -- -- 124/75 --   02/12/21 1550 97.8 °F (36.6 °C) 93 18 (!) 91/52 94 %   02/12/21 1531 -- -- -- -- 94 %   02/12/21 1404 -- -- -- -- 99 %   02/12/21 1251 -- -- -- -- 94 %   02/12/21 1250 -- -- -- -- 92 %   02/12/21 1249 -- -- -- -- (!) 86 %   02/12/21 1242 -- -- -- -- 92 %   02/12/21 1239 -- -- -- -- (!) 78 %       Intake/Output Summary (Last 24 hours) at 2/13/2021 1230  Last data filed at 2/13/2021 0846  Gross per 24 hour   Intake 840 ml   Output 1400 ml   Net -560 ml        I had a face to face encounter and independently examined this patient on 2/13/2021, as outlined below:  PHYSICAL EXAM:  General: WD, WN. Alert, cooperative, in moderate respiratory distress    EENT:  EOMI. Anicteric sclerae. MMM  Resp:  B/l lung coarse, rales+  CV:  Regular  rhythm,  No edema  GI:  Soft, Non distended, Non tender. +Bowel sounds  Neurologic:  Alert and oriented X 3, normal speech,   Psych:   Good insight. Not anxious nor agitated  Skin:  No rashes. No jaundice, subcu around right flank    Reviewed most current lab test results and cultures  YES  Reviewed most current radiology test results   YES  Review and summation of old records today    NO  Reviewed patient's current orders and MAR    YES  PMH/SH reviewed - no change compared to H&P  ________________________________________________________________________  Care Plan discussed with:    Comments   Patient x    Family  x MAC   RN x    Care Manager     Consultant                        Multidiciplinary team rounds were held today with , nursing, pharmacist and clinical coordinator. Patient's plan of care was discussed; medications were reviewed and discharge planning was addressed. ________________________________________________________________________    Total TIME Spent: 25 Minutes non procedure based      Comments   >50% of visit spent in counseling and coordination of care x    ________________________________________________________________________  Katherine Chaidez MD     Procedures: see electronic medical records for all procedures/Xrays and details which were not copied into this note but were reviewed prior to creation of Plan. LABS:  I reviewed today's most current labs and imaging studies. Pertinent labs include:  No results for input(s): WBC, HGB, HCT, PLT, HGBEXT, HCTEXT, PLTEXT, HGBEXT, HCTEXT, PLTEXT in the last 72 hours.   Recent Labs     02/13/21  0347 02/11/21  0305   * 132*   K 3.6 3.7   CL 93* 92*   CO2 37* 39*   GLU 62* 76   BUN 19 24*   CREA 0.45* 0.55*   CA 9.1 8.8   MG 2.2 2.0       Signed: Katherine Chaidez MD

## 2021-02-14 LAB
GLUCOSE BLD STRIP.AUTO-MCNC: 190 MG/DL (ref 65–100)
GLUCOSE BLD STRIP.AUTO-MCNC: 197 MG/DL (ref 65–100)
GLUCOSE BLD STRIP.AUTO-MCNC: 286 MG/DL (ref 65–100)
SERVICE CMNT-IMP: ABNORMAL

## 2021-02-14 PROCEDURE — 74011250637 HC RX REV CODE- 250/637: Performed by: HOSPITALIST

## 2021-02-14 PROCEDURE — 74011250637 HC RX REV CODE- 250/637: Performed by: INTERNAL MEDICINE

## 2021-02-14 PROCEDURE — 82962 GLUCOSE BLOOD TEST: CPT

## 2021-02-14 PROCEDURE — 74011250636 HC RX REV CODE- 250/636: Performed by: INTERNAL MEDICINE

## 2021-02-14 PROCEDURE — 65660000001 HC RM ICU INTERMED STEPDOWN

## 2021-02-14 PROCEDURE — 77010033678 HC OXYGEN DAILY

## 2021-02-14 PROCEDURE — 74011636637 HC RX REV CODE- 636/637: Performed by: INTERNAL MEDICINE

## 2021-02-14 RX ADMIN — FAMOTIDINE 20 MG: 20 TABLET, FILM COATED ORAL at 10:00

## 2021-02-14 RX ADMIN — Medication 10 ML: at 21:10

## 2021-02-14 RX ADMIN — INSULIN LISPRO 2 UNITS: 100 INJECTION, SOLUTION INTRAVENOUS; SUBCUTANEOUS at 16:30

## 2021-02-14 RX ADMIN — DOCUSATE SODIUM - SENNOSIDES 1 TABLET: 50; 8.6 TABLET, FILM COATED ORAL at 10:00

## 2021-02-14 RX ADMIN — ENOXAPARIN SODIUM 30 MG: 30 INJECTION SUBCUTANEOUS at 05:40

## 2021-02-14 RX ADMIN — FAMOTIDINE 20 MG: 20 TABLET, FILM COATED ORAL at 21:09

## 2021-02-14 RX ADMIN — INSULIN LISPRO 2 UNITS: 100 INJECTION, SOLUTION INTRAVENOUS; SUBCUTANEOUS at 15:12

## 2021-02-14 RX ADMIN — PREDNISONE 20 MG: 20 TABLET ORAL at 10:00

## 2021-02-14 RX ADMIN — FUROSEMIDE 40 MG: 10 INJECTION, SOLUTION INTRAMUSCULAR; INTRAVENOUS at 10:01

## 2021-02-14 RX ADMIN — DOCUSATE SODIUM - SENNOSIDES 1 TABLET: 50; 8.6 TABLET, FILM COATED ORAL at 18:55

## 2021-02-14 RX ADMIN — INSULIN LISPRO 3 UNITS: 100 INJECTION, SOLUTION INTRAVENOUS; SUBCUTANEOUS at 22:25

## 2021-02-14 RX ADMIN — Medication 10 ML: at 05:41

## 2021-02-14 RX ADMIN — Medication 10 ML: at 15:14

## 2021-02-14 RX ADMIN — ASPIRIN 81 MG: 81 TABLET, COATED ORAL at 10:00

## 2021-02-14 RX ADMIN — METOPROLOL TARTRATE 25 MG: 25 TABLET, FILM COATED ORAL at 10:00

## 2021-02-14 RX ADMIN — CHOLECALCIFEROL TAB 25 MCG (1000 UNIT) 2000 UNITS: 25 TAB at 10:00

## 2021-02-14 RX ADMIN — METOPROLOL TARTRATE 25 MG: 25 TABLET, FILM COATED ORAL at 18:55

## 2021-02-14 RX ADMIN — Medication 3 MG: at 21:09

## 2021-02-14 RX ADMIN — ENOXAPARIN SODIUM 30 MG: 30 INJECTION SUBCUTANEOUS at 18:55

## 2021-02-14 RX ADMIN — HUMAN INSULIN 10 UNITS: 100 INJECTION, SUSPENSION SUBCUTANEOUS at 10:01

## 2021-02-14 NOTE — PROGRESS NOTES
End of Shift Note    Bedside shift change report given to SEAN (oncoming nurse) by Olivia Bains (offgoing nurse). Report included the following information SBAR    Shift worked:  7-7P     Shift summary and any significant changes:     Pt OOB to chair today. Multiple stools so holding stool softners. Down to 5L NC     Concerns for physician to address:  see above     Zone phone for oncoming shift:   [phone]       Activity:  Activity Level: Up with Assistance  Number times ambulated in hallways past shift: 0  Number of times OOB to chair past shift: 1    Cardiac:   Cardiac Monitoring: Yes      Cardiac Rhythm: Normal sinus rhythm, Sinus tachycardia    Access:   Current line(s): PIV     Genitourinary:   Urinary status: voiding    Respiratory:   O2 Device: Hi flow nasal cannula  Chronic home O2 use?: NO  Incentive spirometer at bedside: YES  Actual Volume (ml): 250 ml  GI:  Last Bowel Movement Date: 02/13/21  Current diet:  DIET DIABETIC CONSISTENT CARB Regular  DIET NUTRITIONAL SUPPLEMENTS Dinner, Lunch; Glucerna Shake  Passing flatus: YES  Tolerating current diet: YES  % Diet Eaten: 100 %    Pain Management:   Patient states pain is manageable on current regimen: YES    Skin:  Govind Score: 20  Interventions: speciality bed, increase time out of bed, PT/OT consult and internal/external urinary devices    Patient Safety:  Fall Score:  Total Score: 3  Interventions: bed/chair alarm, assistive device (walker, cane, etc), gripper socks, pt to call before getting OOB and stay with me (per policy)  High Fall Risk: Yes    Length of Stay:  Expected LOS: 5d 12h  Actual LOS: [address]

## 2021-02-14 NOTE — PROGRESS NOTES
Hospitalist Progress Note    NAME: Rolando Solorio   :  1965   MRN:  [id number]       Assessment / Plan:  Acute hypoxic respiratory failure POA  Severe multilobar Covid pneumonia POA  Pneumomediastinum , not POA, persistent but stable  -COVID-19 test positive on . -CTA  neg for PE. Shows diffuse bilateral peripheral groundglass opacification and consolidation in the lower lobes   -Initially on nasal cannula/high flow, decompensation  went on BIPAP    -developed pneumomediastinum , changed to high flow from BiPAP to reduce barotrauma,   -s/p antibiotics  -s/p convalescent plasma 2021  -s/p IV Solu-Medrol - now on prednisone taper. Inflammatory markers trending down  -continue lovenox  -continue IV lasix. Follow lytes. -off HF, now on 4LNC. 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 Constipation  -pericolace and miralax daily    GERD  -Pepcid      Obesity  Body mass index is 31.14 kg/m².     Code: Discussed, full code    Surrogate decision maker: Brother Rosie  [phone]    Disposition:  IP Pulm rehab versus HH lTBD, PT/OT eval noted CM working on DC planning     Subjective:     Chief Complaint / Reason for Physician Visit:   F/U Resp failure/COVID PNA  Discussed with RN overnight events. Review of Systems:  Symptom Y/N Comments  Symptom Y/N Comments   Fever/Chills n   Chest Pain n    Poor Appetite    Edema     Cough n   Abdominal Pain n    Sputum    Joint Pain     SOB/BULL y  improved  Pruritis/Rash     Nausea/vomit n   Tolerating PT/OT y    Diarrhea    Tolerating Diet y    Constipation    Other       Could NOT obtain due to:          Objective:     VITALS:   Last 24hrs VS reviewed since prior progress note.  Most recent are:  Patient Vitals for the past 24 hrs:   Temp Pulse Resp BP SpO2   21 1022 97.8 °F (36.6 °C) -- 18 132/83 90 % 02/14/21 0742 98 °F (36.7 °C) 100 18 124/73 93 %   02/14/21 0318 98.1 °F (36.7 °C) 93 16 117/74 99 %   02/13/21 2233 98.4 °F (36.9 °C) 96 16 113/73 95 %   02/13/21 2025 97.6 °F (36.4 °C) (!) 107 16 119/75 93 %   02/13/21 1805 -- -- -- 112/79 --   02/13/21 1335 97.4 °F (36.3 °C) (!) 119 18 121/80 (!) 65 %       Intake/Output Summary (Last 24 hours) at 2/14/2021 1203  Last data filed at 2/13/2021 2028  Gross per 24 hour   Intake 1150 ml   Output 750 ml   Net 400 ml        I had a face to face encounter and independently examined this patient on 2/14/2021, as outlined below:  PHYSICAL EXAM:  General: WD, WN. Alert, cooperative, in moderate respiratory distress    EENT:  EOMI. Anicteric sclerae. MMM  Resp:  B/l lung coarse, rales+  CV:  Regular  rhythm,  No edema  GI:  Soft, Non distended, Non tender. +Bowel sounds  Neurologic:  Alert and oriented X 3, normal speech,   Psych:   Good insight. Not anxious nor agitated  Skin:  No rashes. No jaundice, subcu around right flank    Reviewed most current lab test results and cultures  YES  Reviewed most current radiology test results   YES  Review and summation of old records today    NO  Reviewed patient's current orders and MAR    YES  PMH/ reviewed - no change compared to H&P  ________________________________________________________________________  Care Plan discussed with:    Comments   Patient x    Family  x MAC   RN x    Care Manager     Consultant                        Multidiciplinary team rounds were held today with , nursing, pharmacist and clinical coordinator. Patient's plan of care was discussed; medications were reviewed and discharge planning was addressed.      ________________________________________________________________________    Total TIME Spent: 25 Minutes non procedure based      Comments   >50% of visit spent in counseling and coordination of care x    ________________________________________________________________________  [facility] Rey Lemus MD     Procedures: see electronic medical records for all procedures/Xrays and details which were not copied into this note but were reviewed prior to creation of Plan. LABS:  I reviewed today's most current labs and imaging studies. Pertinent labs include:  No results for input(s): WBC, HGB, HCT, PLT, HGBEXT, HCTEXT, PLTEXT, HGBEXT, HCTEXT, PLTEXT in the last 72 hours.   Recent Labs     02/13/21  0347   *   K 3.6   CL 93*   CO2 37*   GLU 62*   BUN 19   CREA 0.45*   CA 9.1   MG 2.2       Signed: Chloe Gan MD

## 2021-02-14 NOTE — PROGRESS NOTES
0700: End of Shift Note    Bedside shift change report given to VADIM Cole  (oncoming nurse) by Seamus Carroll RN (offgoing nurse). Report included the following information SBAR, Kardex, Procedure Summary, Intake/Output, MAR, Recent Results and Cardiac Rhythm NSR    Shift worked:  1429-5358     Shift summary and any significant changes:     holding stool softeners, 5 liters NC, continue to wean     Concerns for physician to address:       Zone phone for oncoming shift:          Activity:  Activity Level: Up with Assistance  Number times ambulated in hallways past shift: 0  Number of times OOB to chair past shift: 0    Cardiac:   Cardiac Monitoring: Yes      Cardiac Rhythm: Normal sinus rhythm    Access:   Current line(s): PIV     Genitourinary:   Urinary status: voiding    Respiratory:   O2 Device: Hi flow nasal cannula  Chronic home O2 use?: NO  Incentive spirometer at bedside: YES  Actual Volume (ml): 250 ml  GI:  Last Bowel Movement Date: 02/13/21  Current diet:  DIET DIABETIC CONSISTENT CARB Regular  DIET NUTRITIONAL SUPPLEMENTS Dinner, Lunch; Glucerna Shake  Passing flatus: YES  Tolerating current diet: YES  % Diet Eaten: 100 %    Pain Management:   Patient states pain is manageable on current regimen: YES    Skin:  Govind Score: 19  Interventions: float heels, increase time out of bed and PT/OT consult    Patient Safety:  Fall Score:  Total Score: 3  Interventions: bed/chair alarm and assistive device (walker, cane, etc)  High Fall Risk: Yes    Length of Stay:  Expected LOS: 5d 12h  Actual LOS: [address], RN

## 2021-02-15 LAB
ANION GAP SERPL CALC-SCNC: 3 MMOL/L (ref 5–15)
BUN SERPL-MCNC: 17 MG/DL (ref 6–20)
BUN/CREAT SERPL: 35 (ref 12–20)
CALCIUM SERPL-MCNC: 8.6 MG/DL (ref 8.5–10.1)
CHLORIDE SERPL-SCNC: 97 MMOL/L (ref 97–108)
CO2 SERPL-SCNC: 36 MMOL/L (ref 21–32)
CREAT SERPL-MCNC: 0.48 MG/DL (ref 0.7–1.3)
GLUCOSE BLD STRIP.AUTO-MCNC: 119 MG/DL (ref 65–100)
GLUCOSE BLD STRIP.AUTO-MCNC: 155 MG/DL (ref 65–100)
GLUCOSE BLD STRIP.AUTO-MCNC: 259 MG/DL (ref 65–100)
GLUCOSE BLD STRIP.AUTO-MCNC: 263 MG/DL (ref 65–100)
GLUCOSE SERPL-MCNC: 84 MG/DL (ref 65–100)
MAGNESIUM SERPL-MCNC: 2 MG/DL (ref 1.6–2.4)
POTASSIUM SERPL-SCNC: 3.7 MMOL/L (ref 3.5–5.1)
SARS-COV-2, COV2: NORMAL
SERVICE CMNT-IMP: ABNORMAL
SODIUM SERPL-SCNC: 136 MMOL/L (ref 136–145)

## 2021-02-15 PROCEDURE — 74011250637 HC RX REV CODE- 250/637: Performed by: INTERNAL MEDICINE

## 2021-02-15 PROCEDURE — 83735 ASSAY OF MAGNESIUM: CPT

## 2021-02-15 PROCEDURE — 74011250637 HC RX REV CODE- 250/637: Performed by: HOSPITALIST

## 2021-02-15 PROCEDURE — 82962 GLUCOSE BLOOD TEST: CPT

## 2021-02-15 PROCEDURE — 97530 THERAPEUTIC ACTIVITIES: CPT | Performed by: OCCUPATIONAL THERAPIST

## 2021-02-15 PROCEDURE — 80048 BASIC METABOLIC PNL TOTAL CA: CPT

## 2021-02-15 PROCEDURE — 74011250636 HC RX REV CODE- 250/636: Performed by: INTERNAL MEDICINE

## 2021-02-15 PROCEDURE — 36415 COLL VENOUS BLD VENIPUNCTURE: CPT

## 2021-02-15 PROCEDURE — U0005 INFEC AGEN DETEC AMPLI PROBE: HCPCS

## 2021-02-15 PROCEDURE — 74011636637 HC RX REV CODE- 636/637: Performed by: INTERNAL MEDICINE

## 2021-02-15 PROCEDURE — 97530 THERAPEUTIC ACTIVITIES: CPT

## 2021-02-15 PROCEDURE — 77010033678 HC OXYGEN DAILY

## 2021-02-15 PROCEDURE — 2709999900 HC NON-CHARGEABLE SUPPLY

## 2021-02-15 PROCEDURE — 97116 GAIT TRAINING THERAPY: CPT

## 2021-02-15 PROCEDURE — 65660000001 HC RM ICU INTERMED STEPDOWN

## 2021-02-15 RX ORDER — FLUTICASONE PROPIONATE 50 MCG
2 SPRAY, SUSPENSION (ML) NASAL DAILY
Status: DISCONTINUED | OUTPATIENT
Start: 2021-02-16 | End: 2021-02-17 | Stop reason: HOSPADM

## 2021-02-15 RX ORDER — ENOXAPARIN SODIUM 100 MG/ML
40 INJECTION SUBCUTANEOUS EVERY 24 HOURS
Status: DISCONTINUED | OUTPATIENT
Start: 2021-02-16 | End: 2021-02-17 | Stop reason: HOSPADM

## 2021-02-15 RX ADMIN — FUROSEMIDE 40 MG: 10 INJECTION, SOLUTION INTRAMUSCULAR; INTRAVENOUS at 09:11

## 2021-02-15 RX ADMIN — INSULIN LISPRO 5 UNITS: 100 INJECTION, SOLUTION INTRAVENOUS; SUBCUTANEOUS at 12:53

## 2021-02-15 RX ADMIN — Medication 10 ML: at 05:26

## 2021-02-15 RX ADMIN — INSULIN LISPRO 2 UNITS: 100 INJECTION, SOLUTION INTRAVENOUS; SUBCUTANEOUS at 17:19

## 2021-02-15 RX ADMIN — POLYETHYLENE GLYCOL 3350 17 G: 17 POWDER, FOR SOLUTION ORAL at 09:09

## 2021-02-15 RX ADMIN — ENOXAPARIN SODIUM 30 MG: 30 INJECTION SUBCUTANEOUS at 05:26

## 2021-02-15 RX ADMIN — ASPIRIN 81 MG: 81 TABLET, COATED ORAL at 09:10

## 2021-02-15 RX ADMIN — Medication 10 ML: at 22:05

## 2021-02-15 RX ADMIN — METOPROLOL TARTRATE 25 MG: 25 TABLET, FILM COATED ORAL at 09:10

## 2021-02-15 RX ADMIN — Medication 10 ML: at 15:02

## 2021-02-15 RX ADMIN — PREDNISONE 20 MG: 20 TABLET ORAL at 09:10

## 2021-02-15 RX ADMIN — HUMAN INSULIN 10 UNITS: 100 INJECTION, SUSPENSION SUBCUTANEOUS at 09:09

## 2021-02-15 RX ADMIN — DOCUSATE SODIUM - SENNOSIDES 1 TABLET: 50; 8.6 TABLET, FILM COATED ORAL at 09:09

## 2021-02-15 RX ADMIN — DOCUSATE SODIUM - SENNOSIDES 1 TABLET: 50; 8.6 TABLET, FILM COATED ORAL at 17:19

## 2021-02-15 RX ADMIN — FAMOTIDINE 20 MG: 20 TABLET, FILM COATED ORAL at 09:10

## 2021-02-15 RX ADMIN — CHOLECALCIFEROL TAB 25 MCG (1000 UNIT) 2000 UNITS: 25 TAB at 09:10

## 2021-02-15 RX ADMIN — FAMOTIDINE 20 MG: 20 TABLET, FILM COATED ORAL at 22:05

## 2021-02-15 RX ADMIN — Medication 3 MG: at 22:05

## 2021-02-15 RX ADMIN — INSULIN LISPRO 3 UNITS: 100 INJECTION, SOLUTION INTRAVENOUS; SUBCUTANEOUS at 22:05

## 2021-02-15 RX ADMIN — METOPROLOL TARTRATE 25 MG: 25 TABLET, FILM COATED ORAL at 17:19

## 2021-02-15 NOTE — PROGRESS NOTES
0700: Verbal shift change report given to Vidhya RN (oncoming nurse) by Nadine Sherman RN (offgoing nurse). Report included the following information SBAR, Intake/Output, MAR, Recent Results and Cardiac Rhythm NSR/Sinus Tach. 1400: Perfect Serve to Dr. Evans Martins regarding pt complaining of nasal congestion. Also, pt's family would like to have repeat COVID swab. Will place orders for Flonase and COVID swab.    1900:   End of Shift Note    Bedside shift change report given to VADIM Gomez (oncoming nurse) by Denver Sor, RN (offgoing nurse). Report included the following information SBAR, Intake/Output, MAR, Recent Results and Cardiac Rhythm NSR/Sinus Tach    Shift worked:  1529-3732     Shift summary and any significant changes:     None     Concerns for physician to address:  None     Zone phone for oncoming shift:   [phone]       Activity:  Activity Level: Up with Assistance  Number times ambulated in hallways past shift: 0  Number of times OOB to chair past shift: 3    Cardiac:   Cardiac Monitoring: Yes      Cardiac Rhythm: Normal sinus rhythm, Sinus tachycardia    Access:   Current line(s): PIV     Genitourinary:   Urinary status: voiding    Respiratory:   O2 Device: Nasal cannula  Chronic home O2 use?: NO  Incentive spirometer at bedside: YES  Actual Volume (ml): 250 ml  GI:  Last Bowel Movement Date: 02/15/21  Current diet:  DIET DIABETIC CONSISTENT CARB Regular  DIET NUTRITIONAL SUPPLEMENTS Dinner, Lunch; Glucerna Shake  Passing flatus: YES  Tolerating current diet: YES  % Diet Eaten: 100 %    Pain Management:   Patient states pain is manageable on current regimen: N/A    Skin:  Govind Score: 18  Interventions: increase time out of bed and PT/OT consult    Patient Safety:  Fall Score:  Total Score: 3  Interventions: bed/chair alarm, assistive device (walker, cane, etc), gripper socks and pt to call before getting OOB  High Fall Risk: Yes    Length of Stay:  Expected LOS: 5d 12h  Actual LOS: [address] RN

## 2021-02-15 NOTE — PROGRESS NOTES
02/15/21 1150 02/15/21 1152 02/15/21 1153   Vital Signs   O2 Sat (%) (!) 84 %  (upon arrival to room, pt sitting up in chair) (!) 86 % 90 %   O2 Device Nasal cannula Nasal cannula Nasal cannula   O2 Flow Rate (L/min) 3 l/min 4 l/min 6 l/min      02/15/21 1200 02/15/21 1202   Vital Signs   O2 Sat (%) (!) 82 %  (mobility forward and back with Rw) 90 %   O2 Device Nasal cannula Nasal cannula   O2 Flow Rate (L/min) 6 l/min 6 l/min

## 2021-02-15 NOTE — PROGRESS NOTES
Problem: Mobility Impaired (Adult and Pediatric)  Goal: *Acute Goals and Plan of Care (Insert Text)  Description: FUNCTIONAL STATUS PRIOR TO ADMISSION: Patient was independent and active without use of DME.    HOME SUPPORT PRIOR TO ADMISSION: The patient lived with family. Physical Therapy Goals  Initiated 2/5/2021  1. Patient will move from supine to sit and sit to supine  in bed with independence within 7 day(s). 2.  Patient will transfer from bed to chair and chair to bed with independence using the least restrictive device within 7 day(s). 3.  Patient will perform sit to stand with independence within 7 day(s). 4.  Patient will ambulate with independence for 50 feet with the least restrictive device within 7 day(s). Physical Therapy Goals  Revised  2/10/2021  1. Patient will move from supine to sit and sit to supine  in bed with independence within 7 day(s). 2.  Patient will transfer from bed to chair and chair to bed with independence using the least restrictive device within 7 day(s). 3.  Patient will perform sit to stand with independence within 7 day(s). 4.  Patient will ambulate with modified independence for 15 feet with the least restrictive device within 7 day(s). While maintaining oxygen saturation >90%      Outcome: Progressing Towards Goal    PHYSICAL THERAPY TREATMENT  Patient: Rola Story (89 y.o. male)  Date: 2/15/2021  Diagnosis: COVID-19 virus infection [U07.1]  [facility] [J96.01] <principal problem not specified>       Precautions: (droplet +)  Chart, physical therapy assessment, plan of care and goals were reviewed. ASSESSMENT  Patient continues with skilled PT services and is progressing towards goals. Pt was received sitting up in the chair on 4L and cleared by nursing to mobilize. Oxygen saturation 84% and not recovering. Increased to 6L and was able to tolerate minimal activity.  Stood and ambulated with RW forward for 5ft and back for 5ft. Sat and cued to PLB, oxygen had dropped to 82%. Able to recover to upper 80s. Blew  his nose and able to get to 90s. MD entered the room and made aware of progress and vitals. He was left sitting up. Current Level of Function Impacting Discharge (mobility/balance): CGA    Other factors to consider for discharge:          PLAN :  Patient continues to benefit from skilled intervention to address the above impairments. Continue treatment per established plan of care. to address goals. Recommendation for discharge: (in order for the patient to meet his/her long term goals)  Therapy 3 hours per day 5-7 days per week, pulmonary rehab    This discharge recommendation:  Has been made in collaboration with the attending provider and/or case management    IF patient discharges home will need the following DME: to be determined (TBD)       SUBJECTIVE:   Patient stated Chayo Rivas.     OBJECTIVE DATA SUMMARY:   Critical Behavior:  Neurologic State: Alert  Orientation Level: Oriented X4  Cognition: Appropriate decision making, Appropriate for age attention/concentration, Appropriate safety awareness  Safety/Judgement: Awareness of environment, Fall prevention  Functional Mobility Training:  Bed Mobility:         Session began and ended in sitting           Transfers:  Sit to Stand: Contact guard assistance  Stand to Sit: Contact guard assistance                             Balance:  Sitting: Intact  Standing: Impaired  Standing - Static: Good  Standing - Dynamic : Fair  Ambulation/Gait Training:  Distance (ft): 10 Feet (ft)  Assistive Device: Walker, rolling  Ambulation - Level of Assistance: Contact guard assistance        Gait Abnormalities: Decreased step clearance              Speed/Wen: Pace decreased (<100 feet/min)  Step Length: Left shortened;Right shortened               Activity Tolerance:   desaturates with exertion and requires oxygen    After treatment patient left in no apparent distress:   Sitting in chair and Call bell within reach    COMMUNICATION/COLLABORATION:   The patients plan of care was discussed with: Occupational therapist, Registered nurse, and Physician.      Belgica Nichols, PT, DPT   Time Calculation: 29 mins

## 2021-02-15 NOTE — PROGRESS NOTES
0700: End of Shift Note    Bedside shift change report given to VADIM Kee (oncoming nurse) by Jorgito Mahmood RN (offgoing nurse). Report included the following information SBAR, Kardex, Procedure Summary, Intake/Output, MAR, Recent Results and Cardiac Rhythm NSR/ST    Shift worked:  0312-4835     Shift summary and any significant changes:     Still on 4 lters oxygen, continue to wean as tolerated. Desats with exerion     Concerns for physician to address:       Zone phone for oncoming shift:          Activity:  Activity Level: Up with Assistance  Number times ambulated in hallways past shift: 0  Number of times OOB to chair past shift: 0    Cardiac:   Cardiac Monitoring: Yes      Cardiac Rhythm: Normal sinus rhythm, Sinus tachycardia    Access:   Current line(s): PIV     Genitourinary:   Urinary status: voiding    Respiratory:   O2 Device: Nasal cannula  Chronic home O2 use?: NO  Incentive spirometer at bedside: YES  Actual Volume (ml): 250 ml  GI:  Last Bowel Movement Date: 02/14/21  Current diet:  DIET DIABETIC CONSISTENT CARB Regular  DIET NUTRITIONAL SUPPLEMENTS Dinner, Lunch; Glucerna Shake  Passing flatus: YES  Tolerating current diet: YES  % Diet Eaten: 100 %    Pain Management:   Patient states pain is manageable on current regimen: YES    Skin:  Govind Score: 18  Interventions: float heels, increase time out of bed and PT/OT consult    Patient Safety:  Fall Score:  Total Score: 3  Interventions: bed/chair alarm and assistive device (walker, cane, etc)  High Fall Risk: Yes    Length of Stay:  Expected LOS: 5d 12h  Actual LOS: [address] Del[address] Acosta RN

## 2021-02-15 NOTE — PROGRESS NOTES
0700 Received report from Jane Monsivais [facility]. Assume care of patient    1900  End of Shift Note    Bedside shift change report given to Jane Monsivais RN (oncoming nurse) by Vivianne Nissen, RN (offgoing nurse). Report included the following information SBAR, Kardex, MAR and Cardiac Rhythm NSR/Sinus TAch    Shift worked:  7a-7p     Shift summary and any significant changes:     patient had a good day     Concerns for physician to address:  none     Zone phone for oncoming shift:   na       Activity:  Activity Level: Up with Assistance  Number times ambulated in hallways past shift: 0  Number of times OOB to chair past shift: 0    Cardiac:   Cardiac Monitoring: Yes      Cardiac Rhythm: Normal sinus rhythm, Sinus tachycardia    Access:   Current line(s): PIV     Genitourinary:   Urinary status: voiding    Respiratory:   O2 Device: Nasal cannula  Chronic home O2 use?: NO  Incentive spirometer at bedside: YES  Actual Volume (ml): 250 ml  GI:  Last Bowel Movement Date: 02/13/21  Current diet:  DIET DIABETIC CONSISTENT CARB Regular  DIET NUTRITIONAL SUPPLEMENTS Dinner, Lunch; Glucerna Shake  Passing flatus: YES  Tolerating current diet: YES  % Diet Eaten: 100 %    Pain Management:   Patient states pain is manageable on current regimen: YES    Skin:  Govind Score: 18  Interventions: float heels, increase time out of bed and PT/OT consult    Patient Safety:  Fall Score:  Total Score: 3  Interventions: bed/chair alarm, assistive device (walker, cane, etc), gripper socks and pt to call before getting OOB  High Fall Risk: Yes    Length of Stay:  Expected LOS: 5d 12h  Actual LOS: [address], RN

## 2021-02-15 NOTE — PROGRESS NOTES
Hospitalist Progress Note    NAME: Nikos Kamara   :  1965   MRN:  [id number]       Assessment / Plan:  Acute hypoxic respiratory failure POA  Severe multilobar Covid pneumonia POA  Pneumomediastinum , not POA, persistent but stable  -COVID-19 test positive on . -CTA  neg for PE. Shows diffuse bilateral peripheral groundglass opacification and consolidation in the lower lobes   -Initially on nasal cannula/high flow, decompensation  went on BIPAP    -developed pneumomediastinum , changed to high flow from BiPAP to reduce barotrauma,   -s/p antibiotics  -s/p convalescent plasma 2021  -s/p IV Solu-Medrol - now on prednisone taper. Inflammatory markers trending down  -continue lovenox. Pulm recommends eliquis 5bid x 30 at discharge  -continue IV lasix. Follow lytes. -was on 3-4L yesterday but needing 4-6L today after working with PT.  DC planning to IR once bed available. 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 Constipation  -pericolace and miralax daily    GERD  -Pepcid      Obesity  Body mass index is 31.14 kg/m².     Code: Discussed, full code    Surrogate decision maker: Brothtoro Aragon [phone]    Disposition:  IP Pulm rehab versus  lTBD, PT/OT eval noted CM working on DC planning     Subjective:     Chief Complaint / Reason for Physician Visit:   F/U Resp failure/COVID PNA  Discussed with RN overnight events. Review of Systems:  Symptom Y/N Comments  Symptom Y/N Comments   Fever/Chills n   Chest Pain n    Poor Appetite    Edema     Cough n   Abdominal Pain n    Sputum    Joint Pain     SOB/BULL y  improved  Pruritis/Rash     Nausea/vomit n   Tolerating PT/OT y    Diarrhea    Tolerating Diet y    Constipation    Other       Could NOT obtain due to:          Objective:     VITALS:   Last 24hrs VS reviewed since prior progress note.  Most recent are:  Patient Vitals for the past 24 hrs:   Temp Pulse Resp BP SpO2   02/15/21 1202 -- -- -- -- 90 %   02/15/21 1200 -- -- -- -- (!) 82 %   02/15/21 1153 -- -- -- -- 90 %   02/15/21 1152 -- -- -- -- (!) 86 %   02/15/21 1150 -- -- -- -- (!) 84 %   02/15/21 1037 98.3 °F (36.8 °C) (!) 108 16 118/78 92 %   02/15/21 0736 98.1 °F (36.7 °C) 98 16 120/89 94 %   02/15/21 0238 98 °F (36.7 °C) 80 16 117/75 96 %   02/14/21 2222 98.4 °F (36.9 °C) (!) 104 16 106/68 91 %   02/14/21 1944 98.1 °F (36.7 °C) (!) 128 16 131/81 92 %       Intake/Output Summary (Last 24 hours) at 2/15/2021 1452  Last data filed at 2/15/2021 1037  Gross per 24 hour   Intake 720 ml   Output 300 ml   Net 420 ml        I had a face to face encounter and independently examined this patient on 2/15/2021, as outlined below:  PHYSICAL EXAM:  General: WD, WN. Alert, cooperative, in moderate respiratory distress    EENT:  EOMI. Anicteric sclerae. MMM  Resp:  B/l lung coarse, rales+  CV:  Regular  rhythm,  No edema  GI:  Soft, Non distended, Non tender. +Bowel sounds  Neurologic:  Alert and oriented X 3, normal speech,   Psych:   Good insight. Not anxious nor agitated  Skin:  No rashes. No jaundice, subcu around right flank    Reviewed most current lab test results and cultures  YES  Reviewed most current radiology test results   YES  Review and summation of old records today    NO  Reviewed patient's current orders and MAR    YES  PMH/SH reviewed - no change compared to H&P  ________________________________________________________________________  Care Plan discussed with:    Comments   Patient x    Family  x MAC   RN x    Care Manager     Consultant                        Multidiciplinary team rounds were held today with , nursing, pharmacist and clinical coordinator. Patient's plan of care was discussed; medications were reviewed and discharge planning was addressed. ________________________________________________________________________    Total TIME Spent: 25 Minutes non procedure based      Comments   >50% of visit spent in counseling and coordination of care x    ________________________________________________________________________  Rito Manning MD     Procedures: see electronic medical records for all procedures/Xrays and details which were not copied into this note but were reviewed prior to creation of Plan. LABS:  I reviewed today's most current labs and imaging studies. Pertinent labs include:  No results for input(s): WBC, HGB, HCT, PLT, HGBEXT, HCTEXT, PLTEXT, HGBEXT, HCTEXT, PLTEXT in the last 72 hours.   Recent Labs     02/15/21  0241 02/13/21  0347    134*   K 3.7 3.6   CL 97 93*   CO2 36* 37*   GLU 84 62*   BUN 17 19   CREA 0.48* 0.45*   CA 8.6 9.1   MG 2.0 2.2       Signed: Rito Manning MD

## 2021-02-15 NOTE — PROGRESS NOTES
Problem: Self Care Deficits Care Plan (Adult)  Goal: *Acute Goals and Plan of Care (Insert Text)  Description: FUNCTIONAL STATUS PRIOR TO ADMISSION: Patient was independent and active without use of DME    HOME SUPPORT PRIOR TO ADMISSION: The patient lived alone with brother and neighbors to provide assistance. Occupational Therapy Goals:  Weekly re-eval 2/10/2021 all below goals remain appropirate  Initiated 2/5/2021  1. Patient will perform grooming seated with supervision within 7 days. Met 2/10/2021 upgrade to standing with rest breaks  2. Patient will perform toileting with supervision within 7 days. 3. Patient will perform lower body dressing with supervision within 7 days. 4. Patient will transfer from bedside commode with modified independence using the least restrictive device and appropriate durable medical equipment within 7 days. 5. Patient will perform UB dressing with supervision within 7 days. Outcome: Progressing Towards Goal   OCCUPATIONAL THERAPY TREATMENT  Patient: Shivam Steen (53 y.o. male)  Date: 2/15/2021  Diagnosis: COVID-19 virus infection [U07.1]  Acute respiratory failure with hypoxia ([facility] [J96.01] <principal problem not specified>       Precautions: (droplet +)  Chart, occupational therapy assessment, plan of care, and goals were reviewed. ASSESSMENT  Patient continues with skilled OT services and is progressing towards goals but remains limited with activity and ADLS due to respiratory status. Pt was on 4L NC upon arrival and O2 sat was 84%. Pt needed to be titrated back up to 6L for activity and pt was 82% with mobility forward and back. Pt continues to state that his nose is stopped up. Attempted to have pt blow his nose and this did help some.  arrived and was notified of findings. Improved balance with RW for support this session. Continue to recommend inpatient pulmonary rehab at discharge.     02/15/21 1150 02/15/21 1152 02/15/21 1153    Vital Signs   O2 Sat (%) (!) 84 %  (upon arrival to room, pt sitting up in chair) (!) 86 % 90 %   O2 Device Nasal cannula Nasal cannula Nasal cannula   O2 Flow Rate (L/min) 3 l/min 4 l/min 6 l/min        02/15/21 1200 02/15/21 1202   Vital Signs   O2 Sat (%) (!) 82 %  (mobility forward and back with Rw) 90 %   O2 Device Nasal cannula Nasal cannula   O2 Flow Rate (L/min) 6 l/min 6 l/min     Current Level of Function Impacting Discharge (ADLs):  CGA sit to stand and CGA to min assist to mobilize forward ~5 feet and back with RW  Grooming  Washing Hands: Set-up(seated)    Other factors to consider for discharge: O2 sats drop quickly with minimal activity         PLAN :  Patient continues to benefit from skilled intervention to address the above impairments. Continue treatment per established plan of care. to address goals. Recommend with staff: monitor O2 sats, OOB to chair    Recommend next OT session: ADLs and mobility    Recommendation for discharge: (in order for the patient to meet his/her long term goals)  Inpatient pulmonary Rehab    This discharge recommendation:  Has been made in collaboration with the attending provider and/or case management    IF patient discharges home will need the following DME: bedside commode, portable oxygen, shower chair, walker: rolling, and wheelchair       SUBJECTIVE:   Patient stated Star Morales will try.     OBJECTIVE DATA SUMMARY:   Cognitive/Behavioral Status:  Neurologic State: Alert  Orientation Level: Oriented X4  Cognition: Appropriate decision making; Appropriate for age attention/concentration; Appropriate safety awareness  Perception: Appears intact  Perseveration: No perseveration noted  Safety/Judgement: Awareness of environment; Fall prevention    Functional Mobility and Transfers for ADLs:      Transfers:  Sit to Stand: Contact guard assistance          Balance:  Sitting: Intact  Standing: Impaired  Standing - Static: Good  Standing - Dynamic : Fair    ADL Intervention: Grooming  Washing Hands: Set-up(seated)      Cognitive Retraining  Safety/Judgement: Awareness of environment; Fall prevention      Activity Tolerance:   desaturates with exertion and requires oxygen, requires frequent rest breaks, and observed SOB with activity    After treatment patient left in no apparent distress:   Sitting in chair and Call bell within reach    COMMUNICATION/COLLABORATION:   The patients plan of care was discussed with: Physical therapist, Registered nurse, Physician, and patient .      Renae Craven OTR/L  Time Calculation: 24 mins

## 2021-02-15 NOTE — PROGRESS NOTES
Problem: Falls - Risk of  Goal: *Absence of Falls  Description: Document Neno Remy Fall Risk and appropriate interventions in the flowsheet. Outcome: Progressing Towards Goal  Note: Fall Risk Interventions:  Mobility Interventions: Bed/chair exit alarm, OT consult for ADLs, PT Consult for mobility concerns, PT Consult for assist device competence, Strengthening exercises (ROM-active/passive), Utilize walker, cane, or other assistive device, Patient to call before getting OOB         Medication Interventions: Bed/chair exit alarm, Patient to call before getting OOB, Teach patient to arise slowly    Elimination Interventions: Bed/chair exit alarm, Call light in reach, Patient to call for help with toileting needs, Toilet paper/wipes in reach, Toileting schedule/hourly rounds, Urinal in reach    History of Falls Interventions: Bed/chair exit alarm, Door open when patient unattended, Room close to nurse's station         Problem: Patient Education: Go to Patient Education Activity  Goal: Patient/Family Education  Outcome: Progressing Towards Goal     Problem: Breathing Pattern - Ineffective  Goal: *Absence of hypoxia  Outcome: Progressing Towards Goal  Goal: *Use of effective breathing techniques  Outcome: Progressing Towards Goal     Problem: Airway Clearance - Ineffective  Goal: Achieve or maintain patent airway  Outcome: Progressing Towards Goal     Problem: Gas Exchange - Impaired  Goal: Absence of hypoxia  Outcome: Progressing Towards Goal  Goal: Promote optimal lung function  Outcome: Progressing Towards Goal     Problem: Breathing Pattern - Ineffective  Goal: Ability to achieve and maintain a regular respiratory rate  Outcome: Progressing Towards Goal     Problem:  Body Temperature -  Risk of, Imbalanced  Goal: Ability to maintain a body temperature within defined limits  Outcome: Progressing Towards Goal  Goal: Will regain or maintain usual level of consciousness  Outcome: Progressing Towards Goal  Goal: Complications related to the disease process, condition or treatment will be avoided or minimized  Outcome: Progressing Towards Goal     Problem: Isolation Precautions - Risk of Spread of Infection  Goal: Prevent transmission of infectious organism to others  Outcome: Progressing Towards Goal     Problem: Nutrition Deficits  Goal: Optimize nutrtional status  Outcome: Progressing Towards Goal     Problem: Risk for Fluid Volume Deficit  Goal: Maintain normal heart rhythm  Outcome: Progressing Towards Goal  Goal: Maintain absence of muscle cramping  Outcome: Progressing Towards Goal  Goal: Maintain normal serum potassium, sodium, calcium, phosphorus, and pH  Outcome: Progressing Towards Goal     Problem: Loneliness or Risk for Loneliness  Goal: Demonstrate positive use of time alone when socialization is not possible  Outcome: Progressing Towards Goal     Problem: Fatigue  Goal: Verbalize increase energy and improved vitality  Outcome: Progressing Towards Goal     Problem: Patient Education: Go to Patient Education Activity  Goal: Patient/Family Education  Outcome: Progressing Towards Goal     Problem: Pressure Injury - Risk of  Goal: *Prevention of pressure injury  Description: Document Govind Scale and appropriate interventions in the flowsheet.   Outcome: Progressing Towards Goal  Note: Pressure Injury Interventions:  Sensory Interventions: Assess changes in LOC, Keep linens dry and wrinkle-free, Maintain/enhance activity level, Minimize linen layers, Pressure redistribution bed/mattress (bed type), Sit a 90-degree angle/use footstool if needed, Use 30-degree side-lying position    Moisture Interventions: Absorbent underpads, Check for incontinence Q2 hours and as needed, Minimize layers, Offer toileting Q_hr    Activity Interventions: Increase time out of bed, Pressure redistribution bed/mattress(bed type), PT/OT evaluation    Mobility Interventions: HOB 30 degrees or less, Pressure redistribution bed/mattress (bed type), PT/OT evaluation    Nutrition Interventions: Document food/fluid/supplement intake    Friction and Shear Interventions: Apply protective barrier, creams and emollients, HOB 30 degrees or less, Minimize layers, Lift team/patient mobility team, Sit at 90-degree angle                Problem: Patient Education: Go to Patient Education Activity  Goal: Patient/Family Education  Outcome: Progressing Towards Goal

## 2021-02-15 NOTE — PROGRESS NOTES
Pharmacy - Enoxaparin (Lovenox®) Monitoring      Indication: VTE ppx     Current Dose: Enoxaparin 30 mg subcutaneously every 12 hours    Creatinine Clearance (mL/min): > 100 mL/min    Current Weight: 75.3 Kg    Labs:  Recent Labs     02/15/21  0241 02/13/21  0347   CREA 0.48* 0.45*     Wt Readings from Last 1 Encounters:   02/14/21 75.3 kg (166 lb)     Ht Readings from Last 1 Encounters:   01/25/21 172.7 cm (68\")       COVID-19 related labs (anticoagulation):   Recent Labs     02/09/21  0309 02/06/21  0348 02/03/21  0232 02/02/21  0216 02/01/21  0242 01/31/21  0212 01/30/21  0205 01/19/21  0310 01/19/21  0310   DDIMSQ 0.33 0.40 0.35 0.46 0.89* 0.68* 0.80*   < > 4.21*   FIBRN  --   --   --   --   --   --   --   --  752*    < > = values in this interval not displayed. Impression/Plan:   Will change to enoxaparin 40 mg daily per updated dosing protocol. Thanks,  Faiza Ortiz, PHARMD      [email or web address]

## 2021-02-15 NOTE — PROGRESS NOTES
[facility]  Pulmonary, Critical Care, and Sleep Medicine    Name: Alex Saldivar MRN: [id number]   : 1965 Hospital: [facility]   Date: 2/15/2021            IMPRESSION:   · Acute hypoxic respiratory failure - severe, secondary to COVID PNA  · Severe Multilobar Covid Pneumonia, first Dx on , Symptoms first started on 21  S/P convalescent plasma ; S/p Remdesivir x 5 days total  · New pneumomediastinum 21, radiographically stable/resolved  · Covid related coagulopathy  · Covid related inflammation, increased ferritin.   · Steroid induced hyperglycemia  · Obesity  · GERD  · Hypertension, LAE and LVH on ekg.       RECOMMENDATIONS:   · O2 - wean as tolerated  · Empiric antibiotics completed  · Continue po steroids, down to 20 mg daily, would continue for another day, then wean to 10 for 3 days, then stop  · PT/OT  · Hopefully to rehab soon.  Would recommend Eliquis 5 bid x 30 days after discharge   PCP: Joe Simms.       Subjective:     2-15-21: overall feeling significantly better.  No acute complaints today.  Looking forward to discharge soon.  Down to 4 LPM, but he does desaturate with exertion.     Current Facility-Administered Medications   Medication Dose Route Frequency   • senna-docusate (PERICOLACE) 8.6-50 mg per tablet 1 Tab  1 Tab Oral BID   • polyethylene glycol (MIRALAX) packet 17 g  17 g Oral DAILY   • furosemide (LASIX) injection 40 mg  40 mg IntraVENous DAILY   • predniSONE (DELTASONE) tablet 20 mg  20 mg Oral DAILY WITH BREAKFAST   • melatonin tablet 3 mg  3 mg Oral QHS   • insulin lispro (HUMALOG) injection   SubCUTAneous AC&HS   • insulin NPH (NOVOLIN N, HUMULIN N) injection 10 Units  10 Units SubCUTAneous DAILY   • metoprolol tartrate (LOPRESSOR) tablet 25 mg  25 mg Oral BID   • enoxaparin (LOVENOX) injection 30 mg  30 mg SubCUTAneous Q12H   • famotidine (PEPCID) tablet 20 mg  20 mg Oral Q12H   • influenza vaccine - (6  mos+)(PF) (FLUARIX/FLULAVAL/FLUZONE QUAD) injection 0.5 mL  0.5 mL IntraMUSCular PRIOR TO DISCHARGE    sodium chloride (NS) flush 5-40 mL  5-40 mL IntraVENous Q8H    cholecalciferol (VITAMIN D3) (1000 Units /25 mcg) tablet 2 Tab  2,000 Units Oral DAILY    aspirin delayed-release tablet 81 mg  81 mg Oral DAILY       Review of Systems:  Constitutional: positive for fevers, chills, sweats, fatigue, malaise and anorexia, negative for weight loss  Eyes: negative  Ears, nose, mouth, throat, and face: positive for nasal congestion  Respiratory: positive for cough or dyspnea on exertion  Cardiovascular: positive for fatigue, paroxysmal nocturnal dyspnea, tachypnea, dyspnea on exertion  Gastrointestinal: positive for dyspepsia, reflux symptoms, nausea and change in bowel habits  Genitourinary:negative  Integument/breast: negative  Hematologic/lymphatic: negative  Musculoskeletal:negative  Neurological: negative  Behavioral/Psych: negative  Endocrine: negative  Allergic/Immunologic: negative    Objective:   Vital Signs:    Visit Vitals  /89   Pulse 98   Temp 98.1 °F (36.7 °C)   Resp 16   Ht 5' 8\" (1.727 m)   Wt 75.3 kg (166 lb)   SpO2 94%   BMI 25.24 kg/m²       O2 Device: Nasal cannula   O2 Flow Rate (L/min): 4 l/min   Temp (24hrs), Av.1 °F (36.7 °C), Min:97.8 °F (36.6 °C), Max:98.4 °F (36.9 °C)       Intake/Output:   Last shift:      No intake/output data recorded. Last 3 shifts:  190 - 02/15 0700  In: 400 [P.O.:400]  Out: 1300 [Urine:1300]    Intake/Output Summary (Last 24 hours) at 2/15/2021 0840  Last data filed at 2/15/2021 0240  Gross per 24 hour   Intake --   Output 900 ml   Net -900 ml      Physical Exam:   General:  Alert, cooperative, no resp distress. Head:  Normocephalic, without obvious abnormality, atraumatic. Eyes:  Conjunctivae/corneas clear    Nose: Nares normal. Septum midline.  Mucosa normal.     Throat: Lips, mucosa, and tongue normal.    Neck: Supple, symmetrical, trachea midline Lungs:   Distant bilateral breath sounds but clear. Breathing comfortably. Chest wall:  No tenderness or deformity. Heart:  Regular rate and rhythm    Abdomen:   Soft, non-tender. Bowel sounds normal.    Extremities: Extremities normal, atraumatic, no cyanosis    Skin: Skin color, texture, turgor normal. No crepitus   Neurologic: Grossly nonfocal      Data:   Labs:  No results for input(s): WBC, HGB, HCT, PLT, HGBEXT, HCTEXT, PLTEXT, HGBEXT, HCTEXT, PLTEXT in the last 72 hours. Recent Labs     02/15/21  0241 02/13/21  0347    134*   K 3.7 3.6   CL 97 93*   CO2 36* 37*   GLU 84 62*   BUN 17 19   CREA 0.48* 0.45*   CA 8.6 9.1   MG 2.0 2.2     No results for input(s): PHI, PCO2I, PO2I, HCO3I, FIO2I in the last 72 hours.     Imaging:  I have personally reviewed the patients radiographs:  None today        Mark Day MD

## 2021-02-15 NOTE — PROGRESS NOTES
Comprehensive Nutrition Assessment    Type and Reason for Visit: Reassess    Nutrition Recommendations/Plan:   Continue CCD  D/c Glucerna per pt request since he is eating better    Nutrition Assessment:      2/15: Chart reviewed for reassessment. RD spoke with pt over the phone. He reports his appetite is \"fair\" but still has some fluctuations. He had not wanted the Glucerna shakes for the past few days because they were \"too much\" with the food. 100% recent meal intake recorded. Will d/c Glucerna. BG still spiking >200mg/dL, but anticipate this to improve with steroid tx weaning down. Recent BM. Pt reports d/c planning soon. Patient Vitals for the past 72 hrs:   % Diet Eaten   02/15/21 0736 100 %   02/13/21 1954 100 %   02/13/21 1335 100 %   02/13/21 0846 100 %   02/12/21 1400 100 %     2/8: Chart reviewed for reassessment. RD spoke with pt over the phone. He reports he does not care much for the food but has generally been eating fairly. Recent recorded intakes %. BG continues to spike, likely d/t steroid tx. Will adjust Ensure to Glucerna. Estimated Daily Nutrient Needs:  Energy (kcal): 2017 kcal (BMR x 1. 3AF - 250); Weight Used for Energy Requirements: Current  Protein (g): 74-93g (0.8-1g/kg); Weight Used for Protein Requirements: Current  Fluid (ml/day): 2000mL; Method Used for Fluid Requirements: 1 ml/kcal      Nutrition Related Findings:  Labs: -286mg/dL. Meds: D3, pepcid, lasix, NPH, miralax, prednisone, pericolace. BM 2/14. Wounds:    None       Current Nutrition Therapies:  DIET DIABETIC CONSISTENT CARB Regular  DIET NUTRITIONAL SUPPLEMENTS Dinner, Lunch; Glucerna Shake    Anthropometric Measures:  · Height:  5' 8\" (172.7 cm)  · Current Body Wt:  75.3 kg (166 lb 0.1 oz)   · Ideal Body Wt:  154 lbs:  133 %   · BMI Category: Overweight (BMI 25.0-29. 9)       Nutrition Diagnosis:   · Altered nutrition-related lab values related to (current medical condition, acute illness, steroid meds) as evidenced by (elevated BG levels >300 mg/dl)  Diagnosis improving, recent -286mg/dL but steroid tx is weaning down.     Nutrition Interventions:   Food and/or Nutrient Delivery: Continue current diet, Discontinue oral nutrition supplement  Nutrition Education and Counseling: No recommendations at this time  Coordination of Nutrition Care: No recommendation at this time    Goals:  PO intake >75% meals with BG <200mg/dL next 5-7 days       Nutrition Monitoring and Evaluation:   Behavioral-Environmental Outcomes: None identified  Food/Nutrient Intake Outcomes: Food and nutrient intake  Physical Signs/Symptoms Outcomes: Biochemical data, Weight, GI status    Discharge Planning:    (Regular diet if BG normal after steroid stops)     Electronically signed by Penny Hernández RD on 2/15/2021 at 10:17 AM    Contact: [phone]  Pager [phone]

## 2021-02-15 NOTE — PROGRESS NOTES
All in agreement with [facility] to [facility] if bed is available(we have insurance auth)    Hattie Song, says if no [facility] or [facility], then home with the 2 caregivers she has arranged, along with [place] [facility] and [address]. I spoke with MD and [facility] Hannon. [facility] has accepted pt(pending auth//bed availability), has initiated insurance auth, and has no beds today. Hattie Song asked that I send the chart to [facility] to assess for admission(done). If he needs SNF, she requests [facility]. If home with [place] [id number], she wants [address]. No d/c anticipated today. ORIANA:  -confirm transport at d/c-[facility]able [facility] as of 2/15  -assess for home oxygen 24 hours prior to d/c-  -await therapy recommendations for the benefit of HHC vs IP rehab. As of 2/15, they,  recommend IP rehab  -ask pt again if he would be willing to give us his physical address.  This will be needed to deliver the oxygen//HHC staff.  -may benefit from IP pulmonary rehab at d/c-updates sent 2/15/21  -make PCP omero't prior to d/c  -[place] [facility] can accept pending oxygen testing//order as of 2/12  -family would like repeat covid test-2/14

## 2021-02-16 LAB
GLUCOSE BLD STRIP.AUTO-MCNC: 165 MG/DL (ref 65–100)
GLUCOSE BLD STRIP.AUTO-MCNC: 235 MG/DL (ref 65–100)
GLUCOSE BLD STRIP.AUTO-MCNC: 293 MG/DL (ref 65–100)
GLUCOSE BLD STRIP.AUTO-MCNC: 90 MG/DL (ref 65–100)
SARS-COV-2, XPLCVT: DETECTED
SERVICE CMNT-IMP: ABNORMAL
SERVICE CMNT-IMP: NORMAL
SOURCE, COVRS: ABNORMAL

## 2021-02-16 PROCEDURE — 74011250637 HC RX REV CODE- 250/637: Performed by: INTERNAL MEDICINE

## 2021-02-16 PROCEDURE — 65660000001 HC RM ICU INTERMED STEPDOWN

## 2021-02-16 PROCEDURE — 82962 GLUCOSE BLOOD TEST: CPT

## 2021-02-16 PROCEDURE — 74011636637 HC RX REV CODE- 636/637: Performed by: INTERNAL MEDICINE

## 2021-02-16 PROCEDURE — 74011250637 HC RX REV CODE- 250/637: Performed by: HOSPITALIST

## 2021-02-16 PROCEDURE — 74011250636 HC RX REV CODE- 250/636: Performed by: INTERNAL MEDICINE

## 2021-02-16 PROCEDURE — 77010033678 HC OXYGEN DAILY

## 2021-02-16 RX ORDER — METOPROLOL TARTRATE 50 MG/1
50 TABLET ORAL 2 TIMES DAILY
Status: DISCONTINUED | OUTPATIENT
Start: 2021-02-16 | End: 2021-02-17 | Stop reason: HOSPADM

## 2021-02-16 RX ADMIN — FAMOTIDINE 20 MG: 20 TABLET, FILM COATED ORAL at 09:08

## 2021-02-16 RX ADMIN — Medication 10 ML: at 22:07

## 2021-02-16 RX ADMIN — INSULIN LISPRO 3 UNITS: 100 INJECTION, SOLUTION INTRAVENOUS; SUBCUTANEOUS at 22:07

## 2021-02-16 RX ADMIN — Medication 3 MG: at 22:07

## 2021-02-16 RX ADMIN — ENOXAPARIN SODIUM 40 MG: 40 INJECTION SUBCUTANEOUS at 05:31

## 2021-02-16 RX ADMIN — FLUTICASONE PROPIONATE 2 SPRAY: 50 SPRAY, METERED NASAL at 09:15

## 2021-02-16 RX ADMIN — CHOLECALCIFEROL TAB 25 MCG (1000 UNIT) 2000 UNITS: 25 TAB at 09:07

## 2021-02-16 RX ADMIN — Medication 10 ML: at 05:30

## 2021-02-16 RX ADMIN — ASPIRIN 81 MG: 81 TABLET, COATED ORAL at 09:08

## 2021-02-16 RX ADMIN — INSULIN LISPRO 3 UNITS: 100 INJECTION, SOLUTION INTRAVENOUS; SUBCUTANEOUS at 12:28

## 2021-02-16 RX ADMIN — INSULIN LISPRO 2 UNITS: 100 INJECTION, SOLUTION INTRAVENOUS; SUBCUTANEOUS at 17:38

## 2021-02-16 RX ADMIN — HUMAN INSULIN 10 UNITS: 100 INJECTION, SUSPENSION SUBCUTANEOUS at 09:07

## 2021-02-16 RX ADMIN — METOPROLOL TARTRATE 25 MG: 25 TABLET, FILM COATED ORAL at 09:08

## 2021-02-16 RX ADMIN — Medication 10 ML: at 17:41

## 2021-02-16 RX ADMIN — METOPROLOL TARTRATE 50 MG: 50 TABLET, FILM COATED ORAL at 17:38

## 2021-02-16 RX ADMIN — FAMOTIDINE 20 MG: 20 TABLET, FILM COATED ORAL at 22:07

## 2021-02-16 RX ADMIN — FUROSEMIDE 40 MG: 10 INJECTION, SOLUTION INTRAMUSCULAR; INTRAVENOUS at 09:08

## 2021-02-16 RX ADMIN — PREDNISONE 20 MG: 20 TABLET ORAL at 09:08

## 2021-02-16 NOTE — PROGRESS NOTES
D/C plans discussed with RN, [facility], MD, and Ladonna Rodriguez. D/C canceled for today. I updated [facility] and rehab facility. ORIANA:  -confirm transport at d/c-probable [facility] as of 2/16  -assess for home oxygen 24 hours prior to d/c-  -await therapy recommendations for the benefit of HHC vs IP rehab. As of 2/165, they,  recommend IP rehab  -ask pt again if he would be willing to give us his physical address. This will be needed to deliver the oxygen//HHC staff.  -may benefit from IP pulmonary rehab at d/c-updates sent 2/16/21  -make PCP omero't prior to d/c  -[facility] can accept pending oxygen testing//order as of 2/12  -family would like repeat covid test-2/15-done and is pending    [facility] can accept, has auth, and bed availability is pending. [facility] is reviewing chart to assess for admission if needed. If no rehab, [facility] wants d/c to home with caregivers, DME, and HHC.   Will continue to follow

## 2021-02-16 NOTE — PROGRESS NOTES
Problem: Falls - Risk of  Goal: *Absence of Falls  Description: Document Daniel Cope Fall Risk and appropriate interventions in the flowsheet.   Outcome: Progressing Towards Goal  Note: Fall Risk Interventions:  Mobility Interventions: Bed/chair exit alarm, Communicate number of staff needed for ambulation/transfer, PT Consult for mobility concerns, PT Consult for assist device competence, Patient to call before getting OOB         Medication Interventions: Bed/chair exit alarm, Evaluate medications/consider consulting pharmacy, Patient to call before getting OOB, Teach patient to arise slowly    Elimination Interventions: Bed/chair exit alarm, Call light in reach, Patient to call for help with toileting needs    History of Falls Interventions: Bed/chair exit alarm

## 2021-02-16 NOTE — PROGRESS NOTES
[facility]  Pulmonary, Critical Care, and Sleep Medicine    Name: Sarah Seay MRN: [id number]   : 1965 Hospital: [facility]   Date: 2021            IMPRESSION:   · Acute hypoxic respiratory failure - severe, secondary to COVID PNA  · Severe Multilobar Covid Pneumonia, first Dx on , Symptoms first started on 21  S/P convalescent plasma ; S/p Remdesivir x 5 days total  · New pneumomediastinum 21, radiographically stable/resolved  · Covid related coagulopathy  · Covid related inflammation, increased ferritin. · Steroid induced hyperglycemia  · Obesity  · GERD  · Hypertension, LAE and LVH on ekg. RECOMMENDATIONS:   · O2 - wean as tolerated  · Empiric antibiotics completed  · Continue po steroids, down to 20 mg daily, would continue for today's dose, then wean to 10 for 3 days, then stop  · PT/OT  · Hopefully to rehab soon. Would recommend Eliquis 5 bid x 30 days after discharge   PCP: Raudel Flores. Subjective:     21: no events. Hopefully going to rehab soon. Still desaturates with exertion  2-15-21: overall feeling significantly better. No acute complaints today. Looking forward to discharge soon. Down to 4 LPM, but he does desaturate with exertion.      Current Facility-Administered Medications   Medication Dose Route Frequency    enoxaparin (LOVENOX) injection 40 mg  40 mg SubCUTAneous Q24H    fluticasone propionate (FLONASE) 50 mcg/actuation nasal spray 2 Spray  2 Spray Both Nostrils DAILY    senna-docusate (PERICOLACE) 8.6-50 mg per tablet 1 Tab  1 Tab Oral BID    polyethylene glycol (MIRALAX) packet 17 g  17 g Oral DAILY    furosemide (LASIX) injection 40 mg  40 mg IntraVENous DAILY    predniSONE (DELTASONE) tablet 20 mg  20 mg Oral DAILY WITH BREAKFAST    melatonin tablet 3 mg  3 mg Oral QHS    insulin lispro (HUMALOG) injection   SubCUTAneous AC&HS    insulin NPH (NOVOLIN N, HUMULIN N) injection 10 Units  10 Units SubCUTAneous DAILY    metoprolol tartrate (LOPRESSOR) tablet 25 mg  25 mg Oral BID    famotidine (PEPCID) tablet 20 mg  20 mg Oral Q12H    influenza vaccine 2020-21 (6 mos+)(PF) (FLUARIX/FLULAVAL/FLUZONE QUAD) injection 0.5 mL  0.5 mL IntraMUSCular PRIOR TO DISCHARGE    sodium chloride (NS) flush 5-40 mL  5-40 mL IntraVENous Q8H    cholecalciferol (VITAMIN D3) (1000 Units /25 mcg) tablet 2 Tab  2,000 Units Oral DAILY    aspirin delayed-release tablet 81 mg  81 mg Oral DAILY       Review of Systems:  Constitutional: positive for fevers, chills, sweats, fatigue, malaise and anorexia, negative for weight loss  Eyes: negative  Ears, nose, mouth, throat, and face: positive for nasal congestion  Respiratory: positive for cough or dyspnea on exertion  Cardiovascular: positive for fatigue, paroxysmal nocturnal dyspnea, tachypnea, dyspnea on exertion  Gastrointestinal: positive for dyspepsia, reflux symptoms, nausea and change in bowel habits  Genitourinary:negative  Integument/breast: negative  Hematologic/lymphatic: negative  Musculoskeletal:negative  Neurological: negative  Behavioral/Psych: negative  Endocrine: negative  Allergic/Immunologic: negative    Objective:   Vital Signs:    Visit Vitals  BP (!) 135/96   Pulse 95   Temp 98 °F (36.7 °C)   Resp 18   Ht 5' 8\" (1.727 m)   Wt 75.5 kg (166 lb 6.4 oz)   SpO2 96%   BMI 25.30 kg/m²       O2 Device: Nasal cannula   O2 Flow Rate (L/min): 6 l/min   Temp (24hrs), Av.1 °F (36.7 °C), Min:97.9 °F (36.6 °C), Max:98.3 °F (36.8 °C)       Intake/Output:   Last shift:      No intake/output data recorded. Last 3 shifts:  1901 -  0700  In: 960 [P.O.:960]  Out: 700 [Urine:700]    Intake/Output Summary (Last 24 hours) at 2021 0836  Last data filed at 2/15/2021 1916  Gross per 24 hour   Intake 480 ml   Output 400 ml   Net 80 ml      Physical Exam:   General:  Alert, cooperative, no resp distress.     Head:  Normocephalic, without obvious abnormality, atraumatic. Eyes:  Conjunctivae/corneas clear    Nose: Nares normal. Septum midline. Mucosa normal.     Throat: Lips, mucosa, and tongue normal.    Neck: Supple, symmetrical, trachea midline    Lungs:   Distant bilateral breath sounds but clear. Breathing comfortably. Chest wall:  No tenderness or deformity. Heart:  Regular rate and rhythm    Abdomen:   Soft, non-tender. Bowel sounds normal.    Extremities: Extremities normal, atraumatic, no cyanosis    Skin: Skin color, texture, turgor normal. No crepitus   Neurologic: Grossly nonfocal      Data:   Labs:  No results for input(s): WBC, HGB, HCT, PLT, HGBEXT, HCTEXT, PLTEXT, HGBEXT, HCTEXT, PLTEXT in the last 72 hours. Recent Labs     02/15/21  0241      K 3.7   CL 97   CO2 36*   GLU 84   BUN 17   CREA 0.48*   CA 8.6   MG 2.0     No results for input(s): PHI, PCO2I, PO2I, HCO3I, FIO2I in the last 72 hours.     Imaging:  I have personally reviewed the patients radiographs:  None today        Denisse Quintero MD

## 2021-02-16 NOTE — PROGRESS NOTES
Hospitalist Progress Note    NAME: Pamela Hamlin   :  1965   MRN:  [id number]       Assessment / Plan:  Acute hypoxic respiratory failure POA  Severe multilobar Covid pneumonia POA  Pneumomediastinum , not POA, persistent but stable  -COVID-19 test positive on . -CTA  neg for PE. Shows diffuse bilateral peripheral groundglass opacification and consolidation in the lower lobes   -Initially on nasal cannula/high flow, decompensation  went on BIPAP    -developed pneumomediastinum , changed to high flow from BiPAP to reduce barotrauma,   -s/p antibiotics  -s/p convalescent plasma 2021  -s/p IV Solu-Medrol - now on prednisone taper. Inflammatory markers trending down  -continue lovenox. Pulm recommends eliquis 5bid x 30 at discharge  -continue IV lasix. Follow lytes. 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     Constipation  -pericolace and miralax daily    GERD  -Pepcid      Obesity  Body mass index is 31.14 kg/m².     Code: Discussed, full code    Surrogate decision maker: Brother Ramesh Phillips [phone]    Disposition:  IP Pulm rehab      Subjective:     Chief Complaint / Reason for Physician Visit:   F/U Resp failure/COVID PNA  Still very SOB upon exertion but eager to go to rehab       Review of Systems:  Symptom Y/N Comments  Symptom Y/N Comments   Fever/Chills n   Chest Pain n    Poor Appetite    Edema     Cough n   Abdominal Pain n    Sputum    Joint Pain     SOB/BULL y   Pruritis/Rash     Nausea/vomit n   Tolerating PT/OT y    Diarrhea    Tolerating Diet y    Constipation    Other       Could NOT obtain due to: Objective:     VITALS:   Last 24hrs VS reviewed since prior progress note. Most recent are:  Patient Vitals for the past 24 hrs:   Temp Pulse Resp BP SpO2   02/16/21 0804 98 °F (36.7 °C) 95 18 (!) 135/96 96 %   02/16/21 0215 97.9 °F (36.6 °C) 81 16 126/85 --   02/15/21 2212 98.2 °F (36.8 °C) 88 18 105/69 97 %   02/15/21 1952 98.1 °F (36.7 °C) (!) 108 16 115/71 97 %   02/15/21 1548 98 °F (36.7 °C) 100 18 125/69 96 %   02/15/21 1202 -- -- -- -- 90 %   02/15/21 1200 -- -- -- -- (!) 82 %   02/15/21 1153 -- -- -- -- 90 %   02/15/21 1152 -- -- -- -- (!) 86 %   02/15/21 1150 -- -- -- -- (!) 84 %   02/15/21 1037 98.3 °F (36.8 °C) (!) 108 16 118/78 92 %       Intake/Output Summary (Last 24 hours) at 2/16/2021 0859  Last data filed at 2/15/2021 1916  Gross per 24 hour   Intake 480 ml   Output 400 ml   Net 80 ml        I had a face to face encounter and independently examined this patient on 2/16/2021, as outlined below:  PHYSICAL EXAM:  General: WD, WN. Alert, cooperative, in moderate respiratory distress    EENT:  EOMI. Anicteric sclerae. MMM  Resp:  B/l lung coarse, rales+  CV:  Regular  rhythm,  No edema  GI:  Soft, Non distended, Non tender. +Bowel sounds  Neurologic:  Alert and oriented X 3, normal speech,   Psych:   Good insight. Not anxious nor agitated  Skin:  No rashes. No jaundice, subcu around right flank    Reviewed most current lab test results and cultures  YES  Reviewed most current radiology test results   YES  Review and summation of old records today    NO  Reviewed patient's current orders and MAR    YES  PMH/SH reviewed - no change compared to H&P  ________________________________________________________________________  Care Plan discussed with:    Comments   Patient x    Family      RN x    Care Manager     Consultant                       x Multidiciplinary team rounds were held today with , nursing, pharmacist and clinical coordinator.   Patient's plan of care was discussed; medications were reviewed and discharge planning was addressed. ________________________________________________________________________    Total TIME Spent: 35 Minutes non procedure based      Comments   >50% of visit spent in counseling and coordination of care x    ________________________________________________________________________  Mahi Sandoval MD     Procedures: see electronic medical records for all procedures/Xrays and details which were not copied into this note but were reviewed prior to creation of Plan. LABS:  I reviewed today's most current labs and imaging studies. Pertinent labs include:  No results for input(s): WBC, HGB, HCT, PLT, HGBEXT, HCTEXT, PLTEXT, HGBEXT, HCTEXT, PLTEXT in the last 72 hours.   Recent Labs     02/15/21  0241      K 3.7   CL 97   CO2 36*   GLU 84   BUN 17   CREA 0.48*   CA 8.6   MG 2.0       Signed: Mahi Sandoval MD

## 2021-02-16 NOTE — PROGRESS NOTES
Problem: Falls - Risk of  Goal: *Absence of Falls  Description: Document Tello Jacobo Fall Risk and appropriate interventions in the flowsheet. Outcome: Progressing Towards Goal  Note: Fall Risk Interventions:  Mobility Interventions: Bed/chair exit alarm, Communicate number of staff needed for ambulation/transfer, PT Consult for mobility concerns, PT Consult for assist device competence, Patient to call before getting OOB         Medication Interventions: Bed/chair exit alarm, Evaluate medications/consider consulting pharmacy, Patient to call before getting OOB, Teach patient to arise slowly    Elimination Interventions: Bed/chair exit alarm, Call light in reach, Patient to call for help with toileting needs    History of Falls Interventions: Bed/chair exit alarm         Problem: Patient Education: Go to Patient Education Activity  Goal: Patient/Family Education  Outcome: Progressing Towards Goal     Problem: Breathing Pattern - Ineffective  Goal: *Absence of hypoxia  Outcome: Progressing Towards Goal  Goal: *Use of effective breathing techniques  Outcome: Progressing Towards Goal     Problem: Airway Clearance - Ineffective  Goal: Achieve or maintain patent airway  Outcome: Progressing Towards Goal     Problem: Gas Exchange - Impaired  Goal: Absence of hypoxia  Outcome: Progressing Towards Goal  Goal: Promote optimal lung function  Outcome: Progressing Towards Goal     Problem: Breathing Pattern - Ineffective  Goal: Ability to achieve and maintain a regular respiratory rate  Outcome: Progressing Towards Goal     Problem:  Body Temperature -  Risk of, Imbalanced  Goal: Ability to maintain a body temperature within defined limits  Outcome: Progressing Towards Goal  Goal: Will regain or maintain usual level of consciousness  Outcome: Progressing Towards Goal  Goal: Complications related to the disease process, condition or treatment will be avoided or minimized  Outcome: Progressing Towards Goal     Problem: Isolation Precautions - Risk of Spread of Infection  Goal: Prevent transmission of infectious organism to others  Outcome: Progressing Towards Goal     Problem: Nutrition Deficits  Goal: Optimize nutrtional status  Outcome: Progressing Towards Goal     Problem: Risk for Fluid Volume Deficit  Goal: Maintain normal heart rhythm  Outcome: Progressing Towards Goal  Goal: Maintain absence of muscle cramping  Outcome: Progressing Towards Goal  Goal: Maintain normal serum potassium, sodium, calcium, phosphorus, and pH  Outcome: Progressing Towards Goal     Problem: Loneliness or Risk for Loneliness  Goal: Demonstrate positive use of time alone when socialization is not possible  Outcome: Progressing Towards Goal     Problem: Fatigue  Goal: Verbalize increase energy and improved vitality  Outcome: Progressing Towards Goal     Problem: Patient Education: Go to Patient Education Activity  Goal: Patient/Family Education  Outcome: Progressing Towards Goal     Problem: Pressure Injury - Risk of  Goal: *Prevention of pressure injury  Description: Document Govind Scale and appropriate interventions in the flowsheet.   Outcome: Progressing Towards Goal  Note: Pressure Injury Interventions:  Sensory Interventions: Assess changes in LOC    Moisture Interventions: Absorbent underpads, Minimize layers    Activity Interventions: Increase time out of bed    Mobility Interventions: HOB 30 degrees or less, PT/OT evaluation    Nutrition Interventions: Document food/fluid/supplement intake    Friction and Shear Interventions: HOB 30 degrees or less, Minimize layers                Problem: Patient Education: Go to Patient Education Activity  Goal: Patient/Family Education  Outcome: Progressing Towards Goal

## 2021-02-16 NOTE — PROGRESS NOTES
0700: End of Shift Note    Bedside shift change report given to VADIM Kee (oncoming nurse) by Blanca Dempsey (offgoing nurse). Report included the following information SBAR, Kardex, Intake/Output, MAR, Recent Results and Cardiac Rhythm NSR    Shift worked:  Night     Shift summary and any significant changes:     No significant changes     Concerns for physician to address:       Zone phone for oncoming shift:          Activity:  Activity Level: Up with Assistance  Number times ambulated in hallways past shift: 0  Number of times OOB to chair past shift: 0    Cardiac:   Cardiac Monitoring: Yes      Cardiac Rhythm: Normal sinus rhythm    Access:   Current line(s): PIV     Genitourinary:   Urinary status: voiding    Respiratory:   O2 Device: Nasal cannula  Chronic home O2 use?: NO  Incentive spirometer at bedside: YES  Actual Volume (ml): 250 ml  GI:  Last Bowel Movement Date: 02/15/21  Current diet:  DIET DIABETIC CONSISTENT CARB Regular  DIET NUTRITIONAL SUPPLEMENTS Dinner, Lunch; Glucerna Shake  Passing flatus: YES  Tolerating current diet: YES  % Diet Eaten: 100 %    Pain Management:   Patient states pain is manageable on current regimen: YES    Skin:  Govind Score: 18  Interventions: increase time out of bed and PT/OT consult    Patient Safety:  Fall Score:  Total Score: 3  Interventions: bed/chair alarm, gripper socks and pt to call before getting OOB  High Fall Risk: Yes    Length of Stay:  Expected LOS: 5d 12h  Actual LOS: 29      Patricia Decker

## 2021-02-16 NOTE — PROGRESS NOTES
0700: Verbal shift change report given to VADIM Kee (oncoming nurse) by Piyush Echeverria RN (offgoing nurse). Report included the following information SBAR, Intake/Output, MAR, Recent Results and Cardiac Rhythm NSR/Sinus Tach. 1000: Pt desated after going to bedside commode to low 80's on 6 L NC. Pt took ~40 minutes to recover with pursed lip breathing. Pt visibly dyspneic. 1045: Pt sating at 94% on 6 L NC.     1900:   End of Shift Note    Bedside shift change report given to VADIM Gomez (oncoming nurse) by Denver Sor, RN (offgoing nurse). Report included the following information SBAR, Intake/Output, MAR, Recent Results and Cardiac Rhythm NSR/Sinus Tach    Shift worked:  2420-0927     Shift summary and any significant changes:     Increased Metoprolol, cancelled DC today     Concerns for physician to address:  None     Zone phone for oncoming shift:   [phone]       Activity:  Activity Level: Up with Assistance  Number times ambulated in hallways past shift: 0  Number of times OOB to chair past shift: 3    Cardiac:   Cardiac Monitoring: Yes      Cardiac Rhythm: Normal sinus rhythm, Sinus tachycardia    Access:   Current line(s): PIV     Genitourinary:   Urinary status: voiding    Respiratory:   O2 Device: Nasal cannula  Chronic home O2 use?: NO  Incentive spirometer at bedside: YES  Actual Volume (ml): 500 ml  GI:  Last Bowel Movement Date: 02/15/21  Current diet:  DIET DIABETIC CONSISTENT CARB Regular  DIET NUTRITIONAL SUPPLEMENTS Dinner, Lunch; Glucerna Shake  Passing flatus: YES  Tolerating current diet: YES  % Diet Eaten: 100 %    Pain Management:   Patient states pain is manageable on current regimen: YES    Skin:  Govind Score: 18  Interventions: float heels and PT/OT consult    Patient Safety:  Fall Score:  Total Score: 3  Interventions: bed/chair alarm, assistive device (walker, cane, etc), gripper socks and pt to call before getting OOB  High Fall Risk: Yes    Length of Stay:  Expected LOS: 5d 12h  Actual LOS: 95 [facility] Camila Booth RN

## 2021-02-17 ENCOUNTER — APPOINTMENT (OUTPATIENT)
Dept: GENERAL RADIOLOGY | Age: 56
DRG: 177 | End: 2021-02-17
Attending: INTERNAL MEDICINE
Payer: COMMERCIAL

## 2021-02-17 VITALS
RESPIRATION RATE: 19 BRPM | TEMPERATURE: 98.4 F | WEIGHT: 166.4 LBS | HEART RATE: 105 BPM | SYSTOLIC BLOOD PRESSURE: 142 MMHG | OXYGEN SATURATION: 95 % | BODY MASS INDEX: 25.22 KG/M2 | HEIGHT: 68 IN | DIASTOLIC BLOOD PRESSURE: 82 MMHG

## 2021-02-17 LAB
ANION GAP SERPL CALC-SCNC: 4 MMOL/L (ref 5–15)
BUN SERPL-MCNC: 19 MG/DL (ref 6–20)
BUN/CREAT SERPL: 44 (ref 12–20)
CALCIUM SERPL-MCNC: 8.8 MG/DL (ref 8.5–10.1)
CHLORIDE SERPL-SCNC: 100 MMOL/L (ref 97–108)
CO2 SERPL-SCNC: 33 MMOL/L (ref 21–32)
CREAT SERPL-MCNC: 0.43 MG/DL (ref 0.7–1.3)
ERYTHROCYTE [DISTWIDTH] IN BLOOD BY AUTOMATED COUNT: 14.5 % (ref 11.5–14.5)
GLUCOSE BLD STRIP.AUTO-MCNC: 141 MG/DL (ref 65–100)
GLUCOSE BLD STRIP.AUTO-MCNC: 184 MG/DL (ref 65–100)
GLUCOSE BLD STRIP.AUTO-MCNC: 89 MG/DL (ref 65–100)
GLUCOSE SERPL-MCNC: 53 MG/DL (ref 65–100)
HCT VFR BLD AUTO: 35.4 % (ref 36.6–50.3)
HGB BLD-MCNC: 11.5 G/DL (ref 12.1–17)
MCH RBC QN AUTO: 28.8 PG (ref 26–34)
MCHC RBC AUTO-ENTMCNC: 32.5 G/DL (ref 30–36.5)
MCV RBC AUTO: 88.5 FL (ref 80–99)
NRBC # BLD: 0 K/UL (ref 0–0.01)
NRBC BLD-RTO: 0 PER 100 WBC
PLATELET # BLD AUTO: 340 K/UL (ref 150–400)
PMV BLD AUTO: 9.9 FL (ref 8.9–12.9)
POTASSIUM SERPL-SCNC: 3.8 MMOL/L (ref 3.5–5.1)
RBC # BLD AUTO: 4 M/UL (ref 4.1–5.7)
SERVICE CMNT-IMP: ABNORMAL
SERVICE CMNT-IMP: ABNORMAL
SERVICE CMNT-IMP: NORMAL
SODIUM SERPL-SCNC: 137 MMOL/L (ref 136–145)
WBC # BLD AUTO: 6.3 K/UL (ref 4.1–11.1)

## 2021-02-17 PROCEDURE — 74011636637 HC RX REV CODE- 636/637: Performed by: INTERNAL MEDICINE

## 2021-02-17 PROCEDURE — 80048 BASIC METABOLIC PNL TOTAL CA: CPT

## 2021-02-17 PROCEDURE — 97530 THERAPEUTIC ACTIVITIES: CPT | Performed by: OCCUPATIONAL THERAPIST

## 2021-02-17 PROCEDURE — 71045 X-RAY EXAM CHEST 1 VIEW: CPT

## 2021-02-17 PROCEDURE — 85027 COMPLETE CBC AUTOMATED: CPT

## 2021-02-17 PROCEDURE — 97530 THERAPEUTIC ACTIVITIES: CPT

## 2021-02-17 PROCEDURE — 90686 IIV4 VACC NO PRSV 0.5 ML IM: CPT | Performed by: INTERNAL MEDICINE

## 2021-02-17 PROCEDURE — 82962 GLUCOSE BLOOD TEST: CPT

## 2021-02-17 PROCEDURE — 74011250636 HC RX REV CODE- 250/636: Performed by: INTERNAL MEDICINE

## 2021-02-17 PROCEDURE — 74011250637 HC RX REV CODE- 250/637: Performed by: INTERNAL MEDICINE

## 2021-02-17 PROCEDURE — 36415 COLL VENOUS BLD VENIPUNCTURE: CPT

## 2021-02-17 PROCEDURE — 90471 IMMUNIZATION ADMIN: CPT

## 2021-02-17 PROCEDURE — 77010033678 HC OXYGEN DAILY

## 2021-02-17 PROCEDURE — 97116 GAIT TRAINING THERAPY: CPT

## 2021-02-17 PROCEDURE — 74011250637 HC RX REV CODE- 250/637: Performed by: HOSPITALIST

## 2021-02-17 RX ORDER — PREDNISONE 10 MG/1
10 TABLET ORAL
Status: DISCONTINUED | OUTPATIENT
Start: 2021-02-18 | End: 2021-02-17 | Stop reason: HOSPADM

## 2021-02-17 RX ORDER — FUROSEMIDE 40 MG/1
40 TABLET ORAL DAILY
Qty: 30 TAB | Refills: 0 | Status: SHIPPED | OUTPATIENT
Start: 2021-02-17 | End: 2021-04-19 | Stop reason: SDUPTHER

## 2021-02-17 RX ORDER — PREDNISONE 10 MG/1
10 TABLET ORAL
Qty: 2 TAB | Refills: 0 | Status: SHIPPED
Start: 2021-02-18 | End: 2021-02-20

## 2021-02-17 RX ORDER — METOPROLOL TARTRATE 50 MG/1
50 TABLET ORAL 2 TIMES DAILY
Qty: 60 TAB | Refills: 0 | Status: SHIPPED
Start: 2021-02-17 | End: 2021-04-19 | Stop reason: SDUPTHER

## 2021-02-17 RX ORDER — LANOLIN ALCOHOL/MO/W.PET/CERES
3 CREAM (GRAM) TOPICAL
Qty: 30 TAB | Refills: 0 | Status: SHIPPED | OUTPATIENT
Start: 2021-02-17 | End: 2021-03-19

## 2021-02-17 RX ADMIN — PREDNISONE 20 MG: 20 TABLET ORAL at 09:41

## 2021-02-17 RX ADMIN — CHOLECALCIFEROL TAB 25 MCG (1000 UNIT) 2000 UNITS: 25 TAB at 09:41

## 2021-02-17 RX ADMIN — INSULIN LISPRO 2 UNITS: 100 INJECTION, SOLUTION INTRAVENOUS; SUBCUTANEOUS at 13:20

## 2021-02-17 RX ADMIN — Medication 10 ML: at 05:23

## 2021-02-17 RX ADMIN — METOPROLOL TARTRATE 50 MG: 50 TABLET, FILM COATED ORAL at 09:41

## 2021-02-17 RX ADMIN — ENOXAPARIN SODIUM 40 MG: 40 INJECTION SUBCUTANEOUS at 05:24

## 2021-02-17 RX ADMIN — HUMAN INSULIN 10 UNITS: 100 INJECTION, SUSPENSION SUBCUTANEOUS at 09:41

## 2021-02-17 RX ADMIN — ASPIRIN 81 MG: 81 TABLET, COATED ORAL at 09:41

## 2021-02-17 RX ADMIN — INFLUENZA VIRUS VACCINE 0.5 ML: 15; 15; 15; 15 SUSPENSION INTRAMUSCULAR at 16:28

## 2021-02-17 RX ADMIN — FAMOTIDINE 20 MG: 20 TABLET, FILM COATED ORAL at 09:41

## 2021-02-17 RX ADMIN — FUROSEMIDE 40 MG: 10 INJECTION, SOLUTION INTRAMUSCULAR; INTRAVENOUS at 09:41

## 2021-02-17 RX ADMIN — FLUTICASONE PROPIONATE 2 SPRAY: 50 SPRAY, METERED NASAL at 09:42

## 2021-02-17 NOTE — DISCHARGE INSTRUCTIONS
HOSPITALIST DISCHARGE INSTRUCTIONS    NAME: Sarah Seay   :  1965   MRN:  [id number]     Date/Time:  2021 2:38 PM    ADMIT DATE: 2021   DISCHARGE DATE: 2021     Attending Physician: Yaw Cedeno MD    DISCHARGE DIAGNOSIS:  Acute hypoxic respiratory failure POA  Severe multilobar Covid pneumonia POA  Pneumomediastinum    History of extensive right lower extremity DVT    Constipation  GERD   Obesity        Medications: Per above medication reconciliation. Pain Management: per above medications    Recommended diet: Cardiac Diet    Recommended activity: Activity as tolerated    Wound care: None    Indwelling devices:  None    Supplemental Oxygen: 4-6LNC  LNC,  wean as tolerated    Required Lab work: Per SNF routine    Glucose management:  None    Code status: Full        Outside physician follow up: Follow-up Information     Follow up With Specialties Details Why Contact Info    Keiko Dhal MD Internal Medicine   [address] [address] [phone]                 Skilled nursing facility/ SNF MD responsible for above on discharge. Information obtained by :  I understand that if any problems occur once I am at home I am to contact my physician. I understand and acknowledge receipt of the instructions indicated above.                                                                                                                                            Physician's or R.N.'s Signature                                                                  Date/Time                                                                                                                                              Patient or Repres

## 2021-02-17 NOTE — PROGRESS NOTES
96% 4L NC supine  92% 6L NC seated  HR  88% 6L NC seated after transfer to bedside chair   93% 6L NC seated at bedside chair  85% 6L NC With ambulation 14ft with RW   91% 6L NC seated at bedside chair after ~1.5 minutes  85% 6L with holding conversation  88% 6L Seated at end of session at bedside chair ~3 minutes  81% 6L after transfer to supine in bed  91% 6L supine

## 2021-02-17 NOTE — PROGRESS NOTES
Problem: Falls - Risk of  Goal: *Absence of Falls  Description: Document Crys Kearns Fall Risk and appropriate interventions in the flowsheet.   Outcome: Progressing Towards Goal  Note: Fall Risk Interventions:  Mobility Interventions: Bed/chair exit alarm, Communicate number of staff needed for ambulation/transfer, OT consult for ADLs, Patient to call before getting OOB, PT Consult for mobility concerns, PT Consult for assist device competence, Utilize gait belt for transfers/ambulation         Medication Interventions: Bed/chair exit alarm, Evaluate medications/consider consulting pharmacy, Patient to call before getting OOB, Teach patient to arise slowly, Utilize gait belt for transfers/ambulation    Elimination Interventions: Bed/chair exit alarm, Call light in reach, Elevated toilet seat, Patient to call for help with toileting needs, Stay With Me (per policy), Toilet paper/wipes in reach, Toileting schedule/hourly rounds    History of Falls Interventions: Bed/chair exit alarm

## 2021-02-17 NOTE — PROGRESS NOTES
Problem: Falls - Risk of  Goal: *Absence of Falls  Description: Document   Fall Risk and appropriate interventions in the flowsheet.   Outcome: Progressing Towards Goal  Note: Fall Risk Interventions:  Mobility Interventions: Bed/chair exit alarm, Communicate number of staff needed for ambulation/transfer, Patient to call before getting OOB         Medication Interventions: Bed/chair exit alarm, Patient to call before getting OOB, Teach patient to arise slowly    Elimination Interventions: Bed/chair exit alarm, Call light in reach, Patient to call for help with toileting needs    History of Falls Interventions: Bed/chair exit alarm

## 2021-02-17 NOTE — PROGRESS NOTES
Problem: Self Care Deficits Care Plan (Adult)  Goal: *Acute Goals and Plan of Care (Insert Text)  Description: FUNCTIONAL STATUS PRIOR TO ADMISSION: Patient was independent and active without use of DME    HOME SUPPORT PRIOR TO ADMISSION: The patient lived alone with brother and neighbors to provide assistance. Occupational Therapy Goals:  Weekly re-eval 2/17/2021 all goals remain appropriate   Weekly re-eval 2/10/2021 all below goals remain appropirate  Initiated 2/5/2021  1. Patient will perform grooming seated with supervision within 7 days. Met 2/10/2021 upgrade to standing with rest breaks  2. Patient will perform toileting with supervision within 7 days. 3. Patient will perform lower body dressing with supervision within 7 days. 4. Patient will transfer from bedside commode with modified independence using the least restrictive device and appropriate durable medical equipment within 7 days. 5. Patient will perform UB dressing with supervision within 7 days. Outcome: Progressing Towards Goal   OCCUPATIONAL THERAPY RE-EVALUATION  Patient: Iris Perez (48 y.o. male)  Date: 2/17/2021  Diagnosis: COVID-19 virus infection [U07.1]  Acute respiratory failure with hypoxia ([facility] [address] [J96.01] <principal problem not specified>       Precautions: (droplet +)  Chart, occupational therapy assessment, plan of care, and goals were reviewed. ASSESSMENT  Based on the objective data described below, pt continues to make slow steady progress. He remains difficult to wean from O2 and needs at least 6L NC with activity with continuous PLB and cues for pacing. Reinforced not talking and performing tasks, moving a slow rate of movement, using RW for balance/energy conservation, sitting to perform ADLS and not bending towards feet. Pt continues to desaturate over time with activity and needed to remain on 6L NC at end of session to recover. Prior to session pt was on 4L NC.   After general mobility x2 pt needed more extended time to recover O2 sats due to respiratory and body fatigue. Pt reported he felt fine but was educated on how moving uses more O2 and taxes his body more therefore he needs to take frequent rest breaks. No goals met from previous re-eval but pt is making gains and continuing to benefit from OT services. Continue to recommend inpatient pulmonary rehab at discharge. Current Level of Function Impacting Discharge (ADLs):   CGA for short distance mobility with RW    Feeding: Independent    Oral Facial Hygiene/Grooming: Setup(seated, unable to perform standing)    Bathing: Moderate assistance    Upper Body Dressing: Setup    Lower Body Dressing: Minimum assistance    Toileting: Minimum assistan    Other factors to consider for discharge: was independent prior to illness, difficulty with weaning from O2         PLAN :  Recommendations and Planned Interventions: self care training, functional mobility training, therapeutic exercise, balance training, therapeutic activities, patient education, home safety training, and family training/education    Frequency/Duration: Patient will be followed by occupational therapy 3 times a week to address goals. Recommend with staff: OOB to chair and to [address] next OT session: attempt standing tolerance/ADLs, possible seated ADLS    Recommendation for discharge: (in order for the patient to meet his/her long term goals)  Inpatient Pulmonary Rehab    This discharge recommendation:  Has been made in collaboration with the attending provider and/or case management    Equipment recommendations for successful discharge (if) home: needs rehab       SUBJECTIVE:   Patient stated Angela Lee got up earlier and it took a long time to recover. I will try it again, but I am not sure.     OBJECTIVE DATA SUMMARY:   Hospital course since last seen and reason for reevaluation: continuing to be weaned from HF, now on 4-6L NC continuing to desaturate    Cognitive/Behavioral Status:  Neurologic State: Alert  Orientation Level: Oriented X4  Cognition: Follows commands  Perception: Appears intact  Perseveration: No perseveration noted  Safety/Judgement: Awareness of environment; Fall prevention    Hearing: Auditory  Auditory Impairment: None    Vision/Perceptual:                           Acuity: Within Defined Limits         Range of Motion:    AROM: Generally decreased, functional  PROM: Generally decreased, functional                      Strength:    Strength: Generally decreased, functional                Coordination:  Coordination: Within functional limits  Fine Motor Skills-Upper: Left Intact; Right Intact    Gross Motor Skills-Upper: Left Intact; Right Intact    Tone & Sensation:  04 clavicle;    Tone: Normal  Sensation: Intact                        Functional Mobility and Transfers for ADLs:  Bed Mobility:  Rolling: Independent  Supine to Sit: Modified independent  Scooting: Supervision    Transfers:  Sit to Stand: Contact guard assistance  Functional Transfers  Bathroom Mobility: (unable due to decreased O2 sats)  Toilet Transfer : Contact guard assistance(with RW)  Bed to Chair: Contact guard assistance; Additional time(with RW)    Balance:  Sitting: Intact  Standing: Impaired  Standing - Static: Good  Standing - Dynamic : Fair    ADL Assessment:  Feeding: Independent    Oral Facial Hygiene/Grooming: Setup(seated, unable to perform standing)    Bathing: Moderate assistance    Upper Body Dressing: Setup    Lower Body Dressing: Minimum assistance    Toileting: Minimum assistance                ADL Intervention and task modifications:s. Energy conservation teaching provided t/o all ADL and functional mobility performance today. Teaching focused on pacing of activities, by breaking them down and incorporating frequent brief rest breaks.  Instructed patient in the use of pursed lip breathing post activity to hasten his recovery when becoming SOB, as well as to assist in minimizing the occurrence of SOB during functional activities. Recommended performance of ADLs in sitting at this time to minimize exertion. 1) Perform activities at a slow steady pace. Don't rush through ADLs and when ambulating. 2) do not talk and perform tasks     3) Perform ADLs and IADLs in sitting PRN. 4) perform exhalation with PLB on exertion     5) avoid bending towards feet       Cognitive Retraining  Safety/Judgement: Awareness of environment; Fall prevention        Functional Measure:  Barthel Index:    Bathin  Bladder: 10  Bowels: 10  Groomin  Dressin  Feeding: 10  Mobility: 0  Stairs: 0  Toilet Use: 5  Transfer (Bed to Chair and Back): 10  Total: 55/100        The Barthel ADL Index: Guidelines  1. The index should be used as a record of what a patient does, not as a record of what a patient could do. 2. The main aim is to establish degree of independence from any help, physical or verbal, however minor and for whatever reason. 3. The need for supervision renders the patient not independent. 4. A patient's performance should be established using the best available evidence. Asking the patient, friends/relatives and nurses are the usual sources, but direct observation and common sense are also important. However direct testing is not needed. 5. Usually the patient's performance over the preceding 24-48 hours is important, but occasionally longer periods will be relevant. 6. Middle categories imply that the patient supplies over 50 per cent of the effort. 7. Use of aids to be independent is allowed. Sunil Segura., Barthel, D.W. (1203). Functional evaluation: the Barthel Index. [address] [facility] (14)2. Shonda Wolff mart ERIK Adkins, Lianet Leo., [facility], [facility] (). Measuring the change indisability after inpatient rehabilitation; comparison of the responsiveness of the Barthel Index and Functional [place] Measure.  Journal of Neurology, Neurosurgery, and Psychiatry, 66(4), 474-494. CRISTINA Manzanares, CHRISSY Gómez, & Yoav Ball M.A. (2004.) Assessment of post-stroke quality of life in cost-effectiveness studies: The usefulness of the Barthel Index and the EuroQoL-5D. Quality of Life Research, 13, 427-43         Pain:  0/10    Activity Tolerance:   Fair, desaturates with exertion and requires oxygen, requires frequent rest breaks, and observed SOB with activity    After treatment patient left in no apparent distress:   Supine in bed and Call bell within reach    COMMUNICATION/COLLABORATION:   The patients plan of care was discussed with: Physical therapist, Registered nurse, and patient .      LORY Coppola/L  Time Calculation: 35 mins

## 2021-02-17 NOTE — PROGRESS NOTES
[facility]  Pulmonary, Critical Care, and Sleep Medicine    Name: Lizzy Matias MRN: [id number]   : 1965 Hospital: [facility]   Date: 2021            IMPRESSION:   · Acute hypoxic respiratory failure - severe, secondary to COVID PNA  · Severe Multilobar Covid Pneumonia, first Dx on , Symptoms first started on 21  S/P convalescent plasma ; S/p Remdesivir x 5 days total  · New pneumomediastinum 21, radiographically stable/resolved  · Covid related coagulopathy  · Covid related inflammation, increased ferritin. · Steroid induced hyperglycemia  · Obesity  · GERD  · Hypertension, LAE and LVH on ekg. RECOMMENDATIONS:   · O2 - wean as tolerated, I have reduced him to 4 LPM again today. Discussed with him that he may still desaturate with exertion  · Empiric antibiotics completed  · Wean po steroids, 10 mg daily x 3 days, then stop  · PT/OT  · Hopefully to rehab soon. Would recommend Eliquis 5 bid x 30 days after discharge   PCP: Maykel Eli. Subjective:     21: had to go back to 6 LPM after exertion yesterday. I was able to wean to 4 LPM this morning while talking to him. Overall feels about the same, remains fatigued. 21: no events. Hopefully going to rehab soon. Still desaturates with exertion  2-15-21: overall feeling significantly better. No acute complaints today. Looking forward to discharge soon. Down to 4 LPM, but he does desaturate with exertion.      Current Facility-Administered Medications   Medication Dose Route Frequency    metoprolol tartrate (LOPRESSOR) tablet 50 mg  50 mg Oral BID    enoxaparin (LOVENOX) injection 40 mg  40 mg SubCUTAneous Q24H    fluticasone propionate (FLONASE) 50 mcg/actuation nasal spray 2 Spray  2 Spray Both Nostrils DAILY    senna-docusate (PERICOLACE) 8.6-50 mg per tablet 1 Tab  1 Tab Oral BID    polyethylene glycol (MIRALAX) packet 17 g  17 g Oral DAILY    furosemide (LASIX) injection 40 mg  40 mg IntraVENous DAILY    predniSONE (DELTASONE) tablet 20 mg  20 mg Oral DAILY WITH BREAKFAST    melatonin tablet 3 mg  3 mg Oral QHS    insulin lispro (HUMALOG) injection   SubCUTAneous AC&HS    insulin NPH (NOVOLIN N, HUMULIN N) injection 10 Units  10 Units SubCUTAneous DAILY    famotidine (PEPCID) tablet 20 mg  20 mg Oral Q12H    influenza vaccine 2020- (6 mos+)(PF) (FLUARIX/FLULAVAL/FLUZONE QUAD) injection 0.5 mL  0.5 mL IntraMUSCular PRIOR TO DISCHARGE    sodium chloride (NS) flush 5-40 mL  5-40 mL IntraVENous Q8H    cholecalciferol (VITAMIN D3) (1000 Units /25 mcg) tablet 2 Tab  2,000 Units Oral DAILY    aspirin delayed-release tablet 81 mg  81 mg Oral DAILY       Review of Systems:  Constitutional: positive for fevers, chills, sweats, fatigue, malaise and anorexia, negative for weight loss  Eyes: negative  Ears, nose, mouth, throat, and face: positive for nasal congestion  Respiratory: positive for cough or dyspnea on exertion  Cardiovascular: positive for fatigue, paroxysmal nocturnal dyspnea, tachypnea, dyspnea on exertion  Gastrointestinal: positive for dyspepsia, reflux symptoms, nausea and change in bowel habits  Genitourinary:negative  Integument/breast: negative  Hematologic/lymphatic: negative  Musculoskeletal:negative  Neurological: negative  Behavioral/Psych: negative  Endocrine: negative  Allergic/Immunologic: negative    Objective:   Vital Signs:    Visit Vitals  /76   Pulse 78   Temp 98.2 °F (36.8 °C)   Resp 20   Ht 5' 8\" (1.727 m)   Wt 75.5 kg (166 lb 6.4 oz)   SpO2 95%   BMI 25.30 kg/m²       O2 Device: Nasal cannula   O2 Flow Rate (L/min): 6 l/min   Temp (24hrs), Av.2 °F (36.8 °C), Min:97.9 °F (36.6 °C), Max:98.5 °F (36.9 °C)       Intake/Output:   Last shift:      No intake/output data recorded.   Last 3 shifts: 02/15 1901 -  0700  In: 960 [P.O.:960]  Out: 600 [Urine:600]    Intake/Output Summary (Last 24 hours) at 2021 Cate 23 filed at 2/16/2021 1827  Gross per 24 hour   Intake 720 ml   Output 200 ml   Net 520 ml      Physical Exam:   General:  Alert, cooperative, no resp distress. Head:  Normocephalic, without obvious abnormality, atraumatic. Eyes:  Conjunctivae/corneas clear    Nose: Nares normal. Septum midline. Mucosa normal.     Throat: Lips, mucosa, and tongue normal.    Neck: Supple, symmetrical, trachea midline    Lungs:   Distant bilateral breath sounds but clear. Breathing comfortably. Chest wall:  No tenderness or deformity. Heart:  Regular rate and rhythm    Abdomen:   Soft, non-tender. Bowel sounds normal.    Extremities: Extremities normal, atraumatic, no cyanosis    Skin: Skin color, texture, turgor normal. No crepitus   Neurologic: Grossly nonfocal      Data:   Labs:  Recent Labs     02/17/21  0232   WBC 6.3   HGB 11.5*   HCT 35.4*        Recent Labs     02/17/21  0232 02/15/21  0241    136   K 3.8 3.7    97   CO2 33* 36*   GLU 53* 84   BUN 19 17   CREA 0.43* 0.48*   CA 8.8 8.6   MG  --  2.0     No results for input(s): PHI, PCO2I, PO2I, HCO3I, FIO2I in the last 72 hours.     Imaging:  I have personally reviewed the patients radiographs:  None today        Rojelio Tamayo MD

## 2021-02-17 NOTE — PROGRESS NOTES
3p    All in agreement with 4pm [facility] EMTALA transport to [facility]. Under the services of Dr Aarti Sawyer. My contact Sabrina Jessica at [phone] is in agreement. Please call report to [phone], send emar, d/c instructions, facesheet//[facility] form//completed EMTALA. Thanks    I notified dtr Noe Anand    Awaiting word from [facility] if they have a bed, insurance auth, and acceptance. Doubt d/c today. Dtr is aware. ORIANA:  -confirm transport at d/c-probable [facility] as of 2/17  -assess for home oxygen 24 hours prior to d/c-  -await therapy recommendations for the benefit of HHC vs IP rehab. As of 2/17, they,  recommend IP rehab  -ask pt again if he would be willing to give us his physical address. This will be needed to deliver the oxygen//HHC staff.  -may benefit from IP pulmonary rehab at d/c-updates sent 2/16/21  -make PCP omero't prior to d/c  -[facility] can accept pending oxygen testing//order as of 2/12  -family would like repeat covid test-2/15-done and is pending     [facility] can accept, has auth, and bed availability is pending. [facility] is reviewing chart to assess for admission if needed. If no rehab, [facility] wants d/c to home with caregivers, DME, and HHC.   Will continue to follow

## 2021-02-17 NOTE — PROGRESS NOTES
Problem: Mobility Impaired (Adult and Pediatric)  Goal: *Acute Goals and Plan of Care (Insert Text)  Description: FUNCTIONAL STATUS PRIOR TO ADMISSION: Patient was independent and active without use of DME.    HOME SUPPORT PRIOR TO ADMISSION: The patient lived with family. Physical Therapy Goals  Initiated 2/5/2021  1. Patient will move from supine to sit and sit to supine  in bed with independence within 7 day(s). 2.  Patient will transfer from bed to chair and chair to bed with independence using the least restrictive device within 7 day(s). 3.  Patient will perform sit to stand with independence within 7 day(s). 4.  Patient will ambulate with independence for 50 feet with the least restrictive device within 7 day(s). Physical Therapy Goals  Revised  2/10/2021  1. Patient will move from supine to sit and sit to supine  in bed with independence within 7 day(s). 2.  Patient will transfer from bed to chair and chair to bed with independence using the least restrictive device within 7 day(s). 3.  Patient will perform sit to stand with independence within 7 day(s). 4.  Patient will ambulate with modified independence for 15 feet with the least restrictive device within 7 day(s). While maintaining oxygen saturation >90%    Reviewed 2/17- goals remain appropriate  Outcome: Progressing Towards Goal    PHYSICAL THERAPY TREATMENT: WEEKLY REASSESSMENT  Patient: Mary Ellen Agrawal (33 y.o. male)  Date: 2/17/2021  Primary Diagnosis: COVID-19 virus infection [U07.1]  Acute respiratory failure with hypoxia (HCC) [J96.01]        Precautions:   (droplet +)      ASSESSMENT  Patient continues with skilled PT services and is progressing towards goals. Pt was received in supine on 4L and cleared by nursing to mobilize. Increased to 6L for activity. He required extensive education on proper breathing techniques during mobility. He was able to come to the EOB and stand with RW. Able to get to the chair. Recovered. Stood and ambulated forward and back. HR in the 130s-120s with activity today. When pt began to talk about an MD coming in his room while he was on the [facility] he was getting flustered and hypoxic. Cued to stop and PLB. Once in the upper 80s he was able to stand without RW and returned to supine. Informed nursing pt was left on 6L to recover. 96% 4L NC supine  92% 6L NC seated  HR  88% 6L NC seated after transfer to bedside chair   93% 6L NC seated at bedside chair  85% 6L NC With ambulation 14ft with RW   91% 6L NC seated at bedside chair after ~1.5 minutes  85% 6L with holding conversation  88% 6L Seated at end of session at bedside chair ~3 minutes  81% 6L after transfer to supine in bed  91% 6L supine    Patient's progression toward goals since last assessment: making very slow progress, continue to limited due to respiratory status    Current Level of Function Impacting Discharge (mobility/balance): CGA    Other factors to consider for discharge: continues to require increased oxygen with any activity         PLAN :  Goals have been updated based on progression since last assessment. Patient continues to benefit from skilled intervention to address the above impairments. Recommendations and Planned Interventions: bed mobility training, transfer training, gait training, therapeutic exercises, patient and family training/education, and therapeutic activities      Frequency/Duration: Patient will be followed by physical therapy:  3 times a week to address goals.     Recommendation for discharge: (in order for the patient to meet his/her long term goals)  Pulmonary rehab when ready    This discharge recommendation:  Has not yet been discussed the attending provider and/or case management    IF patient discharges home will need the following DME: to be determined (TBD)         SUBJECTIVE:   Patient stated the doctor was trying to talk to me when I was trying to catch my breath.     OBJECTIVE DATA SUMMARY:   HISTORY:    Past Medical History:   Diagnosis Date    GERD (gastroesophageal reflux disease)     Obesity (BMI 30-39. 9) 5/8/2019    Right leg DVT ([address] 2019     Past Surgical History:   Procedure Laterality Date    HX ORTHOPAEDIC         Personal factors and/or comorbidities impacting plan of care:     Home Situation  Home Environment: Private residence  # Steps to Enter: 10  One/Two Story Residence: Two story  # of Interior Steps: 10  Living Alone: Yes  Support Systems: Family member(s), Friends \ neighbors  Patient Expects to be Discharged to[de-identified] Private residence  Current DME Used/Available at Home: None    EXAMINATION/PRESENTATION/DECISION MAKING:   Critical Behavior:  Neurologic State: Alert  Orientation Level: Oriented X4  Cognition: Follows commands  Safety/Judgement: Awareness of environment, Fall prevention  Hearing: Auditory  Auditory Impairment: None  Skin:  intact  Edema: none  Range Of Motion:  AROM: Generally decreased, functional           PROM: Generally decreased, functional           Strength:    Strength: Generally decreased, functional                    Tone & Sensation:   Tone: Normal              Sensation: Intact               Coordination:  Coordination: Within functional limits  Vision:   Acuity: Within Defined Limits  Functional Mobility:  Bed Mobility:  Rolling: Independent  Supine to Sit: Modified independent     Scooting: Supervision  Transfers:  Sit to Stand: Contact guard assistance  Stand to Sit: Contact guard assistance        Bed to Chair: Contact guard assistance; Additional time(with RW)              Balance:   Sitting: Intact  Standing: Impaired  Standing - Static: Good  Standing - Dynamic : Fair  Ambulation/Gait Training:  Distance (ft): 14 Feet (ft)  Assistive Device: Walker, rolling  Ambulation - Level of Assistance: Stand-by assistance                       Speed/Wen: Slow  Step Length: Left shortened;Right shortened Activity Tolerance:   desaturates with exertion and requires oxygen and observed SOB with activity    After treatment patient left in no apparent distress:   Supine in bed and Call bell within reach    COMMUNICATION/EDUCATION:   The patients plan of care was discussed with: Occupational therapist and Registered nurse. Fall prevention education was provided and the patient/caregiver indicated understanding. and Patient/family agree to work toward stated goals and plan of care.     Thank you for this referral.  Oracio Blanchard, PT, DPT   Time Calculation: 35 mins

## 2021-02-17 NOTE — PROGRESS NOTES
[address] INTERDISCIPLINARY ROUNDS    Cardiopulmonary Care Interdisciplinary Rounds were held today to discuss patient's plan of care and outcomes. The following members were present: NP/Physician, Pharmacy, Nursing and Case Management. PLAN OF CARE:   Continue current treatment plan, pt needs new insurance authorization and bed placement in order to be discharged to [facility]. CM working on discharge needs.      Expected Length of Stay:  5d 12h

## 2021-02-17 NOTE — DISCHARGE SUMMARY
Hospitalist Discharge Summary     Patient ID:  Arlyn London  [id number]  70 y.o.  1965 1/18/2021    PCP on record: Layla Bradley MD    Admit date: 1/18/2021  Discharge date and time: 2/17/2021    DISCHARGE DIAGNOSIS:  Acute hypoxic respiratory failure POA  Severe multilobar Covid pneumonia POA  Pneumomediastinum    History of extensive right lower extremity DVT    Constipation  GERD   Obesity      CONSULTATIONS:  IP CONSULT TO PULMONOLOGY    Excerpted HPI from H&P of Meek Stout MD:  CHIEF COMPLAINT: Progressively worsening shortness of breath, fevers chills, nausea vomiting diarrhea, loss of appetite     HISTORY OF PRESENT ILLNESS:     Arlyn London is a 48 y.o. male with past medical history significant for right lower extremity DVT was treated with Xarelto no longer on anticoagulation who presented to the emergency room on January 13 with symptoms of fever up to 102, chills, shortness of breath, nausea vomiting diarrhea. Was diagnosed with pneumonia and discharged home on albuterol, azithromycin and given a dose of Decadron in the ER. COVID-19 testing was done and resulted on January 16 as being positive. Patient also started taking vitamin D and zinc.  He reports not feeling any better since he was discharged from the ER and in fact increasingly more and more short of breath. He denies any pleuritic chest pain, leg pain or swelling.        We were asked to admit for work up and evaluation of the above problems. ______________________________________________________________________  DISCHARGE SUMMARY/HOSPITAL COURSE:  for full details see H&P, daily progress notes, labs, consult notes. Acute hypoxic respiratory failure POA  Severe multilobar Covid pneumonia POA  Pneumomediastinum 1/21, not POA, persistent but stable  -COVID-19 test positive on January 13. -CTA 1/18 neg for PE.  Shows diffuse bilateral peripheral groundglass opacification and consolidation in the lower lobes   -Initially on nasal cannula/high flow, decompensation 1/19 went on BIPAP    -developed pneumomediastinum 1/21, changed to high flow from BiPAP to reduce barotrauma,   -s/p antibiotics  -s/p convalescent plasma 1/19/2021  -s/p IV Solu-Medrol - now on prednisone taper.  Inflammatory markers trending down  -continue lovenox.  Pulm recommends eliquis 5bid x 30 at discharge  -s/p  IV lasix. Now on PO lasix   Follow lytes.    -patient is currently on 4liter at rest and HR < 90. 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  Is being discharged on eliquis 5mg bid      Constipation  -pericolace and miralax daily     GERD  -Pepcid      Obesity  Body mass index is 31.14 kg/m².          _______________________________________________________________________  Patient seen and examined by me on discharge day.  Pertinent Findings:  Gen:    Not in distress  Chest: Clear lungs  CVS:   Regular rhythm.  No edema  Abd:  Soft, not distended, not tender  Neuro:  Alert, orientedx3  _______________________________________________________________________  DISCHARGE MEDICATIONS:   Current Discharge Medication List      START taking these medications    Details   apixaban (Eliquis) 5 mg tablet Take 1 Tab by mouth two (2) times a day for 30 days.  Qty: 60 Tab, Refills: 0      furosemide (Lasix) 40 mg tablet Take 1 Tab by mouth daily for 30 days.  Qty: 30 Tab, Refills: 0      melatonin 3 mg tablet Take 1 Tab by mouth nightly for 30 days.  Qty: 30 Tab, Refills: 0      metoprolol tartrate (LOPRESSOR) 50 mg tablet Take 1 Tab by mouth two (2) times a day for 30 days.  Qty: 60 Tab, Refills: 0      predniSONE (DELTASONE)  10 mg tablet Take 10 mg by mouth daily (with breakfast) for 2 days. Qty: 2 Tab, Refills: 0         CONTINUE these medications which have NOT CHANGED    Details   aspirin delayed-release 81 mg tablet Take 81 mg by mouth daily. albuterol (PROVENTIL HFA, VENTOLIN HFA, PROAIR HFA) 90 mcg/actuation inhaler Take 2 Puffs by inhalation every four (4) hours as needed for Wheezing. Qty: 1 Inhaler, Refills: 1      multivitamin (ONE A DAY) tablet Take 1 Tab by mouth daily. cholecalciferol (VITAMIN D3) 1,000 unit cap Take 1,000 Units by mouth daily. ascorbic acid, vitamin C, (VITAMIN C) 500 mg tablet Take 500 mg by mouth daily. Patient Follow Up Instructions:    Activity: Activity as tolerated  Diet: Cardiac Diet  Wound Care: None needed        Follow-up Information     Follow up With Specialties Details Why [address] 79Prasad, MD Internal Medicine   [address]  [phone]          ________________________________________________________________    Risk of deterioration: High    Condition at Discharge:  Stable  __________________________________________________________________    Disposition  IP Rehab    ____________________________________________________________________    Code Status: Full Code  ___________________________________________________________________      Total time in minutes spent coordinating this discharge (includes going over instructions, follow-up, prescriptions, and preparing report for sign off to her PCP) :  >30 minutes    Signed:  Diane Poe MD

## 2021-02-17 NOTE — PROGRESS NOTES
0700: End of Shift Note    Bedside shift change report given to VADIM Beckman (oncoming nurse) by Nikhil Lozano (offgoing nurse). Report included the following information SBAR, Kardex, Intake/Output, MAR, Recent Results and Cardiac Rhythm NSR    Shift worked:  Night     Shift summary and any significant changes:     No significant changes     Concerns for physician to address:       Zone phone for oncoming shift:          Activity:  Activity Level: Up with Assistance  Number times ambulated in hallways past shift: 0  Number of times OOB to chair past shift: 0    Cardiac:   Cardiac Monitoring: Yes      Cardiac Rhythm: Normal sinus rhythm    Access:   Current line(s): PIV     Genitourinary:   Urinary status: voiding    Respiratory:   O2 Device: Nasal cannula  Chronic home O2 use?: NO  Incentive spirometer at bedside: YES  Actual Volume (ml): 500 ml  GI:  Last Bowel Movement Date: 02/15/21  Current diet:  DIET DIABETIC CONSISTENT CARB Regular  DIET NUTRITIONAL SUPPLEMENTS Dinner, Lunch; Glucerna Shake  Passing flatus: YES  Tolerating current diet: YES  % Diet Eaten: 100 %    Pain Management:   Patient states pain is manageable on current regimen: YES    Skin:  Govind Score: 18  Interventions: increase time out of bed and PT/OT consult    Patient Safety:  Fall Score:  Total Score: 3  Interventions: bed/chair alarm, gripper socks and pt to call before getting OOB  High Fall Risk: Yes    Length of Stay:  Expected LOS: 5d 12h  Actual LOS: 30      Nikhil Lozano

## 2021-02-17 NOTE — ADT AUTH CERT NOTES
Utilization Reviews       Viral Illness, Acute - Care Day 29 (2/15/2021) by Deshawn Pizano RN       Review Entered Review Status   2/17/2021 14:26 Completed      Criteria Review      Care Day: 29 Care Date: 2/15/2021 Level of Care: Step Down    Guideline Day 3    Clinical Status    (X) * Hemodynamic stability    2/17/2021 14:26:26 EST by Deshawn Pizano      VS:    98. 1- P- 100- R- 18- 125/69. (X) * Afebrile or temperature acceptable for next level of care    (X) * Tachypnea absent    2/17/2021 14:26:26 EST by Michael Arce RR=18. ( ) * Hypoxemia absent    (X) * Mental status at baseline    2/17/2021 14:26:26 EST by Melodye Cooks and oriented X 3    (X) * Renal function at baseline, or stable and acceptable for next level of care    2/17/2021 14:26:26 EST by Deshawn Pizano      Cr= 0.48.  BUN/Cr ratio= 35. ( ) * Discharge plans and education understood    Activity    (X) * Ambulatory or acceptable for next level of care    2/17/2021 14:26:26 EST by Michael Arce Up ad ozzy. Routes    (X) * Oral hydration [L]    2/17/2021 14:26:26 EST by Deshawn Pizano      Diabetic diet.    (X) * Oral medications or regimen acceptable for next level of care    2/17/2021 14:26:26 EST by Deshawn Pizano      Prednisone 20 mg po daily.  ASA 81 mg po daily.  Vitamin D 3 po daily. Pepcid 20 mg po bid . Lasix 40 mg iv daily.  Humalog SSI.  NPH 10 units SQ daily.  Miralax 17 G po daily.  Lovenox 30 mg SQ bid. Lopressor 25 mg po bid. (X) * Oral diet or acceptable for next level of care    2/17/2021 14:26:26 EST by Deshawn Pizano      Diabetic diet.     Interventions    ( ) * Isolation not indicated, or is performable at next level of care [G] [H]    (X) Pulse oximetry    2/17/2021 14:26:26 EST by Deshawn Pizano      O2 sat= 97% on 6 lpm.    Medications    (X) * Antimicrobial medication absent or regimen established for next level of care    2/17/2021 14:26:26 EST by Radha Kendrick      n/a. * Milestone   Additional Notes   2/15/21-      Abnormal Labs:   Co2= 36.  Anion gap= 3.  Glucose= 119, 263, 155, 259.     COVID 19= positive. Attending Note:   PHYSICAL EXAM:   General:          WD, WN. Alert, cooperative, in moderate respiratory distress     EENT:              EOMI. Anicteric sclerae. MMM   Resp:               B/l lung coarse, rales+   CV:                  Regular  rhythm,  No edema   GI:                   Soft, Non distended, Non tender.  +Bowel sounds   Neurologic:       normal speech,    Psych:   Good insight. Not anxious nor agitated   Skin:                No rashes.  No jaundice, subcu around right flank      Assessment / Plan:   Acute hypoxic respiratory failure POA   Severe multilobar Covid pneumonia POA   Pneumomediastinum 1/21, not POA, persistent but stable   -COVID-19 test positive on January 13. -CTA 1/18 neg for PE. Shows diffuse bilateral peripheral groundglass opacification and consolidation in the lower lobes    -Initially on nasal cannula/high flow, decompensation 1/19 went on BIPAP     -developed pneumomediastinum 1/21, changed to high flow from BiPAP to reduce barotrauma,    -s/p antibiotics   -s/p convalescent plasma 1/19/2021   -s/p IV Solu-Medrol - now on prednisone taper.  Inflammatory markers trending down   -continue lovenox.  Pulm recommends eliquis 5bid x 30 at discharge   -continue IV lasix.  Follow lytes.     -was on 3-4L yesterday but needing 4-6L today after working with PT. [facility] planning to IR once bed available.        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       Constipation   -pericolace and miralax daily       GERD   -Pepcid        Obesity   Body mass index is 31.14 kg/m².             Pulmonary Note:   IMPRESSION:   · Acute hypoxic respiratory failure - severe, secondary to COVID PNA   · Severe Multilobar Covid Pneumonia, first Dx on 1/13, Symptoms first started on 1/4/21  S/P convalescent plasma 1/19; S/p Remdesivir x 5 days total   · New pneumomediastinum 1-21-21, radiographically stable/resolved   · Covid related coagulopathy   · Covid related inflammation, increased ferritin. · Steroid induced hyperglycemia   · Obesity   · GERD   · Hypertension, LAE and LVH on ekg.        RECOMMENDATIONS:   · O2 - wean as tolerated   · Empiric antibiotics completed   · Continue po steroids, down to 20 mg daily, would continue for another day, then wean to 10 for 3 days, then stop   · PT/OT   Hopefully to rehab soon.  Would recommend Eliquis 5 bid x 30 days after discharge         PT Note:      Patient continues with skilled PT services and is progressing towards goals. Pt was received sitting up in the chair on 4L and cleared by nursing to mobilize. Oxygen saturation 84% and not recovering. Increased to 6L and was able to tolerate minimal activity. Stood and ambulated with RW forward for 5ft and back for 5ft. Sat and cued to PLB, oxygen had dropped to 82%. Able to recover to upper 80s. Blew  his nose and able to get to 90s. MD entered the room and made aware of progress and vitals.  He was left sitting up         CM Note:   All in agreement with 3pm [facility] ambulance to [facility] if bed is available(we have insurance auth)       Shea Maria E, says if no [facility] or [facility], then home with the 2 caregivers she has arranged, along with [facility] and [address].         I spoke with MD and MAC, [facility] has accepted pt(pending auth//bed availability), has initiated insurance auth, and has no beds today. Gretchen Chris asked that I send the chart to [facility] to assess for admission(done).  If he needs SNF, she requests [facility].  If home with [facility], she wants [address].  No d/c anticipated today.         ORIANA:   -confirm transport at d/c-probable [facility] as of 2/15   -assess for home oxygen 24 hours prior to d/c-   -await therapy recommendations for the benefit of [facility] vs IP rehab. As of 2/15, they,  recommend IP rehab   -ask pt again if he would be willing to give us his physical address. This will be needed to deliver the oxygen//HHC staff.   -may benefit from IP pulmonary rehab at d/c-updates sent 2/15/21   -make PCP omero't prior to d/c   -[facility] can accept pending oxygen testing//order as of 2/12   -family would like repeat covid test-2/14          Additional Orders:   Stepdown Unit.  Glucose ac/hs.  I and O.  Continuous VS.  Spot check oximetry.  I and O.  Droplet Plus isolation.  Daily weights.  PT/OT.  O2. SCD's.  Full code.                 Positive COVID 19 by Leo Michel RN       Review Entered Review Status   2/17/2021 14:22 In Primary      Criteria Review   Is the illness suspected to be related to the Coronavirus (COVID-19)? Yes  Has the member been tested for the COVID-19? Yes o  If Yes, what are the results of the COVID-19? Positive  What is the severity of the members condition (i.e. Isolation, Ventilator use)? O2 at 6 lpm.  Droplet Plus isolation.      Component Value Flag Ref Range Units Status   Specimen source         Final   Please find results under separate order    SARS-CoV-2 Detected  Panic   NOTD   Final          Viral Illness, Acute - Care Day 28 (2/14/2021) by Leo Michel RN       Review Entered Review Status   2/17/2021 14:18 Completed      Criteria Review      Care Day: 28 Care Date: 2/14/2021 Level of Care: Step Down    Guideline Day 3    Clinical Status    (X) * Hemodynamic stability    2/17/2021 14:18:35 EST by Leo Michel      VS:   98.2- P- 100- R- 18- 108/60. (X) * Afebrile or temperature acceptable for next level of care    2/17/2021 14:18:35 EST by Chelsea Healy temp= 98.2.    (X) * Tachypnea absent    2/17/2021 14:18:35 EST by Chelsea Healy RR=18.     ( ) * Hypoxemia absent    2/17/2021 14:18:35 EST by Leo Michel      O2 sat= 97% on 5 lpm.    (X) * Mental status at baseline    2/17/2021 14:18:35 EST by Michelle Castro and oriented X 3    ( ) * Renal function at baseline, or stable and acceptable for next level of care    ( ) * Discharge plans and education understood    Activity    (X) * Ambulatory or acceptable for next level of care    2/17/2021 14:18:35 EST by Dina So Up ad ozzy. Routes    (X) * Oral hydration [L]    2/17/2021 14:18:35 EST by Wolf Clay      Diabetic diet.    (X) * Oral medications or regimen acceptable for next level of care    2/17/2021 14:18:35 EST by Wolf Clay      Prednisone 20 mg po daily.  ASA 81 mg po daily.  Vitamin D 3 po daily. Pepcid 20 mg po bid . Lasix 40 mg iv daily.  Humalog SSI.  NPH 10 units SQ daily.  Miralax 17 G po daily.  Lovenox 30 mg SQ bid. Lopressor 25 mg po bid. (X) * Oral diet or acceptable for next level of care    2/17/2021 14:18:35 EST by Wolf Clay      Diabetic diet. Interventions    ( ) * Isolation not indicated, or is performable at next level of care [G] [H]    (X) Pulse oximetry    2/17/2021 14:18:35 EST by Wolf Clay      O2 sat= 97% on 5 lpm.    Medications    (X) * Antimicrobial medication absent or regimen established for next level of care    2/17/2021 14:18:35 EST by Nicol Hilliard. * Milestone   Additional Notes   2/14/21-   Abnormal Labs:   Glucose= 197, 1990, 286. Attending Note:    PHYSICAL EXAM:   General:          WD, WN. Alert, cooperative, in moderate respiratory distress     EENT:              EOMI. Anicteric sclerae. MMM   Resp:               B/l lung coarse, rales+   CV:                  Regular  rhythm,  No edema   GI:                   Soft, Non distended, Non tender.  +Bowel sounds   Neurologic:       normal speech,    Psych:   Good insight.  Not anxious nor agitated   Skin:                No rashes.  No jaundice, subcu around right flank      Assessment / Plan:   Acute hypoxic respiratory failure POA Severe multilobar Covid pneumonia POA   Pneumomediastinum 1/21, not POA, persistent but stable   -COVID-19 test positive on January 13. -CTA 1/18 neg for PE. Shows diffuse bilateral peripheral groundglass opacification and consolidation in the lower lobes    -Initially on nasal cannula/high flow, decompensation 1/19 went on BIPAP     -developed pneumomediastinum 1/21, changed to high flow from BiPAP to reduce barotrauma,    -s/p antibiotics   -s/p convalescent plasma 1/19/2021   -s/p IV Solu-Medrol - now on prednisone taper.  Inflammatory markers trending down   -continue lovenox   -continue IV lasix.  Follow lytes.     -off HF, now on 4LNC.  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       Constipation   -pericolace and miralax daily       GERD   -Pepcid        Obesity   Body mass index is 31.14 kg/m².             Additional Orders:   Stepdown Unit.  Glucose ac/hs.  I and O.  Continuous VS.  Spot check oximetry.  I and O.  Droplet Plus isolation.  Daily weights.  PT/OT.  O2. SCD's.  Full code.           Viral Illness, Acute - Care Day 27 (2/13/2021) by Malaika Banerjee RN       Review Entered Review Status   2/17/2021 14:12 Completed      Criteria Review      Care Day: 27 Care Date: 2/13/2021 Level of Care: Step Down    Guideline Day 3    Clinical Status    ( ) * Hemodynamic stability    (X) * Afebrile or temperature acceptable for next level of care    2/17/2021 14:12:39 EST by Malaika Banerjee      temp= 97.4.    (X) * Tachypnea absent    2/17/2021 14:12:39 EST by Malaika Banerjee      R=18.     ( ) * Hypoxemia absent    2/17/2021 14:12:39 EST by Malaika Banerjee      O2 sat= 95% on 7 lpm.    (X) * Mental status at baseline    2/17/2021 14:12:39 EST by Elio Griffith and oriented X 3.    (X) * Renal function at baseline, or stable and acceptable for next level of care 2/17/2021 14:12:39 EST by Carlos A Brennan      Cr= 0.45. BUN/Cr ratio= 42. ( ) * Discharge plans and education understood    Activity    (X) * Ambulatory or acceptable for next level of care    2/17/2021 14:12:39 EST by Jenny Karimi Up ad ozzy. Routes    (X) * Oral hydration [L]    2/17/2021 14:12:39 EST by Carlos A Brennan      Diabetic diet.    (X) * Oral medications or regimen acceptable for next level of care    2/17/2021 14:12:39 EST by Carlos A Brennan      ASA 81 mg po daily.  Vitamin D 3 po daily. Pepcid 20 mg po bid . Lasix 40 mg iv daily.  Humalog SSI.  NPH 10 units SQ daily.  Miralax 17 G po daily.  Lovenox 30 mg SQ bid. Lopressor 25 mg po bid.  Prednisone 20 mg po daily. (X) * Oral diet or acceptable for next level of care    2/17/2021 14:12:39 EST by Carlos A Brennan      Diabetic diet. Interventions    ( ) * Isolation not indicated, or is performable at next level of care [G] [H]    (X) Pulse oximetry    2/17/2021 14:12:39 EST by Carlos A Brennan      O2 sat= 95% on 7 lpm.    Medications    (X) * Antimicrobial medication absent or regimen established for next level of care    2/17/2021 14:12:39 EST by Shine Urbina. * Milestone   Additional Notes   2/13/21      VS:   97. 4- P- 119- R- 18- 113/74.        Abnormal Labs:   Na= 134.  Chl= 93.  Co2= 37.  Anion gap= 4.  Glucose= 62, 102, 232, 158, 219.  NT pro-BNP= 2,211.  Ferritin= 911. C. reactive protein= 1.83. Attending Note:   PHYSICAL EXAM:   General:          WD, WN. Alert, cooperative, in moderate respiratory distress     EENT:              EOMI. Anicteric sclerae. MMM   Resp:               B/l lung coarse, rales+   CV:                  Regular  rhythm,  No edema   GI:                   Soft, Non distended, Non tender.  +Bowel sounds   Neurologic:       normal speech,    Psych:   Good insight.  Not anxious nor agitated   Skin:                No rashes.  No jaundice, subcu around right flank Assessment / Plan:   Acute hypoxic respiratory failure POA   Severe multilobar Covid pneumonia POA   Pneumomediastinum 1/21, not POA, persistent but stable   -COVID-19 test positive on January 13. -CTA 1/18 neg for PE. Shows diffuse bilateral peripheral groundglass opacification and consolidation in the lower lobes    -Initially on nasal cannula/high flow, decompensation 1/19 went on BIPAP     -developed pneumomediastinum 1/21, changed to high flow from BiPAP to reduce barotrauma,    -s/p antibiotics   -s/p convalescent plasma 1/19/2021   -s/p IV Solu-Medrol - now on prednisone taper.   Follow inflammatory markers   -continue lovenox   -continue IV lasix.  Follow lytes   -down to 6L HF from 15.  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       Constipation   -pericolace and miralax daily       GERD   -Pepcid        Obesity   Body mass index is 31.14 kg/m².          Additional Orders:   Stepdown Unit.  Glucose ac/hs.  I and O.  Continuous VS.  Spot check oximetry.  I and O.  Droplet Plus isolation.  Daily weights.  PT/OT.  O2. SCD's.  Full code.           Viral Illness, Acute - Care Day 26 (2/12/2021) by Cheryl Epps RN       Review Entered Review Status   2/17/2021 13:58 Completed      Criteria Review      Care Day: 26 Care Date: 2/12/2021 Level of Care: Step Down    Guideline Day 3    Clinical Status    (X) * Hemodynamic stability    2/17/2021 13:58:20 EST by Cheryl Epps      VS:  98.1- P- 93- R- 18- 91/52.     (X) * Afebrile or temperature acceptable for next level of care    (X) * Tachypnea absent    2/17/2021 13:58:20 EST by Maria Guadalupe Cornejo rr=18,    ( ) * Hypoxemia absent    2/17/2021 13:58:20 EST by Cheryl Epps      O2 sat= 98% on 10 lpm.    (X) * Mental status at baseline    2/17/2021 13:58:20 EST by Shweta Puri and oriented X 3    ( ) * Renal function at baseline, or stable and acceptable for next level of care    ( ) * Discharge plans and education understood    Activity    (X) * Ambulatory or acceptable for next level of care    2/17/2021 13:58:20 EST by Earma Shear Up ad ozzy. Routes    (X) * Oral hydration [L]    2/17/2021 13:58:20 EST by Dewayne nAdrade      Diabetic diet.    (X) * Oral medications or regimen acceptable for next level of care    2/17/2021 13:58:20 EST by Dewayne Andrade      ASA 81 mg po daily.  Vitamin D 3 po daily. Pepcid 20 mg po bid . Lasix 40 mg iv daily.  Humalog SSI.  NPH 10 units SQ daily.  Miralax 17 G po daily.  Lovenox 30 mg SQ bid. Lopressor 25 mg po bid.  Prednisone 20 mg po daily. (X) * Oral diet or acceptable for next level of care    2/17/2021 13:58:20 EST by Dewayne Andrade      Diabetic diet. Interventions    ( ) * Isolation not indicated, or is performable at next level of care [G] [H]    (X) Pulse oximetry    2/17/2021 13:58:20 EST by Dewayne Andrade      O2 sat= 98% on 10 lpm.    Medications    (X) * Antimicrobial medication absent or regimen established for next level of care    2/17/2021 13:58:20 EST by Tara Dominique. * Milestone   Additional Notes   2/12/21-      Abnormal Labs:   Glucose= 18, 184, 253, 318. Attending Note:   PHYSICAL EXAM:   General:          WD, WN. Alert, cooperative, in moderate respiratory distress     EENT:              EOMI. Anicteric sclerae. MMM   Resp:               B/l lung coarse, rales+   CV:                  Regular  rhythm,  No edema   GI:                   Soft, Non distended, Non tender.  +Bowel sounds   Neurologic:       normal speech,    Psych:   Good insight.  Not anxious nor agitated   Skin:                No rashes.  No jaundice, subcu around right flank      Assessment / Plan:   Acute hypoxic respiratory failure POA   Severe multilobar Covid pneumonia POA   Pneumomediastinum 1/21, not POA, persistent but stable -COVID-19 test positive on January 13. -CTA 1/18 neg for PE. Shows diffuse bilateral peripheral groundglass opacification and consolidation in the lower lobes    -Initially on nasal cannula/high flow, decompensation 1/19 went on BIPAP     -developed pneumomediastinum 1/21, changed to high flow from BiPAP to reduce barotrauma,    -s/p antibiotics   -s/p convalescent plasma 1/19/2021   -s/p IV Solu-Medrol - now on prednisone taper.   Follow inflammatory markers   -continue lovenox   -continue IV lasix.  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       Constipation   -pericolace and miralax daily       GERD   -Pepcid        Obesity   Body mass index is 31.14 kg/m².             Pulmonary Note:   IMPRESSION:   · Acute hypoxic respiratory failure - severe, secondary to COVID PNA-currently on HF (10L, 100%), Still needs moderate oxygen support. Continue to wean as tolerated. · Severe Multilobar Covid Pneumonia, first Dx on 1/13, Symptoms first started on 1/4/21  S/P convalescent plasma 1/19; S/p Remdesivir x 5 days total, He was sleeping in prone position several times yesterday. Seems to have some improvement in oxygenation. · New pneumomediastinum 1-21-21, radiographically stable. · Covid related coagulopathy   · Covid related inflammation, increased ferritin. · Steroid induced hyperglycemia   · Obesity   · GERD   · Hypertension, LAE and LVH on ekg.        RECOMMENDATIONS:   · Continue HFNC, wean as tolerated, daily improvement, wean as tolerated. · Will work on weaning the oxygen levels.     · Empiric antibiotics completed   · Cont Lovenox for DVT prophylaxis. · Continue po steroids, decrease dose to 30mg po every day. · Follow inflammatory and coag markers   · Pt and OT to assist: Mobilize out of bed as tolerated   · Will see as needed over the weekend. Please call if any issues or acute changes. PT Note:   Patient continues with skilled PT services and is progressing towards goals.  Pt received supine in bed with continued motivation to participate in PT. Session focused on pt's coordination of PLB with mobility. Pt able to move to eob, stand and ambulate with HHA X 2 to bedside chair. He took a seated rest due to decreased O2 sats.  O2 increased to 15L for further mobility.  RW needed for next session for balance and energy conservation. IPR(pulmonary) remains appropriate at dc. Ambulation/Gait Training:   Distance (ft): 13 Feet (ft)(3, 5ft foward, 5 ft backward)   Assistive Device: (HHA X 2)   Ambulation - Level of Assistance: Contact guard assistance   Gait Abnormalities: Decreased step clearance   Speed/Wen: Pace decreased (<100 feet/min)   Step Length: Left shortened;Right shortened         Additional Orders:   Stepdown Unit.  Glucose ac/hs.  I and O.  Continuous VS.  Spot check oximetry.  I and O.  Droplet Plus isolation.  Daily weights.  PT/OT.  O2.  SCD's.  Full code.

## 2021-02-18 ENCOUNTER — PATIENT OUTREACH (OUTPATIENT)
Dept: CASE MANAGEMENT | Age: 56
End: 2021-02-18

## 2021-02-18 NOTE — PROGRESS NOTES
1920  Pt d/c home. I reviewed all d/c instructions, follow up appts, and medications. Pt stated understanding. Removed all IV/tele. Pt left with all personal belongings (2 cellphones, wallet, boot, and clothing) and documents for admission at [facility] via [facility].

## 2021-02-26 ENCOUNTER — PATIENT OUTREACH (OUTPATIENT)
Dept: CASE MANAGEMENT | Age: 56
End: 2021-02-26

## 2021-02-26 NOTE — PROGRESS NOTES
Transition of care outreach postponed for 7 days due to patient's discharge to inpatient rehab facility.  D/C to [facility] 2/17/21 f/u 7d

## 2021-03-02 ENCOUNTER — APPOINTMENT (OUTPATIENT)
Dept: GENERAL RADIOLOGY | Age: 56
DRG: 871 | End: 2021-03-02
Attending: EMERGENCY MEDICINE
Payer: COMMERCIAL

## 2021-03-02 ENCOUNTER — APPOINTMENT (OUTPATIENT)
Dept: ULTRASOUND IMAGING | Age: 56
DRG: 871 | End: 2021-03-02
Attending: FAMILY MEDICINE
Payer: COMMERCIAL

## 2021-03-02 ENCOUNTER — APPOINTMENT (OUTPATIENT)
Dept: CT IMAGING | Age: 56
DRG: 871 | End: 2021-03-02
Attending: EMERGENCY MEDICINE
Payer: COMMERCIAL

## 2021-03-02 ENCOUNTER — HOSPITAL ENCOUNTER (INPATIENT)
Age: 56
LOS: 16 days | Discharge: NURSING, REHAB OR LONG-TERM CARE | DRG: 871 | End: 2021-03-18
Attending: EMERGENCY MEDICINE | Admitting: FAMILY MEDICINE
Payer: COMMERCIAL

## 2021-03-02 DIAGNOSIS — J12.9 VIRAL PNEUMONIA: ICD-10-CM

## 2021-03-02 DIAGNOSIS — J96.01 ACUTE RESPIRATORY FAILURE WITH HYPOXEMIA (HCC): Primary | ICD-10-CM

## 2021-03-02 PROBLEM — J96.90 RESPIRATORY FAILURE (HCC): Status: ACTIVE | Noted: 2021-03-02

## 2021-03-02 LAB
25(OH)D3 SERPL-MCNC: 24.6 NG/ML (ref 30–100)
ALBUMIN SERPL-MCNC: 2.3 G/DL (ref 3.5–5)
ALBUMIN/GLOB SERPL: 0.5 {RATIO} (ref 1.1–2.2)
ALP SERPL-CCNC: 145 U/L (ref 45–117)
ALT SERPL-CCNC: 88 U/L (ref 12–78)
ANION GAP SERPL CALC-SCNC: 6 MMOL/L (ref 5–15)
APPEARANCE UR: CLEAR
APPEARANCE UR: CLEAR
APTT PPP: 23.8 SEC (ref 22.1–31)
ARTERIAL PATENCY WRIST A: YES
AST SERPL-CCNC: 43 U/L (ref 15–37)
ATRIAL RATE: 109 BPM
ATRIAL RATE: 112 BPM
B PERT DNA SPEC QL NAA+PROBE: NOT DETECTED
BACTERIA URNS QL MICRO: NEGATIVE /HPF
BASE EXCESS BLD CALC-SCNC: 9 MMOL/L
BASOPHILS # BLD: 0.2 K/UL (ref 0–0.1)
BASOPHILS NFR BLD: 1 % (ref 0–1)
BDY SITE: ABNORMAL
BILIRUB SERPL-MCNC: 0.4 MG/DL (ref 0.2–1)
BILIRUB UR QL: NEGATIVE
BILIRUB UR QL: NEGATIVE
BNP SERPL-MCNC: 264 PG/ML
BORDETELLA PARAPERTUSSIS PCR, BORPAR: NOT DETECTED
BUN SERPL-MCNC: 15 MG/DL (ref 6–20)
BUN/CREAT SERPL: 19 (ref 12–20)
C PNEUM DNA SPEC QL NAA+PROBE: NOT DETECTED
CA-I BLD-SCNC: 1.29 MMOL/L (ref 1.12–1.32)
CALCIUM SERPL-MCNC: 9.7 MG/DL (ref 8.5–10.1)
CALCULATED P AXIS, ECG09: 24 DEGREES
CALCULATED P AXIS, ECG09: 26 DEGREES
CALCULATED R AXIS, ECG10: -3 DEGREES
CALCULATED R AXIS, ECG10: 3 DEGREES
CALCULATED T AXIS, ECG11: -8 DEGREES
CALCULATED T AXIS, ECG11: 177 DEGREES
CHLORIDE SERPL-SCNC: 101 MMOL/L (ref 97–108)
CO2 SERPL-SCNC: 29 MMOL/L (ref 21–32)
COLOR UR: NORMAL
COLOR UR: NORMAL
CREAT SERPL-MCNC: 0.78 MG/DL (ref 0.7–1.3)
CRP SERPL-MCNC: 13.4 MG/DL (ref 0–0.6)
D DIMER PPP FEU-MCNC: 2.12 MG/L FEU (ref 0–0.65)
DIAGNOSIS, 93000: NORMAL
DIAGNOSIS, 93000: NORMAL
DIFFERENTIAL METHOD BLD: ABNORMAL
EOSINOPHIL # BLD: 0 K/UL (ref 0–0.4)
EOSINOPHIL NFR BLD: 0 % (ref 0–7)
EPITH CASTS URNS QL MICRO: NORMAL /LPF
ERYTHROCYTE [DISTWIDTH] IN BLOOD BY AUTOMATED COUNT: 15.4 % (ref 11.5–14.5)
FERRITIN SERPL-MCNC: 1491 NG/ML (ref 26–388)
FIBRINOGEN PPP-MCNC: 789 MG/DL (ref 200–475)
FLUAV H1 2009 PAND RNA SPEC QL NAA+PROBE: NOT DETECTED
FLUAV H1 RNA SPEC QL NAA+PROBE: NOT DETECTED
FLUAV H3 RNA SPEC QL NAA+PROBE: NOT DETECTED
FLUAV SUBTYP SPEC NAA+PROBE: NOT DETECTED
FLUBV RNA SPEC QL NAA+PROBE: NOT DETECTED
GAS FLOW.O2 O2 DELIVERY SYS: ABNORMAL L/MIN
GAS FLOW.O2 SETTING OXYMISER: 40 L/M
GLOBULIN SER CALC-MCNC: 5.1 G/DL (ref 2–4)
GLUCOSE SERPL-MCNC: 162 MG/DL (ref 65–100)
GLUCOSE UR STRIP.AUTO-MCNC: NEGATIVE MG/DL
GLUCOSE UR STRIP.AUTO-MCNC: NEGATIVE MG/DL
HADV DNA SPEC QL NAA+PROBE: NOT DETECTED
HCO3 BLD-SCNC: 32.8 MMOL/L (ref 22–26)
HCOV 229E RNA SPEC QL NAA+PROBE: NOT DETECTED
HCOV HKU1 RNA SPEC QL NAA+PROBE: NOT DETECTED
HCOV NL63 RNA SPEC QL NAA+PROBE: NOT DETECTED
HCOV OC43 RNA SPEC QL NAA+PROBE: NOT DETECTED
HCT VFR BLD AUTO: 36.4 % (ref 36.6–50.3)
HGB BLD-MCNC: 12.2 G/DL (ref 12.1–17)
HGB UR QL STRIP: NEGATIVE
HGB UR QL STRIP: NEGATIVE
HMPV RNA SPEC QL NAA+PROBE: NOT DETECTED
HPIV1 RNA SPEC QL NAA+PROBE: NOT DETECTED
HPIV2 RNA SPEC QL NAA+PROBE: NOT DETECTED
HPIV3 RNA SPEC QL NAA+PROBE: NOT DETECTED
HPIV4 RNA SPEC QL NAA+PROBE: NOT DETECTED
HYALINE CASTS URNS QL MICRO: NORMAL /LPF (ref 0–5)
IMM GRANULOCYTES # BLD AUTO: 0.3 K/UL (ref 0–0.04)
IMM GRANULOCYTES NFR BLD AUTO: 2 % (ref 0–0.5)
INR PPP: 1.1 (ref 0.9–1.1)
KETONES UR QL STRIP.AUTO: NEGATIVE MG/DL
KETONES UR QL STRIP.AUTO: NEGATIVE MG/DL
LACTATE SERPL-SCNC: 1.2 MMOL/L (ref 0.4–2)
LACTATE SERPL-SCNC: 2 MMOL/L (ref 0.4–2)
LACTATE SERPL-SCNC: 2.1 MMOL/L (ref 0.4–2)
LACTATE SERPL-SCNC: 3 MMOL/L (ref 0.4–2)
LDH SERPL L TO P-CCNC: 386 U/L (ref 85–241)
LEUKOCYTE ESTERASE UR QL STRIP.AUTO: NEGATIVE
LEUKOCYTE ESTERASE UR QL STRIP.AUTO: NEGATIVE
LYMPHOCYTES # BLD: 0.6 K/UL (ref 0.8–3.5)
LYMPHOCYTES NFR BLD: 4 % (ref 12–49)
M PNEUMO DNA SPEC QL NAA+PROBE: NOT DETECTED
MAGNESIUM SERPL-MCNC: 2.1 MG/DL (ref 1.6–2.4)
MCH RBC QN AUTO: 28.6 PG (ref 26–34)
MCHC RBC AUTO-ENTMCNC: 33.5 G/DL (ref 30–36.5)
MCV RBC AUTO: 85.4 FL (ref 80–99)
MONOCYTES # BLD: 0.5 K/UL (ref 0–1)
MONOCYTES NFR BLD: 3 % (ref 5–13)
NEUTS SEG # BLD: 14.3 K/UL (ref 1.8–8)
NEUTS SEG NFR BLD: 90 % (ref 32–75)
NITRITE UR QL STRIP.AUTO: NEGATIVE
NITRITE UR QL STRIP.AUTO: NEGATIVE
NRBC # BLD: 0.02 K/UL (ref 0–0.01)
NRBC BLD-RTO: 0.1 PER 100 WBC
O2/TOTAL GAS SETTING VFR VENT: 100 %
P-R INTERVAL, ECG05: 112 MS
P-R INTERVAL, ECG05: 114 MS
PCO2 BLD: 43.8 MMHG (ref 35–45)
PH BLD: 7.48 [PH] (ref 7.35–7.45)
PH UR STRIP: 7.5 [PH] (ref 5–8)
PH UR STRIP: 7.5 [PH] (ref 5–8)
PLATELET # BLD AUTO: 268 K/UL (ref 150–400)
PMV BLD AUTO: 10.7 FL (ref 8.9–12.9)
PO2 BLD: 108 MMHG (ref 80–100)
POTASSIUM SERPL-SCNC: 4.3 MMOL/L (ref 3.5–5.1)
PROCALCITONIN SERPL-MCNC: 0.08 NG/ML
PROCALCITONIN SERPL-MCNC: 0.11 NG/ML
PROT SERPL-MCNC: 7.4 G/DL (ref 6.4–8.2)
PROT UR STRIP-MCNC: NEGATIVE MG/DL
PROT UR STRIP-MCNC: NEGATIVE MG/DL
PROTHROMBIN TIME: 11.7 SEC (ref 9–11.1)
Q-T INTERVAL, ECG07: 316 MS
Q-T INTERVAL, ECG07: 318 MS
QRS DURATION, ECG06: 76 MS
QRS DURATION, ECG06: 78 MS
QTC CALCULATION (BEZET), ECG08: 428 MS
QTC CALCULATION (BEZET), ECG08: 431 MS
RBC # BLD AUTO: 4.26 M/UL (ref 4.1–5.7)
RBC #/AREA URNS HPF: NORMAL /HPF (ref 0–5)
RBC MORPH BLD: ABNORMAL
RSV RNA SPEC QL NAA+PROBE: NOT DETECTED
RV+EV RNA SPEC QL NAA+PROBE: NOT DETECTED
SAO2 % BLD: 99 % (ref 92–97)
SARS-COV-2 PCR, COVPCR: NOT DETECTED
SODIUM SERPL-SCNC: 136 MMOL/L (ref 136–145)
SP GR UR REFRACTOMETRY: 1.01 (ref 1–1.03)
SP GR UR REFRACTOMETRY: 1.01 (ref 1–1.03)
SPECIMEN TYPE: ABNORMAL
THERAPEUTIC RANGE,PTTT: NORMAL SECS (ref 58–77)
TOTAL RESP. RATE, ITRR: 30
TROPONIN I SERPL-MCNC: <0.05 NG/ML
UR CULT HOLD, URHOLD: NORMAL
UROBILINOGEN UR QL STRIP.AUTO: 0.2 EU/DL (ref 0.2–1)
UROBILINOGEN UR QL STRIP.AUTO: 0.2 EU/DL (ref 0.2–1)
VENTRICULAR RATE, ECG03: 109 BPM
VENTRICULAR RATE, ECG03: 112 BPM
WBC # BLD AUTO: 15.9 K/UL (ref 4.1–11.1)
WBC URNS QL MICRO: NORMAL /HPF (ref 0–4)

## 2021-03-02 PROCEDURE — 83735 ASSAY OF MAGNESIUM: CPT

## 2021-03-02 PROCEDURE — 0202U NFCT DS 22 TRGT SARS-COV-2: CPT

## 2021-03-02 PROCEDURE — 70450 CT HEAD/BRAIN W/O DYE: CPT

## 2021-03-02 PROCEDURE — 74011000636 HC RX REV CODE- 636: Performed by: RADIOLOGY

## 2021-03-02 PROCEDURE — 85610 PROTHROMBIN TIME: CPT

## 2021-03-02 PROCEDURE — 93005 ELECTROCARDIOGRAM TRACING: CPT

## 2021-03-02 PROCEDURE — 99285 EMERGENCY DEPT VISIT HI MDM: CPT

## 2021-03-02 PROCEDURE — 74011000258 HC RX REV CODE- 258: Performed by: EMERGENCY MEDICINE

## 2021-03-02 PROCEDURE — 84484 ASSAY OF TROPONIN QUANT: CPT

## 2021-03-02 PROCEDURE — 81001 URINALYSIS AUTO W/SCOPE: CPT

## 2021-03-02 PROCEDURE — 86140 C-REACTIVE PROTEIN: CPT

## 2021-03-02 PROCEDURE — 83605 ASSAY OF LACTIC ACID: CPT

## 2021-03-02 PROCEDURE — 87040 BLOOD CULTURE FOR BACTERIA: CPT

## 2021-03-02 PROCEDURE — 71045 X-RAY EXAM CHEST 1 VIEW: CPT

## 2021-03-02 PROCEDURE — 74011250636 HC RX REV CODE- 250/636: Performed by: EMERGENCY MEDICINE

## 2021-03-02 PROCEDURE — 84145 PROCALCITONIN (PCT): CPT

## 2021-03-02 PROCEDURE — 71275 CT ANGIOGRAPHY CHEST: CPT

## 2021-03-02 PROCEDURE — 82728 ASSAY OF FERRITIN: CPT

## 2021-03-02 PROCEDURE — 96365 THER/PROPH/DIAG IV INF INIT: CPT

## 2021-03-02 PROCEDURE — 80053 COMPREHEN METABOLIC PANEL: CPT

## 2021-03-02 PROCEDURE — 36415 COLL VENOUS BLD VENIPUNCTURE: CPT

## 2021-03-02 PROCEDURE — 74011000258 HC RX REV CODE- 258: Performed by: FAMILY MEDICINE

## 2021-03-02 PROCEDURE — 87086 URINE CULTURE/COLONY COUNT: CPT

## 2021-03-02 PROCEDURE — 85379 FIBRIN DEGRADATION QUANT: CPT

## 2021-03-02 PROCEDURE — 36600 WITHDRAWAL OF ARTERIAL BLOOD: CPT

## 2021-03-02 PROCEDURE — 74011250637 HC RX REV CODE- 250/637: Performed by: FAMILY MEDICINE

## 2021-03-02 PROCEDURE — 83880 ASSAY OF NATRIURETIC PEPTIDE: CPT

## 2021-03-02 PROCEDURE — 65660000001 HC RM ICU INTERMED STEPDOWN

## 2021-03-02 PROCEDURE — 83615 LACTATE (LD) (LDH) ENZYME: CPT

## 2021-03-02 PROCEDURE — 85025 COMPLETE CBC W/AUTO DIFF WBC: CPT

## 2021-03-02 PROCEDURE — 82306 VITAMIN D 25 HYDROXY: CPT

## 2021-03-02 PROCEDURE — 81003 URINALYSIS AUTO W/O SCOPE: CPT

## 2021-03-02 PROCEDURE — 77010033711 HC HIGH FLOW OXYGEN

## 2021-03-02 PROCEDURE — 85384 FIBRINOGEN ACTIVITY: CPT

## 2021-03-02 PROCEDURE — 96375 TX/PRO/DX INJ NEW DRUG ADDON: CPT

## 2021-03-02 PROCEDURE — 82803 BLOOD GASES ANY COMBINATION: CPT

## 2021-03-02 PROCEDURE — 74011250636 HC RX REV CODE- 250/636: Performed by: FAMILY MEDICINE

## 2021-03-02 PROCEDURE — 85730 THROMBOPLASTIN TIME PARTIAL: CPT

## 2021-03-02 RX ORDER — METOPROLOL TARTRATE 50 MG/1
50 TABLET ORAL 2 TIMES DAILY
Status: DISCONTINUED | OUTPATIENT
Start: 2021-03-02 | End: 2021-03-18 | Stop reason: HOSPADM

## 2021-03-02 RX ORDER — VANCOMYCIN 1.75 GRAM/500 ML IN 0.9 % SODIUM CHLORIDE INTRAVENOUS
1750 ONCE
Status: COMPLETED | OUTPATIENT
Start: 2021-03-02 | End: 2021-03-02

## 2021-03-02 RX ORDER — ACETAMINOPHEN 325 MG/1
650 TABLET ORAL
Status: DISCONTINUED | OUTPATIENT
Start: 2021-03-02 | End: 2021-03-18 | Stop reason: HOSPADM

## 2021-03-02 RX ORDER — ASPIRIN 81 MG/1
81 TABLET ORAL DAILY
Status: DISCONTINUED | OUTPATIENT
Start: 2021-03-03 | End: 2021-03-18 | Stop reason: HOSPADM

## 2021-03-02 RX ORDER — LANOLIN ALCOHOL/MO/W.PET/CERES
3 CREAM (GRAM) TOPICAL
Status: DISCONTINUED | OUTPATIENT
Start: 2021-03-02 | End: 2021-03-18 | Stop reason: HOSPADM

## 2021-03-02 RX ORDER — SODIUM CHLORIDE 0.9 % (FLUSH) 0.9 %
5-40 SYRINGE (ML) INJECTION AS NEEDED
Status: DISCONTINUED | OUTPATIENT
Start: 2021-03-02 | End: 2021-03-18 | Stop reason: HOSPADM

## 2021-03-02 RX ORDER — THERA TABS 400 MCG
1 TAB ORAL DAILY
Status: DISCONTINUED | OUTPATIENT
Start: 2021-03-03 | End: 2021-03-18 | Stop reason: HOSPADM

## 2021-03-02 RX ORDER — SODIUM CHLORIDE 0.9 % (FLUSH) 0.9 %
5-40 SYRINGE (ML) INJECTION EVERY 8 HOURS
Status: DISCONTINUED | OUTPATIENT
Start: 2021-03-02 | End: 2021-03-18 | Stop reason: HOSPADM

## 2021-03-02 RX ORDER — SODIUM CHLORIDE 0.9 % (FLUSH) 0.9 %
5-10 SYRINGE (ML) INJECTION AS NEEDED
Status: DISCONTINUED | OUTPATIENT
Start: 2021-03-02 | End: 2021-03-18 | Stop reason: HOSPADM

## 2021-03-02 RX ORDER — ASCORBIC ACID 500 MG
500 TABLET ORAL DAILY
Status: DISCONTINUED | OUTPATIENT
Start: 2021-03-03 | End: 2021-03-18 | Stop reason: HOSPADM

## 2021-03-02 RX ORDER — SODIUM CHLORIDE 9 MG/ML
25 INJECTION, SOLUTION INTRAVENOUS CONTINUOUS
Status: DISCONTINUED | OUTPATIENT
Start: 2021-03-02 | End: 2021-03-04

## 2021-03-02 RX ADMIN — PIPERACILLIN AND TAZOBACTAM 3.38 G: 3; .375 INJECTION, POWDER, LYOPHILIZED, FOR SOLUTION INTRAVENOUS at 14:43

## 2021-03-02 RX ADMIN — APIXABAN 5 MG: 5 TABLET, FILM COATED ORAL at 21:30

## 2021-03-02 RX ADMIN — Medication 3 MG: at 21:31

## 2021-03-02 RX ADMIN — Medication 10 ML: at 21:35

## 2021-03-02 RX ADMIN — METOPROLOL TARTRATE 50 MG: 50 TABLET, FILM COATED ORAL at 21:30

## 2021-03-02 RX ADMIN — CEFEPIME HYDROCHLORIDE 2 G: 2 INJECTION, POWDER, FOR SOLUTION INTRAVENOUS at 21:30

## 2021-03-02 RX ADMIN — SODIUM CHLORIDE 75 ML/HR: 9 INJECTION, SOLUTION INTRAVENOUS at 21:30

## 2021-03-02 RX ADMIN — VANCOMYCIN HYDROCHLORIDE 1750 MG: 10 INJECTION, POWDER, LYOPHILIZED, FOR SOLUTION INTRAVENOUS at 14:05

## 2021-03-02 RX ADMIN — IOPAMIDOL 80 ML: 755 INJECTION, SOLUTION INTRAVENOUS at 13:10

## 2021-03-02 NOTE — H&P
History & Physical    Primary Care Provider: Samantha Mena MD  Source of Information: Patient    History of Presenting Illness:   Irma Lewis is a 48 y.o. male who presents with shortness of breath and dyspnea    Patient is not able to provide much history as he has significant shortness of breath and increased work of breathing. Patient was admitted on 1/18/2021 at [facility] and discharged on 2/17/2021. Patient was admitted with acute hypoxic respiratory failure, severe multilobar Covid pneumonia, pneumomediastinum, patient also has history of right lower extremity DVT. Patient was discharged to [facility], patient has been having increased shortness of breath for the last few days and today became quite short of breath, patient was put on nonrebreather 15 L and was brought to [facility].  Patient continued to be quite short of breath and is currently on 45 L nasal cannula, patient appears to be distressed. Patient was evaluated by the ICU team and deemed appropriate to be admitted to the hospitalist service on intermediate care unit. Patient denies any chest pain associated with his symptoms reports that he feels he is struggling to breathe. Patient reports mild cough but denies fever      The patient denies any Headache, blurry vision, sore throat, trouble swallowing, trouble with speech, chest pain, fever, chills, N/V/D, abd pain, urinary symptoms, constipation, recent travels, sick contacts, focal or generalized neurological symptoms,  falls, injuries, rashes, contact with COVID-19 diagnosed patients, hematemesis, melena, hemoptysis, hematuria, rashes, denies starting any new medications and denies any other concerns or problems besides as mentioned above. Review of Systems:  A comprehensive review of systems was negative except for that written in the History of Present Illness.      Past Medical History:   Diagnosis Date    GERD (gastroesophageal reflux disease)     Obesity (BMI 30-39. 9) 5/8/2019    Right leg DVT ([address] 2019      Past Surgical History:   Procedure Laterality Date    HX ORTHOPAEDIC       Prior to Admission medications    Medication Sig Start Date End Date Taking? Authorizing Provider   apixaban (Eliquis) 5 mg tablet Take 1 Tab by mouth two (2) times a day for 30 days. 2/17/21 3/19/21  Gold Ferrari MD   furosemide (Lasix) 40 mg tablet Take 1 Tab by mouth daily for 30 days. 2/17/21 3/19/21  Gold Ferrari MD   melatonin 3 mg tablet Take 1 Tab by mouth nightly for 30 days. 2/17/21 3/19/21  Gold Ferrari MD   metoprolol tartrate (LOPRESSOR) 50 mg tablet Take 1 Tab by mouth two (2) times a day for 30 days. 2/17/21 3/19/21  Gold Ferrari MD   aspirin delayed-release 81 mg tablet Take 81 mg by mouth daily. Other, MD Leopoldo   albuterol (PROVENTIL HFA, VENTOLIN HFA, PROAIR HFA) 90 mcg/actuation inhaler Take 2 Puffs by inhalation every four (4) hours as needed for Wheezing. 1/13/21   Roma Klinefelter, MD   multivitamin (ONE A DAY) tablet Take 1 Tab by mouth daily. Provider, Historical   cholecalciferol (VITAMIN D3) 1,000 unit cap Take 1,000 Units by mouth daily. Provider, Historical   ascorbic acid, vitamin C, (VITAMIN C) 500 mg tablet Take 500 mg by mouth daily.     Provider, Historical     No Known Allergies   Family History   Problem Relation Age of Onset    Cancer Mother     Cancer Father         SOCIAL HISTORY:  Patient resides:  Independently    Assisted Living    SNF x   With family care       Smoking history:   None x   Former    Chronic      Alcohol history:   None    Social x   Chronic      Ambulates:   Independently x   w/cane    w/walker    w/wc    CODE STATUS:  DNR    Full x   Other      Objective:     Physical Exam:     Visit Vitals  BP (!) 134/97   Pulse 93   Temp 98.2 °F (36.8 °C)   Resp (!) 33   Wt 72.1 kg (159 lb)   SpO2 97%   BMI 24.18 kg/m²    O2 Flow Rate (L/min): 40 l/min O2 Device: Hi flow nasal cannula    General : alert x 2, distressed, ill-appearing male  HEENT: PEERL moist mucus membrane, TM clear  Neck: supple,   Chest: Decreased basal breath sounds, coarse breath sounds bilateral lung fields  CVS: S1 S2 heard,   Abd: soft/ Non tender, non distended, BS physiological,   Ext: no clubbing, no cyanosis, no edema,  Neuro/Psych: Exam due to patient's shortness of breath, DTR 1+ to 4  Skin: warm     EKG: Sinus tachycardia with nonspecific ST changes      Data Review:     Recent Days:  Recent Labs     03/02/21  1227   WBC 15.9*   HGB 12.2   HCT 36.4*        Recent Labs     03/02/21  1227      K 4.3      CO2 29   *   BUN 15   CREA 0.78   CA 9.7   MG 2.1   ALB 2.3*   ALT 88*   INR 1.1     No results for input(s): PH, PCO2, PO2, HCO3, FIO2 in the last 72 hours.     24 Hour Results:  Recent Results (from the past 24 hour(s))   EKG, 12 LEAD, INITIAL    Collection Time: 03/02/21 12:13 PM   Result Value Ref Range    Ventricular Rate 112 BPM    Atrial Rate 112 BPM    P-R Interval 114 ms    QRS Duration 78 ms    Q-T Interval 316 ms    QTC Calculation (Bezet) 431 ms    Calculated P Axis 24 degrees    Calculated R Axis 3 degrees    Calculated T Axis -8 degrees    Diagnosis       Sinus tachycardia  Left ventricular hypertrophy with repolarization abnormality  When compared with ECG of 07-FEB-2021 18:49,  Non-specific change in ST segment in Inferior leads  Confirmed by Willy Wang MD, Belvie Goldberg ([id number]) on 3/2/2021 1:50:30 PM     PROTHROMBIN TIME + INR    Collection Time: 03/02/21 12:27 PM   Result Value Ref Range    INR 1.1 0.9 - 1.1      Prothrombin time 11.7 (H) 9.0 - 11.1 sec   FIBRINOGEN    Collection Time: 03/02/21 12:27 PM   Result Value Ref Range    Fibrinogen 789 (H) 200 - 475 mg/dL   PTT    Collection Time: 03/02/21 12:27 PM   Result Value Ref Range    aPTT 23.8 22.1 - 31.0 sec    aPTT, therapeutic range     58.0 - 77.0 SECS   PROCALCITONIN    Collection Time: 03/02/21 12:27 PM   Result Value Ref Range    Procalcitonin 0.11 ng/mL   C REACTIVE PROTEIN, QT    Collection Time: 03/02/21 12:27 PM   Result Value Ref Range    C-Reactive protein 13.40 (H) 0.00 - 0.60 mg/dL   LD    Collection Time: 03/02/21 12:27 PM   Result Value Ref Range     (H) 85 - 241 U/L   FERRITIN    Collection Time: 03/02/21 12:27 PM   Result Value Ref Range    Ferritin 1,491 (H) 26 - 388 NG/ML   D DIMER    Collection Time: 03/02/21 12:27 PM   Result Value Ref Range    D-dimer 2.12 (H) 0.00 - 0.65 mg/L FEU   CBC WITH AUTOMATED DIFF    Collection Time: 03/02/21 12:27 PM   Result Value Ref Range    WBC 15.9 (H) 4.1 - 11.1 K/uL    RBC 4.26 4.10 - 5.70 M/uL    HGB 12.2 12.1 - 17.0 g/dL    HCT 36.4 (L) 36.6 - 50.3 %    MCV 85.4 80.0 - 99.0 FL    MCH 28.6 26.0 - 34.0 PG    MCHC 33.5 30.0 - 36.5 g/dL    RDW 15.4 (H) 11.5 - 14.5 %    PLATELET 039 153 - 134 K/uL    MPV 10.7 8.9 - 12.9 FL    NRBC 0.1 (H) 0  WBC    ABSOLUTE NRBC 0.02 (H) 0.00 - 0.01 K/uL    NEUTROPHILS 90 (H) 32 - 75 %    LYMPHOCYTES 4 (L) 12 - 49 %    MONOCYTES 3 (L) 5 - 13 %    EOSINOPHILS 0 0 - 7 %    BASOPHILS 1 0 - 1 %    IMMATURE GRANULOCYTES 2 (H) 0.0 - 0.5 %    ABS. NEUTROPHILS 14.3 (H) 1.8 - 8.0 K/UL    ABS. LYMPHOCYTES 0.6 (L) 0.8 - 3.5 K/UL    ABS. MONOCYTES 0.5 0.0 - 1.0 K/UL    ABS. EOSINOPHILS 0.0 0.0 - 0.4 K/UL    ABS. BASOPHILS 0.2 (H) 0.0 - 0.1 K/UL    ABS. IMM.  GRANS. 0.3 (H) 0.00 - 0.04 K/UL    DF SMEAR SCANNED      RBC COMMENTS NORMOCYTIC, NORMOCHROMIC     METABOLIC PANEL, COMPREHENSIVE    Collection Time: 03/02/21 12:27 PM   Result Value Ref Range    Sodium 136 136 - 145 mmol/L    Potassium 4.3 3.5 - 5.1 mmol/L    Chloride 101 97 - 108 mmol/L    CO2 29 21 - 32 mmol/L    Anion gap 6 5 - 15 mmol/L    Glucose 162 (H) 65 - 100 mg/dL    BUN 15 6 - 20 MG/DL    Creatinine 0.78 0.70 - 1.30 MG/DL    BUN/Creatinine ratio 19 12 - 20      GFR est AA >60 >60 ml/min/1.73m2    GFR est non-AA >60 >60 ml/min/1.73m2    Calcium 9.7 8.5 - 10.1 MG/DL    Bilirubin, total 0.4 0.2 - 1.0 MG/DL    ALT (SGPT) 88 (H) 12 - 78 U/L    AST (SGOT) 43 (H) 15 - 37 U/L    Alk.  phosphatase 145 (H) 45 - 117 U/L    Protein, total 7.4 6.4 - 8.2 g/dL    Albumin 2.3 (L) 3.5 - 5.0 g/dL    Globulin 5.1 (H) 2.0 - 4.0 g/dL    A-G Ratio 0.5 (L) 1.1 - 2.2     MAGNESIUM    Collection Time: 03/02/21 12:27 PM   Result Value Ref Range    Magnesium 2.1 1.6 - 2.4 mg/dL   TROPONIN I    Collection Time: 03/02/21 12:27 PM   Result Value Ref Range    Troponin-I, Qt. <0.05 <0.05 ng/mL   LACTIC ACID    Collection Time: 03/02/21 12:27 PM   Result Value Ref Range    Lactic acid 3.0 (HH) 0.4 - 2.0 MMOL/L   VITAMIN D, 25 HYDROXY    Collection Time: 03/02/21 12:27 PM   Result Value Ref Range    Vitamin D 25-Hydroxy 24.6 (L) 30 - 100 ng/mL   NT-PRO BNP    Collection Time: 03/02/21 12:27 PM   Result Value Ref Range    NT pro- (H) <125 PG/ML   EKG, 12 LEAD, INITIAL    Collection Time: 03/02/21 12:38 PM   Result Value Ref Range    Ventricular Rate 109 BPM    Atrial Rate 109 BPM    P-R Interval 112 ms    QRS Duration 76 ms    Q-T Interval 318 ms    QTC Calculation (Bezet) 428 ms    Calculated P Axis 26 degrees    Calculated R Axis -3 degrees    Calculated T Axis 177 degrees    Diagnosis       Sinus tachycardia  Left ventricular hypertrophy with repolarization abnormality  When compared with ECG of 07-FEB-2021 18:49,  T wave inversion less evident in Anterolateral leads  QT has shortened  Confirmed by Yolie Presley MD, Sean Ashby ([id number]) on 3/2/2021 1:50:35 PM     POC EG7    Collection Time: 03/02/21 12:56 PM   Result Value Ref Range    Calcium, ionized (POC) 1.29 1.12 - 1.32 mmol/L    FIO2 (POC) 100 %    pH (POC) 7.48 (H) 7.35 - 7.45      pCO2 (POC) 43.8 35.0 - 45.0 MMHG    pO2 (POC) 108 (H) 80 - 100 MMHG    HCO3 (POC) 32.8 (H) 22 - 26 MMOL/L    Base excess (POC) 9 mmol/L    sO2 (POC) 99 (H) 92 - 97 %    Site RIGHT RADIAL      Device: High Flow Nasal Cannula      Flow rate (POC) 40 L/M    Allens test (POC) YES      Specimen type (POC) ARTERIAL      Total resp. rate 30     URINALYSIS W/MICROSCOPIC    Collection Time: 03/02/21  1:53 PM   Result Value Ref Range    Color YELLOW/STRAW      Appearance CLEAR CLEAR      Specific gravity 1.012 1.003 - 1.030      pH (UA) 7.5 5.0 - 8.0      Protein Negative NEG mg/dL    Glucose Negative NEG mg/dL    Ketone Negative NEG mg/dL    Bilirubin Negative NEG      Blood Negative NEG      Urobilinogen 0.2 0.2 - 1.0 EU/dL    Nitrites Negative NEG      Leukocyte Esterase Negative NEG      WBC 0-4 0 - 4 /hpf    RBC 0-5 0 - 5 /hpf    Epithelial cells FEW FEW /lpf    Bacteria Negative NEG /hpf    Hyaline cast 0-2 0 - 5 /lpf   URINE CULTURE HOLD SAMPLE    Collection Time: 03/02/21  1:53 PM    Specimen: Serum; Urine   Result Value Ref Range    Urine culture hold        Urine on hold in Microbiology dept for 2 days. If unpreserved urine is submitted, it cannot be used for addtional testing after 24 hours, recollection will be required.    LACTIC ACID    Collection Time: 03/02/21  2:08 PM   Result Value Ref Range    Lactic acid 2.1 (HH) 0.4 - 2.0 MMOL/L   RESPIRATORY VIRUS PANEL W/COVID-19, PCR    Collection Time: 03/02/21  2:48 PM    Specimen: Nasopharyngeal   Result Value Ref Range    Adenovirus Not detected NOTD      Coronavirus 229E Not detected NOTD      Coronavirus HKU1 Not detected NOTD      Coronavirus CVNL63 Not detected NOTD      Coronavirus OC43 Not detected NOTD      Metapneumovirus Not detected NOTD      Rhinovirus and Enterovirus Not detected NOTD      Influenza A Not detected NOTD      Influenza A, subtype H1 Not detected NOTD      Influenza A, subtype H3 Not detected NOTD      INFLUENZA A H1N1 PCR Not detected NOTD      Influenza B Not detected NOTD      Parainfluenza 1 Not detected NOTD      Parainfluenza 2 Not detected NOTD      Parainfluenza 3 Not detected NOTD      Parainfluenza virus 4 Not detected NOTD      RSV by PCR Not detected NOTD      B. parapertussis, PCR Not detected NOTD      Bordetella pertussis - PCR Not detected NOTD      Chlamydophila pneumoniae DNA, QL, PCR Not detected NOTD      Mycoplasma pneumoniae DNA, QL, PCR Not detected NOTD      SARS-CoV-2, PCR Not detected NOTD           Imaging:   Xr Chest Port    Result Date: 3/2/2021  Low lung volumes. Diffuse interstitial and airspace opacities with some improved aeration in the right lung base. Assessment/Plan     Acute hypoxic respiratory failure  Recent COVID-19 pneumonia  Severe sepsis as defined by leukocytosis, elevated lactic acid, tachypnea, tachycardia and likely pulmonary source  Transaminitis  History of DVT    Admit patient to the intermediate care unit  Start patient on broad-spectrum IV antibiotics, limit fluid bolus due to history of recent COVID-19 infection, continue ascorbic acid and vitamin C  CTA of the chest is pending  Blood cultures, urine culture, IV hydration, pulmonary consult, pulse ox monitoring, telemetry, cycle troponins, monitor  Transaminitis likely secondary to sepsis, right upper quadrant ultrasound pending, trend LFTs, check acetaminophen level, close monitoring  Continue Eliquis    GI DVT prophylaxis: Patient is on Eliquis      Patient appears to be critically ill with high risk for decompensation, total critical care time 48 minutes      Critical Care Attestation: This patient is unstable and critically ill. Due to a high probability of clinically significant, life threatening deterioration, the patient required my highest level of preparedness to intervene emergently and I personally spent this critical care time directly and personally managing the patient. This critical care time included obtaining a history; examining the patient; pulse oximetry; ordering and review of studies; arranging urgent treatment with development of a management plan; evaluation of patient's response to treatment; frequent reassessment; and, discussions with other providers and/or family.  This critical care time was performed to assess and manage the high probability of imminent, life-threatening deterioration that could result in multi-organ failure and death. It was exclusive of separately billable procedures, treating other patients, and teaching time. Please note that this dictation was completed with Dualog, the computer voice recognition software. Quite often unanticipated grammatical, syntax, homophones, and other interpretive errors are inadvertently transcribed by the computer software. Please disregard these errors. Please excuse any errors that have escaped final proofreading.          Signed By: Jack Aguilar MD     March 2, 2021

## 2021-03-02 NOTE — ED PROVIDER NOTES
The history is provided by the patient, the EMS personnel and the nursing home. Respiratory Distress  This is a recurrent problem. The average episode lasts 1 day. The problem occurs continuously. The current episode started more than 1 week ago. The problem has been gradually worsening. Pertinent negatives include no fever, no cough, no sputum production, no hemoptysis and no chest pain. Associated symptoms comments: Occasional nosebleeds. Precipitated by: recent admission for COVID pneumonia. Treatments tried: beta blockers, calium channel blockers. The treatment provided no relief. He has had prior hospitalizations. He has had prior ED visits. Associated medical issues include pneumonia. Past Medical History:   Diagnosis Date    GERD (gastroesophageal reflux disease)     Obesity (BMI 30-39. 9) 5/8/2019    Right leg DVT ([address] 2019       Past Surgical History:   Procedure Laterality Date    HX ORTHOPAEDIC           Family History:   Problem Relation Age of Onset    Cancer Mother     Cancer Father        Social History     Socioeconomic History    Marital status: SINGLE     Spouse name: Not on file    Number of children: Not on file    Years of education: Not on file    Highest education level: Not on file   Occupational History    Not on file   Social Needs    Financial resource strain: Not on file    Food insecurity     Worry: Not on file     Inability: Not on file    Transportation needs     Medical: Not on file     Non-medical: Not on file   Tobacco Use    Smoking status: Never Smoker    Smokeless tobacco: Current User   Substance and Sexual Activity    Alcohol use: Yes     Comment: once a month    Drug use: Not on file    Sexual activity: Not on file   Lifestyle    Physical activity     Days per week: Not on file     Minutes per session: Not on file    Stress: Not on file   Relationships    Social connections     Talks on phone: Not on file     Gets together: Not on file     Attends Jew service: Not on file     Active member of club or organization: Not on file     Attends meetings of clubs or organizations: Not on file     Relationship status: Not on file    Intimate partner violence     Fear of current or ex partner: Not on file     Emotionally abused: Not on file     Physically abused: Not on file     Forced sexual activity: Not on file   Other Topics Concern    Not on file   Social History Narrative    Not on file         ALLERGIES: Patient has no known allergies. Review of Systems   Constitutional: Negative for fever. Respiratory: Negative for cough, hemoptysis and sputum production. Cardiovascular: Negative for chest pain. All other systems reviewed and are negative. Vitals:    03/02/21 1212 03/02/21 1230 03/02/21 1232 03/02/21 1245   BP: (!) 167/88 136/83  139/83   Pulse: (!) 113 (!) 117  (!) 118   Resp: (!) 43 (!) 43  (!) 35   Temp: 98.6 °F (37 °C)      SpO2: (!) 89% 93% 96% 92%            Physical Exam  Vitals signs and nursing note reviewed. Constitutional:       General: He is awake. Appearance: He is ill-appearing. HENT:      Head: Normocephalic and atraumatic. Eyes:      Conjunctiva/sclera: Conjunctivae normal.   Neck:      Musculoskeletal: Neck supple. Trachea: No tracheal deviation. Cardiovascular:      Rate and Rhythm: Normal rate and regular rhythm. Pulmonary:      Effort: Tachypnea present. Breath sounds: Decreased air movement (bilaterally) present. Rales present. Abdominal:      General: There is no distension. Musculoskeletal: Normal range of motion. General: No deformity. Skin:     General: Skin is warm and dry. Neurological:      Mental Status: He is alert. Cranial Nerves: No cranial nerve deficit. Psychiatric:         Behavior: Behavior normal.          MDM     48 y.o. male presents with progressively worsening respiratory failure from Covid pneumonia.   There is concern with elevated white blood cell count, worsening vital signs and increasing oxygen requirements now on 40 L/min on high flow nasal cannula to maintain oxygenation that he could be developing a secondary pneumonia that is either viral or post viral in nature. Covered empirically with vancomycin and Zosyn. ICU consulted for evaluation and inpatient admission. Procedures    Critical Care Time: 34 minutes  I personally performed critical care time separate of billable procedures which may include ordering and interpretation of testing, ordering of medications, direct patient assessment and reassessment, discussion with consultants or family, and documentation. EKG 1238: Rate 109, sinus tachycardia. LVH with secondary repolarization abnormality in anterolateral leads. No significant change from last tracing.

## 2021-03-02 NOTE — CONSULTS
[facility]    ICU Consult    Name: Arlyn London   : 1965   MRN: [id number]   Date: 3/2/2021      Subjective:   Progress Note: 3/2/2021      Reason for ICU Consult: Hypoxic Respiratory Failure     HPI: 49 y/o M w hx of covid-19 pneumonia diagnosed in January s/p prolonged hospitalization at [facility] presenting from Rehab facility with increasing oxygen requirement. Patient with some subjective increase in WOB related to activity over the pas week. Saturating well on HFNC currently. Able to communicate easily in full sentences. Denies chest pain or SOB at rest. He does have a hx of DVT which was treated with xarelto. Summary from [facility] stay:  -COVID-19 test positive on .   -Initially on nasal cannula/high flow, decompensation  went on BIPAP    -developed pneumomediastinum , changed back to high flow   -s/p convalescent plasma 2021  -s/p IV Solu-Medrol - now on prednisone taper. -PO lasix  - 4 lpm at rest   - 6 lpm w exertion        Assessment and Recommendations:     Acute on chronic hypoxic respiratory failure   - Admit to IMCU   - Continue HFNC as tolerated. Pay require bipap at night/while sleeping   - F/u respiratory viral panel   - F/u CTA chest   - Continue PT/OT as tolerated   - Good pulmonary hygiene and oobtc    Contact ICU if he should deteriorate    Active Problem List:     Problem List  Date Reviewed: 2019          Codes Class    History of DVT of lower extremity ICD-10-CM: Z86.718  ICD-9-CM: V12.51         Obesity (BMI 30-39. 9) ICD-10-CM: E66.9  ICD-9-CM: 278.00         Acute respiratory failure with hypoxia (HCC) ICD-10-CM: J96.01  ICD-9-CM: 518.81         COVID-19 virus infection ICD-10-CM: U07.1  ICD-9-CM: 079.89               Past Medical History:      has a past medical history of GERD (gastroesophageal reflux disease), Obesity (BMI 30-39.9) (2019), and Right leg DVT ([facility] [address] (2019).     Past Surgical History:      has a past surgical history that includes hx orthopaedic. Home Medications:     Prior to Admission medications    Medication Sig Start Date End Date Taking? Authorizing Provider   apixaban (Eliquis) 5 mg tablet Take 1 Tab by mouth two (2) times a day for 30 days. 2/17/21 3/19/21  Kierra Shetty MD   furosemide (Lasix) 40 mg tablet Take 1 Tab by mouth daily for 30 days. 2/17/21 3/19/21  Kierra Shetty MD   melatonin 3 mg tablet Take 1 Tab by mouth nightly for 30 days. 2/17/21 3/19/21  Kierra Shetty MD   metoprolol tartrate (LOPRESSOR) 50 mg tablet Take 1 Tab by mouth two (2) times a day for 30 days. 2/17/21 3/19/21  Kierra Shetty MD   aspirin delayed-release 81 mg tablet Take 81 mg by mouth daily. Other, Phys, MD   albuterol (PROVENTIL HFA, VENTOLIN HFA, PROAIR HFA) 90 mcg/actuation inhaler Take 2 Puffs by inhalation every four (4) hours as needed for Wheezing. 21   Ramone Keane MD   multivitamin (ONE A DAY) tablet Take 1 Tab by mouth daily. Provider, Historical   cholecalciferol (VITAMIN D3) 1,000 unit cap Take 1,000 Units by mouth daily. Provider, Historical   ascorbic acid, vitamin C, (VITAMIN C) 500 mg tablet Take 500 mg by mouth daily.     Provider, Historical       Allergies/Social/Family History:     No Known Allergies   Social History     Tobacco Use    Smoking status: Never Smoker    Smokeless tobacco: Current User   Substance Use Topics    Alcohol use: Yes     Comment: once a month      Family History   Problem Relation Age of Onset    Cancer Mother     Cancer Father        Review of Systems:     A comprehensive review of systems was negative except for: Respiratory: positive for dyspnea on exertion    Objective:   Visit Vitals  BP (!) 119/93   Pulse (!) 111   Temp 98.2 °F (36.8 °C)   Resp 26   Wt 72.1 kg (159 lb)   SpO2 93%   BMI 24.18 kg/m²    O2 Flow Rate (L/min): 40 l/min O2 Device: Hi flow nasal cannula Temp (24hrs), Av.4 °F (36.9 °C), Min:98.2 °F (36.8 °C), Max:98.6 °F (37 °C)    Physical Exam:    General:  Alert, cooperative, well noursished, well developed, appears stated age   Eyes:  Sclera anicteric. Pupils equally round and reactive to light. Mouth/Throat: Mucous membranes normal, oral pharynx clear   Neck: Supple   Lungs:   Clear to auscultation bilaterally, good effort. Short inspiratory phase   CV:  Regular rhythm, tachycardic   Abdomen:   Soft, non-tender. bowel sounds normal. non-distended   Extremities: No cyanosis or edema   Skin: Skin color, texture, turgor normal. no acute rash or lesions   Lymph nodes: Cervical and supraclavicular normal   Musculoskeletal: No swelling or deformity   Lines/Devices:  Intact, no erythema, drainage or tenderness   Psych: Alert and oriented, normal mood affect given the setting       Labs and Data: Reviewed  Recent Labs     03/02/21  1227   WBC 15.9*   HGB 12.2   HCT 36.4*        Recent Labs     03/02/21  1227      K 4.3      CO2 29   BUN 15   CREA 0.78   *   CA 9.7   MG 2.1     Recent Labs     03/02/21  1227   *   TP 7.4   ALB 2.3*   GLOB 5.1*     Recent Labs     03/02/21  1227   INR 1.1   PTP 11.7*   APTT 23.8      Recent Labs     03/02/21  1256   PHI 7.48*   PCO2I 43.8   PO2I 108*   FIO2I 100     Recent Labs     03/02/21  1227   TROIQ <0.05       MEDS: Reviewed    Chest X-Ray:  CXR Results  (Last 48 hours)               03/02/21 1248  XR CHEST PORT Final result    Impression:  Low lung volumes. Diffuse interstitial and airspace opacities with   some improved aeration in the right lung base. Narrative:  EXAM: XR CHEST PORT       INDICATION: worsening hypoxemia       COMPARISON: 2/17/2021       FINDINGS: A portable AP radiograph of the chest was obtained at 1248 hours. The   patient is on a cardiac monitor. Lung volumes are low. Diffuse interstitial and   airspace opacities are again noted with some improvement in the right lung   base. Toy Thompson Heart size is possibly enlarged. .  The bones and soft tissues are grossly within normal limits. Special Equipment: None    DISPOSITION: Transfer to non-ICU bed    CRITICAL CARE CONSULTANT NOTE  I had a face to face encounter with the patient, reviewed and interpreted patient data including clinical events, labs, images, vital signs, I/O's, and examined patient. I have discussed the case and the plan and management of the patient's care with the consulting services, the bedside nurses and the respiratory therapist.      I personally spent 45 minutes of critical care time. This is time spent at this critically ill patient's bedside actively involved in patient care as well as the coordination of care and discussions with the patient's family. This does not include any procedural time which has been billed separately.     Ellen Maldonado MD  Anesthesiology and [address] [place] [address]

## 2021-03-03 ENCOUNTER — APPOINTMENT (OUTPATIENT)
Dept: GENERAL RADIOLOGY | Age: 56
DRG: 871 | End: 2021-03-03
Attending: INTERNAL MEDICINE
Payer: COMMERCIAL

## 2021-03-03 ENCOUNTER — APPOINTMENT (OUTPATIENT)
Dept: ULTRASOUND IMAGING | Age: 56
DRG: 871 | End: 2021-03-03
Attending: FAMILY MEDICINE
Payer: COMMERCIAL

## 2021-03-03 LAB
ALBUMIN SERPL-MCNC: 2.2 G/DL (ref 3.5–5)
ALBUMIN/GLOB SERPL: 0.6 {RATIO} (ref 1.1–2.2)
ALP SERPL-CCNC: 126 U/L (ref 45–117)
ALT SERPL-CCNC: 82 U/L (ref 12–78)
ANION GAP SERPL CALC-SCNC: 4 MMOL/L (ref 5–15)
ARTERIAL PATENCY WRIST A: YES
AST SERPL-CCNC: 32 U/L (ref 15–37)
BACTERIA SPEC CULT: NORMAL
BACTERIA SPEC CULT: NORMAL
BASE EXCESS BLD CALC-SCNC: 5 MMOL/L
BASOPHILS # BLD: 0.1 K/UL (ref 0–0.1)
BASOPHILS NFR BLD: 1 % (ref 0–1)
BDY SITE: ABNORMAL
BILIRUB SERPL-MCNC: 0.3 MG/DL (ref 0.2–1)
BNP SERPL-MCNC: 156 PG/ML
BUN SERPL-MCNC: 18 MG/DL (ref 6–20)
BUN/CREAT SERPL: 33 (ref 12–20)
CA-I BLD-SCNC: 1.35 MMOL/L (ref 1.12–1.32)
CALCIUM SERPL-MCNC: 9.2 MG/DL (ref 8.5–10.1)
CHLORIDE SERPL-SCNC: 106 MMOL/L (ref 97–108)
CO2 SERPL-SCNC: 29 MMOL/L (ref 21–32)
CREAT SERPL-MCNC: 0.55 MG/DL (ref 0.7–1.3)
DIFFERENTIAL METHOD BLD: ABNORMAL
EOSINOPHIL # BLD: 0 K/UL (ref 0–0.4)
EOSINOPHIL NFR BLD: 0 % (ref 0–7)
ERYTHROCYTE [DISTWIDTH] IN BLOOD BY AUTOMATED COUNT: 15.4 % (ref 11.5–14.5)
GAS FLOW.O2 O2 DELIVERY SYS: ABNORMAL L/MIN
GAS FLOW.O2 SETTING OXYMISER: 60 L/M
GLOBULIN SER CALC-MCNC: 3.9 G/DL (ref 2–4)
GLUCOSE SERPL-MCNC: 75 MG/DL (ref 65–100)
HCO3 BLD-SCNC: 29.8 MMOL/L (ref 22–26)
HCT VFR BLD AUTO: 36 % (ref 36.6–50.3)
HGB BLD-MCNC: 11.5 G/DL (ref 12.1–17)
IMM GRANULOCYTES # BLD AUTO: 0.4 K/UL (ref 0–0.04)
IMM GRANULOCYTES NFR BLD AUTO: 4 % (ref 0–0.5)
LACTATE SERPL-SCNC: 1 MMOL/L (ref 0.4–2)
LYMPHOCYTES # BLD: 1.3 K/UL (ref 0.8–3.5)
LYMPHOCYTES NFR BLD: 13 % (ref 12–49)
MCH RBC QN AUTO: 28.4 PG (ref 26–34)
MCHC RBC AUTO-ENTMCNC: 31.9 G/DL (ref 30–36.5)
MCV RBC AUTO: 88.9 FL (ref 80–99)
MONOCYTES # BLD: 0.7 K/UL (ref 0–1)
MONOCYTES NFR BLD: 7 % (ref 5–13)
NEUTS SEG # BLD: 7.7 K/UL (ref 1.8–8)
NEUTS SEG NFR BLD: 75 % (ref 32–75)
NRBC # BLD: 0 K/UL (ref 0–0.01)
NRBC BLD-RTO: 0 PER 100 WBC
PCO2 BLD: 48.4 MMHG (ref 35–45)
PH BLD: 7.4 [PH] (ref 7.35–7.45)
PLATELET # BLD AUTO: 214 K/UL (ref 150–400)
PMV BLD AUTO: 11.1 FL (ref 8.9–12.9)
PO2 BLD: 125 MMHG (ref 80–100)
POTASSIUM SERPL-SCNC: 4 MMOL/L (ref 3.5–5.1)
PROCALCITONIN SERPL-MCNC: <0.05 NG/ML
PROT SERPL-MCNC: 6.1 G/DL (ref 6.4–8.2)
RBC # BLD AUTO: 4.05 M/UL (ref 4.1–5.7)
RBC MORPH BLD: ABNORMAL
SAO2 % BLD: 99 % (ref 92–97)
SERVICE CMNT-IMP: NORMAL
SODIUM SERPL-SCNC: 139 MMOL/L (ref 136–145)
SPECIMEN TYPE: ABNORMAL
TROPONIN I SERPL-MCNC: <0.05 NG/ML
WBC # BLD AUTO: 10.2 K/UL (ref 4.1–11.1)

## 2021-03-03 PROCEDURE — 74011250636 HC RX REV CODE- 250/636: Performed by: FAMILY MEDICINE

## 2021-03-03 PROCEDURE — 74011000258 HC RX REV CODE- 258: Performed by: FAMILY MEDICINE

## 2021-03-03 PROCEDURE — 83880 ASSAY OF NATRIURETIC PEPTIDE: CPT

## 2021-03-03 PROCEDURE — 94760 N-INVAS EAR/PLS OXIMETRY 1: CPT

## 2021-03-03 PROCEDURE — 36415 COLL VENOUS BLD VENIPUNCTURE: CPT

## 2021-03-03 PROCEDURE — 82803 BLOOD GASES ANY COMBINATION: CPT

## 2021-03-03 PROCEDURE — 36600 WITHDRAWAL OF ARTERIAL BLOOD: CPT

## 2021-03-03 PROCEDURE — 94762 N-INVAS EAR/PLS OXIMTRY CONT: CPT

## 2021-03-03 PROCEDURE — 80053 COMPREHEN METABOLIC PANEL: CPT

## 2021-03-03 PROCEDURE — 74011250637 HC RX REV CODE- 250/637: Performed by: FAMILY MEDICINE

## 2021-03-03 PROCEDURE — 74011250636 HC RX REV CODE- 250/636: Performed by: INTERNAL MEDICINE

## 2021-03-03 PROCEDURE — 85025 COMPLETE CBC W/AUTO DIFF WBC: CPT

## 2021-03-03 PROCEDURE — 65660000001 HC RM ICU INTERMED STEPDOWN

## 2021-03-03 PROCEDURE — 77010033711 HC HIGH FLOW OXYGEN

## 2021-03-03 PROCEDURE — 84484 ASSAY OF TROPONIN QUANT: CPT

## 2021-03-03 PROCEDURE — 77010033678 HC OXYGEN DAILY

## 2021-03-03 PROCEDURE — 83605 ASSAY OF LACTIC ACID: CPT

## 2021-03-03 PROCEDURE — 80048 BASIC METABOLIC PNL TOTAL CA: CPT

## 2021-03-03 PROCEDURE — 84145 PROCALCITONIN (PCT): CPT

## 2021-03-03 PROCEDURE — 71045 X-RAY EXAM CHEST 1 VIEW: CPT

## 2021-03-03 RX ORDER — FUROSEMIDE 10 MG/ML
20 INJECTION INTRAMUSCULAR; INTRAVENOUS ONCE
Status: COMPLETED | OUTPATIENT
Start: 2021-03-03 | End: 2021-03-03

## 2021-03-03 RX ADMIN — OXYCODONE HYDROCHLORIDE AND ACETAMINOPHEN 500 MG: 500 TABLET ORAL at 10:14

## 2021-03-03 RX ADMIN — METOPROLOL TARTRATE 50 MG: 50 TABLET, FILM COATED ORAL at 10:15

## 2021-03-03 RX ADMIN — FUROSEMIDE 20 MG: 10 INJECTION, SOLUTION INTRAMUSCULAR; INTRAVENOUS at 13:03

## 2021-03-03 RX ADMIN — CEFEPIME HYDROCHLORIDE 2 G: 2 INJECTION, POWDER, FOR SOLUTION INTRAVENOUS at 13:05

## 2021-03-03 RX ADMIN — CEFEPIME HYDROCHLORIDE 2 G: 2 INJECTION, POWDER, FOR SOLUTION INTRAVENOUS at 04:04

## 2021-03-03 RX ADMIN — APIXABAN 5 MG: 5 TABLET, FILM COATED ORAL at 10:14

## 2021-03-03 RX ADMIN — Medication 10 ML: at 13:05

## 2021-03-03 RX ADMIN — Medication 3 MG: at 21:55

## 2021-03-03 RX ADMIN — THERA TABS 1 TABLET: TAB at 10:14

## 2021-03-03 RX ADMIN — Medication 10 ML: at 06:00

## 2021-03-03 RX ADMIN — ASPIRIN 81 MG: 81 TABLET, COATED ORAL at 10:14

## 2021-03-03 RX ADMIN — Medication 10 ML: at 21:55

## 2021-03-03 RX ADMIN — CEFEPIME HYDROCHLORIDE 2 G: 2 INJECTION, POWDER, FOR SOLUTION INTRAVENOUS at 21:55

## 2021-03-03 RX ADMIN — APIXABAN 5 MG: 5 TABLET, FILM COATED ORAL at 17:30

## 2021-03-03 RX ADMIN — METOPROLOL TARTRATE 50 MG: 50 TABLET, FILM COATED ORAL at 17:30

## 2021-03-03 NOTE — PROGRESS NOTES
Transition of Care Plan   RUR- 13% Low Risk   DISPOSITION: The disposition plan is pending recommendation and medical progression.  Transport: Family - Sister Idalia Hernández - [phone]    Reason for Admission:   Hypoxic Respiratory Failure                    RUR Score:  13% Low Risk                   Plan for utilizing home health:  Pending recommendation. PCP: First and Last name:  Sheila Tolentino MD   Name of Practice: [facility]   Are you a current patient: Yes/No: Yes   Approximate date of last visit: Jan 2021 - virtually   Can you participate in a virtual visit with your PCP: Yes                    Current Advanced Directive/Advance Care Plan: Full Code, No ACP documentation on file at this time. Healthcare Decision Maker:   Click here to complete [address] including selection of the Healthcare Decision Maker Relationship (ie \"Primary\") - Patient identified his health care decision maker as his sister Idalia Hernández. Transition of Care Plan:  Pending recommendation. Reviewed chart for transitions of care and discussed in rounds. CM met with patient at bedside to explain role and offer support. Patient is alert and oriented x4 and confirmed demographics. CM contacted patient via patient room this morning (due to contact). Patient confirmed his mailing address, declined giving his home address. [facility]. Patient also reports that he has utilized [facility] and reported, \"I plan to go back once I get better. \" Patient reports that either his sister or brother will be his mode of transportation. Patient confirms that he was using the [facility] for his prescriptions.  Patient gave  permission to contact his sister Hattie Song to get his updated pharmacy ( will use the [facility] on [facility] for his prescriptions.)    CM will continue to follow and provide support. Care Management Interventions  PCP Verified by CM: Yes  Palliative Care Criteria Met (RRAT>21 & CHF Dx)?: No  Mode of Transport at Discharge:  Other (see comment)  Transition of Care Consult (CM Consult): Discharge Planning  MyChart Signup: No  Discharge Durable Medical Equipment: No  Physical Therapy Consult: No  Occupational Therapy Consult: No  Speech Therapy Consult: No  Current Support Network: Own Home  Confirm Follow Up Transport: Family  The Patient and/or Patient Representative was Provided with a Choice of Provider and Agrees with the Discharge Plan?: Yes  Freedom of Choice List was Provided with Basic Dialogue that Supports the Patient's Individualized Plan of Care/Goals, Treatment Preferences and Shares the Quality Data Associated with the Providers?: Yes  The [employer] Information Provided?: No    Edilson Negro, MSW,CRM

## 2021-03-03 NOTE — PROGRESS NOTES
Bedside and Verbal shift change report given to RN  (oncoming nurse) by Danielle Maldonado RN (offgoing nurse). Report included the following information SBAR, Kardex, ED Summary, Intake/Output, MAR, Recent Results and Cardiac Rhythm NSR.

## 2021-03-03 NOTE — PROGRESS NOTES
[address] [facility] Adult  Hospitalist Group                                                                                          Hospitalist Progress Note  Sergio Maradiaga MD  Answering service: [phone] from in house phone        Date of Service:  3/3/2021  NAME:  [place]  :  1965  MRN:  [id number]      Admission Summary:     Patient is not able to provide much history as he has significant shortness of breath and increased work of breathing. Patient was admitted on 2021 at [facility] and discharged on 2021. Patient was admitted with acute hypoxic respiratory failure, severe multilobar Covid pneumonia, pneumomediastinum, patient also has history of right lower extremity DVT. Patient was discharged to [facility], patient has been having increased shortness of breath for the last few days and today became quite short of breath, patient was put on nonrebreather 15 L and was brought to [facility].  Patient continued to be quite short of breath and is currently on 45 L nasal cannula, patient appears to be distressed. Patient was evaluated by the ICU team and deemed appropriate to be admitted to the hospitalist service on intermediate care unit. Patient denies any chest pain associated with his symptoms reports that he feels he is struggling to breathe. Patient reports mild cough but denies fever      Interval history / Subjective:     He said he chocked while eating and gets shortness of breath, and cough     Assessment & Plan:     Acute on chronic hypoxic respiratory failure with recent COVID 19 Pneumonia  -on HFNC on FiO2 100% 60 l/m, monitor pulse ox to wean down oxygen  -on iv cefepime, vancomycin, vitamin c, eliquis  -chest x ray low lung volumes.  Diffuse interstitial and airspace opacities with some improved aeration in the right lung base  -acute respiratory pannel not detected  -SARS CoV 2 PCR not detected on 3/2  -leukocytosis, lactic acidosis improving,   -D Dimer 2.12  -CTA chest limitations secondary to motion. No evidence of pulmonary embolus. Diffuse interstitial airspace opacities consistent with Covid 19 pneumonia or sequela.  -troponin <0.05  -repeated chest x ray 3/3 Stable low lung volumes with bilateral lung opacities. -ABG pH 7.40 pCO2 48.4 pO2 99 on HF  -pulmonologist on board    Severe sepsis likely due to pneumonia POA  -leukocytosis, elevated lactic acid, tachycardia, elevated liver enzyme   -continue above abx, oxygen support  -follow up on blood cx    Right lower extremity DVT  -continue Eliquis    Code status: Full Code  DVT prophylaxis: Eliquis    Care Plan discussed with: Patient/Family, Nurse and   Anticipated Disposition: TBD  Anticipated Discharge: Greater than 48 hours     Hospital Problems  Date Reviewed: 5/8/2019          Codes Class Noted POA    Respiratory failure ([facility] [address] ICD-10-CM: J96.90  ICD-9-CM: 518.81  3/2/2021 Unknown                Vital Signs:    Last 24hrs VS reviewed since prior progress note. Most recent are:  Visit Vitals  /80   Pulse 82   Temp 98.1 °F (36.7 °C)   Resp 16   Wt 72.1 kg (159 lb)   SpO2 100%   BMI 24.18 kg/m²         Intake/Output Summary (Last 24 hours) at 3/3/2021 9992  Last data filed at 3/2/2021 1603  Gross per 24 hour   Intake 100 ml   Output --   Net 100 ml        Physical Examination:     I had a face to face encounter with this patient and independently examined them on 3/3/2021 as outlined below:          Constitutional:  No acute distress, cooperative, pleasant    ENT:  Oral mucosa moist, oropharynx benign. Resp:  Decrease bilaterally. No wheezing/rhonchi/rales. No accessory muscle use   CV:  Regular rhythm, normal rate, no murmurs, gallops, rubs    GI:  Soft, non distended, non tender. normoactive bowel sounds, no hepatosplenomegaly     Musculoskeletal:  No edema     Neurologic:  Moves all extremities.   AAOx3, CN II-XII reviewed Skin:  Good turgor, no rashes or ulcers       Data Review:    Review and/or order of clinical lab test  Review and/or order of tests in the radiology section of CPT  Review and/or order of tests in the medicine section of CPT      Labs:     Recent Labs     03/03/21 0422 03/02/21  1227   WBC 10.2 15.9*   HGB 11.5* 12.2   HCT 36.0* 36.4*    268     Recent Labs     03/03/21  0422 03/02/21  1227    136   K 4.0 4.3    101   CO2 29 29   BUN 18 15   CREA 0.55* 0.78   GLU 75 162*   CA 9.2 9.7   MG  --  2.1     Recent Labs     03/03/21  0422 03/02/21  1227   ALT 82* 88*   * 145*   TBILI 0.3 0.4   TP 6.1* 7.4   ALB 2.2* 2.3*   GLOB 3.9 5.1*     Recent Labs     03/02/21  1227   INR 1.1   PTP 11.7*   APTT 23.8      Recent Labs     03/02/21  1227   FERR 1,491*      No results found for: FOL, RBCF   No results for input(s): PH, PCO2, PO2 in the last 72 hours.   Recent Labs     03/03/21 0422 03/02/21 2127 03/02/21  1708   TROIQ <0.05 <0.05 <0.05     No results found for: CHOL, CHOLX, CHLST, CHOLV, HDL, HDLP, LDL, LDLC, DLDLP, TGLX, TRIGL, TRIGP, CHHD, CHHDX  Lab Results   Component Value Date/Time    Glucose (POC) 184 (H) 02/17/2021 03:46 PM    Glucose (POC) 141 (H) 02/17/2021 11:31 AM    Glucose (POC) 89 02/17/2021 08:00 AM    Glucose (POC) 293 (H) 02/16/2021 08:47 PM    Glucose (POC) 165 (H) 02/16/2021 03:58 PM     Lab Results   Component Value Date/Time    Color YELLOW/STRAW 03/02/2021 05:08 PM    Appearance CLEAR 03/02/2021 05:08 PM    Specific gravity 1.012 03/02/2021 05:08 PM    pH (UA) 7.5 03/02/2021 05:08 PM    Protein Negative 03/02/2021 05:08 PM    Glucose Negative 03/02/2021 05:08 PM    Ketone Negative 03/02/2021 05:08 PM    Bilirubin Negative 03/02/2021 05:08 PM    Urobilinogen 0.2 03/02/2021 05:08 PM    Nitrites Negative 03/02/2021 05:08 PM    Leukocyte Esterase Negative 03/02/2021 05:08 PM    Epithelial cells FEW 03/02/2021 01:53 PM    Bacteria Negative 03/02/2021 01:53 PM    WBC 0-4 03/02/2021 01:53 PM    RBC 0-5 03/02/2021 01:53 PM         Medications Reviewed:     Current Facility-Administered Medications   Medication Dose Route Frequency    apixaban (ELIQUIS) tablet 5 mg  5 mg Oral BID    ascorbic acid (vitamin C) (VITAMIN C) tablet 500 mg  500 mg Oral DAILY    aspirin delayed-release tablet 81 mg  81 mg Oral DAILY    melatonin tablet 3 mg  3 mg Oral QHS    metoprolol tartrate (LOPRESSOR) tablet 50 mg  50 mg Oral BID    therapeutic multivitamin (THERAGRAN) tablet 1 Tab  1 Tab Oral DAILY    sodium chloride (NS) flush 5-40 mL  5-40 mL IntraVENous Q8H    sodium chloride (NS) flush 5-40 mL  5-40 mL IntraVENous PRN    0.9% sodium chloride infusion  75 mL/hr IntraVENous CONTINUOUS    acetaminophen (TYLENOL) tablet 650 mg  650 mg Oral Q4H PRN    cefepime (MAXIPIME) 2 g in 0.9% sodium chloride (MBP/ADV) 100 mL MBP  2 g IntraVENous Q8H    sodium chloride (NS) flush 5-10 mL  5-10 mL IntraVENous PRN     ______________________________________________________________________  EXPECTED LENGTH OF STAY: - - -  ACTUAL LENGTH OF STAY:          1                 Christina Pal MD

## 2021-03-03 NOTE — ED NOTES
TRANSFER - OUT REPORT:    Verbal report given to Shanelle(name) on Irma Lewis  being transferred to [address] [place] [address](unit) for routine progression of care       Report consisted of patients Situation, Background, Assessment and   Recommendations(SBAR). Information from the following report(s) SBAR, Kardex and ED Summary was reviewed with the receiving nurse. Lines:   Peripheral IV 03/02/21 Left Antecubital (Active)   Site Assessment Clean, dry, & intact 03/02/21 1231   Phlebitis Assessment 0 03/02/21 1231   Infiltration Assessment 0 03/02/21 1231   Dressing Status Clean, dry, & intact 03/02/21 1231   Hub Color/Line Status Pink 03/02/21 1231       Peripheral IV 03/02/21 Left Wrist (Active)   Site Assessment Clean, dry, & intact 03/02/21 1242   Phlebitis Assessment 0 03/02/21 1242   Infiltration Assessment 0 03/02/21 1242        Opportunity for questions and clarification was provided.       Patient transported with:   Monitor  O2 @ 40 liters  Registered Nurse

## 2021-03-03 NOTE — CONSULTS
PULMONARY MEDICINE    Initial Physician Consultation Note    Name: Nikos Kamara   : 1965   MRN: [id number]   Date: 3/3/2021      Subjective:   Consult Note: 3/3/2021   Requesting Physician:Dr. Jil Stoddard  Reason for consult: Hypoxic resp failure    Medical records and data reviewed. Patient is a 48 y.o. male who presented to the hospital with worsening dyspnea. Patient was recently discharged from [facility] after an extended stay for COVID-19 pneumonia and was treated with usual acute treatments including remdesivir. He was in rehabilitation and has been experiencing increasing shortness of breath. He was found to be hypoxic, placed on supplemental oxygen and transferred to the ER for further evaluation. He has been requiring high flow oxygen. He report chest discomfort. When seen earlier today he had a spell of aspiration on breakfast on history and has been requiring increased flow at 60 L and 100% FiO2. Breath sounds appear diminished on the left side and a stat chest x-ray is pending. CT scan of the chest that was done on admission was limited by motion artifact and did not show pulmonary emboli. Lung fields however appeared to be severely affected with interstitial and alveolar opacities with developing fibrosis. Viral panel on admission was negative. He is receiving antibiotics for bacterial coverage and sputum culture has been ordered. He does not have prior history of cardiac disease but echocardiogram will be obtained to screen for Covid related cardiac dysfunction. A dose of diuretic has been given. Patient is tachypneic and tachycardic with increased work of breathing on high FiO2 and may benefit from closer monitoring in the ICU as he is at risk for progressive decline. He indicated his sister-in-law be updated, I have called her and discussed his unstable clinical condition and answered all her questions.     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 Discussed with RN at bedside. Discussed with patient and his family member as requested. Critical care time spent 45'      Active Problem List:     Problem List  Date Reviewed: 5/8/2019          Codes Class    History of DVT of lower extremity ICD-10-CM: S40.372  ICD-9-CM: V12.51         Obesity (BMI 30-39. 9) ICD-10-CM: E66.9  ICD-9-CM: 278.00         Respiratory failure ([address] ICD-10-CM: J96.90  ICD-9-CM: 518.81         Acute respiratory failure with hypoxia (HCC) ICD-10-CM: J96.01  ICD-9-CM: 518.81         COVID-19 virus infection ICD-10-CM: U07.1  ICD-9-CM: 079.89               Past Medical History:      has a past medical history of GERD (gastroesophageal reflux disease), Obesity (BMI 30-39.9) (5/8/2019), and Right leg DVT ([address] (2019). Past Surgical History:      has a past surgical history that includes hx orthopaedic. Home Medications:     Prior to Admission medications    Medication Sig Start Date End Date Taking? Authorizing Provider   apixaban (Eliquis) 5 mg tablet Take 1 Tab by mouth two (2) times a day for 30 days. 2/17/21 3/19/21  Frank Lopez MD   furosemide (Lasix) 40 mg tablet Take 1 Tab by mouth daily for 30 days. 2/17/21 3/19/21  Frank Lopez MD   melatonin 3 mg tablet Take 1 Tab by mouth nightly for 30 days. 2/17/21 3/19/21  Frank Lopez MD   metoprolol tartrate (LOPRESSOR) 50 mg tablet Take 1 Tab by mouth two (2) times a day for 30 days. 2/17/21 3/19/21  Frank Lopez MD   aspirin delayed-release 81 mg tablet Take 81 mg by mouth daily. Other, MD Leopoldo   albuterol (PROVENTIL HFA, VENTOLIN HFA, PROAIR HFA) 90 mcg/actuation inhaler Take 2 Puffs by inhalation every four (4) hours as needed for Wheezing. 21   Екатерина Lerner MD   multivitamin (ONE A DAY) tablet Take 1 Tab by mouth daily. Provider, Historical   cholecalciferol (VITAMIN D3) 1,000 unit cap Take 1,000 Units by mouth daily. Provider, Historical   ascorbic acid, vitamin C, (VITAMIN C) 500 mg tablet Take 500 mg by mouth daily. Provider, Historical       Allergies/Social/Family History:     No Known Allergies   Social History     Tobacco Use    Smoking status: Never Smoker    Smokeless tobacco: Current User   Substance Use Topics    Alcohol use: Yes     Comment: once a month      Family History   Problem Relation Age of Onset    Cancer Mother     Cancer Father             Objective:   Vital Signs:  Visit Vitals  BP (!) 123/91   Pulse (!) 115   Temp 98.1 °F (36.7 °C)   Resp (!) 42   Wt 72.1 kg (159 lb)   SpO2 (!) 67%   BMI 24.18 kg/m²    O2 Flow Rate (L/min): 40 l/min O2 Device: Hi flow nasal cannula Temp (24hrs), Av.3 °F (36.8 °C), Min:98 °F (36.7 °C), Max:98.6 °F (37 °C)           Intake/Output:     Intake/Output Summary (Last 24 hours) at 3/3/2021 1100  Last data filed at 3/2/2021 1603  Gross per 24 hour   Intake 100 ml   Output --   Net 100 ml       Physical Exam:   General:  Alert, cooperative, no distress, appears stated age. Tachypneic   Head:  Normocephalic, without obvious abnormality, atraumatic. Eyes:  Conjunctivae/corneas clear. PERRL   Neck: Supple, symmetrical, no adenopathy, no carotid bruit and no JVD.    Lungs: Clear to auscultation bilaterally. Increased work of breathing   Chest wall:  No tenderness or deformity. Heart:  Regular rate and rhythm, S1-S2 normal, no murmur, no click, rub or gallop. Abdomen:   Soft, non-tender. Bowel sounds present. No masses,  No organomegaly. Extremities: Atraumatic, no cyanosis or edema. Pulses: Palpable   Skin: No rashes or lesions   Neurologic: Grossly nonfocal         LABS AND  DATA: Personally reviewed  Recent Labs     03/03/21  0422 03/02/21  1227   WBC 10.2 15.9*   HGB 11.5* 12.2   HCT 36.0* 36.4*    268     Recent Labs     03/03/21  0422 03/02/21  1227    136   K 4.0 4.3    101   CO2 29 29   BUN 18 15   CREA 0.55* 0.78   GLU 75 162*   CA 9.2 9.7   MG  --  2.1     Recent Labs     03/03/21  0422 03/02/21  1227   * 145*   TP 6.1* 7.4   ALB 2.2* 2.3*   GLOB 3.9 5.1*     Recent Labs     03/02/21  1227   INR 1.1   PTP 11.7*   APTT 23.8      Recent Labs     03/02/21  1256   PHI 7.48*   PCO2I 43.8   PO2I 108*   FIO2I 100     Recent Labs     03/03/21  0422 03/02/21  2127   TROIQ <0.05 <0.05       MEDS: Reviewed    Chest Imaging: personally reviewed and report checked    Tele- reviewed    Medical decision making:   I have reviewed the flowsheet and previous day's notes  Patient has acute or chronic illness that poses a threat to life or bodily function  Review and order of Clinical lab tests  Review and Order of Radiology tests  Independent visualization of Image  Reviewed Oxygen/ NiPPV  High Risk Drug therapy requiring intensive monitoring for toxicity: eg steroids, pressors, antibiotics        Thank you for allowing me to participate in this patient's care.     Marques Martinez MD      [facility]

## 2021-03-03 NOTE — PROGRESS NOTES
1015: patient aspirated on food while RN was at bedside. Patient oxygen dropped to 60%. HIgh corbin maxed now. 1045: RN still at bedside. Patient unable to get oxygen sat above 90%. MD paged. 1100: erena at bedside. abg's ordered. RT notified.

## 2021-03-03 NOTE — PROGRESS NOTES
1930: TRANSFER - IN REPORT:    Verbal report received from [facility] RN(name) on Miles Jang  being received from ED (unit) for routine progression of care      Report consisted of patients Situation, Background, Assessment and   Recommendations(SBAR). Information from the following report(s) SBAR, Kardex, ED Summary, Intake/Output, MAR, Recent Results and Cardiac Rhythm NSR was reviewed with the receiving nurse. Opportunity for questions and clarification was provided. Assessment completed upon patients arrival to unit and care assumed. 1930: Bedside shift change report given to Celi RN (oncoming nurse) by Nicki RN (offgoing nurse). Report included the following information SBAR, Kardex, Intake/Output, MAR, Recent Results and Cardiac Rhythm NSR.

## 2021-03-04 ENCOUNTER — APPOINTMENT (OUTPATIENT)
Dept: NON INVASIVE DIAGNOSTICS | Age: 56
DRG: 871 | End: 2021-03-04
Attending: INTERNAL MEDICINE
Payer: COMMERCIAL

## 2021-03-04 LAB
ANION GAP SERPL CALC-SCNC: 6 MMOL/L (ref 5–15)
BACTERIA SPEC CULT: NORMAL
BASOPHILS # BLD: 0.1 K/UL (ref 0–0.1)
BASOPHILS NFR BLD: 1 % (ref 0–1)
BUN SERPL-MCNC: 19 MG/DL (ref 6–20)
BUN/CREAT SERPL: 36 (ref 12–20)
CALCIUM SERPL-MCNC: 8.9 MG/DL (ref 8.5–10.1)
CHLORIDE SERPL-SCNC: 103 MMOL/L (ref 97–108)
CO2 SERPL-SCNC: 28 MMOL/L (ref 21–32)
CREAT SERPL-MCNC: 0.53 MG/DL (ref 0.7–1.3)
DIFFERENTIAL METHOD BLD: ABNORMAL
ECHO AO ROOT DIAM: 3.42 CM
ECHO AV AREA PEAK VELOCITY: 3.42 CM2
ECHO AV AREA/BSA PEAK VELOCITY: 1.8 CM2/M2
ECHO AV PEAK GRADIENT: 5.49 MMHG
ECHO AV PEAK VELOCITY: 117.15 CM/S
ECHO LA MAJOR AXIS: 3.42 CM
ECHO LA MINOR AXIS: 1.84 CM
ECHO LV INTERNAL DIMENSION DIASTOLIC: 4.38 CM (ref 4.2–5.9)
ECHO LV INTERNAL DIMENSION SYSTOLIC: 2.56 CM
ECHO LV IVSD: 1 CM (ref 0.6–1)
ECHO LV MASS 2D: 136.8 G (ref 88–224)
ECHO LV MASS INDEX 2D: 73.8 G/M2 (ref 49–115)
ECHO LV POSTERIOR WALL DIASTOLIC: 0.9 CM (ref 0.6–1)
ECHO LVOT DIAM: 2.26 CM
ECHO LVOT PEAK GRADIENT: 3.98 MMHG
ECHO LVOT PEAK VELOCITY: 99.74 CM/S
ECHO PV PEAK INSTANTANEOUS GRADIENT SYSTOLIC: 3.22 MMHG
EOSINOPHIL # BLD: 0.3 K/UL (ref 0–0.4)
EOSINOPHIL NFR BLD: 3 % (ref 0–7)
ERYTHROCYTE [DISTWIDTH] IN BLOOD BY AUTOMATED COUNT: 15 % (ref 11.5–14.5)
GLUCOSE SERPL-MCNC: 80 MG/DL (ref 65–100)
HCT VFR BLD AUTO: 31.4 % (ref 36.6–50.3)
HGB BLD-MCNC: 10.1 G/DL (ref 12.1–17)
IMM GRANULOCYTES # BLD AUTO: 0.4 K/UL (ref 0–0.04)
IMM GRANULOCYTES NFR BLD AUTO: 5 % (ref 0–0.5)
LACTATE SERPL-SCNC: 1 MMOL/L (ref 0.4–2)
LYMPHOCYTES # BLD: 1.2 K/UL (ref 0.8–3.5)
LYMPHOCYTES NFR BLD: 13 % (ref 12–49)
MCH RBC QN AUTO: 28.5 PG (ref 26–34)
MCHC RBC AUTO-ENTMCNC: 32.2 G/DL (ref 30–36.5)
MCV RBC AUTO: 88.7 FL (ref 80–99)
MONOCYTES # BLD: 0.6 K/UL (ref 0–1)
MONOCYTES NFR BLD: 6 % (ref 5–13)
NEUTS SEG # BLD: 6.9 K/UL (ref 1.8–8)
NEUTS SEG NFR BLD: 72 % (ref 32–75)
NRBC # BLD: 0 K/UL (ref 0–0.01)
NRBC BLD-RTO: 0 PER 100 WBC
PLATELET # BLD AUTO: 212 K/UL (ref 150–400)
PMV BLD AUTO: 11 FL (ref 8.9–12.9)
POTASSIUM SERPL-SCNC: 4.1 MMOL/L (ref 3.5–5.1)
RBC # BLD AUTO: 3.54 M/UL (ref 4.1–5.7)
SERVICE CMNT-IMP: NORMAL
SODIUM SERPL-SCNC: 137 MMOL/L (ref 136–145)
WBC # BLD AUTO: 9.5 K/UL (ref 4.1–11.1)

## 2021-03-04 PROCEDURE — 83605 ASSAY OF LACTIC ACID: CPT

## 2021-03-04 PROCEDURE — 74011250637 HC RX REV CODE- 250/637: Performed by: FAMILY MEDICINE

## 2021-03-04 PROCEDURE — 93306 TTE W/DOPPLER COMPLETE: CPT | Performed by: SPECIALIST

## 2021-03-04 PROCEDURE — 74011250636 HC RX REV CODE- 250/636: Performed by: FAMILY MEDICINE

## 2021-03-04 PROCEDURE — 74011000258 HC RX REV CODE- 258: Performed by: FAMILY MEDICINE

## 2021-03-04 PROCEDURE — 74011250636 HC RX REV CODE- 250/636: Performed by: PHYSICIAN ASSISTANT

## 2021-03-04 PROCEDURE — C8929 TTE W OR WO FOL WCON,DOPPLER: HCPCS

## 2021-03-04 PROCEDURE — 36415 COLL VENOUS BLD VENIPUNCTURE: CPT

## 2021-03-04 PROCEDURE — 65660000001 HC RM ICU INTERMED STEPDOWN

## 2021-03-04 PROCEDURE — 74011250636 HC RX REV CODE- 250/636: Performed by: INTERNAL MEDICINE

## 2021-03-04 PROCEDURE — 74011000258 HC RX REV CODE- 258: Performed by: INTERNAL MEDICINE

## 2021-03-04 RX ORDER — FUROSEMIDE 10 MG/ML
20 INJECTION INTRAMUSCULAR; INTRAVENOUS ONCE
Status: COMPLETED | OUTPATIENT
Start: 2021-03-04 | End: 2021-03-04

## 2021-03-04 RX ADMIN — APIXABAN 5 MG: 5 TABLET, FILM COATED ORAL at 09:00

## 2021-03-04 RX ADMIN — METOPROLOL TARTRATE 50 MG: 50 TABLET, FILM COATED ORAL at 17:54

## 2021-03-04 RX ADMIN — METOPROLOL TARTRATE 50 MG: 50 TABLET, FILM COATED ORAL at 08:59

## 2021-03-04 RX ADMIN — FUROSEMIDE 20 MG: 10 INJECTION, SOLUTION INTRAMUSCULAR; INTRAVENOUS at 11:20

## 2021-03-04 RX ADMIN — PERFLUTREN 2 ML: 6.52 INJECTION, SUSPENSION INTRAVENOUS at 14:13

## 2021-03-04 RX ADMIN — OXYCODONE HYDROCHLORIDE AND ACETAMINOPHEN 500 MG: 500 TABLET ORAL at 08:59

## 2021-03-04 RX ADMIN — CEFEPIME 2 G: 2 INJECTION, POWDER, FOR SOLUTION INTRAVENOUS at 19:00

## 2021-03-04 RX ADMIN — Medication 10 ML: at 21:38

## 2021-03-04 RX ADMIN — Medication 3 MG: at 21:37

## 2021-03-04 RX ADMIN — APIXABAN 5 MG: 5 TABLET, FILM COATED ORAL at 17:54

## 2021-03-04 RX ADMIN — Medication 10 ML: at 09:01

## 2021-03-04 RX ADMIN — CEFEPIME HYDROCHLORIDE 2 G: 2 INJECTION, POWDER, FOR SOLUTION INTRAVENOUS at 11:19

## 2021-03-04 RX ADMIN — Medication 10 ML: at 13:26

## 2021-03-04 RX ADMIN — THERA TABS 1 TABLET: TAB at 09:00

## 2021-03-04 RX ADMIN — CEFEPIME HYDROCHLORIDE 2 G: 2 INJECTION, POWDER, FOR SOLUTION INTRAVENOUS at 04:13

## 2021-03-04 RX ADMIN — ASPIRIN 81 MG: 81 TABLET, COATED ORAL at 08:59

## 2021-03-04 NOTE — PROGRESS NOTES
[facility] Adult  Hospitalist Group                                                                                          Hospitalist Progress Note  Lety Horne MD  Answering service: [phone] from in house phone        Date of Service:  3/4/2021  NAME:  Pamela Hamlin  :  1965  MRN:  [id number]      Admission Summary:     Patient is not able to provide much history as he has significant shortness of breath and increased work of breathing. Patient was admitted on 2021 at [facility] and discharged on 2021. Patient was admitted with acute hypoxic respiratory failure, severe multilobar Covid pneumonia, pneumomediastinum, patient also has history of right lower extremity DVT. Patient was discharged to [facility], patient has been having increased shortness of breath for the last few days and today became quite short of breath, patient was put on nonrebreather 15 L and was brought to [facility].  Patient continued to be quite short of breath and is currently on 45 L nasal cannula, patient appears to be distressed. Patient was evaluated by the ICU team and deemed appropriate to be admitted to the hospitalist service on intermediate care unit. Patient denies any chest pain associated with his symptoms reports that he feels he is struggling to breathe. Patient reports mild cough but denies fever      Interval history / Subjective:     Still on hfnc. No major complaimnts. He told me that he was somewhat sOB in AM but now feeling better      Assessment & Plan:     Acute on chronic hypoxic respiratory failure with recent COVID 19 Pneumonia  -on HFNC on FiO2 100% 30 l/m, monitor pulse ox to wean down oxygen  -on iv cefepime,  vitamin c, eliquis  -chest x ray low lung volumes.  Diffuse interstitial and airspace opacities with some improved aeration in the right lung base  -acute respiratory pannel not detected  -SARS CoV 2 PCR not detected on 3/2  -leukocytosis, lactic acidosis improving,   -D Dimer 2.12  -CTA chest limitations secondary to motion. No evidence of pulmonary embolus. Diffuse interstitial airspace opacities consistent with Covid 19 pneumonia or sequela.  -troponin <0.05  -repeated chest x ray 3/3 Stable low lung volumes with bilateral lung opacities. -ABG pH 7.40 pCO2 48.4 pO2 99 on HF  -pulmonologist on board    Severe sepsis likely due to pneumonia POA, improving   -leukocytosis, elevated lactic acid, tachycardia, elevated liver enzyme   -continue above abx, oxygen support  -follow up on blood cx    Right lower extremity DVT  -continue Eliquis    Code status: Full Code  DVT prophylaxis: Eliquis    Care Plan discussed with: Patient/Family, Nurse and   Anticipated Disposition: TBD  Anticipated Discharge: Greater than 48 hours     Hospital Problems  Date Reviewed: 5/8/2019          Codes Class Noted POA    Respiratory failure ([address] ICD-10-CM: J96.90  ICD-9-CM: 518.81  3/2/2021 Unknown                Vital Signs:    Last 24hrs VS reviewed since prior progress note. Most recent are:  Visit Vitals  /80   Pulse 100   Temp 97.6 °F (36.4 °C)   Resp (!) 42   Ht 5' 8\" (1.727 m)   Wt 72.1 kg (159 lb)   SpO2 100%   BMI 24.18 kg/m²       No intake or output data in the 24 hours ending 03/04/21 1617     Physical Examination:     I had a face to face encounter with this patient and independently examined them on 3/4/2021 as outlined below:          Constitutional:  No acute distress, cooperative, pleasant    ENT:  Oral mucosa moist, oropharynx benign. Resp:  Decrease bilaterally. No wheezing/rhonchi/rales. No accessory muscle use   CV:  Regular rhythm, normal rate, no murmurs, gallops, rubs    GI:  Soft, non distended, non tender. normoactive bowel sounds, no hepatosplenomegaly     Musculoskeletal:  No edema     Neurologic:  Moves all extremities.   AAOx3, CN II-XII reviewed     Skin:  Good turgor, no rashes or ulcers       Data Review:    Review and/or order of clinical lab test  Review and/or order of tests in the radiology section of CPT  Review and/or order of tests in the medicine section of CPT      Labs:     Recent Labs     03/03/21 0428 03/03/21 0422   WBC 9.5 10.2   HGB 10.1* 11.5*   HCT 31.4* 36.0*    214     Recent Labs     03/03/21  0422 03/03/21  0420 03/02/21  1227    137 136   K 4.0 4.1 4.3    103 101   CO2 29 28 29   BUN 18 19 15   CREA 0.55* 0.53* 0.78   GLU 75 80 162*   CA 9.2 8.9 9.7   MG  --   --  2.1     Recent Labs     03/03/21 0422 03/02/21  1227   ALT 82* 88*   * 145*   TBILI 0.3 0.4   TP 6.1* 7.4   ALB 2.2* 2.3*   GLOB 3.9 5.1*     Recent Labs     03/02/21  1227   INR 1.1   PTP 11.7*   APTT 23.8      Recent Labs     03/02/21  1227   FERR 1,491*      No results found for: FOL, RBCF   No results for input(s): PH, PCO2, PO2 in the last 72 hours.   Recent Labs     03/03/21 0422 03/02/21 2127 03/02/21  1708   TROIQ <0.05 <0.05 <0.05     No results found for: CHOL, CHOLX, CHLST, CHOLV, HDL, HDLP, LDL, LDLC, DLDLP, TGLX, TRIGL, TRIGP, CHHD, CHHDX  Lab Results   Component Value Date/Time    Glucose (POC) 184 (H) 02/17/2021 03:46 PM    Glucose (POC) 141 (H) 02/17/2021 11:31 AM    Glucose (POC) 89 02/17/2021 08:00 AM    Glucose (POC) 293 (H) 02/16/2021 08:47 PM    Glucose (POC) 165 (H) 02/16/2021 03:58 PM     Lab Results   Component Value Date/Time    Color YELLOW/STRAW 03/02/2021 05:08 PM    Appearance CLEAR 03/02/2021 05:08 PM    Specific gravity 1.012 03/02/2021 05:08 PM    pH (UA) 7.5 03/02/2021 05:08 PM    Protein Negative 03/02/2021 05:08 PM    Glucose Negative 03/02/2021 05:08 PM    Ketone Negative 03/02/2021 05:08 PM    Bilirubin Negative 03/02/2021 05:08 PM    Urobilinogen 0.2 03/02/2021 05:08 PM    Nitrites Negative 03/02/2021 05:08 PM    Leukocyte Esterase Negative 03/02/2021 05:08 PM    Epithelial cells FEW 03/02/2021 01:53 PM    Bacteria Negative 03/02/2021 01:53 PM    WBC 0-4 03/02/2021 01:53 PM    RBC 0-5 03/02/2021 01:53 PM         Medications Reviewed:     Current Facility-Administered Medications   Medication Dose Route Frequency    apixaban (ELIQUIS) tablet 5 mg  5 mg Oral BID    ascorbic acid (vitamin C) (VITAMIN C) tablet 500 mg  500 mg Oral DAILY    aspirin delayed-release tablet 81 mg  81 mg Oral DAILY    melatonin tablet 3 mg  3 mg Oral QHS    metoprolol tartrate (LOPRESSOR) tablet 50 mg  50 mg Oral BID    therapeutic multivitamin (THERAGRAN) tablet 1 Tab  1 Tab Oral DAILY    sodium chloride (NS) flush 5-40 mL  5-40 mL IntraVENous Q8H    sodium chloride (NS) flush 5-40 mL  5-40 mL IntraVENous PRN    acetaminophen (TYLENOL) tablet 650 mg  650 mg Oral Q4H PRN    sodium chloride (NS) flush 5-10 mL  5-10 mL IntraVENous PRN     ______________________________________________________________________  EXPECTED LENGTH OF STAY: 4d 19h  ACTUAL LENGTH OF STAY:          2                 Dariana Mccullough MD

## 2021-03-04 NOTE — PROGRESS NOTES
visit for routine follow up. Previous visit cut short due to emergent page elsewhere. Returned to complete this visit as patient requested prayer. Spoke about his health and his needs. Requested prayer for his health, but, more importantly, for his brother and sister-in-law and their son out of the love and gratitude he has for them especially for the care and support they have provided him and for the fact that they are such special people. A prayer was offered and he appeared comforted as a result of this prayer and visit expressing gratitude for this visit and prayer. Rev.  Danuta Couch MDiv, [facility], [facility]   paging service: [phone])

## 2021-03-04 NOTE — ACP (ADVANCE CARE PLANNING)
Request by patient to assist with advance medical directive. Consulted with patients nurse. Explained document to patient. Assisted patient in completing the document. Made copy for patients chart and placed the copy in the patient's chart. Returned original document and two copies to the patient. Rev.  Mary Ann Conklin MDiv, [facility], [facility]   paging service: [phone])

## 2021-03-04 NOTE — PROGRESS NOTES
Spiritual Care Assessment/Progress Note  [facility]      NAME: Ugo Brantley      MRN: [id number]  AGE: 48 y.o.  SEX: male  Latter day Affiliation: No preference   Language: English     3/4/2021     Total Time (in minutes): 35     Spiritual Assessment begun in [facility] through conversation with:         [x]Patient        [] Family    [] Friend(s)        Reason for Consult: Advance medical directive consult, Initial/Spiritual assessment, patient floor     Spiritual beliefs: (Please include comment if needed)     [x] Identifies with a andrea tradition: Hoahaoism        [] Supported by a andrea community:            [] Claims no spiritual orientation:           [] Seeking spiritual identity:                [] Adheres to an individual form of spirituality:           [] Not able to assess:                           Identified resources for coping:      [x] Prayer                               [] Music                  [] Guided Imagery     [x] Family/friends                 [] Pet visits     [] Devotional reading                         [] Unknown     [] Other:                                               Interventions offered during this visit: (See comments for more details)    Patient Interventions: Advance medical directive consult, Advance medical directive completed, Affirmation of emotions/emotional suffering, Catharsis/review of pertinent events in supportive environment, Coping skills reviewed/reinforced, Iconic (affirming the presence of God/Higher Power), End of life issues discussed           Plan of Care:     [] Support spiritual and/or cultural needs    [x] Support AMD and/or advance care planning process      [] Support grieving process   [] Coordinate Rites and/or Rituals    [] Coordination with community clergy   [] No spiritual needs identified at this time   [] Detailed Plan of Care below (See Comments)  [] Make referral to Music Therapy  [] Make referral to Pet Therapy     [] Make referral to Addiction services  [] Make referral to [facility]  [] Make referral to Spiritual Care Partner  [] No future visits requested        [x] Follow up upon further referrals     Comments:  visit for initial spiritual assessment and Advance Directive (AMD) consult. Provided spiritual presence and listening as he spoke of his present thoughts, feelings, and concerns. Spoke of his health and events leading to this hospitalization. Says he has basically been in the hospital since mid January when he was admitted to [id number] [place], [place] [address] Critical access hospital with Covid. Was in rehab after that since mid February. Hopefull to recover his health and be able to return home as soon as able. He described good family support from his brother, Addy Cantu, and sister-in-law, Magdiel Harrison. Request by patient to assist with advance medical directive. Consulted with patients nurse. Explained document to patient. Assisted patient in completing the document. Made copy for patients chart and placed the copy in the patient's chart. Returned original document and two copies to the patient. He appeared comforted as a result of this visit and expressed gratitude for [facility] visit. Visit cut short as this  was paged for other care. Rev.  Vandana El MDiv, [facility], [facility]   paging service: [phone])

## 2021-03-04 NOTE — PROGRESS NOTES
PULMONARY MEDICINE    Progress Note    Name: Sukhwinder Fajardo   : 1965   MRN: [id number]   Date: 3/4/2021      Subjective:     3/4/21  Down to 30lpm and 90% FiO2  Reducing fluids and diuretics seem to have helped him     Consult Note:  3/3/21  Requesting Physician:Dr. Troy Paz  Reason for consult: Hypoxic resp failure    Medical records and data reviewed. Patient is a 48 y.o. male who presented to the hospital with worsening dyspnea. Patient was recently discharged from [facility] after an extended stay for COVID-19 pneumonia and was treated with usual acute treatments including remdesivir. He was in rehabilitation and has been experiencing increasing shortness of breath. He was found to be hypoxic, placed on supplemental oxygen and transferred to the ER for further evaluation. He has been requiring high flow oxygen. He report chest discomfort. When seen earlier today he had a spell of aspiration on breakfast on history and has been requiring increased flow at 60 L and 100% FiO2. Breath sounds appear diminished on the left side and a stat chest x-ray is pending. CT scan of the chest that was done on admission was limited by motion artifact and did not show pulmonary emboli. Lung fields however appeared to be severely affected with interstitial and alveolar opacities with developing fibrosis. Viral panel on admission was negative. He is receiving antibiotics for bacterial coverage and sputum culture has been ordered. He does not have prior history of cardiac disease but echocardiogram will be obtained to screen for Covid related cardiac dysfunction. A dose of diuretic has been given. Patient is tachypneic and tachycardic with increased work of breathing on high FiO2 and may benefit from closer monitoring in the ICU as he is at risk for progressive decline.   He indicated his sister-in-law be updated, I have called her and discussed his unstable clinical condition and answered all her questions. 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  Unknown cardiac function  Witnessed aspiration  On Eliquis for DVT in 2019  Other medical problems per chart      Recommendations: On High flow, wean; O2 titration above 90%   Chest x-ray with bilateral infiltrates   A dose of Lasix again today  Stop fluids   ECHO pending   procal is low, on cefepime   Sputum culture pending   Anticoagulated  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 9) ICD-10-CM: E66.9  ICD-9-CM: 278.00         Respiratory failure ([address] ICD-10-CM: J96.90  ICD-9-CM: 518.81         Acute respiratory failure with hypoxia (HCC) ICD-10-CM: J96.01  ICD-9-CM: 518.81         COVID-19 virus infection ICD-10-CM: U07.1  ICD-9-CM: 079.89               Past Medical History:      has a past medical history of GERD (gastroesophageal reflux disease), Obesity (BMI 30-39.9) (5/8/2019), and Right leg DVT ([address] (2019). Past Surgical History:      has a past surgical history that includes hx orthopaedic. Home Medications:     Prior to Admission medications    Medication Sig Start Date End Date Taking? Authorizing Provider   apixaban (Eliquis) 5 mg tablet Take 1 Tab by mouth two (2) times a day for 30 days. 2/17/21 3/19/21  Ector Jules MD   furosemide (Lasix) 40 mg tablet Take 1 Tab by mouth daily for 30 days. 2/17/21 3/19/21  Ector Jules MD   melatonin 3 mg tablet Take 1 Tab by mouth nightly for 30 days.  2/17/21 3/19/21  Ector Jules MD metoprolol tartrate (LOPRESSOR) 50 mg tablet Take 1 Tab by mouth two (2) times a day for 30 days. 2/17/21 3/19/21  Harmony Cid MD   aspirin delayed-release 81 mg tablet Take 81 mg by mouth daily. Other, MD Leopoldo   albuterol (PROVENTIL HFA, VENTOLIN HFA, PROAIR HFA) 90 mcg/actuation inhaler Take 2 Puffs by inhalation every four (4) hours as needed for Wheezing. 21   Paty Diaz MD   multivitamin (ONE A DAY) tablet Take 1 Tab by mouth daily. Provider, Historical   cholecalciferol (VITAMIN D3) 1,000 unit cap Take 1,000 Units by mouth daily. Provider, Historical   ascorbic acid, vitamin C, (VITAMIN C) 500 mg tablet Take 500 mg by mouth daily. Provider, Historical       Allergies/Social/Family History:     No Known Allergies   Social History     Tobacco Use    Smoking status: Never Smoker    Smokeless tobacco: Current User   Substance Use Topics    Alcohol use: Yes     Comment: once a month      Family History   Problem Relation Age of Onset   [facility] Cancer Mother     Cancer Father             Objective:   Vital Signs:  Visit Vitals  /75   Pulse 79   Temp 97.8 °F (36.6 °C)   Resp 21   Ht 5' 8\" (1.727 m)   Wt 72.1 kg (159 lb)   SpO2 91%   BMI 24.18 kg/m²    O2 Flow Rate (L/min): 40 l/min O2 Device: Hi flow nasal cannula Temp (24hrs), Av.2 °F (36.8 °C), Min:97.5 °F (36.4 °C), Max:98.7 °F (37.1 °C)           Intake/Output:     Intake/Output Summary (Last 24 hours) at 3/4/2021 1035  Last data filed at 3/3/2021 1500  Gross per 24 hour   Intake 480 ml   Output --   Net 480 ml       Physical Exam:   General:  Alert, cooperative, no distress, appears stated age. Tachypneic   Head:  Normocephalic, without obvious abnormality, atraumatic. Eyes:  Conjunctivae/corneas clear. PERRL   Neck: Supple, symmetrical, no adenopathy, no carotid bruit and no JVD. Lungs:   Clear to auscultation bilaterally. Increased work of breathing   Chest wall:  No tenderness or deformity.    Heart:  Regular rate and rhythm, S1-S2 normal, no murmur, no click, rub or gallop. Abdomen:   Soft, non-tender. Bowel sounds present. No masses,  No organomegaly. Extremities: Atraumatic, no cyanosis or edema. Pulses: Palpable   Skin: No rashes or lesions   Neurologic: Grossly nonfocal         LABS AND  DATA: Personally reviewed  Recent Labs     03/03/21  0428 03/03/21  0422   WBC 9.5 10.2   HGB 10.1* 11.5*   HCT 31.4* 36.0*    214     Recent Labs     03/03/21  0422 03/03/21  0420 03/02/21  1227    137 136   K 4.0 4.1 4.3    103 101   CO2 29 28 29   BUN 18 19 15   CREA 0.55* 0.53* 0.78   GLU 75 80 162*   CA 9.2 8.9 9.7   MG  --   --  2.1     Recent Labs     03/03/21  0422 03/02/21  1227   * 145*   TP 6.1* 7.4   ALB 2.2* 2.3*   GLOB 3.9 5.1*     Recent Labs     03/02/21  1227   INR 1.1   PTP 11.7*   APTT 23.8      Recent Labs     03/03/21  1131 03/02/21  1256   PHI 7.40 7.48*   PCO2I 48.4* 43.8   PO2I 125* 108*   FIO2I  --  100     Recent Labs     03/03/21  0422 03/02/21  2127   TROIQ <0.05 <0.05       MEDS: Reviewed    Chest Imaging: personally reviewed and report checked    Tele- reviewed    Medical decision making:   I have reviewed the flowsheet and previous day's notes  Patient has acute or chronic illness that poses a threat to life or bodily function  Review and order of Clinical lab tests  Review and Order of Radiology tests  Independent visualization of Image  Reviewed Oxygen/ NiPPV  High Risk Drug therapy requiring intensive monitoring for toxicity: eg steroids, pressors, antibiotics        Thank you for allowing me to participate in this patient's care.     Natasha Cárdenas PA-C      [facility]

## 2021-03-05 ENCOUNTER — PATIENT OUTREACH (OUTPATIENT)
Dept: CASE MANAGEMENT | Age: 56
End: 2021-03-05

## 2021-03-05 PROCEDURE — 74011250636 HC RX REV CODE- 250/636: Performed by: PHYSICIAN ASSISTANT

## 2021-03-05 PROCEDURE — 74011250636 HC RX REV CODE- 250/636: Performed by: INTERNAL MEDICINE

## 2021-03-05 PROCEDURE — 74011000258 HC RX REV CODE- 258: Performed by: INTERNAL MEDICINE

## 2021-03-05 PROCEDURE — 65660000001 HC RM ICU INTERMED STEPDOWN

## 2021-03-05 PROCEDURE — 74011250637 HC RX REV CODE- 250/637: Performed by: FAMILY MEDICINE

## 2021-03-05 RX ORDER — FUROSEMIDE 10 MG/ML
20 INJECTION INTRAMUSCULAR; INTRAVENOUS ONCE
Status: COMPLETED | OUTPATIENT
Start: 2021-03-05 | End: 2021-03-05

## 2021-03-05 RX ADMIN — FUROSEMIDE 20 MG: 10 INJECTION, SOLUTION INTRAMUSCULAR; INTRAVENOUS at 10:41

## 2021-03-05 RX ADMIN — CEFEPIME 2 G: 2 INJECTION, POWDER, FOR SOLUTION INTRAVENOUS at 03:55

## 2021-03-05 RX ADMIN — CEFEPIME 2 G: 2 INJECTION, POWDER, FOR SOLUTION INTRAVENOUS at 10:42

## 2021-03-05 RX ADMIN — APIXABAN 5 MG: 5 TABLET, FILM COATED ORAL at 18:41

## 2021-03-05 RX ADMIN — Medication 10 ML: at 15:33

## 2021-03-05 RX ADMIN — OXYCODONE HYDROCHLORIDE AND ACETAMINOPHEN 500 MG: 500 TABLET ORAL at 10:41

## 2021-03-05 RX ADMIN — METOPROLOL TARTRATE 50 MG: 50 TABLET, FILM COATED ORAL at 10:41

## 2021-03-05 RX ADMIN — Medication 10 ML: at 21:31

## 2021-03-05 RX ADMIN — CEFEPIME 2 G: 2 INJECTION, POWDER, FOR SOLUTION INTRAVENOUS at 18:43

## 2021-03-05 RX ADMIN — Medication 10 ML: at 06:54

## 2021-03-05 RX ADMIN — THERA TABS 1 TABLET: TAB at 10:41

## 2021-03-05 RX ADMIN — METOPROLOL TARTRATE 50 MG: 50 TABLET, FILM COATED ORAL at 18:41

## 2021-03-05 RX ADMIN — ASPIRIN 81 MG: 81 TABLET, COATED ORAL at 10:41

## 2021-03-05 RX ADMIN — Medication 3 MG: at 22:26

## 2021-03-05 RX ADMIN — APIXABAN 5 MG: 5 TABLET, FILM COATED ORAL at 10:41

## 2021-03-05 NOTE — PROGRESS NOTES
[facility]  Hospitalist Group                                                                                          Hospitalist Progress Note  Delonte Patterson MD  Answering service: [phone] from in house phone        Date of Service:  3/5/2021  NAME:  Shun Latif  :  1965  MRN:  [id number]      Admission Summary:     Patient is not able to provide much history as he has significant shortness of breath and increased work of breathing. Patient was admitted on 2021 at [facility] and discharged on 2021. Patient was admitted with acute hypoxic respiratory failure, severe multilobar Covid pneumonia, pneumomediastinum, patient also has history of right lower extremity DVT. Patient was discharged to [facility], patient has been having increased shortness of breath for the last few days and today became quite short of breath, patient was put on nonrebreather 15 L and was brought to [facility].  Patient continued to be quite short of breath and is currently on 45 L nasal cannula, patient appears to be distressed. Patient was evaluated by the ICU team and deemed appropriate to be admitted to the hospitalist service on intermediate care unit. Patient denies any chest pain associated with his symptoms reports that he feels he is struggling to breathe. Patient reports mild cough but denies fever      Interval history / Subjective:     Still on hfnc now on 15 liters . No major complaimnts. He told me that he was somewhat sOB in AM but now feeling better      Assessment & Plan:     Acute on chronic hypoxic respiratory failure with recent COVID 19 Pneumonia  -on HFNC on FiO2 100% 30 l/m, monitor pulse ox to wean down oxygen  -on iv cefepime,  vitamin c, eliquis  -chest x ray low lung volumes.  Diffuse interstitial and airspace opacities with some improved aeration in the right lung base  -acute respiratory pannel not detected  -SARS CoV 2 PCR not detected on 3/2  -leukocytosis, lactic acidosis improving,   -D Dimer 2.12  -CTA chest limitations secondary to motion. No evidence of pulmonary embolus. Diffuse interstitial airspace opacities consistent with Covid 19 pneumonia or sequela.  -troponin <0.05  -repeated chest x ray 3/3 Stable low lung volumes with bilateral lung opacities. -ABG pH 7.40 pCO2 48.4 pO2 99 on HF  -pulmonologist on board    Severe sepsis likely due to pneumonia POA, improving   -leukocytosis, elevated lactic acid, tachycardia, elevated liver enzyme   -continue above abx, oxygen support  -follow up on blood cx    Right lower extremity DVT  -continue Eliquis    Code status: Full Code  DVT prophylaxis: Eliquis    Care Plan discussed with: Patient/Family, Nurse and   Anticipated Disposition: TBD  Anticipated Discharge: Greater than 48 hours     Hospital Problems  Date Reviewed: 5/8/2019          Codes Class Noted POA    Respiratory failure [address] [address] ICD-10-CM: J96.90  ICD-9-CM: 518.81  3/2/2021 Unknown                Vital Signs:    Last 24hrs VS reviewed since prior progress note. Most recent are:  Visit Vitals  /61 (BP 1 Location: Right arm, BP Patient Position: At rest)   Pulse 81   Temp 97.7 °F (36.5 °C)   Resp 26   Ht 5' 8\" (1.727 m)   Wt 72.1 kg (159 lb)   SpO2 100%   BMI 24.18 kg/m²         Intake/Output Summary (Last 24 hours) at 3/5/2021 1542  Last data filed at 3/5/2021 1303  Gross per 24 hour   Intake --   Output 1700 ml   Net -1700 ml        Physical Examination:     I had a face to face encounter with this patient and independently examined them on 3/5/2021 as outlined below:          Constitutional:  No acute distress, cooperative, pleasant    ENT:  Oral mucosa moist, oropharynx benign. Resp:  Decrease bilaterally. No wheezing/rhonchi/rales. No accessory muscle use   CV:  Regular rhythm, normal rate, no murmurs, gallops, rubs    GI:  Soft, non distended, non tender.  normoactive bowel sounds, no hepatosplenomegaly     Musculoskeletal:  No edema     Neurologic:  Moves all extremities. AAOx3, CN II-XII reviewed     Skin:  Good turgor, no rashes or ulcers       Data Review:    Review and/or order of clinical lab test  Review and/or order of tests in the radiology section of CPT  Review and/or order of tests in the medicine section of [facility]      Labs:     Recent Labs     03/03/21 0428 03/03/21 0422   WBC 9.5 10.2   HGB 10.1* 11.5*   HCT 31.4* 36.0*    214     Recent Labs     03/03/21 0422 03/03/21 0420    137   K 4.0 4.1    103   CO2 29 28   BUN 18 19   CREA 0.55* 0.53*   GLU 75 80   CA 9.2 8.9     Recent Labs     03/03/21 0422   ALT 82*   *   TBILI 0.3   TP 6.1*   ALB 2.2*   GLOB 3.9     No results for input(s): INR, PTP, APTT, INREXT, INREXT in the last 72 hours. No results for input(s): FE, TIBC, PSAT, FERR in the last 72 hours. No results found for: FOL, RBCF   No results for input(s): PH, PCO2, PO2 in the last 72 hours.   Recent Labs     03/03/21 0422 03/02/21 2127 03/02/21  1708   TROIQ <0.05 <0.05 <0.05     No results found for: CHOL, CHOLX, CHLST, CHOLV, HDL, HDLP, LDL, LDLC, DLDLP, TGLX, TRIGL, TRIGP, CHHD, CHHDX  Lab Results   Component Value Date/Time    Glucose (POC) 184 (H) 02/17/2021 03:46 PM    Glucose (POC) 141 (H) 02/17/2021 11:31 AM    Glucose (POC) 89 02/17/2021 08:00 AM    Glucose (POC) 293 (H) 02/16/2021 08:47 PM    Glucose (POC) 165 (H) 02/16/2021 03:58 PM     Lab Results   Component Value Date/Time    Color YELLOW/STRAW 03/02/2021 05:08 PM    Appearance CLEAR 03/02/2021 05:08 PM    Specific gravity 1.012 03/02/2021 05:08 PM    pH (UA) 7.5 03/02/2021 05:08 PM    Protein Negative 03/02/2021 05:08 PM    Glucose Negative 03/02/2021 05:08 PM    Ketone Negative 03/02/2021 05:08 PM    Bilirubin Negative 03/02/2021 05:08 PM    Urobilinogen 0.2 03/02/2021 05:08 PM    Nitrites Negative 03/02/2021 05:08 PM    Leukocyte Esterase Negative 03/02/2021 05:08 PM Epithelial cells FEW 03/02/2021 01:53 PM    Bacteria Negative 03/02/2021 01:53 PM    WBC 0-4 03/02/2021 01:53 PM    RBC 0-5 03/02/2021 01:53 PM         Medications Reviewed:     Current Facility-Administered Medications   Medication Dose Route Frequency    cefepime (MAXIPIME) 2 g in 0.9% sodium chloride (MBP/ADV) 100 mL MBP  2 g IntraVENous Q8H    apixaban (ELIQUIS) tablet 5 mg  5 mg Oral BID    ascorbic acid (vitamin C) (VITAMIN C) tablet 500 mg  500 mg Oral DAILY    aspirin delayed-release tablet 81 mg  81 mg Oral DAILY    melatonin tablet 3 mg  3 mg Oral QHS    metoprolol tartrate (LOPRESSOR) tablet 50 mg  50 mg Oral BID    therapeutic multivitamin (THERAGRAN) tablet 1 Tab  1 Tab Oral DAILY    sodium chloride (NS) flush 5-40 mL  5-40 mL IntraVENous Q8H    sodium chloride (NS) flush 5-40 mL  5-40 mL IntraVENous PRN    acetaminophen (TYLENOL) tablet 650 mg  650 mg Oral Q4H PRN    sodium chloride (NS) flush 5-10 mL  5-10 mL IntraVENous PRN     ______________________________________________________________________  EXPECTED LENGTH OF STAY: 4d 19h  ACTUAL LENGTH OF STAY:          3                 Abdullahi Wade MD

## 2021-03-05 NOTE — PROGRESS NOTES
Bedside shift change report given to Yulia (oncoming nurse) by Cammie Mcintosh (offgoing nurse).  Report included the following information SBAR, Intake/Output, MAR, Recent Results and Cardiac Rhythm Sr.

## 2021-03-05 NOTE — PROGRESS NOTES
Transition of Care Plan   RUR- 15% Low Risk    DISPOSITION: The disposition plan is pending recommendation and medical progression.  Transport: Family - Sister Nikolas Joel - [phone]     will continue to follow, provide support and assist with ORIANA needs as they arise.     MARY KAY Tavarez,CRM

## 2021-03-05 NOTE — PROGRESS NOTES
Transition of care outreach postponed for 7 days due to patient's discharge to inpatient rehab facility.   D/C to [facility] 2/17/21 readmit 3/2/21 [facility] spoke to patient currently admitted for respiratory failure will f/u 7d

## 2021-03-05 NOTE — PROGRESS NOTES
PULMONARY MEDICINE    Progress Note    Name: Miles Jang   : 1965   MRN: [id number]   Date: 3/5/2021      Subjective:     3/5  Down to 15lpm   Feeling better     3/4/21  Down to 30lpm and 90% FiO2  Reducing fluids and diuretics seem to have helped him     Consult Note:  3/3/21  Requesting Physician:Dr. Bobbi Handley  Reason for consult: Hypoxic resp failure    Medical records and data reviewed. Patient is a 48 y.o. male who presented to the hospital with worsening dyspnea. Patient was recently discharged from [facility] after an extended stay for COVID-19 pneumonia and was treated with usual acute treatments including remdesivir. He was in rehabilitation and has been experiencing increasing shortness of breath. He was found to be hypoxic, placed on supplemental oxygen and transferred to the ER for further evaluation. He has been requiring high flow oxygen. He report chest discomfort. When seen earlier today he had a spell of aspiration on breakfast on history and has been requiring increased flow at 60 L and 100% FiO2. Breath sounds appear diminished on the left side and a stat chest x-ray is pending. CT scan of the chest that was done on admission was limited by motion artifact and did not show pulmonary emboli. Lung fields however appeared to be severely affected with interstitial and alveolar opacities with developing fibrosis. Viral panel on admission was negative. He is receiving antibiotics for bacterial coverage and sputum culture has been ordered. He does not have prior history of cardiac disease but echocardiogram will be obtained to screen for Covid related cardiac dysfunction. A dose of diuretic has been given. Patient is tachypneic and tachycardic with increased work of breathing on high FiO2 and may benefit from closer monitoring in the ICU as he is at risk for progressive decline.   He indicated his sister-in-law be updated, I have called her and discussed his unstable clinical condition and answered all her questions. 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 9) ICD-10-CM: E66.9  ICD-9-CM: 278.00         Respiratory failure ([facility] [address] ICD-10-CM: J96.90  ICD-9-CM: 518.81         Acute respiratory failure with hypoxia (HCC) ICD-10-CM: J96.01  ICD-9-CM: 518.81         COVID-19 virus infection ICD-10-CM: U07.1  ICD-9-CM: 079.89               Past Medical History:      has a past medical history of GERD (gastroesophageal reflux disease), Obesity (BMI 30-39.9) (5/8/2019), and Right leg DVT ([address] (2019). Past Surgical History:      has a past surgical history that includes hx orthopaedic. Home Medications:     Prior to Admission medications    Medication Sig Start Date End Date Taking? Authorizing Provider   apixaban (Eliquis) 5 mg tablet Take 1 Tab by mouth two (2) times a day for 30 days. 2/17/21 3/19/21  Rebeca Ibrahim MD   furosemide (Lasix) 40 mg tablet Take 1 Tab by mouth daily for 30 days. 2/17/21 3/19/21  Rebeca Ibrahim MD   melatonin 3 mg tablet Take 1 Tab by mouth nightly for 30 days.  2/17/21 3/19/21  Ector Jules MD   metoprolol tartrate (LOPRESSOR) 50 mg tablet Take 1 Tab by mouth two (2) times a day for 30 days. 2/17/21 3/19/21  Ector Jules MD   aspirin delayed-release 81 mg tablet Take 81 mg by mouth daily. Other, MD Leopoldo   albuterol (PROVENTIL HFA, VENTOLIN HFA, PROAIR HFA) 90 mcg/actuation inhaler Take 2 Puffs by inhalation every four (4) hours as needed for Wheezing. 21   Benny Downey MD   multivitamin (ONE A DAY) tablet Take 1 Tab by mouth daily. Provider, Historical   cholecalciferol (VITAMIN D3) 1,000 unit cap Take 1,000 Units by mouth daily. Provider, Historical   ascorbic acid, vitamin C, (VITAMIN C) 500 mg tablet Take 500 mg by mouth daily. Provider, Historical       Allergies/Social/Family History:     No Known Allergies   Social History     Tobacco Use    Smoking status: Never Smoker    Smokeless tobacco: Current User   Substance Use Topics    Alcohol use: Yes     Comment: once a month      Family History   Problem Relation Age of Onset   [address] [place] [address] Cancer Mother     Cancer Father             Objective:   Vital Signs:  Visit Vitals  /80 (BP 1 Location: Right upper arm, BP Patient Position: At rest)   Pulse 91   Temp 98.5 °F (36.9 °C)   Resp 25   Ht 5' 8\" (1.727 m)   Wt 72.1 kg (159 lb)   SpO2 100%   BMI 24.18 kg/m²    O2 Flow Rate (L/min): 15 l/min O2 Device: Hi flow nasal cannula(midflow) Temp (24hrs), Av.1 °F (36.7 °C), Min:97.6 °F (36.4 °C), Max:98.5 °F (36.9 °C)           Intake/Output:     Intake/Output Summary (Last 24 hours) at 3/5/2021 0911  Last data filed at 3/5/2021 0849  Gross per 24 hour   Intake --   Output 1350 ml   Net -1350 ml       Physical Exam:   General:  Alert, cooperative, no distress, appears stated age. Tachypneic   Head:  Normocephalic, without obvious abnormality, atraumatic. Eyes:  Conjunctivae/corneas clear. PERRL   Neck: Supple, symmetrical, no adenopathy, no carotid bruit and no JVD.    Lungs:   Clear to auscultation bilaterally. Increased work of breathing   Chest wall:  No tenderness or deformity. Heart:  Regular rate and rhythm, S1-S2 normal, no murmur, no click, rub or gallop. Abdomen:   Soft, non-tender. Bowel sounds present. No masses,  No organomegaly. Extremities: Atraumatic, no cyanosis or edema. Pulses: Palpable   Skin: No rashes or lesions   Neurologic: Grossly nonfocal         LABS AND  DATA: Personally reviewed  Recent Labs     03/03/21  0428 03/03/21 0422   WBC 9.5 10.2   HGB 10.1* 11.5*   HCT 31.4* 36.0*    214     Recent Labs     03/03/21  0422 03/03/21  0420 03/02/21  1227    137 136   K 4.0 4.1 4.3    103 101   CO2 29 28 29   BUN 18 19 15   CREA 0.55* 0.53* 0.78   GLU 75 80 162*   CA 9.2 8.9 9.7   MG  --   --  2.1     Recent Labs     03/03/21  0422 03/02/21  1227   * 145*   TP 6.1* 7.4   ALB 2.2* 2.3*   GLOB 3.9 5.1*     Recent Labs     03/02/21  1227   INR 1.1   PTP 11.7*   APTT 23.8      Recent Labs     03/03/21  1131 03/02/21  1256   PHI 7.40 7.48*   PCO2I 48.4* 43.8   PO2I 125* 108*   FIO2I  --  100     Recent Labs     03/03/21 0422 03/02/21 2127   TROIQ <0.05 <0.05       MEDS: Reviewed    Chest Imaging: personally reviewed and report checked    Tele- reviewed    Medical decision making:   I have reviewed the flowsheet and previous day's notes  Patient has acute or chronic illness that poses a threat to life or bodily function  Review and order of Clinical lab tests  Review and Order of Radiology tests  Independent visualization of Image  Reviewed Oxygen/ NiPPV  High Risk Drug therapy requiring intensive monitoring for toxicity: eg steroids, pressors, antibiotics        Thank you for allowing me to participate in this patient's care.     Doreen Oliveira PA-C      [facility]

## 2021-03-06 LAB
ANION GAP SERPL CALC-SCNC: 3 MMOL/L (ref 5–15)
BASOPHILS # BLD: 0.1 K/UL (ref 0–0.1)
BASOPHILS NFR BLD: 1 % (ref 0–1)
BUN SERPL-MCNC: 16 MG/DL (ref 6–20)
BUN/CREAT SERPL: 34 (ref 12–20)
CALCIUM SERPL-MCNC: 8.8 MG/DL (ref 8.5–10.1)
CHLORIDE SERPL-SCNC: 100 MMOL/L (ref 97–108)
CO2 SERPL-SCNC: 31 MMOL/L (ref 21–32)
CREAT SERPL-MCNC: 0.47 MG/DL (ref 0.7–1.3)
DIFFERENTIAL METHOD BLD: ABNORMAL
EOSINOPHIL # BLD: 0.5 K/UL (ref 0–0.4)
EOSINOPHIL NFR BLD: 5 % (ref 0–7)
ERYTHROCYTE [DISTWIDTH] IN BLOOD BY AUTOMATED COUNT: 14.5 % (ref 11.5–14.5)
GLUCOSE SERPL-MCNC: 100 MG/DL (ref 65–100)
HCT VFR BLD AUTO: 34 % (ref 36.6–50.3)
HGB BLD-MCNC: 10.8 G/DL (ref 12.1–17)
IMM GRANULOCYTES # BLD AUTO: 0 K/UL
IMM GRANULOCYTES NFR BLD AUTO: 0 %
LYMPHOCYTES # BLD: 1.4 K/UL (ref 0.8–3.5)
LYMPHOCYTES NFR BLD: 13 % (ref 12–49)
MCH RBC QN AUTO: 27.8 PG (ref 26–34)
MCHC RBC AUTO-ENTMCNC: 31.8 G/DL (ref 30–36.5)
MCV RBC AUTO: 87.6 FL (ref 80–99)
MONOCYTES # BLD: 0.7 K/UL (ref 0–1)
MONOCYTES NFR BLD: 7 % (ref 5–13)
MYELOCYTES NFR BLD MANUAL: 2 %
NEUTS BAND NFR BLD MANUAL: 7 % (ref 0–6)
NEUTS SEG # BLD: 7.6 K/UL (ref 1.8–8)
NEUTS SEG NFR BLD: 65 % (ref 32–75)
NRBC # BLD: 0 K/UL (ref 0–0.01)
NRBC BLD-RTO: 0 PER 100 WBC
PLATELET # BLD AUTO: 252 K/UL (ref 150–400)
PMV BLD AUTO: 10.7 FL (ref 8.9–12.9)
POTASSIUM SERPL-SCNC: 4.3 MMOL/L (ref 3.5–5.1)
RBC # BLD AUTO: 3.88 M/UL (ref 4.1–5.7)
RBC MORPH BLD: ABNORMAL
RBC MORPH BLD: ABNORMAL
SODIUM SERPL-SCNC: 134 MMOL/L (ref 136–145)
WBC # BLD AUTO: 10.6 K/UL (ref 4.1–11.1)

## 2021-03-06 PROCEDURE — 85025 COMPLETE CBC W/AUTO DIFF WBC: CPT

## 2021-03-06 PROCEDURE — 74011000258 HC RX REV CODE- 258: Performed by: INTERNAL MEDICINE

## 2021-03-06 PROCEDURE — 65660000001 HC RM ICU INTERMED STEPDOWN

## 2021-03-06 PROCEDURE — 74011250636 HC RX REV CODE- 250/636: Performed by: INTERNAL MEDICINE

## 2021-03-06 PROCEDURE — 36415 COLL VENOUS BLD VENIPUNCTURE: CPT

## 2021-03-06 PROCEDURE — 80048 BASIC METABOLIC PNL TOTAL CA: CPT

## 2021-03-06 PROCEDURE — 74011250637 HC RX REV CODE- 250/637: Performed by: FAMILY MEDICINE

## 2021-03-06 RX ADMIN — CEFEPIME 2 G: 2 INJECTION, POWDER, FOR SOLUTION INTRAVENOUS at 02:38

## 2021-03-06 RX ADMIN — ASPIRIN 81 MG: 81 TABLET, COATED ORAL at 08:31

## 2021-03-06 RX ADMIN — APIXABAN 5 MG: 5 TABLET, FILM COATED ORAL at 19:27

## 2021-03-06 RX ADMIN — Medication 10 ML: at 16:03

## 2021-03-06 RX ADMIN — CEFEPIME 2 G: 2 INJECTION, POWDER, FOR SOLUTION INTRAVENOUS at 13:05

## 2021-03-06 RX ADMIN — OXYCODONE HYDROCHLORIDE AND ACETAMINOPHEN 500 MG: 500 TABLET ORAL at 08:32

## 2021-03-06 RX ADMIN — METOPROLOL TARTRATE 50 MG: 50 TABLET, FILM COATED ORAL at 08:31

## 2021-03-06 RX ADMIN — Medication 3 MG: at 21:01

## 2021-03-06 RX ADMIN — METOPROLOL TARTRATE 50 MG: 50 TABLET, FILM COATED ORAL at 19:27

## 2021-03-06 RX ADMIN — CEFEPIME 2 G: 2 INJECTION, POWDER, FOR SOLUTION INTRAVENOUS at 19:27

## 2021-03-06 RX ADMIN — Medication 10 ML: at 06:01

## 2021-03-06 RX ADMIN — THERA TABS 1 TABLET: TAB at 08:32

## 2021-03-06 RX ADMIN — APIXABAN 5 MG: 5 TABLET, FILM COATED ORAL at 08:32

## 2021-03-06 NOTE — PROGRESS NOTES
Bedside and Verbal shift change report given to [address] (oncoming nurse) by Víctor Pino and Maritza RN (offgoing nurse). Report included the following information SBAR, ED Summary, Intake/Output, Recent Results and Cardiac Rhythm NS-ST.

## 2021-03-06 NOTE — PROGRESS NOTES
[address] [facility] Adult  Hospitalist Group                                                                                          Hospitalist Progress Note  Carlos Ray MD  Answering service: [phone] from in house phone        Date of Service:  3/6/2021  NAME:  Irma Lewis  :  1965  MRN:  [id number]      Admission Summary:     Patient is not able to provide much history as he has significant shortness of breath and increased work of breathing. Patient was admitted on 2021 at [facility] and discharged on 2021. Patient was admitted with acute hypoxic respiratory failure, severe multilobar Covid pneumonia, pneumomediastinum, patient also has history of right lower extremity DVT. Patient was discharged to [facility], patient has been having increased shortness of breath for the last few days and today became quite short of breath, patient was put on nonrebreather 15 L and was brought to [facility].  Patient continued to be quite short of breath and is currently on 45 L nasal cannula, patient appears to be distressed. Patient was evaluated by the ICU team and deemed appropriate to be admitted to the hospitalist service on intermediate care unit. Patient denies any chest pain associated with his symptoms reports that he feels he is struggling to breathe. Patient reports mild cough but denies fever      Interval history / Subjective:     Still on hfnc. No major complaimnts. Still on 13 liters of hfnc. No acute issues overnight      Assessment & Plan:     Acute on chronic hypoxic respiratory failure with recent COVID 19 Pneumonia  -on HFNC on FiO2 100% 13 l/m, monitor pulse ox to wean down oxygen  -on iv cefepime,  vitamin c, eliquis  -chest x ray low lung volumes.  Diffuse interstitial and airspace opacities with some improved aeration in the right lung base  -acute respiratory pannel not detected  -SARS CoV 2 PCR not detected on 3/2  -leukocytosis, lactic acidosis improving,   -D Dimer 2.12  -CTA chest limitations secondary to motion. No evidence of pulmonary embolus. Diffuse interstitial airspace opacities consistent with Covid 19 pneumonia or sequela.  -troponin <0.05  -repeated chest x ray 3/3 Stable low lung volumes with bilateral lung opacities. -ABG pH 7.40 pCO2 48.4 pO2 99 on HF  -pulmonologist on board    Severe sepsis likely due to pneumonia POA, improving   -leukocytosis, elevated lactic acid, tachycardia, elevated liver enzyme   -continue above abx, oxygen support  -follow up on blood cx, NGTD     Right lower extremity DVT  -continue Eliquis    Code status: Full Code  DVT prophylaxis: Eliquis    Care Plan discussed with: Patient/Family, Nurse and   Anticipated Disposition: TBD  Anticipated Discharge: Greater than 48 hours     Hospital Problems  Date Reviewed: 5/8/2019          Codes Class Noted POA    Respiratory failure ([facility] [address] ICD-10-CM: J96.90  ICD-9-CM: 518.81  3/2/2021 Unknown                Vital Signs:    Last 24hrs VS reviewed since prior progress note. Most recent are:  Visit Vitals  /72 (BP 1 Location: Right upper arm, BP Patient Position: At rest)   Pulse 72   Temp 98.2 °F (36.8 °C)   Resp 20   Ht 5' 8\" (1.727 m)   Wt 74.8 kg (165 lb)   SpO2 100%   BMI 25.09 kg/m²         Intake/Output Summary (Last 24 hours) at 3/6/2021 1401  Last data filed at 3/6/2021 1351  Gross per 24 hour   Intake 120 ml   Output 1525 ml   Net -1405 ml        Physical Examination:     I had a face to face encounter with this patient and independently examined them on 3/6/2021 as outlined below:          Constitutional:  No acute distress, cooperative, pleasant    ENT:  Oral mucosa moist, oropharynx benign. Resp:  Decrease bilaterally. No wheezing/rhonchi/rales. No accessory muscle use   CV:  Regular rhythm, normal rate, no murmurs, gallops, rubs    GI:  Soft, non distended, non tender.  normoactive bowel sounds, no hepatosplenomegaly     Musculoskeletal:  No edema     Neurologic:  Moves all extremities. AAOx3, CN II-XII reviewed     Skin:  Good turgor, no rashes or ulcers       Data Review:    Review and/or order of clinical lab test  Review and/or order of tests in the radiology section of CPT  Review and/or order of tests in the medicine section of CPT      Labs:     Recent Labs     03/06/21  0007   WBC 10.6   HGB 10.8*   HCT 34.0*        Recent Labs     03/06/21  0007   *   K 4.3      CO2 31   BUN 16   CREA 0.47*      CA 8.8     No results for input(s): ALT, AP, TBIL, TBILI, TP, ALB, GLOB, GGT, AML, LPSE in the last 72 hours. No lab exists for component: SGOT, GPT, AMYP, HLPSE  No results for input(s): INR, PTP, APTT, INREXT, INREXT in the last 72 hours. No results for input(s): FE, TIBC, PSAT, FERR in the last 72 hours. No results found for: FOL, RBCF   No results for input(s): PH, PCO2, PO2 in the last 72 hours. No results for input(s): CPK, CKNDX, TROIQ in the last 72 hours.     No lab exists for component: CPKMB  No results found for: CHOL, CHOLX, CHLST, CHOLV, HDL, HDLP, LDL, LDLC, DLDLP, TGLX, TRIGL, TRIGP, CHHD, CHHDX  Lab Results   Component Value Date/Time    Glucose (POC) 184 (H) 02/17/2021 03:46 PM    Glucose (POC) 141 (H) 02/17/2021 11:31 AM    Glucose (POC) 89 02/17/2021 08:00 AM    Glucose (POC) 293 (H) 02/16/2021 08:47 PM    Glucose (POC) 165 (H) 02/16/2021 03:58 PM     Lab Results   Component Value Date/Time    Color YELLOW/STRAW 03/02/2021 05:08 PM    Appearance CLEAR 03/02/2021 05:08 PM    Specific gravity 1.012 03/02/2021 05:08 PM    pH (UA) 7.5 03/02/2021 05:08 PM    Protein Negative 03/02/2021 05:08 PM    Glucose Negative 03/02/2021 05:08 PM    Ketone Negative 03/02/2021 05:08 PM    Bilirubin Negative 03/02/2021 05:08 PM    Urobilinogen 0.2 03/02/2021 05:08 PM    Nitrites Negative 03/02/2021 05:08 PM    Leukocyte Esterase Negative 03/02/2021 05:08 PM    Epithelial cells FEW 03/02/2021 01:53 PM    Bacteria Negative 03/02/2021 01:53 PM    WBC 0-4 03/02/2021 01:53 PM    RBC 0-5 03/02/2021 01:53 PM         Medications Reviewed:     Current Facility-Administered Medications   Medication Dose Route Frequency    cefepime (MAXIPIME) 2 g in 0.9% sodium chloride (MBP/ADV) 100 mL MBP  2 g IntraVENous Q8H    apixaban (ELIQUIS) tablet 5 mg  5 mg Oral BID    ascorbic acid (vitamin C) (VITAMIN C) tablet 500 mg  500 mg Oral DAILY    aspirin delayed-release tablet 81 mg  81 mg Oral DAILY    melatonin tablet 3 mg  3 mg Oral QHS    metoprolol tartrate (LOPRESSOR) tablet 50 mg  50 mg Oral BID    therapeutic multivitamin (THERAGRAN) tablet 1 Tab  1 Tab Oral DAILY    sodium chloride (NS) flush 5-40 mL  5-40 mL IntraVENous Q8H    sodium chloride (NS) flush 5-40 mL  5-40 mL IntraVENous PRN    acetaminophen (TYLENOL) tablet 650 mg  650 mg Oral Q4H PRN    sodium chloride (NS) flush 5-10 mL  5-10 mL IntraVENous PRN     ______________________________________________________________________  EXPECTED LENGTH OF STAY: 4d 19h  ACTUAL LENGTH OF STAY:          4                 Maisha Sams MD

## 2021-03-06 NOTE — PROGRESS NOTES
2000: Bedside and Verbal shift change report given to [address] (oncoming nurse) by Ghanshyam Schwartz and Maritza RN (offgoing nurse). Report included the following information SBAR, Kardex, ED Summary, MAR, Recent Results, and Cardiac Rhythm NSR.

## 2021-03-07 LAB
ANION GAP SERPL CALC-SCNC: 4 MMOL/L (ref 5–15)
BACTERIA SPEC CULT: NORMAL
BACTERIA SPEC CULT: NORMAL
BASOPHILS # BLD: 0.2 K/UL (ref 0–0.1)
BASOPHILS NFR BLD: 2 % (ref 0–1)
BUN SERPL-MCNC: 12 MG/DL (ref 6–20)
BUN/CREAT SERPL: 23 (ref 12–20)
CALCIUM SERPL-MCNC: 9.2 MG/DL (ref 8.5–10.1)
CHLORIDE SERPL-SCNC: 100 MMOL/L (ref 97–108)
CO2 SERPL-SCNC: 33 MMOL/L (ref 21–32)
CREAT SERPL-MCNC: 0.52 MG/DL (ref 0.7–1.3)
DIFFERENTIAL METHOD BLD: ABNORMAL
EOSINOPHIL # BLD: 0.4 K/UL (ref 0–0.4)
EOSINOPHIL NFR BLD: 4 % (ref 0–7)
ERYTHROCYTE [DISTWIDTH] IN BLOOD BY AUTOMATED COUNT: 14.7 % (ref 11.5–14.5)
GLUCOSE SERPL-MCNC: 90 MG/DL (ref 65–100)
HCT VFR BLD AUTO: 33.8 % (ref 36.6–50.3)
HGB BLD-MCNC: 10.9 G/DL (ref 12.1–17)
IMM GRANULOCYTES # BLD AUTO: 0 K/UL
IMM GRANULOCYTES NFR BLD AUTO: 0 %
LYMPHOCYTES # BLD: 1 K/UL (ref 0.8–3.5)
LYMPHOCYTES NFR BLD: 10 % (ref 12–49)
MCH RBC QN AUTO: 28.2 PG (ref 26–34)
MCHC RBC AUTO-ENTMCNC: 32.2 G/DL (ref 30–36.5)
MCV RBC AUTO: 87.6 FL (ref 80–99)
MONOCYTES # BLD: 0.2 K/UL (ref 0–1)
MONOCYTES NFR BLD: 2 % (ref 5–13)
MYELOCYTES NFR BLD MANUAL: 2 %
NEUTS SEG # BLD: 8.3 K/UL (ref 1.8–8)
NEUTS SEG NFR BLD: 80 % (ref 32–75)
NRBC # BLD: 0 K/UL (ref 0–0.01)
NRBC BLD-RTO: 0 PER 100 WBC
PLATELET # BLD AUTO: 245 K/UL (ref 150–400)
PLATELET COMMENTS,PCOM: ABNORMAL
PMV BLD AUTO: 10.7 FL (ref 8.9–12.9)
POTASSIUM SERPL-SCNC: 4.2 MMOL/L (ref 3.5–5.1)
RBC # BLD AUTO: 3.86 M/UL (ref 4.1–5.7)
RBC MORPH BLD: ABNORMAL
RBC MORPH BLD: ABNORMAL
SERVICE CMNT-IMP: NORMAL
SERVICE CMNT-IMP: NORMAL
SODIUM SERPL-SCNC: 137 MMOL/L (ref 136–145)
WBC # BLD AUTO: 10.4 K/UL (ref 4.1–11.1)

## 2021-03-07 PROCEDURE — 65660000001 HC RM ICU INTERMED STEPDOWN

## 2021-03-07 PROCEDURE — 77010033711 HC HIGH FLOW OXYGEN

## 2021-03-07 PROCEDURE — 74011250636 HC RX REV CODE- 250/636: Performed by: INTERNAL MEDICINE

## 2021-03-07 PROCEDURE — 80048 BASIC METABOLIC PNL TOTAL CA: CPT

## 2021-03-07 PROCEDURE — 74011250637 HC RX REV CODE- 250/637: Performed by: FAMILY MEDICINE

## 2021-03-07 PROCEDURE — 36415 COLL VENOUS BLD VENIPUNCTURE: CPT

## 2021-03-07 PROCEDURE — 85025 COMPLETE CBC W/AUTO DIFF WBC: CPT

## 2021-03-07 PROCEDURE — 74011250637 HC RX REV CODE- 250/637: Performed by: INTERNAL MEDICINE

## 2021-03-07 PROCEDURE — 74011000258 HC RX REV CODE- 258: Performed by: INTERNAL MEDICINE

## 2021-03-07 RX ADMIN — CEFEPIME 2 G: 2 INJECTION, POWDER, FOR SOLUTION INTRAVENOUS at 04:42

## 2021-03-07 RX ADMIN — APIXABAN 5 MG: 5 TABLET, FILM COATED ORAL at 08:39

## 2021-03-07 RX ADMIN — THERA TABS 1 TABLET: TAB at 08:39

## 2021-03-07 RX ADMIN — Medication 10 ML: at 19:34

## 2021-03-07 RX ADMIN — Medication 10 ML: at 21:07

## 2021-03-07 RX ADMIN — SALINE NASAL SPRAY 2 SPRAY: 1.5 SOLUTION NASAL at 16:27

## 2021-03-07 RX ADMIN — METOPROLOL TARTRATE 50 MG: 50 TABLET, FILM COATED ORAL at 08:39

## 2021-03-07 RX ADMIN — Medication 3 MG: at 21:06

## 2021-03-07 RX ADMIN — APIXABAN 5 MG: 5 TABLET, FILM COATED ORAL at 19:33

## 2021-03-07 RX ADMIN — OXYCODONE HYDROCHLORIDE AND ACETAMINOPHEN 500 MG: 500 TABLET ORAL at 08:39

## 2021-03-07 RX ADMIN — Medication 10 ML: at 06:00

## 2021-03-07 RX ADMIN — Medication 10 ML: at 04:43

## 2021-03-07 RX ADMIN — CEFEPIME 2 G: 2 INJECTION, POWDER, FOR SOLUTION INTRAVENOUS at 19:33

## 2021-03-07 RX ADMIN — CEFEPIME 2 G: 2 INJECTION, POWDER, FOR SOLUTION INTRAVENOUS at 11:51

## 2021-03-07 RX ADMIN — METOPROLOL TARTRATE 50 MG: 50 TABLET, FILM COATED ORAL at 19:33

## 2021-03-07 RX ADMIN — ASPIRIN 81 MG: 81 TABLET, COATED ORAL at 08:39

## 2021-03-07 NOTE — ROUTINE PROCESS
2015: Bedside and Verbal shift change report given to Marielle Landaverde (oncoming nurse) by Glen Serrano (offgoing nurse). Report included the following information SBAR, Intake/Output, MAR, Recent Results and Cardiac Rhythm NSR.

## 2021-03-07 NOTE — PROGRESS NOTES
[address] [facility] Adult  Hospitalist Group                                                                                          Hospitalist Progress Note  Elvin Nissen, MD  Answering service: [phone] from in house phone        Date of Service:  3/7/2021  NAME:  Arthur Liu  :  1965  MRN:  [id number]      Admission Summary:     Patient is not able to provide much history as he has significant shortness of breath and increased work of breathing. Patient was admitted on 2021 at [facility] and discharged on 2021. Patient was admitted with acute hypoxic respiratory failure, severe multilobar Covid pneumonia, pneumomediastinum, patient also has history of right lower extremity DVT. Patient was discharged to [facility], patient has been having increased shortness of breath for the last few days and today became quite short of breath, patient was put on nonrebreather 15 L and was brought to [facility].  Patient continued to be quite short of breath and is currently on 45 L nasal cannula, patient appears to be distressed. Patient was evaluated by the ICU team and deemed appropriate to be admitted to the hospitalist service on intermediate care unit. Patient denies any chest pain associated with his symptoms reports that he feels he is struggling to breathe. Patient reports mild cough but denies fever      Interval history / Subjective:     Still on 10 liters of hfnc. No acute issues overnight   Patient with no major complaints  Afebrile      Assessment & Plan:     Acute on chronic hypoxic respiratory failure with recent COVID 19 Pneumonia  -on HFNC on FiO2 100% 13 l/m, monitor pulse ox to wean down oxygen  -on iv cefepime,  vitamin c, eliquis  -chest x ray low lung volumes.  Diffuse interstitial and airspace opacities with some improved aeration in the right lung base  -acute respiratory pannel not detected  -SARS CoV 2 PCR not detected on 3/2  -leukocytosis, lactic acidosis improving,   -D Dimer 2.12  -CTA chest limitations secondary to motion. No evidence of pulmonary embolus. Diffuse interstitial airspace opacities consistent with Covid 19 pneumonia or sequela.  -troponin <0.05  -repeated chest x ray 3/3 Stable low lung volumes with bilateral lung opacities. -ABG pH 7.40 pCO2 48.4 pO2 99 on HF  -pulmonologist on board    Severe sepsis likely due to pneumonia POA, improving   -leukocytosis, elevated lactic acid, tachycardia, elevated liver enzyme   -continue above abx, oxygen support  -follow up on blood cx, NGTD     Right lower extremity DVT  -continue Eliquis    Code status: Full Code  DVT prophylaxis: Eliquis    Care Plan discussed with: Patient/Family, Nurse and   Anticipated Disposition: TBD  Anticipated Discharge: Greater than 48 hours     Hospital Problems  Date Reviewed: 5/8/2019          Codes Class Noted POA    Respiratory failure ([facility] [address] ICD-10-CM: J96.90  ICD-9-CM: 518.81  3/2/2021 Unknown                Vital Signs:    Last 24hrs VS reviewed since prior progress note. Most recent are:  Visit Vitals  /66 (BP 1 Location: Right upper arm, BP Patient Position: At rest)   Pulse 90   Temp 97.7 °F (36.5 °C)   Resp (!) 32   Ht 5' 8\" (1.727 m)   Wt 75.4 kg (166 lb 3.6 oz)   SpO2 100%   BMI 25.27 kg/m²         Intake/Output Summary (Last 24 hours) at 3/7/2021 1445  Last data filed at 3/7/2021 1336  Gross per 24 hour   Intake --   Output 900 ml   Net -900 ml        Physical Examination:     I had a face to face encounter with this patient and independently examined them on 3/7/2021 as outlined below:          Constitutional:  No acute distress, cooperative, pleasant    ENT:  Oral mucosa moist, oropharynx benign. Resp:  Decrease bilaterally. No wheezing/rhonchi/rales. No accessory muscle use   CV:  Regular rhythm, normal rate, no murmurs, gallops, rubs    GI:  Soft, non distended, non tender.  normoactive bowel sounds, no hepatosplenomegaly     Musculoskeletal:  No edema     Neurologic:  Moves all extremities. AAOx3, CN II-XII reviewed     Skin:  Good turgor, no rashes or ulcers       Data Review:    Review and/or order of clinical lab test  Review and/or order of tests in the radiology section of CPT  Review and/or order of tests in the medicine section of CPT      Labs:     Recent Labs     03/07/21  0440 03/06/21  0007   WBC 10.4 10.6   HGB 10.9* 10.8*   HCT 33.8* 34.0*    252     Recent Labs     03/07/21  0440 03/06/21  0007    134*   K 4.2 4.3    100   CO2 33* 31   BUN 12 16   CREA 0.52* 0.47*   GLU 90 100   CA 9.2 8.8     No results for input(s): ALT, AP, TBIL, TBILI, TP, ALB, GLOB, GGT, AML, LPSE in the last 72 hours. No lab exists for component: SGOT, GPT, AMYP, HLPSE  No results for input(s): INR, PTP, APTT, INREXT, INREXT in the last 72 hours. No results for input(s): FE, TIBC, PSAT, FERR in the last 72 hours. No results found for: FOL, RBCF   No results for input(s): PH, PCO2, PO2 in the last 72 hours. No results for input(s): CPK, CKNDX, TROIQ in the last 72 hours.     No lab exists for component: CPKMB  No results found for: CHOL, CHOLX, CHLST, CHOLV, HDL, HDLP, LDL, LDLC, DLDLP, TGLX, TRIGL, TRIGP, CHHD, CHHDX  Lab Results   Component Value Date/Time    Glucose (POC) 184 (H) 02/17/2021 03:46 PM    Glucose (POC) 141 (H) 02/17/2021 11:31 AM    Glucose (POC) 89 02/17/2021 08:00 AM    Glucose (POC) 293 (H) 02/16/2021 08:47 PM    Glucose (POC) 165 (H) 02/16/2021 03:58 PM     Lab Results   Component Value Date/Time    Color YELLOW/STRAW 03/02/2021 05:08 PM    Appearance CLEAR 03/02/2021 05:08 PM    Specific gravity 1.012 03/02/2021 05:08 PM    pH (UA) 7.5 03/02/2021 05:08 PM    Protein Negative 03/02/2021 05:08 PM    Glucose Negative 03/02/2021 05:08 PM    Ketone Negative 03/02/2021 05:08 PM    Bilirubin Negative 03/02/2021 05:08 PM    Urobilinogen 0.2 03/02/2021 05:08 PM    Nitrites Negative 03/02/2021 05:08 PM    Leukocyte Esterase Negative 03/02/2021 05:08 PM    Epithelial cells FEW 03/02/2021 01:53 PM    Bacteria Negative 03/02/2021 01:53 PM    WBC 0-4 03/02/2021 01:53 PM    RBC 0-5 03/02/2021 01:53 PM         Medications Reviewed:     Current Facility-Administered Medications   Medication Dose Route Frequency    sodium chloride (OCEAN) 0.65 % nasal squeeze bottle 2 Spray  2 Spray Both Nostrils Q2H PRN    cefepime (MAXIPIME) 2 g in 0.9% sodium chloride (MBP/ADV) 100 mL MBP  2 g IntraVENous Q8H    apixaban (ELIQUIS) tablet 5 mg  5 mg Oral BID    ascorbic acid (vitamin C) (VITAMIN C) tablet 500 mg  500 mg Oral DAILY    aspirin delayed-release tablet 81 mg  81 mg Oral DAILY    melatonin tablet 3 mg  3 mg Oral QHS    metoprolol tartrate (LOPRESSOR) tablet 50 mg  50 mg Oral BID    therapeutic multivitamin (THERAGRAN) tablet 1 Tab  1 Tab Oral DAILY    sodium chloride (NS) flush 5-40 mL  5-40 mL IntraVENous Q8H    sodium chloride (NS) flush 5-40 mL  5-40 mL IntraVENous PRN    acetaminophen (TYLENOL) tablet 650 mg  650 mg Oral Q4H PRN    sodium chloride (NS) flush 5-10 mL  5-10 mL IntraVENous PRN     ______________________________________________________________________  EXPECTED LENGTH OF STAY: 4d 19h  ACTUAL LENGTH OF STAY:          Bahman Tomas MD

## 2021-03-08 ENCOUNTER — APPOINTMENT (OUTPATIENT)
Dept: VASCULAR SURGERY | Age: 56
DRG: 871 | End: 2021-03-08
Attending: INTERNAL MEDICINE
Payer: COMMERCIAL

## 2021-03-08 LAB
ANION GAP SERPL CALC-SCNC: 3 MMOL/L (ref 5–15)
BASOPHILS # BLD: 0.1 K/UL (ref 0–0.1)
BASOPHILS NFR BLD: 1 % (ref 0–1)
BUN SERPL-MCNC: 12 MG/DL (ref 6–20)
BUN/CREAT SERPL: 20 (ref 12–20)
CALCIUM SERPL-MCNC: 9.2 MG/DL (ref 8.5–10.1)
CHLORIDE SERPL-SCNC: 103 MMOL/L (ref 97–108)
CO2 SERPL-SCNC: 29 MMOL/L (ref 21–32)
CREAT SERPL-MCNC: 0.59 MG/DL (ref 0.7–1.3)
DIFFERENTIAL METHOD BLD: ABNORMAL
EOSINOPHIL # BLD: 0.8 K/UL (ref 0–0.4)
EOSINOPHIL NFR BLD: 8 % (ref 0–7)
ERYTHROCYTE [DISTWIDTH] IN BLOOD BY AUTOMATED COUNT: 14.6 % (ref 11.5–14.5)
GLUCOSE SERPL-MCNC: 81 MG/DL (ref 65–100)
HCT VFR BLD AUTO: 34.8 % (ref 36.6–50.3)
HGB BLD-MCNC: 10.9 G/DL (ref 12.1–17)
IMM GRANULOCYTES # BLD AUTO: 0 K/UL
IMM GRANULOCYTES NFR BLD AUTO: 0 %
LYMPHOCYTES # BLD: 1.1 K/UL (ref 0.8–3.5)
LYMPHOCYTES NFR BLD: 12 % (ref 12–49)
MCH RBC QN AUTO: 27.9 PG (ref 26–34)
MCHC RBC AUTO-ENTMCNC: 31.3 G/DL (ref 30–36.5)
MCV RBC AUTO: 89.2 FL (ref 80–99)
MONOCYTES # BLD: 0.4 K/UL (ref 0–1)
MONOCYTES NFR BLD: 4 % (ref 5–13)
MYELOCYTES NFR BLD MANUAL: 2 %
NEUTS BAND NFR BLD MANUAL: 1 % (ref 0–6)
NEUTS SEG # BLD: 6.9 K/UL (ref 1.8–8)
NEUTS SEG NFR BLD: 72 % (ref 32–75)
NRBC # BLD: 0 K/UL (ref 0–0.01)
NRBC BLD-RTO: 0 PER 100 WBC
PLATELET # BLD AUTO: 249 K/UL (ref 150–400)
PLATELET COMMENTS,PCOM: ABNORMAL
PMV BLD AUTO: 10.9 FL (ref 8.9–12.9)
POTASSIUM SERPL-SCNC: 4.4 MMOL/L (ref 3.5–5.1)
RBC # BLD AUTO: 3.9 M/UL (ref 4.1–5.7)
RBC MORPH BLD: ABNORMAL
SODIUM SERPL-SCNC: 135 MMOL/L (ref 136–145)
WBC # BLD AUTO: 9.5 K/UL (ref 4.1–11.1)

## 2021-03-08 PROCEDURE — 97530 THERAPEUTIC ACTIVITIES: CPT

## 2021-03-08 PROCEDURE — 65660000001 HC RM ICU INTERMED STEPDOWN

## 2021-03-08 PROCEDURE — 77010033711 HC HIGH FLOW OXYGEN

## 2021-03-08 PROCEDURE — 74011250636 HC RX REV CODE- 250/636: Performed by: INTERNAL MEDICINE

## 2021-03-08 PROCEDURE — 80048 BASIC METABOLIC PNL TOTAL CA: CPT

## 2021-03-08 PROCEDURE — 36415 COLL VENOUS BLD VENIPUNCTURE: CPT

## 2021-03-08 PROCEDURE — 94762 N-INVAS EAR/PLS OXIMTRY CONT: CPT

## 2021-03-08 PROCEDURE — 97161 PT EVAL LOW COMPLEX 20 MIN: CPT

## 2021-03-08 PROCEDURE — 74011000258 HC RX REV CODE- 258: Performed by: INTERNAL MEDICINE

## 2021-03-08 PROCEDURE — 85025 COMPLETE CBC W/AUTO DIFF WBC: CPT

## 2021-03-08 PROCEDURE — 74011250637 HC RX REV CODE- 250/637: Performed by: FAMILY MEDICINE

## 2021-03-08 PROCEDURE — 93971 EXTREMITY STUDY: CPT

## 2021-03-08 RX ADMIN — CEFEPIME 2 G: 2 INJECTION, POWDER, FOR SOLUTION INTRAVENOUS at 03:00

## 2021-03-08 RX ADMIN — ASPIRIN 81 MG: 81 TABLET, COATED ORAL at 09:15

## 2021-03-08 RX ADMIN — APIXABAN 5 MG: 5 TABLET, FILM COATED ORAL at 09:15

## 2021-03-08 RX ADMIN — THERA TABS 1 TABLET: TAB at 09:15

## 2021-03-08 RX ADMIN — OXYCODONE HYDROCHLORIDE AND ACETAMINOPHEN 500 MG: 500 TABLET ORAL at 09:15

## 2021-03-08 RX ADMIN — CEFEPIME 2 G: 2 INJECTION, POWDER, FOR SOLUTION INTRAVENOUS at 10:54

## 2021-03-08 RX ADMIN — METOPROLOL TARTRATE 50 MG: 50 TABLET, FILM COATED ORAL at 17:56

## 2021-03-08 RX ADMIN — Medication 10 ML: at 22:26

## 2021-03-08 RX ADMIN — CEFEPIME 2 G: 2 INJECTION, POWDER, FOR SOLUTION INTRAVENOUS at 18:17

## 2021-03-08 RX ADMIN — Medication 10 ML: at 06:49

## 2021-03-08 RX ADMIN — METOPROLOL TARTRATE 50 MG: 50 TABLET, FILM COATED ORAL at 09:15

## 2021-03-08 RX ADMIN — APIXABAN 5 MG: 5 TABLET, FILM COATED ORAL at 17:56

## 2021-03-08 RX ADMIN — Medication 10 ML: at 15:55

## 2021-03-08 NOTE — PROGRESS NOTES
Bedside and Verbal shift change report given to Danyelle Staley (oncoming nurse) by Kehinde Matias (offgoing nurse). Report included the following information SBAR, Kardex, Cardiac Rhythm NSR and Alarm Parameters .

## 2021-03-08 NOTE — PROGRESS NOTES
Transition Plan of Care  RUR 14%-Low  Admitted from [facility] with complaints of shortness of breath. Was discharged from [facility] on 2/17 to [facility]. At baseline he is independent with ADLs. Will need PT/OT to evaluate for level of care at discharge. Orders for PT/OT placed.   Guanakito Augustine RN CRM  Ext [phone]

## 2021-03-08 NOTE — PROGRESS NOTES
[address] [facility] Adult  Hospitalist Group                                                                                          Hospitalist Progress Note  Halina Muniz MD  Answering service: [phone] OR [phone] from in house phone        Date of Service:  3/8/2021  NAME:  Sheree Rob  :  1965  MRN:  [id number]      Admission Summary:     Patient is not able to provide much history as he has significant shortness of breath and increased work of breathing. Patient was admitted on 2021 at [facility] and discharged on 2021. Patient was admitted with acute hypoxic respiratory failure, severe multilobar Covid pneumonia, pneumomediastinum, patient also has history of right lower extremity DVT. Patient was discharged to [facility], patient has been having increased shortness of breath for the last few days and today became quite short of breath, patient was put on nonrebreather 15 L and was brought to [facility].  Patient continued to be quite short of breath and is currently on 45 L nasal cannula, patient appears to be distressed. Patient was evaluated by the ICU team and deemed appropriate to be admitted to the hospitalist service on intermediate care unit. Patient denies any chest pain associated with his symptoms reports that he feels he is struggling to breathe. Patient reports mild cough but denies fever      Interval history / Subjective:     No acute issues overnight   Patient with no major complaints. Talked ot sister in law in detail and answered questions. Told patient and family that our goal is to wean off oxygen butmay need to be discharged  On 2-3 liters of oxygen if unable to wean off. Afebrile      Assessment & Plan:     Acute on chronic hypoxic respiratory failure with recent COVID 19 Pneumonia  -on HFNC on FiO2 100% 13 l/m, monitor pulse ox to wean down oxygen  -on iv cefepime,  vitamin c, eliquis  -chest x ray low lung volumes. Diffuse interstitial and airspace opacities with some improved aeration in the right lung base  -acute respiratory pannel not detected  -SARS CoV 2 PCR not detected on 3/2  -leukocytosis, lactic acidosis improving,   -D Dimer 2.12  -CTA chest limitations secondary to motion. No evidence of pulmonary embolus. Diffuse interstitial airspace opacities consistent with Covid 19 pneumonia or sequela.  -troponin <0.05  -repeated chest x ray 3/3 Stable low lung volumes with bilateral lung opacities. -ABG pH 7.40 pCO2 48.4 pO2 99 on HF  -pulmonologist on board    Severe sepsis likely due to pneumonia POA, improving   -leukocytosis, elevated lactic acid, tachycardia, elevated liver enzyme   -continue above abx, oxygen support  -follow up on blood cx, NGTD     Right lower extremity DVT  -continue Eliquis    Code status: Full Code  DVT prophylaxis: Eliquis    Care Plan discussed with: Patient/Family, Nurse and   Anticipated Disposition: TBD  Anticipated Discharge: Greater than 48 hours     Hospital Problems  Date Reviewed: 5/8/2019          Codes Class Noted POA    Respiratory failure ([facility] [address] ICD-10-CM: J96.90  ICD-9-CM: 518.81  3/2/2021 Unknown                Vital Signs:    Last 24hrs VS reviewed since prior progress note. Most recent are:  Visit Vitals  BP (!) 145/84 (BP 1 Location: Left upper arm, BP Patient Position: At rest)   Pulse 84   Temp 97.3 °F (36.3 °C)   Resp 26   Ht 5' 8\" (1.727 m)   Wt 75.8 kg (167 lb 3.2 oz)   SpO2 95%   BMI 25.42 kg/m²         Intake/Output Summary (Last 24 hours) at 3/8/2021 1553  Last data filed at 3/8/2021 1234  Gross per 24 hour   Intake 200 ml   Output 1575 ml   Net -1375 ml        Physical Examination:     I had a face to face encounter with this patient and independently examined them on 3/8/2021 as outlined below:          Constitutional:  No acute distress, cooperative, pleasant    ENT:  Oral mucosa moist, oropharynx benign. Resp:  Decrease bilaterally.  No wheezing/rhonchi/rales. No accessory muscle use   CV:  Regular rhythm, normal rate, no murmurs, gallops, rubs    GI:  Soft, non distended, non tender. normoactive bowel sounds, no hepatosplenomegaly     Musculoskeletal:  No edema     Neurologic:  Moves all extremities.  AAOx3, CN II-XII reviewed     Skin:  Good turgor, no rashes or ulcers       Data Review:    Review and/or order of clinical lab test  Review and/or order of tests in the radiology section of CPT  Review and/or order of tests in the medicine section of CPT      Labs:     Recent Labs     03/08/21 0327 03/07/21  0440   WBC 9.5 10.4   HGB 10.9* 10.9*   HCT 34.8* 33.8*    245     Recent Labs     03/08/21 0327 03/07/21 0440 03/06/21  0007   * 137 134*   K 4.4 4.2 4.3    100 100   CO2 29 33* 31   BUN 12 12 16   CREA 0.59* 0.52* 0.47*   GLU 81 90 100   CA 9.2 9.2 8.8     No results for input(s): ALT, AP, TBIL, TBILI, TP, ALB, GLOB, GGT, AML, LPSE in the last 72 hours.    No lab exists for component: SGOT, GPT, AMYP, HLPSE  No results for input(s): INR, PTP, APTT, INREXT, INREXT in the last 72 hours.   No results for input(s): FE, TIBC, PSAT, FERR in the last 72 hours.   No results found for: FOL, RBCF   No results for input(s): PH, PCO2, PO2 in the last 72 hours.  No results for input(s): CPK, CKNDX, TROIQ in the last 72 hours.    No lab exists for component: CPKMB  No results found for: CHOL, CHOLX, CHLST, CHOLV, HDL, HDLP, LDL, LDLC, DLDLP, TGLX, TRIGL, TRIGP, CHHD, CHHDX  Lab Results   Component Value Date/Time    Glucose (POC) 184 (H) 02/17/2021 03:46 PM    Glucose (POC) 141 (H) 02/17/2021 11:31 AM    Glucose (POC) 89 02/17/2021 08:00 AM    Glucose (POC) 293 (H) 02/16/2021 08:47 PM    Glucose (POC) 165 (H) 02/16/2021 03:58 PM     Lab Results   Component Value Date/Time    Color YELLOW/STRAW 03/02/2021 05:08 PM    Appearance CLEAR 03/02/2021 05:08 PM    Specific gravity 1.012 03/02/2021 05:08 PM    pH (UA) 7.5 03/02/2021 05:08 PM     Protein Negative 03/02/2021 05:08 PM    Glucose Negative 03/02/2021 05:08 PM    Ketone Negative 03/02/2021 05:08 PM    Bilirubin Negative 03/02/2021 05:08 PM    Urobilinogen 0.2 03/02/2021 05:08 PM    Nitrites Negative 03/02/2021 05:08 PM    Leukocyte Esterase Negative 03/02/2021 05:08 PM    Epithelial cells FEW 03/02/2021 01:53 PM    Bacteria Negative 03/02/2021 01:53 PM    WBC 0-4 03/02/2021 01:53 PM    RBC 0-5 03/02/2021 01:53 PM         Medications Reviewed:     Current Facility-Administered Medications   Medication Dose Route Frequency    sodium chloride (OCEAN) 0.65 % nasal squeeze bottle 2 Spray  2 Spray Both Nostrils Q2H PRN    cefepime (MAXIPIME) 2 g in 0.9% sodium chloride (MBP/ADV) 100 mL MBP  2 g IntraVENous Q8H    apixaban (ELIQUIS) tablet 5 mg  5 mg Oral BID    ascorbic acid (vitamin C) (VITAMIN C) tablet 500 mg  500 mg Oral DAILY    aspirin delayed-release tablet 81 mg  81 mg Oral DAILY    melatonin tablet 3 mg  3 mg Oral QHS    metoprolol tartrate (LOPRESSOR) tablet 50 mg  50 mg Oral BID    therapeutic multivitamin (THERAGRAN) tablet 1 Tab  1 Tab Oral DAILY    sodium chloride (NS) flush 5-40 mL  5-40 mL IntraVENous Q8H    sodium chloride (NS) flush 5-40 mL  5-40 mL IntraVENous PRN    acetaminophen (TYLENOL) tablet 650 mg  650 mg Oral Q4H PRN    sodium chloride (NS) flush 5-10 mL  5-10 mL IntraVENous PRN     ______________________________________________________________________  EXPECTED LENGTH OF STAY: 4d 19h  ACTUAL LENGTH OF STAY:          6                 Elvin Nissen, MD

## 2021-03-08 NOTE — ROUTINE PROCESS
2000: Bedside shift change report given to Marielle Landaverde (oncoming nurse) by Landry Ross (offgoing nurse). Report included the following information SBAR, ED Summary, Intake/Output and Cardiac Rhythm NS.

## 2021-03-08 NOTE — PROGRESS NOTES
Problem: Mobility Impaired (Adult and Pediatric)  Goal: *Acute Goals and Plan of Care (Insert Text)  Description: FUNCTIONAL STATUS PRIOR TO ADMISSION: Patient was independent and active without use of DME prior to recent admissions. Pt was at [facility] then [facility]. Pt was planned to d/c home then re-hospitalized. Pt was ambulating with min A x 40 ft x RW with PT.     HOME SUPPORT PRIOR TO ADMISSION: Patient lived alone prior to recent hospital stays and plans to have assistance for 2-3 weeks once d/c home. Physical Therapy Goals  Initiated 3/8/2021  1. Patient will move from supine to sit and sit to supine , scoot up and down, and roll side to side in bed with modified independence within 7 day(s). 2.  Patient will transfer from bed to chair and chair to bed with modified independence using the least restrictive device within 7 day(s). 3.  Patient will perform sit to stand with modified independence within 7 day(s). 4.  Patient will ambulate with modified independence for 300 feet with the least restrictive device within 7 day(s). 5.  Patient will ascend/descend 5 stairs with one handrail(s) with modified independence within 7 day(s). Outcome: Not Met   PHYSICAL THERAPY EVALUATION  Patient: Ani Hyde (59 y.o. male)  Date: 3/8/2021  Primary Diagnosis: Respiratory failure ([address] [J96.90]        Precautions:          ASSESSMENT  Based on the objective data described below, the patient presents with decreased activity tolerance and balance deficits. Pt has been in hospital for 6 days and reports he has not been OOB (also reports he was in bed for several days at [facility] until admitted to [facility]). Pt received in bed on 6 L O2. Pt demonstrated ability to perform stand/pivot to chair with good technique, yet labored breathing. SpO2 decreased to 82% and RR increased to 55 bpm. Pt required approx 3 minutes of seated rest break for SpO2 to recover to >90%.  Recommend continued OOB mobility x 1 assist x RW, will trial increasing standing tolerance next session. Current Level of Function Impacting Discharge (mobility/balance): min A for transfers    Functional Outcome Measure: The patient scored Total: 30/100 on the Barthel Index which is indicative of 70% impaired ability to care for basic self needs/dependency on others. Other factors to consider for discharge: fall risk, admitted from [facility], O2 need     Patient will benefit from skilled therapy intervention to address the above noted impairments. PLAN :  Recommendations and Planned Interventions: bed mobility training, transfer training, gait training, therapeutic exercises, neuromuscular re-education, patient and family training/education, and therapeutic activities      Frequency/Duration: Patient will be followed by physical therapy:  5 times a week to address goals. Recommendation for discharge: (in order for the patient to meet his/her long term goals)  Therapy 3 hours per day 5-7 days per week pending progress  If pt were to d/c home, would benefit from HHPT and require assistance/supervision for transfers, ambulation and stairs as pt is a fall risk      This discharge recommendation:  Has not yet been discussed the attending provider and/or case management    IF patient discharges home will need the following DME: wheelchair         SUBJECTIVE:   Patient stated I have been in bed.     OBJECTIVE DATA SUMMARY:   HISTORY:    Past Medical History:   Diagnosis Date    GERD (gastroesophageal reflux disease)     Obesity (BMI 30-39. 9) 5/8/2019    Right leg DVT ([address] 2019     Past Surgical History:   Procedure Laterality Date    HX ORTHOPAEDIC         Personal factors and/or comorbidities impacting plan of care: [facility], recent [address] [facility]: Private residence  # Steps to Enter: 10  Rails to Enter: Yes  Hand Rails : Bilateral  One/Two Story Residence: Two story  # of Interior Steps: 12  Interior Rails: Left  Living Alone: No  Support Systems: (pt plans to have family at home for 1st 2-3 weeks)  Current DME Used/Available at Home: Si Ripa, rolling, Irene Fuss, straight(built in bench for shower)  Tub or Shower Type: Shower    EXAMINATION/PRESENTATION/DECISION MAKING:   Critical Behavior:  Neurologic State: Alert  Orientation Level: Oriented X4  Cognition: Follows commands, Appropriate decision making     Hearing:     Skin:  intact  Edema: none noted  Range Of Motion:  AROM: Within functional limits           PROM: Within functional limits           Strength:    Strength: Generally decreased, functional                    Tone & Sensation:   Tone: Normal              Sensation: Intact               Coordination:  Coordination: Generally decreased, functional  Vision:      Functional Mobility:  Bed Mobility:     Supine to Sit: Supervision        Transfers:  Sit to Stand: Minimum assistance; Adaptive equipment; Additional time  Stand to Sit: Contact guard assistance  Stand Pivot Transfers: Minimum assistance; Adaptive equipment; Additional time                    Balance:   Sitting: Intact  Standing: Impaired; With support  Standing - Static: Fair;Constant support  Standing - Dynamic : Fair;Constant support    Functional Measure:  Barthel Index:    Bathin  Bladder: 5  Bowels: 5  Groomin  Dressin  Feeding: 10  Mobility: 0  Stairs: 0  Toilet Use: 0  Transfer (Bed to Chair and Back): 5  Total: 30/100       The Barthel ADL Index: Guidelines  1. The index should be used as a record of what a patient does, not as a record of what a patient could do. 2. The main aim is to establish degree of independence from any help, physical or verbal, however minor and for whatever reason. 3. The need for supervision renders the patient not independent. 4. A patient's performance should be established using the best available evidence. Asking the patient, friends/relatives and nurses are the usual sources, but direct observation and common sense are also important. However direct testing is not needed. 5. Usually the patient's performance over the preceding 24-48 hours is important, but occasionally longer periods will be relevant. 6. Middle categories imply that the patient supplies over 50 per cent of the effort. 7. Use of aids to be independent is allowed. Ninoska Byrd., Barthel, D.W. (7709). Functional evaluation: the Barthel Index. [address] [facility] (14)2. [address] ERIK Adkins, Joshua Saul., Denita Harrell., Enigma, [facility] (1999). Measuring the change indisability after inpatient rehabilitation; comparison of the responsiveness of the Barthel Index and Functional [place] Measure. Journal of Neurology, Neurosurgery, and Psychiatry, 66(4), 999-689. CRISTINA Chong, CHRISSY Gómez, & Jeimy Jason M.A. (2004.) Assessment of post-stroke quality of life in cost-effectiveness studies: The usefulness of the Barthel Index and the EuroQoL-5D. Quality of Life Research, 15, 330-27        Physical Therapy Evaluation Charge Determination   History Examination Presentation Decision-Making   MEDIUM  Complexity : 1-2 comorbidities / personal factors will impact the outcome/ POC  LOW Complexity : 1-2 Standardized tests and measures addressing body structure, function, activity limitation and / or participation in recreation  MEDIUM Complexity : Evolving with changing characteristics  Other outcome measures barthel index  MEDIUM      Based on the above components, the patient evaluation is determined to be of the following complexity level: LOW       Activity Tolerance:   Fair, desaturates with exertion and requires oxygen, and requires rest breaks    After treatment patient left in no apparent distress:   Sitting in chair and Call bell within reach    COMMUNICATION/EDUCATION:   The patients plan of care was discussed with: Registered nurse.      Fall prevention education was provided and the patient/caregiver indicated understanding., Patient/family have participated as able in goal setting and plan of care. , and Patient/family agree to work toward stated goals and plan of care.     Thank you for this referral.  Juany Adrian, PT, DPT   Time Calculation: 17 mins

## 2021-03-08 NOTE — PROGRESS NOTES
PULMONARY MEDICINE    Progress Note    Name: Martín Garza   : 1965   MRN: [id number]   Date: 3/8/2021      Subjective:     3/8  Down to 5lpm   He feels much better  Diuresising well     3/5  Down to 15lpm   Feeling better     3/4/21  Down to 30lpm and 90% FiO2  Reducing fluids and diuretics seem to have helped him     Consult Note:  3/3/21  Requesting Physician:Dr. Leonel Ojeda  Reason for consult: Hypoxic resp failure    Medical records and data reviewed. Patient is a 48 y.o. male who presented to the hospital with worsening dyspnea. Patient was recently discharged from [facility] after an extended stay for COVID-19 pneumonia and was treated with usual acute treatments including remdesivir. He was in rehabilitation and has been experiencing increasing shortness of breath. He was found to be hypoxic, placed on supplemental oxygen and transferred to the ER for further evaluation. He has been requiring high flow oxygen. He report chest discomfort. When seen earlier today he had a spell of aspiration on breakfast on history and has been requiring increased flow at 60 L and 100% FiO2. Breath sounds appear diminished on the left side and a stat chest x-ray is pending. CT scan of the chest that was done on admission was limited by motion artifact and did not show pulmonary emboli. Lung fields however appeared to be severely affected with interstitial and alveolar opacities with developing fibrosis. Viral panel on admission was negative. He is receiving antibiotics for bacterial coverage and sputum culture has been ordered. He does not have prior history of cardiac disease but echocardiogram will be obtained to screen for Covid related cardiac dysfunction. A dose of diuretic has been given. Patient is tachypneic and tachycardic with increased work of breathing on high FiO2 and may benefit from closer monitoring in the ICU as he is at risk for progressive decline.   He indicated his sister-in-law be updated, I have called her and discussed his unstable clinical condition and answered all her questions. 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 9) ICD-10-CM: E66.9  ICD-9-CM: 278.00         Respiratory failure ([address] ICD-10-CM: J96.90  ICD-9-CM: 518.81         Acute respiratory failure with hypoxia (HCC) ICD-10-CM: J96.01  ICD-9-CM: 518.81         COVID-19 virus infection ICD-10-CM: U07.1  ICD-9-CM: 079.89               Past Medical History:      has a past medical history of GERD (gastroesophageal reflux disease), Obesity (BMI 30-39.9) (5/8/2019), and Right leg DVT ([address] (2019). Past Surgical History:      has a past surgical history that includes hx orthopaedic. Home Medications:     Prior to Admission medications    Medication Sig Start Date End Date Taking? Authorizing Provider   apixaban (Eliquis) 5 mg tablet Take 1 Tab by mouth two (2) times a day for 30 days. 2/17/21 3/19/21  Frank Lopez MD   furosemide (Lasix) 40 mg tablet Take 1 Tab by mouth daily for 30 days.  2/17/21 3/19/21  Frank Lopez MD   melatonin 3 mg tablet Take 1 Tab by mouth nightly for 30 days. 2/17/21 3/19/21  Annie Dwyer MD   metoprolol tartrate (LOPRESSOR) 50 mg tablet Take 1 Tab by mouth two (2) times a day for 30 days. 2/17/21 3/19/21  Annie Dwyer MD   aspirin delayed-release 81 mg tablet Take 81 mg by mouth daily. Other, MD Leopoldo   albuterol (PROVENTIL HFA, VENTOLIN HFA, PROAIR HFA) 90 mcg/actuation inhaler Take 2 Puffs by inhalation every four (4) hours as needed for Wheezing. 21   Martín Amaral MD   multivitamin (ONE A DAY) tablet Take 1 Tab by mouth daily. Provider, Historical   cholecalciferol (VITAMIN D3) 1,000 unit cap Take 1,000 Units by mouth daily. Provider, Historical   ascorbic acid, vitamin C, (VITAMIN C) 500 mg tablet Take 500 mg by mouth daily. Provider, Historical       Allergies/Social/Family History:     No Known Allergies   Social History     Tobacco Use    Smoking status: Never Smoker    Smokeless tobacco: Current User   Substance Use Topics    Alcohol use: Yes     Comment: once a month      Family History   Problem Relation Age of Onset   [facility] Cancer Mother     Cancer Father             Objective:   Vital Signs:  Visit Vitals  /70 (BP 1 Location: Right upper arm, BP Patient Position: At rest)   Pulse 71   Temp 98.1 °F (36.7 °C)   Resp 20   Ht 5' 8\" (1.727 m)   Wt 75.8 kg (167 lb 3.2 oz)   SpO2 98%   BMI 25.42 kg/m²    O2 Flow Rate (L/min): 5 l/min O2 Device: Nasal cannula Temp (24hrs), Av.1 °F (36.7 °C), Min:98 °F (36.7 °C), Max:98.3 °F (36.8 °C)           Intake/Output:     Intake/Output Summary (Last 24 hours) at 3/8/2021 1134  Last data filed at 3/8/2021 0322  Gross per 24 hour   Intake 200 ml   Output 1475 ml   Net -1275 ml       Physical Exam:   General:  Alert, cooperative, no distress, appears stated age. Tachypneic   Head:  Normocephalic, without obvious abnormality, atraumatic. Eyes:  Conjunctivae/corneas clear. PERRL   Neck: Supple, symmetrical, no adenopathy, no carotid bruit and no JVD.    Lungs:   Clear to auscultation bilaterally. Increased work of breathing   Chest wall:  No tenderness or deformity. Heart:  Regular rate and rhythm, S1-S2 normal, no murmur, no click, rub or gallop. Abdomen:   Soft, non-tender. Bowel sounds present. No masses,  No organomegaly. Extremities: Atraumatic, no cyanosis or edema. Pulses: Palpable   Skin: No rashes or lesions   Neurologic: Grossly nonfocal         LABS AND  DATA: Personally reviewed  Recent Labs     03/08/21 0327 03/07/21  0440   WBC 9.5 10.4   HGB 10.9* 10.9*   HCT 34.8* 33.8*    245     Recent Labs     03/08/21 0327 03/07/21  0440   * 137   K 4.4 4.2    100   CO2 29 33*   BUN 12 12   CREA 0.59* 0.52*   GLU 81 90   CA 9.2 9.2     No results for input(s): AP, TBIL, TP, ALB, GLOB, AML, LPSE in the last 72 hours. No lab exists for component: SGOT, GPT, AMYP  No results for input(s): INR, PTP, APTT, INREXT, INREXT in the last 72 hours. No results for input(s): PHI, PCO2I, PO2I, FIO2I in the last 72 hours. No results for input(s): CPK, CKMB, TROIQ, BNPP in the last 72 hours. MEDS: Reviewed    Chest Imaging: personally reviewed and report checked    Tele- reviewed    Medical decision making:   I have reviewed the flowsheet and previous day's notes  Patient has acute or chronic illness that poses a threat to life or bodily function  Review and order of Clinical lab tests  Review and Order of Radiology tests  Independent visualization of Image  Reviewed Oxygen/ NiPPV  High Risk Drug therapy requiring intensive monitoring for toxicity: eg steroids, pressors, antibiotics        Thank you for allowing me to participate in this patient's care.     Austin Erickson PA-C      [facility]

## 2021-03-09 ENCOUNTER — APPOINTMENT (OUTPATIENT)
Dept: CT IMAGING | Age: 56
DRG: 871 | End: 2021-03-09
Attending: INTERNAL MEDICINE
Payer: COMMERCIAL

## 2021-03-09 LAB
ANION GAP SERPL CALC-SCNC: 1 MMOL/L (ref 5–15)
BASOPHILS # BLD: 0.2 K/UL (ref 0–0.1)
BASOPHILS NFR BLD: 2 % (ref 0–1)
BUN SERPL-MCNC: 16 MG/DL (ref 6–20)
BUN/CREAT SERPL: 30 (ref 12–20)
CALCIUM SERPL-MCNC: 9.1 MG/DL (ref 8.5–10.1)
CHLORIDE SERPL-SCNC: 103 MMOL/L (ref 97–108)
CO2 SERPL-SCNC: 33 MMOL/L (ref 21–32)
CREAT SERPL-MCNC: 0.53 MG/DL (ref 0.7–1.3)
DIFFERENTIAL METHOD BLD: ABNORMAL
EOSINOPHIL # BLD: 0.5 K/UL (ref 0–0.4)
EOSINOPHIL NFR BLD: 5 % (ref 0–7)
ERYTHROCYTE [DISTWIDTH] IN BLOOD BY AUTOMATED COUNT: 14.8 % (ref 11.5–14.5)
GLUCOSE SERPL-MCNC: 92 MG/DL (ref 65–100)
HCT VFR BLD AUTO: 33.2 % (ref 36.6–50.3)
HGB BLD-MCNC: 10.8 G/DL (ref 12.1–17)
IMM GRANULOCYTES # BLD AUTO: 0.6 K/UL (ref 0–0.04)
IMM GRANULOCYTES NFR BLD AUTO: 6 % (ref 0–0.5)
LYMPHOCYTES # BLD: 1.1 K/UL (ref 0.8–3.5)
LYMPHOCYTES NFR BLD: 11 % (ref 12–49)
MCH RBC QN AUTO: 28.1 PG (ref 26–34)
MCHC RBC AUTO-ENTMCNC: 32.5 G/DL (ref 30–36.5)
MCV RBC AUTO: 86.2 FL (ref 80–99)
MONOCYTES # BLD: 0.8 K/UL (ref 0–1)
MONOCYTES NFR BLD: 8 % (ref 5–13)
NEUTS SEG # BLD: 6.5 K/UL (ref 1.8–8)
NEUTS SEG NFR BLD: 68 % (ref 32–75)
NRBC # BLD: 0 K/UL (ref 0–0.01)
NRBC BLD-RTO: 0 PER 100 WBC
PLATELET # BLD AUTO: 265 K/UL (ref 150–400)
PMV BLD AUTO: 10.8 FL (ref 8.9–12.9)
POTASSIUM SERPL-SCNC: 4.3 MMOL/L (ref 3.5–5.1)
RBC # BLD AUTO: 3.85 M/UL (ref 4.1–5.7)
RBC MORPH BLD: ABNORMAL
SODIUM SERPL-SCNC: 137 MMOL/L (ref 136–145)
WBC # BLD AUTO: 9.7 K/UL (ref 4.1–11.1)

## 2021-03-09 PROCEDURE — 71275 CT ANGIOGRAPHY CHEST: CPT

## 2021-03-09 PROCEDURE — 85025 COMPLETE CBC W/AUTO DIFF WBC: CPT

## 2021-03-09 PROCEDURE — 65660000001 HC RM ICU INTERMED STEPDOWN

## 2021-03-09 PROCEDURE — 74011250636 HC RX REV CODE- 250/636: Performed by: PHYSICIAN ASSISTANT

## 2021-03-09 PROCEDURE — 74011250637 HC RX REV CODE- 250/637: Performed by: INTERNAL MEDICINE

## 2021-03-09 PROCEDURE — 74011000636 HC RX REV CODE- 636: Performed by: RADIOLOGY

## 2021-03-09 PROCEDURE — 74011250637 HC RX REV CODE- 250/637: Performed by: FAMILY MEDICINE

## 2021-03-09 PROCEDURE — 74011000258 HC RX REV CODE- 258: Performed by: INTERNAL MEDICINE

## 2021-03-09 PROCEDURE — 80048 BASIC METABOLIC PNL TOTAL CA: CPT

## 2021-03-09 PROCEDURE — 97165 OT EVAL LOW COMPLEX 30 MIN: CPT | Performed by: OCCUPATIONAL THERAPIST

## 2021-03-09 PROCEDURE — 97535 SELF CARE MNGMENT TRAINING: CPT | Performed by: OCCUPATIONAL THERAPIST

## 2021-03-09 PROCEDURE — 36415 COLL VENOUS BLD VENIPUNCTURE: CPT

## 2021-03-09 PROCEDURE — 74011250636 HC RX REV CODE- 250/636: Performed by: INTERNAL MEDICINE

## 2021-03-09 RX ORDER — FUROSEMIDE 10 MG/ML
20 INJECTION INTRAMUSCULAR; INTRAVENOUS ONCE
Status: COMPLETED | OUTPATIENT
Start: 2021-03-09 | End: 2021-03-09

## 2021-03-09 RX ORDER — GUAIFENESIN 600 MG/1
600 TABLET, EXTENDED RELEASE ORAL EVERY 12 HOURS
Status: DISCONTINUED | OUTPATIENT
Start: 2021-03-09 | End: 2021-03-16

## 2021-03-09 RX ADMIN — GUAIFENESIN 600 MG: 600 TABLET, EXTENDED RELEASE ORAL at 21:15

## 2021-03-09 RX ADMIN — OXYCODONE HYDROCHLORIDE AND ACETAMINOPHEN 500 MG: 500 TABLET ORAL at 08:55

## 2021-03-09 RX ADMIN — FUROSEMIDE 20 MG: 10 INJECTION, SOLUTION INTRAMUSCULAR; INTRAVENOUS at 12:26

## 2021-03-09 RX ADMIN — METOPROLOL TARTRATE 50 MG: 50 TABLET, FILM COATED ORAL at 17:40

## 2021-03-09 RX ADMIN — THERA TABS 1 TABLET: TAB at 08:54

## 2021-03-09 RX ADMIN — SALINE NASAL SPRAY 2 SPRAY: 1.5 SOLUTION NASAL at 09:25

## 2021-03-09 RX ADMIN — ASPIRIN 81 MG: 81 TABLET, COATED ORAL at 08:54

## 2021-03-09 RX ADMIN — GUAIFENESIN 600 MG: 600 TABLET, EXTENDED RELEASE ORAL at 12:26

## 2021-03-09 RX ADMIN — Medication 10 ML: at 14:59

## 2021-03-09 RX ADMIN — CEFEPIME 2 G: 2 INJECTION, POWDER, FOR SOLUTION INTRAVENOUS at 02:43

## 2021-03-09 RX ADMIN — Medication 10 ML: at 06:38

## 2021-03-09 RX ADMIN — Medication 10 ML: at 21:15

## 2021-03-09 RX ADMIN — IOPAMIDOL 80 ML: 755 INJECTION, SOLUTION INTRAVENOUS at 13:00

## 2021-03-09 RX ADMIN — FUROSEMIDE 20 MG: 10 INJECTION, SOLUTION INTRAMUSCULAR; INTRAVENOUS at 16:03

## 2021-03-09 RX ADMIN — METOPROLOL TARTRATE 50 MG: 50 TABLET, FILM COATED ORAL at 08:54

## 2021-03-09 RX ADMIN — Medication 3 MG: at 21:15

## 2021-03-09 RX ADMIN — APIXABAN 5 MG: 5 TABLET, FILM COATED ORAL at 17:40

## 2021-03-09 RX ADMIN — APIXABAN 5 MG: 5 TABLET, FILM COATED ORAL at 08:55

## 2021-03-09 RX ADMIN — CEFEPIME 2 G: 2 INJECTION, POWDER, FOR SOLUTION INTRAVENOUS at 10:33

## 2021-03-09 NOTE — PROGRESS NOTES
0800  Pt removed NC to clear nares, desated into 60s. Put on non-re breather paired with NC and sats fab to 95.    1000  Pt removed non-re breather to clear nares and sats dropped into 80s. Titrated O2 up to 15L to get sats in the 90s.   Pt was not able to get up to chair with OT.      1200  Titrated O2 to 13L, pts sats 100%    1300  Titrated O2 to 11L    1400  Titrated O2 to 9L, pt sats 99%    1430  Titrated O2 to 7L pt sats remain at 99%    1600  Titrated O2 to 5L pt sats remain at 99%

## 2021-03-09 NOTE — PROGRESS NOTES
Chart checked, case discussed with his nurse who reports that pt is to undergo a CTA of the chest to rule out a PE (has been desating today). PT will defer, follow, and see as able and appropriate.  Thank you, Sangita Sharma, PT

## 2021-03-09 NOTE — PROGRESS NOTES
Problem: Self Care Deficits Care Plan (Adult)  Goal: *Acute Goals and Plan of Care (Insert Text)  Description:   FUNCTIONAL STATUS PRIOR TO ADMISSION: Patient was independent and active without use of DME prior to initial hospitalization at [id number], Dx with COVID 1/13/21, discharge to rehab where he was able to ambulate ~40' until last week he was there    HOME SUPPORT: The patient lived with family but did not require assist.    Occupational Therapy Goals  Initiated 3/9/2021  1. Patient will tolerate > or = 5 minutes functional activity and maintain oxygen sats > or = 90% within 7 day(s). 2.  Patient will stand pivot to bedside commode 1x/day with CGA within 7 day(s)  3. Patient will perform bilateral UE AROM coordinated breathing exercises x;s 5 reps and maintain sats > or = 90% within 7 day(s)  4. Patient will don gown and socks with increased time and set up and maintain sats > or = 90 % within 7 day(s). Outcome: Not Met    OCCUPATIONAL THERAPY EVALUATION  Patient: Ever Woody (77 y.o. male)  Date: 3/9/2021  Primary Diagnosis: Respiratory failure ([facility] [address] [J96.90]        Precautions: fall       ASSESSMENT  Based on the objective data described below, the patient presents with increased work of breathing, decreased oxygen sats with all movement, received on 5 liters midflow after eating and in low 90's. Patient decreased to 80's initially in sitting and with increased time to recover, however continued to decrease to 73%, returned to sidelying and oxygen increase to 15 liters. Patient reports increased congestion making breathing harder and RN aware. Current Level of Function Impacting Discharge (ADLs/self-care): increased assist due to respiratory status, total assist toileting at bed level this date. Eating with increased time     Functional Outcome Measure: The patient scored 20/100 on the Barthel Index outcome measure .       Other factors to consider for discharge: was at rehab and progressing Patient will benefit from skilled therapy intervention to address the above noted impairments. PLAN :  Recommendations and Planned Interventions: self care training, functional mobility training, therapeutic exercise, balance training, therapeutic activities, endurance activities, patient education, home safety training, and family training/education    Frequency/Duration: Patient will be followed by occupational therapy 5 times a week to address goals. Recommendation for discharge: (in order for the patient to meet his/her long term goals)  To be determined: back to rehab versus home health pending progress    This discharge recommendation:  Has been made in collaboration with the attending provider and/or case management    IF patient discharges home will need the following DME:        SUBJECTIVE:   Patient stated I think I need to lay back down, I don't think it's a good idea to get in the chair, I just can't breathe.     OBJECTIVE DATA SUMMARY:   HISTORY:   Past Medical History:   Diagnosis Date    GERD (gastroesophageal reflux disease)     Obesity (BMI 30-39. 9) 5/8/2019    Right leg DVT ([facility] [address] 2019     Past Surgical History:   Procedure Laterality Date    HX ORTHOPAEDIC         Expanded or extensive additional review of patient history:     Home Situation  Home Environment: Private residence  # Steps to Enter: 10  Rails to Enter: Yes  Hand Rails : Bilateral  One/Two Story Residence: Two story  # of Interior Steps: 12  Interior Rails: Left  Living Alone: No  Support Systems: (pt plans to have family at home for 1st 2-3 weeks)  Current DME Used/Available at Home: Elvia Elias, jazmyne, Yolanda De Souza, straight(built in bench for shower)  Tub or Shower Type: Shower    Hand dominance: Right    EXAMINATION OF PERFORMANCE DEFICITS:  Cognitive/Behavioral Status:  Neurologic State: Alert  Orientation Level: Oriented X4  Cognition: Follows commands; Appropriate decision making  Perception: Appears intact  Perseveration: No perseveration noted  Safety/Judgement: Awareness of environment; Fall prevention;Home safety; Insight into deficits    Skin: RN applied ointment marcy-area, otherwise intact    Edema: none noted    Hearing: Auditory  Auditory Impairment: Hard of hearing, bilateral(certain tones worse than others)    Vision/Perceptual:                                     Range of Motion:  AROM: Within functional limits  PROM: Within functional limits                      Strength:  Strength: Generally decreased, functional                Coordination:  Coordination: Generally decreased, functional  Fine Motor Skills-Upper: Left Intact; Right Intact    Gross Motor Skills-Upper: Left Intact; Right Intact    Tone & Sensation:  Tone: Normal  Sensation: Intact                      Balance:  Sitting: Intact  Standing: (unable to tolerate)    Functional Mobility and Transfers for ADLs:  Bed Mobility:  Rolling: Supervision  Supine to Sit: Supervision;Bed Modified(request head of bed elevated)  Sit to Supine: Supervision  Scooting: Supervision    Transfers:  Sit to Stand: Total assistance(unable to tolerate due to respiratory status)  Bed to Chair: Total assistance  Bathroom Mobility: Dependent/total assistance  Toilet Transfer : Total assistance(issued bedside commode, unable to tolerate transfer)    ADL Assessment:  Feeding: Supervision; Additional time(removed non-rebreather to eat)    Oral Facial Hygiene/Grooming: Setup    Bathing: Total assistance;Maximum assistance    Upper Body Dressing: Minimum assistance    Lower Body Dressing: Maximum assistance    Toileting: Total assistance(bed level)                ADL Intervention and task modifications:     Educated on positioning in modified prone in sidelying and rolling as he can onto pillow to increase oxygen sats after decrease to 70's seated edge of bed.      Educated on benefit of prone, verbalized needing assist due to lines and leads and instructed to call for assist    Demonstrated short shallow breathing with decreased oxygen and cues for pursed lip/ exhale through mouth    Issued bedside commode for stand pivot transfer as tolerated          Cognitive Retraining  Safety/Judgement: Awareness of environment; Fall prevention;Home safety; Insight into deficits    Therapeutic Exercise: Instructed on performance of coordinated breathing exercises: ie: inhale during shoulder flexion and exhale on extension, encourage performance as tolerated   Functional Measure:  Barthel Index:    Bathin  Bladder: 5  Bowels: 5  Groomin  Dressin  Feedin  Mobility: 0  Stairs: 0  Toilet Use: 0  Transfer (Bed to Chair and Back): 0  Total: 20/100        The Barthel ADL Index: Guidelines  1. The index should be used as a record of what a patient does, not as a record of what a patient could do. 2. The main aim is to establish degree of independence from any help, physical or verbal, however minor and for whatever reason. 3. The need for supervision renders the patient not independent. 4. A patient's performance should be established using the best available evidence. Asking the patient, friends/relatives and nurses are the usual sources, but direct observation and common sense are also important. However direct testing is not needed. 5. Usually the patient's performance over the preceding 24-48 hours is important, but occasionally longer periods will be relevant. 6. Middle categories imply that the patient supplies over 50 per cent of the effort. 7. Use of aids to be independent is allowed. Eriberto Lu., Barthel, DPrasadW. (4104). Functional evaluation: the Barthel Index. [address] [place] [employer] (14)2. Carolyn Haines, ERIK, Rosemary Hood., Tawanna Ormond., Joey, [address] [place] [address] (). Measuring the change indisability after inpatient rehabilitation; comparison of the responsiveness of the Barthel Index and Functional [place] Measure. Journal of Neurology, Neurosurgery, and Psychiatry, 66(4), 322-065.   Dannielle Carranza, CRISTINA, CHRISSY Gómez, & Susan Medina M.A. (2004.) Assessment of post-stroke quality of life in cost-effectiveness studies: The usefulness of the Barthel Index and the EuroQoL-5D. Quality of Life Research, 15, 307-26         Occupational Therapy Evaluation Charge Determination   History Examination Decision-Making   LOW Complexity : Brief history review  MEDIUM Complexity : 3-5 performance deficits relating to physical, cognitive , or psychosocial skils that result in activity limitations and / or participation restrictions MEDIUM Complexity : Patient may present with comorbidities that affect occupational performnce. Miniml to moderate modification of tasks or assistance (eg, physical or verbal ) with assesment(s) is necessary to enable patient to complete evaluation       Based on the above components, the patient evaluation is determined to be of the following complexity level: LOW   Pain Rating:  No complaint    Activity Tolerance:   Poor, requires frequent rest breaks, and observed SOB with activity    After treatment patient left in no apparent distress:    Patient positioned in left sidelying for pressure relief, Call bell within reach, and Side rails x 3    COMMUNICATION/EDUCATION:   The patients plan of care was discussed with: Registered nurse. Home safety education was provided and the patient/caregiver indicated understanding. and Patient/family have participated as able in goal setting and plan of care. This patients plan of care is appropriate for delegation to [facility] [place].     Thank you for this referral.  LORY Sullivan/L  Time Calculation: 36 mins

## 2021-03-09 NOTE — PROGRESS NOTES
Transition of Care    RUR-  Medium / High Risks    DISPOSITION: The disposition plan is home with family assistance/home with home healthF/U with PCP/Specialist     Transport:car.  CRM continues to monitor for transition of care needs. Patient continues to de-sat and remains on oxygen. He is severely de-conditioned and is now being followed by PT and OT. Dietician is following also as patient has lost weight and needs more protein in diet. Care mgt will continue to monitor for transitions of care needs.

## 2021-03-09 NOTE — PROGRESS NOTES
Bedside and Verbal shift change report given to Elvis Reagan RN (oncoming nurse) by Nicole Mora RN (offgoing nurse). Report included the following information SBAR, Kardex, Intake/Output, MAR, Accordion and Recent Results.

## 2021-03-09 NOTE — PROGRESS NOTES
Problem: Pressure Injury - Risk of  Goal: *Prevention of pressure injury  Description: Document Govind Scale and appropriate interventions in the flowsheet.   Outcome: Progressing Towards Goal  Note: Pressure Injury Interventions:  Sensory Interventions: Discuss PT/OT consult with provider, Keep linens dry and wrinkle-free, Minimize linen layers    Moisture Interventions: Absorbent underpads, Minimize layers    Activity Interventions: Assess need for specialty bed, Chair cushion    Mobility Interventions: Pressure redistribution bed/mattress (bed type), PT/OT evaluation, Chair cushion    Nutrition Interventions: Document food/fluid/supplement intake    Friction and Shear Interventions: Minimize layers     Pt expresses understanding of risks and works with nursing staff to actively prevent pressure injury

## 2021-03-09 NOTE — PROGRESS NOTES
[facility] Adult  Hospitalist Group                                                                                          Hospitalist Progress Note  Lianne Fitzgerald MD  Answering service: [phone] from in house phone        Date of Service:  3/9/2021  NAME:  Rola Story  :  1965  MRN:  [id number]      Admission Summary:     Patient is not able to provide much history as he has significant shortness of breath and increased work of breathing. Patient was admitted on 2021 at [facility] and discharged on 2021. Patient was admitted with acute hypoxic respiratory failure, severe multilobar Covid pneumonia, pneumomediastinum, patient also has history of right lower extremity DVT. Patient was discharged to [facility], patient has been having increased shortness of breath for the last few days and today became quite short of breath, patient was put on nonrebreather 15 L and was brought to [facility].  Patient continued to be quite short of breath and is currently on 45 L nasal cannula, patient appears to be distressed. Patient was evaluated by the ICU team and deemed appropriate to be admitted to the hospitalist service on intermediate care unit. Patient denies any chest pain associated with his symptoms reports that he feels he is struggling to breathe. Patient reports mild cough but denies fever      Interval history / Subjective:     No acute issues overnight   Patient with no major complaints. Talked to sister in law in detail and answered questions. Told patient and family that our goal is to wean off oxygen butmay need to be discharged  On 2-3 liters of oxygen if unable to wean off. Afebrile   Today patient oxygen requirement increased to 15 liters.  Ordered CTA chest after discussing with pulmonology        Assessment & Plan:     Acute on chronic hypoxic respiratory failure with recent COVID 19 Pneumonia  -on HFNC on FiO2 100% 13 l/m, monitor pulse ox to wean down oxygen  -was on iv cefepime,    -vitamin c, eliquis  -chest x ray low lung volumes. -Diffuse interstitial and airspace opacities with some improved aeration in the right lung base  -acute respiratory pannel not detected  -SARS CoV 2 PCR not detected on 3/2  -leukocytosis, lactic acidosis improving,   -D Dimer 2.12  -CTA chest limitations secondary to motion. No evidence of pulmonary embolus. Diffuse interstitial airspace opacities consistent with Covid 19 pneumonia or sequela.  -troponin <0.05  -repeated chest x ray 3/3 Stable low lung volumes with bilateral lung opacities. -pulmonologist on board  CTA chest ordered today     Severe sepsis likely due to pneumonia POA, improving   -leukocytosis, elevated lactic acid, tachycardia, elevated liver enzyme   -continue above abx, oxygen support  -follow up on blood cx, NGTD     Right lower extremity DVT  -continue Eliquis    Code status: Full Code  DVT prophylaxis: Eliquis    Care Plan discussed with: Patient/Family, Nurse and   Anticipated Disposition: TBD  Anticipated Discharge: Greater than 48 hours     Hospital Problems  Date Reviewed: 5/8/2019          Codes Class Noted POA    Respiratory failure ([facility] [address] ICD-10-CM: J96.90  ICD-9-CM: 518.81  3/2/2021 Unknown                Vital Signs:    Last 24hrs VS reviewed since prior progress note.  Most recent are:  Visit Vitals  /71 (BP 1 Location: Left upper arm, BP Patient Position: At rest)   Pulse 70   Temp 98 °F (36.7 °C)   Resp 27   Ht 5' 8\" (1.727 m)   Wt 74 kg (163 lb 2.3 oz)   SpO2 100%   BMI 24.81 kg/m²         Intake/Output Summary (Last 24 hours) at 3/9/2021 1502  Last data filed at 3/9/2021 1429  Gross per 24 hour   Intake 0 ml   Output 1276 ml   Net -1276 ml        Physical Examination:     I had a face to face encounter with this patient and independently examined them on 3/9/2021 as outlined below:          Constitutional:  No acute distress, cooperative, pleasant    ENT:  Oral mucosa moist, oropharynx benign. Resp:  Decrease bilaterally. No wheezing/rhonchi/rales. No accessory muscle use   CV:  Regular rhythm, normal rate, no murmurs, gallops, rubs    GI:  Soft, non distended, non tender. normoactive bowel sounds, no hepatosplenomegaly     Musculoskeletal:  No edema     Neurologic:  Moves all extremities. AAOx3, CN II-XII reviewed     Skin:  Good turgor, no rashes or ulcers       Data Review:    Review and/or order of clinical lab test  Review and/or order of tests in the radiology section of CPT  Review and/or order of tests in the medicine section of CPT      Labs:     Recent Labs     03/09/21 0242 03/08/21 0327   WBC 9.7 9.5   HGB 10.8* 10.9*   HCT 33.2* 34.8*    249     Recent Labs     03/09/21 0242 03/08/21 0327 03/07/21  0440    135* 137   K 4.3 4.4 4.2    103 100   CO2 33* 29 33*   BUN 16 12 12   CREA 0.53* 0.59* 0.52*   GLU 92 81 90   CA 9.1 9.2 9.2     No results for input(s): ALT, AP, TBIL, TBILI, TP, ALB, GLOB, GGT, AML, LPSE in the last 72 hours. No lab exists for component: SGOT, GPT, AMYP, HLPSE  No results for input(s): INR, PTP, APTT, INREXT, INREXT in the last 72 hours. No results for input(s): FE, TIBC, PSAT, FERR in the last 72 hours. No results found for: FOL, RBCF   No results for input(s): PH, PCO2, PO2 in the last 72 hours. No results for input(s): CPK, CKNDX, TROIQ in the last 72 hours.     No lab exists for component: CPKMB  No results found for: CHOL, CHOLX, CHLST, CHOLV, HDL, HDLP, LDL, LDLC, DLDLP, TGLX, TRIGL, TRIGP, CHHD, CHHDX  Lab Results   Component Value Date/Time    Glucose (POC) 184 (H) 02/17/2021 03:46 PM    Glucose (POC) 141 (H) 02/17/2021 11:31 AM    Glucose (POC) 89 02/17/2021 08:00 AM    Glucose (POC) 293 (H) 02/16/2021 08:47 PM    Glucose (POC) 165 (H) 02/16/2021 03:58 PM     Lab Results   Component Value Date/Time    Color YELLOW/STRAW 03/02/2021 05:08 PM    Appearance CLEAR 03/02/2021 05:08 PM    Specific gravity 1.012 03/02/2021 05:08 PM    pH (UA) 7.5 03/02/2021 05:08 PM    Protein Negative 03/02/2021 05:08 PM    Glucose Negative 03/02/2021 05:08 PM    Ketone Negative 03/02/2021 05:08 PM    Bilirubin Negative 03/02/2021 05:08 PM    Urobilinogen 0.2 03/02/2021 05:08 PM    Nitrites Negative 03/02/2021 05:08 PM    Leukocyte Esterase Negative 03/02/2021 05:08 PM    Epithelial cells FEW 03/02/2021 01:53 PM    Bacteria Negative 03/02/2021 01:53 PM    WBC 0-4 03/02/2021 01:53 PM    RBC 0-5 03/02/2021 01:53 PM         Medications Reviewed:     Current Facility-Administered Medications   Medication Dose Route Frequency    guaiFENesin ER (MUCINEX) tablet 600 mg  600 mg Oral Q12H    sodium chloride (OCEAN) 0.65 % nasal squeeze bottle 2 Spray  2 Spray Both Nostrils Q2H PRN    apixaban (ELIQUIS) tablet 5 mg  5 mg Oral BID    ascorbic acid (vitamin C) (VITAMIN C) tablet 500 mg  500 mg Oral DAILY    aspirin delayed-release tablet 81 mg  81 mg Oral DAILY    melatonin tablet 3 mg  3 mg Oral QHS    metoprolol tartrate (LOPRESSOR) tablet 50 mg  50 mg Oral BID    therapeutic multivitamin (THERAGRAN) tablet 1 Tab  1 Tab Oral DAILY    sodium chloride (NS) flush 5-40 mL  5-40 mL IntraVENous Q8H    sodium chloride (NS) flush 5-40 mL  5-40 mL IntraVENous PRN    acetaminophen (TYLENOL) tablet 650 mg  650 mg Oral Q4H PRN    sodium chloride (NS) flush 5-10 mL  5-10 mL IntraVENous PRN     ______________________________________________________________________  EXPECTED LENGTH OF STAY: 4d 19h  ACTUAL LENGTH OF STAY:          7                 Lety Horne MD

## 2021-03-09 NOTE — PROGRESS NOTES
Occupational Therapy  Order received and chart reviewed, RN clear to see if vitals stable on non-re-breather, on arrival patient removed non-rebreather and nasal canula to blow nose briefly, sats 88% with nasal cannula back in place, patient keeping non-rebreather off so he can eat at this time, declined to transfer to chair or reposition higher in bed to eat,  will follow up as able.    Fidel Corona, OTR/L

## 2021-03-09 NOTE — PROGRESS NOTES
PCCM:    On 5lpm    BMP stable    Plan:    [id number] with post inflammatory fibrosis    Lasix today   OOB as tolerated    Juan Carlos Mills PA-C

## 2021-03-10 LAB
ANION GAP SERPL CALC-SCNC: 4 MMOL/L (ref 5–15)
BASOPHILS # BLD: 0.1 K/UL (ref 0–0.1)
BASOPHILS NFR BLD: 1 % (ref 0–1)
BUN SERPL-MCNC: 16 MG/DL (ref 6–20)
BUN/CREAT SERPL: 25 (ref 12–20)
CALCIUM SERPL-MCNC: 9.5 MG/DL (ref 8.5–10.1)
CHLORIDE SERPL-SCNC: 99 MMOL/L (ref 97–108)
CO2 SERPL-SCNC: 34 MMOL/L (ref 21–32)
CREAT SERPL-MCNC: 0.63 MG/DL (ref 0.7–1.3)
DIFFERENTIAL METHOD BLD: ABNORMAL
EOSINOPHIL # BLD: 0.9 K/UL (ref 0–0.4)
EOSINOPHIL NFR BLD: 10 % (ref 0–7)
ERYTHROCYTE [DISTWIDTH] IN BLOOD BY AUTOMATED COUNT: 14.8 % (ref 11.5–14.5)
GLUCOSE SERPL-MCNC: 82 MG/DL (ref 65–100)
HCT VFR BLD AUTO: 36.4 % (ref 36.6–50.3)
HGB BLD-MCNC: 11.5 G/DL (ref 12.1–17)
IMM GRANULOCYTES # BLD AUTO: 0.6 K/UL (ref 0–0.04)
IMM GRANULOCYTES NFR BLD AUTO: 6 % (ref 0–0.5)
LYMPHOCYTES # BLD: 1.4 K/UL (ref 0.8–3.5)
LYMPHOCYTES NFR BLD: 15 % (ref 12–49)
MCH RBC QN AUTO: 27.6 PG (ref 26–34)
MCHC RBC AUTO-ENTMCNC: 31.6 G/DL (ref 30–36.5)
MCV RBC AUTO: 87.3 FL (ref 80–99)
MONOCYTES # BLD: 0.8 K/UL (ref 0–1)
MONOCYTES NFR BLD: 9 % (ref 5–13)
NEUTS SEG # BLD: 5.4 K/UL (ref 1.8–8)
NEUTS SEG NFR BLD: 59 % (ref 32–75)
NRBC # BLD: 0.02 K/UL (ref 0–0.01)
NRBC BLD-RTO: 0.2 PER 100 WBC
PLATELET # BLD AUTO: 268 K/UL (ref 150–400)
PLATELET COMMENTS,PCOM: ABNORMAL
PMV BLD AUTO: 11.2 FL (ref 8.9–12.9)
POTASSIUM SERPL-SCNC: 4.2 MMOL/L (ref 3.5–5.1)
RBC # BLD AUTO: 4.17 M/UL (ref 4.1–5.7)
RBC MORPH BLD: ABNORMAL
RBC MORPH BLD: ABNORMAL
SODIUM SERPL-SCNC: 137 MMOL/L (ref 136–145)
WBC # BLD AUTO: 9.2 K/UL (ref 4.1–11.1)

## 2021-03-10 PROCEDURE — 36415 COLL VENOUS BLD VENIPUNCTURE: CPT

## 2021-03-10 PROCEDURE — 77010033678 HC OXYGEN DAILY

## 2021-03-10 PROCEDURE — 65660000001 HC RM ICU INTERMED STEPDOWN

## 2021-03-10 PROCEDURE — 74011250636 HC RX REV CODE- 250/636: Performed by: PHYSICIAN ASSISTANT

## 2021-03-10 PROCEDURE — 80048 BASIC METABOLIC PNL TOTAL CA: CPT

## 2021-03-10 PROCEDURE — 74011250637 HC RX REV CODE- 250/637: Performed by: INTERNAL MEDICINE

## 2021-03-10 PROCEDURE — 74011250637 HC RX REV CODE- 250/637: Performed by: FAMILY MEDICINE

## 2021-03-10 PROCEDURE — 97116 GAIT TRAINING THERAPY: CPT

## 2021-03-10 PROCEDURE — 97530 THERAPEUTIC ACTIVITIES: CPT

## 2021-03-10 PROCEDURE — 85025 COMPLETE CBC W/AUTO DIFF WBC: CPT

## 2021-03-10 RX ORDER — FUROSEMIDE 10 MG/ML
20 INJECTION INTRAMUSCULAR; INTRAVENOUS ONCE
Status: COMPLETED | OUTPATIENT
Start: 2021-03-10 | End: 2021-03-10

## 2021-03-10 RX ORDER — BENZONATATE 100 MG/1
100 CAPSULE ORAL
Status: DISCONTINUED | OUTPATIENT
Start: 2021-03-10 | End: 2021-03-18 | Stop reason: HOSPADM

## 2021-03-10 RX ADMIN — APIXABAN 5 MG: 5 TABLET, FILM COATED ORAL at 10:07

## 2021-03-10 RX ADMIN — METOPROLOL TARTRATE 50 MG: 50 TABLET, FILM COATED ORAL at 10:07

## 2021-03-10 RX ADMIN — Medication 10 ML: at 19:27

## 2021-03-10 RX ADMIN — Medication 10 ML: at 20:07

## 2021-03-10 RX ADMIN — Medication 3 MG: at 20:07

## 2021-03-10 RX ADMIN — THERA TABS 1 TABLET: TAB at 10:08

## 2021-03-10 RX ADMIN — APIXABAN 5 MG: 5 TABLET, FILM COATED ORAL at 19:27

## 2021-03-10 RX ADMIN — GUAIFENESIN 600 MG: 600 TABLET, EXTENDED RELEASE ORAL at 20:06

## 2021-03-10 RX ADMIN — Medication 10 ML: at 05:00

## 2021-03-10 RX ADMIN — FUROSEMIDE 20 MG: 10 INJECTION, SOLUTION INTRAMUSCULAR; INTRAVENOUS at 13:21

## 2021-03-10 RX ADMIN — OXYCODONE HYDROCHLORIDE AND ACETAMINOPHEN 500 MG: 500 TABLET ORAL at 10:08

## 2021-03-10 RX ADMIN — ASPIRIN 81 MG: 81 TABLET, COATED ORAL at 10:07

## 2021-03-10 RX ADMIN — METOPROLOL TARTRATE 50 MG: 50 TABLET, FILM COATED ORAL at 19:27

## 2021-03-10 RX ADMIN — GUAIFENESIN 600 MG: 600 TABLET, EXTENDED RELEASE ORAL at 10:07

## 2021-03-10 RX ADMIN — BENZONATATE 100 MG: 100 CAPSULE ORAL at 21:51

## 2021-03-10 NOTE — PROGRESS NOTES
Transition of Care Plan   RUR- Low 15%   DISPOSITION: Return to [facility] vs. Home with home health   F/U with PCP/Specialist     Transport: [facility] vs   Sister Juma Fish - [phone]    CM reviewed patient chart. Patient discharge likely 48+ hours, per MD note on 3/9. PT/OT recommendation [facility] vs home health pending progress.  CM to continue to monitor patient progress and assist with ORIANA needs as recommended by PT/OT/MD.    Kiki De La Garza MSW Supervisee

## 2021-03-10 NOTE — PROGRESS NOTES
PULMONARY MEDICINE    Progress Note    Name: Rola Story   : 1965   MRN: [id number]   Date: 3/10/2021      Subjective:     3/10  Apparently had a coughing fit yesterday that dropped his saturations  CTA without PE, notable fibrotic disease and evidence of pulmonary edema as waell  Responded to diuretics, down to 5lpm    3/8  Down to 5lpm   He feels much better  Diuresising well     3/5  Down to 15lpm   Feeling better     3/4/21  Down to 30lpm and 90% FiO2  Reducing fluids and diuretics seem to have helped him     Consult Note:  3/3/21  Requesting Physician:Dr. Yasmin Gallego  Reason for consult: Hypoxic resp failure    Medical records and data reviewed. Patient is a 48 y.o. male who presented to the hospital with worsening dyspnea. Patient was recently discharged from [facility] after an extended stay for COVID-19 pneumonia and was treated with usual acute treatments including remdesivir. He was in rehabilitation and has been experiencing increasing shortness of breath. He was found to be hypoxic, placed on supplemental oxygen and transferred to the ER for further evaluation. He has been requiring high flow oxygen. He report chest discomfort. When seen earlier today he had a spell of aspiration on breakfast on history and has been requiring increased flow at 60 L and 100% FiO2. Breath sounds appear diminished on the left side and a stat chest x-ray is pending. CT scan of the chest that was done on admission was limited by motion artifact and did not show pulmonary emboli. Lung fields however appeared to be severely affected with interstitial and alveolar opacities with developing fibrosis. Viral panel on admission was negative. He is receiving antibiotics for bacterial coverage and sputum culture has been ordered. He does not have prior history of cardiac disease but echocardiogram will be obtained to screen for Covid related cardiac dysfunction.   A dose of diuretic has been given. Patient is tachypneic and tachycardic with increased work of breathing on high FiO2 and may benefit from closer monitoring in the ICU as he is at risk for progressive decline. He indicated his sister-in-law be updated, I have called her and discussed his unstable clinical condition and answered all her questions. 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 9) ICD-10-CM: E66.9  ICD-9-CM: 278.00         Respiratory failure ([address] ICD-10-CM: J96.90  ICD-9-CM: 518.81         Acute respiratory failure with hypoxia (HCC) ICD-10-CM: J96.01  ICD-9-CM: 518.81         COVID-19 virus infection ICD-10-CM: U07.1  ICD-9-CM: 079.89               Past Medical History:      has a past medical history of GERD (gastroesophageal reflux disease), Obesity (BMI 30-39.9) (5/8/2019), and Right leg DVT ([address] (2019). Past Surgical History:      has a past surgical history that includes hx orthopaedic. Home Medications:     Prior to Admission medications    Medication Sig Start Date End Date Taking? Authorizing Provider   apixaban (Eliquis) 5 mg tablet Take 1 Tab by mouth two (2) times a day for 30 days.  2/17/21 3/19/21 Karen Chester MD   furosemide (Lasix) 40 mg tablet Take 1 Tab by mouth daily for 30 days. 2/17/21 3/19/21  Karen Chester MD   melatonin 3 mg tablet Take 1 Tab by mouth nightly for 30 days. 2/17/21 3/19/21  Karen Chester MD   metoprolol tartrate (LOPRESSOR) 50 mg tablet Take 1 Tab by mouth two (2) times a day for 30 days. 2/17/21 3/19/21  Karen Chester MD   aspirin delayed-release 81 mg tablet Take 81 mg by mouth daily. Other, MD Leopoldo   albuterol (PROVENTIL HFA, VENTOLIN HFA, PROAIR HFA) 90 mcg/actuation inhaler Take 2 Puffs by inhalation every four (4) hours as needed for Wheezing. 21   Germán Morin MD   multivitamin (ONE A DAY) tablet Take 1 Tab by mouth daily. Provider, Historical   cholecalciferol (VITAMIN D3) 1,000 unit cap Take 1,000 Units by mouth daily. Provider, Historical   ascorbic acid, vitamin C, (VITAMIN C) 500 mg tablet Take 500 mg by mouth daily. Provider, Historical       Allergies/Social/Family History:     No Known Allergies   Social History     Tobacco Use    Smoking status: Never Smoker    Smokeless tobacco: Current User   Substance Use Topics    Alcohol use: Yes     Comment: once a month      Family History   Problem Relation Age of Onset   [facility] Cancer Mother     Cancer Father             Objective:   Vital Signs:  Visit Vitals  /70 (BP 1 Location: Left upper arm, BP Patient Position: At rest)   Pulse 76   Temp 98.2 °F (36.8 °C)   Resp 17   Ht 5' 8\" (1.727 m)   Wt 73.2 kg (161 lb 6 oz)   SpO2 95%   BMI 24.54 kg/m²    O2 Flow Rate (L/min): 5 l/min O2 Device: Nasal cannula Temp (24hrs), Av.1 °F (36.7 °C), Min:98 °F (36.7 °C), Max:98.5 °F (36.9 °C)           Intake/Output:     Intake/Output Summary (Last 24 hours) at 3/10/2021 1113  Last data filed at 3/9/2021 1741  Gross per 24 hour   Intake 100 ml   Output 1750 ml   Net -1650 ml       Physical Exam:   General:  Alert, cooperative, no distress, appears stated age.   Tachypneic   Head:  Normocephalic, without obvious abnormality, atraumatic. Eyes:  Conjunctivae/corneas clear. PERRL   Neck: Supple, symmetrical, no adenopathy, no carotid bruit and no JVD. Lungs:   Clear to auscultation bilaterally. Increased work of breathing   Chest wall:  No tenderness or deformity. Heart:  Regular rate and rhythm, S1-S2 normal, no murmur, no click, rub or gallop. Abdomen:   Soft, non-tender. Bowel sounds present. No masses,  No organomegaly. Extremities: Atraumatic, no cyanosis or edema. Pulses: Palpable   Skin: No rashes or lesions   Neurologic: Grossly nonfocal         LABS AND  DATA: Personally reviewed  Recent Labs     03/10/21  0327 03/09/21  0242   WBC 9.2 9.7   HGB 11.5* 10.8*   HCT 36.4* 33.2*    265     Recent Labs     03/10/21  0327 03/09/21  0242    137   K 4.2 4.3   CL 99 103   CO2 34* 33*   BUN 16 16   CREA 0.63* 0.53*   GLU 82 92   CA 9.5 9.1     No results for input(s): AP, TBIL, TP, ALB, GLOB, AML, LPSE in the last 72 hours. No lab exists for component: SGOT, GPT, AMYP  No results for input(s): INR, PTP, APTT, INREXT, INREXT in the last 72 hours. No results for input(s): PHI, PCO2I, PO2I, FIO2I in the last 72 hours. No results for input(s): CPK, CKMB, TROIQ, BNPP in the last 72 hours. MEDS: Reviewed    Chest Imaging: personally reviewed and report checked    Tele- reviewed    Medical decision making:   I have reviewed the flowsheet and previous day's notes  Patient has acute or chronic illness that poses a threat to life or bodily function  Review and order of Clinical lab tests  Review and Order of Radiology tests  Independent visualization of Image  Reviewed Oxygen/ NiPPV  High Risk Drug therapy requiring intensive monitoring for toxicity: eg steroids, pressors, antibiotics        Thank you for allowing me to participate in this patient's care.     Alice Meade PA-C      [facility]

## 2021-03-10 NOTE — PROGRESS NOTES
Bedside and Verbal shift change report given to Josh Lemus RN (oncoming nurse) by Carmencita Mora RN (offgoing nurse). Report included the following information SBAR and Kardex.

## 2021-03-10 NOTE — PROGRESS NOTES
Problem: Mobility Impaired (Adult and Pediatric)  Goal: *Acute Goals and Plan of Care (Insert Text)  Description: FUNCTIONAL STATUS PRIOR TO ADMISSION: Patient was independent and active without use of DME prior to recent admissions. Pt was at [facility] then [facility]. Pt was planned to d/c home then re-hospitalized. Pt was ambulating with min A x 40 ft x RW with PT.     HOME SUPPORT PRIOR TO ADMISSION: Patient lived alone prior to recent hospital stays and plans to have assistance for 2-3 weeks once d/c home. Physical Therapy Goals  Initiated 3/8/2021  1. Patient will move from supine to sit and sit to supine , scoot up and down, and roll side to side in bed with modified independence within 7 day(s). 2.  Patient will transfer from bed to chair and chair to bed with modified independence using the least restrictive device within 7 day(s). 3.  Patient will perform sit to stand with modified independence within 7 day(s). 4.  Patient will ambulate with modified independence for 300 feet with the least restrictive device within 7 day(s). 5.  Patient will ascend/descend 5 stairs with one handrail(s) with modified independence within 7 day(s). Outcome: Progressing Towards Goal   PHYSICAL THERAPY TREATMENT  Patient: Irma Lewis (55 y.o. male)  Date: 3/10/2021  Diagnosis: Respiratory failure ([facility] [J96.90] <principal problem not specified>       Precautions:    Chart, physical therapy assessment, plan of care and goals were reviewed. ASSESSMENT  Patient continues with skilled PT services and is progressing towards goals. Patient continues to be limited by poor pulmonary function that is worsened with even position changes and talking. On 5L today at rest with sats near 100% but with transition to sitting EOB sats dropped to mid  80s and required 2-3 minutes to recover and still hard to maintain over 89%. Increased O2 to 6L during gait around bed to chair with HHA.   Once in chair, O2 sats down to 78% at one point but recovered to 91% after 3-4 minutes. RR in 40s at times but with encouragement to try to increase depth and decreased rate of breathing, RR decreased. Patient needs this concept reinforced during activity and with recovery. Left patient up in chair with all needs in reach and sats in 90s. Cont to recommend returning to [facility] at d/c as patient well below his baseline level and demonstrates high motivation and steady progress with therapy. Current Level of Function Impacting Discharge (mobility/balance): SBA bed mob, CGA to MIN A for transfers and gait    Other factors to consider for discharge:          PLAN :  Patient continues to benefit from skilled intervention to address the above impairments. Continue treatment per established plan of care. to address goals. Recommendation for discharge: (in order for the patient to meet his/her long term goals)  Therapy 3 hours per day 5-7 days per week    This discharge recommendation:  Has been made in collaboration with the attending provider and/or case management    IF patient discharges home will need the following DME: to be determined (TBD)       SUBJECTIVE:   Patient stated I just can't breathe slower and deeper bc of my lost capacity.     OBJECTIVE DATA SUMMARY:   Critical Behavior:  Neurologic State: Alert  Orientation Level: Oriented X4  Cognition: Appropriate decision making, Appropriate for age attention/concentration, Appropriate safety awareness, Follows commands  Safety/Judgement: Awareness of environment, Fall prevention, Home safety, Insight into deficits  Functional Mobility Training:  Bed Mobility:  Rolling: Supervision  Supine to Sit: Supervision     Scooting: Supervision        Transfers:  Sit to Stand: Minimum assistance  Stand to Sit: Contact guard assistance        Bed to Chair: Minimum assistance                    Balance:  Sitting: Intact  Standing: Impaired  Standing - Static: Good;Constant support  Standing - Dynamic : Fair;Constant support  Ambulation/Gait Training:  Distance (ft): 8 Feet (ft)(around bed to chair)  Assistive Device: Gait belt  Ambulation - Level of Assistance: Minimal assistance(light HHA)        Gait Abnormalities: Decreased step clearance              Speed/Wen: Pace decreased (<100 feet/min)  Step Length: Right shortened;Left shortened          Pain Rating:  None reported    Activity Tolerance:   Poor, desaturates with exertion and requires oxygen, requires frequent rest breaks, and observed SOB with activity    After treatment patient left in no apparent distress:   Sitting in chair and Call bell within reach    COMMUNICATION/COLLABORATION:   The patients plan of care was discussed with: Registered nurse.      Bharath Wade, PT   Time Calculation: 25 mins

## 2021-03-10 NOTE — PROGRESS NOTES
Occupational Therapy  3/10/2021    Chart reviewed. Spoke with PT who just worked with patient. Pt in chair, needing increased time to recover due to poor respiratory status. Will defer at this time and follow back as able.  Celso Gonzalez, OT

## 2021-03-10 NOTE — PROGRESS NOTES
[facility] Adult  Hospitalist Group                                                                                          Hospitalist Progress Note  Abimael Kemp MD  Answering service: [phone] from in house phone        Date of Service:  3/10/2021  NAME:  Josette Freeman  :  1965  MRN:  [id number]      Admission Summary:     Patient is not able to provide much history as he has significant shortness of breath and increased work of breathing. Patient was admitted on 2021 at [facility] and discharged on 2021. Patient was admitted with acute hypoxic respiratory failure, severe multilobar Covid pneumonia, pneumomediastinum, patient also has history of right lower extremity DVT. Patient was discharged to [facility], patient has been having increased shortness of breath for the last few days and today became quite short of breath, patient was put on nonrebreather 15 L and was brought to [facility].  Patient continued to be quite short of breath and is currently on 45 L nasal cannula, patient appears to be distressed. Patient was evaluated by the ICU team and deemed appropriate to be admitted to the hospitalist service on intermediate care unit. Patient denies any chest pain associated with his symptoms reports that he feels he is struggling to breathe. Patient reports mild cough but denies fever      Interval history / Subjective:     No acute issues overnight   Patient with no major complaints. Talked to sister in law in detail and answered questions. Told patient and family that our goal is to wean off oxygen butmay need to be discharged  On 2-3 liters of oxygen if unable to wean off. Afebrile   Today patient feeling better. He had a cough spell in morning but no drop in oxygen saturation. Patient is on 5 liters today.         Assessment & Plan:     Acute on chronic hypoxic respiratory failure with recent COVID 19 Pneumonia  -on HFNC on FiO2 100% 13 l/m, monitor pulse ox to wean down oxygen  -was on iv cefepime,    -vitamin c, eliquis  -chest x ray low lung volumes. -Diffuse interstitial and airspace opacities with some improved aeration in the right lung base  -acute respiratory pannel not detected  -SARS CoV 2 PCR not detected on 3/2  -leukocytosis, lactic acidosis improving,   -D Dimer 2.12  -CTA chest limitations secondary to motion. No evidence of pulmonary embolus. Diffuse interstitial airspace opacities consistent with Covid 19 pneumonia or sequela.  -troponin <0.05  -repeated chest x ray 3/3 Stable low lung volumes with bilateral lung opacities. -pulmonologist on board  CTA chest ordered , no acute changes, no     Severe sepsis likely due to pneumonia POA, improving   -leukocytosis, elevated lactic acid, tachycardia, elevated liver enzyme   -continue above abx, oxygen support  -follow up on blood cx, NGTD     Right lower extremity DVT  -continue Eliquis    Code status: Full Code  DVT prophylaxis: Eliquis    Care Plan discussed with: Patient/Family, Nurse and   Anticipated Disposition: TBD  Anticipated Discharge: Greater than 48 hours     Hospital Problems  Date Reviewed: 5/8/2019          Codes Class Noted POA    Respiratory failure ([facility] [address] ICD-10-CM: J96.90  ICD-9-CM: 518.81  3/2/2021 Unknown                Vital Signs:    Last 24hrs VS reviewed since prior progress note.  Most recent are:  Visit Vitals  /73 (BP 1 Location: Left upper arm, BP Patient Position: At rest)   Pulse 89   Temp 98.5 °F (36.9 °C)   Resp 21   Ht 5' 8\" (1.727 m)   Wt 73.2 kg (161 lb 6 oz)   SpO2 97%   BMI 24.54 kg/m²         Intake/Output Summary (Last 24 hours) at 3/10/2021 1651  Last data filed at 3/9/2021 1741  Gross per 24 hour   Intake --   Output 900 ml   Net -900 ml        Physical Examination:     I had a face to face encounter with this patient and independently examined them on 3/10/2021 as outlined below:          Constitutional: No acute distress, cooperative, pleasant    ENT:  Oral mucosa moist, oropharynx benign. Resp:  Decrease bilaterally. No wheezing/rhonchi/rales. No accessory muscle use   CV:  Regular rhythm, normal rate, no murmurs, gallops, rubs    GI:  Soft, non distended, non tender. normoactive bowel sounds, no hepatosplenomegaly     Musculoskeletal:  No edema     Neurologic:  Moves all extremities. AAOx3, CN II-XII reviewed     Skin:  Good turgor, no rashes or ulcers       Data Review:    Review and/or order of clinical lab test  Review and/or order of tests in the radiology section of CPT  Review and/or order of tests in the medicine section of CPT      Labs:     Recent Labs     03/10/21  0327 03/09/21  0242   WBC 9.2 9.7   HGB 11.5* 10.8*   HCT 36.4* 33.2*    265     Recent Labs     03/10/21  0327 03/09/21  0242 03/08/21  0327    137 135*   K 4.2 4.3 4.4   CL 99 103 103   CO2 34* 33* 29   BUN 16 16 12   CREA 0.63* 0.53* 0.59*   GLU 82 92 81   CA 9.5 9.1 9.2     No results for input(s): ALT, AP, TBIL, TBILI, TP, ALB, GLOB, GGT, AML, LPSE in the last 72 hours. No lab exists for component: SGOT, GPT, AMYP, HLPSE  No results for input(s): INR, PTP, APTT, INREXT, INREXT in the last 72 hours. No results for input(s): FE, TIBC, PSAT, FERR in the last 72 hours. No results found for: FOL, RBCF   No results for input(s): PH, PCO2, PO2 in the last 72 hours. No results for input(s): CPK, CKNDX, TROIQ in the last 72 hours.     No lab exists for component: CPKMB  No results found for: CHOL, CHOLX, CHLST, CHOLV, HDL, HDLP, LDL, LDLC, DLDLP, TGLX, TRIGL, TRIGP, CHHD, CHHDX  Lab Results   Component Value Date/Time    Glucose (POC) 184 (H) 02/17/2021 03:46 PM    Glucose (POC) 141 (H) 02/17/2021 11:31 AM    Glucose (POC) 89 02/17/2021 08:00 AM    Glucose (POC) 293 (H) 02/16/2021 08:47 PM    Glucose (POC) 165 (H) 02/16/2021 03:58 PM     Lab Results   Component Value Date/Time    Color YELLOW/STRAW 03/02/2021 05:08 PM Appearance CLEAR 03/02/2021 05:08 PM    Specific gravity 1.012 03/02/2021 05:08 PM    pH (UA) 7.5 03/02/2021 05:08 PM    Protein Negative 03/02/2021 05:08 PM    Glucose Negative 03/02/2021 05:08 PM    Ketone Negative 03/02/2021 05:08 PM    Bilirubin Negative 03/02/2021 05:08 PM    Urobilinogen 0.2 03/02/2021 05:08 PM    Nitrites Negative 03/02/2021 05:08 PM    Leukocyte Esterase Negative 03/02/2021 05:08 PM    Epithelial cells FEW 03/02/2021 01:53 PM    Bacteria Negative 03/02/2021 01:53 PM    WBC 0-4 03/02/2021 01:53 PM    RBC 0-5 03/02/2021 01:53 PM         Medications Reviewed:     Current Facility-Administered Medications   Medication Dose Route Frequency    benzonatate (TESSALON) capsule 100 mg  100 mg Oral TID PRN    guaiFENesin ER (MUCINEX) tablet 600 mg  600 mg Oral Q12H    sodium chloride (OCEAN) 0.65 % nasal squeeze bottle 2 Spray  2 Spray Both Nostrils Q2H PRN    apixaban (ELIQUIS) tablet 5 mg  5 mg Oral BID    ascorbic acid (vitamin C) (VITAMIN C) tablet 500 mg  500 mg Oral DAILY    aspirin delayed-release tablet 81 mg  81 mg Oral DAILY    melatonin tablet 3 mg  3 mg Oral QHS    metoprolol tartrate (LOPRESSOR) tablet 50 mg  50 mg Oral BID    therapeutic multivitamin (THERAGRAN) tablet 1 Tab  1 Tab Oral DAILY    sodium chloride (NS) flush 5-40 mL  5-40 mL IntraVENous Q8H    sodium chloride (NS) flush 5-40 mL  5-40 mL IntraVENous PRN    acetaminophen (TYLENOL) tablet 650 mg  650 mg Oral Q4H PRN    sodium chloride (NS) flush 5-10 mL  5-10 mL IntraVENous PRN     ______________________________________________________________________  EXPECTED LENGTH OF STAY: 4d 19h  ACTUAL LENGTH OF STAY:          Cata Brothers MD

## 2021-03-11 ENCOUNTER — APPOINTMENT (OUTPATIENT)
Dept: GENERAL RADIOLOGY | Age: 56
DRG: 871 | End: 2021-03-11
Attending: HOSPITALIST
Payer: COMMERCIAL

## 2021-03-11 LAB
ANION GAP SERPL CALC-SCNC: 6 MMOL/L (ref 5–15)
BASOPHILS # BLD: 0 K/UL (ref 0–0.1)
BASOPHILS NFR BLD: 0 % (ref 0–1)
BNP SERPL-MCNC: 210 PG/ML
BUN SERPL-MCNC: 18 MG/DL (ref 6–20)
BUN/CREAT SERPL: 34 (ref 12–20)
CALCIUM SERPL-MCNC: 9.3 MG/DL (ref 8.5–10.1)
CHLORIDE SERPL-SCNC: 102 MMOL/L (ref 97–108)
CO2 SERPL-SCNC: 32 MMOL/L (ref 21–32)
CREAT SERPL-MCNC: 0.53 MG/DL (ref 0.7–1.3)
DIFFERENTIAL METHOD BLD: ABNORMAL
EOSINOPHIL # BLD: 0.5 K/UL (ref 0–0.4)
EOSINOPHIL NFR BLD: 5 % (ref 0–7)
ERYTHROCYTE [DISTWIDTH] IN BLOOD BY AUTOMATED COUNT: 14.9 % (ref 11.5–14.5)
GLUCOSE SERPL-MCNC: 89 MG/DL (ref 65–100)
HCT VFR BLD AUTO: 35.4 % (ref 36.6–50.3)
HGB BLD-MCNC: 11.3 G/DL (ref 12.1–17)
IMM GRANULOCYTES # BLD AUTO: 0 K/UL
IMM GRANULOCYTES NFR BLD AUTO: 0 %
LYMPHOCYTES # BLD: 1.7 K/UL (ref 0.8–3.5)
LYMPHOCYTES NFR BLD: 18 % (ref 12–49)
MCH RBC QN AUTO: 28.3 PG (ref 26–34)
MCHC RBC AUTO-ENTMCNC: 31.9 G/DL (ref 30–36.5)
MCV RBC AUTO: 88.5 FL (ref 80–99)
METAMYELOCYTES NFR BLD MANUAL: 1 %
MONOCYTES # BLD: 0.5 K/UL (ref 0–1)
MONOCYTES NFR BLD: 5 % (ref 5–13)
MYELOCYTES NFR BLD MANUAL: 1 %
NEUTS SEG # BLD: 6.5 K/UL (ref 1.8–8)
NEUTS SEG NFR BLD: 70 % (ref 32–75)
NRBC # BLD: 0 K/UL (ref 0–0.01)
NRBC BLD-RTO: 0 PER 100 WBC
PLATELET # BLD AUTO: 255 K/UL (ref 150–400)
PMV BLD AUTO: 11 FL (ref 8.9–12.9)
POTASSIUM SERPL-SCNC: 3.9 MMOL/L (ref 3.5–5.1)
RBC # BLD AUTO: 4 M/UL (ref 4.1–5.7)
RBC MORPH BLD: ABNORMAL
SODIUM SERPL-SCNC: 140 MMOL/L (ref 136–145)
WBC # BLD AUTO: 9.3 K/UL (ref 4.1–11.1)

## 2021-03-11 PROCEDURE — 71045 X-RAY EXAM CHEST 1 VIEW: CPT

## 2021-03-11 PROCEDURE — 74011250636 HC RX REV CODE- 250/636: Performed by: PHYSICIAN ASSISTANT

## 2021-03-11 PROCEDURE — 74011250637 HC RX REV CODE- 250/637: Performed by: FAMILY MEDICINE

## 2021-03-11 PROCEDURE — 36415 COLL VENOUS BLD VENIPUNCTURE: CPT

## 2021-03-11 PROCEDURE — 83880 ASSAY OF NATRIURETIC PEPTIDE: CPT

## 2021-03-11 PROCEDURE — 97116 GAIT TRAINING THERAPY: CPT

## 2021-03-11 PROCEDURE — 97530 THERAPEUTIC ACTIVITIES: CPT

## 2021-03-11 PROCEDURE — 80048 BASIC METABOLIC PNL TOTAL CA: CPT

## 2021-03-11 PROCEDURE — 85025 COMPLETE CBC W/AUTO DIFF WBC: CPT

## 2021-03-11 PROCEDURE — 74011250637 HC RX REV CODE- 250/637: Performed by: INTERNAL MEDICINE

## 2021-03-11 PROCEDURE — 65660000001 HC RM ICU INTERMED STEPDOWN

## 2021-03-11 PROCEDURE — 97535 SELF CARE MNGMENT TRAINING: CPT

## 2021-03-11 RX ORDER — BENZONATATE 100 MG/1
100 CAPSULE ORAL 3 TIMES DAILY
Status: DISCONTINUED | OUTPATIENT
Start: 2021-03-12 | End: 2021-03-11

## 2021-03-11 RX ORDER — BENZONATATE 100 MG/1
100 CAPSULE ORAL
Status: DISCONTINUED | OUTPATIENT
Start: 2021-03-11 | End: 2021-03-11 | Stop reason: SDUPTHER

## 2021-03-11 RX ORDER — FUROSEMIDE 10 MG/ML
20 INJECTION INTRAMUSCULAR; INTRAVENOUS ONCE
Status: COMPLETED | OUTPATIENT
Start: 2021-03-11 | End: 2021-03-11

## 2021-03-11 RX ADMIN — BENZONATATE 100 MG: 100 CAPSULE ORAL at 22:12

## 2021-03-11 RX ADMIN — ASPIRIN 81 MG: 81 TABLET, COATED ORAL at 09:21

## 2021-03-11 RX ADMIN — METOPROLOL TARTRATE 50 MG: 50 TABLET, FILM COATED ORAL at 09:21

## 2021-03-11 RX ADMIN — OXYCODONE HYDROCHLORIDE AND ACETAMINOPHEN 500 MG: 500 TABLET ORAL at 09:21

## 2021-03-11 RX ADMIN — BENZONATATE 100 MG: 100 CAPSULE ORAL at 17:38

## 2021-03-11 RX ADMIN — Medication 10 ML: at 13:00

## 2021-03-11 RX ADMIN — GUAIFENESIN 600 MG: 600 TABLET, EXTENDED RELEASE ORAL at 21:59

## 2021-03-11 RX ADMIN — APIXABAN 5 MG: 5 TABLET, FILM COATED ORAL at 17:38

## 2021-03-11 RX ADMIN — THERA TABS 1 TABLET: TAB at 09:21

## 2021-03-11 RX ADMIN — GUAIFENESIN 600 MG: 600 TABLET, EXTENDED RELEASE ORAL at 09:21

## 2021-03-11 RX ADMIN — FUROSEMIDE 20 MG: 10 INJECTION, SOLUTION INTRAMUSCULAR; INTRAVENOUS at 12:59

## 2021-03-11 RX ADMIN — BENZONATATE 100 MG: 100 CAPSULE ORAL at 09:27

## 2021-03-11 RX ADMIN — METOPROLOL TARTRATE 50 MG: 50 TABLET, FILM COATED ORAL at 17:38

## 2021-03-11 RX ADMIN — APIXABAN 5 MG: 5 TABLET, FILM COATED ORAL at 09:21

## 2021-03-11 RX ADMIN — Medication 10 ML: at 21:59

## 2021-03-11 RX ADMIN — Medication 10 ML: at 04:22

## 2021-03-11 NOTE — PROGRESS NOTES
1930: Bedside, Verbal and Written shift change report given to Ben Dempsey and Drew Nye (oncoming nurse) by Bianca Garcia RN (offgoing nurse). Report included the following information SBAR, Kardex, ED Summary, Procedure Summary, Intake/Output, MAR, Accordion, Recent Results, Med Rec Status, Cardiac Rhythm NSR, Alarm Parameters , Pre Procedure Checklist, Procedure Verification, Quality Measures and Dual Neuro Assessment. 2000: Assessment completed, Meds given by preceptor Mehrdad Mcgill. Pt resting call bell within reach      Hourly rounding completed pt is resting in bed with call bell within reach   0000: Reassessment completed. Changed noted. Vital signs assessed. Pt resting in bed with call bell in reach. No complaints of pain. 0400: Reassessment completed. No changed noted. Vital signs assessed. Blood drawn for labs . Pt resting in bed with call bell in reach. No complaints of pain. 0700: Pt up and preforming morning ADL's. Pt denies pain. Pt had call bell within reach     0730 Bedside, Verbal and Written shift change report given to [address] [place] [address] Drive (oncoming nurse) by Ben Dempsey and Mehrdad Mcgill RN (offgoing nurse). Report included the following information SBAR, Kardex, ED Summary, Procedure Summary, Intake/Output, MAR, Accordion, Recent Results, Med Rec Status, Cardiac Rhythm ST , Alarm Parameters , Pre Procedure Checklist, Procedure Verification, Quality Measures and Dual Neuro Assessment.

## 2021-03-11 NOTE — PROGRESS NOTES
PCCM:    On 5lpm, can be weaned. BMP stable    Plan:    [id number] with post inflammatory fibrosis    Lasix today   OOB as tolerated   Possible discharge soon?    He wants to go back to rehab    Kg Bedolla PA-C

## 2021-03-11 NOTE — PROGRESS NOTES
Problem: Self Care Deficits Care Plan (Adult)  Goal: *Acute Goals and Plan of Care (Insert Text)  Description:   FUNCTIONAL STATUS PRIOR TO ADMISSION: Patient was independent and active without use of DME prior to initial hospitalization at [facility], Dx with COVID 1/13/21, discharge to rehab where he was able to ambulate ~40' until last week he was there    HOME SUPPORT: The patient lived with family but did not require assist.    Occupational Therapy Goals  Initiated 3/9/2021  1. Patient will tolerate > or = 5 minutes functional activity and maintain oxygen sats > or = 90% within 7 day(s). 2.  Patient will stand pivot to bedside commode 1x/day with CGA within 7 day(s)  3. Patient will perform bilateral UE AROM coordinated breathing exercises x;s 5 reps and maintain sats > or = 90% within 7 day(s)  4. Patient will don gown and socks with increased time and set up and maintain sats > or = 90 % within 7 day(s). Outcome: Progressing Towards Goal       OCCUPATIONAL THERAPY TREATMENT  Patient: Ani Hyde (3 y.o. male)  Date: 3/11/2021  Diagnosis: Respiratory failure ([facility] [address] [J96.90] <principal problem not specified>       Precautions:    Chart, occupational therapy assessment, plan of care, and goals were reviewed. ASSESSMENT  Patient continues with skilled OT services and is slowly progressing towards goals. Pt remains limited in ADLs, functional transfers and mobility by poor pulmonary endurance with desaturations on 6L NC. Pt is motivated to regain functional independence and tolerated bed to [facility] with RW and MIN A x 1. Pt stood x 1 minute leaning on RW for total A bowel hygiene before requesting return to bed 2* BULL/SOB. Pt extremely tachypneic with RR 50s-60s and SpO2 59-60% in bed. Placed NRB mask over 6L NC with pt recovering SpO2 >90% within 2 minutes. Pt able to roll in bed after several minutes of recovery for assistance with BM hygiene. Pt bridged in bed x 10 seconds for brief donning.  At this time, pt is well below his ADL and functional mobility baseline and would benefit from IPR at discharge. Current Level of Function Impacting Discharge (ADLs): min A for transfers, BULL/hypoxia, decreased cardiopulmonary endurance, A for hygiene in standing    Other factors to consider for discharge: from rehab, hx of COVID         PLAN :  Patient continues to benefit from skilled intervention to address the above impairments. Continue treatment per established plan of care. to address goals. Recommend with staff: BSC with RW Ax 1 as able    Recommend next OT session: energy conservation, bed level exercises, ADLs as tolerated    Recommendation for discharge: (in order for the patient to meet his/her long term goals)  Therapy 3 hours per day 5-7 days per week    This discharge recommendation:  Has been made in collaboration with the attending provider and/or case management    IF patient discharges home will need the following DME: bedside commode, portable oxygen, shower chair, and wheelchair       SUBJECTIVE:   Patient stated I want to go to rehab to get stronger so that I can actually walk into the bathroom. I need to be able to do that at home.     OBJECTIVE DATA SUMMARY:   Cognitive/Behavioral Status:                      Functional Mobility and Transfers for ADLs:  Bed Mobility:  Rolling: Stand-by assistance  Supine to Sit: Stand-by assistance  Sit to Supine: Stand-by assistance    Transfers:  Sit to Stand: Contact guard assistance  Functional Transfers  Toilet Transfer : Minimum assistance;Assist x1;Additional time; Adaptive equipment  Bed to Chair: Minimum assistance; Additional time;Assist x1    Balance:  Sitting: Intact  Standing: Impaired; Without support  Standing - Static: Good  Standing - Dynamic : Fair    ADL Intervention:          Pt demonstrated short, rapid breaths after coughing and exertion.  Pt reported unable to take a deep breath due to COVID, fibrotic lung tissue                 Lower Body Dressing Assistance  Protective Undergarmet: Total assistance (dependent)    Toileting  Bowel Hygiene: Total assistance (dependent)  Clothing Management: Maximum assistance         Pain:  none    Activity Tolerance:   Poor    After treatment patient left in no apparent distress:   Supine in bed and Call bell within reach    COMMUNICATION/COLLABORATION:   The patients plan of care was discussed with: Registered nurse.      Melissa Licona OT  Time Calculation: 52 mins

## 2021-03-11 NOTE — PROGRESS NOTES
Problem: Mobility Impaired (Adult and Pediatric)  Goal: *Acute Goals and Plan of Care (Insert Text)  Description: FUNCTIONAL STATUS PRIOR TO ADMISSION: Patient was independent and active without use of DME prior to recent admissions. Pt was at [address] then IPR. Pt was planned to d/c home then re-hospitalized. Pt was ambulating with min A x 40 ft x RW with PT.     HOME SUPPORT PRIOR TO ADMISSION: Patient lived alone prior to recent hospital stays and plans to have assistance for 2-3 weeks once d/c home. Physical Therapy Goals  Initiated 3/8/2021  1. Patient will move from supine to sit and sit to supine , scoot up and down, and roll side to side in bed with modified independence within 7 day(s). 2.  Patient will transfer from bed to chair and chair to bed with modified independence using the least restrictive device within 7 day(s). 3.  Patient will perform sit to stand with modified independence within 7 day(s). 4.  Patient will ambulate with modified independence for 300 feet with the least restrictive device within 7 day(s). 5.  Patient will ascend/descend 5 stairs with one handrail(s) with modified independence within 7 day(s). Outcome: Progressing Towards Goal   PHYSICAL THERAPY TREATMENT  Patient: Placido Mcnair (89 y.o. male)  Date: 3/11/2021  Diagnosis: [facility] failure ([facility] [J96.90] <principal problem not specified>       Precautions:    Chart, physical therapy assessment, plan of care and goals were reviewed. ASSESSMENT  Patient continues with skilled PT services and is progressing towards goals. Pt seen in the afternoon after had had a session with OT and some time to recover, see OT note. Our session today was limited by impaired activity tolerance, see chart below. At rest his RR was elevated and we took mobility very slowly. He desat to 84% on 5 liters of 02 with just 2 feet of amb but did quickly recover, sats up to 94% in two minutes.  Note some coughing during session but it was minimal. He is moving well and anticipate steady gains once he can tolerate activity respiratory wise. Continue to recommend rehab at inpatient level. Current Level of Function Impacting Discharge (mobility/balance): provided contact guard at most    Other factors to consider for discharge: h/o COVID-19    Vitals:     03/11/21 1423 03/11/21 1428 03/11/21 1436 03/11/21 1438   BP: 110/73 127/71 133/82     BP 1 Location: Left upper arm Left upper arm Left upper arm     BP Patient Position: Supine Sitting Sitting     Pulse: 90 (!) 103 (!) 116     Resp: (!) 37 (!) 48 (!) 44     Temp:           SpO2: on 5 liters of 02 96% 93% (!) 84% 94%                                       PLAN :  Patient continues to benefit from skilled intervention to address the above impairments. Continue treatment per established plan of care. to address goals. Recommendation for discharge: (in order for the patient to meet his/her long term goals)  Therapy 3 hours per day 5-7 days per week    This discharge recommendation:  A follow-up discussion with the attending provider and/or case management is planned    IF patient discharges home will need the following DME: to be determined (TBD)       SUBJECTIVE:   Patient stated that he feels that if the coughing could be brought under control, then he would be able to tolerate more activity    OBJECTIVE DATA SUMMARY:   Chart checked, pt cleared by nursing  Critical Behavior:  Neurologic State: Alert, Eyes open spontaneously  Orientation Level: Oriented X4  Cognition: Follows commands  Safety/Judgement: Awareness of environment, Fall prevention, Home safety, Insight into deficits  Functional Mobility Training:  Bed Mobility:  Rolling: Stand-by assistance  Supine to Sit: Supervision  Sit to Supine: Stand-by assistance           Transfers:  Sit to Stand: Contact guard assistance  Stand to Sit: Contact guard assistance                    Balance:  Sitting: Intact; Without support  Standing: Impaired; Without support  Standing - Static: Good  Standing - Dynamic : Fair  Ambulation/Gait Training:  Distance (ft): 2 Feet (ft)  Assistive Device: Gait belt  Ambulation - Level of Assistance: Contact guard assistance        Gait Abnormalities: Decreased step clearance              Speed/Wen: Slow;Pace decreased (<100 feet/min)  Step Length: Left shortened;Right shortened                    Stairs: Therapeutic Exercises:     Pain Rating:  None rated    Activity Tolerance:   Fair and see assessment above    After treatment patient left in no apparent distress:   Sitting in chair and Call bell within reach    COMMUNICATION/COLLABORATION:   The patients plan of care was discussed with: Occupational therapist and Registered nurse.      Brian Gutierrez   Time Calculation: 38 mins

## 2021-03-11 NOTE — PROGRESS NOTES
Full note to follow.  Carlos Andrade, PT    Vitals:    03/11/21 1423 03/11/21 1428 03/11/21 1436 03/11/21 1438   BP: 110/73 127/71 133/82    BP 1 Location: Left upper arm Left upper arm Left upper arm    BP Patient Position: Supine Sitting Sitting    Pulse: 90 (!) 103 (!) 116    Resp: (!) 37 (!) 48 (!) 44    Temp:       SpO2: on 5 liters of 02 96% 93% (!) 84% 94%

## 2021-03-11 NOTE — PROGRESS NOTES
Transition of Care Plan   RUR- Low 16%   DISPOSITION: Return to [facility] IPR   F/U with PCP/Specialist     Transport: [facility] vs   Sister [place] - [phone]    CM met with patient at bedside to discuss return to [facility]; patient prefers to return to [facility] to get his strength back. CM will wait for evaluations from PT/OT. Patient requested CM call his sister, Prosper Valdivia, for an update this afternoon.      Debora [facility] Mina De La Rosa BA, MSW Supervisee

## 2021-03-12 LAB
ANION GAP SERPL CALC-SCNC: 2 MMOL/L (ref 5–15)
BASOPHILS # BLD: 0.2 K/UL (ref 0–0.1)
BASOPHILS NFR BLD: 2 % (ref 0–1)
BUN SERPL-MCNC: 14 MG/DL (ref 6–20)
BUN/CREAT SERPL: 25 (ref 12–20)
CALCIUM SERPL-MCNC: 9.4 MG/DL (ref 8.5–10.1)
CHLORIDE SERPL-SCNC: 101 MMOL/L (ref 97–108)
CO2 SERPL-SCNC: 31 MMOL/L (ref 21–32)
CREAT SERPL-MCNC: 0.57 MG/DL (ref 0.7–1.3)
DIFFERENTIAL METHOD BLD: ABNORMAL
EOSINOPHIL # BLD: 1 K/UL (ref 0–0.4)
EOSINOPHIL NFR BLD: 11 % (ref 0–7)
ERYTHROCYTE [DISTWIDTH] IN BLOOD BY AUTOMATED COUNT: 15.1 % (ref 11.5–14.5)
GLUCOSE SERPL-MCNC: 88 MG/DL (ref 65–100)
HCT VFR BLD AUTO: 33.5 % (ref 36.6–50.3)
HGB BLD-MCNC: 10.6 G/DL (ref 12.1–17)
IMM GRANULOCYTES # BLD AUTO: 0 K/UL
IMM GRANULOCYTES NFR BLD AUTO: 0 %
LYMPHOCYTES # BLD: 1.1 K/UL (ref 0.8–3.5)
LYMPHOCYTES NFR BLD: 13 % (ref 12–49)
MCH RBC QN AUTO: 27.7 PG (ref 26–34)
MCHC RBC AUTO-ENTMCNC: 31.6 G/DL (ref 30–36.5)
MCV RBC AUTO: 87.5 FL (ref 80–99)
MONOCYTES # BLD: 0.5 K/UL (ref 0–1)
MONOCYTES NFR BLD: 6 % (ref 5–13)
MYELOCYTES NFR BLD MANUAL: 2 %
NEUTS BAND NFR BLD MANUAL: 10 % (ref 0–6)
NEUTS SEG # BLD: 5.8 K/UL (ref 1.8–8)
NEUTS SEG NFR BLD: 56 % (ref 32–75)
NRBC # BLD: 0 K/UL (ref 0–0.01)
NRBC BLD-RTO: 0 PER 100 WBC
PLATELET # BLD AUTO: 240 K/UL (ref 150–400)
PMV BLD AUTO: 10.8 FL (ref 8.9–12.9)
POTASSIUM SERPL-SCNC: 4.1 MMOL/L (ref 3.5–5.1)
PROCALCITONIN SERPL-MCNC: <0.05 NG/ML
RBC # BLD AUTO: 3.83 M/UL (ref 4.1–5.7)
RBC MORPH BLD: ABNORMAL
SODIUM SERPL-SCNC: 134 MMOL/L (ref 136–145)
WBC # BLD AUTO: 8.8 K/UL (ref 4.1–11.1)

## 2021-03-12 PROCEDURE — 84145 PROCALCITONIN (PCT): CPT

## 2021-03-12 PROCEDURE — 65660000001 HC RM ICU INTERMED STEPDOWN

## 2021-03-12 PROCEDURE — 74011250637 HC RX REV CODE- 250/637: Performed by: HOSPITALIST

## 2021-03-12 PROCEDURE — 77010033678 HC OXYGEN DAILY

## 2021-03-12 PROCEDURE — 97530 THERAPEUTIC ACTIVITIES: CPT

## 2021-03-12 PROCEDURE — 74011250637 HC RX REV CODE- 250/637: Performed by: FAMILY MEDICINE

## 2021-03-12 PROCEDURE — 97535 SELF CARE MNGMENT TRAINING: CPT

## 2021-03-12 PROCEDURE — 74011250637 HC RX REV CODE- 250/637: Performed by: INTERNAL MEDICINE

## 2021-03-12 PROCEDURE — 36415 COLL VENOUS BLD VENIPUNCTURE: CPT

## 2021-03-12 PROCEDURE — 80048 BASIC METABOLIC PNL TOTAL CA: CPT

## 2021-03-12 PROCEDURE — 85025 COMPLETE CBC W/AUTO DIFF WBC: CPT

## 2021-03-12 RX ORDER — LORATADINE 10 MG/1
10 TABLET ORAL DAILY
Status: DISCONTINUED | OUTPATIENT
Start: 2021-03-12 | End: 2021-03-18 | Stop reason: HOSPADM

## 2021-03-12 RX ADMIN — APIXABAN 5 MG: 5 TABLET, FILM COATED ORAL at 10:37

## 2021-03-12 RX ADMIN — LORATADINE 10 MG: 10 TABLET ORAL at 10:37

## 2021-03-12 RX ADMIN — ASPIRIN 81 MG: 81 TABLET, COATED ORAL at 10:38

## 2021-03-12 RX ADMIN — Medication 3 MG: at 21:08

## 2021-03-12 RX ADMIN — GUAIFENESIN 600 MG: 600 TABLET, EXTENDED RELEASE ORAL at 21:08

## 2021-03-12 RX ADMIN — METOPROLOL TARTRATE 50 MG: 50 TABLET, FILM COATED ORAL at 10:37

## 2021-03-12 RX ADMIN — OXYCODONE HYDROCHLORIDE AND ACETAMINOPHEN 500 MG: 500 TABLET ORAL at 10:38

## 2021-03-12 RX ADMIN — GUAIFENESIN 600 MG: 600 TABLET, EXTENDED RELEASE ORAL at 10:38

## 2021-03-12 RX ADMIN — Medication 10 ML: at 21:09

## 2021-03-12 RX ADMIN — METOPROLOL TARTRATE 50 MG: 50 TABLET, FILM COATED ORAL at 18:41

## 2021-03-12 RX ADMIN — BENZONATATE 100 MG: 100 CAPSULE ORAL at 10:42

## 2021-03-12 RX ADMIN — Medication 20 ML: at 06:28

## 2021-03-12 RX ADMIN — BENZONATATE 100 MG: 100 CAPSULE ORAL at 18:41

## 2021-03-12 RX ADMIN — APIXABAN 5 MG: 5 TABLET, FILM COATED ORAL at 18:40

## 2021-03-12 RX ADMIN — THERA TABS 1 TABLET: TAB at 10:37

## 2021-03-12 RX ADMIN — Medication 10 ML: at 10:38

## 2021-03-12 NOTE — PROGRESS NOTES
Problem: Self Care Deficits Care Plan (Adult)  Goal: *Acute Goals and Plan of Care (Insert Text)  Description:   FUNCTIONAL STATUS PRIOR TO ADMISSION: Patient was independent and active without use of DME prior to initial hospitalization at [facility], Dx with COVID 1/13/21, discharge to rehab where he was able to ambulate ~40' until last week he was there    HOME SUPPORT: The patient lived with family but did not require assist.    Occupational Therapy Goals  Initiated 3/9/2021  1. Patient will tolerate > or = 5 minutes functional activity and maintain oxygen sats > or = 90% within 7 day(s). 2.  Patient will stand pivot to bedside commode 1x/day with CGA within 7 day(s)  3. Patient will perform bilateral UE AROM coordinated breathing exercises x;s 5 reps and maintain sats > or = 90% within 7 day(s)  4. Patient will don gown and socks with increased time and set up and maintain sats > or = 90 % within 7 day(s). Outcome: Progressing Towards Goal   OCCUPATIONAL THERAPY TREATMENT  Patient: Abdulaziz Olivas (49 y.o. male)  Date: 3/12/2021  Diagnosis: Respiratory failure ([facility] [address] [J96.90] <principal problem not specified>       Precautions:    Chart, occupational therapy assessment, plan of care, and goals were reviewed. ASSESSMENT  Patient continues with skilled OT services and is progressing towards goals. Patient received sitting up in bedside chair on 5L O2, tachypneic with RR up to 42 and HR in 90s. Per patient, had just had a \"coughing fit. \" Patient benefiting from redirection from shortness of breath, with patient slowly recovering until SpO2 up to 95%. Patient participatory in extensive discussion / education regarding energy conservation strategies, therapeutic exercise, and calming strategies that can be practiced over the weekend. Although patient standing with SBA and adjusting briefs bimanually with no LOB, shortly after returning to seated position patient SpO2 to 79%, RR to 43, and HR to low 120s. Muted alarms to minimize additional stimuli and worked with patient on refocusing and calming as he became a bit panicked. Although patient benefits from being able to tell when his O2 drops for purposes of adjusting activity appropriately, this awareness also inhibits recovery by increasing anxiety and distress. Patient with quicker recovery, able to achieve 90% after approx 2 minutes with HR and RR also improving. Per patient, finds country music calming. Encouraged patient to advocate for strategies that can help with stress management as he experiences respiratory distress with any episodes of coughing and intermittently with activity. Patient agreeable to plan to work on strength, endurance, and stress management over weekend. Current Level of Function Impacting Discharge (ADLs): set-up to total A 2/2 endurance and O2 needs    Other factors to consider for discharge:          PLAN :  Patient continues to benefit from skilled intervention to address the above impairments. Continue treatment per established plan of care. to address goals. Recommend with staff: OOB to chair and toileting at [facility] via stand-pivot with SBA and rest breaks between each step of transfer / activity    Recommendation for discharge: (in order for the patient to meet his/her long term goals)  Therapy 3 hours per day 5-7 days per week    This discharge recommendation:  Has been made in collaboration with the attending provider and/or case management    IF patient discharges home will need the following DME: TBD       SUBJECTIVE:   Patient stated I feel hopeless.     OBJECTIVE DATA SUMMARY:   Cognitive/Behavioral Status:  Neurologic State: Alert; Appropriate for age  Orientation Level: Oriented X4  Cognition: Follows commands; Appropriate for age attention/concentration  Perception: Appears intact  Perseveration: No perseveration noted  Safety/Judgement: Awareness of environment; Fall prevention;Home safety; Insight into deficits    Functional Mobility and Transfers for ADLs:  Bed Mobility:  Supine to Sit: Supervision    Transfers:  Sit to Stand: Stand-by assistance   Stand to Sit: Stand-by assistance     Balance:  Sitting: Intact  Standing: Impaired  Standing - Static: Fair;Good  Standing - Dynamic : Constant support; Fair    ADL Intervention:  Feeding  Feeding Assistance: Set-up  Container Management: Independent  Drink to Mouth: Independent    Lower Body Dressing Assistance  Protective Undergarmet: Stand-by assistance(to pull/up adjust in standing)    Cognitive Retraining  Safety/Judgement: Awareness of environment; Fall prevention;Home safety; Insight into deficits    Therapeutic Exercises:   Discussed series of upper-body stretching exercises to facilitate increased chest expansion.  -side bends  -cross-body reaching  -cervical rotation    Pain:  No reports. Activity Tolerance:   Fair, desaturates with exertion and requires oxygen, requires frequent rest breaks, and observed SOB with activity    After treatment patient left in no apparent distress:   Sitting in chair and Call bell within reach    COMMUNICATION/COLLABORATION:   The patients plan of care was discussed with: Physical therapist and Registered nurse.      Blayne Hernandez OT  Time Calculation: 47 mins

## 2021-03-12 NOTE — PROGRESS NOTES
I agree with care, assessment and charting on this patient by Godfrey Juarez RN. Bedside and Verbal shift change report given to Andrew Ann RN (oncoming nurse) by Brittni Castañeda RN (offgoing nurse).  Report included the following information SBAR, Kardex, ED Summary, OR Summary, Procedure Summary, Intake/Output, MAR, Recent Results and Cardiac Rhythm SR.

## 2021-03-12 NOTE — PROGRESS NOTES
PCCM:    On 5lpm, this can be weaned. BMP stable    Plan:    [id number] with post inflammatory fibrosis    Lasix intermittently, but he seems to respond well to it   OOB as tolerated   Possible discharge soon?    He wants to go back to rehab   PRN over the weekend     Vielka Blount PA-C

## 2021-03-12 NOTE — PROGRESS NOTES
Problem: Discharge Planning  Goal: *Discharge to safe environment  Outcome: Progressing Towards Goal     Problem: Pressure Injury - Risk of  Goal: *Prevention of pressure injury  Description: Document Govind Scale and appropriate interventions in the flowsheet.   Outcome: Progressing Towards Goal  Note: Pressure Injury Interventions:  Sensory Interventions: Avoid rigorous massage over bony prominences    Moisture Interventions: Check for incontinence Q2 hours and as needed, Maintain skin hydration (lotion/cream), Minimize layers    Activity Interventions: Assess need for specialty bed, PT/OT evaluation    Mobility Interventions: HOB 30 degrees or less, PT/OT evaluation    Nutrition Interventions: Document food/fluid/supplement intake    Friction and Shear Interventions: HOB 30 degrees or less, Minimize layers

## 2021-03-12 NOTE — PROGRESS NOTES
[address] [facility] Adult  Hospitalist Group                                                                                          Hospitalist Progress Note  Sathish Eduardo MD  Answering service: [phone] from in house phone        Date of Service:  3/12/2021  NAME:  Brea Armstrong  :  1965  MRN:  [id number]      Admission Summary:     Patient is not able to provide much history as he has significant shortness of breath and increased work of breathing. Patient was admitted on 2021 at [facility] and discharged on 2021. Patient was admitted with acute hypoxic respiratory failure, severe multilobar Covid pneumonia, pneumomediastinum, patient also has history of right lower extremity DVT. Patient was discharged to [facility], patient has been having increased shortness of breath for the last few days and today became quite short of breath, patient was put on nonrebreather 15 L and was brought to [facility].  Patient continued to be quite short of breath and is currently on 45 L nasal cannula, patient appears to be distressed. Patient was evaluated by the ICU team and deemed appropriate to be admitted to the hospitalist service on intermediate care unit. Patient denies any chest pain associated with his symptoms reports that he feels he is struggling to breathe. Patient reports mild cough but denies fever      Interval history / Subjective:   Patient seen and examined. He placed his sister in law on speaker phone as well. States that cough is better today. He continues to desaturate with exertion. Assessment & Plan:     Acute on chronic hypoxic respiratory failure with recent COVID 19 Pneumonia  -received  iv cefepime 3/3-3/9,    -SARS CoV 2 PCR not detected on 3/2  -CTA chest 3/9:Extensive scattered interstitial, groundglass and parenchymal opacity with  bronchiectatic change as well.  Imaging findings may be related to viral pneumonia. - procal wnl, sputum cultures if possible  -his O2 saturation >90% on 5lt oxygen at rest but desaturating with exertion but recovering  -Will give one dose of lasix- 20 mg today. Severe sepsis likely due to pneumonia POA,resolved  -leukocytosis, elevated lactic acid, tachycardia, elevated liver enzyme   -completed above abx, oxygen support  -follow up on blood cx, NGTD     #Thrombosis of left median cubital vein  -patient has h/o DVT few yrs ago, with recent COVID 19 -continue anticoagulation Eliquis for now and repeat ultrasound again in 2 weeks    Code status: Full Code  DVT prophylaxis: Eliquis    Care Plan discussed with: Patient/Family, Nurse and   Anticipated Disposition: TBD  Anticipated Discharge: Greater than 48 hours     Hospital Problems  Date Reviewed: 5/8/2019          Codes Class Noted POA    Respiratory failure ([facility] [address] ICD-10-CM: J96.90  ICD-9-CM: 518.81  3/2/2021 Unknown                Vital Signs:    Last 24hrs VS reviewed since prior progress note. Most recent are:  Visit Vitals  /72 (BP Patient Position: Sitting)   Pulse (!) 101   Temp 97.6 °F (36.4 °C)   Resp (!) 38   Ht 5' 8\" (1.727 m)   Wt 74.5 kg (164 lb 3.2 oz)   SpO2 92%   BMI 24.97 kg/m²         Intake/Output Summary (Last 24 hours) at 3/12/2021 1717  Last data filed at 3/12/2021 1416  Gross per 24 hour   Intake --   Output 1350 ml   Net -1350 ml        Physical Examination:     I had a face to face encounter with this patient and independently examined them on 3/12/2021 as outlined below:          Constitutional:  No acute distress, cooperative, pleasant    ENT:  Oral mucosa moist, oropharynx benign. Resp:  Decrease bilaterally. No wheezing/rhonchi/rales. No accessory muscle use   CV:  Regular rhythm, normal rate, no murmurs, gallops, rubs    GI:  Soft, non distended, non tender. normoactive bowel sounds, no hepatosplenomegaly     Musculoskeletal:  No LE edema     Neurologic:  Moves all extremities. AAOx3, CN II-XII grossly intact     Skin:  Good turgor, no rashes or ulcers       Data Review:    Review and/or order of clinical lab test  Review and/or order of tests in the medicine section of [facility]      Labs:     Recent Labs     03/12/21 0401 03/11/21 0421   WBC 8.8 9.3   HGB 10.6* 11.3*   HCT 33.5* 35.4*    255     Recent Labs     03/12/21  0401 03/11/21  0421 03/10/21  0327   * 140 137   K 4.1 3.9 4.2    102 99   CO2 31 32 34*   BUN 14 18 16   CREA 0.57* 0.53* 0.63*   GLU 88 89 82   CA 9.4 9.3 9.5     No results for input(s): ALT, AP, TBIL, TBILI, TP, ALB, GLOB, GGT, AML, LPSE in the last 72 hours. No lab exists for component: SGOT, GPT, AMYP, HLPSE  No results for input(s): INR, PTP, APTT, INREXT, INREXT in the last 72 hours. No results for input(s): FE, TIBC, PSAT, FERR in the last 72 hours. No results found for: FOL, RBCF   No results for input(s): PH, PCO2, PO2 in the last 72 hours. No results for input(s): CPK, CKNDX, TROIQ in the last 72 hours.     No lab exists for component: CPKMB  No results found for: CHOL, CHOLX, CHLST, CHOLV, HDL, HDLP, LDL, LDLC, DLDLP, TGLX, TRIGL, TRIGP, CHHD, CHHDX  Lab Results   Component Value Date/Time    Glucose (POC) 184 (H) 02/17/2021 03:46 PM    Glucose (POC) 141 (H) 02/17/2021 11:31 AM    Glucose (POC) 89 02/17/2021 08:00 AM    Glucose (POC) 293 (H) 02/16/2021 08:47 PM    Glucose (POC) 165 (H) 02/16/2021 03:58 PM     Lab Results   Component Value Date/Time    Color YELLOW/STRAW 03/02/2021 05:08 PM    Appearance CLEAR 03/02/2021 05:08 PM    Specific gravity 1.012 03/02/2021 05:08 PM    pH (UA) 7.5 03/02/2021 05:08 PM    Protein Negative 03/02/2021 05:08 PM    Glucose Negative 03/02/2021 05:08 PM    Ketone Negative 03/02/2021 05:08 PM    Bilirubin Negative 03/02/2021 05:08 PM    Urobilinogen 0.2 03/02/2021 05:08 PM    Nitrites Negative 03/02/2021 05:08 PM    Leukocyte Esterase Negative 03/02/2021 05:08 PM    Epithelial cells FEW 03/02/2021 01:53 PM    Bacteria Negative 03/02/2021 01:53 PM    WBC 0-4 03/02/2021 01:53 PM    RBC 0-5 03/02/2021 01:53 PM         Medications Reviewed:     Current Facility-Administered Medications   Medication Dose Route Frequency    loratadine (CLARITIN) tablet 10 mg  10 mg Oral DAILY    benzonatate (TESSALON) capsule 100 mg  100 mg Oral TID PRN    guaiFENesin ER (MUCINEX) tablet 600 mg  600 mg Oral Q12H    sodium chloride (OCEAN) 0.65 % nasal squeeze bottle 2 Spray  2 Spray Both Nostrils Q2H PRN    apixaban (ELIQUIS) tablet 5 mg  5 mg Oral BID    ascorbic acid (vitamin C) (VITAMIN C) tablet 500 mg  500 mg Oral DAILY    aspirin delayed-release tablet 81 mg  81 mg Oral DAILY    melatonin tablet 3 mg  3 mg Oral QHS    metoprolol tartrate (LOPRESSOR) tablet 50 mg  50 mg Oral BID    therapeutic multivitamin (THERAGRAN) tablet 1 Tab  1 Tab Oral DAILY    sodium chloride (NS) flush 5-40 mL  5-40 mL IntraVENous Q8H    sodium chloride (NS) flush 5-40 mL  5-40 mL IntraVENous PRN    acetaminophen (TYLENOL) tablet 650 mg  650 mg Oral Q4H PRN    sodium chloride (NS) flush 5-10 mL  5-10 mL IntraVENous PRN     ______________________________________________________________________  EXPECTED LENGTH OF STAY: 4d 19h  ACTUAL LENGTH OF STAY:          10                 Smith Sandoval MD

## 2021-03-12 NOTE — PROGRESS NOTES
Problem: Mobility Impaired (Adult and Pediatric)  Goal: *Acute Goals and Plan of Care (Insert Text)  Description: FUNCTIONAL STATUS PRIOR TO ADMISSION: Patient was independent and active without use of DME prior to recent admissions. Pt was at [facility] then [facility]. Pt was planned to d/c home then re-hospitalized. Pt was ambulating with min A x 40 ft x RW with PT.     HOME SUPPORT PRIOR TO ADMISSION: Patient lived alone prior to recent hospital stays and plans to have assistance for 2-3 weeks once d/c home. Physical Therapy Goals  Initiated 3/8/2021  1. Patient will move from supine to sit and sit to supine , scoot up and down, and roll side to side in bed with modified independence within 7 day(s). 2.  Patient will transfer from bed to chair and chair to bed with modified independence using the least restrictive device within 7 day(s). 3.  Patient will perform sit to stand with modified independence within 7 day(s). 4.  Patient will ambulate with modified independence for 300 feet with the least restrictive device within 7 day(s). 5.  Patient will ascend/descend 5 stairs with one handrail(s) with modified independence within 7 day(s). Outcome: Progressing Towards Goal   PHYSICAL THERAPY TREATMENT  Patient: Martín Garza (64 y.o. male)  Date: 3/12/2021  Diagnosis: [facility] ([facility] [J96.90] <principal problem not specified>       Precautions:  fall, aspiration  Chart, physical therapy assessment, plan of care and goals were reviewed. ASSESSMENT  Patient continues with skilled PT services and is progressing towards goals. Pt continues to be limited by impaired activity tolerance (respiratory status), see chart below. After session (15 minutes later) note that on monitor screen at the nurses station that his RR was 50s and 02 sats 72%. Went to check in on patient and asked nurse to come into the room. She noted that his nasal cannula had disconnected from the wall oxygen.  Once that was restored at 5 liters, his 02 sats fab to 95% and stayed in the 90s there after (I observed for another 10 minutes on tele monitor). Continue to recommend rehab at inpatient level. For the weekend, recommend patient to complete as able in order to maintain strength, endurance and independence with nursing: OOB to chair 3x/day with assist X 1. PT to follow-up with patient after the weekend. Current Level of Function Impacting Discharge (mobility/balance): up to stand by assist provided. Other factors to consider for discharge: history of COVID-19 PNA          Vitals:           03/12/21 1416 03/12/21 1420 03/12/21 1423 03/12/21 1427   BP:             115/72   BP 1 Location:                 BP Patient Position: supine Sitting up to chair post transfer Sitting recovery time Seated LE ex Sitting recovery time Sitting post coughing Sitting recovery   Pulse:             (!) 101   Resp: 20 (!)45 21 (!) 51 (!) 34 (!) 48 (!) 38   Temp:             97.6 °F (36.4 °C)   SpO2: on 5 liters of 02 98% (!) 74% 92% (!) 83% 90% (!) 82% 92%              PLAN :  Patient continues to benefit from skilled intervention to address the above impairments. Continue treatment per established plan of care. to address goals. Recommendation for discharge: (in order for the patient to meet his/her long term goals)  Therapy 3 hours per day 5-7 days per week    This discharge recommendation:  A follow-up discussion with the attending provider and/or case management is planned    IF patient discharges home will need the following DME: bedside commode, hospital bed, rolling walker, wheelchair, and ramps to access home. SUBJECTIVE:   Patient stated It's the cough.     OBJECTIVE DATA SUMMARY:   Chart checked, pt cleared by nursing  ritical Behavior:  Neurologic State: Appropriate for age, Alert  Orientation Level: Oriented X4  Cognition: Follows commands  Safety/Judgement: Awareness of environment, Fall prevention, Home safety, Insight into deficits  Functional Mobility Training:  Bed Mobility:     Supine to Sit: Supervision              Transfers:  Sit to Stand: Stand-by assistance  Stand to Sit: Stand-by assistance                             Balance:  Sitting: Intact; Without support  Standing: Impaired  Standing - Static: Constant support;Good  Standing - Dynamic : Constant support; Fair  Ambulation/Gait Training:  Distance (ft): 2 Feet (ft)  Assistive Device: Gait belt  Ambulation - Level of Assistance: Stand-by assistance        Gait Abnormalities: Decreased step clearance              Speed/Wen: Slow  Step Length: Left shortened;Right shortened                    Stairs: Therapeutic Exercises:   Seated LAQs with ankle pumps X 5 reps and seated marching X 2 reps  Pain Rating:  None rated    Activity Tolerance:   See assessment    After treatment patient left in no apparent distress:   Sitting in chair and Call bell within reach    COMMUNICATION/COLLABORATION:   The patients plan of care was discussed with: Occupational therapist and Registered nurse.      Ebenezer Monet   Time Calculation: 34 mins

## 2021-03-12 NOTE — PROGRESS NOTES
Transition Plan of Care  RUR 16%-Low  Covid in past now with shortness of breath. Currently on 5 lpm. Recent stay at [facility] for rehab. Recommendations are for IPR. Patient is agreeable to plan. Referral sent via [employer].   Yaya Pavon RN CRM  Ext [phone]

## 2021-03-12 NOTE — PROGRESS NOTES
[address] [facility] Adult  Hospitalist Group                                                                                          Hospitalist Progress Note  Prieto Sargent MD  Answering service: [phone] from in house phone        Date of Service:  3/11/2021  NAME:  Sukhwinder Fajardo  :  1965  MRN:  [id number]      Admission Summary:     Patient is not able to provide much history as he has significant shortness of breath and increased work of breathing. Patient was admitted on 2021 at [facility] and discharged on 2021. Patient was admitted with acute hypoxic respiratory failure, severe multilobar Covid pneumonia, pneumomediastinum, patient also has history of right lower extremity DVT. Patient was discharged to [facility], patient has been having increased shortness of breath for the last few days and today became quite short of breath, patient was put on nonrebreather 15 L and was brought to [facility].  Patient continued to be quite short of breath and is currently on 45 L nasal cannula, patient appears to be distressed. Patient was evaluated by the ICU team and deemed appropriate to be admitted to the hospitalist service on intermediate care unit. Patient denies any chest pain associated with his symptoms reports that he feels he is struggling to breathe. Patient reports mild cough but denies fever      Interval history / Subjective:   Patient seen and examined. He placed his sister in law on speaker phone as well. States that he has intermittent cough and when he coughs -SOB increases  Reviewed PT/OT note- his O2 saturation dropped 60s but recovered eventually.        Assessment & Plan:     Acute on chronic hypoxic respiratory failure with recent COVID 19 Pneumonia  -received  iv cefepime 3/3-3/9,    -SARS CoV 2 PCR not detected on 3/2  -CTA chest 3/9:Extensive scattered interstitial, groundglass and parenchymal opacity with  bronchiectatic change as well. Imaging findings may be related to viral pneumonia. -will recheck procal again tomorrow, sputum cultures  -his O2 saturation >90% on 5lt oxygen at rest but desaturating with exertion but recovering   -prn lasix    Severe sepsis likely due to pneumonia POA,resolved  -leukocytosis, elevated lactic acid, tachycardia, elevated liver enzyme   -completed above abx, oxygen support  -follow up on blood cx, NGTD     #Thrombosis of left median cubital vein  -patient has h/o DVT few yrs ago, with recent COVID 19 -continue anticoagulation Eliquis for now and repeat ultrasound again in 2 weeks    Code status: Full Code  DVT prophylaxis: [address] discussed with: Patient/Family, Nurse and   Anticipated Disposition: TBD  Anticipated Discharge: Greater than 48 hours     Hospital Problems  Date Reviewed: 5/8/2019          Codes Class Noted POA    Respiratory failure ([facility] [address] ICD-10-CM: J96.90  ICD-9-CM: 518.81  3/2/2021 Unknown                Vital Signs:    Last 24hrs VS reviewed since prior progress note. Most recent are:  Visit Vitals  /68 (BP 1 Location: Left upper arm, BP Patient Position: At rest)   Pulse 95   Temp 98 °F (36.7 °C)   Resp (!) 34   Ht 5' 8\" (1.727 m)   Wt 73.2 kg (161 lb 6 oz)   SpO2 97%   BMI 24.54 kg/m²         Intake/Output Summary (Last 24 hours) at 3/11/2021 2056  Last data filed at 3/11/2021 1931  Gross per 24 hour   Intake --   Output 1401 ml   Net -1401 ml        Physical Examination:     I had a face to face encounter with this patient and independently examined them on 3/11/2021 as outlined below:          Constitutional:  No acute distress, cooperative, pleasant    ENT:  Oral mucosa moist, oropharynx benign. Resp:  Decrease bilaterally. No wheezing/rhonchi/rales. No accessory muscle use   CV:  Regular rhythm, normal rate, no murmurs, gallops, rubs    GI:  Soft, non distended, non tender.  normoactive bowel sounds, no hepatosplenomegaly Musculoskeletal:  No LE edema     Neurologic:  Moves all extremities. AAOx3, CN II-XII grossly intact     Skin:  Good turgor, no rashes or ulcers       Data Review:    Review and/or order of clinical lab test  Review and/or order of tests in the radiology section of CPT  Review and/or order of tests in the medicine section of CPT      Labs:     Recent Labs     03/11/21  0421 03/10/21  0327   WBC 9.3 9.2   HGB 11.3* 11.5*   HCT 35.4* 36.4*    268     Recent Labs     03/11/21  0421 03/10/21  0327 03/09/21  0242    137 137   K 3.9 4.2 4.3    99 103   CO2 32 34* 33*   BUN 18 16 16   CREA 0.53* 0.63* 0.53*   GLU 89 82 92   CA 9.3 9.5 9.1     No results for input(s): ALT, AP, TBIL, TBILI, TP, ALB, GLOB, GGT, AML, LPSE in the last 72 hours. No lab exists for component: SGOT, GPT, AMYP, HLPSE  No results for input(s): INR, PTP, APTT, INREXT, INREXT in the last 72 hours. No results for input(s): FE, TIBC, PSAT, FERR in the last 72 hours. No results found for: FOL, RBCF   No results for input(s): PH, PCO2, PO2 in the last 72 hours. No results for input(s): CPK, CKNDX, TROIQ in the last 72 hours.     No lab exists for component: CPKMB  No results found for: CHOL, CHOLX, CHLST, CHOLV, HDL, HDLP, LDL, LDLC, DLDLP, TGLX, TRIGL, TRIGP, CHHD, CHHDX  Lab Results   Component Value Date/Time    Glucose (POC) 184 (H) 02/17/2021 03:46 PM    Glucose (POC) 141 (H) 02/17/2021 11:31 AM    Glucose (POC) 89 02/17/2021 08:00 AM    Glucose (POC) 293 (H) 02/16/2021 08:47 PM    Glucose (POC) 165 (H) 02/16/2021 03:58 PM     Lab Results   Component Value Date/Time    Color YELLOW/STRAW 03/02/2021 05:08 PM    Appearance CLEAR 03/02/2021 05:08 PM    Specific gravity 1.012 03/02/2021 05:08 PM    pH (UA) 7.5 03/02/2021 05:08 PM    Protein Negative 03/02/2021 05:08 PM    Glucose Negative 03/02/2021 05:08 PM    Ketone Negative 03/02/2021 05:08 PM    Bilirubin Negative 03/02/2021 05:08 PM    Urobilinogen 0.2 03/02/2021 05:08 PM    Nitrites Negative 03/02/2021 05:08 PM    Leukocyte Esterase Negative 03/02/2021 05:08 PM    Epithelial cells FEW 03/02/2021 01:53 PM    Bacteria Negative 03/02/2021 01:53 PM    WBC 0-4 03/02/2021 01:53 PM    RBC 0-5 03/02/2021 01:53 PM         Medications Reviewed:     Current Facility-Administered Medications   Medication Dose Route Frequency    benzonatate (TESSALON) capsule 100 mg  100 mg Oral TID PRN    guaiFENesin ER (MUCINEX) tablet 600 mg  600 mg Oral Q12H    sodium chloride (OCEAN) 0.65 % nasal squeeze bottle 2 Spray  2 Spray Both Nostrils Q2H PRN    apixaban (ELIQUIS) tablet 5 mg  5 mg Oral BID    ascorbic acid (vitamin C) (VITAMIN C) tablet 500 mg  500 mg Oral DAILY    aspirin delayed-release tablet 81 mg  81 mg Oral DAILY    melatonin tablet 3 mg  3 mg Oral QHS    metoprolol tartrate (LOPRESSOR) tablet 50 mg  50 mg Oral BID    therapeutic multivitamin (THERAGRAN) tablet 1 Tab  1 Tab Oral DAILY    sodium chloride (NS) flush 5-40 mL  5-40 mL IntraVENous Q8H    sodium chloride (NS) flush 5-40 mL  5-40 mL IntraVENous PRN    acetaminophen (TYLENOL) tablet 650 mg  650 mg Oral Q4H PRN    sodium chloride (NS) flush 5-10 mL  5-10 mL IntraVENous PRN     ______________________________________________________________________  EXPECTED LENGTH OF STAY: 4d 19h  ACTUAL LENGTH OF STAY:          9                 Екатерина Sotelo MD

## 2021-03-12 NOTE — PROGRESS NOTES
Full note to follow.  Anahi Crain, PT       Vitals:       03/12/21 1416 03/12/21 1420 03/12/21 1423 03/12/21 1427   BP:       115/72   BP 1 Location:          BP Patient Position: supine Sitting up to chair post transfer Sitting recovery time Seated LE ex Sitting recovery time Sitting post coughing Sitting recovery   Pulse:       (!) 101   Resp: 20 (!)45 21 (!) 51 (!) 34 (!) 48 (!) 38   Temp:       97.6 °F (36.4 °C)   SpO2: on 5 liters of 02 98% (!) 74% 92% (!) 83% 90% (!) 82% 92%

## 2021-03-13 ENCOUNTER — APPOINTMENT (OUTPATIENT)
Dept: GENERAL RADIOLOGY | Age: 56
DRG: 871 | End: 2021-03-13
Attending: INTERNAL MEDICINE
Payer: COMMERCIAL

## 2021-03-13 LAB
ANION GAP SERPL CALC-SCNC: 4 MMOL/L (ref 5–15)
BASOPHILS # BLD: 0.2 K/UL (ref 0–0.1)
BASOPHILS NFR BLD: 2 % (ref 0–1)
BUN SERPL-MCNC: 12 MG/DL (ref 6–20)
BUN/CREAT SERPL: 21 (ref 12–20)
CALCIUM SERPL-MCNC: 9.2 MG/DL (ref 8.5–10.1)
CHLORIDE SERPL-SCNC: 104 MMOL/L (ref 97–108)
CO2 SERPL-SCNC: 29 MMOL/L (ref 21–32)
CREAT SERPL-MCNC: 0.58 MG/DL (ref 0.7–1.3)
DIFFERENTIAL METHOD BLD: ABNORMAL
EOSINOPHIL # BLD: 1.2 K/UL (ref 0–0.4)
EOSINOPHIL NFR BLD: 14 % (ref 0–7)
ERYTHROCYTE [DISTWIDTH] IN BLOOD BY AUTOMATED COUNT: 15.3 % (ref 11.5–14.5)
GLUCOSE SERPL-MCNC: 86 MG/DL (ref 65–100)
HCT VFR BLD AUTO: 33 % (ref 36.6–50.3)
HGB BLD-MCNC: 10.6 G/DL (ref 12.1–17)
IMM GRANULOCYTES # BLD AUTO: 0.3 K/UL (ref 0–0.04)
IMM GRANULOCYTES NFR BLD AUTO: 3 % (ref 0–0.5)
LYMPHOCYTES # BLD: 1.1 K/UL (ref 0.8–3.5)
LYMPHOCYTES NFR BLD: 13 % (ref 12–49)
MCH RBC QN AUTO: 27.9 PG (ref 26–34)
MCHC RBC AUTO-ENTMCNC: 32.1 G/DL (ref 30–36.5)
MCV RBC AUTO: 86.8 FL (ref 80–99)
MONOCYTES # BLD: 0.6 K/UL (ref 0–1)
MONOCYTES NFR BLD: 7 % (ref 5–13)
NEUTS SEG # BLD: 5.3 K/UL (ref 1.8–8)
NEUTS SEG NFR BLD: 61 % (ref 32–75)
NRBC # BLD: 0 K/UL (ref 0–0.01)
NRBC BLD-RTO: 0 PER 100 WBC
PLATELET # BLD AUTO: 233 K/UL (ref 150–400)
PMV BLD AUTO: 10.3 FL (ref 8.9–12.9)
POTASSIUM SERPL-SCNC: 4.4 MMOL/L (ref 3.5–5.1)
RBC # BLD AUTO: 3.8 M/UL (ref 4.1–5.7)
RBC MORPH BLD: ABNORMAL
SODIUM SERPL-SCNC: 137 MMOL/L (ref 136–145)
WBC # BLD AUTO: 8.7 K/UL (ref 4.1–11.1)

## 2021-03-13 PROCEDURE — 74011250637 HC RX REV CODE- 250/637: Performed by: INTERNAL MEDICINE

## 2021-03-13 PROCEDURE — 36415 COLL VENOUS BLD VENIPUNCTURE: CPT

## 2021-03-13 PROCEDURE — 74011250637 HC RX REV CODE- 250/637: Performed by: FAMILY MEDICINE

## 2021-03-13 PROCEDURE — 80048 BASIC METABOLIC PNL TOTAL CA: CPT

## 2021-03-13 PROCEDURE — 71045 X-RAY EXAM CHEST 1 VIEW: CPT

## 2021-03-13 PROCEDURE — 74011250636 HC RX REV CODE- 250/636: Performed by: INTERNAL MEDICINE

## 2021-03-13 PROCEDURE — 65660000001 HC RM ICU INTERMED STEPDOWN

## 2021-03-13 PROCEDURE — 85025 COMPLETE CBC W/AUTO DIFF WBC: CPT

## 2021-03-13 PROCEDURE — 74011250637 HC RX REV CODE- 250/637: Performed by: HOSPITALIST

## 2021-03-13 RX ORDER — DEXTROMETHORPHAN POLISTIREX 30 MG/5ML
30 SUSPENSION ORAL EVERY 12 HOURS
Status: DISCONTINUED | OUTPATIENT
Start: 2021-03-13 | End: 2021-03-16

## 2021-03-13 RX ORDER — FUROSEMIDE 10 MG/ML
20 INJECTION INTRAMUSCULAR; INTRAVENOUS ONCE
Status: COMPLETED | OUTPATIENT
Start: 2021-03-13 | End: 2021-03-13

## 2021-03-13 RX ADMIN — DEXTROMETHORPHAN 30 MG: 30 SUSPENSION, EXTENDED RELEASE ORAL at 13:45

## 2021-03-13 RX ADMIN — THERA TABS 1 TABLET: TAB at 10:03

## 2021-03-13 RX ADMIN — ASPIRIN 81 MG: 81 TABLET, COATED ORAL at 10:03

## 2021-03-13 RX ADMIN — APIXABAN 5 MG: 5 TABLET, FILM COATED ORAL at 10:04

## 2021-03-13 RX ADMIN — GUAIFENESIN 600 MG: 600 TABLET, EXTENDED RELEASE ORAL at 10:03

## 2021-03-13 RX ADMIN — METOPROLOL TARTRATE 50 MG: 50 TABLET, FILM COATED ORAL at 19:05

## 2021-03-13 RX ADMIN — OXYCODONE HYDROCHLORIDE AND ACETAMINOPHEN 500 MG: 500 TABLET ORAL at 10:03

## 2021-03-13 RX ADMIN — METOPROLOL TARTRATE 50 MG: 50 TABLET, FILM COATED ORAL at 10:03

## 2021-03-13 RX ADMIN — Medication 10 ML: at 14:00

## 2021-03-13 RX ADMIN — DEXTROMETHORPHAN 30 MG: 30 SUSPENSION, EXTENDED RELEASE ORAL at 22:29

## 2021-03-13 RX ADMIN — Medication 10 ML: at 06:18

## 2021-03-13 RX ADMIN — BENZONATATE 100 MG: 100 CAPSULE ORAL at 10:09

## 2021-03-13 RX ADMIN — Medication 10 ML: at 22:00

## 2021-03-13 RX ADMIN — BENZONATATE 100 MG: 100 CAPSULE ORAL at 19:09

## 2021-03-13 RX ADMIN — GUAIFENESIN 600 MG: 600 TABLET, EXTENDED RELEASE ORAL at 22:29

## 2021-03-13 RX ADMIN — LORATADINE 10 MG: 10 TABLET ORAL at 10:04

## 2021-03-13 RX ADMIN — APIXABAN 5 MG: 5 TABLET, FILM COATED ORAL at 19:05

## 2021-03-13 RX ADMIN — FUROSEMIDE 20 MG: 10 INJECTION, SOLUTION INTRAMUSCULAR; INTRAVENOUS at 13:45

## 2021-03-13 NOTE — PROGRESS NOTES
[facility] [facility]  Hospitalist Group                                                                                          Hospitalist Progress Note  Chucho Fang MD  Answering service: [phone] OR [phone] from in house phone        Date of Service:  3/13/2021  NAME:  Lovely Booker  :  1965  MRN:  [id number]      Admission Summary:     Patient is not able to provide much history as he has significant shortness of breath and increased work of breathing. Patient was admitted on 2021 at [facility] and discharged on 2021. Patient was admitted with acute hypoxic respiratory failure, severe multilobar Covid pneumonia, pneumomediastinum, patient also has history of right lower extremity DVT. Patient was discharged to [facility], patient has been having increased shortness of breath for the last few days and today became quite short of breath, patient was put on nonrebreather 15 L and was brought to [facility].  Patient continued to be quite short of breath and is currently on 45 L nasal cannula, patient appears to be distressed. Patient was evaluated by the ICU team and deemed appropriate to be admitted to the hospitalist service on intermediate care unit. Patient denies any chest pain associated with his symptoms reports that he feels he is struggling to breathe. Patient reports mild cough but denies fever      Interval history / Subjective: Follow up severe sepsis, PNA, Acute hypoxic respiratory failure. Patient seen and examined at the bedside. Labs, images and notes reviewed  Discussed with nursing staff, orders reviewed. Plan discussed with patient/Family  Still continue to have scratchy throat with cough. Clear mucous scant phlegm. Would like to have Delsyn syrup if possible. Agreeable for IV Lasix 20mg once. Acapella for chest PT.      Assessment & Plan:     Acute on chronic hypoxic respiratory failure with recent COVID 19 Pneumonia  -received  iv cefepime 3/3-3/9,    -SARS CoV 2 PCR not detected on 3/2  -CTA chest 3/9:Extensive scattered interstitial, groundglass and parenchymal opacity with  bronchiectatic change as well. Imaging findings may be related to viral pneumonia. - procal wnl, sputum cultures if possible  -his O2 saturation >90% on 5lt oxygen at rest but desaturating with exertion but recovering  -Lasix 20mg once given 3/12  -CXR again 3/13 and another dose of lasix 20mg IV once 3/13    Severe sepsis likely due to pneumonia POA,resolved  -leukocytosis, elevated lactic acid, tachycardia, elevated liver enzyme   -completed above abx, oxygen support  -follow up on blood cx, NGTD     #Thrombosis of left median cubital vein  -patient has h/o DVT few yrs ago, with recent COVID 19 -continue anticoagulation Eliquis for now and repeat ultrasound again in 2 weeks    Code status: Full Code  DVT prophylaxis: [address] discussed with: Patient/Family, Nurse and   Anticipated Disposition: TBD  Anticipated Discharge: Greater than 48 hours     Hospital Problems  Date Reviewed: 5/8/2019          Codes Class Noted POA    Respiratory failure ([facility] [address] ICD-10-CM: J96.90  ICD-9-CM: 518.81  3/2/2021 Unknown                Vital Signs:    Last 24hrs VS reviewed since prior progress note. Most recent are:  Visit Vitals  /72 (BP 1 Location: Right upper arm, BP Patient Position: At rest)   Pulse 86   Temp 97.9 °F (36.6 °C)   Resp 28   Ht 5' 8\" (1.727 m)   Wt 74.9 kg (165 lb 3.2 oz)   SpO2 99%   BMI 25.12 kg/m²         Intake/Output Summary (Last 24 hours) at 3/13/2021 1322  Last data filed at 3/12/2021 1416  Gross per 24 hour   Intake --   Output 300 ml   Net -300 ml        Physical Examination:     I had a face to face encounter with this patient and independently examined them on 3/13/2021 as outlined below:          Constitutional:  No acute distress, cooperative, pleasant    ENT:  Oral mucosa moist, oropharynx benign. Resp:  Decrease bilaterally. No wheezing/rhonchi/rales. No accessory muscle use   CV:  Regular rhythm, normal rate, no murmurs, gallops, rubs    GI:  Soft, non distended, non tender. normoactive bowel sounds, no hepatosplenomegaly     Musculoskeletal:  No LE edema     Neurologic:  Moves all extremities. AAOx3, CN II-XII grossly intact     Skin:  Good turgor, no rashes or ulcers       Data Review:    Review and/or order of clinical lab test  Review and/or order of tests in the medicine section of CPT    Ct Head Wo Cont    Result Date: 3/2/2021  No acute intracranial abnormality     Cta Chest W Or W Wo Cont    Result Date: 3/9/2021  There is no pulmonary embolism. There is no aortic aneurysm or dissection. Extensive scattered interstitial, groundglass and parenchymal opacity with bronchiectatic change as well. Imaging findings may be related to viral pneumonia. Minimal left-sided effusion. Cardiomegaly. Cta Chest W Or W Wo Cont    Result Date: 3/2/2021  1. Limitations secondary to motion. No evidence of pulmonary embolus. 2.  Diffuse interstitial airspace opacities consistent with Covid 19 pneumonia or sequela. Xr Chest Port    Result Date: 3/13/2021  Slightly improved appearance to coarse pulmonary infiltrates with no new or superimposed infiltrate evident by plain film. Xr Chest Port    Result Date: 3/11/2021  Severe bilateral mid and lower lung pulmonary opacifications appear unchanged. Findings consistent with bilateral pneumonia. Xr Chest Port    Result Date: 3/3/2021  Stable low lung volumes with bilateral lung opacities. Xr Chest Port    Result Date: 3/2/2021  Low lung volumes. Diffuse interstitial and airspace opacities with some improved aeration in the right lung base.         Labs:     Recent Labs     03/13/21  0455 03/12/21  0401   WBC 8.7 8.8   HGB 10.6* 10.6*   HCT 33.0* 33.5*    240     Recent Labs     03/13/21  0325 03/12/21  0401 03/11/21  0421    134* 140   K 4.4 4.1 3.9    101 102   CO2 29 31 32   BUN 12 14 18   CREA 0.58* 0.57* 0.53*   GLU 86 88 89   CA 9.2 9.4 9.3     No results for input(s): ALT, AP, TBIL, TBILI, TP, ALB, GLOB, GGT, AML, LPSE in the last 72 hours. No lab exists for component: SGOT, GPT, AMYP, HLPSE  No results for input(s): INR, PTP, APTT, INREXT, INREXT in the last 72 hours. No results for input(s): FE, TIBC, PSAT, FERR in the last 72 hours. No results found for: FOL, RBCF   No results for input(s): PH, PCO2, PO2 in the last 72 hours. No results for input(s): CPK, CKNDX, TROIQ in the last 72 hours.     No lab exists for component: CPKMB  No results found for: CHOL, CHOLX, CHLST, CHOLV, HDL, HDLP, LDL, LDLC, DLDLP, TGLX, TRIGL, TRIGP, CHHD, CHHDX  Lab Results   Component Value Date/Time    Glucose (POC) 184 (H) 02/17/2021 03:46 PM    Glucose (POC) 141 (H) 02/17/2021 11:31 AM    Glucose (POC) 89 02/17/2021 08:00 AM    Glucose (POC) 293 (H) 02/16/2021 08:47 PM    Glucose (POC) 165 (H) 02/16/2021 03:58 PM     Lab Results   Component Value Date/Time    Color YELLOW/STRAW 03/02/2021 05:08 PM    Appearance CLEAR 03/02/2021 05:08 PM    Specific gravity 1.012 03/02/2021 05:08 PM    pH (UA) 7.5 03/02/2021 05:08 PM    Protein Negative 03/02/2021 05:08 PM    Glucose Negative 03/02/2021 05:08 PM    Ketone Negative 03/02/2021 05:08 PM    Bilirubin Negative 03/02/2021 05:08 PM    Urobilinogen 0.2 03/02/2021 05:08 PM    Nitrites Negative 03/02/2021 05:08 PM    Leukocyte Esterase Negative 03/02/2021 05:08 PM    Epithelial cells FEW 03/02/2021 01:53 PM    Bacteria Negative 03/02/2021 01:53 PM    WBC 0-4 03/02/2021 01:53 PM    RBC 0-5 03/02/2021 01:53 PM         Medications Reviewed:     Current Facility-Administered Medications   Medication Dose Route Frequency    furosemide (LASIX) injection 20 mg  20 mg IntraVENous ONCE    dextromethorphan (DELSYM) 30 mg/5 mL syrup 30 mg  30 mg Oral Q12H    loratadine (CLARITIN) tablet 10 mg  10 mg Oral DAILY    benzonatate (TESSALON) capsule 100 mg  100 mg Oral TID PRN    guaiFENesin ER (MUCINEX) tablet 600 mg  600 mg Oral Q12H    sodium chloride (OCEAN) 0.65 % nasal squeeze bottle 2 Spray  2 Spray Both Nostrils Q2H PRN    apixaban (ELIQUIS) tablet 5 mg  5 mg Oral BID    ascorbic acid (vitamin C) (VITAMIN C) tablet 500 mg  500 mg Oral DAILY    aspirin delayed-release tablet 81 mg  81 mg Oral DAILY    melatonin tablet 3 mg  3 mg Oral QHS    metoprolol tartrate (LOPRESSOR) tablet 50 mg  50 mg Oral BID    therapeutic multivitamin (THERAGRAN) tablet 1 Tab  1 Tab Oral DAILY    sodium chloride (NS) flush 5-40 mL  5-40 mL IntraVENous Q8H    sodium chloride (NS) flush 5-40 mL  5-40 mL IntraVENous PRN    acetaminophen (TYLENOL) tablet 650 mg  650 mg Oral Q4H PRN    sodium chloride (NS) flush 5-10 mL  5-10 mL IntraVENous PRN     ______________________________________________________________________  EXPECTED LENGTH OF STAY: 4d 19h  ACTUAL LENGTH OF STAY:          11                 Aidan Ho MD

## 2021-03-13 NOTE — PROGRESS NOTES
0730: Bedside and Verbal shift change report given to Tommie Miranda (oncoming nurse) by Karyn Stuart RN (offgoing nurse). Report included the following information SBAR, Kardex, ED Summary, Intake/Output, MAR, Recent Results and Cardiac Rhythm NSR.

## 2021-03-13 NOTE — PROGRESS NOTES
Bedside and Verbal shift change report given to Zach Vernon RN (oncoming nurse) by Colin Hewitt RN (offgoing nurse).  Report included the following information SBAR, Kardex, Intake/Output, MAR, Recent Results, Med Rec Status and Cardiac Rhythm SR.

## 2021-03-13 NOTE — PROGRESS NOTES
Problem: Pressure Injury - Risk of  Goal: *Prevention of pressure injury  Description: Document Govind Scale and appropriate interventions in the flowsheet. Outcome: Progressing Towards Goal  Note: Pressure Injury Interventions:  Sensory Interventions: Float heels, Keep linens dry and wrinkle-free, Maintain/enhance activity level, Minimize linen layers, Assess changes in LOC, Discuss PT/OT consult with provider    Moisture Interventions: Absorbent underpads, Internal/External urinary devices, Limit adult briefs, Maintain skin hydration (lotion/cream), Minimize layers, Moisture barrier, Check for incontinence Q2 hours and as needed, Apply protective barrier, creams and emollients    Activity Interventions: Increase time out of bed, Pressure redistribution bed/mattress(bed type), PT/OT evaluation    Mobility Interventions: HOB 30 degrees or less, Pressure redistribution bed/mattress (bed type), PT/OT evaluation, Turn and reposition approx. every two hours(pillow and wedges), Float heels, Assess need for specialty bed    Nutrition Interventions: Document food/fluid/supplement intake    Friction and Shear Interventions: HOB 30 degrees or less, Lift sheet, Lift team/patient mobility team, Minimize layers                Problem: Falls - Risk of  Goal: *Absence of Falls  Description: Document Ignacio Fall Risk and appropriate interventions in the flowsheet.   Outcome: Progressing Towards Goal  Note: Fall Risk Interventions:  Mobility Interventions: Communicate number of staff needed for ambulation/transfer, Patient to call before getting OOB         Medication Interventions: Evaluate medications/consider consulting pharmacy, Patient to call before getting OOB, Teach patient to arise slowly    Elimination Interventions: Call light in reach, Patient to call for help with toileting needs, Stay With Me (per policy), Toilet paper/wipes in reach, Toileting schedule/hourly rounds, Urinal in reach              Problem: Patient Education: Go to Patient Education Activity  Goal: Patient/Family Education  Outcome: Progressing Towards Goal

## 2021-03-14 LAB
ANION GAP SERPL CALC-SCNC: 6 MMOL/L (ref 5–15)
BASOPHILS # BLD: 0.1 K/UL (ref 0–0.1)
BASOPHILS NFR BLD: 1 % (ref 0–1)
BUN SERPL-MCNC: 15 MG/DL (ref 6–20)
BUN/CREAT SERPL: 24 (ref 12–20)
CALCIUM SERPL-MCNC: 9.2 MG/DL (ref 8.5–10.1)
CHLORIDE SERPL-SCNC: 104 MMOL/L (ref 97–108)
CO2 SERPL-SCNC: 29 MMOL/L (ref 21–32)
CREAT SERPL-MCNC: 0.62 MG/DL (ref 0.7–1.3)
DIFFERENTIAL METHOD BLD: ABNORMAL
EOSINOPHIL # BLD: 0.8 K/UL (ref 0–0.4)
EOSINOPHIL NFR BLD: 9 % (ref 0–7)
ERYTHROCYTE [DISTWIDTH] IN BLOOD BY AUTOMATED COUNT: 15.2 % (ref 11.5–14.5)
GLUCOSE SERPL-MCNC: 91 MG/DL (ref 65–100)
HCT VFR BLD AUTO: 33.1 % (ref 36.6–50.3)
HGB BLD-MCNC: 10.5 G/DL (ref 12.1–17)
IMM GRANULOCYTES # BLD AUTO: 0.2 K/UL (ref 0–0.04)
IMM GRANULOCYTES NFR BLD AUTO: 2 % (ref 0–0.5)
LYMPHOCYTES # BLD: 1.1 K/UL (ref 0.8–3.5)
LYMPHOCYTES NFR BLD: 13 % (ref 12–49)
MCH RBC QN AUTO: 27.9 PG (ref 26–34)
MCHC RBC AUTO-ENTMCNC: 31.7 G/DL (ref 30–36.5)
MCV RBC AUTO: 87.8 FL (ref 80–99)
MONOCYTES # BLD: 0.7 K/UL (ref 0–1)
MONOCYTES NFR BLD: 8 % (ref 5–13)
NEUTS SEG # BLD: 5.9 K/UL (ref 1.8–8)
NEUTS SEG NFR BLD: 67 % (ref 32–75)
NRBC # BLD: 0 K/UL (ref 0–0.01)
NRBC BLD-RTO: 0 PER 100 WBC
PLATELET # BLD AUTO: 232 K/UL (ref 150–400)
PMV BLD AUTO: 10.8 FL (ref 8.9–12.9)
POTASSIUM SERPL-SCNC: 4.2 MMOL/L (ref 3.5–5.1)
RBC # BLD AUTO: 3.77 M/UL (ref 4.1–5.7)
SODIUM SERPL-SCNC: 139 MMOL/L (ref 136–145)
WBC # BLD AUTO: 8.8 K/UL (ref 4.1–11.1)

## 2021-03-14 PROCEDURE — 36415 COLL VENOUS BLD VENIPUNCTURE: CPT

## 2021-03-14 PROCEDURE — 85025 COMPLETE CBC W/AUTO DIFF WBC: CPT

## 2021-03-14 PROCEDURE — 65660000001 HC RM ICU INTERMED STEPDOWN

## 2021-03-14 PROCEDURE — 77010033678 HC OXYGEN DAILY

## 2021-03-14 PROCEDURE — 74011250637 HC RX REV CODE- 250/637: Performed by: HOSPITALIST

## 2021-03-14 PROCEDURE — 74011250637 HC RX REV CODE- 250/637: Performed by: FAMILY MEDICINE

## 2021-03-14 PROCEDURE — 80048 BASIC METABOLIC PNL TOTAL CA: CPT

## 2021-03-14 PROCEDURE — 74011250637 HC RX REV CODE- 250/637: Performed by: INTERNAL MEDICINE

## 2021-03-14 RX ORDER — FUROSEMIDE 40 MG/1
40 TABLET ORAL DAILY
Status: DISCONTINUED | OUTPATIENT
Start: 2021-03-14 | End: 2021-03-18 | Stop reason: HOSPADM

## 2021-03-14 RX ADMIN — THERA TABS 1 TABLET: TAB at 09:12

## 2021-03-14 RX ADMIN — FUROSEMIDE 40 MG: 40 TABLET ORAL at 13:25

## 2021-03-14 RX ADMIN — Medication 10 ML: at 06:20

## 2021-03-14 RX ADMIN — GUAIFENESIN 600 MG: 600 TABLET, EXTENDED RELEASE ORAL at 21:02

## 2021-03-14 RX ADMIN — DEXTROMETHORPHAN 30 MG: 30 SUSPENSION, EXTENDED RELEASE ORAL at 21:02

## 2021-03-14 RX ADMIN — METOPROLOL TARTRATE 50 MG: 50 TABLET, FILM COATED ORAL at 09:11

## 2021-03-14 RX ADMIN — GUAIFENESIN 600 MG: 600 TABLET, EXTENDED RELEASE ORAL at 09:12

## 2021-03-14 RX ADMIN — BENZONATATE 100 MG: 100 CAPSULE ORAL at 21:04

## 2021-03-14 RX ADMIN — DEXTROMETHORPHAN 30 MG: 30 SUSPENSION, EXTENDED RELEASE ORAL at 09:11

## 2021-03-14 RX ADMIN — APIXABAN 5 MG: 5 TABLET, FILM COATED ORAL at 17:14

## 2021-03-14 RX ADMIN — Medication 3 MG: at 21:02

## 2021-03-14 RX ADMIN — ASPIRIN 81 MG: 81 TABLET, COATED ORAL at 09:11

## 2021-03-14 RX ADMIN — Medication 10 ML: at 21:02

## 2021-03-14 RX ADMIN — LORATADINE 10 MG: 10 TABLET ORAL at 09:12

## 2021-03-14 RX ADMIN — METOPROLOL TARTRATE 50 MG: 50 TABLET, FILM COATED ORAL at 17:14

## 2021-03-14 RX ADMIN — APIXABAN 5 MG: 5 TABLET, FILM COATED ORAL at 09:12

## 2021-03-14 RX ADMIN — OXYCODONE HYDROCHLORIDE AND ACETAMINOPHEN 500 MG: 500 TABLET ORAL at 09:12

## 2021-03-14 RX ADMIN — Medication 10 ML: at 14:00

## 2021-03-14 NOTE — PROGRESS NOTES
[address] [facility] Adult  Hospitalist Group                                                                                          Hospitalist Progress Note  Aidan Ho MD  Answering service: [phone] from in house phone        Date of Service:  3/14/2021  NAME:  Lety Menendez  :  1965  MRN:  [id number]      Admission Summary:     Patient is not able to provide much history as he has significant shortness of breath and increased work of breathing. Patient was admitted on 2021 at [facility] and discharged on 2021. Patient was admitted with acute hypoxic respiratory failure, severe multilobar Covid pneumonia, pneumomediastinum, patient also has history of right lower extremity DVT. Patient was discharged to [facility], patient has been having increased shortness of breath for the last few days and today became quite short of breath, patient was put on nonrebreather 15 L and was brought to [facility].  Patient continued to be quite short of breath and is currently on 45 L nasal cannula, patient appears to be distressed. Patient was evaluated by the ICU team and deemed appropriate to be admitted to the hospitalist service on intermediate care unit. Patient denies any chest pain associated with his symptoms reports that he feels he is struggling to breathe. Patient reports mild cough but denies fever      Interval history / Subjective: Follow up severe sepsis, PNA, Acute hypoxic respiratory failure. Patient seen and examined at the bedside. Labs, images and notes reviewed  Discussed with nursing staff, orders reviewed. Plan discussed with patient/Family  SOB present with exertion but somewhat better. Denied any other concern. On 3-4LPM O2 NC.      Assessment & Plan:     # Acute on chronic hypoxic respiratory failure with recent COVID 19 Pneumonia  -received  iv cefepime 3/3-3/9,    -SARS CoV 2 PCR not detected on 3/2  -CTA chest 3/9:Extensive scattered interstitial, groundglass and parenchymal opacity with  bronchiectatic change as well. Imaging findings may be related to viral pneumonia. - procal wnl, sputum cultures if possible  -his O2 saturation >90% on 5lt oxygen at rest but desaturating with exertion but recovering  -Lasix 20mg once given 3/12, 3/13  -CXR 3/13 noted with mild improvement  -Add lasix 40mg PO daily with molly watching I/O, BMP and clinical course. May not need lasox for long term. # Severe sepsis likely due to pneumonia POA, resolved  -leukocytosis, elevated lactic acid, tachycardia, elevated liver enzyme   -completed above abx, oxygen support  -follow up on blood cx, NGTD     # Thrombosis of left median cubital vein  -patient has h/o DVT few yrs ago, with recent COVID 19 -continue anticoagulation Eliquis for now and repeat ultrasound again in 2 weeks    Code status: Full Code  DVT prophylaxis: [address] discussed with: Patient/Family, Nurse and   Anticipated Disposition: IPR, pending choice  Anticipated Discharge: Greater than 48 hours     Hospital Problems  Date Reviewed: 5/8/2019          Codes Class Noted POA    Respiratory failure ([facility] [address] ICD-10-CM: J96.90  ICD-9-CM: 518.81  3/2/2021 Unknown                Vital Signs:    Last 24hrs VS reviewed since prior progress note.  Most recent are:  Visit Vitals  /74 (BP 1 Location: Left upper arm, BP Patient Position: At rest)   Pulse 77   Temp 97.8 °F (36.6 °C)   Resp 28   Ht 5' 8\" (1.727 m)   Wt 74.4 kg (164 lb)   SpO2 98%   BMI 24.94 kg/m²         Intake/Output Summary (Last 24 hours) at 3/14/2021 1204  Last data filed at 3/13/2021 2200  Gross per 24 hour   Intake --   Output 750 ml   Net -750 ml        Physical Examination:     I had a face to face encounter with this patient and independently examined them on 3/14/2021 as outlined below:          Constitutional:  No acute distress, cooperative, pleasant    ENT:  Oral mucosa moist, oropharynx benign. Resp:  Decrease bilaterally. No wheezing/rhonchi/rales. No accessory muscle use   CV:  Regular rhythm, normal rate, no murmurs, gallops, rubs    GI:  Soft, non distended, non tender. normoactive bowel sounds, no hepatosplenomegaly     Musculoskeletal:  No LE edema     Neurologic:  Moves all extremities. AAOx3, CN II-XII grossly intact     Skin:  Good turgor, no rashes or ulcers       Data Review:    Review and/or order of clinical lab test  Review and/or order of tests in the medicine section of CPT    Ct Head Wo Cont    Result Date: 3/2/2021  No acute intracranial abnormality     Cta Chest W Or W Wo Cont    Result Date: 3/9/2021  There is no pulmonary embolism. There is no aortic aneurysm or dissection. Extensive scattered interstitial, groundglass and parenchymal opacity with bronchiectatic change as well. Imaging findings may be related to viral pneumonia. Minimal left-sided effusion. Cardiomegaly. Cta Chest W Or W Wo Cont    Result Date: 3/2/2021  1. Limitations secondary to motion. No evidence of pulmonary embolus. 2.  Diffuse interstitial airspace opacities consistent with Covid 19 pneumonia or sequela. Xr Chest Port    Result Date: 3/13/2021  Slightly improved appearance to coarse pulmonary infiltrates with no new or superimposed infiltrate evident by plain film. Xr Chest Port    Result Date: 3/11/2021  Severe bilateral mid and lower lung pulmonary opacifications appear unchanged. Findings consistent with bilateral pneumonia. Xr Chest Port    Result Date: 3/3/2021  Stable low lung volumes with bilateral lung opacities. Xr Chest Port    Result Date: 3/2/2021  Low lung volumes. Diffuse interstitial and airspace opacities with some improved aeration in the right lung base.         Labs:     Recent Labs     03/14/21  0400 03/13/21  0455   WBC 8.8 8.7   HGB 10.5* 10.6*   HCT 33.1* 33.0*    233     Recent Labs     03/14/21  0400 03/13/21  0325 03/12/21  0401    137 134*   K 4.2 4.4 4.1    104 101   CO2 29 29 31   BUN 15 12 14   CREA 0.62* 0.58* 0.57*   GLU 91 86 88   CA 9.2 9.2 9.4     No results for input(s): ALT, AP, TBIL, TBILI, TP, ALB, GLOB, GGT, AML, LPSE in the last 72 hours. No lab exists for component: SGOT, GPT, AMYP, HLPSE  No results for input(s): INR, PTP, APTT, INREXT, INREXT in the last 72 hours. No results for input(s): FE, TIBC, PSAT, FERR in the last 72 hours. No results found for: FOL, RBCF   No results for input(s): PH, PCO2, PO2 in the last 72 hours. No results for input(s): CPK, CKNDX, TROIQ in the last 72 hours.     No lab exists for component: CPKMB  No results found for: CHOL, CHOLX, CHLST, CHOLV, HDL, HDLP, LDL, LDLC, DLDLP, TGLX, TRIGL, TRIGP, CHHD, CHHDX  Lab Results   Component Value Date/Time    Glucose (POC) 184 (H) 02/17/2021 03:46 PM    Glucose (POC) 141 (H) 02/17/2021 11:31 AM    Glucose (POC) 89 02/17/2021 08:00 AM    Glucose (POC) 293 (H) 02/16/2021 08:47 PM    Glucose (POC) 165 (H) 02/16/2021 03:58 PM     Lab Results   Component Value Date/Time    Color YELLOW/STRAW 03/02/2021 05:08 PM    Appearance CLEAR 03/02/2021 05:08 PM    Specific gravity 1.012 03/02/2021 05:08 PM    pH (UA) 7.5 03/02/2021 05:08 PM    Protein Negative 03/02/2021 05:08 PM    Glucose Negative 03/02/2021 05:08 PM    Ketone Negative 03/02/2021 05:08 PM    Bilirubin Negative 03/02/2021 05:08 PM    Urobilinogen 0.2 03/02/2021 05:08 PM    Nitrites Negative 03/02/2021 05:08 PM    Leukocyte Esterase Negative 03/02/2021 05:08 PM    Epithelial cells FEW 03/02/2021 01:53 PM    Bacteria Negative 03/02/2021 01:53 PM    WBC 0-4 03/02/2021 01:53 PM    RBC 0-5 03/02/2021 01:53 PM         Medications Reviewed:     Current Facility-Administered Medications   Medication Dose Route Frequency    dextromethorphan (DELSYM) 30 mg/5 mL syrup 30 mg  30 mg Oral Q12H    loratadine (CLARITIN) tablet 10 mg  10 mg Oral DAILY    benzonatate (TESSALON) capsule 100 mg  100 mg Oral TID PRN    guaiFENesin ER (MUCINEX) tablet 600 mg  600 mg Oral Q12H    sodium chloride (OCEAN) 0.65 % nasal squeeze bottle 2 Spray  2 Spray Both Nostrils Q2H PRN    apixaban (ELIQUIS) tablet 5 mg  5 mg Oral BID    ascorbic acid (vitamin C) (VITAMIN C) tablet 500 mg  500 mg Oral DAILY    aspirin delayed-release tablet 81 mg  81 mg Oral DAILY    melatonin tablet 3 mg  3 mg Oral QHS    metoprolol tartrate (LOPRESSOR) tablet 50 mg  50 mg Oral BID    therapeutic multivitamin (THERAGRAN) tablet 1 Tab  1 Tab Oral DAILY    sodium chloride (NS) flush 5-40 mL  5-40 mL IntraVENous Q8H    sodium chloride (NS) flush 5-40 mL  5-40 mL IntraVENous PRN    acetaminophen (TYLENOL) tablet 650 mg  650 mg Oral Q4H PRN    sodium chloride (NS) flush 5-10 mL  5-10 mL IntraVENous PRN     ______________________________________________________________________  EXPECTED LENGTH OF STAY: 4d 19h  ACTUAL LENGTH OF STAY:          12                 Horace Brown MD

## 2021-03-14 NOTE — PROGRESS NOTES
Bedside, Verbal and Written shift change report given to Ben Weldon 10 and Clara Griffin RN  (oncoming nurse) by Foreign Oglesby RN (offgoing nurse). Report included the following information SBAR, Kardex, ED Summary, Procedure Summary, Intake/Output, MAR, Accordion, Recent Results, Med Rec Status, Cardiac Rhythm NSR, Alarm Parameters , Pre Procedure Checklist, Procedure Verification, Quality Measures and Dual Neuro Assessment. 2230: Assessment completed, Medication given, Pt resting in bed no complaints of pain. 0000: Pt reassessment  completed no changed noted . Pt denies pain. Pt resting with call bell within reach     0300: Pt resting with call bell within reach. Pt denies any pain. 0400: Reassessment. No changes noted. Pt denies any pain. Pt is resting with call bell in reach        0700: Pt resting with call bell within reach. Vitals signs taken. Pt denies pain    0730 Bedside, Verbal and Written shift change report given to Tommie Miranda (oncoming nurse) by Sayda Baldwin RN and Clara Griffin RN (offgoing nurse). Report included the following information SBAR, Kardex, ED Summary, Procedure Summary, Intake/Output, MAR, Accordion, Recent Results, Med Rec Status, Cardiac Rhythm NSR, Alarm Parameters , Pre Procedure Checklist, Procedure Verification, Quality Measures and Dual Neuro Assessment.

## 2021-03-14 NOTE — PROGRESS NOTES
Problem: Pressure Injury - Risk of  Goal: *Prevention of pressure injury  Description: Document Govind Scale and appropriate interventions in the flowsheet. Outcome: Progressing Towards Goal  Note: Pressure Injury Interventions:  Sensory Interventions: Keep linens dry and wrinkle-free, Maintain/enhance activity level, Minimize linen layers, Float heels, Check visual cues for pain, Pressure redistribution bed/mattress (bed type), Turn and reposition approx. every two hours (pillows and wedges if needed)    Moisture Interventions: Maintain skin hydration (lotion/cream), Limit adult briefs, Minimize layers, Absorbent underpads    Activity Interventions: Increase time out of bed, Pressure redistribution bed/mattress(bed type)    Mobility Interventions: HOB 30 degrees or less, Pressure redistribution bed/mattress (bed type), Turn and reposition approx. every two hours(pillow and wedges)    Nutrition Interventions: Document food/fluid/supplement intake    Friction and Shear Interventions: HOB 30 degrees or less, Lift sheet, Minimize layers                Problem: Falls - Risk of  Goal: *Absence of Falls  Description: Document Ignacio Fall Risk and appropriate interventions in the flowsheet.   Outcome: Progressing Towards Goal  Note: Fall Risk Interventions:  Mobility Interventions: Communicate number of staff needed for ambulation/transfer, Patient to call before getting OOB         Medication Interventions: Evaluate medications/consider consulting pharmacy, Patient to call before getting OOB, Teach patient to arise slowly    Elimination Interventions: Call light in reach, Patient to call for help with toileting needs, Stay With Me (per policy), Toilet paper/wipes in reach, Toileting schedule/hourly rounds              Problem: Patient Education: Go to Patient Education Activity  Goal: Patient/Family Education  Outcome: Progressing Towards Goal

## 2021-03-15 PROCEDURE — 74011250637 HC RX REV CODE- 250/637: Performed by: INTERNAL MEDICINE

## 2021-03-15 PROCEDURE — 77010033678 HC OXYGEN DAILY

## 2021-03-15 PROCEDURE — 77030012794 HC KT NSL CANN/HGH TRAN -A

## 2021-03-15 PROCEDURE — 97530 THERAPEUTIC ACTIVITIES: CPT

## 2021-03-15 PROCEDURE — 74011250637 HC RX REV CODE- 250/637: Performed by: HOSPITALIST

## 2021-03-15 PROCEDURE — 65660000001 HC RM ICU INTERMED STEPDOWN

## 2021-03-15 PROCEDURE — 97535 SELF CARE MNGMENT TRAINING: CPT

## 2021-03-15 PROCEDURE — 74011250637 HC RX REV CODE- 250/637: Performed by: FAMILY MEDICINE

## 2021-03-15 PROCEDURE — 77030018831 HC SOL IRR H20 BAXT -A

## 2021-03-15 RX ADMIN — Medication 10 ML: at 21:51

## 2021-03-15 RX ADMIN — LORATADINE 10 MG: 10 TABLET ORAL at 09:26

## 2021-03-15 RX ADMIN — BENZONATATE 100 MG: 100 CAPSULE ORAL at 09:42

## 2021-03-15 RX ADMIN — THERA TABS 1 TABLET: TAB at 09:26

## 2021-03-15 RX ADMIN — SALINE NASAL SPRAY 2 SPRAY: 1.5 SOLUTION NASAL at 08:45

## 2021-03-15 RX ADMIN — DEXTROMETHORPHAN 30 MG: 30 SUSPENSION, EXTENDED RELEASE ORAL at 21:50

## 2021-03-15 RX ADMIN — APIXABAN 5 MG: 5 TABLET, FILM COATED ORAL at 18:19

## 2021-03-15 RX ADMIN — BENZONATATE 100 MG: 100 CAPSULE ORAL at 18:19

## 2021-03-15 RX ADMIN — METOPROLOL TARTRATE 50 MG: 50 TABLET, FILM COATED ORAL at 18:19

## 2021-03-15 RX ADMIN — FUROSEMIDE 40 MG: 40 TABLET ORAL at 09:26

## 2021-03-15 RX ADMIN — METOPROLOL TARTRATE 50 MG: 50 TABLET, FILM COATED ORAL at 09:26

## 2021-03-15 RX ADMIN — OXYCODONE HYDROCHLORIDE AND ACETAMINOPHEN 500 MG: 500 TABLET ORAL at 09:26

## 2021-03-15 RX ADMIN — DEXTROMETHORPHAN 30 MG: 30 SUSPENSION, EXTENDED RELEASE ORAL at 09:52

## 2021-03-15 RX ADMIN — Medication 10 ML: at 07:25

## 2021-03-15 RX ADMIN — Medication 10 ML: at 15:13

## 2021-03-15 RX ADMIN — GUAIFENESIN 600 MG: 600 TABLET, EXTENDED RELEASE ORAL at 21:50

## 2021-03-15 RX ADMIN — GUAIFENESIN 600 MG: 600 TABLET, EXTENDED RELEASE ORAL at 09:26

## 2021-03-15 RX ADMIN — ASPIRIN 81 MG: 81 TABLET, COATED ORAL at 09:26

## 2021-03-15 RX ADMIN — APIXABAN 5 MG: 5 TABLET, FILM COATED ORAL at 09:26

## 2021-03-15 NOTE — PROGRESS NOTES
Problem: Self Care Deficits Care Plan (Adult)  Goal: *Acute Goals and Plan of Care (Insert Text)  Description:   FUNCTIONAL STATUS PRIOR TO ADMISSION: Patient was independent and active without use of DME prior to initial hospitalization at [facility], Dx with COVID 1/13/21, discharge to rehab where he was able to ambulate ~40' until last week he was there    HOME SUPPORT: The patient lived with family but did not require assist.    Occupational Therapy Goals  Initiated 3/9/2021  1. Patient will tolerate > or = 5 minutes functional activity and maintain oxygen sats > or = 90% within 7 day(s). 2.  Patient will stand pivot to bedside commode 1x/day with CGA within 7 day(s)  3. Patient will perform bilateral UE AROM coordinated breathing exercises x;s 5 reps and maintain sats > or = 90% within 7 day(s)  4. Patient will don gown and socks with increased time and set up and maintain sats > or = 90 % within 7 day(s). Outcome: Progressing Towards Goal   OCCUPATIONAL THERAPY TREATMENT  Patient: Armando Hernandez (85 y.o. male)  Date: 3/15/2021  Diagnosis: Respiratory failure ([facility] [address] [J96.90] <principal problem not specified>       Precautions:    Chart, occupational therapy assessment, plan of care, and goals were reviewed. ASSESSMENT  Patient continues with skilled OT services and is not progressing towards goals. Pt received supine in bed with HOB elevated, 92-93% on 5L. Asking to use bathroom d/t pt having bowel mvmt. Performed bed mobility at min assist x 1. Seated on EOB, extremely limited d/t increase SOB and de-saturation levels on 5L low 80s%. Pt bumped up to 6L, 82-88% after 3-4 min  Increase time to recover. Pt returned pt to supine to clean. In sidelying, pt dropped to 72% d/t increase coughing and having a difficult time catching breath. Pt returned to supine, partially cleaned. Notified nursing to allow pt time to recover before finishing completing marcy hygiene.  Overall, pt is motivated to participate, but limited by SOB and de-saturation levels. Will con't to follow     Current Level of Function Impacting Discharge (ADLs): SOB, de-saturation levels which limit participation    Other factors to consider for discharge:          PLAN :  Patient continues to benefit from skilled intervention to address the above impairments. Continue treatment per established plan of care. to address goals. Recommend with staff:     Recommend next OT session: see goals    Recommendation for discharge: (in order for the patient to meet his/her long term goals)  Therapy 3 hours per day 5-7 days per week    This discharge recommendation:  A follow-up discussion with the attending provider and/or case management is planned    IF patient discharges home will need the following DME: TBD at rehab       SUBJECTIVE:   Patient stated I can't catch my breath.     OBJECTIVE DATA SUMMARY:   Cognitive/Behavioral Status:  Neurologic State: Alert  Orientation Level: Oriented X4  Cognition: Appropriate decision making             Functional Mobility and Transfers for ADLs:  Bed Mobility:  Rolling: Supervision  Supine to Sit: Supervision  Sit to Supine: Stand-by assistance    Transfers:             Balance:  Sitting: Intact    ADL Intervention:  Feeding  Feeding Assistance: Independent    Grooming  Grooming Assistance: Set-up    Upper Body Bathing  Bathing Assistance: Moderate assistance  Position Performed: Long sitting on bed    Lower Body Bathing  Bathing Assistance: Total assistance(dependent)  Position Performed: Other(sidelying)    Upper Body [address]: Set-up    Lower Body Dressing Assistance  Dressing Assistance: Total assistance(dependent)    Toileting  Toileting Assistance:  Total assistance(dependent)         Therapeutic Exercises:       Pain:  No c/o pain    Activity Tolerance:   Poor    After treatment patient left in no apparent distress:   Supine in bed and HOB elevated    COMMUNICATION/COLLABORATION: The patients plan of care was discussed with: Physical therapist and Registered nurse.      Bobbi Christiansen OTR/L  Time Calculation: 30 mins

## 2021-03-15 NOTE — PROGRESS NOTES
Transition Plan of Care  RUR 17%-Low  Continues to be on 3-4 lpm of oxygen. Recommendations from rehab services are for IPR. Patient is agreeable and would like to return to [facility]. Referral sent by this CM today. Will need authorization. Will ask PT/OT to update notes.   Juan Antonio Romero RN CRM  Ext [phone]

## 2021-03-15 NOTE — PROGRESS NOTES
Problem: Mobility Impaired (Adult and Pediatric)  Goal: *Acute Goals and Plan of Care (Insert Text)  Description: FUNCTIONAL STATUS PRIOR TO ADMISSION: Patient was independent and active without use of DME prior to recent admissions. Pt was at [facility] then [facility]. Pt was planned to d/c home then re-hospitalized. Pt was ambulating with min A x 40 ft x RW with PT.     HOME SUPPORT PRIOR TO ADMISSION: Patient lived alone prior to recent hospital stays and plans to have assistance for 2-3 weeks once d/c home. Physical Therapy Goals  Reviewed/ revised 3/15/2021  1. Patient will move from supine to sit and sit to supine  in bed with modified independence within 7 day(s). 2.  Patient will transfer from bed to chair and chair to bed with supervision/set-up using the least restrictive device within 7 day(s). 3.  Patient will perform sit to stand with supervision/set-up within 7 day(s). 4.  Patient will ambulate with supervision/set-up for 30 feet with the least restrictive device within 7 day(s). Physical Therapy Goals  Initiated 3/8/2021  1. Patient will move from supine to sit and sit to supine , scoot up and down, and roll side to side in bed with modified independence within 7 day(s). 2.  Patient will transfer from bed to chair and chair to bed with modified independence using the least restrictive device within 7 day(s). 3.  Patient will perform sit to stand with modified independence within 7 day(s). 4.  Patient will ambulate with modified independence for 300 feet with the least restrictive device within 7 day(s). 5.  Patient will ascend/descend 5 stairs with one handrail(s) with modified independence within 7 day(s).         3/15/2021 1035 by Adrian Kauffman PT  Outcome: Not Met   PHYSICAL THERAPY TREATMENT: WEEKLY REASSESSMENT  Patient: Rola Story (88 y.o. male)  Date: 3/15/2021  Primary Diagnosis: Respiratory failure ([address] [J96.90]       Precautions: fall, aspiration ASSESSMENT  Patient continues with skilled PT services and is not progressing towards goals due to decreased cardiopulmonary endurance which limits activity tolerance and functional mobility training. Pt able to complete bed mob with supervision, however continues to demo O2 desat with activity and unable to tolerate transfer to chair this date. .     Pt received on 4L O2 this session with SpO2 90-92%, HR low 100s-115 bpm. Demo SpO2 low 70s, HR 130s with EOB; required significant time, O2 titration to 6L, and return to semi-reclined position to recover to SpO2 80s. Initiated pericare for BM to include pt roll to L, however activity discontinued before completion due to O2 desat to 70s with coughing and inability to recover with rest. No further activity this session due to pt need to lie still and rest. Left with RN in room who noted pt eventual return to SpO2 80s after session. Discussed possible need for mid-flow O2 to enable increased tolerance to activity and prevent further functional decline. Pt remains below recent functional baseline. Will benefit from mobility progression with acute PT as tolerated. Recommend pt return to IPR when medically stable to facilitate return to max level of functional independence. Patient's progression toward goals since last assessment: no significant progress due to decreased activity tolerance, up to chair only thus far    Current Level of Function Impacting Discharge (mobility/balance): bed mob supervision  (transfers SBA per prior notes)      Other factors to consider for discharge: lives alone, transferred from IPR         PLAN :  Goals have been updated based on progression since last assessment. Patient continues to benefit from skilled intervention to address the above impairments.     Recommendations and Planned Interventions: bed mobility training, transfer training, gait training, therapeutic exercises, patient and family training/education, and therapeutic activities      Frequency/Duration: Patient will be followed by physical therapy:  5 times a week to address goals. Recommendation for discharge: (in order for the patient to meet his/her long term goals)  Therapy 3 hours per day 5-7 days per week    This discharge recommendation:  Has been made in collaboration with the attending provider and/or case management    IF patient discharges home will need the following DME: to be determined (TBD)         SUBJECTIVE:   Patient stated I just can't get my breath.     OBJECTIVE DATA SUMMARY:   HISTORY:    Past Medical History:   Diagnosis Date    GERD (gastroesophageal reflux disease)     Obesity (BMI 30-39. 9) 5/8/2019    Right leg DVT ([address] 2019     Past Surgical History:   Procedure Laterality Date    HX ORTHOPAEDIC         Personal factors and/or comorbidities impacting plan of care: h/o Covid    Home Situation  Home Environment: Private residence  # Steps to Enter: 10  Rails to Enter: Yes  Hand Rails : Bilateral  One/Two Story Residence: Two story  # of Interior Steps: 12  Interior Rails: Left  Living Alone: No  Support Systems: (pt plans to have family at home for 1st 2-3 weeks)  Current DME Used/Available at Home: Deyanira Oakes, rolling, [place], straight(built in bench for shower)  Tub or Shower Type: Shower    EXAMINATION/PRESENTATION/DECISION MAKING:   Critical Behavior:  Neurologic State: Alert  Orientation Level: Oriented X4  Cognition: Follows commands  Safety/Judgement: Awareness of environment, Fall prevention, Home safety, Insight into deficits  Hearing:   Auditory  Auditory Impairment: Hard of hearing, bilateral(certain tones worse than others)    Range Of Motion:  AROM: Generally decreased, functional                       Strength:    Strength: Generally decreased, functional                    Tone & Sensation:   Tone: Normal              Sensation: Intact               Coordination:  Coordination: Within functional limits  Vision:      Functional Mobility:  Bed Mobility:  Rolling: Supervision  Supine to Sit: Supervision  Sit to Supine: Stand-by assistance     Transfers:      deferred due to resp status                       Balance:   Sitting: Intact            Pain Rating:  No c/o pain    Activity Tolerance:   Poor, desaturates with exertion and requires oxygen, requires rest breaks and observed SOB with activity    After treatment patient left in no apparent distress:   Supine in bed, Call bell within reach, Side rails x 3 and HOB elevated, RN present    COMMUNICATION/EDUCATION:   The patients plan of care was discussed with: Occupational therapist and Registered nurse. Fall prevention education was provided and the patient/caregiver indicated understanding., Patient/family have participated as able in goal setting and plan of care. and Patient/family agree to work toward stated goals and plan of care.     Thank you for this referral.  Aicha Don, PT   Time Calculation: 35 mins

## 2021-03-15 NOTE — PROGRESS NOTES
0730: Bedside and Verbal shift change report given to Yaneth Chandler (oncoming nurse) by Alba Reese RN (offgoing nurse). Report included the following information SBAR, Kardex, Intake/Output, MAR, Recent Results and Cardiac Rhythm NSR.

## 2021-03-15 NOTE — PROGRESS NOTES
[facility] Adult  Hospitalist Group                                                                                          Hospitalist Progress Note  Chucho Fang MD  Answering service: [phone] from in house phone        Date of Service:  3/15/2021  NAME:  Lovely Booker  :  1965  MRN:  [id number]      Admission Summary:     Patient is not able to provide much history as he has significant shortness of breath and increased work of breathing. Patient was admitted on 2021 at [facility] and discharged on 2021. Patient was admitted with acute hypoxic respiratory failure, severe multilobar Covid pneumonia, pneumomediastinum, patient also has history of right lower extremity DVT. Patient was discharged to [facility], patient has been having increased shortness of breath for the last few days and today became quite short of breath, patient was put on nonrebreather 15 L and was brought to [facility].  Patient continued to be quite short of breath and is currently on 45 L nasal cannula, patient appears to be distressed. Patient was evaluated by the ICU team and deemed appropriate to be admitted to the hospitalist service on intermediate care unit. Patient denies any chest pain associated with his symptoms reports that he feels he is struggling to breathe. Patient reports mild cough but denies fever      Interval history / Subjective: Follow up severe sepsis, PNA, Acute hypoxic respiratory failure. Patient seen and examined at the bedside. Labs, images and notes reviewed  Discussed with nursing staff, orders reviewed. Plan discussed with patient/Family  Feeling ok. Some desaturation in upper 80s with mild exertion and not able to wean much, at 3LPM from 4LPM. No fever, chills. No other overnight events or concerns.      Assessment & Plan:     # Acute on chronic hypoxic respiratory failure with recent COVID 19 Pneumonia  -received  iv cefepime 3/3-3/9,    -SARS CoV 2 PCR not detected on 3/2  -CTA chest 3/9: Extensive scattered interstitial, groundglass and parenchymal opacity with  bronchiectatic change as well. Imaging findings may be related to viral pneumonia.  -Procal wnl, sputum cultures if possible  -his O2 saturation >90% on 5lt oxygen at rest but desaturating with exertion but recovering  -Lasix 20mg once given 3/12, 3/13  -CXR 3/13 noted with mild improvement  -Continue lasix 40mg PO daily, likely short term need with close BMP monitoring  -Continue rest of the current mx    # Severe sepsis likely due to pneumonia POA, resolved  -leukocytosis, elevated lactic acid, tachycardia, elevated liver enzyme   -completed above abx, oxygen support  -follow up on blood cx, NGTD     # Thrombosis of left median cubital vein  -patient has h/o DVT few yrs ago, with recent COVID 19 -continue anticoagulation Eliquis for now and repeat ultrasound again in 2 weeks    Code status: Full Code  DVT prophylaxis: [address] discussed with: Patient/Family, Nurse and   Anticipated Disposition: IPR, pending choice  Anticipated Discharge: Greater than 48 hours     Hospital Problems  Date Reviewed: 5/8/2019          Codes Class Noted POA    Respiratory failure ([facility] [address] ICD-10-CM: J96.90  ICD-9-CM: 518.81  3/2/2021 Unknown                Vital Signs:    Last 24hrs VS reviewed since prior progress note.  Most recent are:  Visit Vitals  /80 (BP 1 Location: Left upper arm, BP Patient Position: At rest)   Pulse 76   Temp 98.4 °F (36.9 °C)   Resp 20   Ht 5' 8\" (1.727 m)   Wt 73.2 kg (161 lb 6 oz)   SpO2 96%   BMI 24.54 kg/m²         Intake/Output Summary (Last 24 hours) at 3/15/2021 0840  Last data filed at 3/14/2021 1714  Gross per 24 hour   Intake --   Output 1180 ml   Net -1180 ml        Physical Examination:     I had a face to face encounter with this patient and independently examined them on 3/15/2021 as outlined below:          Constitutional:  No acute distress, cooperative, pleasant. Stable, on 3LPM O2 NC   ENT:  Oral mucosa moist, oropharynx benign. Resp:  Better AE, fine crackles bibasilar. No wheezing. No accessory muscle use   CV:  Regular rhythm, normal rate, no murmurs, gallops, rubs    GI:  Soft, NT ND, BS+, no hepatosplenomegaly     Musculoskeletal:  No LE edema     Neurologic:  Moves all extremities. AAOx3, CN II-XII grossly intact     Skin:  Good turgor, no rashes or ulcers       Data Review:    Review and/or order of clinical lab test  Review and/or order of tests in the medicine section of CPT    Ct Head Wo Cont    Result Date: 3/2/2021  No acute intracranial abnormality     Cta Chest W Or W Wo Cont    Result Date: 3/9/2021  There is no pulmonary embolism. There is no aortic aneurysm or dissection. Extensive scattered interstitial, groundglass and parenchymal opacity with bronchiectatic change as well. Imaging findings may be related to viral pneumonia. Minimal left-sided effusion. Cardiomegaly. Cta Chest W Or W Wo Cont    Result Date: 3/2/2021  1. Limitations secondary to motion. No evidence of pulmonary embolus. 2.  Diffuse interstitial airspace opacities consistent with Covid 19 pneumonia or sequela. Xr Chest Port    Result Date: 3/13/2021  Slightly improved appearance to coarse pulmonary infiltrates with no new or superimposed infiltrate evident by plain film. Xr Chest Port    Result Date: 3/11/2021  Severe bilateral mid and lower lung pulmonary opacifications appear unchanged. Findings consistent with bilateral pneumonia. Xr Chest Port    Result Date: 3/3/2021  Stable low lung volumes with bilateral lung opacities. Xr Chest Port    Result Date: 3/2/2021  Low lung volumes. Diffuse interstitial and airspace opacities with some improved aeration in the right lung base.         Labs:     Recent Labs     03/14/21  0400 03/13/21  0455   WBC 8.8 8.7   HGB 10.5* 10.6*   HCT 33.1* 33.0*   PLT 232 233     Recent Labs     03/14/21  0400 03/13/21  0325    137   K 4.2 4.4    104   CO2 29 29   BUN 15 12   CREA 0.62* 0.58*   GLU 91 86   CA 9.2 9.2     No results for input(s): ALT, AP, TBIL, TBILI, TP, ALB, GLOB, GGT, AML, LPSE in the last 72 hours. No lab exists for component: SGOT, GPT, AMYP, HLPSE  No results for input(s): INR, PTP, APTT, INREXT, INREXT in the last 72 hours. No results for input(s): FE, TIBC, PSAT, FERR in the last 72 hours. No results found for: FOL, RBCF   No results for input(s): PH, PCO2, PO2 in the last 72 hours. No results for input(s): CPK, CKNDX, TROIQ in the last 72 hours.     No lab exists for component: CPKMB  No results found for: CHOL, CHOLX, CHLST, CHOLV, HDL, HDLP, LDL, LDLC, DLDLP, TGLX, TRIGL, TRIGP, CHHD, CHHDX  Lab Results   Component Value Date/Time    Glucose (POC) 184 (H) 02/17/2021 03:46 PM    Glucose (POC) 141 (H) 02/17/2021 11:31 AM    Glucose (POC) 89 02/17/2021 08:00 AM    Glucose (POC) 293 (H) 02/16/2021 08:47 PM    Glucose (POC) 165 (H) 02/16/2021 03:58 PM     Lab Results   Component Value Date/Time    Color YELLOW/STRAW 03/02/2021 05:08 PM    Appearance CLEAR 03/02/2021 05:08 PM    Specific gravity 1.012 03/02/2021 05:08 PM    pH (UA) 7.5 03/02/2021 05:08 PM    Protein Negative 03/02/2021 05:08 PM    Glucose Negative 03/02/2021 05:08 PM    Ketone Negative 03/02/2021 05:08 PM    Bilirubin Negative 03/02/2021 05:08 PM    Urobilinogen 0.2 03/02/2021 05:08 PM    Nitrites Negative 03/02/2021 05:08 PM    Leukocyte Esterase Negative 03/02/2021 05:08 PM    Epithelial cells FEW 03/02/2021 01:53 PM    Bacteria Negative 03/02/2021 01:53 PM    WBC 0-4 03/02/2021 01:53 PM    RBC 0-5 03/02/2021 01:53 PM         Medications Reviewed:     Current Facility-Administered Medications   Medication Dose Route Frequency    furosemide (LASIX) tablet 40 mg  40 mg Oral DAILY    dextromethorphan (DELSYM) 30 mg/5 mL syrup 30 mg  30 mg Oral Q12H    loratadine (CLARITIN) tablet 10 mg  10 mg Oral DAILY    benzonatate (TESSALON) capsule 100 mg  100 mg Oral TID PRN    guaiFENesin ER (MUCINEX) tablet 600 mg  600 mg Oral Q12H    sodium chloride (OCEAN) 0.65 % nasal squeeze bottle 2 Spray  2 Spray Both Nostrils Q2H PRN    apixaban (ELIQUIS) tablet 5 mg  5 mg Oral BID    ascorbic acid (vitamin C) (VITAMIN C) tablet 500 mg  500 mg Oral DAILY    aspirin delayed-release tablet 81 mg  81 mg Oral DAILY    melatonin tablet 3 mg  3 mg Oral QHS    metoprolol tartrate (LOPRESSOR) tablet 50 mg  50 mg Oral BID    therapeutic multivitamin (THERAGRAN) tablet 1 Tab  1 Tab Oral DAILY    sodium chloride (NS) flush 5-40 mL  5-40 mL IntraVENous Q8H    sodium chloride (NS) flush 5-40 mL  5-40 mL IntraVENous PRN    acetaminophen (TYLENOL) tablet 650 mg  650 mg Oral Q4H PRN    sodium chloride (NS) flush 5-10 mL  5-10 mL IntraVENous PRN     ______________________________________________________________________  EXPECTED LENGTH OF STAY: 4d 19h  ACTUAL LENGTH OF STAY:          13                 Hoang Vizcaino MD

## 2021-03-15 NOTE — PROGRESS NOTES
PCCM:    O2 requirements are down     BMP stable    Plan:    COVID19 with post inflammatory fibrosis    Lasix suggest daily for now   OOB as tolerated   Possible discharge soon?    He wants to go back to rehab   We will be available again to see if needed     Zheng Donato PA-C

## 2021-03-16 LAB
ALBUMIN SERPL-MCNC: 2.5 G/DL (ref 3.5–5)
ALBUMIN/GLOB SERPL: 0.6 {RATIO} (ref 1.1–2.2)
ALP SERPL-CCNC: 109 U/L (ref 45–117)
ALT SERPL-CCNC: 52 U/L (ref 12–78)
ANION GAP SERPL CALC-SCNC: 7 MMOL/L (ref 5–15)
AST SERPL-CCNC: 25 U/L (ref 15–37)
BILIRUB SERPL-MCNC: 0.3 MG/DL (ref 0.2–1)
BUN SERPL-MCNC: 16 MG/DL (ref 6–20)
BUN/CREAT SERPL: 29 (ref 12–20)
CALCIUM SERPL-MCNC: 9.5 MG/DL (ref 8.5–10.1)
CHLORIDE SERPL-SCNC: 100 MMOL/L (ref 97–108)
CO2 SERPL-SCNC: 31 MMOL/L (ref 21–32)
CREAT SERPL-MCNC: 0.55 MG/DL (ref 0.7–1.3)
ERYTHROCYTE [DISTWIDTH] IN BLOOD BY AUTOMATED COUNT: 15.4 % (ref 11.5–14.5)
GLOBULIN SER CALC-MCNC: 3.9 G/DL (ref 2–4)
GLUCOSE SERPL-MCNC: 117 MG/DL (ref 65–100)
HCT VFR BLD AUTO: 35 % (ref 36.6–50.3)
HGB BLD-MCNC: 11.3 G/DL (ref 12.1–17)
MCH RBC QN AUTO: 28.3 PG (ref 26–34)
MCHC RBC AUTO-ENTMCNC: 32.3 G/DL (ref 30–36.5)
MCV RBC AUTO: 87.5 FL (ref 80–99)
NRBC # BLD: 0 K/UL (ref 0–0.01)
NRBC BLD-RTO: 0 PER 100 WBC
PLATELET # BLD AUTO: 243 K/UL (ref 150–400)
PMV BLD AUTO: 11.3 FL (ref 8.9–12.9)
POTASSIUM SERPL-SCNC: 4 MMOL/L (ref 3.5–5.1)
PROT SERPL-MCNC: 6.4 G/DL (ref 6.4–8.2)
RBC # BLD AUTO: 4 M/UL (ref 4.1–5.7)
SODIUM SERPL-SCNC: 138 MMOL/L (ref 136–145)
WBC # BLD AUTO: 9.5 K/UL (ref 4.1–11.1)

## 2021-03-16 PROCEDURE — 97535 SELF CARE MNGMENT TRAINING: CPT | Performed by: OCCUPATIONAL THERAPIST

## 2021-03-16 PROCEDURE — 74011250637 HC RX REV CODE- 250/637: Performed by: HOSPITALIST

## 2021-03-16 PROCEDURE — 97530 THERAPEUTIC ACTIVITIES: CPT

## 2021-03-16 PROCEDURE — 80053 COMPREHEN METABOLIC PANEL: CPT

## 2021-03-16 PROCEDURE — 97116 GAIT TRAINING THERAPY: CPT

## 2021-03-16 PROCEDURE — 74011250637 HC RX REV CODE- 250/637: Performed by: FAMILY MEDICINE

## 2021-03-16 PROCEDURE — 36415 COLL VENOUS BLD VENIPUNCTURE: CPT

## 2021-03-16 PROCEDURE — 97110 THERAPEUTIC EXERCISES: CPT

## 2021-03-16 PROCEDURE — 74011250637 HC RX REV CODE- 250/637: Performed by: INTERNAL MEDICINE

## 2021-03-16 PROCEDURE — 65660000001 HC RM ICU INTERMED STEPDOWN

## 2021-03-16 PROCEDURE — 85027 COMPLETE CBC AUTOMATED: CPT

## 2021-03-16 RX ORDER — DEXTROMETHORPHAN POLISTIREX 30 MG/5ML
30 SUSPENSION ORAL EVERY 12 HOURS
Status: DISCONTINUED | OUTPATIENT
Start: 2021-03-16 | End: 2021-03-18 | Stop reason: HOSPADM

## 2021-03-16 RX ORDER — GUAIFENESIN 600 MG/1
1200 TABLET, EXTENDED RELEASE ORAL EVERY 12 HOURS
Status: DISCONTINUED | OUTPATIENT
Start: 2021-03-16 | End: 2021-03-18 | Stop reason: HOSPADM

## 2021-03-16 RX ADMIN — METOPROLOL TARTRATE 50 MG: 50 TABLET, FILM COATED ORAL at 18:18

## 2021-03-16 RX ADMIN — DEXTROMETHORPHAN 30 MG: 30 SUSPENSION, EXTENDED RELEASE ORAL at 08:33

## 2021-03-16 RX ADMIN — THERA TABS 1 TABLET: TAB at 08:33

## 2021-03-16 RX ADMIN — ASPIRIN 81 MG: 81 TABLET, COATED ORAL at 08:33

## 2021-03-16 RX ADMIN — METOPROLOL TARTRATE 50 MG: 50 TABLET, FILM COATED ORAL at 08:33

## 2021-03-16 RX ADMIN — GUAIFENESIN 1200 MG: 600 TABLET, EXTENDED RELEASE ORAL at 18:18

## 2021-03-16 RX ADMIN — BENZONATATE 100 MG: 100 CAPSULE ORAL at 18:18

## 2021-03-16 RX ADMIN — GUAIFENESIN 600 MG: 600 TABLET, EXTENDED RELEASE ORAL at 08:33

## 2021-03-16 RX ADMIN — BENZONATATE 100 MG: 100 CAPSULE ORAL at 08:33

## 2021-03-16 RX ADMIN — OXYCODONE HYDROCHLORIDE AND ACETAMINOPHEN 500 MG: 500 TABLET ORAL at 08:33

## 2021-03-16 RX ADMIN — DEXTROMETHORPHAN 30 MG: 30 SUSPENSION, EXTENDED RELEASE ORAL at 18:17

## 2021-03-16 RX ADMIN — APIXABAN 5 MG: 5 TABLET, FILM COATED ORAL at 08:33

## 2021-03-16 RX ADMIN — Medication 3 MG: at 21:09

## 2021-03-16 RX ADMIN — LORATADINE 10 MG: 10 TABLET ORAL at 08:33

## 2021-03-16 RX ADMIN — Medication 10 ML: at 21:09

## 2021-03-16 RX ADMIN — FUROSEMIDE 40 MG: 40 TABLET ORAL at 08:33

## 2021-03-16 RX ADMIN — Medication 10 ML: at 06:16

## 2021-03-16 RX ADMIN — APIXABAN 5 MG: 5 TABLET, FILM COATED ORAL at 18:18

## 2021-03-16 RX ADMIN — Medication 10 ML: at 18:27

## 2021-03-16 NOTE — PROGRESS NOTES
Bedside shift change report given to VADIM Castellano (oncoming nurse) by Claude Walter (offgoing nurse). Report included the following information SBAR, Kardex, MAR and Recent Results.

## 2021-03-16 NOTE — PROGRESS NOTES
Problem: Mobility Impaired (Adult and Pediatric)  Goal: *Acute Goals and Plan of Care (Insert Text)  Description: FUNCTIONAL STATUS PRIOR TO ADMISSION: Patient was independent and active without use of DME prior to recent admissions. Pt was at [facility] then [facility]. Pt was planned to d/c home then re-hospitalized. Pt was ambulating with min A x 40 ft x RW with PT.     HOME SUPPORT PRIOR TO ADMISSION: Patient lived alone prior to recent hospital stays and plans to have assistance for 2-3 weeks once d/c home. Physical Therapy Goals  Reviewed/ revised 3/15/2021  1. Patient will move from supine to sit and sit to supine  in bed with modified independence within 7 day(s). 2.  Patient will transfer from bed to chair and chair to bed with supervision/set-up using the least restrictive device within 7 day(s). 3.  Patient will perform sit to stand with supervision/set-up within 7 day(s). 4.  Patient will ambulate with supervision/set-up for 30 feet with the least restrictive device within 7 day(s). Physical Therapy Goals  Initiated 3/8/2021  1. Patient will move from supine to sit and sit to supine , scoot up and down, and roll side to side in bed with modified independence within 7 day(s). 2.  Patient will transfer from bed to chair and chair to bed with modified independence using the least restrictive device within 7 day(s). 3.  Patient will perform sit to stand with modified independence within 7 day(s). 4.  Patient will ambulate with modified independence for 300 feet with the least restrictive device within 7 day(s). 5.  Patient will ascend/descend 5 stairs with one handrail(s) with modified independence within 7 day(s).       Outcome: Progressing Towards Goal   PHYSICAL THERAPY TREATMENT  Patient: Tanya Oh (25 y.o. male)  Date: 3/16/2021  Diagnosis: [facility] ([facility] [J96.90] <principal problem not specified>       Precautions:  fall  Chart, physical therapy assessment, plan of care and goals were reviewed. ASSESSMENT  Patient continues with skilled PT services and is progressing towards goals. Pt did much better today from an activity tolerance perspective. He was received in 6 liters of 02 and he remained on the 6 liters throughout our session. Resting 02 sats even with much conversation was 94-96% as it was with seated ex, see body of note for details. He worked on some amb able to increase his distance to a total of 40 feet, 20 feet X 2. After the first 20 feet his HR fab to 124 and he sat to rest and his 02 sats dipped to 87% but then recovered to 92% in about 30 seconds. After the second 20 feet his HR fab to 131 and he sat to rest and his 02 sats dropped to 80% and it took about 2 minutes for him to recover to 90%. We discussed activity pacing and the concept of chunking it down to complete tasks. Overall he did much better today while on the 6 liters of 02. Also of note, pt's cough was much better controlled today. Continue to recommend rehab. Current Level of Function Impacting Discharge (mobility/balance): stand by assist provided as well as ongoing of activity tolerance. 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 Continue treatment per established plan of care. to address goals. Recommendation for discharge: (in order for the patient to meet his/her long term goals)  Therapy 3 hours per day 5-7 days per week    This discharge recommendation:  A follow-up discussion with the attending provider and/or case management is planned    IF patient discharges home will need the following DME:  bedside commode, hospital bed, rolling walker, wheelchair, and ramps to access home. SUBJECTIVE:   Patient stated Kim Maldonado got up to the chair once over the weekend.     OBJECTIVE DATA SUMMARY:   Chart checked, pt cleared by nursing  Critical Behavior:  Neurologic State: Alert, Appropriate for age  Orientation Level: Oriented X4  Cognition: Appropriate decision making, Appropriate for age attention/concentration, Appropriate safety awareness, Follows commands  Safety/Judgement: Awareness of environment, Fall prevention, Insight into deficits  Functional Mobility Training:  Bed Mobility:     Supine to Sit: Stand-by assistance     Scooting: Stand-by assistance        Transfers:  Sit to Stand: Stand-by assistance  Stand to Sit: Stand-by assistance                    Balance:  Sitting: Intact; Without support  Standing: Impaired  Standing - Static: Good  Standing - Dynamic : Good  Ambulation/Gait Training:  Distance (ft): 40 Feet (ft)(20 feet X 2 with a seated rest break)  Assistive Device: Gait belt  Ambulation - Level of Assistance: Stand-by assistance        Gait Abnormalities: Decreased step clearance              Speed/Wen: Slow  Step Length: Left shortened;Right shortened                    Stairs: Therapeutic Exercises:   Bilaterally 10 reps: ankle pumps, quad sets, glue sets, hamstring sets, and LAQs. Also reviewed pursed lip breathing. Pain Rating:  None rated    Activity Tolerance:   See assessment    After treatment patient left in no apparent distress:   Sitting in chair, Call bell within reach, and LEs elevated    COMMUNICATION/COLLABORATION:   The patients plan of care was discussed with: Occupational therapist, Registered nurse, and Physician.      Riky Savage   Time Calculation: 43 mins

## 2021-03-16 NOTE — ADT AUTH CERT NOTES
Respiratory Failure GRG - Care Day 14 (3/15/2021) by Leonela Luis RN       Review Status Review Entered   Completed 3/15/2021 14:22      Criteria Review      Care Day: 14 Care Date: 3/15/2021 Level of Care: Intermediate Care    Guideline Day 3    Level Of Care    ( ) * Activity level acceptable    Clinical Status    ( ) * Ventilation status appropriate    3/15/2021 14:22:21 EDT by Heavenly Sanchez      92% 3lnc    (X) * Airway status acceptable    ( ) * Respiratory status acceptable    3/15/2021 14:22:21 EDT by Heavenly Sanchez      rr 25    (X) * Stable chest findings    (X) * Neurologic status acceptable    (X) * Temperature status acceptable    ( ) * No infection, or status acceptable    ( ) * General Discharge Criteria met    Interventions    (X) * No chest tube, or status acceptable    (X) * Intake acceptable    ( ) * No inpatient interventions needed    3/15/2021 14:22:21 EDT by Heavenly Sanchez      eliquis 5 mg po 2 times daily, vitamin c 500 mg po daily, aspirin 81 mg po daily, melatonin 3 mg po every bedtime, lopressor 50 mg po 2 times daily    mucinex 600mg po q12   tessalon 100mg po prn   delsym 30mg po q12    * Milestone   Additional Notes   3/15/21  inpt  telem   98.3    114    25   142/87    92% 3lnc   No labs      meds: eliquis 5 mg po 2 times daily, vitamin c 500 mg po daily, aspirin 81 mg po daily, melatonin 3 mg po every bedtime, lopressor 50 mg po 2 times daily    mucinex 600mg po q12   tessalon 100mg po prn   delsym 30mg po q12      orders:  card diet  ambulate with assistance   aspiration precaution   fall precaution   I&O   neuro/vasc ck q4   hospitalist note   Interval history / Subjective: Follow up severe sepsis, PNA, Acute hypoxic respiratory failure. Patient seen and examined at the bedside. Labs, images and notes reviewed   Discussed with nursing staff, orders reviewed. Plan discussed with patient/Family   Feeling ok.  Some desaturation in upper 80s with mild exertion and not able to wean much, at 3LPM from 4LPM. No fever, chills. No other overnight events or concerns.       Assessment & Plan:       # Acute on chronic hypoxic respiratory failure with recent COVID 19 Pneumonia   -received  iv cefepime 3/3-3/9,     -SARS CoV 2 PCR not detected on 3/2   -CTA chest 3/9: Extensive scattered interstitial, groundglass and parenchymal opacity with   bronchiectatic change as well.  Imaging findings may be related to viral pneumonia.   -Procal wnl, sputum cultures if possible   -his O2 saturation >90% on 5lt oxygen at rest but desaturating with exertion but recovering   -Lasix 20mg once given 3/12, 3/13   -CXR 3/13 noted with mild improvement   -Continue lasix 40mg PO daily, likely short term need with close BMP monitoring   -Continue rest of the current mx       # Severe sepsis likely due to pneumonia POA, resolved   -leukocytosis, elevated lactic acid, tachycardia, elevated liver enzyme    -completed above abx, oxygen support   -follow up on blood cx, NGTD        # Thrombosis of left median cubital vein   -patient has h/o DVT few yrs ago, with recent COVID 19 -continue anticoagulation Eliquis for now and repeat ultrasound again in 2 weeks       Code status: Full Code   DVT prophylaxis: [address] [facility] discussed with: Patient/Family, Nurse and    Anticipated Disposition: IPR, pending choice   Anticipated Discharge: Greater than 48 hours             Respiratory Failure GRG - Care Day 13 (3/14/2021) by Abebe Lozoya RN       Review Status Review Entered   Completed 3/15/2021 14:19      Criteria Review      Care Day: 13 Care Date: 3/14/2021 Level of Care: Intermediate Care    Guideline Day 3    Level Of Care    ( ) * Activity level acceptable    Clinical Status    ( ) * Ventilation status appropriate    3/15/2021 14:19:02 EDT by Salomon Reid      93% 3lnc    (X) * Airway status acceptable    ( ) * Respiratory status acceptable    3/15/2021 14:19:02 EDT by Shannan Green rr 30    ( ) * Stable chest findings    (X) * Neurologic status acceptable    (X) * Temperature status acceptable    ( ) * No infection, or status acceptable    ( ) * General Discharge Criteria met    Interventions    (X) * No chest tube, or status acceptable    (X) * Intake acceptable    ( ) * No inpatient interventions needed    3/15/2021 14:19:22 EDT by Kathia Denis      : eliquis 5 mg po 2 times daily, vitamin c 500 mg po daily, aspirin 81 mg po daily, melatonin 3 mg po every bedtime, lopressor 50 mg po 2 times daily    mucinex 600mg po q12   tessalon 100mg po prn   delsym 30mg po q12    * Milestone   Additional Notes   3/14/21  inpt  telem   98   92  30  111/78   93% 3lnc    WBC: 8.8   HGB: 10.5 (L)   HCT: 33.1 (L)   PLATELET: 688   Sodium: 139   Potassium: 4.2   Glucose: 91   BUN: 15   Creatinine: 0.62 (L)   BUN/Creatinine ratio: 24 (H)   meds: eliquis 5 mg po 2 times daily, vitamin c 500 mg po daily, aspirin 81 mg po daily, melatonin 3 mg po every bedtime, lopressor 50 mg po 2 times daily    mucinex 600mg po q12   tessalon 100mg po prn   delsym 30mg po q12      orders:  card diet  ambulate with assistance   aspiration precaution   fall precaution   I&O   neuro/vasc ck q4      hospitalist note   Interval history / Subjective: Follow up severe sepsis, PNA, Acute hypoxic respiratory failure. Patient seen and examined at the bedside. Labs, images and notes reviewed   Discussed with nursing staff, orders reviewed. Plan discussed with patient/Family   SOB present with exertion but somewhat better. Denied any other concern. On 3-4LPM O2 NC.       Assessment & Plan:       # Acute on chronic hypoxic respiratory failure with recent COVID 19 Pneumonia   -received  iv cefepime 3/3-3/9,     -SARS CoV 2 PCR not detected on 3/2   -CTA chest 3/9:Extensive scattered interstitial, groundglass and parenchymal opacity with   bronchiectatic change as well. Imaging findings may be related to viral pneumonia.    - procal wnl, sputum cultures if possible   -his O2 saturation >90% on 5lt oxygen at rest but desaturating with exertion but recovering   -Lasix 20mg once given 3/12, 3/13   -CXR 3/13 noted with mild improvement   -Add lasix 40mg PO daily with molly watching I/O, BMP and clinical course. May not need lasox for long term.       # Severe sepsis likely due to pneumonia POA, resolved   -leukocytosis, elevated lactic acid, tachycardia, elevated liver enzyme    -completed above abx, oxygen support   -follow up on blood cx, NGTD        # Thrombosis of left median cubital vein   -patient has h/o DVT few yrs ago, with recent COVID 19 -continue anticoagulation Eliquis for now and repeat ultrasound again in 2 weeks       Code status: Full Code   DVT prophylaxis: [facility] [facility] discussed with: Patient/Family, Nurse and    Anticipated Disposition: IPR, pending choice   Anticipated Discharge: Greater than 48 hours                   Respiratory Failure GRG - Care Day 12 (3/13/2021) by Marnie Reid RN       Review Status Review Entered   Completed 3/15/2021 14:12      Criteria Review      Care Day: 12 Care Date: 3/13/2021 Level of Care: Intermediate Care    Guideline Day 3    Level Of Care    ( ) * Activity level acceptable    Clinical Status    ( ) * Ventilation status appropriate    3/15/2021 14:12:44 EDT by Wisam Shah      93% on 2lnc    (X) * Airway status acceptable    ( ) * Respiratory status acceptable    3/15/2021 14:12:44 EDT by Lenny Roldan rr 33    ( ) * Stable chest findings    3/15/2021 14:12:44 EDT by Wisam Shah      Chest xray  IMPRESSION  Slightly improved appearance to coarse pulmonary infiltrates with no  new or superimposed infiltrate evident by plain film.     (X) * Neurologic status acceptable    (X) * Temperature status acceptable    ( ) * No infection, or status acceptable    ( ) * General Discharge Criteria met    Interventions    (X) * No chest tube, or status acceptable    (X) * Intake acceptable    ( ) * No inpatient interventions needed    3/15/2021 14:12:44 EDT by Castillo Pizarro      eliquis 5 mg po 2 times daily, vitamin c 500 mg po daily, aspirin 81 mg po daily, melatonin 3 mg po every bedtime, lopressor 50 mg po 2 times daily    mucinex 600mg po q12   tessalon 100mg po prn x  3   delsym 30mg po q12   lasix 20mg iv  once    * Milestone   Additional Notes   3/13/21  inpt  telem   98.3   92  30   127/78    93% on 2lnc   WBC: 8.7   HGB: 10.6 (L)   HCT: 33.0 (L)   PLATELET: 740   Sodium: 137   Potassium: 4.4   Chloride: 104   CO2: 29   Anion gap: 4 (L)   Glucose: 86   BUN: 12   Creatinine: 0.58 (L)   BUN/Creatinine ratio: 21 (H)   Chest xray  IMPRESSION   Slightly improved appearance to coarse pulmonary infiltrates with no   new or superimposed infiltrate evident by plain film. meds: eliquis 5 mg po 2 times daily, vitamin c 500 mg po daily, aspirin 81 mg po daily, melatonin 3 mg po every bedtime, lopressor 50 mg po 2 times daily    mucinex 600mg po q12   tessalon 100mg po prn x  3   delsym 30mg po q12   lasix 20mg iv  once   orders:  card diet  ambulate with assistance   aspiration precaution   fall precaution   I&O   neuro/vasc ck q4         hospitalist note   Interval history / Subjective: Follow up severe sepsis, PNA, Acute hypoxic respiratory failure. Patient seen and examined at the bedside. Labs, images and notes reviewed   Discussed with nursing staff, orders reviewed. Plan discussed with patient/Family   Still continue to have scratchy throat with cough. Clear mucous scant phlegm. Would like to have Delsyn syrup if possible. Agreeable for IV Lasix 20mg once. Acapella for chest PT.       Assessment & Plan:       Acute on chronic hypoxic respiratory failure with recent COVID 19 Pneumonia   -received  iv cefepime 3/3-3/9,     -SARS CoV 2 PCR not detected on 3/2   -CTA chest 3/9:Extensive scattered interstitial, groundglass and parenchymal opacity with   bronchiectatic change as well.  Imaging findings may be related to viral pneumonia.    - procal wnl, sputum cultures if possible   -his O2 saturation >90% on 5lt oxygen at rest but desaturating with exertion but recovering   -Lasix 20mg once given 3/12   -CXR again 3/13 and another dose of lasix 20mg IV once 3/13       Severe sepsis likely due to pneumonia POA,resolved   -leukocytosis, elevated lactic acid, tachycardia, elevated liver enzyme    -completed above abx, oxygen support   -follow up on blood cx, NGTD        #Thrombosis of left median cubital vein   -patient has h/o DVT few yrs ago, with recent COVID 19 -continue anticoagulation Eliquis for now and repeat ultrasound again in 2 weeks       Code status: Full Code   DVT prophylaxis: [address] [facility] discussed with: Patient/Family, Nurse and    Anticipated Disposition: TBD   Anticipated Discharge: Greater than 48 hours            Respiratory Failure GRG - Care Day 11 (3/12/2021) by Calin Mckeon RN       Review Status Review Entered   Completed 3/15/2021 14:00      Criteria Review      Care Day: 11 Care Date: 3/12/2021 Level of Care: Intermediate Care    Guideline Day 3    Level Of Care    ( ) * Activity level acceptable    Clinical Status    ( ) * Ventilation status appropriate    3/15/2021 14:00:32 EDT by Jess Chen      92% 5lnc    (X) * Airway status acceptable    ( ) * Respiratory status acceptable    3/15/2021 14:00:32 EDT by Jess Chen      rr 38    (X) * Stable chest findings    (X) * Neurologic status acceptable    (X) * Temperature status acceptable    ( ) * No infection, or status acceptable    ( ) * General Discharge Criteria met    Interventions    (X) * No chest tube, or status acceptable    (X) * Intake acceptable    ( ) * No inpatient interventions needed    3/15/2021 14:00:32 EDT by Jess Chen      meds: eliquis 5 mg po 2 times daily, vitamin c 500 mg po daily, aspirin 81 mg po daily, cefepime 2 g IV q8hrs, melatonin 3 mg po every bedtime, lopressor 50 mg po 2 times daily    mucinex 600mg po q12    * Milestone   Additional Notes   3/12/21  inpt  telem   98.8  101  38 115/72  92% 5lnc    WBC: 8.8   HGB: 10.6 (L)   HCT: 33.5 (L)   PLATELET: 166   Sodium: 134 (L)   Potassium: 4.1   Anion gap: 2 (L)   Glucose: 88   BUN: 14   Creatinine: 0.57 (L)   BUN/Creatinine ratio: 25 (H)      meds: eliquis 5 mg po 2 times daily, vitamin c 500 mg po daily, aspirin 81 mg po daily,  melatonin 3 mg po every bedtime, lopressor 50 mg po 2 times daily    mucinex 600mg po q12      orders:  card diet  ambulate with assistance   aspiration precaution   fall precaution   I&O   neuro/vasc ck q4      pulmonary note   PCCM:       On 5lpm, this can be weaned.        BMP stable       Plan:       COVID19 with post inflammatory fibrosis                Lasix intermittently, but he seems to respond well to it               OOB as tolerated               Possible discharge soon?               He wants to go back to rehab               PRN over the weekend         Hospitalist note   Interval history / Subjective:   Patient seen and examined. He placed his sister in law on speaker phone as well.       States that cough is better today. He continues to desaturate with exertion.           Assessment & Plan:       Acute on chronic hypoxic respiratory failure with recent COVID 19 Pneumonia   -received  iv cefepime 3/3-3/9,     -SARS CoV 2 PCR not detected on 3/2   -CTA chest 3/9:Extensive scattered interstitial, groundglass and parenchymal opacity with   bronchiectatic change as well. Imaging findings may be related to viral pneumonia.    - procal wnl, sputum cultures if possible   -his O2 saturation >90% on 5lt oxygen at rest but desaturating with exertion but recovering   -Will give one dose of lasix- 20 mg today.               Severe sepsis likely due to pneumonia POA,resolved   -leukocytosis, elevated lactic acid, tachycardia, elevated liver enzyme    -completed above abx, oxygen support   -follow up on blood cx, NGTD        #Thrombosis of left median cubital vein   -patient has h/o DVT few yrs ago, with recent COVID 19 -continue anticoagulation Eliquis for now and repeat ultrasound again in 2 weeks       Code status: Full Code   DVT prophylaxis: Eliquis       Care Plan discussed with: Patient/Family, Nurse and    Anticipated Disposition: TBD   Anticipated Discharge: Greater than 48 hours

## 2021-03-16 NOTE — PROGRESS NOTES
Comprehensive Nutrition Assessment    Type and Reason for Visit: Reassess    Nutrition Recommendations/Plan:    1. Continue regular diet with double portions as requested. 2. Snacks and ONS d/c'd. Has a good stash in room he is working through. OK to resume when depleted if pt requests. 3. Pt with moderate, protein-calorie malnutrition POA; improving. Malnutrition Assessment:  Malnutrition Status: Moderate malnutrition    Context:  Acute illness     Findings of the 6 clinical characteristics of malnutrition:   Energy Intake:  No significant decrease in energy intake  Weight Loss:  7.00 - Greater than 7.5% over 3 months(45 lb, 22% BW in 1 month during [facility] admission for COVID)     Body Fat Loss:  1 - Mild body fat loss, Orbital   Muscle Mass Loss:  1 - Mild muscle mass loss, Temples (temporalis)  Fluid Accumulation:  No significant fluid accumulation,     Strength:  Not performed     Nutrition Assessment:     48 y.o. male who has a PMHx of GERD, DVT. Recent extended admission to [facility] from 1/18-2/17 for COVID 19 PNA, pneumomediastinum. Discharged to [facility]. Pt admitted to [facility] on 3/2 with acute on chronic hypoxic respiratory failure with recent COVID 19 PNA, initially requiring non-rebreather, stable on 4 L NC as of today; severe sepsis POA resolved. COVID negative. Significant wt loss during [facility] stay. -205 lb, down to 159 lb on admission to rehab, net 45 lb (22% BW) over 1 month. Visited with pt in room today. In good spirits, hoping to d/c to rehab soon. Good appetite continues, appreciates double portions PRN. Overwhelmed with amount of ONS and snacks in room - requested them to be discontinued until he uses stash in room. Trying to drink at least 1 or 2 daily, likes having available as late night snack. Wt remains relatively stable from admission, on daily Lasix. As noted in initial assessment, no hx of DM, but dealt with steroid induced hyperglycemia during previous admission.  Remains off steroids.  No POC BG checks, but AM Glu WNL this entire admission with exception of last night.    Limited NFPE performed - mild muscle wasting and SQ fat loss noted.  Eating very well at this point, but poor intake during [facility] stay.  Still, suspect portion of wt loss d/t muscle atrophy r/t to disuse.  However, does meet criteria for moderate protein-calorie malnutrition POA.    Nutritionally Significant Medications:   Eliquis, Vit C, Lasix, MVI      Estimated Daily Nutrient Needs:  Energy (kcal): 4431-6158(MSJ x 1.3 x 1.1-1.2)  Protein (g): 100-120(1.3-1.6 gm/kg or ~20%)  Fluid (ml/day): 1 mL/kcal    Nutrition Related Findings:   Edema: none  Last BM: 3/16 - soft, formed      Wounds:    None       Current Nutrition Therapies:  Diet: Regular with PRN double portions, between meal snacks  Supplements: Ensure Enlive TID with meals  Meal intake:   Patient Vitals for the past 168 hrs:   % Diet Eaten   03/15/21 1600 90 %   03/15/21 1200 80 %   03/11/21 1616 100 %   03/10/21 1924 100 %   03/10/21 0815 50 %       Anthropometric Measures:  · Height:  5' 8\" (172.7 cm)  · Current Body Wt:  74.7 kg (164 lb 10.9 oz)   · Admission Body Wt:  159 lb(72.1 kg)    · Usual Body Wt:  93 kg (205 lb)     · Ideal Body Wt:  154 lbs:  106.9 %    · BMI Category:  Overweight (BMI 25.0-29.9)       Wt Readings from Last 20 Encounters:   03/16/21 74.7 kg (164 lb 10.9 oz) - bed   03/08/21 75.8 kg (167 lb 3.2 oz)   03/06/21 74.8 kg (165 lb) - 1st wt taken   02/19/21 159 lb - per pt/ standing wt at [facility]   02/16/21 75.5 kg (166 lb 6.4 oz)   01/13/21 92.9 kg (204 lb 12.9 oz)   05/18/20 93.4 kg (205 lb 14.6 oz)   08/12/19 94.4 kg (208 lb 3.2 oz)   05/18/19 93.3 kg (205 lb 11 oz)   05/17/19 95.3 kg (210 lb)   05/08/19 89.8 kg (198 lb)   05/08/19 94.5 kg (208 lb 6.4 oz)       Nutrition Diagnosis:   · Unintended weight loss related to catabolic illness, impaired respiratory function, increased demand for energy/nutrients, inadequate  protein-energy intake as evidenced by weight loss(45 lb, 22% BW in 1 month)      Nutrition Interventions:   Food and/or Nutrient Delivery: Continue current diet, Continue oral nutrition supplement(continue ONS PRN from room stash, d/c from system)  Nutrition Education and Counseling: Counseling initiated(discussed cont' ONS w/ increased needs to prevent wt loss )  Coordination of Nutrition Care: Continue to monitor while inpatient    Goals:  continued PO >/=75% of meals with PRN snacks and ONS; stabilize wt loss       Nutrition Monitoring and Evaluation:   Behavioral-Environmental Outcomes: None identified  Food/Nutrient Intake Outcomes: Food and nutrient intake, Supplement intake  Physical Signs/Symptoms Outcomes: Biochemical data, Weight, Nutrition focused physical findings, Hemodynamic status    Discharge Planning:    Continue current diet, Continue oral nutrition supplement     Recent Labs     03/16/21  0221 03/14/21  0400   * 91   BUN 16 15   CREA 0.55* 0.62*    139   K 4.0 4.2    104   CO2 31 29   CA 9.5 9.2       No results for input(s): PREALB in the last 72 hours. No results for input(s): TRIGL in the last 72 hours. No results for input(s): GLUCPOC in the last 72 hours.     Lab Results   Component Value Date/Time    Hemoglobin A1c 6.4 (H) 02/01/2021 02:42 AM       Ana Romano RD  Available via [facility]

## 2021-03-16 NOTE — PROGRESS NOTES
Transition Plan of Care  RUR 17%-Low  Patient has been accepted at [facility]. They have started authorization process.   Yaya Pavon RN CRM  Ext [phone]

## 2021-03-16 NOTE — PROGRESS NOTES
Problem: Self Care Deficits Care Plan (Adult)  Goal: *Acute Goals and Plan of Care (Insert Text)  Description:   FUNCTIONAL STATUS PRIOR TO ADMISSION: Patient was independent and active without use of DME prior to initial hospitalization at [address], Dx with COVID 1/13/21, discharge to rehab where he was able to ambulate ~40' until last week he was there    HOME SUPPORT: The patient lived with family but did not require assist.    Occupational Therapy Goals  Initiated 3/9/2021  1. Patient will tolerate > or = 5 minutes functional activity and maintain oxygen sats > or = 90% within 7 day(s). 2.  Patient will stand pivot to bedside commode 1x/day with CGA within 7 day(s)  3. Patient will perform bilateral UE AROM coordinated breathing exercises x;s 5 reps and maintain sats > or = 90% within 7 day(s)  4. Patient will don gown and socks with increased time and set up and maintain sats > or = 90 % within 7 day(s). Outcome: Progressing Towards Goal     OCCUPATIONAL THERAPY TREATMENT  Patient: Rola Story (64 y.o. male)  Date: 3/16/2021  Diagnosis: Respiratory failure ([facility] [address] [J96.90] <principal problem not specified>       Precautions:  fall  Chart, occupational therapy assessment, plan of care, and goals were reviewed. ASSESSMENT  Patient continues with skilled OT services and is progressing towards goals. Pt on 4L o2 initially with O2 sats no > than 86% with light bed mobility. Increased O2 to 5L and unable to achieve >87%. Increased again to 6L and pt able to recover with light ADL activity to 88-91% with frequent rest breaks and increased time. Pt tolerate 55 minutes of functional activities this session. Re-educated on energy conservation techniques and pt able to recall strategies already learned. He declined written handout as he already has one from IP rehab. Continue to recommend return to IP rehab at discharge.      Current Level of Function Impacting Discharge (ADLs): min A, increased time, frequent rest breaks    Other factors to consider for discharge: see above         PLAN :  Patient continues to benefit from skilled intervention to address the above impairments. Continue treatment per established plan of care. to address goals. Recommend with staff: up to chair for meals as able; [facility] for toileting    Recommend next OT session: endurance    Recommendation for discharge: (in order for the patient to meet his/her long term goals)  Therapy 3 hours per day 5-7 days per week    This discharge recommendation:  Has been made in collaboration with the attending provider and/or case management    IF patient discharges home will need the following DME: TBD       SUBJECTIVE:   Patient stated I am doing ok. If I get to coughing then I am in trouble.     OBJECTIVE DATA SUMMARY:   Cognitive/Behavioral Status:  Neurologic State: Alert; Appropriate for age  Orientation Level: Oriented X4  Cognition: Appropriate decision making; Appropriate for age attention/concentration; Appropriate safety awareness; Follows commands  Perception: Appears intact  Perseveration: No perseveration noted  Safety/Judgement: Awareness of environment; Fall prevention; Insight into deficits    Functional Mobility and Transfers for ADLs:  Bed Mobility:  Supine to Sit: Stand-by assistance  Scooting: Stand-by assistance    Transfers:  Sit to Stand: Contact guard assistance  Functional Transfers  Toilet Transfer : Minimum assistance; Additional time;Assist x1  Cues: Physical assistance; Tactile cues provided;Verbal cues provided;Visual cues provided  Adaptive Equipment: Bedside commode  Bed to Chair: Minimum assistance    Balance:  Sitting: Intact  Standing: Intact; With support    ADL Intervention: Pt required increased time, frequent rest breaks with increased time due to impaired respiratory status. Patient instructed and indicated understanding energy conservation techniques to increase independence and safety during ADLs.  Educated on relaxation, pacing and PLB. He demonstrated good understanding with frequent cues to follow. Lower Body Dressing Assistance  Socks: Set-up; Supervision  Leg Crossed Method Used: Yes  Position Performed: Seated edge of bed  Cues: Don;Doff;Verbal cues provided;Visual cues provided    Toileting  Toileting Assistance: Total assistance(dependent)(due to imp respiratory status and bowel incontinence)  Bladder Hygiene: Supervision  Bowel Hygiene: Total assistance (dependent)  Clothing Management: Total assistance (dependent)  Cues: Tactile cues provided;Verbal cues provided;Visual cues provided    Cognitive Retraining  Safety/Judgement: Awareness of environment; Fall prevention; Insight into deficits      Pain:  0/10    Activity Tolerance:   Fair, desaturates with exertion and requires oxygen, requires frequent rest breaks, and observed SOB with activity    After treatment patient left in no apparent distress:   Sitting in chair and Call bell within reach    COMMUNICATION/COLLABORATION:   The patients plan of care was discussed with: Physical therapist and Registered nurse.      Margarita Barajas OT  Time Calculation: 56 mins

## 2021-03-17 ENCOUNTER — APPOINTMENT (OUTPATIENT)
Dept: GENERAL RADIOLOGY | Age: 56
DRG: 871 | End: 2021-03-17
Attending: INTERNAL MEDICINE
Payer: COMMERCIAL

## 2021-03-17 LAB
ANION GAP SERPL CALC-SCNC: 4 MMOL/L (ref 5–15)
BUN SERPL-MCNC: 15 MG/DL (ref 6–20)
BUN/CREAT SERPL: 20 (ref 12–20)
CALCIUM SERPL-MCNC: 9.4 MG/DL (ref 8.5–10.1)
CHLORIDE SERPL-SCNC: 100 MMOL/L (ref 97–108)
CO2 SERPL-SCNC: 32 MMOL/L (ref 21–32)
CREAT SERPL-MCNC: 0.76 MG/DL (ref 0.7–1.3)
ERYTHROCYTE [DISTWIDTH] IN BLOOD BY AUTOMATED COUNT: 15.7 % (ref 11.5–14.5)
GLUCOSE SERPL-MCNC: 100 MG/DL (ref 65–100)
HCT VFR BLD AUTO: 39.5 % (ref 36.6–50.3)
HGB BLD-MCNC: 12.9 G/DL (ref 12.1–17)
MAGNESIUM SERPL-MCNC: 2.3 MG/DL (ref 1.6–2.4)
MCH RBC QN AUTO: 28.2 PG (ref 26–34)
MCHC RBC AUTO-ENTMCNC: 32.7 G/DL (ref 30–36.5)
MCV RBC AUTO: 86.4 FL (ref 80–99)
NRBC # BLD: 0 K/UL (ref 0–0.01)
NRBC BLD-RTO: 0 PER 100 WBC
PHOSPHATE SERPL-MCNC: 4.1 MG/DL (ref 2.6–4.7)
PLATELET # BLD AUTO: 280 K/UL (ref 150–400)
PMV BLD AUTO: 10.9 FL (ref 8.9–12.9)
POTASSIUM SERPL-SCNC: 4.1 MMOL/L (ref 3.5–5.1)
RBC # BLD AUTO: 4.57 M/UL (ref 4.1–5.7)
SODIUM SERPL-SCNC: 136 MMOL/L (ref 136–145)
WBC # BLD AUTO: 9.2 K/UL (ref 4.1–11.1)

## 2021-03-17 PROCEDURE — 74011250637 HC RX REV CODE- 250/637: Performed by: INTERNAL MEDICINE

## 2021-03-17 PROCEDURE — 74011250637 HC RX REV CODE- 250/637: Performed by: FAMILY MEDICINE

## 2021-03-17 PROCEDURE — 65660000001 HC RM ICU INTERMED STEPDOWN

## 2021-03-17 PROCEDURE — 80048 BASIC METABOLIC PNL TOTAL CA: CPT

## 2021-03-17 PROCEDURE — 36415 COLL VENOUS BLD VENIPUNCTURE: CPT

## 2021-03-17 PROCEDURE — 85027 COMPLETE CBC AUTOMATED: CPT

## 2021-03-17 PROCEDURE — 71045 X-RAY EXAM CHEST 1 VIEW: CPT

## 2021-03-17 PROCEDURE — 84100 ASSAY OF PHOSPHORUS: CPT

## 2021-03-17 PROCEDURE — 74011250637 HC RX REV CODE- 250/637: Performed by: HOSPITALIST

## 2021-03-17 PROCEDURE — 83735 ASSAY OF MAGNESIUM: CPT

## 2021-03-17 RX ADMIN — APIXABAN 5 MG: 5 TABLET, FILM COATED ORAL at 08:43

## 2021-03-17 RX ADMIN — BENZONATATE 100 MG: 100 CAPSULE ORAL at 08:51

## 2021-03-17 RX ADMIN — Medication 10 ML: at 21:53

## 2021-03-17 RX ADMIN — METOPROLOL TARTRATE 50 MG: 50 TABLET, FILM COATED ORAL at 19:32

## 2021-03-17 RX ADMIN — LORATADINE 10 MG: 10 TABLET ORAL at 08:43

## 2021-03-17 RX ADMIN — METOPROLOL TARTRATE 50 MG: 50 TABLET, FILM COATED ORAL at 08:43

## 2021-03-17 RX ADMIN — Medication 10 ML: at 06:57

## 2021-03-17 RX ADMIN — OXYCODONE HYDROCHLORIDE AND ACETAMINOPHEN 500 MG: 500 TABLET ORAL at 08:43

## 2021-03-17 RX ADMIN — Medication 10 ML: at 19:33

## 2021-03-17 RX ADMIN — THERA TABS 1 TABLET: TAB at 08:43

## 2021-03-17 RX ADMIN — BENZONATATE 100 MG: 100 CAPSULE ORAL at 19:29

## 2021-03-17 RX ADMIN — GUAIFENESIN 1200 MG: 600 TABLET, EXTENDED RELEASE ORAL at 19:30

## 2021-03-17 RX ADMIN — ASPIRIN 81 MG: 81 TABLET, COATED ORAL at 08:43

## 2021-03-17 RX ADMIN — FUROSEMIDE 40 MG: 40 TABLET ORAL at 08:43

## 2021-03-17 RX ADMIN — APIXABAN 5 MG: 5 TABLET, FILM COATED ORAL at 19:29

## 2021-03-17 RX ADMIN — DEXTROMETHORPHAN 30 MG: 30 SUSPENSION, EXTENDED RELEASE ORAL at 19:31

## 2021-03-17 RX ADMIN — DEXTROMETHORPHAN 30 MG: 30 SUSPENSION, EXTENDED RELEASE ORAL at 06:45

## 2021-03-17 RX ADMIN — GUAIFENESIN 1200 MG: 600 TABLET, EXTENDED RELEASE ORAL at 06:45

## 2021-03-17 NOTE — PROGRESS NOTES
Bedside, Verbal and Written shift change report given to [address] and Sissy Tijerina RN (oncoming nurse) by Jordy Morris RN  (offgoing nurse). Report included the following information SBAR, Kardex, ED Summary, Procedure Summary, Intake/Output, MAR, Accordion, Recent Results, Med Rec Status, Cardiac Rhythm NSR, Alarm Parameters , Pre Procedure Checklist, Procedure Verification, Quality Measures and Dual Neuro Assessment. 2045: Assessment completed. Pt is A & O x4. Pt is continent of bladder and uses his urinal. Pt denies any pain. Medication given pt takes meds whole with water. Pt is resting with call bell within reach. 0030: Reassessment completed. No changed noted. Pt is resting with call bell within reach. 2225:  Reassessment completed. No changed noted. Pt is resting with call bell within reach. No labs to be drawn. Pt denies any pain.    0700 Bedside shift change report given to Gena Hull RN  (oncoming nurse) by Hyun Chacko RN and Sissy Tijerina RN  (offgoing nurse). Report included the following information SBAR, Kardex, ED Summary, Procedure Summary, Intake/Output, MAR, Accordion, Recent Results, Med Rec Status, Cardiac Rhythm NSR, Alarm Parameters , Pre Procedure Checklist, Procedure Verification, Quality Measures and Dual Neuro Assessment.

## 2021-03-17 NOTE — PROGRESS NOTES
Problem: Discharge Planning  Goal: *Discharge to safe environment  Outcome: Progressing Towards Goal     Problem: Pressure Injury - Risk of  Goal: *Prevention of pressure injury  Description: Document Govind Scale and appropriate interventions in the flowsheet. Outcome: Progressing Towards Goal  Note: Pressure Injury Interventions:  Sensory Interventions: Assess need for specialty bed, Avoid rigorous massage over bony prominences, Keep linens dry and wrinkle-free    Moisture Interventions: Absorbent underpads, Apply protective barrier, creams and emollients, Assess need for specialty bed    Activity Interventions: Assess need for specialty bed    Mobility Interventions: Assess need for specialty bed, HOB 30 degrees or less    Nutrition Interventions: Document food/fluid/supplement intake    Friction and Shear Interventions: HOB 30 degrees or less, Lift sheet, Minimize layers                Problem: Patient Education: Go to Patient Education Activity  Goal: Patient/Family Education  Outcome: Progressing Towards Goal     Problem: Falls - Risk of  Goal: *Absence of Falls  Description: Document Ignacio Fall Risk and appropriate interventions in the flowsheet.   Outcome: Progressing Towards Goal  Note: Fall Risk Interventions:  Mobility Interventions: Bed/chair exit alarm         Medication Interventions: Patient to call before getting OOB    Elimination Interventions: Bed/chair exit alarm, Call light in reach              Problem: Patient Education: Go to Patient Education Activity  Goal: Patient/Family Education  Outcome: Progressing Towards Goal     Problem: Patient Education: Go to Patient Education Activity  Goal: Patient/Family Education  Outcome: Progressing Towards Goal     Problem: Patient Education: Go to Patient Education Activity  Goal: Patient/Family Education  Outcome: Progressing Towards Goal

## 2021-03-17 NOTE — PROGRESS NOTES
[facility] Adult  Hospitalist Group                                                                                          Hospitalist Progress Note  Juwan Rodgers MD  Answering service: [phone] from in house phone        Date of Service:  3/16/2021  NAME:  Rola Story  :  1965  MRN:  [id number]      Admission Summary:     Patient is not able to provide much history as he has significant shortness of breath and increased work of breathing. Patient was admitted on 2021 at [facility] and discharged on 2021. Patient was admitted with acute hypoxic respiratory failure, severe multilobar Covid pneumonia, pneumomediastinum, patient also has history of right lower extremity DVT. Patient was discharged to [facility], patient has been having increased shortness of breath for the last few days and today became quite short of breath, patient was put on nonrebreather 15 L and was brought to [facility].  Patient continued to be quite short of breath and is currently on 45 L nasal cannula, patient appears to be distressed. Patient was evaluated by the ICU team and deemed appropriate to be admitted to the hospitalist service on intermediate care unit. Patient denies any chest pain associated with his symptoms reports that he feels he is struggling to breathe. Patient reports mild cough but denies fever      Interval history / Subjective: Follow up severe sepsis, PNA, Acute hypoxic respiratory failure. Patient seen and examined at the bedside. Labs, images and notes reviewed  Discussed with nursing staff, orders reviewed. Plan discussed with patient/Family  Feeling and looking better. On 4 LPM O2 NC at rest.  Increase O2 requirement with mild exertion while working with therapies. D/W pulmonology. Agree with continuation of Lasix. Awaiting authorization for [facility] IPR. Accepted. No other concerns or overnight events. Meek Zamudio Assessment & Plan:     # Acute on chronic hypoxic respiratory failure with recent COVID 19 Pneumonia, likely long-term need for oxygen supplementation, may be lifelong  -received  iv cefepime 3/3-3/9,    -SARS CoV 2 PCR not detected on 3/2  -CTA chest 3/9: Extensive scattered interstitial, groundglass and parenchymal opacity with  bronchiectatic change as well. Imaging findings may be related to viral pneumonia.  -Procal wnl, sputum cultures if possible  -his O2 saturation >90% on 5lt oxygen at rest but desaturating with exertion but recovering  -Lasix 20mg once given 3/12, 3/13  -CXR 3/13 noted with mild improvement  -Lasix 40 mg daily  -BMP monitoring while on Lasix  -Supportive care for cough with Mucinex, dose increased, Delsym liquid, Tessalon Perles as needed    # Severe sepsis likely due to pneumonia POA, resolved  -leukocytosis, elevated lactic acid, tachycardia, elevated liver enzyme   -completed above abx, oxygen support  -follow up on blood cx, NGTD     # Thrombosis of left median cubital vein  -patient has h/o DVT few yrs ago, with recent COVID 19 -continue anticoagulation Eliquis for now and repeat ultrasound again in 2 weeks    Code status: Full Code  DVT prophylaxis: [address] discussed with: Patient/Family, Nurse and   Anticipated Disposition: IPR, [facility]-accept[facility]  Anticipated Discharge: 24-48 hours, pending authorization     Hospital Problems  Date Reviewed: 5/8/2019          Codes Class Noted POA    Respiratory failure ([facility] [address] ICD-10-CM: J96.90  ICD-9-CM: 518.81  3/2/2021 Unknown                Vital Signs:    Last 24hrs VS reviewed since prior progress note.  Most recent are:  Visit Vitals  /65 (BP 1 Location: Left upper arm, BP Patient Position: At rest)   Pulse 85   Temp 97.9 °F (36.6 °C)   Resp 18   Ht 5' 8\" (1.727 m)   Wt 74.7 kg (164 lb 10.9 oz)   SpO2 96%   BMI 25.04 kg/m²         Intake/Output Summary (Last 24 hours) at 3/16/2021 3239  Last data filed at 3/16/2021 0838  Gross per 24 hour   Intake --   Output 400 ml   Net -400 ml        Physical Examination:     I had a face to face encounter with this patient and independently examined them on 3/16/2021 as outlined below:          Constitutional:  No acute distress, cooperative, pleasant. Mild tachypnea while working with therapy along with hypoxia needing more oxygen supplementation, currently up to 7 LPM while working with PT   ENT:  Oral mucosa moist, oropharynx benign. Resp:  AE better. Fine crackles on right, much better. Left posterior basal crackles not much changed. Mild use of accessory muscles at rest between therapies   CV:  Regular rhythm, normal rate, no murmurs, gallops, rubs    GI:  Soft, NT ND, BS+, no hepatosplenomegaly     Musculoskeletal:  No LE edema     Neurologic:  Moves all extremities. AAOx3, CN II-XII grossly intact     Skin:  Good turgor, no rashes or ulcers       Data Review:    Review and/or order of clinical lab test  Review and/or order of tests in the medicine section of CPT    Ct Head Wo Cont    Result Date: 3/2/2021  No acute intracranial abnormality     Cta Chest W Or W Wo Cont    Result Date: 3/9/2021  There is no pulmonary embolism. There is no aortic aneurysm or dissection. Extensive scattered interstitial, groundglass and parenchymal opacity with bronchiectatic change as well. Imaging findings may be related to viral pneumonia. Minimal left-sided effusion. Cardiomegaly. Cta Chest W Or W Wo Cont    Result Date: 3/2/2021  1. Limitations secondary to motion. No evidence of pulmonary embolus. 2.  Diffuse interstitial airspace opacities consistent with Covid 19 pneumonia or sequela. Xr Chest Port    Result Date: 3/13/2021  Slightly improved appearance to coarse pulmonary infiltrates with no new or superimposed infiltrate evident by plain film. Xr Chest Port    Result Date: 3/11/2021  Severe bilateral mid and lower lung pulmonary opacifications appear unchanged. Findings consistent with bilateral pneumonia. Xr Chest Port    Result Date: 3/3/2021  Stable low lung volumes with bilateral lung opacities. Xr Chest Port    Result Date: 3/2/2021  Low lung volumes. Diffuse interstitial and airspace opacities with some improved aeration in the right lung base. Labs:     Recent Labs     03/16/21 0221 03/14/21  0400   WBC 9.5 8.8   HGB 11.3* 10.5*   HCT 35.0* 33.1*    232     Recent Labs     03/16/21 0221 03/14/21  0400    139   K 4.0 4.2    104   CO2 31 29   BUN 16 15   CREA 0.55* 0.62*   * 91   CA 9.5 9.2     Recent Labs     03/16/21 0221   ALT 52      TBILI 0.3   TP 6.4   ALB 2.5*   GLOB 3.9     No results for input(s): INR, PTP, APTT, INREXT, INREXT in the last 72 hours. No results for input(s): FE, TIBC, PSAT, FERR in the last 72 hours. No results found for: FOL, RBCF   No results for input(s): PH, PCO2, PO2 in the last 72 hours. No results for input(s): CPK, CKNDX, TROIQ in the last 72 hours.     No lab exists for component: CPKMB  No results found for: CHOL, CHOLX, CHLST, CHOLV, HDL, HDLP, LDL, LDLC, DLDLP, TGLX, TRIGL, TRIGP, CHHD, CHHDX  Lab Results   Component Value Date/Time    Glucose (POC) 184 (H) 02/17/2021 03:46 PM    Glucose (POC) 141 (H) 02/17/2021 11:31 AM    Glucose (POC) 89 02/17/2021 08:00 AM    Glucose (POC) 293 (H) 02/16/2021 08:47 PM    Glucose (POC) 165 (H) 02/16/2021 03:58 PM     Lab Results   Component Value Date/Time    Color YELLOW/STRAW 03/02/2021 05:08 PM    Appearance CLEAR 03/02/2021 05:08 PM    Specific gravity 1.012 03/02/2021 05:08 PM    pH (UA) 7.5 03/02/2021 05:08 PM    Protein Negative 03/02/2021 05:08 PM    Glucose Negative 03/02/2021 05:08 PM    Ketone Negative 03/02/2021 05:08 PM    Bilirubin Negative 03/02/2021 05:08 PM    Urobilinogen 0.2 03/02/2021 05:08 PM    Nitrites Negative 03/02/2021 05:08 PM    Leukocyte Esterase Negative 03/02/2021 05:08 PM    Epithelial cells FEW 03/02/2021 01:53 PM    Bacteria Negative 03/02/2021 01:53 PM    WBC 0-4 03/02/2021 01:53 PM    RBC 0-5 03/02/2021 01:53 PM         Medications Reviewed:     Current Facility-Administered Medications   Medication Dose Route Frequency    dextromethorphan (DELSYM) 30 mg/5 mL syrup 30 mg  30 mg Oral Q12H    guaiFENesin ER (MUCINEX) tablet 1,200 mg  1,200 mg Oral Q12H    furosemide (LASIX) tablet 40 mg  40 mg Oral DAILY    loratadine (CLARITIN) tablet 10 mg  10 mg Oral DAILY    benzonatate (TESSALON) capsule 100 mg  100 mg Oral TID PRN    sodium chloride (OCEAN) 0.65 % nasal squeeze bottle 2 Spray  2 Spray Both Nostrils Q2H PRN    apixaban (ELIQUIS) tablet 5 mg  5 mg Oral BID    ascorbic acid (vitamin C) (VITAMIN C) tablet 500 mg  500 mg Oral DAILY    aspirin delayed-release tablet 81 mg  81 mg Oral DAILY    melatonin tablet 3 mg  3 mg Oral QHS    metoprolol tartrate (LOPRESSOR) tablet 50 mg  50 mg Oral BID    therapeutic multivitamin (THERAGRAN) tablet 1 Tab  1 Tab Oral DAILY    sodium chloride (NS) flush 5-40 mL  5-40 mL IntraVENous Q8H    sodium chloride (NS) flush 5-40 mL  5-40 mL IntraVENous PRN    acetaminophen (TYLENOL) tablet 650 mg  650 mg Oral Q4H PRN    sodium chloride (NS) flush 5-10 mL  5-10 mL IntraVENous PRN     ______________________________________________________________________  EXPECTED LENGTH OF STAY: 4d 19h  ACTUAL LENGTH OF STAY:          14                 Perri Romero MD

## 2021-03-17 NOTE — PROGRESS NOTES
[address] [facility] Adult  Hospitalist Group                                                                                          Hospitalist Progress Note  Abilio Maldonado MD  Answering service: [phone] from in house phone        Date of Service:  3/17/2021  NAME:  Brea Armstrong  :  1965  MRN:  [id number]      Admission Summary:     Patient is not able to provide much history as he has significant shortness of breath and increased work of breathing. Patient was admitted on 2021 at [facility] and discharged on 2021. Patient was admitted with acute hypoxic respiratory failure, severe multilobar Covid pneumonia, pneumomediastinum, patient also has history of right lower extremity DVT. Patient was discharged to [facility], patient has been having increased shortness of breath for the last few days and today became quite short of breath, patient was put on nonrebreather 15 L and was brought to [facility].  Patient continued to be quite short of breath and is currently on 45 L nasal cannula, patient appears to be distressed. Patient was evaluated by the ICU team and deemed appropriate to be admitted to the hospitalist service on intermediate care unit. Patient denies any chest pain associated with his symptoms reports that he feels he is struggling to breathe. Patient reports mild cough but denies fever      Interval history / Subjective: Follow up severe sepsis, PNA, Acute hypoxic respiratory failure. Patient seen and examined at the bedside. Labs, images and notes reviewed  Discussed with nursing staff, orders reviewed. Plan discussed with patient/Family  Feeling stable and looking better. Still gets short winded with exertion. Some hypoxia with that and takes little time for recovery. Currently baseline stabilization around 4 LPM O2 NC. No fever or chills. Tolerating Lasix well.   Consultation again about possible need of long-term oxygen supplementation and dependence. Assessment & Plan:     # Acute on chronic hypoxic respiratory failure with recent COVID 19 Pneumonia, likely long-term need for oxygen supplementation, may be lifelong  -received IV cefepime 3/3-3/9  -SARS CoV 2 PCR not detected on 3/2  -CTA chest 3/9: Extensive scattered interstitial, groundglass and parenchymal opacity with  bronchiectatic change as well. Imaging findings may be related to viral pneumonia.  -Procal wnl, sputum cultures if possible  -his O2 saturation >90% on 5lt oxygen at rest but desaturating with exertion but recovering  -Lasix 20mg once given 3/12, 3/13  -CXR 3/13 noted with mild improvement  -Lasix 40 mg daily  -We will recheck CXR 3/17  -BMP monitoring while on Lasix. Stable at present  -Supportive care for cough with Mucinex, dose increased, Delsym liquid, Tessalon Perles as needed    # Severe sepsis likely due to pneumonia POA, resolved  -leukocytosis, elevated lactic acid, tachycardia, elevated liver enzyme   -completed above abx, oxygen support  -follow up on blood cx, NGTD     # Thrombosis of left median cubital vein  -patient has h/o DVT few yrs ago, with recent COVID 19 -continue anticoagulation Eliquis for now and repeat ultrasound again in 2 weeks    Code status: Full Code  DVT prophylaxis: [address] discussed with: Patient/Family, Nurse and   Anticipated Disposition: [facility], [facility]-accept[facility], authorization received, awaiting bed availability  Anticipated Discharge: Medically ready, pending bed availability     Hospital Problems  Date Reviewed: 5/8/2019          Codes Class Noted POA    Respiratory failure ([facility] [place] [address] ICD-10-CM: J96.90  ICD-9-CM: 518.81  3/2/2021 Unknown                Vital Signs:    Last 24hrs VS reviewed since prior progress note.  Most recent are:  Visit Vitals  /79 (BP 1 Location: Left upper arm, BP Patient Position: At rest)   Pulse 90   Temp 98.2 °F (36.8 °C)   Resp 25   Ht 5' 8\" (1.727 m)   Wt 75.2 kg (165 lb 12.8 oz)   SpO2 98%   BMI 25.21 kg/m²         Intake/Output Summary (Last 24 hours) at 3/17/2021 0716  Last data filed at 3/16/2021 6825  Gross per 24 hour   Intake --   Output 400 ml   Net -400 ml        Physical Examination:     I had a face to face encounter with this patient and independently examined them on 3/17/2021 as outlined below:          Constitutional:  Alert, oriented x3, no acute distress at rest.  Cooperative. ENT:  Oral mucosa moist, oropharynx benign. Resp:  Air entry bilaterally improved, fine crackles on left basal mid to lower posteriorly, almost clear on right   CV:  Regular rate and rhythm, no murmur rub or gallop. GI:  Soft, NT ND, BS+, no hepatosplenomegaly     Musculoskeletal:  No LE edema     Neurologic:  Moves all extremities. AAOx3, CN II-XII grossly intact     Skin:  Good turgor, no rashes or ulcers       Data Review:    Review and/or order of clinical lab test  Review and/or order of tests in the medicine section of CPT    Ct Head Wo Cont    Result Date: 3/2/2021  No acute intracranial abnormality     Cta Chest W Or W Wo Cont    Result Date: 3/9/2021  There is no pulmonary embolism. There is no aortic aneurysm or dissection. Extensive scattered interstitial, groundglass and parenchymal opacity with bronchiectatic change as well. Imaging findings may be related to viral pneumonia. Minimal left-sided effusion. Cardiomegaly. Cta Chest W Or W Wo Cont    Result Date: 3/2/2021  1. Limitations secondary to motion. No evidence of pulmonary embolus. 2.  Diffuse interstitial airspace opacities consistent with Covid 19 pneumonia or sequela. Xr Chest Port    Result Date: 3/13/2021  Slightly improved appearance to coarse pulmonary infiltrates with no new or superimposed infiltrate evident by plain film. Xr Chest Port    Result Date: 3/11/2021  Severe bilateral mid and lower lung pulmonary opacifications appear unchanged.  Findings consistent with bilateral pneumonia. Xr Chest Port    Result Date: 3/3/2021  Stable low lung volumes with bilateral lung opacities. Xr Chest Port    Result Date: 3/2/2021  Low lung volumes. Diffuse interstitial and airspace opacities with some improved aeration in the right lung base. Labs:     Recent Labs     03/16/21 0221   WBC 9.5   HGB 11.3*   HCT 35.0*        Recent Labs     03/16/21 0221      K 4.0      CO2 31   BUN 16   CREA 0.55*   *   CA 9.5     Recent Labs     03/16/21 0221   ALT 52      TBILI 0.3   TP 6.4   ALB 2.5*   GLOB 3.9     No results for input(s): INR, PTP, APTT, INREXT, INREXT in the last 72 hours. No results for input(s): FE, TIBC, PSAT, FERR in the last 72 hours. No results found for: FOL, RBCF   No results for input(s): PH, PCO2, PO2 in the last 72 hours. No results for input(s): CPK, CKNDX, TROIQ in the last 72 hours.     No lab exists for component: CPKMB  No results found for: CHOL, CHOLX, CHLST, CHOLV, HDL, HDLP, LDL, LDLC, DLDLP, TGLX, TRIGL, TRIGP, CHHD, CHHDX  Lab Results   Component Value Date/Time    Glucose (POC) 184 (H) 02/17/2021 03:46 PM    Glucose (POC) 141 (H) 02/17/2021 11:31 AM    Glucose (POC) 89 02/17/2021 08:00 AM    Glucose (POC) 293 (H) 02/16/2021 08:47 PM    Glucose (POC) 165 (H) 02/16/2021 03:58 PM     Lab Results   Component Value Date/Time    Color YELLOW/STRAW 03/02/2021 05:08 PM    Appearance CLEAR 03/02/2021 05:08 PM    Specific gravity 1.012 03/02/2021 05:08 PM    pH (UA) 7.5 03/02/2021 05:08 PM    Protein Negative 03/02/2021 05:08 PM    Glucose Negative 03/02/2021 05:08 PM    Ketone Negative 03/02/2021 05:08 PM    Bilirubin Negative 03/02/2021 05:08 PM    Urobilinogen 0.2 03/02/2021 05:08 PM    Nitrites Negative 03/02/2021 05:08 PM    Leukocyte Esterase Negative 03/02/2021 05:08 PM    Epithelial cells FEW 03/02/2021 01:53 PM    Bacteria Negative 03/02/2021 01:53 PM    WBC 0-4 03/02/2021 01:53 PM    RBC 0-5 03/02/2021 01:53 PM Medications Reviewed:     Current Facility-Administered Medications   Medication Dose Route Frequency    dextromethorphan (DELSYM) 30 mg/5 mL syrup 30 mg  30 mg Oral Q12H    guaiFENesin ER (MUCINEX) tablet 1,200 mg  1,200 mg Oral Q12H    furosemide (LASIX) tablet 40 mg  40 mg Oral DAILY    loratadine (CLARITIN) tablet 10 mg  10 mg Oral DAILY    benzonatate (TESSALON) capsule 100 mg  100 mg Oral TID PRN    sodium chloride (OCEAN) 0.65 % nasal squeeze bottle 2 Spray  2 Spray Both Nostrils Q2H PRN    apixaban (ELIQUIS) tablet 5 mg  5 mg Oral BID    ascorbic acid (vitamin C) (VITAMIN C) tablet 500 mg  500 mg Oral DAILY    aspirin delayed-release tablet 81 mg  81 mg Oral DAILY    melatonin tablet 3 mg  3 mg Oral QHS    metoprolol tartrate (LOPRESSOR) tablet 50 mg  50 mg Oral BID    therapeutic multivitamin (THERAGRAN) tablet 1 Tab  1 Tab Oral DAILY    sodium chloride (NS) flush 5-40 mL  5-40 mL IntraVENous Q8H    sodium chloride (NS) flush 5-40 mL  5-40 mL IntraVENous PRN    acetaminophen (TYLENOL) tablet 650 mg  650 mg Oral Q4H PRN    sodium chloride (NS) flush 5-10 mL  5-10 mL IntraVENous PRN     ______________________________________________________________________  EXPECTED LENGTH OF STAY: 4d 19h  ACTUAL LENGTH OF STAY:          15                 Danuta Dumont MD

## 2021-03-17 NOTE — PROGRESS NOTES
Bedside shift change report given to Tata (oncoming nurse) by Akil Kerr (offgoing nurse). Report included the following information SBAR, Kardex, ED Summary and MAR.

## 2021-03-17 NOTE — PROGRESS NOTES
TRANSITION OF CARE  RUR--17%  Disposition--Return to [facility]. Auth done. Pending bed availability. Patient continues on oxygen at 4 l/m. Transport--probable BLS with [facility]    CM spoke with Vanesa Roque, [facility] Admissions [phone]), who said that insurance authorization is now received. Return [facility] now pending bed availability.

## 2021-03-18 VITALS
WEIGHT: 166.89 LBS | DIASTOLIC BLOOD PRESSURE: 83 MMHG | OXYGEN SATURATION: 98 % | SYSTOLIC BLOOD PRESSURE: 152 MMHG | RESPIRATION RATE: 24 BRPM | HEIGHT: 68 IN | TEMPERATURE: 97.5 F | HEART RATE: 86 BPM | BODY MASS INDEX: 25.29 KG/M2

## 2021-03-18 LAB
ANION GAP SERPL CALC-SCNC: 3 MMOL/L (ref 5–15)
BUN SERPL-MCNC: 11 MG/DL (ref 6–20)
BUN/CREAT SERPL: 19 (ref 12–20)
CALCIUM SERPL-MCNC: 9.3 MG/DL (ref 8.5–10.1)
CHLORIDE SERPL-SCNC: 101 MMOL/L (ref 97–108)
CO2 SERPL-SCNC: 32 MMOL/L (ref 21–32)
CREAT SERPL-MCNC: 0.57 MG/DL (ref 0.7–1.3)
ERYTHROCYTE [DISTWIDTH] IN BLOOD BY AUTOMATED COUNT: 15 % (ref 11.5–14.5)
GLUCOSE SERPL-MCNC: 95 MG/DL (ref 65–100)
HCT VFR BLD AUTO: 34.9 % (ref 36.6–50.3)
HGB BLD-MCNC: 11.4 G/DL (ref 12.1–17)
MCH RBC QN AUTO: 28.5 PG (ref 26–34)
MCHC RBC AUTO-ENTMCNC: 32.7 G/DL (ref 30–36.5)
MCV RBC AUTO: 87.3 FL (ref 80–99)
NRBC # BLD: 0 K/UL (ref 0–0.01)
NRBC BLD-RTO: 0 PER 100 WBC
PLATELET # BLD AUTO: 204 K/UL (ref 150–400)
PMV BLD AUTO: 10.9 FL (ref 8.9–12.9)
POTASSIUM SERPL-SCNC: 4 MMOL/L (ref 3.5–5.1)
RBC # BLD AUTO: 4 M/UL (ref 4.1–5.7)
SODIUM SERPL-SCNC: 136 MMOL/L (ref 136–145)
WBC # BLD AUTO: 7.9 K/UL (ref 4.1–11.1)

## 2021-03-18 PROCEDURE — 36415 COLL VENOUS BLD VENIPUNCTURE: CPT

## 2021-03-18 PROCEDURE — 80048 BASIC METABOLIC PNL TOTAL CA: CPT

## 2021-03-18 PROCEDURE — 74011250637 HC RX REV CODE- 250/637: Performed by: HOSPITALIST

## 2021-03-18 PROCEDURE — 74011250637 HC RX REV CODE- 250/637: Performed by: FAMILY MEDICINE

## 2021-03-18 PROCEDURE — 85027 COMPLETE CBC AUTOMATED: CPT

## 2021-03-18 PROCEDURE — 74011250637 HC RX REV CODE- 250/637: Performed by: INTERNAL MEDICINE

## 2021-03-18 RX ORDER — DEXTROMETHORPHAN POLISTIREX 30 MG/5ML
30 SUSPENSION ORAL EVERY 12 HOURS
Qty: 100 ML | Refills: 0 | Status: SHIPPED
Start: 2021-03-18 | End: 2021-03-28

## 2021-03-18 RX ORDER — BENZONATATE 100 MG/1
100 CAPSULE ORAL
Qty: 30 CAP | Refills: 0 | Status: SHIPPED
Start: 2021-03-18 | End: 2021-04-19 | Stop reason: SDUPTHER

## 2021-03-18 RX ORDER — LORATADINE 10 MG/1
10 TABLET ORAL DAILY
Qty: 30 TAB | Refills: 0 | Status: SHIPPED
Start: 2021-03-19 | End: 2022-01-20 | Stop reason: ALTCHOICE

## 2021-03-18 RX ORDER — ACETAMINOPHEN 325 MG/1
650 TABLET ORAL
Qty: 30 TAB | Refills: 0 | Status: SHIPPED
Start: 2021-03-18 | End: 2021-05-05 | Stop reason: ALTCHOICE

## 2021-03-18 RX ORDER — DEXTROMETHORPHAN POLISTIREX 30 MG/5ML
30 SUSPENSION ORAL EVERY 12 HOURS
Qty: 100 ML | Refills: 0 | Status: SHIPPED | OUTPATIENT
Start: 2021-03-18 | End: 2021-03-18

## 2021-03-18 RX ADMIN — GUAIFENESIN 1200 MG: 600 TABLET, EXTENDED RELEASE ORAL at 06:50

## 2021-03-18 RX ADMIN — DEXTROMETHORPHAN 30 MG: 30 SUSPENSION, EXTENDED RELEASE ORAL at 06:49

## 2021-03-18 RX ADMIN — THERA TABS 1 TABLET: TAB at 08:55

## 2021-03-18 RX ADMIN — LORATADINE 10 MG: 10 TABLET ORAL at 08:55

## 2021-03-18 RX ADMIN — SALINE NASAL SPRAY 2 SPRAY: 1.5 SOLUTION NASAL at 09:01

## 2021-03-18 RX ADMIN — METOPROLOL TARTRATE 50 MG: 50 TABLET, FILM COATED ORAL at 08:55

## 2021-03-18 RX ADMIN — APIXABAN 5 MG: 5 TABLET, FILM COATED ORAL at 08:55

## 2021-03-18 RX ADMIN — OXYCODONE HYDROCHLORIDE AND ACETAMINOPHEN 500 MG: 500 TABLET ORAL at 08:55

## 2021-03-18 RX ADMIN — Medication 10 ML: at 06:50

## 2021-03-18 RX ADMIN — FUROSEMIDE 40 MG: 40 TABLET ORAL at 08:55

## 2021-03-18 RX ADMIN — Medication 10 ML: at 14:00

## 2021-03-18 RX ADMIN — ASPIRIN 81 MG: 81 TABLET, COATED ORAL at 08:55

## 2021-03-18 NOTE — DISCHARGE INSTRUCTIONS
Discharge Instructions       PATIENT ID: Irma Lewis  MRN: [id number]   YOB: 1965    DATE OF ADMISSION: 3/2/2021 12:08 PM    DATE OF DISCHARGE: 3/18/2021    PRIMARY CARE PROVIDER: Samantha Mena MD     ATTENDING PHYSICIAN: Armond Licona MD  DISCHARGING PROVIDER: Shani Humphries MD    To contact this individual call [phone] and ask the  to page. If unavailable ask to be transferred the Adult Hospitalist Department. DISCHARGE DIAGNOSES   # Acute on chronic hypoxic respiratory failure with recent COVID 19 Pneumonia, likely long-term need for oxygen supplementation on 3-4LPM O2 NC at rest and higher on exertion, may be lifelong need. SARS CoV 2 PCR not detected on 3/2  # Severe sepsis likely due to pneumonia POA, resolved  # Thrombosis of left median cubital vein. Plan was 30 days eliquis.  F/up duplex US in 1 week to decide further     Code status: Full Code  DVT prophylaxis: on Eliquis     Care Plan discussed with: Patient/Family, Nurse and   Anticipated Disposition: [facility], [facility]  Anticipated Discharge: DC today    CONSULTATIONS: IP CONSULT TO INTENSIVIST  IP CONSULT TO PULMONOLOGY  IP CONSULT TO PULMONOLOGY    PROCEDURES/SURGERIES: * No surgery found *    PENDING TEST RESULTS:   At the time of discharge the following test results are still pending: none    FOLLOW UP APPOINTMENTS:   Follow-up Information     Follow up With Specialties Details Why Contact Info    Samantha Mena MD Internal Medicine In 1 week PCP: For post hosptial discharge follow up with CBC, CMP monitoring [facility]  [phone]      Ciara Monet PA-C Pulmonary Disease  Pulmonology: , As needed, If symptoms worsen and post hospital discharge follow up with your Pulmonologist. [address]  [phone]             ADDITIONAL CARE RECOMMENDATIONS:   Follow ups as noted in Appointments  Please get LUE venous duplex US to reevaluate for thrombophlebitis and plan for eliquis further. Likely DC eliquis if resolved. Defer to PCP/ provider at [facility]. · It is important that you take the medication exactly as they are prescribed. · Keep your medication in the bottles provided by the pharmacist and keep a list of the medication names, dosages, and times to be taken in your wallet. · Do not take other medications without consulting your doctor. · No drinking alcohol or driving car or operating machinery if you are on narcotic pain medications. Donot take sedating mediations if you are sleepy or confused. · Fall Precautions  · Keep Well Hydrated  · Report to your medical provider if you feel you have  developed allergies to medications  · Follow up with your PCP or Consultant for medication adjustments and refills  · Monitor for signs of fevers,chills,bleeding,chest pain and seek medical attention if you do so. DIET: Regular Diet  Oral Nutritional Supplements: Ensure Enlive Twice daily     ACTIVITY: Activity as tolerated and PT/OT Eval and Treat    WOUND CARE: NA    EQUIPMENT needed: Oxygen supplementation 3-4LPM at rest and more on exertion      Radiology:  Ct Head Wo Cont    Result Date: 3/2/2021  No acute intracranial abnormality     Cta Chest W Or W Wo Cont    Result Date: 3/9/2021  There is no pulmonary embolism. There is no aortic aneurysm or dissection. Extensive scattered interstitial, groundglass and parenchymal opacity with bronchiectatic change as well. Imaging findings may be related to viral pneumonia. Minimal left-sided effusion. Cardiomegaly. Cta Chest W Or W Wo Cont    Result Date: 3/2/2021  1. Limitations secondary to motion. No evidence of pulmonary embolus. 2.  Diffuse interstitial airspace opacities consistent with Covid 19 pneumonia or sequela.      Xr Chest Port    Result Date: 3/17/2021  Stable low lung volumes with patchy bilateral lung opacities. Xr Chest Port    Result Date: 3/13/2021  Slightly improved appearance to coarse pulmonary infiltrates with no new or superimposed infiltrate evident by plain film. Xr Chest Port    Result Date: 3/11/2021  Severe bilateral mid and lower lung pulmonary opacifications appear unchanged. Findings consistent with bilateral pneumonia. Xr Chest Port    Result Date: 3/3/2021  Stable low lung volumes with bilateral lung opacities. Xr Chest Port    Result Date: 3/2/2021  Low lung volumes. Diffuse interstitial and airspace opacities with some improved aeration in the right lung base. DISCHARGE MEDICATIONS:   See Medication Reconciliation Form    · It is important that you take the medication exactly as they are prescribed. · Keep your medication in the bottles provided by the pharmacist and keep a list of the medication names, dosages, and times to be taken in your wallet. · Do not take other medications without consulting your doctor. NOTIFY YOUR PHYSICIAN FOR ANY OF THE FOLLOWING:   Fever over 101 degrees for 24 hours. Chest pain, shortness of breath, fever, chills, nausea, vomiting, diarrhea, change in mentation, falling, weakness, bleeding. Severe pain or pain not relieved by medications. Or, any other signs or symptoms that you may have questions about. DISPOSITION:    Home With:   OT  PT  HH  RN      x SNF/Inpatient Rehab/LTAC    Independent/assisted living    Hospice    Other:     CDMP Checked:   Yes x     PROBLEM LIST Updated:  Yes x        My Medications      START taking these medications      Instructions Each Dose to Equal Morning Noon Evening Bedtime   acetaminophen 325 mg tablet  Commonly known as: TYLENOL    Your last dose was: Your next dose is: Take 2 Tabs by mouth every four (4) hours as needed for Pain or Fever. 650 mg                 benzonatate 100 mg capsule  Commonly known as: TESSALON    Your last dose was:      Your next dose is: Take 1 Cap by mouth three (3) times daily as needed for Cough for up to 10 days. 100 mg                 dextromethorphan 30 mg/5 mL liquid  Commonly known as: DELSYM    Your last dose was: Your next dose is: Take 5 mL by mouth every twelve (12) hours for 10 days. 30 mg                 guaiFENesin 1,200 mg Ta12 ER tablet  Commonly known as: [address]    Your last dose was: Your next dose is: Take 1 Tab by mouth every twelve (12) hours. 1,200 mg                 loratadine 10 mg tablet  Commonly known as: CLARITIN  Start taking on: March 19, 2021    Your last dose was: Your next dose is: Take 1 Tab by mouth daily. 10 mg                 sodium chloride 0.65 % nasal squeeze bottle  Commonly known as: OCEAN    Your last dose was: Your next dose is:         0.1 mL by Both Nostrils route every two (2) hours as needed for Congestion or Nasal Dryness. 2 Spray                    CONTINUE taking these medications      Instructions Each Dose to Equal Morning Noon Evening Bedtime   albuterol 90 mcg/actuation inhaler  Commonly known as: PROVENTIL HFA, VENTOLIN HFA, PROAIR HFA    Your last dose was: Your next dose is: Take 2 Puffs by inhalation every four (4) hours as needed for Wheezing. 2 Puff                 apixaban 5 mg tablet  Commonly known as: Eliquis    Your last dose was: Your next dose is: Take 1 Tab by mouth two (2) times a day for 30 days. 5 mg                 aspirin delayed-release 81 mg tablet    Your last dose was: Your next dose is: Take 81 mg by mouth daily. 81 mg                 furosemide 40 mg tablet  Commonly known as: Lasix    Your last dose was: Your next dose is: Take 1 Tab by mouth daily for 30 days. 40 mg                 melatonin 3 mg tablet    Your last dose was: Your next dose is: Take 1 Tab by mouth nightly for 30 days.    3 mg                 metoprolol tartrate 50 mg tablet  Commonly known as: LOPRESSOR    Your last dose was: Your next dose is: Take 1 Tab by mouth two (2) times a day for 30 days. 50 mg                 multivitamin tablet  Commonly known as: ONE A DAY    Your last dose was: Your next dose is: Take 1 Tab by mouth daily. 1 Tab                 Vitamin C 500 mg tablet  Generic drug: ascorbic acid (vitamin C)    Your last dose was: Your next dose is: Take 500 mg by mouth daily. 500 mg                 Vitamin D3 25 mcg (1,000 unit) Cap  Generic drug: cholecalciferol    Your last dose was: Your next dose is: Take 1,000 Units by mouth daily. 1,000 Units                       Where to Get Your Medications      Information on where to get these meds will be given to you by the nurse or doctor.     Ask your nurse or doctor about these medications  · acetaminophen 325 mg tablet  · benzonatate 100 mg capsule  · dextromethorphan 30 mg/5 mL liquid  · guaiFENesin 1,200 mg Ta12 ER tablet  · loratadine 10 mg tablet  · sodium chloride 0.65 % nasal squeeze bottle         Signed:   Hernan Nunez MD  3/18/2021  11:36 AM

## 2021-03-18 NOTE — PROGRESS NOTES
TRANSITION OF CARE  RUR--19%  Disposition--To [facility]. .  Transport--BlS with [facility] [place]. CM follow up. CM spoke with Everette Christine [facility] Liaison [phone], who said that patient is accepted, that insurance authorization is received, and that bed is available today. Receiving physician is Jaime Tom MD/ Nurse Report to be called to [phone]. CM sent referral via [employer] for BLS transport to [address] [place] [address] ([phone]), and referral was accepted for  1:00PM . CM completed PCS and placed it on chart. CM completed CM portion of [place]. CM gave update to staff nurse and to patient.

## 2021-03-18 NOTE — PROGRESS NOTES
Pt transported via [facility] to [facility]. Belongings and paperwork accompanied pt. Pt VSS. On 3 L via NC .         TRANSFER - out REPORT:     Verbal report given to VADIM Finley  from [facility] ([phone]) from Oralia Loza RN in [facility] for continuation of care. Report consisted of the following report(s) SBAR, Kardex, ED Summary /IMCU reports and Cardiac Rhythm. All questions answered.

## 2021-03-18 NOTE — PROGRESS NOTES
Problem: Pressure Injury - Risk of  Goal: *Prevention of pressure injury  Description: Document Govind Scale and appropriate interventions in the flowsheet. Outcome: Progressing Towards Goal  Note: Pressure Injury Interventions:  Sensory Interventions: Minimize linen layers, Pad between skin to skin, Keep linens dry and wrinkle-free    Moisture Interventions: Absorbent underpads    Activity Interventions: Increase time out of bed, Pressure redistribution bed/mattress(bed type), PT/OT evaluation    Mobility Interventions: PT/OT evaluation, Pressure redistribution bed/mattress (bed type)    Nutrition Interventions: Document food/fluid/supplement intake    Friction and Shear Interventions: Apply protective barrier, creams and emollients                Problem: Falls - Risk of  Goal: *Absence of Falls  Description: Document Ignacio Fall Risk and appropriate interventions in the flowsheet.   Outcome: Progressing Towards Goal  Note: Fall Risk Interventions:  Mobility Interventions: Communicate number of staff needed for ambulation/transfer, OT consult for ADLs, Patient to call before getting OOB         Medication Interventions: Evaluate medications/consider consulting pharmacy, Patient to call before getting OOB, Teach patient to arise slowly    Elimination Interventions: Call light in reach, Patient to call for help with toileting needs, Stay With Me (per policy), Toilet paper/wipes in reach

## 2021-03-18 NOTE — PROGRESS NOTES
1900 Bedside, Verbal and Written shift change report given to Ben Dempsey (oncoming nurse) by Kehinde Villasenor RN (offgoing nurse). Report included the following information SBAR, Kardex, ED Summary, Procedure Summary, Intake/Output, MAR, Accordion, Recent Results, Med Rec Status, Cardiac Rhythm NSR, Alarm Parameters , Pre Procedure Checklist, Procedure Verification, Quality Measures and Dual Neuro Assessment.

## 2021-03-18 NOTE — DISCHARGE SUMMARY
Discharge Summary       PATIENT ID: Rola Story  MRN: [id number]   YOB: 1965    DATE OF ADMISSION: 3/2/2021 12:08 PM    DATE OF DISCHARGE: 03/18/21  PRIMARY CARE PROVIDER: Axel Maldonado MD     ATTENDING PHYSICIAN: Juwan Rodgers MD  DISCHARGING PROVIDER: Juwan Rodgers MD    To contact this individual call [phone] and ask the  to page. If unavailable ask to be transferred the Adult Hospitalist Department. CONSULTATIONS: IP CONSULT TO INTENSIVIST  IP CONSULT TO PULMONOLOGY  IP CONSULT TO PULMONOLOGY    PROCEDURES/SURGERIES: * No surgery found *    ADMITTING [address] [place] [address] COURSE:   # Acute on chronic hypoxic respiratory failure with recent COVID 19 Pneumonia, likely long-term need for oxygen supplementation on 3-4LPM O2 NC at rest and higher on exertion, may be lifelong need. SARS CoV 2 PCR not detected on 3/2  # Severe sepsis likely due to pneumonia POA, resolved  # Thrombosis of left median cubital vein. Plan was 30 days eliquis. F/up duplex US in 1 week to decide further    Admission Summary:      Patient is not able to provide much history as he has significant shortness of breath and increased work of breathing.  Patient was admitted on 1/18/2021 at [facility] and discharged on 2/17/2021. Pal Reeder was admitted with acute hypoxic respiratory failure, severe multilobar Covid pneumonia, pneumomediastinum, patient also has history of right lower extremity DVT.  Patient was discharged to [facility], patient has been having increased shortness of breath for the last few days and today became quite short of breath, patient was put on nonrebreather 15 L and was brought to [address] continued to be quite short of breath and is currently on 45 L nasal cannula, patient appears to be distressed. Pal Reeder was evaluated by the ICU team and deemed appropriate to be admitted to the hospitalist service on intermediate care unit.  Patient denies any chest pain associated with his symptoms reports that he feels he is struggling to breathe.  Patient reports mild cough but denies fever        Interval history / Subjective: Follow up severe sepsis, PNA, Acute hypoxic respiratory failure. Patient seen and examined at the bedside. Labs, images and notes reviewed  Discussed with nursing staff, orders reviewed. Plan discussed with patient/Family  Feeling well. D/w sister in law Paulette Peoples on phone like usual.   Breathing is at baseline. No fever, chills. O2 4LPM NC at rest. Cough is controlled. Looking forward to rehablitation    DISCHARGE DIAGNOSES / PLAN:      # Acute on chronic hypoxic respiratory failure with recent COVID 19 Pneumonia, likely long-term need for oxygen supplementation, may be lifelong  -received IV cefepime 3/3-3/9  -SARS CoV 2 PCR not detected on 3/2  -CTA chest 3/9: Extensive scattered interstitial, groundglass and parenchymal opacity with  bronchiectatic change as well. Imaging findings may be related to viral pneumonia.  -Procal wnl, sputum cultures if possible  -his O2 saturation >90% on 5lt oxygen at rest but desaturating with exertion but recovering  -Lasix 20mg IV once given 3/12, 3/13  -CXR 3/13 noted with mild improvement  -Lasix 40 mg daily PO and tolerating well, stable BMP  -CXR stable. Image reviewed personally and showed to the pt as well. Appears little better compared to one before this. -BMP monitoring while on Lasix at rehab.  Defer to PCP/ provider at [facility]  -Supportive care for cough with Mucinex, Delsym liquid, Claritin, Tessalon Perles as needed     # Severe sepsis likely due to pneumonia POA, resolved  -leukocytosis, elevated lactic acid, tachycardia, elevated liver enzyme   -completed above abx, oxygen support  -follow up on blood cx, NGTD      # Thrombosis of left median cubital vein  -patient has h/o DVT few yrs ago  -with recent COVID 19 -continue anticoagulation Eliquis for now on DC and was intended for 30 days  -Get LUE venous duplex in a week and plan further from there  -Given nature as superficial thrombophlebitis, DC eliquis if it is resolved     Code status: Full Code  DVT prophylaxis: Eliquis - atleast for another week. Further per LUE venous duplex [address] discussed with: Patient/Family, Nurse and   Anticipated Disposition: IPR, [facility] today  Anticipated Discharge: DC today. Pt and family are agreeable. ADDITIONAL CARE RECOMMENDATIONS:   Follow ups as noted in Appointments  Please get LUE venous duplex US to reevaluate for thrombophlebitis and plan for eliquis further. Likely DC eliquis if resolved. Defer to PCP/ provider at [facility]. · It is important that you take the medication exactly as they are prescribed. · Keep your medication in the bottles provided by the pharmacist and keep a list of the medication names, dosages, and times to be taken in your wallet. · Do not take other medications without consulting your doctor. · No drinking alcohol or driving car or operating machinery if you are on narcotic pain medications. Donot take sedating mediations if you are sleepy or confused.    · Fall Precautions  · Keep Well Hydrated  · Report to your medical provider if you feel you have  developed allergies to medications  · Follow up with your PCP or Consultant for medication adjustments and refills  · Monitor for signs of fevers,chills,bleeding,chest pain and seek medical attention if you do so.          DIET: Regular Diet  Oral Nutritional Supplements: Ensure Enlive Twice daily      ACTIVITY: Activity as tolerated and PT/OT Eval and Treat     WOUND CARE: NA     EQUIPMENT needed: Oxygen supplementation 3-4LPM at rest and more on exertion     PENDING TEST RESULTS:   At the time of discharge the following test results are still pending: none    FOLLOW UP APPOINTMENTS:    Follow-up Information     Follow up With Specialties Details Why Contact Info Rajinder Pedraza MD Internal Medicine In 1 week PCP: For post hosptial discharge follow up with [facility], Tyler Peng [facility] [address] monitoring [facility]  [phone]      Efraín Miner PA-C Pulmonary Disease  Pulmonology: , As needed, If symptoms worsen and post hospital discharge follow up with your Pulmonologist. [address] [facility]  [address]  [facility]  Rogelio Tuttle.  [phone]                 DISCHARGE MEDICATIONS:  Current Discharge Medication List      START taking these medications    Details   sodium chloride (OCEAN) 0.65 % nasal squeeze bottle 0.1 mL by Both Nostrils route every two (2) hours as needed for Congestion or Nasal Dryness. Qty: 15 mL, Refills: 0      acetaminophen (TYLENOL) 325 mg tablet Take 2 Tabs by mouth every four (4) hours as needed for Pain or Fever. Qty: 30 Tab, Refills: 0      benzonatate (TESSALON) 100 mg capsule Take 1 Cap by mouth three (3) times daily as needed for Cough for up to 10 days. Qty: 30 Cap, Refills: 0      guaiFENesin ER (MUCINEX) 1,200 mg Ta12 ER tablet Take 1 Tab by mouth every twelve (12) hours. Qty: 30 Tab, Refills: 0      loratadine (CLARITIN) 10 mg tablet Take 1 Tab by mouth daily. Qty: 30 Tab, Refills: 0      dextromethorphan (DELSYM) 30 mg/5 mL liquid Take 5 mL by mouth every twelve (12) hours for 10 days. Qty: 100 mL, Refills: 0         CONTINUE these medications which have NOT CHANGED    Details   apixaban (Eliquis) 5 mg tablet Take 1 Tab by mouth two (2) times a day for 30 days. Qty: 60 Tab, Refills: 0      furosemide (Lasix) 40 mg tablet Take 1 Tab by mouth daily for 30 days. Qty: 30 Tab, Refills: 0      melatonin 3 mg tablet Take 1 Tab by mouth nightly for 30 days. Qty: 30 Tab, Refills: 0      metoprolol tartrate (LOPRESSOR) 50 mg tablet Take 1 Tab by mouth two (2) times a day for 30 days. Qty: 60 Tab, Refills: 0      aspirin delayed-release 81 mg tablet Take 81 mg by mouth daily.       albuterol (PROVENTIL HFA, VENTOLIN HFA, PROAIR HFA) 90 mcg/actuation inhaler Take 2 Puffs by inhalation every four (4) hours as needed for Wheezing. Qty: 1 Inhaler, Refills: 1      multivitamin (ONE A DAY) tablet Take 1 Tab by mouth daily. cholecalciferol (VITAMIN D3) 1,000 unit cap Take 1,000 Units by mouth daily. ascorbic acid, vitamin C, (VITAMIN C) 500 mg tablet Take 500 mg by mouth daily. NOTIFY YOUR PHYSICIAN FOR ANY OF THE FOLLOWING:   Fever over 101 degrees for 24 hours. Chest pain, shortness of breath, fever, chills, nausea, vomiting, diarrhea, change in mentation, falling, weakness, bleeding. Severe pain or pain not relieved by medications. Or, any other signs or symptoms that you may have questions about. DISPOSITION:   Home With:   OT  PT  HH  RN      x Long term SNF/Inpatient Rehab    Independent/assisted living    Hospice    Other:       PATIENT CONDITION AT DISCHARGE:     Functional status    Poor    x Deconditioned     Independent      Cognition   x  Lucid     Forgetful     Dementia      Catheters/lines (plus indication)    Mallory     PICC     PEG    x None      Code status   x  Full code     DNR      PHYSICAL EXAMINATION AT DISCHARGE:  Visit Vitals  BP (!) 152/83 (BP 1 Location: Left upper arm, BP Patient Position: At rest)   Pulse 86   Temp 97.5 °F (36.4 °C)   Resp 24   Ht 5' 8\" (1.727 m)   Wt 75.7 kg (166 lb 14.2 oz)   SpO2 98%   BMI 25.38 kg/m²       I had a face to face encounter with this patient and independently examined them on 3/18/2021 as outlined below:                                                   Constitutional:  Alert, oriented x3, no acute distress at rest.  Cooperative. ENT:  Oral mucosa moist, oropharynx benign. Resp:  Mild bibasilar fine crackles, stable and improving AE, not much changed from 3/17   CV:  Regular rate and rhythm, no murmur rub or gallop.     GI:  Soft, NT ND, BS+, no hepatosplenomegaly     Musculoskeletal:  No LE edema     Neurologic:  Moves all extremities. AAOx3, CN II-XII grossly intact                         Skin:  Good turgor, no rashes or ulcers        CHRONIC MEDICAL DIAGNOSES:  Problem List as of 3/18/2021 Date Reviewed: 5/8/2019          Codes Class Noted - Resolved    History of DVT of lower extremity ICD-10-CM: R31.788  ICD-9-CM: V12.51  8/12/2019 - Present        Obesity (BMI 30-39. 9) ICD-10-CM: E66.9  ICD-9-CM: 278.00  5/8/2019 - Present        RESOLVED: Acute deep vein thrombosis (DVT) of femoral vein of right lower extremity ([facility] ICD-10-CM: I82.411  ICD-9-CM: 453.41  5/17/2019 - 8/12/2019        [facility] ICD-10-CM: J96.90  ICD-9-CM: 518.81  3/2/2021 - Present        Acute respiratory failure with hypoxia (HCC) ICD-10-CM: J96.01  ICD-9-CM: 518.81  1/18/2021 - Present        COVID-19 virus infection ICD-10-CM: U07.1  ICD-9-CM: 079.89  1/18/2021 - Present            Radiology  Ct Head Wo Cont    Result Date: 3/2/2021  No acute intracranial abnormality     Cta Chest W Or W Wo Cont    Result Date: 3/9/2021  There is no pulmonary embolism. There is no aortic aneurysm or dissection. Extensive scattered interstitial, groundglass and parenchymal opacity with bronchiectatic change as well. Imaging findings may be related to viral pneumonia. Minimal left-sided effusion. Cardiomegaly. Cta Chest W Or W Wo Cont    Result Date: 3/2/2021  1. Limitations secondary to motion. No evidence of pulmonary embolus. 2.  Diffuse interstitial airspace opacities consistent with Covid 19 pneumonia or sequela. Xr Chest Port    Result Date: 3/17/2021  Stable low lung volumes with patchy bilateral lung opacities. Xr Chest Port    Result Date: 3/13/2021  Slightly improved appearance to coarse pulmonary infiltrates with no new or superimposed infiltrate evident by plain film. Xr Chest Port    Result Date: 3/11/2021  Severe bilateral mid and lower lung pulmonary opacifications appear unchanged.  Findings consistent with bilateral pneumonia. Xr Chest Port    Result Date: 3/3/2021  Stable low lung volumes with bilateral lung opacities. Xr Chest Port    Result Date: 3/2/2021  Low lung volumes. Diffuse interstitial and airspace opacities with some improved aeration in the right lung base.     Recent Results (from the past 24 hour(s))   CBC W/O DIFF    Collection Time: 03/18/21  7:21 AM   Result Value Ref Range    WBC 7.9 4.1 - 11.1 K/uL    RBC 4.00 (L) 4.10 - 5.70 M/uL    HGB 11.4 (L) 12.1 - 17.0 g/dL    HCT 34.9 (L) 36.6 - 50.3 %    MCV 87.3 80.0 - 99.0 FL    MCH 28.5 26.0 - 34.0 PG    MCHC 32.7 30.0 - 36.5 g/dL    RDW 15.0 (H) 11.5 - 14.5 %    PLATELET 463 438 - 977 K/uL    MPV 10.9 8.9 - 12.9 FL    NRBC 0.0 0  WBC    ABSOLUTE NRBC 0.00 0.00 - 9.64 K/uL   METABOLIC PANEL, BASIC    Collection Time: 03/18/21  7:21 AM   Result Value Ref Range    Sodium 136 136 - 145 mmol/L    Potassium 4.0 3.5 - 5.1 mmol/L    Chloride 101 97 - 108 mmol/L    CO2 32 21 - 32 mmol/L    Anion gap 3 (L) 5 - 15 mmol/L    Glucose 95 65 - 100 mg/dL    BUN 11 6 - 20 MG/DL    Creatinine 0.57 (L) 0.70 - 1.30 MG/DL    BUN/Creatinine ratio 19 12 - 20      GFR est AA >60 >60 ml/min/1.73m2    GFR est non-AA >60 >60 ml/min/1.73m2    Calcium 9.3 8.5 - 10.1 MG/DL       Greater than 45 minutes were spent with the patient on counseling and coordination of care    Signed:   Live Feliciano MD  3/18/2021  11:42 AM

## 2021-03-18 NOTE — PROGRESS NOTES
Bedside shift change report given to [facility] RENE (oncoming nurse) by Una Bull (offgoing nurse). Report included the following information SBAR, Kardex, ED Summary, [facility] and Recent Results.

## 2021-03-19 ENCOUNTER — PATIENT OUTREACH (OUTPATIENT)
Dept: CASE MANAGEMENT | Age: 56
End: 2021-03-19

## 2021-03-19 NOTE — ADT AUTH CERT NOTES
Respiratory Failure GRG - Care Day 16 (3/17/2021) by Brandon Gaston RN       Review Status Review Entered   Completed 3/17/2021 16:51      Criteria Review      Care Day: 16 Care Date: 3/17/2021 Level of Care: Intermediate Care    Guideline Day 3    Level Of Care    ( ) * Activity level acceptable    Clinical Status    ( ) * Ventilation status appropriate    (X) * Airway status acceptable    ( ) * Respiratory status acceptable    3/17/2021 16:51:30 EDT by Ney William      rr 30  96% 4lnc    (X) * Stable chest findings    (X) * Neurologic status acceptable    (X) * Temperature status acceptable    ( ) * No infection, or status acceptable    ( ) * General Discharge Criteria met    Interventions    (X) * No chest tube, or status acceptable    (X) * Intake acceptable    ( ) * No inpatient interventions needed    3/17/2021 16:51:30 EDT by Ney William      see below    * Milestone   Additional Notes    3/17/21  inpt telem     98. 7   90   30   108/73   96% 4lnc    WBC: 9.2   HGB: 12.9   HCT: 39.5   PLATELET: 238   Sodium: 136   Potassium: 4.1   Chloride: 100   CO2: 32   Anion gap: 4 (L)   Glucose: 100   BUN: 15   Creatinine: 0.76   BUN/Creatinine ratio: 20   meds: eliquis 5 mg po 2 times daily, vitamin c 500 mg po daily, aspirin 81 mg po daily, melatonin 3 mg po every bedtime, lopressor 50 mg po 2 times daily    mucinex 1200mg po q12   tessalon 100mg po prn   delsym 30mg po q12         hospitalist note   Interval history / Subjective: Follow up severe sepsis, PNA, Acute hypoxic respiratory failure. Patient seen and examined at the bedside. Labs, images and notes reviewed   Discussed with nursing staff, orders reviewed. Plan discussed with patient/Family   Feeling stable and looking better.  Still gets short winded with exertion.  Some hypoxia with that and takes little time for recovery.  Currently baseline stabilization around 4 LPM O2 NC.  No fever or chills. Tolerating Lasix well.    Consultation again about possible need of long-term oxygen supplementation and dependence.       Assessment & Plan:       # Acute on chronic hypoxic respiratory failure with recent COVID 19 Pneumonia, likely long-term need for oxygen supplementation, may be lifelong   -received IV cefepime 3/3-3/9   -SARS CoV 2 PCR not detected on 3/2   -CTA chest 3/9: Extensive scattered interstitial, groundglass and parenchymal opacity with   bronchiectatic change as well.  Imaging findings may be related to viral pneumonia.   -Procal wnl, sputum cultures if possible   -his O2 saturation >90% on 5lt oxygen at rest but desaturating with exertion but recovering   -Lasix 20mg once given 3/12, 3/13   -CXR 3/13 noted with mild improvement   -Lasix 40 mg daily   -We will recheck CXR 3/17   -BMP monitoring while on Lasix.  Stable at present   -Supportive care for cough with Mucinex, dose increased, Delsym liquid, Tessalon Perles as needed       # Severe sepsis likely due to pneumonia POA, resolved   -leukocytosis, elevated lactic acid, tachycardia, elevated liver enzyme    -completed above abx, oxygen support   -follow up on blood cx, NGTD        # Thrombosis of left median cubital vein   -patient has h/o DVT few yrs ago, with recent COVID 19 -continue anticoagulation Eliquis for now and repeat ultrasound again in 2 weeks       Code status: Full Code   DVT prophylaxis: [facility] discussed with: Patient/Family, Nurse and    Anticipated Disposition: [facility], [facility], authorization received, awaiting bed availability   Anticipated Discharge: Medically ready, pending bed availability                Respiratory Failure GRG - Care Day 15 (3/16/2021) by Calin Mckeon RN       Review Status Review Entered   Completed 3/17/2021 16:48      Criteria Review      Care Day: 15 Care Date: 3/16/2021 Level of Care: Intermediate Care    Guideline Day 3    Level Of Care    ( ) * Activity level acceptable    Clinical Status    ( ) * Ventilation status appropriate    (X) * Airway status acceptable    ( ) * Respiratory status acceptable    3/17/2021 16:48:12 EDT by Mickey Still rr 28   95% 4lnc    (X) * Stable chest findings    (X) * Neurologic status acceptable    (X) * Temperature status acceptable    ( ) * No infection, or status acceptable    ( ) * General Discharge Criteria met    Interventions    (X) * No chest tube, or status acceptable    (X) * Intake acceptable    ( ) * No inpatient interventions needed    3/17/2021 16:48:12 EDT by Mickey Still see below    * Milestone   Additional Notes    3/16/21  inpt telem     98. 2   110   28   104/65   95% 4lnc   WBC: 9.5   HGB: 11.3 (L)   HCT: 35.0 (L)   PLATELET: 732   Sodium: 138   Potassium: 4.0   Glucose: 117 (H)   BUN: 16   Creatinine: 0.55 (L)   BUN/Creatinine ratio: 29 (H)         meds: eliquis 5 mg po 2 times daily, vitamin c 500 mg po daily, aspirin 81 mg po daily, melatonin 3 mg po every bedtime, lopressor 50 mg po 2 times daily    mucinex 1200mg po q12   tessalon 100mg po prn   delsym 30mg po q12         hospitalist note   Interval history / Subjective: Follow up severe sepsis, PNA, Acute hypoxic respiratory failure. Patient seen and examined at the bedside. Labs, images and notes reviewed   Discussed with nursing staff, orders reviewed. Plan discussed with patient/Family   Feeling and looking better.  On 4 LPM O2 NC at rest.  Increase O2 requirement with mild exertion while working with therapies.  D/W pulmonology. Jan Pulliam with continuation of Lasix.  Awaiting authorization for [facility].  Accepted.  No other concerns or overnight events. .       Assessment & Plan:       # Acute on chronic hypoxic respiratory failure with recent COVID 19 Pneumonia, likely long-term need for oxygen supplementation, may be lifelong   -received  iv cefepime 3/3-3/9,     -SARS CoV 2 PCR not detected on 3/2   -CTA chest 3/9: Extensive scattered interstitial, groundglass and parenchymal opacity with bronchiectatic change as well.  Imaging findings may be related to viral pneumonia.   -Procal wnl, sputum cultures if possible   -his O2 saturation >90% on 5lt oxygen at rest but desaturating with exertion but recovering   -Lasix 20mg once given 3/12, 3/13   -CXR 3/13 noted with mild improvement   -Lasix 40 mg daily   -BMP monitoring while on Lasix   -Supportive care for cough with Mucinex, dose increased, Delsym liquid, Tessalon Perles as needed       # Severe sepsis likely due to pneumonia POA, resolved   -leukocytosis, elevated lactic acid, tachycardia, elevated liver enzyme    -completed above abx, oxygen support   -follow up on blood cx, NGTD        # Thrombosis of left median cubital vein   -patient has h/o DVT few yrs ago, with recent COVID 19 -continue anticoagulation Eliquis for now and repeat ultrasound again in 2 weeks       Code status: Full Code   DVT prophylaxis: [address] [facility] discussed with: Patient/Family, Nurse and    Anticipated Disposition: [facility], [facility]-accept[facility]   Anticipated Discharge: 24-48 hours, pending authorization

## 2021-03-19 NOTE — PROGRESS NOTES
Transition of care outreach postponed for 7 days due to patient's discharge to inpatient rehab facility.   [facility] readmit 3/2-3/18/21 acute resp  fx d/c [facility] f/u 7d

## 2021-03-19 NOTE — ADT AUTH CERT NOTES
Respiratory Failure GRG - Care Day 16 (3/17/2021) by Cee Yun RN       Review Status Review Entered   Completed 3/17/2021 16:51      Criteria Review      Care Day: 16 Care Date: 3/17/2021 Level of Care: Intermediate Care    Guideline Day 3    Level Of Care    ( ) * Activity level acceptable    Clinical Status    ( ) * Ventilation status appropriate    (X) * Airway status acceptable    ( ) * Respiratory status acceptable    3/17/2021 16:51:30 EDT by Cee Yun      rr 30  96% 4lnc    (X) * Stable chest findings    (X) * Neurologic status acceptable    (X) * Temperature status acceptable    ( ) * No infection, or status acceptable    ( ) * General Discharge Criteria met    Interventions    (X) * No chest tube, or status acceptable    (X) * Intake acceptable    ( ) * No inpatient interventions needed    3/17/2021 16:51:30 EDT by Cee Yun      see below    * Milestone   Additional Notes    3/17/21  inpt telem     98.7   90   30   108/73   96% 4lnc    WBC: 9.2   HGB: 12.9   HCT: 39.5   PLATELET: 280   Sodium: 136   Potassium: 4.1   Chloride: 100   CO2: 32   Anion gap: 4 (L)   Glucose: 100   BUN: 15   Creatinine: 0.76   BUN/Creatinine ratio: 20   meds: eliquis 5 mg po 2 times daily, vitamin c 500 mg po daily, aspirin 81 mg po daily, melatonin 3 mg po every bedtime, lopressor 50 mg po 2 times daily    mucinex 1200mg po q12   tessalon 100mg po prn   delsym 30mg po q12         hospitalist note   Interval history / Subjective:   Follow up severe sepsis, PNA, Acute hypoxic respiratory failure.   Patient seen and examined at the bedside. Labs, images and notes reviewed   Discussed with nursing staff, orders reviewed. Plan discussed with patient/Family   Feeling stable and looking better.  Still gets short winded with exertion.  Some hypoxia with that and takes little time for recovery.  Currently baseline stabilization around 4 LPM O2 NC.  No fever or chills.   Tolerating Lasix well.   Consultation again about  possible need of long-term oxygen supplementation and dependence.       Assessment & Plan:       # Acute on chronic hypoxic respiratory failure with recent COVID 19 Pneumonia, likely long-term need for oxygen supplementation, may be lifelong   -received IV cefepime 3/3-3/9   -SARS CoV 2 PCR not detected on 3/2   -CTA chest 3/9: Extensive scattered interstitial, groundglass and parenchymal opacity with   bronchiectatic change as well.  Imaging findings may be related to viral pneumonia.   -Procal wnl, sputum cultures if possible   -his O2 saturation >90% on 5lt oxygen at rest but desaturating with exertion but recovering   -Lasix 20mg once given 3/12, 3/13   -CXR 3/13 noted with mild improvement   -Lasix 40 mg daily   -We will recheck CXR 3/17   -BMP monitoring while on Lasix.  Stable at present   -Supportive care for cough with Mucinex, dose increased, Delsym liquid, Tessalon Perles as needed       # Severe sepsis likely due to pneumonia POA, resolved   -leukocytosis, elevated lactic acid, tachycardia, elevated liver enzyme    -completed above abx, oxygen support   -follow up on blood cx, NGTD        # Thrombosis of left median cubital vein   -patient has h/o DVT few yrs ago, with recent COVID 19 -continue anticoagulation Eliquis for now and repeat ultrasound again in 2 weeks       Code status: Full Code   DVT prophylaxis: [facility] discussed with: Patient/Family, Nurse and    Anticipated Disposition: [facility]PR, [facility]-accept[facility], authorization received, awaiting bed availability   Anticipated Discharge: Medically ready, pending bed availability                Respiratory Failure GRG - Care Day 15 (3/16/2021) by Sowmya Vaughn RN       Review Status Review Entered   Completed 3/17/2021 16:48      Criteria Review      Care Day: 15 Care Date: 3/16/2021 Level of Care: Intermediate Care    Guideline Day 3    Level Of Care    ( ) * Activity level acceptable    Clinical Status    ( ) * Ventilation status appropriate    (X) * Airway status acceptable    ( ) * Respiratory status acceptable    3/17/2021 16:48:12 EDT by [facility] rr 28   95% 4lnc    (X) * Stable chest findings    (X) * Neurologic status acceptable    (X) * Temperature status acceptable    ( ) * No infection, or status acceptable    ( ) * General Discharge Criteria met    Interventions    (X) * No chest tube, or status acceptable    (X) * Intake acceptable    ( ) * No inpatient interventions needed    3/17/2021 16:48:12 EDT by [facility] see below    * Milestone   Additional Notes    3/16/21  inpt telem     98. 2   110   28   104/65   95% 4lnc   WBC: 9.5   HGB: 11.3 (L)   HCT: 35.0 (L)   PLATELET: 137   Sodium: 138   Potassium: 4.0   Glucose: 117 (H)   BUN: 16   Creatinine: 0.55 (L)   BUN/Creatinine ratio: 29 (H)         meds: eliquis 5 mg po 2 times daily, vitamin c 500 mg po daily, aspirin 81 mg po daily, melatonin 3 mg po every bedtime, lopressor 50 mg po 2 times daily    mucinex 1200mg po q12   tessalon 100mg po prn   delsym 30mg po q12         hospitalist note   Interval history / Subjective: Follow up severe sepsis, PNA, Acute hypoxic respiratory failure. Patient seen and examined at the bedside. Labs, images and notes reviewed   Discussed with nursing staff, orders reviewed. Plan discussed with patient/Family   Feeling and looking better.  On 4 LPM O2 NC at rest.  Increase O2 requirement with mild exertion while working with therapies.  D/W pulmonology. Link Hoar with continuation of Lasix.  Awaiting authorization for [facility].  Accepted.  No other concerns or overnight events. .       Assessment & Plan:       # Acute on chronic hypoxic respiratory failure with recent COVID 19 Pneumonia, likely long-term need for oxygen supplementation, may be lifelong   -received  iv cefepime 3/3-3/9,     -SARS CoV 2 PCR not detected on 3/2   -CTA chest 3/9: Extensive scattered interstitial, groundglass and parenchymal opacity with bronchiectatic change as well.  Imaging findings may be related to viral pneumonia.   -Procal wnl, sputum cultures if possible   -his O2 saturation >90% on 5lt oxygen at rest but desaturating with exertion but recovering   -Lasix 20mg once given 3/12, 3/13   -CXR 3/13 noted with mild improvement   -Lasix 40 mg daily   -BMP monitoring while on Lasix   -Supportive care for cough with Mucinex, dose increased, Delsym liquid, Tessalon Perles as needed       # Severe sepsis likely due to pneumonia POA, resolved   -leukocytosis, elevated lactic acid, tachycardia, elevated liver enzyme    -completed above abx, oxygen support   -follow up on blood cx, NGTD        # Thrombosis of left median cubital vein   -patient has h/o DVT few yrs ago, with recent COVID 19 -continue anticoagulation Eliquis for now and repeat ultrasound again in 2 weeks       Code status: Full Code   DVT prophylaxis: [address] [facility] discussed with: Patient/Family, Nurse and    Anticipated Disposition: IPR, [facility]accept[facility]   Anticipated Discharge: 24-48 hours, pending authorization                   Respiratory Failure GRG - Care Day 14 (3/15/2021) by Nikia Alan RN       Review Status Review Entered   Completed 3/15/2021 14:22      Criteria Review      Care Day: 14 Care Date: 3/15/2021 Level of Care: Intermediate Care    Guideline Day 3    Level Of Care    ( ) * Activity level acceptable    Clinical Status    ( ) * Ventilation status appropriate    3/15/2021 14:22:21 EDT by Chadwick Gabriel      92% 3lnc    (X) * Airway status acceptable    ( ) * Respiratory status acceptable    3/15/2021 14:22:21 EDT by Chadwick Gabriel      rr 25    (X) * Stable chest findings    (X) * Neurologic status acceptable    (X) * Temperature status acceptable    ( ) * No infection, or status acceptable    ( ) * General Discharge Criteria met    Interventions    (X) * No chest tube, or status acceptable    (X) * Intake acceptable    ( ) * No inpatient interventions needed    3/15/2021 14:22:21 EDT by Lance Armenta      eliquis 5 mg po 2 times daily, vitamin c 500 mg po daily, aspirin 81 mg po daily, melatonin 3 mg po every bedtime, lopressor 50 mg po 2 times daily    mucinex 600mg po q12   tessalon 100mg po prn   delsym 30mg po q12    * Milestone   Additional Notes   3/15/21  inpt  telem   98.3    114    25   142/87    92% 3lnc   No labs      meds: eliquis 5 mg po 2 times daily, vitamin c 500 mg po daily, aspirin 81 mg po daily, melatonin 3 mg po every bedtime, lopressor 50 mg po 2 times daily    mucinex 600mg po q12   tessalon 100mg po prn   delsym 30mg po q12      orders:  card diet  ambulate with assistance   aspiration precaution   fall precaution   I&O   neuro/vasc ck q4   hospitalist note   Interval history / Subjective: Follow up severe sepsis, PNA, Acute hypoxic respiratory failure. Patient seen and examined at the bedside. Labs, images and notes reviewed   Discussed with nursing staff, orders reviewed. Plan discussed with patient/Family   Feeling ok. Some desaturation in upper 80s with mild exertion and not able to wean much, at 3LPM from 4LPM. No fever, chills. No other overnight events or concerns.       Assessment & Plan:       # Acute on chronic hypoxic respiratory failure with recent COVID 19 Pneumonia   -received  iv cefepime 3/3-3/9,     -SARS CoV 2 PCR not detected on 3/2   -CTA chest 3/9: Extensive scattered interstitial, groundglass and parenchymal opacity with   bronchiectatic change as well.  Imaging findings may be related to viral pneumonia.   -Procal wnl, sputum cultures if possible   -his O2 saturation >90% on 5lt oxygen at rest but desaturating with exertion but recovering   -Lasix 20mg once given 3/12, 3/13   -CXR 3/13 noted with mild improvement   -Continue lasix 40mg PO daily, likely short term need with close BMP monitoring   -Continue rest of the current mx       # Severe sepsis likely due to pneumonia POA, resolved -leukocytosis, elevated lactic acid, tachycardia, elevated liver enzyme    -completed above abx, oxygen support   -follow up on blood cx, NGTD        # Thrombosis of left median cubital vein   -patient has h/o DVT few yrs ago, with recent COVID 19 -continue anticoagulation Eliquis for now and repeat ultrasound again in 2 weeks       Code status: Full Code   DVT prophylaxis: Eliquis       Care Plan discussed with: Patient/Family, Nurse and    Anticipated Disposition: IPR, pending choice   Anticipated Discharge: Greater than 48 hours             Respiratory Failure GRG - Care Day 13 (3/14/2021) by Leonela Luis RN       Review Status Review Entered   Completed 3/15/2021 14:19      Criteria Review      Care Day: 13 Care Date: 3/14/2021 Level of Care: Intermediate Care    Guideline Day 3    Level Of Care    ( ) * Activity level acceptable    Clinical Status    ( ) * Ventilation status appropriate    3/15/2021 14:19:02 EDT by Heavenly Sanchez      93% 3lnc    (X) * Airway status acceptable    ( ) * Respiratory status acceptable    3/15/2021 14:19:02 EDT by Heavenly Sanchez      rr 30    ( ) * Stable chest findings    (X) * Neurologic status acceptable    (X) * Temperature status acceptable    ( ) * No infection, or status acceptable    ( ) * General Discharge Criteria met    Interventions    (X) * No chest tube, or status acceptable    (X) * Intake acceptable    ( ) * No inpatient interventions needed    3/15/2021 14:19:22 EDT by Heavenly Sanchez      : eliquis 5 mg po 2 times daily, vitamin c 500 mg po daily, aspirin 81 mg po daily, melatonin 3 mg po every bedtime, lopressor 50 mg po 2 times daily    mucinex 600mg po q12   tessalon 100mg po prn   delsym 30mg po q12    * Milestone   Additional Notes   3/14/21  inpt  telem   98   92  30  111/78   93% 3lnc    WBC: 8.8   HGB: 10.5 (L)   HCT: 33.1 (L)   PLATELET: 000   Sodium: 139   Potassium: 4.2   Glucose: 91   BUN: 15   Creatinine: 0.62 (L)   BUN/Creatinine ratio: 24 (H)   meds: eliquis 5 mg po 2 times daily, vitamin c 500 mg po daily, aspirin 81 mg po daily, melatonin 3 mg po every bedtime, lopressor 50 mg po 2 times daily    mucinex 600mg po q12   tessalon 100mg po prn   delsym 30mg po q12      orders:  card diet  ambulate with assistance   aspiration precaution   fall precaution   I&O   neuro/vasc ck q4      hospitalist note   Interval history / Subjective: Follow up severe sepsis, PNA, Acute hypoxic respiratory failure. Patient seen and examined at the bedside. Labs, images and notes reviewed   Discussed with nursing staff, orders reviewed. Plan discussed with patient/Family   SOB present with exertion but somewhat better. Denied any other concern. On 3-4LPM O2 NC.       Assessment & Plan:       # Acute on chronic hypoxic respiratory failure with recent COVID 19 Pneumonia   -received  iv cefepime 3/3-3/9,     -SARS CoV 2 PCR not detected on 3/2   -CTA chest 3/9:Extensive scattered interstitial, groundglass and parenchymal opacity with   bronchiectatic change as well. Imaging findings may be related to viral pneumonia. - procal wnl, sputum cultures if possible   -his O2 saturation >90% on 5lt oxygen at rest but desaturating with exertion but recovering   -Lasix 20mg once given 3/12, 3/13   -CXR 3/13 noted with mild improvement   -Add lasix 40mg PO daily with clolsely watching I/O, BMP and clinical course.  May not need lasox for long term.       # Severe sepsis likely due to pneumonia POA, resolved   -leukocytosis, elevated lactic acid, tachycardia, elevated liver enzyme    -completed above abx, oxygen support   -follow up on blood cx, NGTD        # Thrombosis of left median cubital vein   -patient has h/o DVT few yrs ago, with recent COVID 19 -continue anticoagulation Eliquis for now and repeat ultrasound again in 2 weeks       Code status: Full Code   DVT prophylaxis: Eliquis       Care Plan discussed with: Patient/Family, Nurse and    Anticipated Disposition: IPR, pending choice   Anticipated Discharge: Greater than 48 hours

## 2021-03-26 ENCOUNTER — PATIENT OUTREACH (OUTPATIENT)
Dept: CASE MANAGEMENT | Age: 56
End: 2021-03-26

## 2021-03-26 NOTE — PROGRESS NOTES
Transition of care outreach postponed for 7 days due to patient's discharge to inpatient rehab facility.   [facility] 3/[facility] still admitted f/u 7d

## 2021-04-05 ENCOUNTER — PATIENT OUTREACH (OUTPATIENT)
Dept: CASE MANAGEMENT | Age: 56
End: 2021-04-05

## 2021-04-05 NOTE — PROGRESS NOTES
[address] [place] [address] Dr Discharge Follow-Up      Date/Time:  2021 1:55 PM    Patient was admitted to [facility] on 3/2/21 and discharged to [facility]  on 3/18/21. The patients discharge diagnosis was Respiratory failure. Patient was discharge on 3/29/21 from [facility]. The discharge summary from the post acute facilty was not available at the time of outreach. Patient was contacted within 5 business days of discharge from the post acute facility. LPN Care Coordinator contacted the patient by telephone to perform post hospital discharge follow up. Verified name and  with patient as identifiers. Provided introduction to self, and explanation of the LPN Care Coordinator role. Patient received post acute facility discharge instructions. LPN Care Coordinator reviewed discharge instructions and red flags with patient who verbalized understanding. Patient given an opportunity to ask questions and does not have any further questions or concerns at this time. The patient agrees to contact the PCP office for questions related to their healthcare. LPN Care Coordinator provided contact information for future reference. Advance Care Planning:   Does patient have an Advance Directive:  patient declined education     Home Health orders at discharge: PT, OT, [address]: [facility]  Date of initial visit: 3/30/21    Durable Medical Equipment ordered at discharge: [address]: [facility]  [facility] received: had before admission    Medication(s):   The post acute facility medication discharge list was not available for this call. Medication review was performed with patient, hospital discharge list was used. who verbalizes understanding of administration of home medications. There were no barriers to obtaining medications identified at this time.   Patient states lasix 40mg, metoprolol 50mg, and eliquis 5mg has been added to his list.  Advised to take d/c papers to PCP f/u 4/14/21 and pulmonary 4/12/21 Dr Matt May.     [facility] follow up appointment(s):   Future Appointments   Date Time Provider Ivelisse Whitney   4/14/2021 10:30 AM Sugey Waldron MD [facility] BS AMB      Non-[facility] follow up appointment(s): pulmonary 4/12/21  [facility]:  information provided as a resource

## 2021-04-12 ENCOUNTER — TELEPHONE (OUTPATIENT)
Dept: INTERNAL MEDICINE CLINIC | Age: 56
End: 2021-04-12

## 2021-04-12 DIAGNOSIS — J96.01 ACUTE RESPIRATORY FAILURE WITH HYPOXIA (HCC): Primary | ICD-10-CM

## 2021-04-12 NOTE — TELEPHONE ENCOUNTER
Patient had a [facility] appt scheduled for 4/13/21. He rescheduled it for 5/5/21. He was nervous about coming into the office after spending 10 weeks in the hospital for covid pneumonia. He has a home health nurse [facility]) coming out tomorrow. Does he need any blood work done if so she can draw them. He has a virtual pulmonary visit coming up and then he will go into their office in a couple of weeks for an xray. The pulmonologist wanted him to have labs to check his kidney function since he is on a diuretic but wants you to order it. Please advise.     [phone]

## 2021-04-19 ENCOUNTER — TELEPHONE (OUTPATIENT)
Dept: INTERNAL MEDICINE CLINIC | Age: 56
End: 2021-04-19

## 2021-04-19 RX ORDER — METOPROLOL TARTRATE 50 MG/1
50 TABLET ORAL 2 TIMES DAILY
Qty: 60 TAB | Refills: 0 | Status: SHIPPED | OUTPATIENT
Start: 2021-04-19 | End: 2021-05-19

## 2021-04-19 RX ORDER — BENZONATATE 100 MG/1
100 CAPSULE ORAL
Qty: 30 CAP | Refills: 0 | Status: SHIPPED | OUTPATIENT
Start: 2021-04-19 | End: 2021-04-29

## 2021-04-19 RX ORDER — FUROSEMIDE 40 MG/1
40 TABLET ORAL DAILY
Qty: 30 TAB | Refills: 0 | Status: SHIPPED | OUTPATIENT
Start: 2021-04-19 | End: 2021-05-05 | Stop reason: ALTCHOICE

## 2021-04-19 NOTE — TELEPHONE ENCOUNTER
Patient advised labs obtained by home health were normal per Dr Melvi Garcia. He has a ORIANA scheduled on 5-5-21. Patient needs refills of the following Rxs:  Requested Prescriptions     Pending Prescriptions Disp Refills    benzonatate (TESSALON) 100 mg capsule 30 Cap 0     Sig: Take 1 Cap by mouth three (3) times daily as needed for Cough for up to 10 days.  apixaban (Eliquis) 5 mg tablet 60 Tab 0     Sig: Take 1 Tab by mouth two (2) times a day for 30 days.  furosemide (Lasix) 40 mg tablet 30 Tab 0     Sig: Take 1 Tab by mouth daily for 30 days.  metoprolol tartrate (LOPRESSOR) 50 mg tablet 60 Tab 0     Sig: Take 1 Tab by mouth two (2) times a day for 30 days.

## 2021-04-20 ENCOUNTER — PATIENT OUTREACH (OUTPATIENT)
Dept: CASE MANAGEMENT | Age: 56
End: 2021-04-20

## 2021-04-20 NOTE — PROGRESS NOTES
d/c 3/18 [facility] and home 3/29/21 final f/u call LM  Patient has a PCP appointment 5/5/21  Episode resolved

## 2021-04-23 ENCOUNTER — HOSPITAL ENCOUNTER (OUTPATIENT)
Dept: GENERAL RADIOLOGY | Age: 56
Discharge: HOME | End: 2021-04-23
Payer: COMMERCIAL

## 2021-04-23 ENCOUNTER — TRANSCRIBE ORDER (OUTPATIENT)
Dept: REGISTRATION | Age: 56
End: 2021-04-23

## 2021-04-23 DIAGNOSIS — U07.1 CLINICAL DIAGNOSIS OF COVID-19: ICD-10-CM

## 2021-04-23 DIAGNOSIS — U07.1 CLINICAL DIAGNOSIS OF COVID-19: Primary | ICD-10-CM

## 2021-04-23 PROCEDURE — 71046 X-RAY EXAM CHEST 2 VIEWS: CPT

## 2021-05-05 ENCOUNTER — OFFICE VISIT (OUTPATIENT)
Dept: INTERNAL MEDICINE CLINIC | Age: 56
End: 2021-05-05
Payer: COMMERCIAL

## 2021-05-05 VITALS
SYSTOLIC BLOOD PRESSURE: 124 MMHG | WEIGHT: 183.6 LBS | HEIGHT: 68 IN | HEART RATE: 81 BPM | DIASTOLIC BLOOD PRESSURE: 84 MMHG | OXYGEN SATURATION: 97 % | BODY MASS INDEX: 27.83 KG/M2 | TEMPERATURE: 97.9 F | RESPIRATION RATE: 18 BRPM

## 2021-05-05 DIAGNOSIS — U07.1 COVID-19 VIRUS INFECTION: Primary | ICD-10-CM

## 2021-05-05 DIAGNOSIS — Z86.718 HISTORY OF DVT OF LOWER EXTREMITY: ICD-10-CM

## 2021-05-05 DIAGNOSIS — J96.01 ACUTE RESPIRATORY FAILURE WITH HYPOXIA (HCC): ICD-10-CM

## 2021-05-05 PROCEDURE — 99214 OFFICE O/P EST MOD 30 MIN: CPT | Performed by: INTERNAL MEDICINE

## 2021-05-05 RX ORDER — BENZONATATE 100 MG/1
100 CAPSULE ORAL
COMMUNITY
Start: 2021-03-29 | End: 2022-01-20 | Stop reason: ALTCHOICE

## 2021-05-05 NOTE — PROGRESS NOTES
Naheed Stratton is a 48 y.o. male presenting for Transitions Of Care  . 1. Have you been to the ER, urgent care clinic since your last visit? Hospitalized since your last visit? 1/18/21-2/17/21 [address] [facility], 3/3/21-3/18/21 [facility] [place]    2. Have you seen or consulted any other health care providers outside of the [address] [place] [address] since your last visit? Include any pap smears or colon screening. Dr Poonam Christine- pulmonary    No flowsheet data found. Abuse Screening Questionnaire 5/17/2019   Do you ever feel afraid of your partner? N   Are you in a relationship with someone who physically or mentally threatens you? N   Is it safe for you to go home? Y       3 most recent PHQ Screens 5/5/2021   Little interest or pleasure in doing things Not at all   Feeling down, depressed, irritable, or hopeless Not at all   Total Score PHQ 2 0       There are no discontinued medications.

## 2021-05-05 NOTE — PROGRESS NOTES
Subjective:     Jono Molina is a 60-year-old male who returns to the office today after an extensive illness and hospitalizations related to Covid-19. The patient was in his usual state of good health until around January 13 when he developed symptoms of Covid and was seen in the emergency room and diagnosed with Covid-19. He returned on 1/18 with worsening shortness of breath and was hospitalized from 1/18 until 2/17 with acute hypoxic respiratory failure, severe multilobar pneumonia, pneumo mediastinum. The patient required high flow oxygen and BiPAP and slowly improved. He was discharged to [facility] rehab on 217 however on 3/2 was transferred back to [facility] with severe sepsis, pneumonia, respiratory failure and thrombosis of the left median cubital vein. He required high flow oxygen during that time and was treated with antibiotics. He was [facility] oxygen with consideration that he would likely need long-term oxygen supplementation. Patient stayed at [facility] until 3/29 at which time he was discharged home. He did have home health nursing and PT/OT for a period of time which was discontinued by his insurer as they evidently would not pay for any further therapy. He continues on oxygen at 2 L/min and was seen by Dr. Chung Rosario, his pulmonologist, last week. A chest x-ray obtained at that time revealed persistent low lung volumes with minimal improvement in aeration and changes consistent with postinflammatory changes and possible fibrosis. The patient remains on an albuterol inhaler and states that his breathing is still somewhat short with any type of physical activity. He is on a beta-blocker due to persistent tachycardia that occurs with any type of physical activity. The patient has been severely debilitated as a result of his lack of ability to perform physical activities related to his impaired lung function and persistent tachycardia.   He denies any fevers or chills or signs of infection. Past Medical History:   Diagnosis Date    GERD (gastroesophageal reflux disease)     Obesity (BMI 30-39. 9) 5/8/2019    Right leg DVT ([address] 2019     Past Surgical History:   Procedure Laterality Date    HX ORTHOPAEDIC       No Known Allergies  Current Outpatient Medications   Medication Sig Dispense Refill    benzonatate (TESSALON) 100 mg capsule 100 mg.  apixaban (Eliquis) 5 mg tablet Take 1 Tab by mouth two (2) times a day for 30 days. 60 Tab 0    metoprolol tartrate (LOPRESSOR) 50 mg tablet Take 1 Tab by mouth two (2) times a day for 30 days. 60 Tab 0    sodium chloride (OCEAN) 0.65 % nasal squeeze bottle 0.1 mL by Both Nostrils route every two (2) hours as needed for Congestion or Nasal Dryness. 15 mL 0    loratadine (CLARITIN) 10 mg tablet Take 1 Tab by mouth daily. 30 Tab 0    aspirin delayed-release 81 mg tablet Take 81 mg by mouth daily.  albuterol (PROVENTIL HFA, VENTOLIN HFA, PROAIR HFA) 90 mcg/actuation inhaler Take 2 Puffs by inhalation every four (4) hours as needed for Wheezing. 1 Inhaler 1    multivitamin (ONE A DAY) tablet Take 1 Tab by mouth daily.  cholecalciferol (VITAMIN D3) 1,000 unit cap Take 1,000 Units by mouth daily.  ascorbic acid, vitamin C, (VITAMIN C) 500 mg tablet Take 500 mg by mouth daily.        Social History     Socioeconomic History    Marital status: SINGLE     Spouse name: Not on file    Number of children: Not on file    Years of education: Not on file    Highest education level: Not on file   Tobacco Use    Smoking status: Never Smoker    Smokeless tobacco: Current User   Substance and Sexual Activity    Alcohol use: Yes     Comment: once a month     Family History   Problem Relation Age of Onset    Cancer Mother     Cancer Father        Review of Systems:  GEN: Positive for weight loss, fatigue and debility  CV: Positive for mild tachycardia currently on beta-blocker with activity  Resp: Positive for chronic shortness of breath with any activity  Abd: no nausea, vomiting or diarrhea  EXT: denies edema, claudication  Endocrine: no hair loss, excessive thirst or polyuria  Neurological ROS: Positive for generalized weakness ROS otherwise negative      Objective:     Visit Vitals  /84 (BP 1 Location: Right upper arm, BP Patient Position: Sitting, BP Cuff Size: Adult)   Pulse 81   Temp 97.9 °F (36.6 °C) (Temporal)   Resp 18   Ht 5' 8\" (1.727 m)   Wt 183 lb 9.6 oz (83.3 kg)   SpO2 97% Comment: on 2 lpm of O2   BMI 27.92 kg/m²     Body mass index is 27.92 kg/m². General:   alert, cooperative and no distress   Eyes: conjunctivae/sclerae clear. PERRL, EOM's intact   Mouth:  No oral lesions, no pharyngeal erythema, no exudates   Neck: Trachea midline, no thyromegaly, no bruits   Heart: S1 and S2 normal,no murmurs noted    Lungs:  Poor air movement noted with a few posterior rales evident but no active wheezing. Abdomen: Soft, nontender. Normal bowel sounds   Extremities: No edema or cyanosis   Neuro: ..alert, oriented x3,speech normal in context and clarity, cranial nerves II-XII intact,motor strength: full proximally and distally,gait: normal  reflexes: full and symmetric     Physical exam otherwise negative         Assessment/Plan:     Diagnoses and all orders for this visit:    COVID-19 virus infection    Acute respiratory failure with hypoxia ([address]    History of DVT of lower extremity        Other instructions: The patient's medications were reviewed and reconciled. Continue oxygen at 2 L/min    Continue follow-up with pulmonary in regards to further testing and management of his impaired pulmonary function secondary to severe infection with Covid-19.     Activity level as tolerated dependent on shortness of breath, tachycardia, etc.    Recent laboratory work was done via home health and chest x-ray done per Dr. Radha Hatfield last week    Follow-up here to be determined    30-minute face-to-face office visit spent taking prolonged history regarding events surrounding his recent hospitalizations, reviewing multiple medications, discussing long-term implications of current disability, discussing need for long-term pulmonary follow-up with specialist over time. Ramesh Lu MD    Please note that this dictation was completed with [employer], the computer voice recognition software. Quite often unanticipated grammatical, syntax, homophones, and other interpretive errors are inadvertently transcribed by the computer software. Please disregard these errors. Please excuse any errors that have escaped final proofreading.

## 2021-05-19 RX ORDER — METOPROLOL TARTRATE 50 MG/1
TABLET ORAL
Qty: 60 TABLET | Refills: 0 | Status: SHIPPED | OUTPATIENT
Start: 2021-05-19 | End: 2021-05-24 | Stop reason: SDUPTHER

## 2021-05-24 RX ORDER — METOPROLOL TARTRATE 25 MG/1
TABLET, FILM COATED ORAL
Qty: 60 TABLET | Refills: 3 | Status: SHIPPED | OUTPATIENT
Start: 2021-05-24 | End: 2021-08-16

## 2021-05-24 NOTE — TELEPHONE ENCOUNTER
Requested Prescriptions     Pending Prescriptions Disp Refills    apixaban (Eliquis) 5 mg tablet 60 Tablet 0     Sig: Take 1 Tablet by mouth two (2) times a day for 30 days.     metoprolol tartrate (LOPRESSOR) 50 mg tablet 60 Tablet 0       Last Refill: 4/19/21  Last Appointment:5/5/21    Patient need his metoprolol changed to 25 mg

## 2021-06-14 ENCOUNTER — TRANSCRIBE ORDER (OUTPATIENT)
Dept: SCHEDULING | Age: 56
End: 2021-06-14

## 2021-06-14 DIAGNOSIS — U09.9 POST-COVID SYNDROME: Primary | ICD-10-CM

## 2021-06-15 ENCOUNTER — TRANSCRIBE ORDER (OUTPATIENT)
Dept: SCHEDULING | Age: 56
End: 2021-06-15

## 2021-06-15 DIAGNOSIS — U09.9 POST-COVID SYNDROME: Primary | ICD-10-CM

## 2021-06-21 ENCOUNTER — HOSPITAL ENCOUNTER (OUTPATIENT)
Dept: CT IMAGING | Age: 56
Discharge: HOME | End: 2021-06-21
Attending: INTERNAL MEDICINE
Payer: COMMERCIAL

## 2021-06-21 DIAGNOSIS — U09.9 POST-COVID SYNDROME: ICD-10-CM

## 2021-06-21 PROCEDURE — 71250 CT THORAX DX C-: CPT

## 2021-06-24 NOTE — TELEPHONE ENCOUNTER
Last Refill: 5-24-21  Last Visit: 5/5/2021   Next Visit: Visit date not found     Requested Prescriptions     Pending Prescriptions Disp Refills    apixaban (Eliquis) 5 mg tablet 60 Tablet 1     Sig: Take 1 Tablet by mouth two (2) times a day for 30 days.

## 2021-06-24 NOTE — TELEPHONE ENCOUNTER
Patient states he has 3 more pills of Eliquis. Do you want him to continue it? Please advise.      [phone]

## 2021-07-28 ENCOUNTER — TELEPHONE (OUTPATIENT)
Dept: INTERNAL MEDICINE CLINIC | Age: 56
End: 2021-07-28

## 2021-07-28 NOTE — TELEPHONE ENCOUNTER
Patient states Dr Daisy Jimenez told him to take melatonin for sleep when he was in the hospital earlier this year for covid. He has been taking 2 melatonin for the last 30 days and it is not helping. He would like to try [place] because he is unable to sleep. Please advise.      [phone]

## 2021-07-28 NOTE — TELEPHONE ENCOUNTER
Would like to stay away from benzodiazepines and medications like Ambien as they suppress respiratory drive.

## 2021-11-10 RX ORDER — METOPROLOL TARTRATE 25 MG/1
TABLET, FILM COATED ORAL
Qty: 180 TABLET | Refills: 0 | Status: SHIPPED | OUTPATIENT
Start: 2021-11-10 | End: 2022-02-07

## 2021-11-16 RX ORDER — APIXABAN 5 MG/1
TABLET, FILM COATED ORAL
Qty: 60 TABLET | Refills: 1 | Status: SHIPPED | OUTPATIENT
Start: 2021-11-16 | End: 2022-07-19

## 2022-01-15 RX ORDER — APIXABAN 5 MG/1
TABLET, FILM COATED ORAL
Qty: 60 TABLET | Refills: 1 | OUTPATIENT
Start: 2022-01-15

## 2022-01-20 ENCOUNTER — TELEPHONE (OUTPATIENT)
Dept: INTERNAL MEDICINE CLINIC | Age: 57
End: 2022-01-20

## 2022-01-20 ENCOUNTER — OFFICE VISIT (OUTPATIENT)
Dept: INTERNAL MEDICINE CLINIC | Age: 57
End: 2022-01-20
Payer: COMMERCIAL

## 2022-01-20 VITALS
HEIGHT: 68 IN | RESPIRATION RATE: 20 BRPM | WEIGHT: 209.6 LBS | BODY MASS INDEX: 31.77 KG/M2 | TEMPERATURE: 98.1 F | DIASTOLIC BLOOD PRESSURE: 82 MMHG | HEART RATE: 90 BPM | SYSTOLIC BLOOD PRESSURE: 126 MMHG | OXYGEN SATURATION: 93 %

## 2022-01-20 DIAGNOSIS — J96.11 CHRONIC RESPIRATORY FAILURE WITH HYPOXIA (HCC): Chronic | ICD-10-CM

## 2022-01-20 DIAGNOSIS — Z86.718 HISTORY OF DVT OF LOWER EXTREMITY: Primary | ICD-10-CM

## 2022-01-20 DIAGNOSIS — Z86.16 HISTORY OF COVID-19: ICD-10-CM

## 2022-01-20 DIAGNOSIS — I10 PRIMARY HYPERTENSION: ICD-10-CM

## 2022-01-20 PROBLEM — J96.10 CHRONIC RESPIRATORY FAILURE (HCC): Chronic | Status: ACTIVE | Noted: 2021-03-02

## 2022-01-20 PROBLEM — U07.1 COVID-19 VIRUS INFECTION: Status: RESOLVED | Noted: 2021-01-18 | Resolved: 2022-01-20

## 2022-01-20 PROBLEM — J96.10 CHRONIC RESPIRATORY FAILURE (HCC): Status: ACTIVE | Noted: 2021-03-02

## 2022-01-20 PROBLEM — J96.01 ACUTE RESPIRATORY FAILURE WITH HYPOXIA (HCC): Status: RESOLVED | Noted: 2021-01-18 | Resolved: 2022-01-20

## 2022-01-20 PROCEDURE — 99214 OFFICE O/P EST MOD 30 MIN: CPT | Performed by: INTERNAL MEDICINE

## 2022-01-20 RX ORDER — BUDESONIDE AND FORMOTEROL FUMARATE DIHYDRATE 160; 4.5 UG/1; UG/1
2 AEROSOL RESPIRATORY (INHALATION) 2 TIMES DAILY
COMMUNITY

## 2022-01-20 RX ORDER — AMLODIPINE BESYLATE 5 MG/1
5 TABLET ORAL DAILY
COMMUNITY
Start: 2021-12-30

## 2022-01-20 RX ORDER — PREDNISONE 1 MG/1
2 TABLET ORAL EVERY OTHER DAY
COMMUNITY
Start: 2022-01-06 | End: 2022-07-19

## 2022-01-20 NOTE — TELEPHONE ENCOUNTER
Patient called and states Dr Jewell Holley told him to discontinue Eliquis. Does he need to taper off of it? Also he wants to know if he can come off or take less of the bp medications. He states the only reason he was on it was to lower his heart rate from covid. Please advise.      [phone]

## 2022-01-20 NOTE — TELEPHONE ENCOUNTER
Would stop Eliquis.   Would reduce the beta-blocker by one half for a couple of weeks before stopping it

## 2022-01-20 NOTE — PROGRESS NOTES
Armond Begum is a 55 y.o. male presenting for Follow Up Chronic Condition  . 1. Have you been to the ER, urgent care clinic since your last visit? Hospitalized since your last visit? No    2. Have you seen or consulted any other health care providers outside of the [address] [place] [address] since your last visit? Include any pap smears or colon screening. Dr Baker Shells- Pulmonary    No flowsheet data found. Abuse Screening Questionnaire 5/17/2019   Do you ever feel afraid of your partner? N   Are you in a relationship with someone who physically or mentally threatens you? N   Is it safe for you to go home?  Y       3 most recent PHQ Screens 5/5/2021   Little interest or pleasure in doing things Not at all   Feeling down, depressed, irritable, or hopeless Not at all   Total Score PHQ 2 0       Medications Discontinued During This Encounter   Medication Reason    benzonatate (TESSALON) 100 mg capsule Therapy Completed

## 2022-01-20 NOTE — PROGRESS NOTES
Subjective:     Mr. Jerrald Kawasaki returns to the office today in general medical follow-up. The patient has a history of COVID complicated by pneumonia and respiratory failure. As result he has become disabled due to his lung function which is 41% of normal. The patient has chronic shortness of breath and dyspnea on exertion. He is followed by Dr. Servando Baird and is currently on tapering prednisone along with Symbicort and albuterol. The patient did have a prior history of DVT and was placed on Eliquis at the time of his hospitalizations which he currently is on. He has had no further blood clots and questions whether he needs to continue with this. Patient also has hypertension and a history of tachycardia related to his COVID for which she is on metoprolol and amlodipine. He has been having blood work done through his pulmonary specialist office. Past Medical History:   Diagnosis Date    GERD (gastroesophageal reflux disease)     Obesity (BMI 30-39. 9) 5/8/2019    Right leg DVT ([address] 2019     Past Surgical History:   Procedure Laterality Date    HX ORTHOPAEDIC       No Known Allergies  Current Outpatient Medications   Medication Sig Dispense Refill    predniSONE (DELTASONE) 1 mg tablet Take 2 mg by mouth every other day.  budesonide-formoteroL (Symbicort) 160-4.5 mcg/actuation HFAA Take 2 Puffs by inhalation two (2) times a day.  amLODIPine (NORVASC) 5 mg tablet Take 5 mg by mouth daily.  Eliquis 5 mg tablet TAKE 1 TABLET BY MOUTH TWO (2) TIMES A DAY 60 Tablet 1    metoprolol tartrate (LOPRESSOR) 25 mg tablet TAKE 1 TABLET BY MOUTH TWICE A  Tablet 0    aspirin delayed-release 81 mg tablet Take 81 mg by mouth daily.  albuterol (PROVENTIL HFA, VENTOLIN HFA, PROAIR HFA) 90 mcg/actuation inhaler Take 2 Puffs by inhalation every four (4) hours as needed for Wheezing. 1 Inhaler 1    multivitamin (ONE A DAY) tablet Take 1 Tab by mouth daily.       cholecalciferol (VITAMIN D3) 1,000 unit cap Take 1,000 Units by mouth daily.  ascorbic acid, vitamin C, (VITAMIN C) 500 mg tablet Take 500 mg by mouth daily. Social History     Socioeconomic History    Marital status: SINGLE   Tobacco Use    Smoking status: Never Smoker    Smokeless tobacco: Current User   Vaping Use    Vaping Use: Never used   Substance and Sexual Activity    Alcohol use: Yes     Comment: once a month     Family History   Problem Relation Age of Onset   [facility] Cancer Mother     Cancer Father        Review of Systems:  GEN: no weight loss, weight gain, fatigue or night sweats  CV: no PND, orthopnea, or palpitations  Resp: no dyspnea on exertion, no cough  Abd: no nausea, vomiting or diarrhea  EXT: denies edema, claudication  Endocrine: no hair loss, excessive thirst or polyuria  Neurological ROS: no TIA or stroke symptoms  ROS otherwise negative      Objective:     Visit Vitals  /82 (BP 1 Location: Left upper arm, BP Patient Position: Sitting, BP Cuff Size: Adult)   Pulse 90   Temp 98.1 °F (36.7 °C) (Oral)   Resp 20   Ht 5' 8\" (1.727 m)   Wt 209 lb 9.6 oz (95.1 kg)   SpO2 93%   BMI 31.87 kg/m²     Body mass index is 31.87 kg/m². General:   alert, cooperative and no distress   Eyes: conjunctivae/sclerae clear. PERRL, EOM's intact   Mouth:  No oral lesions, no pharyngeal erythema, no exudates   Neck: Trachea midline, no thyromegaly, no bruits   Heart: S1 and S2 normal,no murmurs noted    Lungs: Clear to auscultation bilaterally, no increased work of breathing   Abdomen: Soft, nontender.   Normal bowel sounds   Extremities: No edema or cyanosis   Neuro: ..alert, oriented x3,speech normal in context and clarity, cranial nerves II-XII intact,motor strength: full proximally and distally,gait: normal  reflexes: full and symmetric     Physical exam otherwise negative         Assessment/Plan:     Diagnoses and all orders for this visit:    History of DVT of lower extremity    Primary hypertension    History of COVID-19    Chronic respiratory failure with hypoxia (HCC)        Other instructions: The patient's medications were reviewed and reconciled. The patient will check with Dr. Isabel Piña regarding the need for Eliquis at this time. If there is no pulmonary need, we will stop it. Continued follow-up with pulmonary due to his disability. Patient plans on getting COVID-19 vaccination in the near future    Follow-up in 6 months    Follow-up and Dispositions    · Return in about 6 months (around 7/20/2022). Dalila Talley MD    Please note that this dictation was completed with [employer], the computer voice recognition software. Quite often unanticipated grammatical, syntax, homophones, and other interpretive errors are inadvertently transcribed by the computer software. Please disregard these errors. Please excuse any errors that have escaped final proofreading.

## 2022-01-21 ENCOUNTER — TELEPHONE (OUTPATIENT)
Dept: INTERNAL MEDICINE CLINIC | Age: 57
End: 2022-01-21

## 2022-01-21 NOTE — TELEPHONE ENCOUNTER
Patient called and would like for you to write a letter stating he is no longer on Eliquis and the changes to his bp medication that were recommended. He needs this for workmens comp. Please let him know when it is ready and he will pick it up.       [phone]

## 2022-01-21 NOTE — LETTER
2022         RE: Mr. Mary Vazquez. Rabia [facility] [address] [place] [address]    1965    To whom it may concern: This letter is to inform you that Mr. Saldivar's Eliquis has been discontinued and his metoprolol has been reduced in dosage by one half for 2 weeks at which time he will discontinue it should he have no further tachycardia.     Respectfully,                Maricel Dobson MD

## 2022-02-07 RX ORDER — METOPROLOL TARTRATE 25 MG/1
TABLET, FILM COATED ORAL
Qty: 180 TABLET | Refills: 0 | Status: SHIPPED | OUTPATIENT
Start: 2022-02-07 | End: 2022-07-19

## 2022-02-09 ENCOUNTER — TRANSCRIBE ORDER (OUTPATIENT)
Dept: CARDIAC REHAB | Age: 57
End: 2022-02-09

## 2022-02-09 DIAGNOSIS — J84.10 PULMONARY FIBROSIS (HCC): Primary | ICD-10-CM

## 2022-03-04 ENCOUNTER — HOSPITAL ENCOUNTER (OUTPATIENT)
Dept: CARDIAC REHAB | Age: 57
Discharge: HOME | End: 2022-03-04
Payer: COMMERCIAL

## 2022-03-04 VITALS — HEIGHT: 68 IN | WEIGHT: 205.6 LBS | BODY MASS INDEX: 31.16 KG/M2

## 2022-03-04 PROCEDURE — 94625 PHY/QHP OP PULM RHB W/O MNTR: CPT

## 2022-03-04 NOTE — CARDIO/PULMONARY
[facility]    Pulmonary Rehabilitation Program    Intake Appointment  3/4/2022           NAME: Damon Landin : 1965 AGE: 55 y.o. GENDER: male    PULMONARY REHAB ADMITTING DIAGNOSIS: Pulmonary fibrosis, unspecified [J84.10]    REFERRING PHYSICIAN: Janet Pike MD    MEDICAL HX:  Past Medical History:   Diagnosis Date    GERD (gastroesophageal reflux disease)     Hypertension     Obesity (BMI 30-39. 9) 2019    Right leg DVT ([address] 2019       LABS:     Lab Results   Component Value Date/Time    Hemoglobin A1c 6.4 (H) 2021 02:42 AM     No results found for: CHOL, CHOLPOCT, CHOLX, CHLST, CHOLV, HDL, HDLPOC, HDLP, LDL, LDLCPOC, LDLC, DLDLP, VLDLC, VLDL, TGLX, TRIGL, TRIGP, TGLPOCT, CHHD, CHHDX      MEDICATIONS/SUPPLEMENTS:     Prior to Admission medications    Medication Sig Start Date End Date Taking? Authorizing Provider   budesonide-formoteroL (Symbicort) 160-4.5 mcg/actuation HFAA Take 2 Puffs by inhalation two (2) times a day. Yes Provider, Historical   amLODIPine (NORVASC) 5 mg tablet Take 5 mg by mouth daily. 21  Yes Provider, Historical   aspirin delayed-release 81 mg tablet Take 81 mg by mouth daily. Yes Other, MD Leopoldo   albuterol (PROVENTIL HFA, VENTOLIN HFA, PROAIR HFA) 90 mcg/actuation inhaler Take 2 Puffs by inhalation every four (4) hours as needed for Wheezing. 21  Yes Tono Galeana MD   multivitamin (ONE A DAY) tablet Take 1 Tab by mouth daily. Yes Provider, Historical   cholecalciferol (VITAMIN D3) 1,000 unit cap Take 1,000 Units by mouth daily. Yes Provider, Historical   ascorbic acid, vitamin C, (VITAMIN C) 500 mg tablet Take 500 mg by mouth daily. Yes Provider, Historical   metoprolol tartrate (LOPRESSOR) 25 mg tablet TAKE 1 TABLET BY MOUTH TWICE A DAY  Patient not taking: Reported on 3/4/2022 2/7/22   Kadie Cowan MD   predniSONE (DELTASONE) 1 mg tablet Take 2 mg by mouth every other day.   Patient not taking: Reported on 3/4/2022 1/6/22   Provider, Historical   Eliquis 5 mg tablet TAKE 1 TABLET BY MOUTH TWO (2) TIMES A DAY  Patient not taking: Reported on 1/21/2022 11/16/21   Lorenz Bloch, MD       ANTHROPOMETRICS:      Ht Readings from Last 1 Encounters:   03/04/22 5' 8\" (1.727 m)      Wt Readings from Last 1 Encounters:   03/04/22 93.3 kg (205 lb 9.6 oz)        WAIST: 44    SCREENING SCORES:                       CAT: 15  Dyspnea:25  Rate Your Plate: 47  PHQ-9: 7       VISIT SUMMARY:    Jimy Bates 55 y.o. presented to [facility] for program orientation and 6 minute walk test today with a primary diagnosis of Pulmonary fibrosis, unspecified [J84.10]. Jimy Bates has no significant history. Jimy Bates is not  and lives alone. PHQ9, depression score, is  7 and this is considered mild. The result was discussed with patient who affirms)score to be accurate and PHQ9 will be faxed to MD.  Patient denied chest pain or SOB during 6 minute walk test and was in SR/ST. Patient walked 280  meters at a speed of 1.5 and grade of 0  for a final MET level of 2.1. Exercise prescription developed around patient stated goals, to be supplemented with home exercise recommendations. Education book given to patient and reviewed. Patient will attend pulmonary rehab 2 times/week and also plans to participate in educational classes.         Nichole Husain RT

## 2022-03-08 ENCOUNTER — HOSPITAL ENCOUNTER (OUTPATIENT)
Dept: CARDIAC REHAB | Age: 57
Discharge: HOME | End: 2022-03-08
Payer: COMMERCIAL

## 2022-03-08 PROCEDURE — G0239 OTH RESP PROC, GROUP: HCPCS

## 2022-03-08 PROCEDURE — 94625 PHY/QHP OP PULM RHB W/O MNTR: CPT

## 2022-03-08 NOTE — CARDIO/PULMONARY
Cardiac Rehab Nutrition Assessment - 1:1 Evaluation         NAME: Elora Snellen : 1965 AGE: 55 y.o. GENDER: male  CARDIAC REHAB ADMITTING DIAGNOSIS: pulmonary fibrosis s/p COVID    PROBLEM LIST:  Patient Active Problem List   Diagnosis Code    Obesity (BMI 30-39. 9) E66.9    History of DVT of lower extremity Z86.718    Chronic respiratory failure (HCC) J96.10    History of COVID-19 Z86.16    Primary hypertension I10         LABS:   Lab Results   Component Value Date/Time    Hemoglobin A1c 6.4 (H) 2021 02:42 AM     No results found for: CHOL, CHOLPOCT, CHOLX, CHLST, CHOLV, HDL, HDLPOC, HDLP, LDL, LDLCPOC, LDLC, DLDLP, VLDLC, VLDL, TGLX, TRIGL, TRIGP, TGLPOCT, CHHD, CHHDX      MEDICATIONS/SUPPLEMENTS:   Prior to Admission medications    Medication Sig Start Date End Date Taking? Authorizing Provider   metoprolol tartrate (LOPRESSOR) 25 mg tablet TAKE 1 TABLET BY MOUTH TWICE A DAY  Patient not taking: Reported on 3/4/2022 2/7/22   Jason Nova MD   predniSONE (DELTASONE) 1 mg tablet Take 2 mg by mouth every other day. Patient not taking: Reported on 3/4/2022 1/6/22   Provider, Historical   budesonide-formoteroL (Symbicort) 160-4.5 mcg/actuation HFAA Take 2 Puffs by inhalation two (2) times a day. Provider, Historical   amLODIPine (NORVASC) 5 mg tablet Take 5 mg by mouth daily. 21   Provider, Historical   Eliquis 5 mg tablet TAKE 1 TABLET BY MOUTH TWO (2) TIMES A DAY  Patient not taking: Reported on 21   Jason Nova MD   aspirin delayed-release 81 mg tablet Take 81 mg by mouth daily. Other, MD Leopoldo   albuterol (PROVENTIL HFA, VENTOLIN HFA, PROAIR HFA) 90 mcg/actuation inhaler Take 2 Puffs by inhalation every four (4) hours as needed for Wheezing. 21   Candice Johnson MD   multivitamin (ONE A DAY) tablet Take 1 Tab by mouth daily. Provider, Historical   cholecalciferol (VITAMIN D3) 1,000 unit cap Take 1,000 Units by mouth daily. Provider, Historical   ascorbic acid, vitamin C, (VITAMIN C) 500 mg tablet Take 500 mg by mouth daily. Provider, Historical     Had been checking BG daily, advised to keep it below 150 mg/dL which he managed; has stopped since stopping prednisone. ANTHROPOMETRICS:    Ht Readings from Last 1 Encounters:   03/04/22 5' 8\" (1.727 m)      Wt Readings from Last 10 Encounters:   03/04/22 93.3 kg (205 lb 9.6 oz)   01/20/22 95.1 kg (209 lb 9.6 oz)   05/05/21 83.3 kg (183 lb 9.6 oz)   03/18/21 75.7 kg (166 lb 14.2 oz)   02/16/21 75.5 kg (166 lb 6.4 oz)   01/13/21 92.9 kg (204 lb 12.9 oz)   05/18/20 93.4 kg (205 lb 14.6 oz)   08/12/19 94.4 kg (208 lb 3.2 oz)   05/18/19 93.3 kg (205 lb 11 oz)   05/17/19 95.3 kg (210 lb)         BMI: 31.26 kg/M2 Category: obese (class I)  Waist: 44 inches    Reported Wt Hx: Hospitalized for 2 months in 2021 around 198-200# dropped to 158# in March 2021, started on prednisone and was home-bound, started on prednisone in July and recently came off in January. Reported Diet Hx: Dislikes asparagus, cooked greens/some vegetables (but likes raw vegetables). Dines out/eats fast food often during the week     Rate Your Plate Score: 47  (Score 58-72: Making many healthy choices; 41-57: Some choices need improving 24-40: many choices need improving)    24 HOUR DIET RECALL  Breakfast Banana   Lunch [place] (lunch meat, tuna fish, chicken salad) OR soup   Yesterday - [employer] roast beef sandwich    Dinner [employer] Veggie chips, peanut butter crackers, nuts/seeds (all kinds)  Milk & cookies (chocolate chip) at night  Popcorn    Beverages Water, big cup of coffee (typically 2/day)     Elora Snellen     Environmental/Social: State  for 33 years. Bought a stationary bike, tries to exercise a couple times a day.        NUTRITION INTERVENTION:  Nutrition 60 minute one-on-one education & goal setting with Elora Snellen    Reviewed with Elora Snellen relevant labs compared to ideals. Reviewed weight history and patient's verbalized weight goal as well as any real or perceived barriers to obtaining the goal. Collaborated with patient to set a specific short and long term weight goal.     Reviewed Rate Your Plate and conducted a verbal diet recall. Assessed for environmental, financial, psychosocial, physical and comorbidities that may impact the food and eating patterns / behaviors of Clarisa Varghese    Collaborated with patient to set specific nutrient goals as well as specific food / behavior changes that will help patient meet the overall goal of following a heart healthy eating pattern (using guidelines as set forth by the American Heart Association and modeled after healthful eating patterns as recognized by the USDA Dietary Guidelines such as DASH, Mediterranean or plant-based). Briefly reviewed with Clarisa Varghese the nutrition information in the Cardiac Rehab patient education book and encouraged Clarisa Varghese to read thoroughly, ask questions as needed, and use for future reference for heart healthy nutrition information. Clarisa Varghese is not scheduled to participate in Cardiac Rehab group nutrition classes. PATIENT GOALS:    Weight Goals:180#    Nutrition Goals:  Daily Recommendations:  Calories: 1900 /day  (using [place] with AF 1.4-500)    Saturated Fat: no more than 19 g/day  Trans Fat: 0 g/day  Sodium: no more than 2171-1547 mg/day  Fruit: 2 cups / day  Vegetables: 2-3 cups/day    Other:  - read and compare food labels  - add in extra fruit or vegetable 2-3x/week  - choose \"best bites\" when dining out      Keeping a food diary was recommended. Questions addressed. Follow-up plans discussed. Clarisa Varghese verbalized understanding.             Edilma Reza RD

## 2022-03-15 ENCOUNTER — HOSPITAL ENCOUNTER (OUTPATIENT)
Dept: CARDIAC REHAB | Age: 57
Discharge: HOME | End: 2022-03-15
Payer: COMMERCIAL

## 2022-03-15 PROCEDURE — 93798 PHYS/QHP OP CAR RHAB W/ECG: CPT

## 2022-03-15 PROCEDURE — G0239 OTH RESP PROC, GROUP: HCPCS

## 2022-03-17 ENCOUNTER — HOSPITAL ENCOUNTER (OUTPATIENT)
Dept: CARDIAC REHAB | Age: 57
Discharge: HOME | End: 2022-03-17
Payer: COMMERCIAL

## 2022-03-17 PROCEDURE — G0239 OTH RESP PROC, GROUP: HCPCS

## 2022-03-18 ENCOUNTER — APPOINTMENT (OUTPATIENT)
Dept: CARDIAC REHAB | Age: 57
End: 2022-03-18

## 2022-03-18 PROBLEM — E66.9 OBESITY (BMI 30-39.9): Status: ACTIVE | Noted: 2019-05-08

## 2022-03-18 PROBLEM — Z86.16 HISTORY OF COVID-19: Status: ACTIVE | Noted: 2022-01-20

## 2022-03-18 PROBLEM — J96.10 CHRONIC RESPIRATORY FAILURE (HCC): Status: ACTIVE | Noted: 2021-03-02

## 2022-03-19 PROBLEM — Z86.718 HISTORY OF DVT OF LOWER EXTREMITY: Status: ACTIVE | Noted: 2019-08-12

## 2022-03-19 PROBLEM — I10 PRIMARY HYPERTENSION: Status: ACTIVE | Noted: 2022-01-20

## 2022-03-22 ENCOUNTER — HOSPITAL ENCOUNTER (OUTPATIENT)
Dept: CARDIAC REHAB | Age: 57
Discharge: HOME | End: 2022-03-22
Payer: COMMERCIAL

## 2022-03-22 PROCEDURE — 94625 PHY/QHP OP PULM RHB W/O MNTR: CPT

## 2022-03-24 ENCOUNTER — HOSPITAL ENCOUNTER (OUTPATIENT)
Dept: CARDIAC REHAB | Age: 57
Discharge: HOME | End: 2022-03-24
Payer: COMMERCIAL

## 2022-03-24 PROCEDURE — 94625 PHY/QHP OP PULM RHB W/O MNTR: CPT

## 2022-03-29 ENCOUNTER — HOSPITAL ENCOUNTER (OUTPATIENT)
Dept: CARDIAC REHAB | Age: 57
Discharge: HOME | End: 2022-03-29
Payer: COMMERCIAL

## 2022-03-29 VITALS — BODY MASS INDEX: 31.47 KG/M2 | WEIGHT: 207 LBS

## 2022-03-29 PROCEDURE — 94625 PHY/QHP OP PULM RHB W/O MNTR: CPT

## 2022-03-31 ENCOUNTER — HOSPITAL ENCOUNTER (OUTPATIENT)
Dept: CARDIAC REHAB | Age: 57
Discharge: HOME | End: 2022-03-31
Payer: COMMERCIAL

## 2022-03-31 VITALS — WEIGHT: 206.4 LBS | BODY MASS INDEX: 31.38 KG/M2

## 2022-03-31 PROCEDURE — 94625 PHY/QHP OP PULM RHB W/O MNTR: CPT

## 2022-04-05 ENCOUNTER — HOSPITAL ENCOUNTER (OUTPATIENT)
Dept: CARDIAC REHAB | Age: 57
Discharge: HOME | End: 2022-04-05
Payer: COMMERCIAL

## 2022-04-05 VITALS — WEIGHT: 205 LBS | BODY MASS INDEX: 31.17 KG/M2

## 2022-04-05 PROCEDURE — 94625 PHY/QHP OP PULM RHB W/O MNTR: CPT

## 2022-04-07 ENCOUNTER — HOSPITAL ENCOUNTER (OUTPATIENT)
Dept: CARDIAC REHAB | Age: 57
Discharge: HOME | End: 2022-04-07
Payer: COMMERCIAL

## 2022-04-07 VITALS — WEIGHT: 205.6 LBS | BODY MASS INDEX: 31.26 KG/M2

## 2022-04-07 PROCEDURE — 94625 PHY/QHP OP PULM RHB W/O MNTR: CPT

## 2022-04-12 ENCOUNTER — HOSPITAL ENCOUNTER (OUTPATIENT)
Dept: CARDIAC REHAB | Age: 57
Discharge: HOME | End: 2022-04-12
Payer: COMMERCIAL

## 2022-04-12 VITALS — WEIGHT: 204.6 LBS | BODY MASS INDEX: 31.11 KG/M2

## 2022-04-12 PROCEDURE — 94625 PHY/QHP OP PULM RHB W/O MNTR: CPT

## 2022-04-14 ENCOUNTER — HOSPITAL ENCOUNTER (OUTPATIENT)
Dept: CARDIAC REHAB | Age: 57
Discharge: HOME | End: 2022-04-14
Payer: COMMERCIAL

## 2022-04-14 VITALS — WEIGHT: 206 LBS | BODY MASS INDEX: 31.32 KG/M2

## 2022-04-14 PROCEDURE — 94625 PHY/QHP OP PULM RHB W/O MNTR: CPT

## 2022-04-19 ENCOUNTER — HOSPITAL ENCOUNTER (OUTPATIENT)
Dept: CARDIAC REHAB | Age: 57
Discharge: HOME | End: 2022-04-19
Payer: COMMERCIAL

## 2022-04-19 VITALS — BODY MASS INDEX: 31.08 KG/M2 | WEIGHT: 204.4 LBS

## 2022-04-19 PROCEDURE — 94625 PHY/QHP OP PULM RHB W/O MNTR: CPT

## 2022-04-19 PROCEDURE — G0239 OTH RESP PROC, GROUP: HCPCS

## 2022-04-21 ENCOUNTER — HOSPITAL ENCOUNTER (OUTPATIENT)
Dept: CARDIAC REHAB | Age: 57
Discharge: HOME | End: 2022-04-21
Payer: COMMERCIAL

## 2022-04-21 VITALS — WEIGHT: 205.8 LBS | BODY MASS INDEX: 31.29 KG/M2

## 2022-04-21 PROCEDURE — 94625 PHY/QHP OP PULM RHB W/O MNTR: CPT

## 2022-04-26 ENCOUNTER — HOSPITAL ENCOUNTER (OUTPATIENT)
Dept: CARDIAC REHAB | Age: 57
Discharge: HOME | End: 2022-04-26
Payer: COMMERCIAL

## 2022-04-26 VITALS — BODY MASS INDEX: 31.02 KG/M2 | WEIGHT: 204 LBS

## 2022-04-26 PROCEDURE — 94625 PHY/QHP OP PULM RHB W/O MNTR: CPT

## 2022-04-26 PROCEDURE — 93798 PHYS/QHP OP CAR RHAB W/ECG: CPT

## 2022-04-28 ENCOUNTER — HOSPITAL ENCOUNTER (OUTPATIENT)
Dept: CARDIAC REHAB | Age: 57
Discharge: HOME | End: 2022-04-28
Payer: COMMERCIAL

## 2022-04-28 VITALS — BODY MASS INDEX: 30.91 KG/M2 | WEIGHT: 203.3 LBS

## 2022-04-28 PROCEDURE — 94625 PHY/QHP OP PULM RHB W/O MNTR: CPT

## 2022-05-03 ENCOUNTER — HOSPITAL ENCOUNTER (OUTPATIENT)
Dept: CARDIAC REHAB | Age: 57
Discharge: HOME | End: 2022-05-03
Payer: COMMERCIAL

## 2022-05-03 VITALS — BODY MASS INDEX: 30.81 KG/M2 | WEIGHT: 202.6 LBS

## 2022-05-03 PROCEDURE — 94625 PHY/QHP OP PULM RHB W/O MNTR: CPT

## 2022-05-05 ENCOUNTER — HOSPITAL ENCOUNTER (OUTPATIENT)
Dept: CARDIAC REHAB | Age: 57
Discharge: HOME | End: 2022-05-05
Payer: COMMERCIAL

## 2022-05-05 VITALS — WEIGHT: 205 LBS | BODY MASS INDEX: 31.17 KG/M2

## 2022-05-05 PROCEDURE — 94625 PHY/QHP OP PULM RHB W/O MNTR: CPT

## 2022-05-09 DIAGNOSIS — J84.10 PULMONARY FIBROSIS (HCC): ICD-10-CM

## 2022-05-10 ENCOUNTER — HOSPITAL ENCOUNTER (OUTPATIENT)
Dept: CARDIAC REHAB | Age: 57
Discharge: HOME | End: 2022-05-10
Payer: COMMERCIAL

## 2022-05-10 VITALS — BODY MASS INDEX: 31.11 KG/M2 | WEIGHT: 204.6 LBS

## 2022-05-10 PROCEDURE — G0239 OTH RESP PROC, GROUP: HCPCS

## 2022-05-12 ENCOUNTER — HOSPITAL ENCOUNTER (OUTPATIENT)
Dept: CARDIAC REHAB | Age: 57
Discharge: HOME | End: 2022-05-12
Payer: COMMERCIAL

## 2022-05-12 VITALS — WEIGHT: 205.4 LBS | BODY MASS INDEX: 31.23 KG/M2

## 2022-05-12 PROCEDURE — 94625 PHY/QHP OP PULM RHB W/O MNTR: CPT

## 2022-05-17 ENCOUNTER — HOSPITAL ENCOUNTER (OUTPATIENT)
Dept: CARDIAC REHAB | Age: 57
Discharge: HOME | End: 2022-05-17
Payer: COMMERCIAL

## 2022-05-17 VITALS — BODY MASS INDEX: 31.2 KG/M2 | WEIGHT: 205.2 LBS

## 2022-05-17 PROCEDURE — 94625 PHY/QHP OP PULM RHB W/O MNTR: CPT

## 2022-05-19 ENCOUNTER — HOSPITAL ENCOUNTER (OUTPATIENT)
Dept: CARDIAC REHAB | Age: 57
Discharge: HOME | End: 2022-05-19
Payer: COMMERCIAL

## 2022-05-19 VITALS — WEIGHT: 204.6 LBS | BODY MASS INDEX: 31.11 KG/M2

## 2022-05-19 PROCEDURE — 94625 PHY/QHP OP PULM RHB W/O MNTR: CPT

## 2022-05-24 ENCOUNTER — HOSPITAL ENCOUNTER (OUTPATIENT)
Dept: CARDIAC REHAB | Age: 57
Discharge: HOME | End: 2022-05-24
Payer: COMMERCIAL

## 2022-05-24 VITALS — BODY MASS INDEX: 30.96 KG/M2 | WEIGHT: 203.6 LBS

## 2022-05-24 PROCEDURE — 94625 PHY/QHP OP PULM RHB W/O MNTR: CPT

## 2022-05-26 ENCOUNTER — HOSPITAL ENCOUNTER (OUTPATIENT)
Dept: CARDIAC REHAB | Age: 57
Discharge: HOME | End: 2022-05-26
Payer: COMMERCIAL

## 2022-05-26 VITALS — WEIGHT: 205.6 LBS | BODY MASS INDEX: 31.26 KG/M2

## 2022-05-26 PROCEDURE — 94625 PHY/QHP OP PULM RHB W/O MNTR: CPT

## 2022-05-31 ENCOUNTER — HOSPITAL ENCOUNTER (OUTPATIENT)
Dept: CARDIAC REHAB | Age: 57
Discharge: HOME | End: 2022-05-31
Payer: COMMERCIAL

## 2022-05-31 VITALS — BODY MASS INDEX: 31.11 KG/M2 | WEIGHT: 204.6 LBS

## 2022-05-31 PROCEDURE — 94625 PHY/QHP OP PULM RHB W/O MNTR: CPT

## 2022-06-02 ENCOUNTER — HOSPITAL ENCOUNTER (OUTPATIENT)
Dept: CARDIAC REHAB | Age: 57
Discharge: HOME | End: 2022-06-02
Payer: COMMERCIAL

## 2022-06-02 VITALS — BODY MASS INDEX: 31.29 KG/M2 | WEIGHT: 205.8 LBS

## 2022-06-02 PROCEDURE — 94625 PHY/QHP OP PULM RHB W/O MNTR: CPT

## 2022-06-07 ENCOUNTER — HOSPITAL ENCOUNTER (OUTPATIENT)
Dept: CARDIAC REHAB | Age: 57
Discharge: HOME | End: 2022-06-07
Payer: COMMERCIAL

## 2022-06-07 VITALS — WEIGHT: 203.4 LBS | BODY MASS INDEX: 30.93 KG/M2

## 2022-06-07 PROCEDURE — 94625 PHY/QHP OP PULM RHB W/O MNTR: CPT

## 2022-06-09 ENCOUNTER — HOSPITAL ENCOUNTER (OUTPATIENT)
Dept: CARDIAC REHAB | Age: 57
Discharge: HOME | End: 2022-06-09
Payer: COMMERCIAL

## 2022-06-09 PROCEDURE — 94625 PHY/QHP OP PULM RHB W/O MNTR: CPT

## 2022-06-14 ENCOUNTER — APPOINTMENT (OUTPATIENT)
Dept: CARDIAC REHAB | Age: 57
End: 2022-06-14
Payer: COMMERCIAL

## 2022-06-16 ENCOUNTER — APPOINTMENT (OUTPATIENT)
Dept: CARDIAC REHAB | Age: 57
End: 2022-06-16
Payer: COMMERCIAL

## 2022-06-21 ENCOUNTER — APPOINTMENT (OUTPATIENT)
Dept: CARDIAC REHAB | Age: 57
End: 2022-06-21
Payer: COMMERCIAL

## 2022-06-23 ENCOUNTER — APPOINTMENT (OUTPATIENT)
Dept: CARDIAC REHAB | Age: 57
End: 2022-06-23
Payer: COMMERCIAL

## 2022-07-19 PROBLEM — J84.10 PULMONARY FIBROSIS (HCC): Status: ACTIVE | Noted: 2022-01-01

## 2022-07-19 NOTE — PROGRESS NOTES
Subjective:     Chief Complaint   Patient presents with    [address] is a 55 y.o. M.  I reviewed the medical record. The patient has a prior history of DVT that was treated previously with Eliquis. He was seen most recently in January by his previous primary care provider for follow-up. At the time, he was noted to be followed with a pulmonary specialist for his prior history of pneumonia and respiratory failure with subsequent reduction in his lung function. Physical examination at the time had been unremarkable. Eliquis had been discontinued as the patient was noted to have completed his course of therapy. He was seen a couple months later for his history of interstitial fibrosis, where pulmonary function testing and walk testing was performed. Pulmonary function testing had demonstrated reduction in his forced vital capacity to 44% predicted, as well as significant reduction in his FEV1 to 48% predicted. He was advised to get vaccinated against pneumonia, and follow-up with primary care to discuss the potential for sleep apnea. He was felt to not warrant lung transplantation for his COVID fibrosis as it was not felt that he had progressive disease. TTE apparently had demonstrated no evidence of any pulmonary hypertension. Over the past several weeks, the patient has been seen mainly in the cardiac rehabilitation center for treatment of his pulmonary fibrosis. Today, the patient comes in for routine preventive care as well as follow-up on his chronic medical concerns. He reports feeling generally okay overall today and has no acute somatic complaints. He has continued to go to cardiac rehab for his history of pulmonary fibrosis over the past several months and has made relatively good progress, although he expresses some frustration that he has not been able to lose as much weight as he had hoped.   He denies having had any chest pain, shortness of breath, or any further cardiopulmonary concerns over the past few months, and feels that his exercise tolerance is steadily improving. He continues to follow-up regularly with his pulmonary specialist, and he continues on Symbicort for his pulmonary fibrosis and finds this helpful. He does use his albuterol inhaler every day at the direction of his pulmonary specialist, in addition to the Symbicort, but denies having had any recent wheezing, coughing, production, or other problems. He continues on amlodipine 5 mg daily. He denies having had any lightheadedness or dizziness or lower extremity edema with the medication. He does not measure his blood pressure readings regularly at home. He continues on aspirin instead of Eliquis, as noted above, after the recent discontinuation of Eliquis sometime ago. No excess bleeding or bruising with this medication. I reviewed the patient's preventive screening measures with the patient. He is notably due for hepatitis C screening, as well as routine fasting laboratory studies. He reports having had his COVID-19 vaccine already at the direction of his pulmonary specialist earlier this year. He also says that he would like to get the Shingrix vaccination, and I advised him to get this at an outside pharmacy. He is also advised to obtain the Prevnar vaccine at an outside pharmacy. He says he will do so. Routine Healthcare Maintenance issues are reviewed and discussed with the patient as noted below. Orders to update gaps in healthcare maintenance were placed as noted below in the Assessment and Plan, where applicable. Past Medical History:  Past Medical History:   Diagnosis Date    GERD (gastroesophageal reflux disease)     Hepatic steatosis     History of DVT (deep vein thrombosis) 2019    Right leg DVT ([address] [I82.401]    History of echocardiogram 03/2021    LV: Calculated LVEF is 73%.  Normal cavity size, systolic function (ejection fraction normal) and diastolic function. Moderate concentric hypertrophy. Wall motion: normal. Normal left ventricular strain. Hypertension     Obesity (BMI 30-39. 9) 5/8/2019    Personal history of COVID-19 01/2021    Prediabetes     Pulmonary fibrosis ([address] 01/2022 2/2 COVID-19; no transplant indicated       Past Surgical Histor:  Past Surgical History:   Procedure Laterality Date    HX ORTHOPAEDIC         Allergies:  No Known Allergies    Medications:  Current Outpatient Medications   Medication Sig Dispense Refill    melatonin 3 mg tablet Take 3 mg by mouth. Take 3 at night      budesonide-formoteroL (SYMBICORT) 160-4.5 mcg/actuation HFAA Take 2 Puffs by inhalation two (2) times a day. amLODIPine (NORVASC) 5 mg tablet Take 5 mg by mouth daily. aspirin delayed-release 81 mg tablet Take 81 mg by mouth daily. albuterol (PROVENTIL HFA, VENTOLIN HFA, PROAIR HFA) 90 mcg/actuation inhaler Take 2 Puffs by inhalation every four (4) hours as needed for Wheezing. 1 Inhaler 1    multivitamin (ONE A DAY) tablet Take 1 Tab by mouth daily. cholecalciferol (VITAMIN D3) 25 mcg (1,000 unit) cap Take 1,000 Units by mouth daily. ascorbic acid, vitamin C, (VITAMIN C) 500 mg tablet Take 500 mg by mouth daily.          Social History:  Social History     Socioeconomic History    Marital status: SINGLE    Years of education: 15    Highest education level: Some college, no degree   Tobacco Use    Smoking status: Never    Smokeless tobacco: Former     Quit date: 01/2021   Vaping Use    Vaping Use: Never used   Substance and Sexual Activity    Alcohol use: Yes     Comment: Socially     Drug use: Never   Other Topics Concern     Service No    Blood Transfusions No    Caffeine Concern No    Occupational Exposure No    Hobby Hazards No    Sleep Concern No    Stress Concern Yes     Comment: Lung disease    Weight Concern No    Special Diet No    Back Care No    Exercise Yes     Comment: recumbent bike    Bike Helmet Yes    Seat Belt Yes Self-Exams Yes       Family History:  Family History   Problem Relation Age of Onset    Cancer Mother     Stroke Mother     Hypertension Mother     Cancer Father     Hypertension Father     Hypertension Brother        Immunizations:  Immunization History   Administered Date(s) Administered    Influenza Vaccine 11/01/2021    Influenza Vaccine (Quad) PF (>6 Mo Flulaval, Fluarix, and >3 Darolyn Gracie [id number]) 02/17/2021        Healthcare Maintenance:  Health Maintenance   Topic Date Due    Hepatitis C Screening  Never done    COVID-19 Vaccine (1) Never done    Pneumococcal 0-64 years (1 - PCV) Never done    DTaP/Tdap/Td series (1 - Tdap) Never done    Lipid Screen  Never done    Shingrix Vaccine Age 50> (1 of 2) Never done    Flu Vaccine (1) 09/01/2022    Depression Screen  06/07/2023    Colorectal Cancer Screening Combo  07/31/2027        Review of Systems:  ROS:  Review of Systems   Constitutional: Negative. HENT: Negative. Eyes: Negative. Respiratory: Negative. Cardiovascular: Negative. Gastrointestinal: Negative. Genitourinary: Negative. Musculoskeletal: Negative. Skin: Negative. Neurological: Negative. Endo/Heme/Allergies: Negative. Psychiatric/Behavioral: Negative. ROS otherwise negative      Objective:     Vital Signs:  Visit Vitals  /81 (BP 1 Location: Right arm, BP Patient Position: Sitting, BP Cuff Size: Large adult)   Pulse 89   Resp 20   Ht 5' 8\" (1.727 m)   Wt 204 lb 1.6 oz (92.6 kg)   SpO2 95%   BMI 31.03 kg/m²       BMI:  Body mass index is 31.03 kg/m². Physical Examination:  Physical Exam  Vitals reviewed. Constitutional:       Appearance: Normal appearance. He is obese. HENT:      Head: Normocephalic and atraumatic. Nose: Nose normal.      Mouth/Throat:      Mouth: Mucous membranes are moist.   Eyes:      Extraocular Movements: Extraocular movements intact.       Conjunctiva/sclera: Conjunctivae normal.      Pupils: Pupils are equal, round, and reactive to light. Cardiovascular:      Rate and Rhythm: Normal rate and regular rhythm. Pulses: Normal pulses. Heart sounds: Normal heart sounds. No murmur heard. No friction rub. No gallop. Pulmonary:      Effort: Pulmonary effort is normal. No respiratory distress. Breath sounds: Normal breath sounds. No wheezing, rhonchi or rales. Abdominal:      General: Bowel sounds are normal. There is no distension. Palpations: Abdomen is soft. There is no mass. Tenderness: There is no abdominal tenderness. There is no guarding or rebound. Musculoskeletal:         General: No tenderness, deformity or signs of injury. Normal range of motion. Cervical back: Normal range of motion and neck supple. Right lower leg: No edema. Left lower leg: No edema. Skin:     General: Skin is warm and dry. Findings: No bruising, lesion or rash. Neurological:      General: No focal deficit present. Mental Status: He is alert and oriented to person, place, and time. Mental status is at baseline. Cranial Nerves: No cranial nerve deficit. Sensory: No sensory deficit. Motor: No weakness.       Coordination: Coordination normal.      Gait: Gait normal.      Deep Tendon Reflexes: Reflexes normal.   Psychiatric:         Mood and Affect: Mood normal.         Behavior: Behavior normal.        Physical exam otherwise negative    Diagnostic Testing:    Laboratory Studies:    Lab Results   Component Value Date/Time    Hemoglobin A1c 6.4 (H) 02/01/2021 02:42 AM     Lab Results   Component Value Date/Time    GFR est AA >60 03/18/2021 07:21 AM    GFR est non-AA >60 03/18/2021 07:21 AM    Creatinine 0.57 (L) 03/18/2021 07:21 AM    BUN 11 03/18/2021 07:21 AM    Sodium 136 03/18/2021 07:21 AM    Potassium 4.0 03/18/2021 07:21 AM    Chloride 101 03/18/2021 07:21 AM    CO2 32 03/18/2021 07:21 AM       Lab Results   Component Value Date/Time    ALT (SGPT) 52 03/16/2021 02:21 AM AST (SGOT) 25 03/16/2021 02:21 AM    Alk. phosphatase 109 03/16/2021 02:21 AM    Bilirubin, total 0.3 03/16/2021 02:21 AM       Lab Results   Component Value Date/Time    WBC 7.9 03/18/2021 07:21 AM    HGB 11.4 (L) 03/18/2021 07:21 AM    HCT 34.9 (L) 03/18/2021 07:21 AM    PLATELET 519 31/22/2931 07:21 AM    MCV 87.3 03/18/2021 07:21 AM       No results found for: CHOL, CHOLPOCT, CHOLX, CHLST, CHOLV, TOTCHOLEXT, HDL, HDLPOC, HDLEXT, HDLP, LDL, LDLCPOC, LDLCEXT, LDLC, DLDLP, VLDLC, VLDL, TGLX, TRIGL, TRIGLYCEXT, TRIGP, TGLPOCT, CHHD, CHHDX     No visits with results within 1 Year(s) from this visit. Latest known visit with results is:   No results displayed because visit has over 200 results. Radiographic Studies:  XR Results (most recent):  Results from Hospital Encounter encounter on 04/23/21    XR CHEST PA LAT    Narrative  Exam:  2 view chest    Indication: Covid 19. COMPARISON: 3/17/2021    PA and lateral views demonstrate normal heart size. The lung volumes continue to  be low. There is minimal improved aeration. There are bilateral coarse  bronchovascular markings and probable areas of postinflammatory change and  fibrosis in the mid lower lung zones bilaterally left greater than right. These  fairly severe postinflammatory changes demonstrate no significant interval  change. Impression  1. Persistent low lung volumes with minimal improved aeration. The course  interstitial opacities consistent with postinflammatory change and presumed  fibrosis is unchanged. JULIOCESAR Results (most recent):  No results found for this or any previous visit. CT Results (most recent):  Results from Hospital Encounter encounter on 06/21/21    CT CHEST WO CONT    Narrative  HIGH-RESOLUTION CT CHEST WITHOUT CONTRAST. 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 Severe restrictive lung disease. Ongoing  dyspnea with exertion, nonproductive cough. 3 L oxygen at rest and 4-5 L with  exertion. Admission for coronal pneumonia pneumonia in January 2021. Left upper  extremity thrombosis. COMPARISON: 3/9/2021, 3/2/2021, 1/18/2021. TECHNIQUE: CT of the chest was performed without contrast. Supine and prone  inspiration, and supine expiration images were obtained. Coronal and sagittal  reconstructions were performed. CT dose reduction was achieved through use of a  standardized protocol tailored for this examination and automatic exposure  control for dose modulation. FINDINGS:  Diffuse septal thickening, volume loss, and traction bronchiolectasis in the  areas of prior COVID-19 pneumonia is consistent with post-COVID-19 fibrosis. This is moderate to severe in the left lower lobe; moderate in the right lower  lobe, right middle lobe, and lingula; and more mild in the subpleural upper  lobes. The background pulmonary parenchyma is normal in the areas spared by  COVID-19 pneumonia on 1/18/2021. The main pulmonary artery is enlarged (30 x 34  mm), consistent with pulmonary artery hypertension. No air trapping on expiratory images. Fibrosis persists on prone inspiratory  images. The central airways are patent. No pneumothorax or pleural effusion. The  heart is at the upper limits of normal in size. No thoracic lymphadenopathy. Hepatic steatosis is mild. The visualized unenhanced upper abdomen is otherwise  normal.    Impression  1. 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 2. Moderate, diffuse, pulmonary fibrosis, with a basilar distribution. 3. Pulmonary artery hypertension. 4. Mild hepatic steatosis. DEXA Results (most recent):  No results found for this or any previous visit. MRI Results (most recent):  No results found for this or any previous visit. Assessment/Plan:       ICD-10-CM ICD-9-CM    1.  Routine adult health maintenance  Z00.00 V70.0 HEMOGLOBIN A1C WITH EAG      CBC WITH AUTOMATED DIFF      METABOLIC PANEL, COMPREHENSIVE      LIPID PANEL      HEPATITIS C AB      MICROALBUMIN, UR, RAND W/ MICROALB/CREAT RATIO      2. Primary hypertension  I10 401.9       3. Pulmonary fibrosis ([address]  J84.10 515       4. Obesity (BMI 30-39. 9)  E66.9 278.00       5. Prediabetes  R73.03 790.29       6. Encounter for immunization  Z23 V03.89              Healthcare Maintenance:  - Preventive measures are reviewed as per above  - Up to date on routine interventions except as noted above  - Orders placed to update gaps as noted  - Notes: Fasting labs ordered as noted above. Discussed vaccinations as noted above. Colonoscopy will be due in 2027. AAA ultrasound due at age 73. Pulmonary Fibrosis:   - Current Symptoms: taking medications as instructed, no medication side effects noted, no significant ongoing wheezing or shortness of breath   - CAT/ACT: ---   - MMRC: ---   - Current Assessment: Minimally symptomatic, good control with current inhaler regimen. Continue follow-up with pulmonary specialist.   - Current Regimen:   Key COPD Medications               budesonide-formoteroL (SYMBICORT) 160-4.5 mcg/actuation HFAA (Taking) Take 2 Puffs by inhalation two (2) times a day. albuterol (PROVENTIL HFA, VENTOLIN HFA, PROAIR HFA) 90 mcg/actuation inhaler (Taking) Take 2 Puffs by inhalation every four (4) hours as needed for Wheezing.           - Plan: current treatment plan is effective, no change in therapy      Obesity:   - Body mass index is 31.03 kg/m². - Current Obesity Medications (if any):   Key Obesity Meds       Patient is on no anti-obesity meds.            - Discussed therapeutic lifestyle changes: dietary modifications, caloric restriction, increased aerobic exercise, resistance training   - Patient adherent to dietary modifications x 6 months (e.g., Mediterranean Diet): YES has been adherent over the past 6 months while going to cardiac rehabilitation   - Previous Nutrition Referral or formal weight loss plan x 6 m: NO   - Comorbidities:   - Obstructive sleep apnea: NO   - Obesity-related hypoventilation:NO    - Diabetes Mellitus: Does have prediabetes   - Hypertension:YES   - Other (GERD, OA, back pain):NO   - Qualifies for medication or bariatric surgery referral? YES   - Notes: Patient has been adherent to a dietary plan and exercise plan over the past 6 months. He has been unable to lose a significant amount of weight. Awaiting laboratory studies; consider GLP-1 agonist depending on laboratory studies or patient preference        Essential Hypertension/Blood Pressure Management:   - Home BP Readings: not doing   - Current Control: optimal   - Target BP: Less than 140/90 mmHg   - Relevant BP Meds:  Key CAD CHF Meds               amLODIPine (NORVASC) 5 mg tablet (Taking) Take 5 mg by mouth daily. aspirin delayed-release 81 mg tablet (Taking) Take 81 mg by mouth daily.               - Plan: continue current treatment regimen, continue current meds, dietary sodium restriction, regular aerobic exercise, weight loss   - Notes: ---    Prediabetes:   - Current A1C:   Lab Results   Component Value Date/Time    Hemoglobin A1c 6.4 (H) 02/01/2021 02:42 AM       - Target A1C: Less than 7.6%   - Complications: none   - Relevant Diabetes Medications:  Key Antihyperglycemic Medications       Patient is on no antihyperglycemic meds. - General Recommendations discussed today: diabetic diet discussed in detail, written exchange diet given and low cholesterol diet, weight control and daily exercise discussed   - Notes: Continue efforts at weight loss; consider GLP-1 agonist as noted above              I have reviewed the patient's medical history in detail and updated the computerized patient record. We had a prolonged discussion about these complex clinical issues and went over the various important aspects to consider. All questions were answered.       Advised the patient to call back or return to office if symptoms do not improve, change in nature, or persist.     The patient was given an after visit summary or informed of [email or web address] which includes patient instructions, diagnoses, current medications, & vitals. he expressed understanding with the diagnosis and plan. Ronda Gifford MD    Please note that this dictation was completed with [employer], the computer voice recognition software. Quite often unanticipated grammatical, syntax, homophones, and other interpretive errors are inadvertently transcribed by the computer software. Please disregard these errors. Please excuse any errors that have escaped final proofreading.

## 2022-07-21 ENCOUNTER — OFFICE VISIT (OUTPATIENT)
Dept: INTERNAL MEDICINE CLINIC | Age: 57
End: 2022-07-21
Payer: COMMERCIAL

## 2022-07-21 VITALS
HEART RATE: 89 BPM | DIASTOLIC BLOOD PRESSURE: 81 MMHG | SYSTOLIC BLOOD PRESSURE: 125 MMHG | BODY MASS INDEX: 30.93 KG/M2 | WEIGHT: 204.1 LBS | HEIGHT: 68 IN | RESPIRATION RATE: 20 BRPM | OXYGEN SATURATION: 95 %

## 2022-07-21 DIAGNOSIS — I10 PRIMARY HYPERTENSION: ICD-10-CM

## 2022-07-21 DIAGNOSIS — Z00.00 ROUTINE ADULT HEALTH MAINTENANCE: Primary | ICD-10-CM

## 2022-07-21 DIAGNOSIS — E66.9 OBESITY (BMI 30-39.9): ICD-10-CM

## 2022-07-21 DIAGNOSIS — J84.10 PULMONARY FIBROSIS (HCC): ICD-10-CM

## 2022-07-21 DIAGNOSIS — R73.03 PREDIABETES: ICD-10-CM

## 2022-07-21 PROCEDURE — 99396 PREV VISIT EST AGE 40-64: CPT | Performed by: INTERNAL MEDICINE

## 2022-07-21 PROCEDURE — 99214 OFFICE O/P EST MOD 30 MIN: CPT | Performed by: INTERNAL MEDICINE

## 2022-07-21 RX ORDER — LANOLIN ALCOHOL/MO/W.PET/CERES
3 CREAM (GRAM) TOPICAL
COMMUNITY

## 2022-07-21 NOTE — PATIENT INSTRUCTIONS
Please continue monitoring your blood pressure at home using your home blood pressure monitor. Please record your readings and bring these with you to your next visit. I have placed information regarding a couple of medications that could be used for diabetes or for weight loss in this after visit summary. These medications are not currently prescribed but this information is included here for your reference. Learning About Carbohydrate (Carb) Counting and Eating Out When You Have Diabetes  Why plan your meals? Meal planning can be a key part of managing diabetes. Planning meals and snacks with the right balance of carbohydrate, protein, and fat can help you keep your blood sugar at the target level you set with your doctor. You don't have to eat special foods. You can eat what your family eats, including sweets once in a while. But you do have to pay attention to how often you eat and how much you eat of certain foods. You may want to work with a dietitian or a diabetes educator. They can give you tips and meal ideas and can answer your questions about meal planning. This health professional can also help you reach a healthy weight if that is one of your goals. What should you know about eating carbs? Managing the amount of carbohydrate (carbs) you eat is an important part of healthy meals when you have diabetes. Carbohydrate is found in many foods. Learn which foods have carbs. And learn the amounts of carbs in different foods. Bread, cereal, pasta, and rice have about 15 grams of carbs in a serving. A serving is 1 slice of bread (1 ounce), ½ cup of cooked cereal, or 1/3 cup of cooked pasta or rice. Fruits have 15 grams of carbs in a serving. A serving is 1 small fresh fruit, such as an apple or orange; ½ of a banana; ½ cup of cooked or canned fruit; ½ cup of fruit juice; 1 cup of melon or raspberries; or 2 tablespoons of dried fruit.   Milk and no-sugar-added yogurt have 15 grams of carbs in a serving. A serving is 1 cup of milk or 3/4 cup (6 oz) of no-sugar-added yogurt. Starchy vegetables have 15 grams of carbs in a serving. A serving is ½ cup of mashed potatoes or sweet potato; 1 cup winter squash; ½ of a small baked potato; ½ cup of cooked beans; or ½ cup cooked corn or green peas. Learn how much carbs to eat each day and at each meal. A dietitian or certified diabetes educator can teach you how to keep track of the amount of carbs you eat. This is called carbohydrate counting. If you are not sure how to count carbohydrate grams, use the plate method to plan meals. It is a quick way to make sure that you have a balanced meal. It also can help you manage the amount of carbohydrate you eat at meals. Divide your plate by types of foods. Put non-starchy vegetables on half the plate, meat or other protein food on one-quarter of the plate, and a grain or starchy vegetable in the final quarter of the plate. To this you can add a small piece of fruit and 1 cup of milk or yogurt, depending on how many carbs you are supposed to eat at a meal.  Try to eat about the same amount of carbs at each meal. Do not \"save up\" your daily allowance of carbs to eat at one meal.  Proteins have very little or no carbs. Examples of proteins are beef, chicken, turkey, fish, eggs, tofu, cheese, cottage cheese, and peanut butter. How can you eat out and still eat healthy? Learn to estimate the serving sizes of foods that have carbohydrate. If you measure food at home, it will be easier to estimate the amount in a serving of restaurant food. If the meal you order has too much carbohydrate (such as potatoes, corn, or baked beans), ask to have a low-carbohydrate food instead. Ask for a salad or non-starchy vegetables like broccoli, cauliflower, green beans, or peppers. If you eat more carbohydrate at a meal than you had planned, take a walk or do other exercise. This will help lower your blood sugar.   What are some tips for eating healthy? Limit saturated fat, such as the fat from meat and dairy products. This is a healthy choice because people who have diabetes are at higher risk of heart disease. So choose lean cuts of meat and nonfat or low-fat dairy products. Use olive or canola oil instead of butter or shortening when cooking. Don't skip meals. Your blood sugar may drop too low if you skip meals and take insulin or certain medicines for diabetes. Check with your doctor before you drink alcohol. Alcohol can cause your blood sugar to drop too low. Alcohol can also cause a bad reaction if you take certain diabetes medicines. Follow-up care is a key part of your treatment and safety. Be sure to make and go to all appointments, and call your doctor if you are having problems. It's also a good idea to know your test results and keep a list of the medicines you take. Where can you learn more? Go to http://www.mcneill.com/  Enter I147 in the search box to learn more about \"Learning About Carbohydrate (Carb) Counting and Eating Out When You Have Diabetes. \"  Current as of: September 8, 2021               Content Version: 13.2  © 2985-1452 [employer]. Care instructions adapted under license by [employer] (which disclaims liability or warranty for this information). If you have questions about a medical condition or this instruction, always ask your healthcare professional. [address] any warranty or liability for your use of this information. Learning About the [address] Diet  What is the Mediterranean diet? The Mediterranean diet is a style of eating rather than a diet plan. It features foods eaten in [place], [place], [place], and other countries along the [place]. It emphasizes eating foods like fish, fruits, vegetables, beans, high-fiber breads and whole grains, nuts, and olive oil.  This style of eating includes limited red meat, cheese, and sweets. Why choose the Mediterranean diet? A Mediterranean-style diet may improve heart health. It contains more fat than other heart-healthy diets. But the fats are mainly from nuts, unsaturated oils (such as fish oils and olive oil), and certain nut or seed oils (such as canola, soybean, or flaxseed oil). These fats may help protect the heart and blood vessels. How can you get started on the Mediterranean diet? Here are some things you can do to switch to a more Mediterranean way of eating. What to eat  Eat a variety of fruits and vegetables each day, such as grapes, blueberries, tomatoes, broccoli, peppers, figs, olives, spinach, eggplant, beans, lentils, and chickpeas. Eat a variety of whole-grain foods each day, such as oats, brown rice, and whole wheat bread, pasta, and couscous. Eat fish at least 2 times a week. Try tuna, salmon, mackerel, lake trout, herring, or sardines. Eat moderate amounts of low-fat dairy products, such as milk, cheese, or yogurt. Eat moderate amounts of poultry and eggs. Choose healthy (unsaturated) fats, such as nuts, olive oil, and certain nut or seed oils like canola, soybean, and flaxseed. Limit unhealthy (saturated) fats, such as butter, palm oil, and coconut oil. And limit fats found in animal products, such as meat and dairy products made with whole milk. Try to eat red meat only a few times a month in very small amounts. Limit sweets and desserts to only a few times a week. This includes sugar-sweetened drinks like soda. The Mediterranean diet may also include red wine with your meal--1 glass each day for women and up to 2 glasses a day for men. Tips for eating at home  Use herbs, spices, garlic, lemon zest, and citrus juice instead of salt to add flavor to foods. Add avocado slices to your sandwich instead of richardson. Have fish for lunch or dinner instead of red meat. Brush the fish with olive oil, and broil or grill it.   Sprinkle your salad with seeds or nuts instead of cheese. Cook with olive or canola oil instead of butter or oils that are high in saturated fat. Switch from 2% milk or whole milk to 1% or fat-free milk. Dip raw vegetables in a vinaigrette dressing or hummus instead of dips made from mayonnaise or sour cream.  Have a piece of fruit for dessert instead of a piece of cake. Try baked apples, or have some dried fruit. Tips for eating out  Try broiled, grilled, baked, or poached fish instead of having it fried or breaded. Ask your  to have your meals prepared with olive oil instead of butter. Order dishes made with marinara sauce or sauces made from olive oil. Avoid sauces made from cream or mayonnaise. Choose whole-grain breads, whole wheat pasta and pizza crust, brown rice, beans, and lentils. Cut back on butter or margarine on bread. Instead, you can dip your bread in a small amount of olive oil. Ask for a side salad or grilled vegetables instead of french fries or chips. Where can you learn more? Go to http://www.mcneill.com/  Enter O407 in the search box to learn more about \"Learning About the Mediterranean Diet. \"  Current as of: September 8, 2021               Content Version: 13.2  © 2005-1267 [employer]. Care instructions adapted under license by [employer] (which disclaims liability or warranty for this information). If you have questions about a medical condition or this instruction, always ask your healthcare professional. [place] [address] any warranty or liability for your use of this information. Metformin (By mouth)   Metformin Hydrochloride (met-FOR-min vanessa-droe-KLOR-ashtyn)  Treats type 2 diabetes. Brand Name(s): DM2, Fortamet, Glucophage, Glucophage XR, Glumetza, Riomet   There may be other brand names for this medicine. When This Medicine Should Not Be Used: This medicine is not right for everyone.  Do not use if you had an allergic reaction to metformin. How to Use This Medicine:   Liquid, Tablet, Long Acting Tablet  Take your medicine as directed. Your dose may need to be changed several times to find what works best for you. It is best to take this medicine with food or milk. Swallow the extended-release tablet whole. Do not crush, break, or chew it. Tell your doctor if you have trouble swallowing the tablets whole. Measure the oral liquid medicine with a marked measuring spoon, oral syringe, or medicine cup. Read and follow the patient instructions that come with this medicine. Talk to your doctor or pharmacist if you have any questions. Missed dose: Take a dose as soon as you remember. If it is almost time for your next dose, wait until then and take a regular dose. Do not take extra medicine to make up for a missed dose. Store the medicine in a closed container at room temperature, away from heat, moisture, and direct light. Drugs and Foods to Avoid:   Ask your doctor or pharmacist before using any other medicine, including over-the-counter medicines, vitamins, and herbal products. Some medicines can affect how metformin works. Tell your doctor if you are using any of the following:  Acetazolamide, dichlorphenamide, dolutegravir, isoniazid, nicotinic acid, phenytoin, ranolazine, topiramate, vandetanib, zonisamide  Birth control pills  Blood pressure medicine  Diuretic (water pill)  Phenothiazine medicine  Steroid medicine  Thyroid medicine  Do not drink alcohol while you are using this medicine. Warnings While Using This Medicine:   Tell your doctor if you are pregnant or breastfeeding, or if you have kidney disease, liver disease, heart or blood vessel disease, heart failure, blood circulation problems, anemia, metabolic acidosis, an adrenal gland or pituitary gland disorder, vitamin B12 deficiency, or had a heart attack. Tell your doctor if you drink alcohol.   Too much of this medicine can cause a rare, but serious condition called lactic acidosis. Part of the extended-release tablet may pass in your stool. This is normal.  Make sure any doctor or dentist who treats you knows that you are using this medicine. You may need to stop using this medicine before you have surgery, an x-ray, CT scan, or other medical test.  Your doctor will do lab tests at regular visits to check on the effects of this medicine. Keep all appointments. Keep all medicine out of the reach of children. Never share your medicine with anyone. Possible Side Effects While Using This Medicine:   Call your doctor right away if you notice any of these side effects: Allergic reaction: Itching or hives, swelling in your face or hands, swelling or tingling in your mouth or throat, chest tightness, trouble breathing  Confusion, fast heartbeat, increased hunger, shakiness  Fever or chills  Stomach pain, nausea, vomiting, muscle pain or cramping  Trouble breathing, slow heartbeat, lightheadedness, dizziness  Unusual tiredness or weakness  If you notice these less serious side effects, talk with your doctor:   Diarrhea, gas  If you notice other side effects that you think are caused by this medicine, tell your doctor. Call your doctor for medical advice about side effects. You may report side effects to FDA at 2-895-FDA-4698  © 2017 [facility] Information is for End User's use only and may not be sold, redistributed or otherwise used for commercial purposes. The above information is an  only. It is not intended as medical advice for individual conditions or treatments. Talk to your doctor, nurse or pharmacist before following any medical regimen to see if it is safe and effective for you. Semaglutide: Patient drug information      Copyright 6553-2785 [address] rights reserved.   Contributor Disclosures  (For additional information see \"Semaglutide: Drug information\")    You must carefully read the [employer] Use and Disclaimer\" below in order to understand and correctly use this information. Brand Names: [place]  Ozempic (0.25 or 0.5 MG/DOSE); Ozempic (1 MG/DOSE); Ozempic (2 MG/DOSE); Rybelsus; HDTUYL    Brand Names: [place] ([facility])  Ozempic (0.25 or 0.5 MG/DOSE); Ozempic (1 MG/DOSE); Rybelsus    Warning   This drug has been shown to cause thyroid cancer in some animals. It is not known if this happens in humans. If thyroid cancer happens, it may be deadly if not found and treated early. Call your doctor right away if you have a neck mass, trouble breathing, trouble swallowing, or have hoarseness that will not go away. Do not use this drug if you have a health problem called Multiple Endocrine Neoplasia syndrome type 2 (MEN 2), or if you or a family member have had thyroid cancer. What is this drug used for? Ozempic prefilled pens and Rybelsus tablets: It is used to lower blood sugar in patients with high blood sugar (diabetes). Ozempic prefilled pens: It is used to lower the chance of heart attack, stroke, and death in some people. Wegovy prefilled pens: It is used to help with weight loss in certain people. What do I need to tell my doctor BEFORE I take this drug? For all uses of this drug:   If you are allergic to this drug; any part of this drug; or any other drugs, foods, or substances. Tell your doctor about the allergy and what signs you had. If you have ever had pancreatitis. If you have or have ever had depression or thoughts of suicide. If you are using another drug that has the same drug in it. If you are using another drug like this one. If you are not sure, ask your doctor or pharmacist.   If using for high blood sugar: If you have type 1 diabetes. Do not use this drug to treat type 1 diabetes. This is not a list of all drugs or health problems that interact with this drug.    Tell your doctor and pharmacist about all of your drugs (prescription or OTC, natural products, vitamins) and health problems. You must check to make sure that it is safe for you to take this drug with all of your drugs and health problems. Do not start, stop, or change the dose of any drug without checking with your doctor. What are some things I need to know or do while I take this drug? For all uses of this drug:   Tell all of your health care providers that you take this drug. This includes your doctors, nurses, pharmacists, and dentists. Follow the diet and workout plan that your doctor told you about. Have blood work checked as you have been told by the doctor. Talk with the doctor. Talk with your doctor before you drink alcohol. Kidney problems have happened. Sometimes, these may need to be treated in the hospital or with dialysis. If you cannot drink liquids by mouth or if you have upset stomach, throwing up, or diarrhea that does not go away; you need to avoid getting dehydrated. Contact your doctor to find out what to do. Dehydration may lead to new or worse kidney problems. Do not share pen or cartridge devices with another person even if the needle has been changed. Sharing these devices may pass infections from one person to another. This includes infections you may not know you have. If you are planning on getting pregnant, talk with your doctor. You may need to stop taking this drug at least 2 months before getting pregnant. If using for high blood sugar:   Wear disease medical alert ID (identification). Check your blood sugar as you have been told by your doctor. Do not drive if your blood sugar has been low. There is a greater chance of you having a crash. It may be harder to control blood sugar during times of stress such as fever, infection, injury, or surgery. A change in physical activity, exercise, or diet may also affect blood sugar. Tell your doctor if you are pregnant, plan on getting pregnant, or are breast-feeding.  You will need to talk about the benefits and risks to you and the baby. If using for weight loss: If you have high blood sugar (diabetes), you will need to watch your blood sugar closely. Weight loss during pregnancy may cause harm to the unborn baby. If you get pregnant while taking this drug or if you want to get pregnant, call your doctor right away. Tell your doctor if you are breast-feeding. You will need to talk about any risks to your baby. What are some side effects that I need to call my doctor about right away? WARNING/CAUTION: Even though it may be rare, some people may have very bad and sometimes deadly side effects when taking a drug. Tell your doctor or get medical help right away if you have any of the following signs or symptoms that may be related to a very bad side effect:   For all uses of this drug:   Signs of an allergic reaction, like rash; hives; itching; red, swollen, blistered, or peeling skin with or without fever; wheezing; tightness in the chest or throat; trouble breathing, swallowing, or talking; unusual hoarseness; or swelling of the mouth, face, lips, tongue, or throat. Signs of kidney problems like unable to pass urine, change in how much urine is passed, blood in the urine, or a big weight gain. Signs of gallbladder problems like pain in the upper right belly area, right shoulder area, or between the shoulder blades; change in stools; dark urine or yellow skin or eyes; or fever with chills. Very bad dizziness or passing out. A fast heartbeat. Change in eyesight. Low blood sugar can happen. The chance may be raised when this drug is used with other drugs for diabetes. Signs may be dizziness, headache, feeling sleepy or weak, shaking, fast heartbeat, confusion, hunger, or sweating. Call your doctor right away if you have any of these signs. Follow what you have been told to do for low blood sugar. This may include taking glucose tablets, liquid glucose, or some fruit juices.    Severe and sometimes deadly pancreas problems (pancreatitis) have happened with this drug. Call your doctor right away if you have severe stomach pain, severe back pain, or severe upset stomach or throwing up. If using for weight loss:   New or worse behavior or mood changes like depression or thoughts of suicide. What are some other side effects of this drug? All drugs may cause side effects. However, many people have no side effects or only have minor side effects. Call your doctor or get medical help if any of these side effects or any other side effects bother you or do not go away: If using for high blood sugar:   Constipation, diarrhea, stomach pain, upset stomach, or throwing up. Tablets:   Not hungry. If using for weight loss:   Constipation, diarrhea, stomach pain, upset stomach, or throwing up. Headache. Feeling dizzy, tired, or weak. Burping. Gas. These are not all of the side effects that may occur. If you have questions about side effects, call your doctor. Call your doctor for medical advice about side effects. You may report side effects to your national health agency. How is this drug best taken? Use this drug as ordered by your doctor. Read all information given to you. Follow all instructions closely. Tablets: Take at least 30 minutes before the first food, drink, or drugs of the day. Take with plain water only. Do not take with more than 4 ounces (120 mL) of water. Swallow whole. Do not chew, break, or crush. Keep taking this drug as you have been told by your doctor or other health care provider, even if you feel well. Prefilled syringes or pen: It is given as a shot into the fatty part of the skin on the top of the thigh, belly area, or upper arm. If you will be giving yourself the shot, your doctor or nurse will teach you how to give the shot. Take with or without food. Take the same day each week. Move the site where you give the shot with each shot.    Do not use if the solution is cloudy, leaking, or has particles. Do not use if solution changes color. Wash your hands before and after use. Keep taking this drug as you have been told by your doctor or other health care provider, even if you feel well. Throw away needles in a needle/sharp disposal box. Do not reuse needles or other items. When the box is full, follow all local rules for getting rid of it. Talk with a doctor or pharmacist if you have any questions. If using for high blood sugar:   Attach new needle before each dose. Take off the needle after each shot. Do not store this device with the needle on it. Put the cap back on after you are done using your dose. If you are also using insulin, you may inject this drug and the insulin in the same area of the body but not right next to each other. Do not mix this drug in the same syringe with insulin. If using for weight loss:   Each container is for one use only. Use right after opening. Throw away any part of the opened container after the dose is given. What do I do if I miss a dose? Tablets:   Skip the missed dose and go back to your normal time. Do not take 2 doses at the same time or extra doses. Prefilled syringes or pen: Take a missed dose as soon as you think about it and go back to your normal time. If it is less than 48 hours until your next dose, skip the missed and go back to your normal time. Do not take 2 doses within 48 hours of each other. If you miss 2 doses, call your doctor. How do I store and/or throw out this drug? Tablets:   Store in the original container at room temperature. Store in a dry place. Do not store in a bathroom. Prefilled syringes or pen:   Store unopened pens in a refrigerator. Do not freeze. Do not use if it has been frozen. Ozempic prefilled pens:   After opening, store in the refrigerator or at room temperature. Throw away any part not used after 56 days.    Protect from heat and light.   Wegovy prefilled pens: You may store unopened containers at room temperature. If you store at room temperature, throw away any part not used after 28 days. Store in the original container to protect from light. All products:   Keep all drugs in a safe place. Keep all drugs out of the reach of children and pets. Throw away unused or  drugs. Do not flush down a toilet or pour down a drain unless you are told to do so. Check with your pharmacist if you have questions about the best way to throw out drugs. There may be drug take-back programs in your area. General drug facts   If your symptoms or health problems do not get better or if they become worse, call your doctor. Do not share your drugs with others and do not take anyone else's drugs. Some drugs may have another patient information leaflet. If you have any questions about this drug, please talk with your doctor, nurse, pharmacist, or other health care provider. If you think there has been an overdose, call your poison control center or get medical care right away. Be ready to tell or show what was taken, how much, and when it happened. Last Reviewed [id number]  Consumer Information Use and Disclaimer   This generalized information is a limited summary of diagnosis, treatment, and/or medication information. It is not meant to be comprehensive and should be used as a tool to help the user understand and/or assess potential diagnostic and treatment options. It does NOT include all information about conditions, treatments, medications, side effects, or risks that may apply to a specific patient. It is not intended to be medical advice or a substitute for the medical advice, diagnosis, or treatment of a health care provider based on the health care provider's examination and assessment of a patient's specific and unique circumstances.  Patients must speak with a health care provider for complete information about their health, medical questions, and treatment options, including any risks or benefits regarding use of medications. This information does not endorse any treatments or medications as safe, effective, or approved for treating a specific patient. [employer] and its affiliates disclaim any warranty or liability relating to this information or the use thereof. The use of this information is governed by the Terms of Use, available at [employer]. com/en/know/clinical-effectiveness-terms. © 2022 [employer] and its affiliates and/or [address]. All rights reserved.

## 2022-07-21 NOTE — PROGRESS NOTES
Chief Complaint   Patient presents with    Establish Care       1. \"Have you been to the ER, urgent care clinic since your last visit? Hospitalized since your last visit? \" No    2. \"Have you seen or consulted any other health care providers outside of the [address] [place] [address] since your last visit? \" No     3. For patients aged 39-70: Has the patient had a colonoscopy / FIT/ Cologuard? No      If the patient is female:    4. For patients aged 41-77: Has the patient had a mammogram within the past 2 years? No      5. For patients aged 21-65: Has the patient had a pap smear?  No

## 2022-07-22 LAB
ALBUMIN SERPL-MCNC: 3.9 G/DL (ref 3.5–5)
ALBUMIN/GLOB SERPL: 1.3 {RATIO} (ref 1.1–2.2)
ALP SERPL-CCNC: 128 U/L (ref 45–117)
ALT SERPL-CCNC: 55 U/L (ref 12–78)
ANION GAP SERPL CALC-SCNC: 4 MMOL/L (ref 5–15)
AST SERPL-CCNC: 34 U/L (ref 15–37)
BASOPHILS # BLD: 0.1 K/UL (ref 0–0.1)
BASOPHILS NFR BLD: 1 % (ref 0–1)
BILIRUB SERPL-MCNC: 0.3 MG/DL (ref 0.2–1)
BUN SERPL-MCNC: 14 MG/DL (ref 6–20)
BUN/CREAT SERPL: 15 (ref 12–20)
CALCIUM SERPL-MCNC: 9.4 MG/DL (ref 8.5–10.1)
CHLORIDE SERPL-SCNC: 108 MMOL/L (ref 97–108)
CHOLEST SERPL-MCNC: 212 MG/DL
CO2 SERPL-SCNC: 27 MMOL/L (ref 21–32)
CREAT SERPL-MCNC: 0.92 MG/DL (ref 0.7–1.3)
CREAT UR-MCNC: 55.4 MG/DL
DIFFERENTIAL METHOD BLD: NORMAL
EOSINOPHIL # BLD: 0.1 K/UL (ref 0–0.4)
EOSINOPHIL NFR BLD: 2 % (ref 0–7)
ERYTHROCYTE [DISTWIDTH] IN BLOOD BY AUTOMATED COUNT: 13.7 % (ref 11.5–14.5)
EST. AVERAGE GLUCOSE BLD GHB EST-MCNC: 114 MG/DL
GLOBULIN SER CALC-MCNC: 2.9 G/DL (ref 2–4)
GLUCOSE SERPL-MCNC: 89 MG/DL (ref 65–100)
HBA1C MFR BLD: 5.6 % (ref 4–5.6)
HCT VFR BLD AUTO: 50.2 % (ref 36.6–50.3)
HCV AB SERPL QL IA: NONREACTIVE
HDLC SERPL-MCNC: 39 MG/DL
HDLC SERPL: 5.4 {RATIO} (ref 0–5)
HGB BLD-MCNC: 16.5 G/DL (ref 12.1–17)
IMM GRANULOCYTES # BLD AUTO: 0 K/UL (ref 0–0.04)
IMM GRANULOCYTES NFR BLD AUTO: 0 % (ref 0–0.5)
LDLC SERPL CALC-MCNC: 106.2 MG/DL (ref 0–100)
LYMPHOCYTES # BLD: 1.2 K/UL (ref 0.8–3.5)
LYMPHOCYTES NFR BLD: 17 % (ref 12–49)
MCH RBC QN AUTO: 29.2 PG (ref 26–34)
MCHC RBC AUTO-ENTMCNC: 32.9 G/DL (ref 30–36.5)
MCV RBC AUTO: 88.8 FL (ref 80–99)
MICROALBUMIN UR-MCNC: 0.53 MG/DL
MICROALBUMIN/CREAT UR-RTO: 10 MG/G (ref 0–30)
MONOCYTES # BLD: 0.6 K/UL (ref 0–1)
MONOCYTES NFR BLD: 9 % (ref 5–13)
NEUTS SEG # BLD: 5.2 K/UL (ref 1.8–8)
NEUTS SEG NFR BLD: 71 % (ref 32–75)
NRBC # BLD: 0 K/UL (ref 0–0.01)
NRBC BLD-RTO: 0 PER 100 WBC
PLATELET # BLD AUTO: 214 K/UL (ref 150–400)
PMV BLD AUTO: 12.6 FL (ref 8.9–12.9)
POTASSIUM SERPL-SCNC: 4.5 MMOL/L (ref 3.5–5.1)
PROT SERPL-MCNC: 6.8 G/DL (ref 6.4–8.2)
RBC # BLD AUTO: 5.65 M/UL (ref 4.1–5.7)
SODIUM SERPL-SCNC: 139 MMOL/L (ref 136–145)
TRIGL SERPL-MCNC: 334 MG/DL (ref ?–150)
VLDLC SERPL CALC-MCNC: 66.8 MG/DL
WBC # BLD AUTO: 7.3 K/UL (ref 4.1–11.1)

## 2023-01-26 NOTE — PROGRESS NOTES
Problem: Falls - Risk of  Goal: *Absence of Falls  Description: Document Leonarda Dong Fall Risk and appropriate interventions in the flowsheet. Outcome: Progressing Towards Goal  Note: Fall Risk Interventions:            Medication Interventions: Bed/chair exit alarm                   Problem: Patient Education: Go to Patient Education Activity  Goal: Patient/Family Education  Outcome: Progressing Towards Goal     Problem: Breathing Pattern - Ineffective  Goal: *Absence of hypoxia  Outcome: Progressing Towards Goal  Goal: *Use of effective breathing techniques  Outcome: Progressing Towards Goal     Problem: Airway Clearance - Ineffective  Goal: Achieve or maintain patent airway  Outcome: Progressing Towards Goal     Problem: Gas Exchange - Impaired  Goal: Absence of hypoxia  Outcome: Progressing Towards Goal  Goal: Promote optimal lung function  Outcome: Progressing Towards Goal     Problem: Breathing Pattern - Ineffective  Goal: Ability to achieve and maintain a regular respiratory rate  Outcome: Progressing Towards Goal     Problem:  Body Temperature -  Risk of, Imbalanced  Goal: Ability to maintain a body temperature within defined limits  Outcome: Progressing Towards Goal  Goal: Will regain or maintain usual level of consciousness  Outcome: Progressing Towards Goal  Goal: Complications related to the disease process, condition or treatment will be avoided or minimized  Outcome: Progressing Towards Goal     Problem: Isolation Precautions - Risk of Spread of Infection  Goal: Prevent transmission of infectious organism to others  Outcome: Progressing Towards Goal     Problem: Nutrition Deficits  Goal: Optimize nutrtional status  Outcome: Progressing Towards Goal     Problem: Risk for Fluid Volume Deficit  Goal: Maintain normal heart rhythm  Outcome: Progressing Towards Goal  Goal: Maintain absence of muscle cramping  Outcome: Progressing Towards Goal  Goal: Maintain normal serum potassium, sodium, calcium, phosphorus, Spoke with patient and let her know that I need Jury letter   and pH  Outcome: Progressing Towards Goal     Problem: Loneliness or Risk for Loneliness  Goal: Demonstrate positive use of time alone when socialization is not possible  Outcome: Progressing Towards Goal     Problem: Fatigue  Goal: Verbalize increase energy and improved vitality  Outcome: Progressing Towards Goal     Problem: Patient Education: Go to Patient Education Activity  Goal: Patient/Family Education  Outcome: Progressing Towards Goal

## 2023-01-31 ENCOUNTER — OFFICE VISIT (OUTPATIENT)
Dept: INTERNAL MEDICINE CLINIC | Age: 58
End: 2023-01-31
Payer: COMMERCIAL

## 2023-01-31 VITALS
DIASTOLIC BLOOD PRESSURE: 86 MMHG | RESPIRATION RATE: 20 BRPM | HEART RATE: 89 BPM | WEIGHT: 205.3 LBS | HEIGHT: 68 IN | OXYGEN SATURATION: 96 % | SYSTOLIC BLOOD PRESSURE: 142 MMHG | BODY MASS INDEX: 31.11 KG/M2

## 2023-01-31 DIAGNOSIS — L91.8 ACROCHORDON: ICD-10-CM

## 2023-01-31 DIAGNOSIS — N40.1 BENIGN PROSTATIC HYPERPLASIA WITH URINARY HESITANCY: ICD-10-CM

## 2023-01-31 DIAGNOSIS — Z00.00 ROUTINE ADULT HEALTH MAINTENANCE: Primary | ICD-10-CM

## 2023-01-31 DIAGNOSIS — E66.9 OBESITY (BMI 30-39.9): ICD-10-CM

## 2023-01-31 DIAGNOSIS — Z86.16 HISTORY OF COVID-19: ICD-10-CM

## 2023-01-31 DIAGNOSIS — J84.10 PULMONARY FIBROSIS (HCC): ICD-10-CM

## 2023-01-31 DIAGNOSIS — G47.33 OBSTRUCTIVE SLEEP APNEA: ICD-10-CM

## 2023-01-31 DIAGNOSIS — I10 PRIMARY HYPERTENSION: ICD-10-CM

## 2023-01-31 DIAGNOSIS — E78.00 HYPERCHOLESTEROLEMIA: ICD-10-CM

## 2023-01-31 DIAGNOSIS — R39.11 BENIGN PROSTATIC HYPERPLASIA WITH URINARY HESITANCY: ICD-10-CM

## 2023-01-31 PROCEDURE — 99214 OFFICE O/P EST MOD 30 MIN: CPT | Performed by: INTERNAL MEDICINE

## 2023-01-31 PROCEDURE — 3077F SYST BP >= 140 MM HG: CPT | Performed by: INTERNAL MEDICINE

## 2023-01-31 PROCEDURE — 3079F DIAST BP 80-89 MM HG: CPT | Performed by: INTERNAL MEDICINE

## 2023-01-31 RX ORDER — TAMSULOSIN HYDROCHLORIDE 0.4 MG/1
0.4 CAPSULE ORAL DAILY
Qty: 30 CAPSULE | Refills: 2 | Status: SHIPPED | OUTPATIENT
Start: 2023-01-31

## 2023-01-31 NOTE — PROGRESS NOTES
ICD-10-CM ICD-9-CM    1. Routine adult health maintenance  Z00.00 V70.0       2. Pulmonary fibrosis ([facility] [address]  J84.10 515       3. Primary hypertension  I10 401.9       4. History of COVID-19  Z86.16 V12.09       5. Hypercholesterolemia  E78.00 272.0 LIPID PANEL      6. Obesity (BMI 30-39. 9)  E66.9 278.00       7. Acrochordon  L91.8 701.9 REFERRAL TO DERMATOLOGY      8. Benign prostatic hyperplasia with urinary hesitancy  N40.1 600.01 PSA, DIAGNOSTIC (PROSTATE SPECIFIC AG)    R39.11 788.64 URINALYSIS W/ RFLX MICROSCOPIC      CULTURE, URINE      tamsulosin (Flomax) 0.4 mg capsule      9. Obstructive sleep apnea  G47.33 327.23                Subjective:     Chief Complaint   Patient presents with    Follow-up       Matthew Zamora is a 65 y.o. M.  he  has a past medical history of BPH (benign prostatic hyperplasia) (01/2023), GERD (gastroesophageal reflux disease), Hepatic steatosis, History of DVT (deep vein thrombosis) (2019), History of echocardiogram (03/2021), Hypertension, Obesity (BMI 30-39.9) (05/08/2019), Obstructive sleep apnea, Personal history of COVID-19 (01/2021), Prediabetes, and Pulmonary fibrosis ([facility] (01/2022). his last appointment in this clinic was 7/21/2022 . I reviewed and updated the medical record. When I saw this patient most recently in July, he was feeling generally fine. He was noted to be completing a course of cardiac rehabilitation and pulmonary rehabilitation for his history of pulmonary fibrosis, which was felt to be secondary to his history of COVID-19. He was feeling generally fine at the time otherwise, with relatively improved breathing with use of Symbicort. He was otherwise continued on his usual medication regimen. Laboratory studies demonstrated hypercholesterolemia. Today, the patient comes in for routine follow-up. In general, he reports feeling fine but does have a few somatic complaints today.     His first complaint relates to numerous acrochordons on his body.  He has had skin tags across his body and across his face, which frequently become irritated and inflamed. He requests referral to dermatology to have these removed. His other complaint relates to incomplete urinary emptying, and urinary frequency. He says that he has had some difficulty starting a stream over the past several months, and says that this is otherwise been somewhat progressive during this time. He denies having had any dysuria, or hematuria, flank pain, or abdominal pain. No fevers or chills. No weight changes. He is not currently taking any medications for this. He has not seen a urologist in the past.  No prior history of prostate problems. Finally, he complains that he continues to feel sluggish in the day and have daytime fatigue. Of note, the patient is noted to have a history of obstructive sleep apnea and had been strongly advised in the past by other providers to begin CPAP therapy but he had refused this. He inquires about the possibility of testosterone therapy today. He denies any recent chest pain, shortness of breath, or any further cardiopulmonary symptoms. Otherwise, he continues on amlodipine 5 mg daily for his blood pressure, which she reports tolerating well without lightheadedness or dizziness or lower extremity edema. No recent orthopnea, paroxysmal nocturnal dyspnea or other significant symptoms. He has hypercholesterolemia as noted. He is not currently taking any medications for this. His ASCVD risk today is calculated at approximately 10% or so. He is precontemplative regarding initiation of medications, but notes that he has had high cholesterol at other times on his work wellness checks. He otherwise continues on Symbicort for his history of pulmonary fibrosis and continues to follow with a pulmonary specialist for this. His work of breathing has overall been stable. His review of systems is otherwise negative.     Routine Healthcare Maintenance issues are reviewed and discussed with the patient as noted below. Orders to update gaps in healthcare maintenance were placed as noted below in the Assessment and Plan, where applicable. 10-year Cardiovascular Risk Nguyenlobo Medina, 2013): The 10-year ASCVD risk score (Carmen BYNUM, et al., 2019) is: 11.2%    Values used to calculate the score:      Age: 65 years      Sex: Male      Is Non- : No      Diabetic: No      Tobacco smoker: No      Systolic Blood Pressure: 994 mmHg      Is BP treated: Yes      HDL Cholesterol: 39 MG/DL      Total Cholesterol: 212 MG/DL     Past Medical History:  Past Medical History:   Diagnosis Date    BPH (benign prostatic hyperplasia) 01/2023    * PROVISIONAL DIAGNOSIS * trial Flomax    GERD (gastroesophageal reflux disease)     Hepatic steatosis     History of DVT (deep vein thrombosis) 2019    Right leg DVT ([facility] [address] [I82.401]    History of echocardiogram 03/2021    LV: Calculated LVEF is 73%. Normal cavity size, systolic function (ejection fraction normal) and diastolic function. Moderate concentric hypertrophy. Wall motion: normal. Normal left ventricular strain. Hypertension     Obesity (BMI 30-39.9) 05/08/2019    Obstructive sleep apnea     Personal history of COVID-19 01/2021    Prediabetes     Pulmonary fibrosis ([facility] [address] 01/2022 2/2 COVID-19; no transplant indicated       Past Surgical Histor:  Past Surgical History:   Procedure Laterality Date    HX ORTHOPAEDIC         Allergies:  No Known Allergies    Medications:  Current Outpatient Medications   Medication Sig Dispense Refill    tamsulosin (Flomax) 0.4 mg capsule Take 1 Capsule by mouth daily. 30 Capsule 2    melatonin 3 mg tablet Take 3 mg by mouth. Take 3 at night      budesonide-formoteroL (SYMBICORT) 160-4.5 mcg/actuation HFAA Take 2 Puffs by inhalation two (2) times a day. amLODIPine (NORVASC) 5 mg tablet Take 5 mg by mouth daily.       aspirin delayed-release 81 mg tablet Take 81 mg by mouth daily. albuterol (PROVENTIL HFA, VENTOLIN HFA, PROAIR HFA) 90 mcg/actuation inhaler Take 2 Puffs by inhalation every four (4) hours as needed for Wheezing. 1 Inhaler 1    multivitamin (ONE A DAY) tablet Take 1 Tab by mouth daily. cholecalciferol (VITAMIN D3) 25 mcg (1,000 unit) cap Take 1,000 Units by mouth daily. ascorbic acid, vitamin C, (VITAMIN C) 500 mg tablet Take 500 mg by mouth daily.          Social History:  Social History     Socioeconomic History    Marital status: SINGLE    Years of education: 15    Highest education level: Some college, no degree   Tobacco Use    Smoking status: Never    Smokeless tobacco: Former     Quit date: 01/2021   Vaping Use    Vaping Use: Never used   Substance and Sexual Activity    Alcohol use: Yes     Comment: Socially     Drug use: Never   Other Topics Concern     Service No    Blood Transfusions No    Caffeine Concern No    Occupational Exposure No    Hobby Hazards No    Sleep Concern No    Stress Concern Yes     Comment: Lung disease    Weight Concern No    Special Diet No    Back Care No    Exercise Yes     Comment: recumbent bike    Bike Helmet Yes    Seat Belt Yes    Self-Exams Yes       Family History:  Family History   Problem Relation Age of Onset    Cancer Mother     Stroke Mother     Hypertension Mother     Cancer Father     Hypertension Father     Hypertension Brother        Immunizations:  Immunization History   Administered Date(s) Administered    Influenza Vaccine 11/01/2021, 11/03/2022    Influenza, FLUARIX, FLULAVAL, FLUZONE (age 10 mo+) AND AFLURIA, (age 1 y+), PF, 0.5mL 02/17/2021    Zoster Recombinant 07/28/2022        Healthcare Maintenance:  Health Maintenance   Topic Date Due    COVID-19 Vaccine (1) Never done    Pneumococcal 0-64 years (1 - PCV) Never done    DTaP/Tdap/Td series (1 - Tdap) Never done    Shingles Vaccine (2 of 2) 09/22/2022    Depression Screen  07/21/2023    Lipid Screen  07/21/2027    Colorectal Cancer Screening Combo  07/31/2027    Hepatitis C Screening  Completed    Flu Vaccine  Completed        Review of Systems:  ROS:  Review of Systems   Constitutional:  Positive for malaise/fatigue. HENT: Negative. Eyes: Negative. Respiratory: Negative. Cardiovascular: Negative. Gastrointestinal: Negative. Genitourinary:  Positive for frequency and urgency. Negative for dysuria, flank pain and hematuria. Musculoskeletal: Negative. Skin: Negative. Neurological: Negative. Endo/Heme/Allergies: Negative. Psychiatric/Behavioral: Negative. ROS otherwise negative      Objective:     Vital Signs:  Visit Vitals  BP (!) 142/86 (BP 1 Location: Left upper arm, BP Patient Position: Sitting, BP Cuff Size: Large adult)   Pulse 89   Resp 20   Ht 5' 8\" (1.727 m)   Wt 205 lb 4.8 oz (93.1 kg)   SpO2 96%   BMI 31.22 kg/m²       BMI:  Body mass index is 31.22 kg/m². Physical Examination:  Physical Exam  Vitals reviewed. Constitutional:       Appearance: Normal appearance. He is obese. HENT:      Head: Normocephalic and atraumatic. Nose: Nose normal.      Mouth/Throat:      Mouth: Mucous membranes are moist.   Eyes:      Extraocular Movements: Extraocular movements intact. Conjunctiva/sclera: Conjunctivae normal.      Pupils: Pupils are equal, round, and reactive to light. Cardiovascular:      Rate and Rhythm: Normal rate and regular rhythm. Pulses: Normal pulses. Heart sounds: Normal heart sounds. No murmur heard. No friction rub. No gallop. Pulmonary:      Effort: Pulmonary effort is normal. No respiratory distress. Breath sounds: Normal breath sounds. No wheezing, rhonchi or rales. Comments: Bibasilar crackles  Abdominal:      General: Bowel sounds are normal. There is no distension. Palpations: Abdomen is soft. There is no mass. Tenderness: There is no abdominal tenderness. There is no guarding or rebound.    Musculoskeletal:         General: No tenderness, deformity or signs of injury. Normal range of motion. Cervical back: Normal range of motion and neck supple. Right lower leg: No edema. Left lower leg: No edema. Skin:     General: Skin is warm and dry. Findings: No bruising, lesion or rash. Neurological:      General: No focal deficit present. Mental Status: He is alert and oriented to person, place, and time. Mental status is at baseline. Cranial Nerves: No cranial nerve deficit. Sensory: No sensory deficit. Motor: No weakness. Coordination: Coordination normal.      Gait: Gait normal.      Deep Tendon Reflexes: Reflexes normal.   Psychiatric:         Mood and Affect: Mood normal.         Behavior: Behavior normal.        Physical exam otherwise negative    Diagnostic Testing:    Laboratory Studies:  No visits with results within 6 Month(s) from this visit. Latest known visit with results is:   Office Visit on 07/21/2022   Component Date Value Ref Range Status    Hep C virus Ab Interp. 07/21/2022 NONREACTIVE  NONREACTIVE   Final    Method used is Siemens [employer]    Cholesterol, total 07/21/2022 212 (A)  <200 MG/DL Final    Triglyceride 07/21/2022 334 (A)  <150 MG/DL Final    Based on NCEP-ATP III:  Triglycerides <150 mg/dL  is considered normal, 150-199 mg/dL  borderline high,  200-499 mg/dL high and  greater than or equal to 500 mg/dL very high. HDL Cholesterol 07/21/2022 39  MG/DL Final    Based on NCEP ATP III, HDL Cholesterol <40 mg/dL is considered low and >60 mg/dL is elevated.     LDL, calculated 07/21/2022 106.2 (A)  0 - 100 MG/DL Final    Comment: Based on the NCEP-ATP: LDL-C concentrations are considered  optimal <100 mg/dL,  near optimal/above Normal 100-129 mg/dL  Borderline High: 130-159, High: 160-189 mg/dL  Very High: Greater than or equal to 190 mg/dL      VLDL, calculated 07/21/2022 66.8  MG/DL Final    CHOL/HDL Ratio 07/21/2022 5.4 (A)  0.0 - 5.0   Final    Sodium 07/21/2022 139  136 - 145 mmol/L Final    Potassium 07/21/2022 4.5  3.5 - 5.1 mmol/L Final    Chloride 07/21/2022 108  97 - 108 mmol/L Final    CO2 07/21/2022 27  21 - 32 mmol/L Final    Anion gap 07/21/2022 4 (A)  5 - 15 mmol/L Final    Glucose 07/21/2022 89  65 - 100 mg/dL Final    BUN 07/21/2022 14  6 - 20 MG/DL Final    Creatinine 07/21/2022 0.92  0.70 - 1.30 MG/DL Final    BUN/Creatinine ratio 07/21/2022 15  12 - 20   Final    GFR est AA 07/21/2022 >60  >60 ml/min/1.73m2 Final    GFR est non-AA 07/21/2022 >60  >60 ml/min/1.73m2 Final    Estimated GFR is calculated using the IDMS-traceable Modification of Diet in Renal Disease (MDRD) Study equation, reported for both  Americans (GFRAA) and non- Americans (GFRNA), and normalized to 1.73m2 body surface area. The physician must decide which value applies to the patient. Calcium 07/21/2022 9.4  8.5 - 10.1 MG/DL Final    Bilirubin, total 07/21/2022 0.3  0.2 - 1.0 MG/DL Final    ALT (SGPT) 07/21/2022 55  12 - 78 U/L Final    AST (SGOT) 07/21/2022 34  15 - 37 U/L Final    Alk.  phosphatase 07/21/2022 128 (A)  45 - 117 U/L Final    Protein, total 07/21/2022 6.8  6.4 - 8.2 g/dL Final    Albumin 07/21/2022 3.9  3.5 - 5.0 g/dL Final    Globulin 07/21/2022 2.9  2.0 - 4.0 g/dL Final    A-G Ratio 07/21/2022 1.3  1.1 - 2.2   Final    WBC 07/21/2022 7.3  4.1 - 11.1 K/uL Final    RBC 07/21/2022 5.65  4.10 - 5.70 M/uL Final    HGB 07/21/2022 16.5  12.1 - 17.0 g/dL Final    HCT 07/21/2022 50.2  36.6 - 50.3 % Final    MCV 07/21/2022 88.8  80.0 - 99.0 FL Final    MCH 07/21/2022 29.2  26.0 - 34.0 PG Final    MCHC 07/21/2022 32.9  30.0 - 36.5 g/dL Final    RDW 07/21/2022 13.7  11.5 - 14.5 % Final    PLATELET 27/11/4712 151  150 - 400 K/uL Final    MPV 07/21/2022 12.6  8.9 - 12.9 FL Final    NRBC 07/21/2022 0.0  0  WBC Final    ABSOLUTE NRBC 07/21/2022 0.00  0.00 - 0.01 K/uL Final    NEUTROPHILS 07/21/2022 71  32 - 75 % Final    LYMPHOCYTES 07/21/2022 17  12 - 49 % Final MONOCYTES 07/21/2022 9  5 - 13 % Final    EOSINOPHILS 07/21/2022 2  0 - 7 % Final    BASOPHILS 07/21/2022 1  0 - 1 % Final    IMMATURE GRANULOCYTES 07/21/2022 0  0.0 - 0.5 % Final    ABS. NEUTROPHILS 07/21/2022 5.2  1.8 - 8.0 K/UL Final    ABS. LYMPHOCYTES 07/21/2022 1.2  0.8 - 3.5 K/UL Final    ABS. MONOCYTES 07/21/2022 0.6  0.0 - 1.0 K/UL Final    ABS. EOSINOPHILS 07/21/2022 0.1  0.0 - 0.4 K/UL Final    ABS. BASOPHILS 07/21/2022 0.1  0.0 - 0.1 K/UL Final    ABS. IMM. GRANS. 07/21/2022 0.0  0.00 - 0.04 K/UL Final    DF 07/21/2022 AUTOMATED    Final    Hemoglobin A1c 07/21/2022 5.6  4.0 - 5.6 % Final    Comment: NEW METHOD  PLEASE NOTE NEW REFERENCE RANGE  (NOTE)  HbA1C Interpretive Ranges  <5.7              Normal  5.7 - 6.4         Consider Prediabetes  >6.5              Consider Diabetes      Est. average glucose 07/21/2022 114  mg/dL Final    Microalbumin,urine random 07/22/2022 0.53  MG/DL Final    No reference range has been established. Creatinine, urine random 07/22/2022 55.40  mg/dL Final    No reference range has been established. Microalbumin/Creat ratio (mg/g cre* 07/22/2022 10  0 - 30 mg/g Final         Radiographic Studies:  XR Results (most recent):  Results from [place] encounter on 04/23/21    XR CHEST PA LAT    Narrative  Exam:  2 view chest    Indication: Covid 19. COMPARISON: 3/17/2021    PA and lateral views demonstrate normal heart size. The lung volumes continue to  be low. There is minimal improved aeration. There are bilateral coarse  bronchovascular markings and probable areas of postinflammatory change and  fibrosis in the mid lower lung zones bilaterally left greater than right. These  fairly severe postinflammatory changes demonstrate no significant interval  change. Impression  1. Persistent low lung volumes with minimal improved aeration. The course  interstitial opacities consistent with postinflammatory change and presumed  fibrosis is unchanged.      JULIOCESAR Results (most recent):  No results found for this or any previous visit. CT Results (most recent):  Results from Hospital Encounter encounter on 06/21/21    CT CHEST WO CONT    Narrative  HIGH-RESOLUTION CT CHEST WITHOUT CONTRAST. 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 Severe restrictive lung disease. Ongoing  dyspnea with exertion, nonproductive cough. 3 L oxygen at rest and 4-5 L with  exertion. Admission for coronal pneumonia pneumonia in January 2021. Left upper  extremity thrombosis. COMPARISON: 3/9/2021, 3/2/2021, 1/18/2021. TECHNIQUE: CT of the chest was performed without contrast. Supine and prone  inspiration, and supine expiration images were obtained. Coronal and sagittal  reconstructions were performed. CT dose reduction was achieved through use of a  standardized protocol tailored for this examination and automatic exposure  control for dose modulation. FINDINGS:  Diffuse septal thickening, volume loss, and traction bronchiolectasis in the  areas of prior COVID-19 pneumonia is consistent with post-COVID-19 fibrosis. This is moderate to severe in the left lower lobe; moderate in the right lower  lobe, right middle lobe, and lingula; and more mild in the subpleural upper  lobes. The background pulmonary parenchyma is normal in the areas spared by  COVID-19 pneumonia on 1/18/2021. The main pulmonary artery is enlarged (30 x 34  mm), consistent with pulmonary artery hypertension. No air trapping on expiratory images. Fibrosis persists on prone inspiratory  images. The central airways are patent. No pneumothorax or pleural effusion. The  heart is at the upper limits of normal in size. No thoracic lymphadenopathy. Hepatic steatosis is mild. The visualized unenhanced upper abdomen is otherwise  normal.    Impression  1. 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 2. Moderate, diffuse, pulmonary fibrosis, with a basilar distribution. 3. Pulmonary artery hypertension.   4. Mild hepatic steatosis. DEXA Results (most recent):  No results found for this or any previous visit. MRI Results (most recent):  No results found for this or any previous visit. Assessment/Plan:       ICD-10-CM ICD-9-CM    1. Routine adult health maintenance  Z00.00 V70.0       2. Pulmonary fibrosis ([facility] [address]  J84.10 515       3. Primary hypertension  I10 401.9       4. History of COVID-19  Z86.16 V12.09       5. Hypercholesterolemia  E78.00 272.0 LIPID PANEL      6. Obesity (BMI 30-39. 9)  E66.9 278.00       7. Acrochordon  L91.8 701.9 REFERRAL TO DERMATOLOGY      8. Benign prostatic hyperplasia with urinary hesitancy  N40.1 600.01 PSA, DIAGNOSTIC (PROSTATE SPECIFIC AG)    R39.11 788.64 URINALYSIS W/ RFLX MICROSCOPIC      CULTURE, URINE      tamsulosin (Flomax) 0.4 mg capsule      9. Obstructive sleep apnea  G47.33 327.23                  Follow-up and Dispositions    Return in about 6 months (around 7/31/2023). Pulmonary Fibrosis:   - Related to COVID infection previously. On Symbicort and PRN MAI. Minimally symptomatic currently, did have recent lower respiratory tract infection treated with antibiotics from [facility].    - Following with Pulmonary. Repeat PFTs pending with Dr. Lucinda Collins soon per patient.    - CCM. Essential Hypertension/Blood Pressure Management:   - Home BP Readings: not doing   - Current Control: stage 1   - Target BP: <130/80 mmHg   - Relevant BP Meds:  Key CAD CHF Meds               amLODIPine (NORVASC) 5 mg tablet (Taking) Take 5 mg by mouth daily.     aspirin delayed-release 81 mg tablet (Taking) Take 81 mg by mouth daily.             - Plan: continue current treatment regimen, continue current meds, dietary sodium restriction, regular aerobic exercise, weight loss   - Notes: Strongly advised patient to consider CPAP for uncontrolled OSAS    Hyperlipidemia/Dyslipidemia:   - Summary of Cardiovascular Risks and Goals:     LDL goal is under 100  hypertension  hyperlipidemia  former smoker  obese  [place] 10-year risk 10-20%   -   Lab Results   Component Value Date/Time    LDL, calculated 106.2 (H) 07/21/2022 03:29 PM    HDL Cholesterol 39 07/21/2022 03:29 PM       - Relevant Cholesterol Meds:  Key Antihyperlipidemia Meds       The patient is on no antihyperlipidemia meds. - Cholesterol at target: no   - Does patient meet USPSTF and ACC/AHA indications for pharmacotherapy (e.g., statin): yes   - GI symptoms with meds: N/A   - Muscle aches with meds: N/A   - Other Adverse effects with meds: N/A   - Medication Plan:  repeat LDL, consider tx for ASCVD risk > 10%   - Notes: Information regarding atorvastatin is provided to the patient today in the after visit summary    BPH:     - IPSS: 15 (moderate symptoms)   - Checking PSA, UA, UCX, consider Urology referral   - Trial of Flomax provided; discussed RBAI with patient, Jitendra Mccall        I have reviewed the patient's medical history in detail and updated the computerized patient record. We had a prolonged discussion about these complex clinical issues and went over the various important aspects to consider. All questions were answered. Advised the patient to call back or return to office if symptoms do not improve, change in nature, or persist.     The patient was given an after visit summary or informed of [employer] which includes patient instructions, diagnoses, current medications, & vitals. he expressed understanding with the diagnosis and plan. Darryle Ginger, MD    Please note that this dictation was completed with [employer], the computer voice recognition software. Quite often unanticipated grammatical, syntax, homophones, and other interpretive errors are inadvertently transcribed by the computer software. Please disregard these errors. Please excuse any errors that have escaped final proofreading.

## 2023-01-31 NOTE — PROGRESS NOTES
Chief Complaint   Patient presents with    Follow-up   Visit Vitals  BP (!) 142/86 (BP 1 Location: Left upper arm, BP Patient Position: Sitting, BP Cuff Size: Large adult)   Pulse 89   Resp 20   Ht 5' 8\" (1.727 m)   Wt 205 lb 4.8 oz (93.1 kg)   SpO2 96%   BMI 31.22 kg/m²         1. \"Have you been to the ER, urgent care clinic since your last visit? Hospitalized since your last visit? \" [facility] for pneumonia     2. \"Have you seen or consulted any other health care providers outside of the [facility] since your last visit? \" No     3. For patients aged 39-70: Has the patient had a colonoscopy / FIT/ Cologuard? No      If the patient is female:    4. For patients aged 41-77: Has the patient had a mammogram within the past 2 years? No      5. For patients aged 21-65: Has the patient had a pap smear?  No

## 2023-01-31 NOTE — PATIENT INSTRUCTIONS
Learning About the [facility] Diet  What is the Mediterranean diet? The Mediterranean diet is a style of eating rather than a diet plan. It features foods eaten in [place], [place], [place] and [place], and other countries along the [facility]. It emphasizes eating foods like fish, fruits, vegetables, beans, high-fiber breads and whole grains, nuts, and olive oil. This style of eating includes limited red meat, cheese, and sweets. Why choose the Mediterranean diet? A Mediterranean-style diet may improve heart health. It contains more fat than other heart-healthy diets. But the fats are mainly from nuts, unsaturated oils (such as fish oils and olive oil), and certain nut or seed oils (such as canola, soybean, or flaxseed oil). These fats may help protect the heart and blood vessels. How can you get started on the Mediterranean diet? Here are some things you can do to switch to a more Mediterranean way of eating. What to eat  Eat a variety of fruits and vegetables each day, such as grapes, blueberries, tomatoes, broccoli, peppers, figs, olives, spinach, eggplant, beans, lentils, and chickpeas. Eat a variety of whole-grain foods each day, such as oats, brown rice, and whole wheat bread, pasta, and couscous. Eat fish at least 2 times a week. Try tuna, salmon, mackerel, lake trout, herring, or sardines. Eat moderate amounts of low-fat dairy products, such as milk, cheese, or yogurt. Eat moderate amounts of poultry and eggs. Choose healthy (unsaturated) fats, such as nuts, olive oil, and certain nut or seed oils like canola, soybean, and flaxseed. Limit unhealthy (saturated) fats, such as butter, palm oil, and coconut oil. And limit fats found in animal products, such as meat and dairy products made with whole milk. Try to eat red meat only a few times a month in very small amounts. Limit sweets and desserts to only a few times a week. This includes sugar-sweetened drinks like soda.   The Mediterranean diet may also include red wine with your meal--1 glass each day for women and up to 2 glasses a day for men. Tips for eating at home  Use herbs, spices, garlic, lemon zest, and citrus juice instead of salt to add flavor to foods. Add avocado slices to your sandwich instead of richardson. Have fish for lunch or dinner instead of red meat. Brush the fish with olive oil, and broil or grill it. Sprinkle your salad with seeds or nuts instead of cheese. Cook with olive or canola oil instead of butter or oils that are high in saturated fat. Switch from 2% milk or whole milk to 1% or fat-free milk. Dip raw vegetables in a vinaigrette dressing or hummus instead of dips made from mayonnaise or sour cream.  Have a piece of fruit for dessert instead of a piece of cake. Try baked apples, or have some dried fruit. Tips for eating out  Try broiled, grilled, baked, or poached fish instead of having it fried or breaded. Ask your  to have your meals prepared with olive oil instead of butter. Order dishes made with marinara sauce or sauces made from olive oil. Avoid sauces made from cream or mayonnaise. Choose whole-grain breads, whole wheat pasta and pizza crust, brown rice, beans, and lentils. Cut back on butter or margarine on bread. Instead, you can dip your bread in a small amount of olive oil. Ask for a side salad or grilled vegetables instead of french fries or chips. Where can you learn more? Go to http://www.mcneill.com/  Enter O407 in the search box to learn more about \"Learning About the Mediterranean Diet. \"  Current as of: May 9, 2022               Content Version: 13.4  © 1396-1637 Healthwise, Incorporated. Care instructions adapted under license by [employer] (which disclaims liability or warranty for this information).  If you have questions about a medical condition or this instruction, always ask your healthcare professional. Wilbur Benedict disclaims any warranty or liability for your use of this information. Atorvastatin (By mouth)   Atorvastatin (a-tor-va-STAT-in)  Treats high cholesterol and triglyceride levels. Reduces the risk of angina, stroke, heart attack, or certain heart and blood vessel problems. This medicine is a statin. Brand Name(s): Lipitor   There may be other brand names for this medicine. When This Medicine Should Not Be Used: This medicine is not right for everyone. Do not use it if you had an allergic reaction to atorvastatin, if you have active liver disease, or if you are pregnant or breastfeeding. How to Use This Medicine:   Tablet  Take your medicine as directed. Your dose may need to be changed several times to find what works best for you. Take this medicine at the same time each day. Swallow the tablet whole. Do not break it. Missed dose: Take a dose as soon as you remember. If it is less than 12 hours until your next dose, skip the missed dose and take the next dose at the regular time. Do not take 2 doses of this medicine within 12 hours. Read and follow the patient instructions that come with this medicine. Talk to your doctor or pharmacist if you have any questions. Store the medicine in a closed container at room temperature, away from heat, moisture, and direct light. Drugs and Foods to Avoid:   Ask your doctor or pharmacist before using any other medicine, including over-the-counter medicines, vitamins, and herbal products. Some medicines can affect how atorvastatin works. Tell your doctor if you also use birth control pills, boceprevir, cimetidine, colchicine, cyclosporine, digoxin, niacin, rifampin, spironolactone, telaprevir, medicine to treat an infection, or medicine to treat HIV/AIDS. Warnings While Using This Medicine: It is not safe to take this medicine during pregnancy. It could harm an unborn baby. Tell your doctor right away if you become pregnant.   Tell your doctor if you have kidney disease, diabetes, muscle pain or weakness, thyroid problems, have recently had a stroke or TIA (transient ischemic attack), or have a history of liver disease. Tell your doctor if you drink grapefruit juice or drink alcohol regularly. This medicine can cause muscle problems, which can lead to kidney problems. Tell any doctor or dentist who treats you that you use this medicine. You may need to stop using it if you have surgery, have an injury, or develop serious health problems. Your doctor will do lab tests at regular visits to check on the effects of this medicine. Keep all appointments. Keep all medicine out of the reach of children. Never share your medicine with anyone. Possible Side Effects While Using This Medicine:   Call your doctor right away if you notice any of these side effects: Allergic reaction: Itching or hives, swelling in your face or hands, swelling or tingling in your mouth or throat, chest tightness, trouble breathing  Blistering, peeling, red skin rash  Change in how much or how often you urinate  Dark urine or pale stools, nausea, vomiting, loss of appetite, stomach pain, yellow skin or eyes  Fever  Muscle pain, tenderness, or weakness  Unusual tiredness  If you notice these less serious side effects, talk with your doctor:   Diarrhea  Joint pain  If you notice other side effects that you think are caused by this medicine, tell your doctor. Call your doctor for medical advice about side effects. You may report side effects to FDA at 4-095-FDA-9626  © 2017 [facility] Information is for End User's use only and may not be sold, redistributed or otherwise used for commercial purposes. The above information is an  only. It is not intended as medical advice for individual conditions or treatments. Talk to your doctor, nurse or pharmacist before following any medical regimen to see if it is safe and effective for you.

## 2023-02-08 ENCOUNTER — APPOINTMENT (OUTPATIENT)
Dept: INTERNAL MEDICINE CLINIC | Age: 58
End: 2023-02-08

## 2023-02-08 DIAGNOSIS — E78.00 HYPERCHOLESTEROLEMIA: ICD-10-CM

## 2023-02-08 DIAGNOSIS — R39.11 BENIGN PROSTATIC HYPERPLASIA WITH URINARY HESITANCY: ICD-10-CM

## 2023-02-08 DIAGNOSIS — N40.1 BENIGN PROSTATIC HYPERPLASIA WITH URINARY HESITANCY: ICD-10-CM

## 2023-02-09 LAB
APPEARANCE UR: CLEAR
BILIRUB UR QL: NEGATIVE
CHOLEST SERPL-MCNC: 186 MG/DL
COLOR UR: NORMAL
GLUCOSE UR STRIP.AUTO-MCNC: NEGATIVE MG/DL
HDLC SERPL-MCNC: 44 MG/DL
HDLC SERPL: 4.2 (ref 0–5)
HGB UR QL STRIP: NEGATIVE
KETONES UR QL STRIP.AUTO: NEGATIVE MG/DL
LDLC SERPL CALC-MCNC: 111.2 MG/DL (ref 0–100)
LEUKOCYTE ESTERASE UR QL STRIP.AUTO: NEGATIVE
NITRITE UR QL STRIP.AUTO: NEGATIVE
PH UR STRIP: 6.5 (ref 5–8)
PROT UR STRIP-MCNC: NEGATIVE MG/DL
PSA SERPL-MCNC: 1.2 NG/ML (ref 0.01–4)
SP GR UR REFRACTOMETRY: 1.01 (ref 1–1.03)
TRIGL SERPL-MCNC: 154 MG/DL (ref ?–150)
UROBILINOGEN UR QL STRIP.AUTO: 0.2 EU/DL (ref 0.2–1)
VLDLC SERPL CALC-MCNC: 30.8 MG/DL

## 2023-02-10 LAB
BACTERIA SPEC CULT: NORMAL
SERVICE CMNT-IMP: NORMAL

## 2023-02-10 NOTE — PROGRESS NOTES
Please call the patient regarding his diagnostic evaluation. Urine culture without growth; urinalysis unremarkable. Mild hypercholesterolemia, stable compared to prior.   PSA normal.

## 2023-04-29 DIAGNOSIS — R39.11 BENIGN PROSTATIC HYPERPLASIA WITH URINARY HESITANCY: ICD-10-CM

## 2023-04-29 DIAGNOSIS — N40.1 BENIGN PROSTATIC HYPERPLASIA WITH URINARY HESITANCY: ICD-10-CM

## 2023-04-29 RX ORDER — TAMSULOSIN HYDROCHLORIDE 0.4 MG/1
CAPSULE ORAL
Qty: 90 CAPSULE | Refills: 3 | Status: SHIPPED | OUTPATIENT
Start: 2023-04-29

## 2023-08-25 NOTE — PROGRESS NOTES
Problem: Falls - Risk of  Goal: *Absence of Falls  Description: Document Damaris Castellon Fall Risk and appropriate interventions in the flowsheet. Outcome: Progressing Towards Goal  Note: Fall Risk Interventions:  Mobility Interventions: Bed/chair exit alarm, Patient to call before getting OOB, Communicate number of staff needed for ambulation/transfer         Medication Interventions: Bed/chair exit alarm, Patient to call before getting OOB, Teach patient to arise slowly    Elimination Interventions: Call light in reach, Bed/chair exit alarm    History of Falls Interventions: Bed/chair exit alarm         Problem: Patient Education: Go to Patient Education Activity  Goal: Patient/Family Education  Outcome: Progressing Towards Goal     Problem: Breathing Pattern - Ineffective  Goal: *Absence of hypoxia  Outcome: Progressing Towards Goal  Goal: *Use of effective breathing techniques  Outcome: Progressing Towards Goal     Problem: Airway Clearance - Ineffective  Goal: Achieve or maintain patent airway  Outcome: Progressing Towards Goal     Problem: Gas Exchange - Impaired  Goal: Absence of hypoxia  Outcome: Progressing Towards Goal  Goal: Promote optimal lung function  Outcome: Progressing Towards Goal     Problem: Breathing Pattern - Ineffective  Goal: Ability to achieve and maintain a regular respiratory rate  Outcome: Progressing Towards Goal     Problem:  Body Temperature -  Risk of, Imbalanced  Goal: Ability to maintain a body temperature within defined limits  Outcome: Progressing Towards Goal  Goal: Will regain or maintain usual level of consciousness  Outcome: Progressing Towards Goal  Goal: Complications related to the disease process, condition or treatment will be avoided or minimized  Outcome: Progressing Towards Goal     Problem: Isolation Precautions - Risk of Spread of Infection  Goal: Prevent transmission of infectious organism to others  Outcome: Progressing Towards Goal     Problem: Nutrition Deficits  Goal: Optimize nutrtional status  Outcome: Progressing Towards Goal     Problem: Risk for Fluid Volume Deficit  Goal: Maintain normal heart rhythm  Outcome: Progressing Towards Goal  Goal: Maintain absence of muscle cramping  Outcome: Progressing Towards Goal  Goal: Maintain normal serum potassium, sodium, calcium, phosphorus, and pH  Outcome: Progressing Towards Goal     Problem: Loneliness or Risk for Loneliness  Goal: Demonstrate positive use of time alone when socialization is not possible  Outcome: Progressing Towards Goal     Problem: Fatigue  Goal: Verbalize increase energy and improved vitality  Outcome: Progressing Towards Goal     Problem: Patient Education: Go to Patient Education Activity  Goal: Patient/Family Education  Outcome: Progressing Towards Goal     Problem: Pressure Injury - Risk of  Goal: *Prevention of pressure injury  Description: Document Govind Scale and appropriate interventions in the flowsheet.   Outcome: Progressing Towards Goal  Note: Pressure Injury Interventions:  Sensory Interventions: Keep linens dry and wrinkle-free, Maintain/enhance activity level, Minimize linen layers    Moisture Interventions: Absorbent underpads, Minimize layers    Activity Interventions: Increase time out of bed, Pressure redistribution bed/mattress(bed type), PT/OT evaluation    Mobility Interventions: HOB 30 degrees or less, Pressure redistribution bed/mattress (bed type), PT/OT evaluation    Nutrition Interventions: Document food/fluid/supplement intake, Offer support with meals,snacks and hydration    Friction and Shear Interventions: Minimize layers, HOB 30 degrees or less                Problem: Patient Education: Go to Patient Education Activity  Goal: Patient/Family Education  Outcome: Progressing Towards Goal headache     Anemia  - hgb 10.3. Likely AOCD due to MCV of 93.3. Plan  - continue to monitor  - patient declines blood products     HTN  - on amlodipine and lisinopril at home  Plan:  - continue home amlodipine   - continue home lisinopril     HLD  - on atorvastatin at home  Plan:  - continue home atorvastatin     Anxiety   Depression  - patient reports she takes 30mg of citalopram at home. On prn Lorazepam for anxiety  Plan:  - continue home citalopram  - continue home ativan PRN    Discharge Planning  -home on doxycycline x 10 days as long as continuing to improve clinically       Code: Full  Diet: Reg  DVT/AC: lovenox  Mobility: per protocol  Disposition: [address] of Contact (relationship):   Darroll Running [phone]        SUBJECTIVE:   Events of the last 24 hours:  No acute events overnight. Patient seen at beside. No acute complaints.      ROS   Constitutional: fevers, chills  HEENT: no changes in vision, no changes in hearing, no dysphagia  Cardiovascular: no chest pain, no palpitations, yes BLE edema  Pulmonary: no SOB, no cough, no sputum production no chest tightness  Gastrointestinal: yes abdominal pain, no nausea/vomiting, no diarrhea, no constipation, no melena, no hematochezia  Genitourinary: no dysuria, no hesitation, no hematuria, no urgency  Musculoskeletal: no new arthralgias, no new myalgias  Skin: abdominal rash, RLE itching  Neurological: no sensory changes, no motor changes, no headache      CURRENT INPATIENT MEDICATIONS:  Current Facility-Administered Medications   Medication Dose Route Frequency Provider Last Rate Last Admin    amLODIPine (NORVASC) tablet 10 mg  10 mg Oral Nightly Trey Gaona MD   10 mg at 08/24/23 2151    gabapentin (NEURONTIN) capsule 600 mg  600 mg Oral TID PRN Trey Gaona MD        hydrOXYzine HCl (ATARAX) tablet 25 mg  25 mg Oral Once PRN Trey Gaona MD        lisinopril (PRINIVIL;ZESTRIL) tablet 20 mg  20 mg Oral Nightly Trey Gaona ================================================================  Further management for Ms. Chauncey Kamarao will be discussed on rounds with my attending.       Yusuf Lane MD, PGY-1  [facility]  August 25, 2023 6:51 AM

## 2024-10-29 ENCOUNTER — OFFICE VISIT (OUTPATIENT)
Facility: CLINIC | Age: 59
End: 2024-10-29
Payer: COMMERCIAL

## 2024-10-29 VITALS
OXYGEN SATURATION: 95 % | SYSTOLIC BLOOD PRESSURE: 137 MMHG | WEIGHT: 214.3 LBS | DIASTOLIC BLOOD PRESSURE: 80 MMHG | BODY MASS INDEX: 32.48 KG/M2 | HEART RATE: 75 BPM | HEIGHT: 68 IN | RESPIRATION RATE: 16 BRPM

## 2024-10-29 DIAGNOSIS — Z12.5 PROSTATE CANCER SCREENING: ICD-10-CM

## 2024-10-29 DIAGNOSIS — I10 PRIMARY HYPERTENSION: Primary | ICD-10-CM

## 2024-10-29 DIAGNOSIS — E66.9 OBESITY (BMI 30-39.9): ICD-10-CM

## 2024-10-29 DIAGNOSIS — N40.1 BENIGN PROSTATIC HYPERPLASIA WITH LOWER URINARY TRACT SYMPTOMS, SYMPTOM DETAILS UNSPECIFIED: ICD-10-CM

## 2024-10-29 DIAGNOSIS — E78.00 PURE HYPERCHOLESTEROLEMIA, UNSPECIFIED: ICD-10-CM

## 2024-10-29 LAB
ANION GAP SERPL CALC-SCNC: 5 MMOL/L (ref 2–12)
BASOPHILS # BLD: 0.1 K/UL (ref 0–0.1)
BASOPHILS NFR BLD: 1 % (ref 0–1)
BUN SERPL-MCNC: 15 MG/DL (ref 6–20)
BUN/CREAT SERPL: 14 (ref 12–20)
CALCIUM SERPL-MCNC: 9.5 MG/DL (ref 8.5–10.1)
CHLORIDE SERPL-SCNC: 104 MMOL/L (ref 97–108)
CHOLEST SERPL-MCNC: 224 MG/DL
CO2 SERPL-SCNC: 29 MMOL/L (ref 21–32)
CREAT SERPL-MCNC: 1.09 MG/DL (ref 0.7–1.3)
CREAT UR-MCNC: 121 MG/DL
DIFFERENTIAL METHOD BLD: ABNORMAL
EOSINOPHIL # BLD: 0.1 K/UL (ref 0–0.4)
EOSINOPHIL NFR BLD: 2 % (ref 0–7)
ERYTHROCYTE [DISTWIDTH] IN BLOOD BY AUTOMATED COUNT: 13.3 % (ref 11.5–14.5)
GLUCOSE SERPL-MCNC: 112 MG/DL (ref 65–100)
HCT VFR BLD AUTO: 47.9 % (ref 36.6–50.3)
HDLC SERPL-MCNC: 43 MG/DL
HDLC SERPL: 5.2 (ref 0–5)
HGB BLD-MCNC: 16.1 G/DL (ref 12.1–17)
IMM GRANULOCYTES # BLD AUTO: 0 K/UL (ref 0–0.04)
IMM GRANULOCYTES NFR BLD AUTO: 1 % (ref 0–0.5)
LDLC SERPL CALC-MCNC: 143.2 MG/DL (ref 0–100)
LYMPHOCYTES # BLD: 1.3 K/UL (ref 0.8–3.5)
LYMPHOCYTES NFR BLD: 19 % (ref 12–49)
MCH RBC QN AUTO: 29.5 PG (ref 26–34)
MCHC RBC AUTO-ENTMCNC: 33.6 G/DL (ref 30–36.5)
MCV RBC AUTO: 87.7 FL (ref 80–99)
MICROALBUMIN UR-MCNC: 0.86 MG/DL
MICROALBUMIN/CREAT UR-RTO: 7 MG/G (ref 0–30)
MONOCYTES # BLD: 0.6 K/UL (ref 0–1)
MONOCYTES NFR BLD: 9 % (ref 5–13)
NEUTS SEG # BLD: 4.5 K/UL (ref 1.8–8)
NEUTS SEG NFR BLD: 68 % (ref 32–75)
NRBC # BLD: 0 K/UL (ref 0–0.01)
NRBC BLD-RTO: 0 PER 100 WBC
PLATELET # BLD AUTO: 201 K/UL (ref 150–400)
PMV BLD AUTO: 12 FL (ref 8.9–12.9)
POTASSIUM SERPL-SCNC: 4.2 MMOL/L (ref 3.5–5.1)
PSA SERPL-MCNC: 1.2 NG/ML (ref 0.01–4)
RBC # BLD AUTO: 5.46 M/UL (ref 4.1–5.7)
SODIUM SERPL-SCNC: 138 MMOL/L (ref 136–145)
TRIGL SERPL-MCNC: 189 MG/DL
VLDLC SERPL CALC-MCNC: 37.8 MG/DL
WBC # BLD AUTO: 6.6 K/UL (ref 4.1–11.1)

## 2024-10-29 PROCEDURE — 3079F DIAST BP 80-89 MM HG: CPT | Performed by: STUDENT IN AN ORGANIZED HEALTH CARE EDUCATION/TRAINING PROGRAM

## 2024-10-29 PROCEDURE — 3075F SYST BP GE 130 - 139MM HG: CPT | Performed by: STUDENT IN AN ORGANIZED HEALTH CARE EDUCATION/TRAINING PROGRAM

## 2024-10-29 PROCEDURE — 99214 OFFICE O/P EST MOD 30 MIN: CPT | Performed by: STUDENT IN AN ORGANIZED HEALTH CARE EDUCATION/TRAINING PROGRAM

## 2024-10-29 RX ORDER — CHLORAL HYDRATE 500 MG
CAPSULE ORAL DAILY
COMMUNITY

## 2024-10-29 RX ORDER — CIDER VINEGAR 300 MG
TABLET ORAL
COMMUNITY

## 2024-10-29 SDOH — ECONOMIC STABILITY: INCOME INSECURITY: HOW HARD IS IT FOR YOU TO PAY FOR THE VERY BASICS LIKE FOOD, HOUSING, MEDICAL CARE, AND HEATING?: NOT HARD AT ALL

## 2024-10-29 SDOH — ECONOMIC STABILITY: FOOD INSECURITY: WITHIN THE PAST 12 MONTHS, THE FOOD YOU BOUGHT JUST DIDN'T LAST AND YOU DIDN'T HAVE MONEY TO GET MORE.: NEVER TRUE

## 2024-10-29 SDOH — ECONOMIC STABILITY: FOOD INSECURITY: WITHIN THE PAST 12 MONTHS, YOU WORRIED THAT YOUR FOOD WOULD RUN OUT BEFORE YOU GOT MONEY TO BUY MORE.: NEVER TRUE

## 2024-10-29 ASSESSMENT — PATIENT HEALTH QUESTIONNAIRE - PHQ9
SUM OF ALL RESPONSES TO PHQ9 QUESTIONS 1 & 2: 0
SUM OF ALL RESPONSES TO PHQ QUESTIONS 1-9: 0
2. FEELING DOWN, DEPRESSED OR HOPELESS: NOT AT ALL
SUM OF ALL RESPONSES TO PHQ QUESTIONS 1-9: 0
SUM OF ALL RESPONSES TO PHQ QUESTIONS 1-9: 0
1. LITTLE INTEREST OR PLEASURE IN DOING THINGS: NOT AT ALL
SUM OF ALL RESPONSES TO PHQ QUESTIONS 1-9: 0

## 2024-10-29 NOTE — PROGRESS NOTES
\"Have you been to the ER, urgent care clinic since your last visit?  Hospitalized since your last visit?\"    NO    “Have you seen or consulted any other health care providers outside our system since your last visit?”    YES - When: approximately 1 months ago.  Where and Why: Dr Darnell[facility].    Colonoscopy? 5/25/2023 - in chart             
lb 4.8 oz)   SpO2 95%   BMI 32.58 kg/m²   Physical Exam:   General appearance - alert, well appearing, and in no distress  Mental status - alert, oriented to person, place, and time  EYE-DONALD, EOMI, fundi normal, corneas normal, no foreign bodies  ENT-ENT exam normal, no neck nodes or sinus tenderness  Nose - normal and patent, no erythema, discharge or polyps  Mouth - mucous membranes moist, pharynx normal without lesions  Neck - supple, no significant adenopathy   Chest - clear to auscultation, no wheezes, rales or rhonchi, symmetric air entry   Heart - normal rate, regular rhythm, normal S1, S2, no murmurs, rubs, clicks or gallops   Abdomen - soft, nontender, nondistended, no masses or organomegaly  Lymph- no adenopathy palpable  Ext-peripheral pulses normal, no pedal edema, no clubbing or cyanosis  Skin-Warm and dry. no hyperpigmentation, vitiligo, or suspicious lesions  Neuro -alert, oriented, normal speech, no focal findings or movement disorder noted        10/29/2024     8:09 AM   PHQ-9    Little interest or pleasure in doing things 0   Feeling down, depressed, or hopeless 0   PHQ-2 Score 0   PHQ-9 Total Score 0        Assessment/Plan:     1. Primary hypertension  Assessment & Plan:   - BP in office today 137/80.   - At goal BP < 140/90, continue current BP medication regimen, RFT check  - Encourage DASH diet and regular exercise, periodically monitor BP at home, call if elevated or symptomatic   Orders:  -     Basic Metabolic Panel; Future  -     CBC with Auto Differential; Future  -     Microalbumin / Creatinine Urine Ratio; Future  2. Obesity (BMI 30-39.9)  Assessment & Plan:   BMI in office today 32.58. I encouraged the patient on establishing healthier eating habits and regular exercise routine.   Orders:  -     Basic Metabolic Panel; Future  -     CBC with Auto Differential; Future  3. Pure hypercholesterolemia, unspecified  Assessment & Plan:  Lipid panel collected today. ASCVD risk ~10%. He

## 2024-10-29 NOTE — ASSESSMENT & PLAN NOTE
BMI in office today 32.58. I encouraged the patient on establishing healthier eating habits and regular exercise routine.

## 2024-10-29 NOTE — ASSESSMENT & PLAN NOTE
- BP in office today 137/80.   - At goal BP < 140/90, continue current BP medication regimen, RFT check  - Encourage DASH diet and regular exercise, periodically monitor BP at home, call if elevated or symptomatic

## 2024-11-28 PROBLEM — Z12.5 PROSTATE CANCER SCREENING: Status: RESOLVED | Noted: 2024-10-29 | Resolved: 2024-11-28

## 2025-01-03 NOTE — PROGRESS NOTES
Bedside incision and drainage     Indication: Left gluteal abscess     Authorizing surgeon: Melvin Wheeler MD    Performed by: Isis Ortiz MD PGY1, Umair Garcia MD PGY4    Anesthesia: 8 ml, of 1% lidocaine     Description of Procedure:  After obtaining informed consent, the area was cleaned, prepped, and draped in the usual fashion.  Anesthesia was obtained with 1% lidocaine. An Incision and Drainage was performed of left gluteal cleft using an 11 blade to make a 4cm cm incision.  Approximately 30 ml of pus was evacuated from the cavity. The cavity was then explored and deloculated. After break down of any loculations was performed, the cavity was washed out copiously with sterile saline. Hemostasis was achieved with localized pressure.    Patient tolerated the procedure well. Minimal blood loss. Wound was packed with a wet to dry gauze, covered with dry gauze, and an abdominal pad secure with tape. Dressing and wound care instructions were provided.        Complications:  None.    Blood loss: minimal           Ricardo Ogden is a 47 y.o. male presenting for Blood Clot (3 mo fu)  . 1. Have you been to the ER, urgent care clinic since your last visit? Hospitalized since your last visit? 5-18-19 ED [facility] ER    2. Have you seen or consulted any other health care providers outside of the [address] [place] [address] since your last visit? Include any pap smears or colon screening. No    No flowsheet data found. Abuse Screening Questionnaire 5/17/2019   Do you ever feel afraid of your partner? N   Are you in a relationship with someone who physically or mentally threatens you? N   Is it safe for you to go home? Y       3 most recent PHQ Screens 5/8/2019   Little interest or pleasure in doing things Not at all   Feeling down, depressed, irritable, or hopeless Not at all   Total Score PHQ 2 0       There are no discontinued medications.

## 2025-04-29 ENCOUNTER — OFFICE VISIT (OUTPATIENT)
Facility: CLINIC | Age: 60
End: 2025-04-29
Payer: COMMERCIAL

## 2025-04-29 VITALS
HEIGHT: 68 IN | WEIGHT: 208.8 LBS | SYSTOLIC BLOOD PRESSURE: 126 MMHG | RESPIRATION RATE: 16 BRPM | OXYGEN SATURATION: 97 % | DIASTOLIC BLOOD PRESSURE: 78 MMHG | TEMPERATURE: 97.8 F | HEART RATE: 68 BPM | BODY MASS INDEX: 31.64 KG/M2

## 2025-04-29 DIAGNOSIS — E66.9 OBESITY (BMI 30-39.9): ICD-10-CM

## 2025-04-29 DIAGNOSIS — J84.10 PULMONARY FIBROSIS (HCC): ICD-10-CM

## 2025-04-29 DIAGNOSIS — E78.2 MIXED HYPERLIPIDEMIA: Primary | ICD-10-CM

## 2025-04-29 DIAGNOSIS — G47.9 SLEEPING DIFFICULTY: ICD-10-CM

## 2025-04-29 DIAGNOSIS — J30.1 SEASONAL ALLERGIC RHINITIS DUE TO POLLEN: ICD-10-CM

## 2025-04-29 DIAGNOSIS — I10 PRIMARY HYPERTENSION: ICD-10-CM

## 2025-04-29 PROBLEM — J96.10 CHRONIC RESPIRATORY FAILURE (HCC): Status: RESOLVED | Noted: 2021-03-02 | Resolved: 2025-04-29

## 2025-04-29 PROBLEM — E78.00 PURE HYPERCHOLESTEROLEMIA, UNSPECIFIED: Status: RESOLVED | Noted: 2024-10-29 | Resolved: 2025-04-29

## 2025-04-29 PROBLEM — J30.9 ALLERGIC RHINITIS: Status: ACTIVE | Noted: 2025-04-29

## 2025-04-29 LAB
CHOLEST SERPL-MCNC: 242 MG/DL
HDLC SERPL-MCNC: 45 MG/DL
HDLC SERPL: 5.4 (ref 0–5)
LDLC SERPL CALC-MCNC: 157 MG/DL (ref 0–100)
TRIGL SERPL-MCNC: 200 MG/DL
VLDLC SERPL CALC-MCNC: 40 MG/DL

## 2025-04-29 PROCEDURE — 3074F SYST BP LT 130 MM HG: CPT | Performed by: STUDENT IN AN ORGANIZED HEALTH CARE EDUCATION/TRAINING PROGRAM

## 2025-04-29 PROCEDURE — 3078F DIAST BP <80 MM HG: CPT | Performed by: STUDENT IN AN ORGANIZED HEALTH CARE EDUCATION/TRAINING PROGRAM

## 2025-04-29 PROCEDURE — 99214 OFFICE O/P EST MOD 30 MIN: CPT | Performed by: STUDENT IN AN ORGANIZED HEALTH CARE EDUCATION/TRAINING PROGRAM

## 2025-04-29 RX ORDER — BUDESONIDE AND FORMOTEROL FUMARATE DIHYDRATE 160; 4.5 UG/1; UG/1
2 AEROSOL RESPIRATORY (INHALATION) 2 TIMES DAILY
COMMUNITY
Start: 2025-04-29

## 2025-04-29 SDOH — ECONOMIC STABILITY: FOOD INSECURITY: WITHIN THE PAST 12 MONTHS, YOU WORRIED THAT YOUR FOOD WOULD RUN OUT BEFORE YOU GOT MONEY TO BUY MORE.: NEVER TRUE

## 2025-04-29 SDOH — ECONOMIC STABILITY: FOOD INSECURITY: WITHIN THE PAST 12 MONTHS, THE FOOD YOU BOUGHT JUST DIDN'T LAST AND YOU DIDN'T HAVE MONEY TO GET MORE.: NEVER TRUE

## 2025-04-29 ASSESSMENT — PATIENT HEALTH QUESTIONNAIRE - PHQ9
1. LITTLE INTEREST OR PLEASURE IN DOING THINGS: NOT AT ALL
SUM OF ALL RESPONSES TO PHQ QUESTIONS 1-9: 0
2. FEELING DOWN, DEPRESSED OR HOPELESS: NOT AT ALL
SUM OF ALL RESPONSES TO PHQ QUESTIONS 1-9: 0
2. FEELING DOWN, DEPRESSED OR HOPELESS: NOT AT ALL
1. LITTLE INTEREST OR PLEASURE IN DOING THINGS: NOT AT ALL

## 2025-04-29 NOTE — PROGRESS NOTES
Johnson Liu is a 59 y.o. male     Chief Complaint   Patient presents with    Annual Exam       /78 (BP Site: Left Upper Arm, Patient Position: Sitting, BP Cuff Size: Medium Adult)   Pulse 68   Temp 97.8 °F (36.6 °C) (Oral)   Resp 16   Ht 1.727 m (5' 8\")   Wt 94.7 kg (208 lb 12.8 oz)   SpO2 97%   BMI 31.75 kg/m²     Health Maintenance Due   Topic Date Due    HIV screen  Never done    Hepatitis B vaccine (1 of 3 - 19+ 3-dose series) Never done    DTaP/Tdap/Td vaccine (1 - Tdap) Never done    Shingles vaccine (2 of 2) 09/22/2022    Pneumococcal 50+ years Vaccine (2 of 2 - PCV) 12/01/2023    COVID-19 Vaccine (1 - 2024-25 season) Never done         \"Have you been to the ER, urgent care clinic since your last visit?  Hospitalized since your last visit?\"    NO    “Have you seen or consulted any other health care providers outside of [facility] since your last visit?”    NO                     
about these complex clinical issues and went over the various important aspects to consider. All questions were answered.      Advised the patient to call back or return to office if symptoms do not improve, change in nature, or persist.     The patient was given an after visit summary or informed of [employer] Access which includes patient instructions, diagnoses, current medications, & vitals.    Patient expressed understanding with the diagnosis and plan.        Christiana De Jesus MD       The patient (or guardian, if applicable) and other individuals in attendance with the patient were advised that Artificial Intelligence will be utilized during this visit to record, process the conversation to generate a clinical note, and support improvement of the AI technology. The patient (or guardian, if applicable) and other individuals in attendance at the appointment consented to the use of AI, including the recording.

## 2025-04-30 ENCOUNTER — RESULTS FOLLOW-UP (OUTPATIENT)
Facility: CLINIC | Age: 60
End: 2025-04-30

## 2025-04-30 NOTE — RESULT ENCOUNTER NOTE
The 10-year ASCVD risk score (Caren BOWMAN, et al., 2019) is: 10.8%    Values used to calculate the score:      Age: 59 years      Sex: Male      Is Non- : No      Diabetic: No      Tobacco smoker: No      Systolic Blood Pressure: 126 mmHg      Is BP treated: Yes      HDL Cholesterol: 45 MG/DL      Total Cholesterol: 242 MG/DL     Please contact patient.   Mixed hyperlipidemia noted, lifestyle modification encouraged with routine aerobic exercise and dietary modification.   Recommendations to start low intensity statin at this time.